# Patient Record
Sex: FEMALE | Race: OTHER | NOT HISPANIC OR LATINO
[De-identification: names, ages, dates, MRNs, and addresses within clinical notes are randomized per-mention and may not be internally consistent; named-entity substitution may affect disease eponyms.]

---

## 2018-07-26 ENCOUNTER — APPOINTMENT (OUTPATIENT)
Dept: OPHTHALMOLOGY | Facility: CLINIC | Age: 53
End: 2018-07-26
Payer: COMMERCIAL

## 2018-07-26 PROCEDURE — 92014 COMPRE OPH EXAM EST PT 1/>: CPT

## 2018-07-26 PROCEDURE — 92134 CPTRZ OPH DX IMG PST SGM RTA: CPT

## 2018-07-26 PROCEDURE — 92083 EXTENDED VISUAL FIELD XM: CPT

## 2019-05-07 ENCOUNTER — NON-APPOINTMENT (OUTPATIENT)
Age: 54
End: 2019-05-07

## 2019-05-07 ENCOUNTER — APPOINTMENT (OUTPATIENT)
Dept: OPHTHALMOLOGY | Facility: CLINIC | Age: 54
End: 2019-05-07
Payer: COMMERCIAL

## 2019-05-07 PROCEDURE — 92014 COMPRE OPH EXAM EST PT 1/>: CPT

## 2019-05-07 PROCEDURE — 92225: CPT | Mod: RT

## 2019-07-25 ENCOUNTER — NON-APPOINTMENT (OUTPATIENT)
Age: 54
End: 2019-07-25

## 2019-07-25 ENCOUNTER — APPOINTMENT (OUTPATIENT)
Dept: OPHTHALMOLOGY | Facility: CLINIC | Age: 54
End: 2019-07-25
Payer: COMMERCIAL

## 2019-07-25 PROCEDURE — 92134 CPTRZ OPH DX IMG PST SGM RTA: CPT

## 2019-07-25 PROCEDURE — 92083 EXTENDED VISUAL FIELD XM: CPT

## 2019-07-25 PROCEDURE — 92015 DETERMINE REFRACTIVE STATE: CPT

## 2019-07-25 PROCEDURE — 92012 INTRM OPH EXAM EST PATIENT: CPT

## 2019-10-24 ENCOUNTER — APPOINTMENT (OUTPATIENT)
Dept: OPHTHALMOLOGY | Facility: CLINIC | Age: 54
End: 2019-10-24

## 2022-01-21 ENCOUNTER — APPOINTMENT (OUTPATIENT)
Dept: PULMONOLOGY | Facility: CLINIC | Age: 57
End: 2022-01-21
Payer: COMMERCIAL

## 2022-01-21 VITALS
DIASTOLIC BLOOD PRESSURE: 80 MMHG | BODY MASS INDEX: 43.43 KG/M2 | OXYGEN SATURATION: 95 % | HEART RATE: 82 BPM | HEIGHT: 61 IN | TEMPERATURE: 97.5 F | SYSTOLIC BLOOD PRESSURE: 110 MMHG | WEIGHT: 230 LBS

## 2022-01-21 DIAGNOSIS — Z11.59 ENCOUNTER FOR SCREENING FOR OTHER VIRAL DISEASES: ICD-10-CM

## 2022-01-21 DIAGNOSIS — Z86.69 PERSONAL HISTORY OF OTHER DISEASES OF THE NERVOUS SYSTEM AND SENSE ORGANS: ICD-10-CM

## 2022-01-21 PROCEDURE — 99204 OFFICE O/P NEW MOD 45 MIN: CPT

## 2022-01-21 NOTE — HISTORY OF PRESENT ILLNESS
[Never] : never [TextBox_4] : She was hospitalized for asthma. She was in the hospital for about 3 days. She is feeling much better now. Using budesonide via nebulizer. Also using albuterol as needed. She is completing a prednisone taper.\par \par She has history of obstructive sleep apnea. Has not used CPAP in over one year. Her machine was recalled.

## 2022-01-21 NOTE — DISCUSSION/SUMMARY
[FreeTextEntry1] : She is a 56 year-old, never smoker, with a history of asthma and obstructive sleep apnea.\par \par For her apnea her CPAP was renewed. \par \par For her asthma she is to continue with budesonide and albuterol as needed. \par \par A PFT will be obtained after a nasopharyngeal swab for COVID-19-PCR has been obtained and the result is negative. \par \par Further recommendations to follow the results of the above. \par \par Follow up in one month. \par

## 2022-01-21 NOTE — REVIEW OF SYSTEMS
[SOB on Exertion] : sob on exertion [Fever] : no fever [Fatigue] : no fatigue [Nasal Congestion] : no nasal congestion [Cough] : no cough [Chest Tightness] : no chest tightness [Chest Discomfort] : no chest discomfort [GERD] : no gerd [Myalgias] : no myalgias [Rash] : no rash [History of Iron Deficiency] : no history of iron deficiency [Anxiety] : no anxiety [Diabetes] : no diabetes

## 2022-01-21 NOTE — PHYSICAL EXAM
[No Acute Distress] : no acute distress [No Neck Mass] : no neck mass [Normal S1, S2] : normal s1, s2 [Clear to Auscultation Bilaterally] : clear to auscultation bilaterally [No HSM] : no hsm [Normal Gait] : normal gait [No Cyanosis] : no cyanosis [No Edema] : no edema [Normal Turgor] : normal turgor [No Focal Deficits] : no focal deficits [Oriented x3] : oriented x3

## 2022-03-04 ENCOUNTER — APPOINTMENT (OUTPATIENT)
Dept: PULMONOLOGY | Facility: CLINIC | Age: 57
End: 2022-03-04

## 2022-03-05 ENCOUNTER — TRANSCRIPTION ENCOUNTER (OUTPATIENT)
Age: 57
End: 2022-03-05

## 2022-04-11 PROBLEM — Z11.59 SCREENING FOR VIRAL DISEASE: Status: ACTIVE | Noted: 2022-01-21

## 2022-07-28 ENCOUNTER — APPOINTMENT (OUTPATIENT)
Dept: OPHTHALMOLOGY | Facility: CLINIC | Age: 57
End: 2022-07-28

## 2022-09-01 ENCOUNTER — APPOINTMENT (OUTPATIENT)
Dept: OPHTHALMOLOGY | Facility: CLINIC | Age: 57
End: 2022-09-01

## 2022-09-01 ENCOUNTER — NON-APPOINTMENT (OUTPATIENT)
Age: 57
End: 2022-09-01

## 2022-09-01 PROCEDURE — 92015 DETERMINE REFRACTIVE STATE: CPT

## 2022-09-01 PROCEDURE — 92004 COMPRE OPH EXAM NEW PT 1/>: CPT

## 2023-03-01 ENCOUNTER — APPOINTMENT (OUTPATIENT)
Dept: PULMONOLOGY | Facility: CLINIC | Age: 58
End: 2023-03-01
Payer: COMMERCIAL

## 2023-03-01 VITALS
HEART RATE: 90 BPM | BODY MASS INDEX: 42.7 KG/M2 | WEIGHT: 226 LBS | TEMPERATURE: 98 F | DIASTOLIC BLOOD PRESSURE: 100 MMHG | OXYGEN SATURATION: 96 % | SYSTOLIC BLOOD PRESSURE: 172 MMHG

## 2023-03-01 DIAGNOSIS — J45.909 UNSPECIFIED ASTHMA, UNCOMPLICATED: ICD-10-CM

## 2023-03-01 DIAGNOSIS — G47.33 OBSTRUCTIVE SLEEP APNEA (ADULT) (PEDIATRIC): ICD-10-CM

## 2023-03-01 PROCEDURE — 94060 EVALUATION OF WHEEZING: CPT

## 2023-03-01 PROCEDURE — 94729 DIFFUSING CAPACITY: CPT

## 2023-03-01 PROCEDURE — 94727 GAS DIL/WSHOT DETER LNG VOL: CPT

## 2023-03-01 PROCEDURE — 99215 OFFICE O/P EST HI 40 MIN: CPT | Mod: 25

## 2023-03-01 NOTE — DISCUSSION/SUMMARY
[FreeTextEntry1] : She is a 58 year-old, never smoker, with a history of asthma and obstructive sleep apnea.\par \par She stated that she had a sleep study at [facility] in the past year. The lab was called. No record of her having a PSG. \par \par Will renew CPAP of 8. Obtain f/u PSG titration. To continue with albuterol as needed. Should to to hospital if feels somnolent or has any cardiac or neurologic symptoms.  \par \par Follow up in one month.

## 2023-03-01 NOTE — REVIEW OF SYSTEMS
[SOB on Exertion] : sob on exertion [Fever] : no fever [Fatigue] : no fatigue [Nasal Congestion] : no nasal congestion [Cough] : no cough [Chest Tightness] : no chest tightness [A.M. Dry Mouth] : no a.m. dry mouth [Chest Discomfort] : no chest discomfort [Nasal Discharge] : no nasal discharge [GERD] : no gerd [Myalgias] : no myalgias [Rash] : no rash [History of Iron Deficiency] : no history of iron deficiency [Diabetes] : no diabetes

## 2023-03-01 NOTE — HISTORY OF PRESENT ILLNESS
[Never] : never [Obstructive Sleep Apnea] : obstructive sleep apnea [TextBox_4] : Has h/o BRENDA. Was on CPAP but her machine was recalled. \par \par She stated that she had a sleep study at [facility] in the past year. The lab was called. No record of her having a PSG. \par \par  \par

## 2023-03-01 NOTE — PROCEDURE
[FreeTextEntry1] : PSG 7/31/15: Moderate BRENDA. AHI 21.9.\par \par PSG - CPAP 10/9/15: CPAP of 8 therapeutic. \par \par PFT 3/1/23: No obstruction. Moderate restriction. Moderate decrease in diffusion. Significant improvement after bronchodilator noted.

## 2023-03-13 ENCOUNTER — APPOINTMENT (OUTPATIENT)
Dept: PULMONOLOGY | Facility: CLINIC | Age: 58
End: 2023-03-13

## 2023-03-17 ENCOUNTER — APPOINTMENT (OUTPATIENT)
Dept: PULMONOLOGY | Facility: CLINIC | Age: 58
End: 2023-03-17
Payer: COMMERCIAL

## 2023-03-17 VITALS
SYSTOLIC BLOOD PRESSURE: 162 MMHG | HEART RATE: 91 BPM | DIASTOLIC BLOOD PRESSURE: 90 MMHG | BODY MASS INDEX: 41.95 KG/M2 | TEMPERATURE: 97.1 F | WEIGHT: 222 LBS | OXYGEN SATURATION: 95 %

## 2023-03-17 DIAGNOSIS — G47.30 SLEEP APNEA, UNSPECIFIED: ICD-10-CM

## 2023-03-17 PROCEDURE — 99213 OFFICE O/P EST LOW 20 MIN: CPT

## 2023-03-18 NOTE — HISTORY OF PRESENT ILLNESS
[Never] : never [Obstructive Sleep Apnea] : obstructive sleep apnea [TextBox_4] : Has h/o BRENDA. Was on CPAP but her machine was recalled. \par \par She stated that she had a sleep study at [facility] in the past year. The lab was called. No record of her having a PSG. \par \par The patient came for a scheduled follow up visit today. \par \par  \par

## 2023-03-18 NOTE — DISCUSSION/SUMMARY
[FreeTextEntry1] : She is a 58 year-old, never smoker, with a history of asthma and obstructive sleep apnea.\par \par Will renew CPAP of 8 and obtain CPAP titration. To continue with albuterol as needed.\par \par Follow up in two months.

## 2023-03-18 NOTE — PROCEDURE
[FreeTextEntry1] : PSG 7/31/15: Moderate BRENDA. AHI 21.9.\par \par PSG CPAP 10/9/15: CPAP of 8 therapeutic. \par \par PFT 3/1/23: No obstruction. Moderate restriction. Moderate decrease in diffusion. Significant improvement after bronchodilator noted.

## 2023-03-18 NOTE — PHYSICAL EXAM
[No Acute Distress] : no acute distress [No Neck Mass] : no neck mass [Normal S1, S2] : normal s1, s2 [Clear to Auscultation Bilaterally] : clear to auscultation bilaterally [No HSM] : no hsm [Normal Gait] : normal gait [No Cyanosis] : no cyanosis [No Edema] : no edema [Normal Turgor] : normal turgor [No Focal Deficits] : no focal deficits [Oriented x3] : oriented x3 [Not Tender] : not tender

## 2023-03-18 NOTE — REVIEW OF SYSTEMS
[SOB on Exertion] : sob on exertion [Fatigue] : fatigue [Nasal Congestion] : no nasal congestion [Cough] : no cough [Chest Tightness] : no chest tightness [A.M. Dry Mouth] : no a.m. dry mouth [Chest Discomfort] : no chest discomfort [GERD] : no gerd [Nasal Discharge] : no nasal discharge [Myalgias] : no myalgias [Rash] : no rash [History of Iron Deficiency] : no history of iron deficiency [Diabetes] : no diabetes

## 2023-03-20 ENCOUNTER — APPOINTMENT (OUTPATIENT)
Dept: PULMONOLOGY | Facility: CLINIC | Age: 58
End: 2023-03-20

## 2023-05-25 ENCOUNTER — APPOINTMENT (OUTPATIENT)
Dept: PULMONOLOGY | Facility: CLINIC | Age: 58
End: 2023-05-25

## 2023-11-14 ENCOUNTER — INPATIENT (INPATIENT)
Facility: HOSPITAL | Age: 58
LOS: 4 days | Discharge: ANOTHER HOSPITAL | End: 2023-11-19
Attending: HOSPITALIST | Admitting: HOSPITALIST
Payer: MEDICAID

## 2023-11-14 VITALS
OXYGEN SATURATION: 96 % | SYSTOLIC BLOOD PRESSURE: 156 MMHG | DIASTOLIC BLOOD PRESSURE: 94 MMHG | RESPIRATION RATE: 18 BRPM | TEMPERATURE: 99 F | HEART RATE: 76 BPM | HEIGHT: 62 IN | WEIGHT: 240.08 LBS

## 2023-11-14 LAB
ALBUMIN SERPL ELPH-MCNC: 3.3 G/DL — SIGNIFICANT CHANGE UP (ref 3.3–5)
ALBUMIN SERPL ELPH-MCNC: 3.3 G/DL — SIGNIFICANT CHANGE UP (ref 3.3–5)
ALP SERPL-CCNC: 60 U/L — SIGNIFICANT CHANGE UP (ref 40–120)
ALP SERPL-CCNC: 60 U/L — SIGNIFICANT CHANGE UP (ref 40–120)
ALT FLD-CCNC: 16 U/L — SIGNIFICANT CHANGE UP (ref 12–78)
ALT FLD-CCNC: 16 U/L — SIGNIFICANT CHANGE UP (ref 12–78)
ANION GAP SERPL CALC-SCNC: 5 MMOL/L — SIGNIFICANT CHANGE UP (ref 5–17)
ANION GAP SERPL CALC-SCNC: 5 MMOL/L — SIGNIFICANT CHANGE UP (ref 5–17)
AST SERPL-CCNC: 21 U/L — SIGNIFICANT CHANGE UP (ref 15–37)
AST SERPL-CCNC: 21 U/L — SIGNIFICANT CHANGE UP (ref 15–37)
BASOPHILS # BLD AUTO: 0.02 K/UL — SIGNIFICANT CHANGE UP (ref 0–0.2)
BASOPHILS # BLD AUTO: 0.02 K/UL — SIGNIFICANT CHANGE UP (ref 0–0.2)
BASOPHILS NFR BLD AUTO: 0.3 % — SIGNIFICANT CHANGE UP (ref 0–2)
BASOPHILS NFR BLD AUTO: 0.3 % — SIGNIFICANT CHANGE UP (ref 0–2)
BILIRUB SERPL-MCNC: 0.6 MG/DL — SIGNIFICANT CHANGE UP (ref 0.2–1.2)
BILIRUB SERPL-MCNC: 0.6 MG/DL — SIGNIFICANT CHANGE UP (ref 0.2–1.2)
BUN SERPL-MCNC: 24 MG/DL — HIGH (ref 7–23)
BUN SERPL-MCNC: 24 MG/DL — HIGH (ref 7–23)
CALCIUM SERPL-MCNC: 9.4 MG/DL — SIGNIFICANT CHANGE UP (ref 8.5–10.1)
CALCIUM SERPL-MCNC: 9.4 MG/DL — SIGNIFICANT CHANGE UP (ref 8.5–10.1)
CHLORIDE SERPL-SCNC: 102 MMOL/L — SIGNIFICANT CHANGE UP (ref 96–108)
CHLORIDE SERPL-SCNC: 102 MMOL/L — SIGNIFICANT CHANGE UP (ref 96–108)
CO2 SERPL-SCNC: 34 MMOL/L — HIGH (ref 22–31)
CO2 SERPL-SCNC: 34 MMOL/L — HIGH (ref 22–31)
CREAT SERPL-MCNC: 1.79 MG/DL — HIGH (ref 0.5–1.3)
CREAT SERPL-MCNC: 1.79 MG/DL — HIGH (ref 0.5–1.3)
EGFR: 32 ML/MIN/1.73M2 — LOW
EGFR: 32 ML/MIN/1.73M2 — LOW
EOSINOPHIL # BLD AUTO: 0.13 K/UL — SIGNIFICANT CHANGE UP (ref 0–0.5)
EOSINOPHIL # BLD AUTO: 0.13 K/UL — SIGNIFICANT CHANGE UP (ref 0–0.5)
EOSINOPHIL NFR BLD AUTO: 2.1 % — SIGNIFICANT CHANGE UP (ref 0–6)
EOSINOPHIL NFR BLD AUTO: 2.1 % — SIGNIFICANT CHANGE UP (ref 0–6)
GLUCOSE SERPL-MCNC: 104 MG/DL — HIGH (ref 70–99)
GLUCOSE SERPL-MCNC: 104 MG/DL — HIGH (ref 70–99)
HCG SERPL-ACNC: 1 MIU/ML — SIGNIFICANT CHANGE UP
HCG SERPL-ACNC: 1 MIU/ML — SIGNIFICANT CHANGE UP
HCT VFR BLD CALC: 30 % — LOW (ref 34.5–45)
HCT VFR BLD CALC: 30 % — LOW (ref 34.5–45)
HGB BLD-MCNC: 10.4 G/DL — LOW (ref 11.5–15.5)
HGB BLD-MCNC: 10.4 G/DL — LOW (ref 11.5–15.5)
IMM GRANULOCYTES NFR BLD AUTO: 0.2 % — SIGNIFICANT CHANGE UP (ref 0–0.9)
IMM GRANULOCYTES NFR BLD AUTO: 0.2 % — SIGNIFICANT CHANGE UP (ref 0–0.9)
LYMPHOCYTES # BLD AUTO: 1.94 K/UL — SIGNIFICANT CHANGE UP (ref 1–3.3)
LYMPHOCYTES # BLD AUTO: 1.94 K/UL — SIGNIFICANT CHANGE UP (ref 1–3.3)
LYMPHOCYTES # BLD AUTO: 30.8 % — SIGNIFICANT CHANGE UP (ref 13–44)
LYMPHOCYTES # BLD AUTO: 30.8 % — SIGNIFICANT CHANGE UP (ref 13–44)
MCHC RBC-ENTMCNC: 29 PG — SIGNIFICANT CHANGE UP (ref 27–34)
MCHC RBC-ENTMCNC: 29 PG — SIGNIFICANT CHANGE UP (ref 27–34)
MCHC RBC-ENTMCNC: 34.7 G/DL — SIGNIFICANT CHANGE UP (ref 32–36)
MCHC RBC-ENTMCNC: 34.7 G/DL — SIGNIFICANT CHANGE UP (ref 32–36)
MCV RBC AUTO: 83.6 FL — SIGNIFICANT CHANGE UP (ref 80–100)
MCV RBC AUTO: 83.6 FL — SIGNIFICANT CHANGE UP (ref 80–100)
MONOCYTES # BLD AUTO: 1.07 K/UL — HIGH (ref 0–0.9)
MONOCYTES # BLD AUTO: 1.07 K/UL — HIGH (ref 0–0.9)
MONOCYTES NFR BLD AUTO: 17 % — HIGH (ref 2–14)
MONOCYTES NFR BLD AUTO: 17 % — HIGH (ref 2–14)
NEUTROPHILS # BLD AUTO: 3.13 K/UL — SIGNIFICANT CHANGE UP (ref 1.8–7.4)
NEUTROPHILS # BLD AUTO: 3.13 K/UL — SIGNIFICANT CHANGE UP (ref 1.8–7.4)
NEUTROPHILS NFR BLD AUTO: 49.6 % — SIGNIFICANT CHANGE UP (ref 43–77)
NEUTROPHILS NFR BLD AUTO: 49.6 % — SIGNIFICANT CHANGE UP (ref 43–77)
NRBC # BLD: 0 /100 WBCS — SIGNIFICANT CHANGE UP (ref 0–0)
NRBC # BLD: 0 /100 WBCS — SIGNIFICANT CHANGE UP (ref 0–0)
PLATELET # BLD AUTO: 347 K/UL — SIGNIFICANT CHANGE UP (ref 150–400)
PLATELET # BLD AUTO: 347 K/UL — SIGNIFICANT CHANGE UP (ref 150–400)
POTASSIUM SERPL-MCNC: 3.1 MMOL/L — LOW (ref 3.5–5.3)
POTASSIUM SERPL-MCNC: 3.1 MMOL/L — LOW (ref 3.5–5.3)
POTASSIUM SERPL-SCNC: 3.1 MMOL/L — LOW (ref 3.5–5.3)
POTASSIUM SERPL-SCNC: 3.1 MMOL/L — LOW (ref 3.5–5.3)
PROT SERPL-MCNC: 7.9 GM/DL — SIGNIFICANT CHANGE UP (ref 6–8.3)
PROT SERPL-MCNC: 7.9 GM/DL — SIGNIFICANT CHANGE UP (ref 6–8.3)
RBC # BLD: 3.59 M/UL — LOW (ref 3.8–5.2)
RBC # BLD: 3.59 M/UL — LOW (ref 3.8–5.2)
RBC # FLD: 12.9 % — SIGNIFICANT CHANGE UP (ref 10.3–14.5)
RBC # FLD: 12.9 % — SIGNIFICANT CHANGE UP (ref 10.3–14.5)
SODIUM SERPL-SCNC: 141 MMOL/L — SIGNIFICANT CHANGE UP (ref 135–145)
SODIUM SERPL-SCNC: 141 MMOL/L — SIGNIFICANT CHANGE UP (ref 135–145)
TSH SERPL-MCNC: 1.04 UIU/ML — SIGNIFICANT CHANGE UP (ref 0.36–3.74)
TSH SERPL-MCNC: 1.04 UIU/ML — SIGNIFICANT CHANGE UP (ref 0.36–3.74)
WBC # BLD: 6.3 K/UL — SIGNIFICANT CHANGE UP (ref 3.8–10.5)
WBC # BLD: 6.3 K/UL — SIGNIFICANT CHANGE UP (ref 3.8–10.5)
WBC # FLD AUTO: 6.3 K/UL — SIGNIFICANT CHANGE UP (ref 3.8–10.5)
WBC # FLD AUTO: 6.3 K/UL — SIGNIFICANT CHANGE UP (ref 3.8–10.5)

## 2023-11-14 PROCEDURE — 99285 EMERGENCY DEPT VISIT HI MDM: CPT | Mod: 25

## 2023-11-14 PROCEDURE — 70450 CT HEAD/BRAIN W/O DYE: CPT | Mod: 26,MA

## 2023-11-14 RX ORDER — SODIUM CHLORIDE 9 MG/ML
1000 INJECTION INTRAMUSCULAR; INTRAVENOUS; SUBCUTANEOUS ONCE
Refills: 0 | Status: COMPLETED | OUTPATIENT
Start: 2023-11-14 | End: 2023-11-14

## 2023-11-14 RX ADMIN — SODIUM CHLORIDE 1000 MILLILITER(S): 9 INJECTION INTRAMUSCULAR; INTRAVENOUS; SUBCUTANEOUS at 22:32

## 2023-11-14 NOTE — ED PROVIDER NOTE - PHYSICAL EXAMINATION
Vitals: HTN at 156/94, otherwise WNL  Gen: AAOx2, pleasantly confused, NAD, sitting comfortably in stretcher  Head: ncat, perrla, eomi b/l  Neck: supple, no lymphadenopathy, no midline deviation  Heart: rrr, no m/r/g  Lungs: CTA b/l, no rales/ronchi/wheezes  Abd: soft, nontender, non-distended, no rebound or guarding  Ext: no clubbing/cyanosis/edema  Neuro: sensation and muscle strength intact b/l, steady gait, no focal weakness or sensory loss, CN2-12 intact b/l

## 2023-11-14 NOTE — ED PROVIDER NOTE - PROGRESS NOTE DETAILS
EKG shows concerning TWIs, unknown age, no prior EKG to compare to  pt. denies chest pain, but history is unreliable given probable dementia  will admit to tele for further care, trop trending, family pickup when medically cleared

## 2023-11-14 NOTE — ED ADULT TRIAGE NOTE - CHIEF COMPLAINT QUOTE
bibems from home for AMS.  Per EMS, pt lives by herself, a neighbor overheard pt looking for mom whom currently is in [place].  Pt was also confused as to what apt she lives when EMS got there.  FS in triage 115, Pt AOx3 in triage, denies any pain or discomfort.  no neuro deficits noted, pt ambulatory on scene.   hx of HTN, dementia.  Nkda.

## 2023-11-14 NOTE — ED ADULT NURSE NOTE - NSFALLUNIVINTERV_ED_ALL_ED
Bed/Stretcher in lowest position, wheels locked, appropriate side rails in place/Call bell, personal items and telephone in reach/Instruct patient to call for assistance before getting out of bed/chair/stretcher/Non-slip footwear applied when patient is off stretcher/[place] to call system/Physically safe environment - no spills, clutter or unnecessary equipment/Purposeful proactive rounding/Room/bathroom lighting operational, light cord in reach

## 2023-11-14 NOTE — ED PROVIDER NOTE - CARE PLAN
Principal Discharge DX:	Confusion   1 Principal Discharge DX:	Confusion  Secondary Diagnosis:	Abnormal EKG  Secondary Diagnosis:	Hypertensive urgency

## 2023-11-14 NOTE — ED ADULT NURSE NOTE - CHIEF COMPLAINT QUOTE
bibems from home for AMS.  Per EMS, pt lives by herself, a neighbor overheard pt looking for mom whom currently is in [place].  Pt was also confused as to what apt she lives when EMS got there.  FS in triage 115, Pt AOx3 in triage, denies any pain or discomfort.  no neuro deficits noted, pt ambulatory on scene.   hx of HTN, dementia.  Nkda.

## 2023-11-14 NOTE — ED ADULT NURSE NOTE - OBJECTIVE STATEMENT
bibems from home for AMS.  Per EMS, pt lives by herself, a neighbor overheard pt looking for mom whom currently is in [place].  Pt was also confused as to what apt she lives when EMS got there per triage note. Pt AOx3 at time of assessment. denies any pain or discomfort.  no neuro deficits noted, pt ambulatory on scene.   hx of HTN, dementia.  Nkda.

## 2023-11-14 NOTE — ED PROVIDER NOTE - CLINICAL SUMMARY MEDICAL DECISION MAKING FREE TEXT BOX
57 yo F with questionable AMS, vs baseline dementia, concerning for UTI, doubt CVA, doubt hypothyroid  -cbc, cmp, ua, cx, hcg, finger stick, tsh, CT brain, ekg, iv, hydration  -f/u results, reeval

## 2023-11-14 NOTE — ED PROVIDER NOTE - OBJECTIVE STATEMENT
57 yo F bibems after being found wandering/confused around apartment complex.  Pt. currently has no complaints.  She explains that she heard a voice or a scream--someone calling from outside so she went out to investigate.  pt. is unsure of her address at this time.  She says she lives with sister at home, but there was a family event and they were out for the night.  No other complaints.   ROS: negative for fever, cough, headache, chest pain, shortness of breath, abd pain, nausea, vomiting, diarrhea, rash, paresthesia, and weakness--all other systems reviewed are negative.   PMH: HTN, Dementia; Meds: Denies; SH: Denies smoking/drinking/drug use

## 2023-11-15 DIAGNOSIS — R41.82 ALTERED MENTAL STATUS, UNSPECIFIED: ICD-10-CM

## 2023-11-15 DIAGNOSIS — I16.0 HYPERTENSIVE URGENCY: ICD-10-CM

## 2023-11-15 DIAGNOSIS — R93.0 ABNORMAL FINDINGS ON DIAGNOSTIC IMAGING OF SKULL AND HEAD, NOT ELSEWHERE CLASSIFIED: ICD-10-CM

## 2023-11-15 DIAGNOSIS — N17.9 ACUTE KIDNEY FAILURE, UNSPECIFIED: ICD-10-CM

## 2023-11-15 LAB
ALBUMIN SERPL ELPH-MCNC: 3.3 G/DL — SIGNIFICANT CHANGE UP (ref 3.3–5)
ALBUMIN SERPL ELPH-MCNC: 3.3 G/DL — SIGNIFICANT CHANGE UP (ref 3.3–5)
ALP SERPL-CCNC: 61 U/L — SIGNIFICANT CHANGE UP (ref 40–120)
ALP SERPL-CCNC: 61 U/L — SIGNIFICANT CHANGE UP (ref 40–120)
ALT FLD-CCNC: 17 U/L — SIGNIFICANT CHANGE UP (ref 12–78)
ALT FLD-CCNC: 17 U/L — SIGNIFICANT CHANGE UP (ref 12–78)
ANION GAP SERPL CALC-SCNC: 7 MMOL/L — SIGNIFICANT CHANGE UP (ref 5–17)
ANION GAP SERPL CALC-SCNC: 7 MMOL/L — SIGNIFICANT CHANGE UP (ref 5–17)
AST SERPL-CCNC: 15 U/L — SIGNIFICANT CHANGE UP (ref 15–37)
AST SERPL-CCNC: 15 U/L — SIGNIFICANT CHANGE UP (ref 15–37)
BILIRUB SERPL-MCNC: 1 MG/DL — SIGNIFICANT CHANGE UP (ref 0.2–1.2)
BILIRUB SERPL-MCNC: 1 MG/DL — SIGNIFICANT CHANGE UP (ref 0.2–1.2)
BUN SERPL-MCNC: 19 MG/DL — SIGNIFICANT CHANGE UP (ref 7–23)
BUN SERPL-MCNC: 19 MG/DL — SIGNIFICANT CHANGE UP (ref 7–23)
CALCIUM SERPL-MCNC: 9 MG/DL — SIGNIFICANT CHANGE UP (ref 8.5–10.1)
CALCIUM SERPL-MCNC: 9 MG/DL — SIGNIFICANT CHANGE UP (ref 8.5–10.1)
CHLORIDE SERPL-SCNC: 105 MMOL/L — SIGNIFICANT CHANGE UP (ref 96–108)
CHLORIDE SERPL-SCNC: 105 MMOL/L — SIGNIFICANT CHANGE UP (ref 96–108)
CK MB BLD-MCNC: <1.2 % — SIGNIFICANT CHANGE UP (ref 0–3.5)
CK MB BLD-MCNC: <1.2 % — SIGNIFICANT CHANGE UP (ref 0–3.5)
CK MB CFR SERPL CALC: <1 NG/ML — SIGNIFICANT CHANGE UP (ref 0.5–3.6)
CK MB CFR SERPL CALC: <1 NG/ML — SIGNIFICANT CHANGE UP (ref 0.5–3.6)
CK SERPL-CCNC: 82 U/L — SIGNIFICANT CHANGE UP (ref 26–192)
CK SERPL-CCNC: 82 U/L — SIGNIFICANT CHANGE UP (ref 26–192)
CO2 SERPL-SCNC: 32 MMOL/L — HIGH (ref 22–31)
CO2 SERPL-SCNC: 32 MMOL/L — HIGH (ref 22–31)
CREAT SERPL-MCNC: 1.34 MG/DL — HIGH (ref 0.5–1.3)
CREAT SERPL-MCNC: 1.34 MG/DL — HIGH (ref 0.5–1.3)
EGFR: 45 ML/MIN/1.73M2 — LOW
EGFR: 45 ML/MIN/1.73M2 — LOW
FOLATE SERPL-MCNC: 16.5 NG/ML — SIGNIFICANT CHANGE UP
FOLATE SERPL-MCNC: 16.5 NG/ML — SIGNIFICANT CHANGE UP
GLUCOSE SERPL-MCNC: 97 MG/DL — SIGNIFICANT CHANGE UP (ref 70–99)
GLUCOSE SERPL-MCNC: 97 MG/DL — SIGNIFICANT CHANGE UP (ref 70–99)
HCT VFR BLD CALC: 29.1 % — LOW (ref 34.5–45)
HCT VFR BLD CALC: 29.1 % — LOW (ref 34.5–45)
HGB BLD-MCNC: 10.3 G/DL — LOW (ref 11.5–15.5)
HGB BLD-MCNC: 10.3 G/DL — LOW (ref 11.5–15.5)
MAGNESIUM SERPL-MCNC: 2 MG/DL — SIGNIFICANT CHANGE UP (ref 1.6–2.6)
MAGNESIUM SERPL-MCNC: 2 MG/DL — SIGNIFICANT CHANGE UP (ref 1.6–2.6)
MCHC RBC-ENTMCNC: 29.5 PG — SIGNIFICANT CHANGE UP (ref 27–34)
MCHC RBC-ENTMCNC: 29.5 PG — SIGNIFICANT CHANGE UP (ref 27–34)
MCHC RBC-ENTMCNC: 35.4 G/DL — SIGNIFICANT CHANGE UP (ref 32–36)
MCHC RBC-ENTMCNC: 35.4 G/DL — SIGNIFICANT CHANGE UP (ref 32–36)
MCV RBC AUTO: 83.4 FL — SIGNIFICANT CHANGE UP (ref 80–100)
MCV RBC AUTO: 83.4 FL — SIGNIFICANT CHANGE UP (ref 80–100)
NRBC # BLD: 0 /100 WBCS — SIGNIFICANT CHANGE UP (ref 0–0)
NRBC # BLD: 0 /100 WBCS — SIGNIFICANT CHANGE UP (ref 0–0)
PHOSPHATE SERPL-MCNC: 1.8 MG/DL — LOW (ref 2.5–4.5)
PHOSPHATE SERPL-MCNC: 1.8 MG/DL — LOW (ref 2.5–4.5)
PLATELET # BLD AUTO: 323 K/UL — SIGNIFICANT CHANGE UP (ref 150–400)
PLATELET # BLD AUTO: 323 K/UL — SIGNIFICANT CHANGE UP (ref 150–400)
POTASSIUM SERPL-MCNC: 2.7 MMOL/L — CRITICAL LOW (ref 3.5–5.3)
POTASSIUM SERPL-MCNC: 2.7 MMOL/L — CRITICAL LOW (ref 3.5–5.3)
POTASSIUM SERPL-SCNC: 2.7 MMOL/L — CRITICAL LOW (ref 3.5–5.3)
POTASSIUM SERPL-SCNC: 2.7 MMOL/L — CRITICAL LOW (ref 3.5–5.3)
PROT SERPL-MCNC: 7.8 GM/DL — SIGNIFICANT CHANGE UP (ref 6–8.3)
PROT SERPL-MCNC: 7.8 GM/DL — SIGNIFICANT CHANGE UP (ref 6–8.3)
RBC # BLD: 3.49 M/UL — LOW (ref 3.8–5.2)
RBC # BLD: 3.49 M/UL — LOW (ref 3.8–5.2)
RBC # FLD: 12.9 % — SIGNIFICANT CHANGE UP (ref 10.3–14.5)
RBC # FLD: 12.9 % — SIGNIFICANT CHANGE UP (ref 10.3–14.5)
SODIUM SERPL-SCNC: 144 MMOL/L — SIGNIFICANT CHANGE UP (ref 135–145)
SODIUM SERPL-SCNC: 144 MMOL/L — SIGNIFICANT CHANGE UP (ref 135–145)
TROPONIN I, HIGH SENSITIVITY RESULT: 15.3 NG/L — SIGNIFICANT CHANGE UP
TROPONIN I, HIGH SENSITIVITY RESULT: 15.3 NG/L — SIGNIFICANT CHANGE UP
TROPONIN I, HIGH SENSITIVITY RESULT: 15.4 NG/L — SIGNIFICANT CHANGE UP
TROPONIN I, HIGH SENSITIVITY RESULT: 15.4 NG/L — SIGNIFICANT CHANGE UP
TROPONIN I, HIGH SENSITIVITY RESULT: 17.4 NG/L — SIGNIFICANT CHANGE UP
TROPONIN I, HIGH SENSITIVITY RESULT: 17.4 NG/L — SIGNIFICANT CHANGE UP
VIT B12 SERPL-MCNC: 252 PG/ML — SIGNIFICANT CHANGE UP (ref 232–1245)
VIT B12 SERPL-MCNC: 252 PG/ML — SIGNIFICANT CHANGE UP (ref 232–1245)
WBC # BLD: 5.85 K/UL — SIGNIFICANT CHANGE UP (ref 3.8–10.5)
WBC # BLD: 5.85 K/UL — SIGNIFICANT CHANGE UP (ref 3.8–10.5)
WBC # FLD AUTO: 5.85 K/UL — SIGNIFICANT CHANGE UP (ref 3.8–10.5)
WBC # FLD AUTO: 5.85 K/UL — SIGNIFICANT CHANGE UP (ref 3.8–10.5)

## 2023-11-15 PROCEDURE — 70450 CT HEAD/BRAIN W/O DYE: CPT | Mod: 26

## 2023-11-15 PROCEDURE — 71045 X-RAY EXAM CHEST 1 VIEW: CPT | Mod: 26

## 2023-11-15 PROCEDURE — 99222 1ST HOSP IP/OBS MODERATE 55: CPT

## 2023-11-15 PROCEDURE — 93010 ELECTROCARDIOGRAM REPORT: CPT

## 2023-11-15 PROCEDURE — 93306 TTE W/DOPPLER COMPLETE: CPT | Mod: 26

## 2023-11-15 RX ORDER — LANOLIN ALCOHOL/MO/W.PET/CERES
3 CREAM (GRAM) TOPICAL AT BEDTIME
Refills: 0 | Status: DISCONTINUED | OUTPATIENT
Start: 2023-11-15 | End: 2023-11-19

## 2023-11-15 RX ORDER — HYDRALAZINE HCL 50 MG
5 TABLET ORAL ONCE
Refills: 0 | Status: COMPLETED | OUTPATIENT
Start: 2023-11-15 | End: 2023-11-15

## 2023-11-15 RX ORDER — HYDRALAZINE HCL 50 MG
10 TABLET ORAL ONCE
Refills: 0 | Status: COMPLETED | OUTPATIENT
Start: 2023-11-15 | End: 2023-11-15

## 2023-11-15 RX ORDER — LOSARTAN POTASSIUM 100 MG/1
100 TABLET, FILM COATED ORAL DAILY
Refills: 0 | Status: DISCONTINUED | OUTPATIENT
Start: 2023-11-15 | End: 2023-11-15

## 2023-11-15 RX ORDER — ALPRAZOLAM 0.25 MG
0.25 TABLET ORAL DAILY
Refills: 0 | Status: DISCONTINUED | OUTPATIENT
Start: 2023-11-15 | End: 2023-11-17

## 2023-11-15 RX ORDER — ACETAMINOPHEN 500 MG
650 TABLET ORAL EVERY 6 HOURS
Refills: 0 | Status: DISCONTINUED | OUTPATIENT
Start: 2023-11-15 | End: 2023-11-19

## 2023-11-15 RX ORDER — POTASSIUM CHLORIDE 20 MEQ
40 PACKET (EA) ORAL ONCE
Refills: 0 | Status: COMPLETED | OUTPATIENT
Start: 2023-11-15 | End: 2023-11-15

## 2023-11-15 RX ORDER — AMLODIPINE BESYLATE 2.5 MG/1
10 TABLET ORAL DAILY
Refills: 0 | Status: DISCONTINUED | OUTPATIENT
Start: 2023-11-16 | End: 2023-11-19

## 2023-11-15 RX ORDER — HYDRALAZINE HCL 50 MG
25 TABLET ORAL EVERY 6 HOURS
Refills: 0 | Status: DISCONTINUED | OUTPATIENT
Start: 2023-11-15 | End: 2023-11-17

## 2023-11-15 RX ORDER — POTASSIUM PHOSPHATE, MONOBASIC POTASSIUM PHOSPHATE, DIBASIC 236; 224 MG/ML; MG/ML
15 INJECTION, SOLUTION INTRAVENOUS ONCE
Refills: 0 | Status: COMPLETED | OUTPATIENT
Start: 2023-11-15 | End: 2023-11-15

## 2023-11-15 RX ORDER — AMLODIPINE BESYLATE 2.5 MG/1
5 TABLET ORAL DAILY
Refills: 0 | Status: DISCONTINUED | OUTPATIENT
Start: 2023-11-15 | End: 2023-11-15

## 2023-11-15 RX ORDER — POTASSIUM CHLORIDE 20 MEQ
10 PACKET (EA) ORAL
Refills: 0 | Status: COMPLETED | OUTPATIENT
Start: 2023-11-15 | End: 2023-11-15

## 2023-11-15 RX ADMIN — Medication 0.25 MILLIGRAM(S): at 10:56

## 2023-11-15 RX ADMIN — Medication 100 MILLIEQUIVALENT(S): at 15:13

## 2023-11-15 RX ADMIN — POTASSIUM PHOSPHATE, MONOBASIC POTASSIUM PHOSPHATE, DIBASIC 62.5 MILLIMOLE(S): 236; 224 INJECTION, SOLUTION INTRAVENOUS at 21:58

## 2023-11-15 RX ADMIN — Medication 100 MILLIEQUIVALENT(S): at 12:50

## 2023-11-15 RX ADMIN — Medication 100 MILLIEQUIVALENT(S): at 16:40

## 2023-11-15 RX ADMIN — Medication 25 MILLIGRAM(S): at 11:57

## 2023-11-15 RX ADMIN — LOSARTAN POTASSIUM 100 MILLIGRAM(S): 100 TABLET, FILM COATED ORAL at 08:11

## 2023-11-15 RX ADMIN — Medication 40 MILLIEQUIVALENT(S): at 03:06

## 2023-11-15 RX ADMIN — Medication 5 MILLIGRAM(S): at 09:44

## 2023-11-15 RX ADMIN — AMLODIPINE BESYLATE 5 MILLIGRAM(S): 2.5 TABLET ORAL at 10:56

## 2023-11-15 RX ADMIN — Medication 25 MILLIGRAM(S): at 17:33

## 2023-11-15 NOTE — H&P ADULT - PROBLEM SELECTOR PLAN 3
Amlodipine 10mg daily  Hydralazine prn  If Cr stable/improve and BP remain elevated, will resume losartan

## 2023-11-15 NOTE — PHYSICAL THERAPY INITIAL EVALUATION ADULT - ADDITIONAL COMMENTS
Per brother at bedside. Pt lives alone in apartment. Has elevators to second floor. Pt was independent prior to admission

## 2023-11-15 NOTE — PHYSICAL THERAPY INITIAL EVALUATION ADULT - PERTINENT HX OF CURRENT PROBLEM, REHAB EVAL
Per H&P, Pt is a 58 years old female with h/o HTN, RA was brought in to ED with concern for AMS. Neighbor overhead that patient was looking for mom, wondering/confused around apartment coplex. Per patient, she live with sister but is not able to provide with phone number. Patient denied any discomfort.

## 2023-11-15 NOTE — PATIENT PROFILE ADULT - FALL HARM RISK - HARM RISK INTERVENTIONS

## 2023-11-15 NOTE — H&P ADULT - NSHPPHYSICALEXAM_GEN_ALL_CORE
CONSTITUTIONAL: alert and cooperative, no acute distress.  EYES: PERRL, EOMI  ENT: Mucosa moist, tongue normal  NECK: Neck supple, trachea midline, non-tender  CARDIAC: Normal S1 and S2. Regular rate and rhythms. No Pedal edema.  LUNGS: Equal air entry both lungs. No rales, rhonchi, wheezing. Normal respiratory effort.   ABDOMEN: Soft, nondistended, nontender. No guarding or rebound tenderness. No hepatomegaly or splenomegaly. Bowel sound normal.   MUSCULOSKELETAL: Normocephalic, atraumatic. No significant deformity or joint abnormality  NEUROLOGICAL: No gross motor or sensory deficits  SKIN: no lesions or eruptions. Normal turgor  PSYCHIATRIC: A&O x 1-2, appear demented

## 2023-11-15 NOTE — H&P ADULT - NSHPADDITIONALINFOADULT_GEN_ALL_CORE
EKG  ( I personally review) with NSR, inferolateral ST-T changes. No chest pain, HsTnT negative. No prior EKG to compare. Will get ECHO

## 2023-11-15 NOTE — H&P ADULT - ASSESSMENT
58 years old female with h/o HTN, RA was brought in to ED with concern for AMS. Neighbor overhead that patient was looking for mom, wondering/confused around apartment coplex. Per patient, she live with sister but is not able to provide with phone number. Patient denied any discomfort.   Hypertensive, afebrile, sat well at . No leukocytosis, plt 347, hsTnT 15.4--> 17.4, Cr 1.79, TSH 1.040. CT head noted small hyperdensity at the right thalamocapsular junction on thinner slices, which may represent dystrophic calcification or residual blood products from prior hemorrhage (given surrounding hypodensity, possibly gliosis or residual edema). Acute hemorrhage is considered less likely as it is not prominent on the thicker slice. EKG with NSR, inferolateral ST-T changes.

## 2023-11-15 NOTE — H&P ADULT - NSHPREVIEWOFSYSTEMS_GEN_ALL_CORE
CONSTITUTIONAL: No fever, chills or weight loss.  EYES: No eye pain. No vision changes  ENT:  No hearing changes or pain,  Cardiovascular:  No Chest Pain, palpitation, SOB orthopnea or PND  Respiratory:  No cough, SOB or wheezing.  Gastrointestinal:  No nausea, vomiting, diarrhea, constipation or abdominal pain.  Genitourinary: No dysuria, frequency or hematuria  Musculoskeletal:  No myalgia or joint pain  Skin:  No skin lesion, rash or pruritis  Neuro:  No weakness, numbness, loss of consciousness

## 2023-11-15 NOTE — H&P ADULT - PROBLEM SELECTOR PLAN 1
brought in to ED with concern for AMS, found wondering in apartment complex  Patient unable to provide any contact information  No leukocytosis, afebrile,  No focal neurological weakness. TSH normal   Suspect underlying dementia  Social work consult - unable to reach family  Vitamin B12, folate  PT consult

## 2023-11-15 NOTE — H&P ADULT - PROBLEM SELECTOR PLAN 2
elevated Cr  KRUNAL vs CKD  Received 1L fluid in ED  monitor serum Cr. Hold if ARB for now. If Cr remain stable, will then resume

## 2023-11-15 NOTE — H&P ADULT - PROBLEM SELECTOR PLAN 4
CT head  ( I personally review) noted small hyperdensity at the right thalamocapsular junction on thinner slices, which may represent dystrophic calcification or residual blood products from prior hemorrhage (given surrounding hypodensity, possibly gliosis or residual edema). Acute hemorrhage is considered less likely as it is not prominent on the thicker slice  No focal neurological weakness  Repeat CT head for interval change  SW consult- unable to reach any family

## 2023-11-15 NOTE — H&P ADULT - HISTORY OF PRESENT ILLNESS
58 years old female with h/o HTN, RA was brought in to ED with concern for AMS. Neighbor overhead that patient was looking for mom, wondering/confused around apartment coplex. Per patient, she live with sister but is not able to provide with phone number. Patient denied any discomfort.   Hypertensive, afebrile, sat well at . No leukocytosis, plt 347, hsTnT 15.4--> 17.4, Cr 1.79, TSH 1.040. CT head noted small hyperdensity at the right thalamocapsular junction on thinner slices, which may represent dystrophic calcification or residual blood products from prior hemorrhage (given surrounding hypodensity, possibly gliosis or residual edema). Acute hemorrhage is considered less likely as it is not prominent on the thicker slice. EKG with NSR, inferolateral ST-T changes.    SH: no toxic habits

## 2023-11-16 LAB
ALBUMIN SERPL ELPH-MCNC: 3 G/DL — LOW (ref 3.3–5)
ALBUMIN SERPL ELPH-MCNC: 3 G/DL — LOW (ref 3.3–5)
ALP SERPL-CCNC: 65 U/L — SIGNIFICANT CHANGE UP (ref 40–120)
ALP SERPL-CCNC: 65 U/L — SIGNIFICANT CHANGE UP (ref 40–120)
ALT FLD-CCNC: 14 U/L — SIGNIFICANT CHANGE UP (ref 12–78)
ALT FLD-CCNC: 14 U/L — SIGNIFICANT CHANGE UP (ref 12–78)
AMPHET UR-MCNC: NEGATIVE — SIGNIFICANT CHANGE UP
AMPHET UR-MCNC: NEGATIVE — SIGNIFICANT CHANGE UP
ANION GAP SERPL CALC-SCNC: 4 MMOL/L — LOW (ref 5–17)
APPEARANCE UR: CLEAR — SIGNIFICANT CHANGE UP
APPEARANCE UR: CLEAR — SIGNIFICANT CHANGE UP
AST SERPL-CCNC: 15 U/L — SIGNIFICANT CHANGE UP (ref 15–37)
AST SERPL-CCNC: 15 U/L — SIGNIFICANT CHANGE UP (ref 15–37)
BACTERIA # UR AUTO: ABNORMAL /HPF
BACTERIA # UR AUTO: ABNORMAL /HPF
BARBITURATES UR SCN-MCNC: NEGATIVE — SIGNIFICANT CHANGE UP
BARBITURATES UR SCN-MCNC: NEGATIVE — SIGNIFICANT CHANGE UP
BENZODIAZ UR-MCNC: NEGATIVE — SIGNIFICANT CHANGE UP
BENZODIAZ UR-MCNC: NEGATIVE — SIGNIFICANT CHANGE UP
BILIRUB SERPL-MCNC: 0.6 MG/DL — SIGNIFICANT CHANGE UP (ref 0.2–1.2)
BILIRUB SERPL-MCNC: 0.6 MG/DL — SIGNIFICANT CHANGE UP (ref 0.2–1.2)
BILIRUB UR-MCNC: NEGATIVE — SIGNIFICANT CHANGE UP
BILIRUB UR-MCNC: NEGATIVE — SIGNIFICANT CHANGE UP
BUN SERPL-MCNC: 16 MG/DL — SIGNIFICANT CHANGE UP (ref 7–23)
CALCIUM SERPL-MCNC: 8.6 MG/DL — SIGNIFICANT CHANGE UP (ref 8.5–10.1)
CHLORIDE SERPL-SCNC: 104 MMOL/L — SIGNIFICANT CHANGE UP (ref 96–108)
CHLORIDE SERPL-SCNC: 104 MMOL/L — SIGNIFICANT CHANGE UP (ref 96–108)
CHLORIDE SERPL-SCNC: 107 MMOL/L — SIGNIFICANT CHANGE UP (ref 96–108)
CHLORIDE SERPL-SCNC: 107 MMOL/L — SIGNIFICANT CHANGE UP (ref 96–108)
CO2 SERPL-SCNC: 31 MMOL/L — SIGNIFICANT CHANGE UP (ref 22–31)
CO2 SERPL-SCNC: 31 MMOL/L — SIGNIFICANT CHANGE UP (ref 22–31)
CO2 SERPL-SCNC: 34 MMOL/L — HIGH (ref 22–31)
CO2 SERPL-SCNC: 34 MMOL/L — HIGH (ref 22–31)
COCAINE METAB.OTHER UR-MCNC: NEGATIVE — SIGNIFICANT CHANGE UP
COCAINE METAB.OTHER UR-MCNC: NEGATIVE — SIGNIFICANT CHANGE UP
COLOR SPEC: YELLOW — SIGNIFICANT CHANGE UP
COLOR SPEC: YELLOW — SIGNIFICANT CHANGE UP
CREAT SERPL-MCNC: 1.12 MG/DL — SIGNIFICANT CHANGE UP (ref 0.5–1.3)
CREAT SERPL-MCNC: 1.12 MG/DL — SIGNIFICANT CHANGE UP (ref 0.5–1.3)
CREAT SERPL-MCNC: 1.32 MG/DL — HIGH (ref 0.5–1.3)
CREAT SERPL-MCNC: 1.32 MG/DL — HIGH (ref 0.5–1.3)
DIFF PNL FLD: NEGATIVE — SIGNIFICANT CHANGE UP
DIFF PNL FLD: NEGATIVE — SIGNIFICANT CHANGE UP
EGFR: 46 ML/MIN/1.73M2 — LOW
EGFR: 46 ML/MIN/1.73M2 — LOW
EGFR: 56 ML/MIN/1.73M2 — LOW
EGFR: 56 ML/MIN/1.73M2 — LOW
EPI CELLS # UR: PRESENT
EPI CELLS # UR: PRESENT
GLUCOSE SERPL-MCNC: 103 MG/DL — HIGH (ref 70–99)
GLUCOSE SERPL-MCNC: 103 MG/DL — HIGH (ref 70–99)
GLUCOSE SERPL-MCNC: 99 MG/DL — SIGNIFICANT CHANGE UP (ref 70–99)
GLUCOSE SERPL-MCNC: 99 MG/DL — SIGNIFICANT CHANGE UP (ref 70–99)
GLUCOSE UR QL: 500 MG/DL
GLUCOSE UR QL: 500 MG/DL
HCT VFR BLD CALC: 29.9 % — LOW (ref 34.5–45)
HCT VFR BLD CALC: 29.9 % — LOW (ref 34.5–45)
HCV AB S/CO SERPL IA: 0.06 S/CO — SIGNIFICANT CHANGE UP (ref 0–0.99)
HCV AB S/CO SERPL IA: 0.06 S/CO — SIGNIFICANT CHANGE UP (ref 0–0.99)
HCV AB SERPL-IMP: SIGNIFICANT CHANGE UP
HCV AB SERPL-IMP: SIGNIFICANT CHANGE UP
HGB BLD-MCNC: 10.5 G/DL — LOW (ref 11.5–15.5)
HGB BLD-MCNC: 10.5 G/DL — LOW (ref 11.5–15.5)
KETONES UR-MCNC: ABNORMAL MG/DL
KETONES UR-MCNC: ABNORMAL MG/DL
LEUKOCYTE ESTERASE UR-ACNC: NEGATIVE — SIGNIFICANT CHANGE UP
LEUKOCYTE ESTERASE UR-ACNC: NEGATIVE — SIGNIFICANT CHANGE UP
MAGNESIUM SERPL-MCNC: 1.9 MG/DL — SIGNIFICANT CHANGE UP (ref 1.6–2.6)
MAGNESIUM SERPL-MCNC: 1.9 MG/DL — SIGNIFICANT CHANGE UP (ref 1.6–2.6)
MCHC RBC-ENTMCNC: 29 PG — SIGNIFICANT CHANGE UP (ref 27–34)
MCHC RBC-ENTMCNC: 29 PG — SIGNIFICANT CHANGE UP (ref 27–34)
MCHC RBC-ENTMCNC: 35.1 G/DL — SIGNIFICANT CHANGE UP (ref 32–36)
MCHC RBC-ENTMCNC: 35.1 G/DL — SIGNIFICANT CHANGE UP (ref 32–36)
MCV RBC AUTO: 82.6 FL — SIGNIFICANT CHANGE UP (ref 80–100)
MCV RBC AUTO: 82.6 FL — SIGNIFICANT CHANGE UP (ref 80–100)
METHADONE UR-MCNC: NEGATIVE — SIGNIFICANT CHANGE UP
METHADONE UR-MCNC: NEGATIVE — SIGNIFICANT CHANGE UP
NITRITE UR-MCNC: NEGATIVE — SIGNIFICANT CHANGE UP
NITRITE UR-MCNC: NEGATIVE — SIGNIFICANT CHANGE UP
NRBC # BLD: 0 /100 WBCS — SIGNIFICANT CHANGE UP (ref 0–0)
NRBC # BLD: 0 /100 WBCS — SIGNIFICANT CHANGE UP (ref 0–0)
OPIATES UR-MCNC: NEGATIVE — SIGNIFICANT CHANGE UP
OPIATES UR-MCNC: NEGATIVE — SIGNIFICANT CHANGE UP
PCP SPEC-MCNC: SIGNIFICANT CHANGE UP
PCP UR-MCNC: NEGATIVE — SIGNIFICANT CHANGE UP
PCP UR-MCNC: NEGATIVE — SIGNIFICANT CHANGE UP
PH UR: 7 — SIGNIFICANT CHANGE UP (ref 5–8)
PH UR: 7 — SIGNIFICANT CHANGE UP (ref 5–8)
PHOSPHATE SERPL-MCNC: 3.1 MG/DL — SIGNIFICANT CHANGE UP (ref 2.5–4.5)
PHOSPHATE SERPL-MCNC: 3.1 MG/DL — SIGNIFICANT CHANGE UP (ref 2.5–4.5)
PLATELET # BLD AUTO: 330 K/UL — SIGNIFICANT CHANGE UP (ref 150–400)
PLATELET # BLD AUTO: 330 K/UL — SIGNIFICANT CHANGE UP (ref 150–400)
POTASSIUM SERPL-MCNC: 2.6 MMOL/L — CRITICAL LOW (ref 3.5–5.3)
POTASSIUM SERPL-MCNC: 2.6 MMOL/L — CRITICAL LOW (ref 3.5–5.3)
POTASSIUM SERPL-MCNC: 3.3 MMOL/L — LOW (ref 3.5–5.3)
POTASSIUM SERPL-MCNC: 3.3 MMOL/L — LOW (ref 3.5–5.3)
POTASSIUM SERPL-SCNC: 2.6 MMOL/L — CRITICAL LOW (ref 3.5–5.3)
POTASSIUM SERPL-SCNC: 2.6 MMOL/L — CRITICAL LOW (ref 3.5–5.3)
POTASSIUM SERPL-SCNC: 3.3 MMOL/L — LOW (ref 3.5–5.3)
POTASSIUM SERPL-SCNC: 3.3 MMOL/L — LOW (ref 3.5–5.3)
PROT SERPL-MCNC: 7.6 GM/DL — SIGNIFICANT CHANGE UP (ref 6–8.3)
PROT SERPL-MCNC: 7.6 GM/DL — SIGNIFICANT CHANGE UP (ref 6–8.3)
PROT UR-MCNC: 30 MG/DL
PROT UR-MCNC: 30 MG/DL
RBC # BLD: 3.62 M/UL — LOW (ref 3.8–5.2)
RBC # BLD: 3.62 M/UL — LOW (ref 3.8–5.2)
RBC # FLD: 12.7 % — SIGNIFICANT CHANGE UP (ref 10.3–14.5)
RBC # FLD: 12.7 % — SIGNIFICANT CHANGE UP (ref 10.3–14.5)
RBC CASTS # UR COMP ASSIST: SIGNIFICANT CHANGE UP /HPF (ref 0–4)
RBC CASTS # UR COMP ASSIST: SIGNIFICANT CHANGE UP /HPF (ref 0–4)
SODIUM SERPL-SCNC: 142 MMOL/L — SIGNIFICANT CHANGE UP (ref 135–145)
SP GR SPEC: 1.02 — SIGNIFICANT CHANGE UP (ref 1–1.03)
SP GR SPEC: 1.02 — SIGNIFICANT CHANGE UP (ref 1–1.03)
THC UR QL: NEGATIVE — SIGNIFICANT CHANGE UP
THC UR QL: NEGATIVE — SIGNIFICANT CHANGE UP
TSH SERPL-MCNC: 1.51 UIU/ML — SIGNIFICANT CHANGE UP (ref 0.36–3.74)
TSH SERPL-MCNC: 1.51 UIU/ML — SIGNIFICANT CHANGE UP (ref 0.36–3.74)
UROBILINOGEN FLD QL: 1 MG/DL — SIGNIFICANT CHANGE UP (ref 0.2–1)
UROBILINOGEN FLD QL: 1 MG/DL — SIGNIFICANT CHANGE UP (ref 0.2–1)
WBC # BLD: 6.97 K/UL — SIGNIFICANT CHANGE UP (ref 3.8–10.5)
WBC # BLD: 6.97 K/UL — SIGNIFICANT CHANGE UP (ref 3.8–10.5)
WBC # FLD AUTO: 6.97 K/UL — SIGNIFICANT CHANGE UP (ref 3.8–10.5)
WBC # FLD AUTO: 6.97 K/UL — SIGNIFICANT CHANGE UP (ref 3.8–10.5)
WBC UR QL: SIGNIFICANT CHANGE UP /HPF (ref 0–5)
WBC UR QL: SIGNIFICANT CHANGE UP /HPF (ref 0–5)

## 2023-11-16 PROCEDURE — 99232 SBSQ HOSP IP/OBS MODERATE 35: CPT

## 2023-11-16 RX ORDER — POTASSIUM CHLORIDE 20 MEQ
10 PACKET (EA) ORAL
Refills: 0 | Status: DISCONTINUED | OUTPATIENT
Start: 2023-11-16 | End: 2023-11-16

## 2023-11-16 RX ORDER — LOSARTAN POTASSIUM 100 MG/1
50 TABLET, FILM COATED ORAL DAILY
Refills: 0 | Status: DISCONTINUED | OUTPATIENT
Start: 2023-11-16 | End: 2023-11-17

## 2023-11-16 RX ORDER — POTASSIUM CHLORIDE 20 MEQ
10 PACKET (EA) ORAL
Refills: 0 | Status: COMPLETED | OUTPATIENT
Start: 2023-11-16 | End: 2023-11-16

## 2023-11-16 RX ORDER — POTASSIUM CHLORIDE 20 MEQ
40 PACKET (EA) ORAL EVERY 4 HOURS
Refills: 0 | Status: DISCONTINUED | OUTPATIENT
Start: 2023-11-16 | End: 2023-11-16

## 2023-11-16 RX ORDER — CEFTRIAXONE 500 MG/1
INJECTION, POWDER, FOR SOLUTION INTRAMUSCULAR; INTRAVENOUS
Refills: 0 | Status: DISCONTINUED | OUTPATIENT
Start: 2023-11-16 | End: 2023-11-19

## 2023-11-16 RX ORDER — POTASSIUM CHLORIDE 20 MEQ
40 PACKET (EA) ORAL EVERY 4 HOURS
Refills: 0 | Status: COMPLETED | OUTPATIENT
Start: 2023-11-16 | End: 2023-11-17

## 2023-11-16 RX ORDER — CEFTRIAXONE 500 MG/1
1000 INJECTION, POWDER, FOR SOLUTION INTRAMUSCULAR; INTRAVENOUS EVERY 24 HOURS
Refills: 0 | Status: DISCONTINUED | OUTPATIENT
Start: 2023-11-17 | End: 2023-11-19

## 2023-11-16 RX ORDER — PREGABALIN 225 MG/1
100 CAPSULE ORAL DAILY
Refills: 0 | Status: DISCONTINUED | OUTPATIENT
Start: 2023-11-16 | End: 2023-11-16

## 2023-11-16 RX ORDER — PREGABALIN 225 MG/1
1000 CAPSULE ORAL DAILY
Refills: 0 | Status: DISCONTINUED | OUTPATIENT
Start: 2023-11-16 | End: 2023-11-19

## 2023-11-16 RX ORDER — CEFTRIAXONE 500 MG/1
1000 INJECTION, POWDER, FOR SOLUTION INTRAMUSCULAR; INTRAVENOUS ONCE
Refills: 0 | Status: COMPLETED | OUTPATIENT
Start: 2023-11-16 | End: 2023-11-16

## 2023-11-16 RX ORDER — POTASSIUM CHLORIDE 20 MEQ
40 PACKET (EA) ORAL EVERY 4 HOURS
Refills: 0 | Status: COMPLETED | OUTPATIENT
Start: 2023-11-16 | End: 2023-11-16

## 2023-11-16 RX ORDER — CEFTRIAXONE 500 MG/1
INJECTION, POWDER, FOR SOLUTION INTRAMUSCULAR; INTRAVENOUS
Refills: 0 | Status: DISCONTINUED | OUTPATIENT
Start: 2023-11-16 | End: 2023-11-16

## 2023-11-16 RX ADMIN — AMLODIPINE BESYLATE 10 MILLIGRAM(S): 2.5 TABLET ORAL at 05:08

## 2023-11-16 RX ADMIN — Medication 100 MILLIEQUIVALENT(S): at 18:40

## 2023-11-16 RX ADMIN — Medication 40 MILLIEQUIVALENT(S): at 22:20

## 2023-11-16 RX ADMIN — Medication 100 MILLIEQUIVALENT(S): at 09:34

## 2023-11-16 RX ADMIN — CEFTRIAXONE 100 MILLIGRAM(S): 500 INJECTION, POWDER, FOR SOLUTION INTRAMUSCULAR; INTRAVENOUS at 17:49

## 2023-11-16 RX ADMIN — Medication 40 MILLIEQUIVALENT(S): at 14:31

## 2023-11-16 RX ADMIN — Medication 100 MILLIEQUIVALENT(S): at 12:46

## 2023-11-16 RX ADMIN — Medication 100 MILLIEQUIVALENT(S): at 14:30

## 2023-11-16 RX ADMIN — LOSARTAN POTASSIUM 50 MILLIGRAM(S): 100 TABLET, FILM COATED ORAL at 12:36

## 2023-11-16 RX ADMIN — Medication 40 MILLIEQUIVALENT(S): at 09:34

## 2023-11-16 RX ADMIN — Medication 100 MILLIEQUIVALENT(S): at 22:22

## 2023-11-16 RX ADMIN — Medication 100 MILLIEQUIVALENT(S): at 22:34

## 2023-11-16 RX ADMIN — PREGABALIN 1000 MICROGRAM(S): 225 CAPSULE ORAL at 12:46

## 2023-11-17 ENCOUNTER — APPOINTMENT (OUTPATIENT)
Dept: NEUROSURGERY | Facility: CLINIC | Age: 58
End: 2023-11-17

## 2023-11-17 LAB
ANION GAP SERPL CALC-SCNC: 4 MMOL/L — LOW (ref 5–17)
ANION GAP SERPL CALC-SCNC: 4 MMOL/L — LOW (ref 5–17)
ANION GAP SERPL CALC-SCNC: 5 MMOL/L — SIGNIFICANT CHANGE UP (ref 5–17)
ANION GAP SERPL CALC-SCNC: 5 MMOL/L — SIGNIFICANT CHANGE UP (ref 5–17)
BASOPHILS # BLD AUTO: 0.02 K/UL — SIGNIFICANT CHANGE UP (ref 0–0.2)
BASOPHILS # BLD AUTO: 0.02 K/UL — SIGNIFICANT CHANGE UP (ref 0–0.2)
BASOPHILS NFR BLD AUTO: 0.3 % — SIGNIFICANT CHANGE UP (ref 0–2)
BASOPHILS NFR BLD AUTO: 0.3 % — SIGNIFICANT CHANGE UP (ref 0–2)
BUN SERPL-MCNC: 14 MG/DL — SIGNIFICANT CHANGE UP (ref 7–23)
BUN SERPL-MCNC: 14 MG/DL — SIGNIFICANT CHANGE UP (ref 7–23)
BUN SERPL-MCNC: 15 MG/DL — SIGNIFICANT CHANGE UP (ref 7–23)
BUN SERPL-MCNC: 15 MG/DL — SIGNIFICANT CHANGE UP (ref 7–23)
CALCIUM SERPL-MCNC: 8.8 MG/DL — SIGNIFICANT CHANGE UP (ref 8.5–10.1)
CALCIUM SERPL-MCNC: 8.8 MG/DL — SIGNIFICANT CHANGE UP (ref 8.5–10.1)
CALCIUM SERPL-MCNC: 8.9 MG/DL — SIGNIFICANT CHANGE UP (ref 8.5–10.1)
CALCIUM SERPL-MCNC: 8.9 MG/DL — SIGNIFICANT CHANGE UP (ref 8.5–10.1)
CHLORIDE SERPL-SCNC: 106 MMOL/L — SIGNIFICANT CHANGE UP (ref 96–108)
CHLORIDE SERPL-SCNC: 106 MMOL/L — SIGNIFICANT CHANGE UP (ref 96–108)
CHLORIDE SERPL-SCNC: 107 MMOL/L — SIGNIFICANT CHANGE UP (ref 96–108)
CHLORIDE SERPL-SCNC: 107 MMOL/L — SIGNIFICANT CHANGE UP (ref 96–108)
CO2 SERPL-SCNC: 30 MMOL/L — SIGNIFICANT CHANGE UP (ref 22–31)
CREAT SERPL-MCNC: 1.11 MG/DL — SIGNIFICANT CHANGE UP (ref 0.5–1.3)
CREAT SERPL-MCNC: 1.11 MG/DL — SIGNIFICANT CHANGE UP (ref 0.5–1.3)
CREAT SERPL-MCNC: 1.15 MG/DL — SIGNIFICANT CHANGE UP (ref 0.5–1.3)
CREAT SERPL-MCNC: 1.15 MG/DL — SIGNIFICANT CHANGE UP (ref 0.5–1.3)
EGFR: 55 ML/MIN/1.73M2 — LOW
EGFR: 55 ML/MIN/1.73M2 — LOW
EGFR: 57 ML/MIN/1.73M2 — LOW
EGFR: 57 ML/MIN/1.73M2 — LOW
EOSINOPHIL # BLD AUTO: 0.38 K/UL — SIGNIFICANT CHANGE UP (ref 0–0.5)
EOSINOPHIL # BLD AUTO: 0.38 K/UL — SIGNIFICANT CHANGE UP (ref 0–0.5)
EOSINOPHIL NFR BLD AUTO: 5.9 % — SIGNIFICANT CHANGE UP (ref 0–6)
EOSINOPHIL NFR BLD AUTO: 5.9 % — SIGNIFICANT CHANGE UP (ref 0–6)
ERYTHROCYTE [SEDIMENTATION RATE] IN BLOOD: 83 MM/HR — HIGH (ref 0–20)
ERYTHROCYTE [SEDIMENTATION RATE] IN BLOOD: 83 MM/HR — HIGH (ref 0–20)
FOLATE SERPL-MCNC: 8.3 NG/ML — SIGNIFICANT CHANGE UP
FOLATE SERPL-MCNC: 8.3 NG/ML — SIGNIFICANT CHANGE UP
GLUCOSE SERPL-MCNC: 110 MG/DL — HIGH (ref 70–99)
GLUCOSE SERPL-MCNC: 110 MG/DL — HIGH (ref 70–99)
GLUCOSE SERPL-MCNC: 90 MG/DL — SIGNIFICANT CHANGE UP (ref 70–99)
GLUCOSE SERPL-MCNC: 90 MG/DL — SIGNIFICANT CHANGE UP (ref 70–99)
HCT VFR BLD CALC: 29.1 % — LOW (ref 34.5–45)
HCT VFR BLD CALC: 29.1 % — LOW (ref 34.5–45)
HGB BLD-MCNC: 10.1 G/DL — LOW (ref 11.5–15.5)
HGB BLD-MCNC: 10.1 G/DL — LOW (ref 11.5–15.5)
IMM GRANULOCYTES NFR BLD AUTO: 0.2 % — SIGNIFICANT CHANGE UP (ref 0–0.9)
IMM GRANULOCYTES NFR BLD AUTO: 0.2 % — SIGNIFICANT CHANGE UP (ref 0–0.9)
LYMPHOCYTES # BLD AUTO: 2.83 K/UL — SIGNIFICANT CHANGE UP (ref 1–3.3)
LYMPHOCYTES # BLD AUTO: 2.83 K/UL — SIGNIFICANT CHANGE UP (ref 1–3.3)
LYMPHOCYTES # BLD AUTO: 43.7 % — SIGNIFICANT CHANGE UP (ref 13–44)
LYMPHOCYTES # BLD AUTO: 43.7 % — SIGNIFICANT CHANGE UP (ref 13–44)
MAGNESIUM SERPL-MCNC: 2.3 MG/DL — SIGNIFICANT CHANGE UP (ref 1.6–2.6)
MAGNESIUM SERPL-MCNC: 2.3 MG/DL — SIGNIFICANT CHANGE UP (ref 1.6–2.6)
MCHC RBC-ENTMCNC: 28.9 PG — SIGNIFICANT CHANGE UP (ref 27–34)
MCHC RBC-ENTMCNC: 28.9 PG — SIGNIFICANT CHANGE UP (ref 27–34)
MCHC RBC-ENTMCNC: 34.7 G/DL — SIGNIFICANT CHANGE UP (ref 32–36)
MCHC RBC-ENTMCNC: 34.7 G/DL — SIGNIFICANT CHANGE UP (ref 32–36)
MCV RBC AUTO: 83.4 FL — SIGNIFICANT CHANGE UP (ref 80–100)
MCV RBC AUTO: 83.4 FL — SIGNIFICANT CHANGE UP (ref 80–100)
MONOCYTES # BLD AUTO: 0.67 K/UL — SIGNIFICANT CHANGE UP (ref 0–0.9)
MONOCYTES # BLD AUTO: 0.67 K/UL — SIGNIFICANT CHANGE UP (ref 0–0.9)
MONOCYTES NFR BLD AUTO: 10.4 % — SIGNIFICANT CHANGE UP (ref 2–14)
MONOCYTES NFR BLD AUTO: 10.4 % — SIGNIFICANT CHANGE UP (ref 2–14)
NEUTROPHILS # BLD AUTO: 2.56 K/UL — SIGNIFICANT CHANGE UP (ref 1.8–7.4)
NEUTROPHILS # BLD AUTO: 2.56 K/UL — SIGNIFICANT CHANGE UP (ref 1.8–7.4)
NEUTROPHILS NFR BLD AUTO: 39.5 % — LOW (ref 43–77)
NEUTROPHILS NFR BLD AUTO: 39.5 % — LOW (ref 43–77)
NRBC # BLD: 0 /100 WBCS — SIGNIFICANT CHANGE UP (ref 0–0)
NRBC # BLD: 0 /100 WBCS — SIGNIFICANT CHANGE UP (ref 0–0)
PLATELET # BLD AUTO: 317 K/UL — SIGNIFICANT CHANGE UP (ref 150–400)
PLATELET # BLD AUTO: 317 K/UL — SIGNIFICANT CHANGE UP (ref 150–400)
POTASSIUM SERPL-MCNC: 2.9 MMOL/L — CRITICAL LOW (ref 3.5–5.3)
POTASSIUM SERPL-MCNC: 2.9 MMOL/L — CRITICAL LOW (ref 3.5–5.3)
POTASSIUM SERPL-MCNC: 3.5 MMOL/L — SIGNIFICANT CHANGE UP (ref 3.5–5.3)
POTASSIUM SERPL-MCNC: 3.5 MMOL/L — SIGNIFICANT CHANGE UP (ref 3.5–5.3)
POTASSIUM SERPL-MCNC: 4 MMOL/L — SIGNIFICANT CHANGE UP (ref 3.5–5.3)
POTASSIUM SERPL-MCNC: 4 MMOL/L — SIGNIFICANT CHANGE UP (ref 3.5–5.3)
POTASSIUM SERPL-SCNC: 2.9 MMOL/L — CRITICAL LOW (ref 3.5–5.3)
POTASSIUM SERPL-SCNC: 2.9 MMOL/L — CRITICAL LOW (ref 3.5–5.3)
POTASSIUM SERPL-SCNC: 3.5 MMOL/L — SIGNIFICANT CHANGE UP (ref 3.5–5.3)
POTASSIUM SERPL-SCNC: 3.5 MMOL/L — SIGNIFICANT CHANGE UP (ref 3.5–5.3)
POTASSIUM SERPL-SCNC: 4 MMOL/L — SIGNIFICANT CHANGE UP (ref 3.5–5.3)
POTASSIUM SERPL-SCNC: 4 MMOL/L — SIGNIFICANT CHANGE UP (ref 3.5–5.3)
RBC # BLD: 3.49 M/UL — LOW (ref 3.8–5.2)
RBC # BLD: 3.49 M/UL — LOW (ref 3.8–5.2)
RBC # FLD: 12.9 % — SIGNIFICANT CHANGE UP (ref 10.3–14.5)
RBC # FLD: 12.9 % — SIGNIFICANT CHANGE UP (ref 10.3–14.5)
SODIUM SERPL-SCNC: 140 MMOL/L — SIGNIFICANT CHANGE UP (ref 135–145)
SODIUM SERPL-SCNC: 140 MMOL/L — SIGNIFICANT CHANGE UP (ref 135–145)
SODIUM SERPL-SCNC: 142 MMOL/L — SIGNIFICANT CHANGE UP (ref 135–145)
SODIUM SERPL-SCNC: 142 MMOL/L — SIGNIFICANT CHANGE UP (ref 135–145)
T PALLIDUM AB TITR SER: NEGATIVE — SIGNIFICANT CHANGE UP
T PALLIDUM AB TITR SER: NEGATIVE — SIGNIFICANT CHANGE UP
T4 AB SER-ACNC: 8.7 UG/DL — SIGNIFICANT CHANGE UP (ref 4.6–12)
T4 AB SER-ACNC: 8.7 UG/DL — SIGNIFICANT CHANGE UP (ref 4.6–12)
WBC # BLD: 6.47 K/UL — SIGNIFICANT CHANGE UP (ref 3.8–10.5)
WBC # BLD: 6.47 K/UL — SIGNIFICANT CHANGE UP (ref 3.8–10.5)
WBC # FLD AUTO: 6.47 K/UL — SIGNIFICANT CHANGE UP (ref 3.8–10.5)
WBC # FLD AUTO: 6.47 K/UL — SIGNIFICANT CHANGE UP (ref 3.8–10.5)

## 2023-11-17 PROCEDURE — 99232 SBSQ HOSP IP/OBS MODERATE 35: CPT

## 2023-11-17 PROCEDURE — 74177 CT ABD & PELVIS W/CONTRAST: CPT | Mod: 26

## 2023-11-17 PROCEDURE — 71250 CT THORAX DX C-: CPT | Mod: 26

## 2023-11-17 PROCEDURE — 99221 1ST HOSP IP/OBS SF/LOW 40: CPT

## 2023-11-17 PROCEDURE — 70552 MRI BRAIN STEM W/DYE: CPT | Mod: 26

## 2023-11-17 PROCEDURE — 99223 1ST HOSP IP/OBS HIGH 75: CPT

## 2023-11-17 RX ORDER — SPIRONOLACTONE 25 MG/1
50 TABLET, FILM COATED ORAL DAILY
Refills: 0 | Status: DISCONTINUED | OUTPATIENT
Start: 2023-11-17 | End: 2023-11-19

## 2023-11-17 RX ORDER — SODIUM CHLORIDE 9 MG/ML
1000 INJECTION INTRAMUSCULAR; INTRAVENOUS; SUBCUTANEOUS
Refills: 0 | Status: DISCONTINUED | OUTPATIENT
Start: 2023-11-17 | End: 2023-11-17

## 2023-11-17 RX ORDER — POTASSIUM CHLORIDE 20 MEQ
40 PACKET (EA) ORAL EVERY 4 HOURS
Refills: 0 | Status: COMPLETED | OUTPATIENT
Start: 2023-11-17 | End: 2023-11-17

## 2023-11-17 RX ORDER — POTASSIUM CHLORIDE 20 MEQ
10 PACKET (EA) ORAL
Refills: 0 | Status: COMPLETED | OUTPATIENT
Start: 2023-11-17 | End: 2023-11-17

## 2023-11-17 RX ORDER — MAGNESIUM SULFATE 500 MG/ML
2 VIAL (ML) INJECTION ONCE
Refills: 0 | Status: COMPLETED | OUTPATIENT
Start: 2023-11-17 | End: 2023-11-17

## 2023-11-17 RX ORDER — HEPARIN SODIUM 5000 [USP'U]/ML
5000 INJECTION INTRAVENOUS; SUBCUTANEOUS EVERY 12 HOURS
Refills: 0 | Status: DISCONTINUED | OUTPATIENT
Start: 2023-11-17 | End: 2023-11-17

## 2023-11-17 RX ADMIN — LOSARTAN POTASSIUM 50 MILLIGRAM(S): 100 TABLET, FILM COATED ORAL at 06:02

## 2023-11-17 RX ADMIN — SPIRONOLACTONE 50 MILLIGRAM(S): 25 TABLET, FILM COATED ORAL at 12:11

## 2023-11-17 RX ADMIN — Medication 100 MILLIGRAM(S): at 18:52

## 2023-11-17 RX ADMIN — Medication 100 MILLIEQUIVALENT(S): at 09:40

## 2023-11-17 RX ADMIN — Medication 40 MILLIEQUIVALENT(S): at 18:53

## 2023-11-17 RX ADMIN — Medication 100 MILLIEQUIVALENT(S): at 11:46

## 2023-11-17 RX ADMIN — AMLODIPINE BESYLATE 10 MILLIGRAM(S): 2.5 TABLET ORAL at 06:02

## 2023-11-17 RX ADMIN — Medication 100 MILLIGRAM(S): at 12:12

## 2023-11-17 RX ADMIN — Medication 25 GRAM(S): at 11:41

## 2023-11-17 RX ADMIN — PREGABALIN 1000 MICROGRAM(S): 225 CAPSULE ORAL at 12:12

## 2023-11-17 RX ADMIN — Medication 40 MILLIEQUIVALENT(S): at 15:01

## 2023-11-17 RX ADMIN — Medication 40 MILLIEQUIVALENT(S): at 09:40

## 2023-11-17 RX ADMIN — Medication 100 MILLIEQUIVALENT(S): at 10:43

## 2023-11-17 RX ADMIN — CEFTRIAXONE 100 MILLIGRAM(S): 500 INJECTION, POWDER, FOR SOLUTION INTRAMUSCULAR; INTRAVENOUS at 18:53

## 2023-11-17 NOTE — CHART NOTE - NSCHARTNOTEFT_GEN_A_CORE
Please order (unless already ordered)    ESR, CRP, MAX, RF, CCP, RPR, anti DS-DNA, complement C3 and C4, SPEP, ferritin, COVID testing, 25 OH vit D, IL6, D-dimer, procalcitonin.    Have specimens drawn STAT so that the lab can process those that need to be sent out as soon as possible.

## 2023-11-17 NOTE — CONSULT NOTE ADULT - TIME BILLING
Imaging review, plan formulation
Review of imaging studies; co-ordination of care with hospitalists and neurosurgeons; chart review.

## 2023-11-17 NOTE — CONSULT NOTE ADULT - SUBJECTIVE AND OBJECTIVE BOX
[facility] NEPHROLOGY SERVICES, [facility]  NEPHROLOGY AND HYPERTENSION  [address]  [address]  [place] [address]  [phone]    MD POP MAYEN MD YELENA ROSENBERG, MD BINNY KOSHY, MD CHRISTOPHER CAPUTO, MD EDWARD BOVER, MD      Information from chart:  "Patient is a 58y old  Female who presents with a chief complaint of AMS, KRUNAL (2023 20:15)    HPI:  58 years old female with h/o HTN, RA was brought in to ED with concern for AMS. Neighbor overhead that patient was looking for mom, wondering/confused around apartment coplex. Per patient, she live with sister but is not able to provide with phone number. Patient denied any discomfort.   Hypertensive, afebrile, sat well at RA. No leukocytosis, plt 347, hsTnT 15.4--> 17.4, Cr 1.79, TSH 1.040. CT head noted small hyperdensity at the right thalamocapsular junction on thinner slices, which may represent dystrophic calcification or residual blood products from prior hemorrhage (given surrounding hypodensity, possibly gliosis or residual edema). Acute hemorrhage is considered less likely as it is not prominent on the thicker slice. EKG with NSR, inferolateral ST-T changes.    SH: no toxic habits (15 Nov 2023 11:44)   "    PAST MEDICAL & SURGICAL HISTORY:    FAMILY HISTORY:    Allergies    No Known Allergies    Intolerances      Home Medications:  LOSARTAN POTASSIUM 100 MG TAB: take 1 tablet by mouth once daily (2023 16:17)    MEDICATIONS  (STANDING):  amLODIPine   Tablet 10 milliGRAM(s) Oral daily  cefTRIAXone   IVPB 1000 milliGRAM(s) IV Intermittent every 24 hours  cefTRIAXone   IVPB      cyanocobalamin Injectable 1000 MICROGram(s) IntraMuscular daily  doxycycline monohydrate Capsule 100 milliGRAM(s) Oral every 12 hours  spironolactone 50 milliGRAM(s) Oral daily    MEDICATIONS  (PRN):  acetaminophen     Tablet .. 650 milliGRAM(s) Oral every 6 hours PRN Mild Pain (1 - 3), Moderate Pain (4 - 6)  melatonin 3 milliGRAM(s) Oral at bedtime PRN Insomnia    Vital Signs Last 24 Hrs  T(C): 36.3 (2023 17:30), Max: 37.2 (2023 06:19)  T(F): 97.4 (2023 17:30), Max: 99 (2023 06:19)  HR: 93 (:30) (87 - 101)  BP: 149/89 (2023 17:30) (141/80 - 171/85)  BP(mean): --  RR: 18 (:) (17 - 20)  SpO2: 93% (:) (92% - 96%)    Parameters below as of   Patient On (Oxygen Delivery Method): room air        Daily     Daily Weight in k.7 (2023 06:19)    23 @ 07:01  -  23 @ 07:00  --------------------------------------------------------  IN: 240 mL / OUT: 1150 mL / NET: -910 mL    23 @ 07:01  -  23 @ 20:56  --------------------------------------------------------  IN: 240 mL / OUT: 0 mL / NET: 240 mL      CAPILLARY BLOOD GLUCOSE        PHYSICAL EXAM:      T(C): 36.3 (23 @ 17:30), Max: 37.2 (23 @ 06:19)  HR: 93 (23 17:30) (87 - 101)  BP: 149/89 (23 @ 17:30) (141/80 - 171/85)  RR: 18 (23 17:30) (17 - 20)  SpO2: 93% (23 17:30) (92% - 96%)  Wt(kg): --  Lungs clear  Heart S1S2  Abd soft NT ND  Extremities:   1 edema                  x   |  x   |  x   ----------------------------<  x   4.0   |  x   |  x     Ca    8.8      2023 15:15  Phos  3.1       Mg     2.3         TPro  7.6  /  Alb  3.0<L>  /  TBili  0.6  /  DBili  x   /  AST  15  /  ALT  14  /  AlkPhos  65  11-16                          10.1   6.47  )-----------( 317      ( 2023 07:57 )             29.1     Creatinine Trend: 1.15<--, 1.11<--, 1.32<--, 1.12<--, 1.34<--, 1.79<--  Urinalysis Basic - ( 2023 15:15 )    Color: x / Appearance: x / SG: x / pH: x  Gluc: 110 mg/dL / Ketone: x  / Bili: x / Urobili: x   Blood: x / Protein: x / Nitrite: x   Leuk Esterase: x / RBC: x / WBC x   Sq Epi: x / Non Sq Epi: x / Bacteria: x            Assessment   KRUNAL accelerated HTN with acute nephropathy;   R/O secondary HTN in light of hypokalemia     Plan  BP mediation adjustments  Replete K  Secondary HTN work up   Shubham Swan MD

## 2023-11-17 NOTE — CONSULT NOTE ADULT - SUBJECTIVE AND OBJECTIVE BOX
58 year old female with history of rheumatoid arthritis and "early onset dementia" admitted for worsening confusion, found wandering around neighborhood confused. Denies urinary incontinence or gait imbalance.    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

## 2023-11-17 NOTE — CONSULT NOTE ADULT - SUBJECTIVE AND OBJECTIVE BOX
BIBEMS from home late 11/14/23.  History from Admission H&P 11/15/23    <Start of quote(s) from H&P>  "Reason for Admission: AMS, KRUNAL  History of Present Illness:   58 years old female with h/o HTN, RA was brought in to ED with concern for AMS. Neighbor overhead that patient was looking for mom, wondering/confused around apartment coplex. Per patient, she live with sister but is not able to provide with phone number. Patient denied any discomfort.   Hypertensive, afebrile, sat well at [facility]. No leukocytosis, plt 347, hsTnT 15.4--> 17.4, Cr 1.79, TSH 1.040. CT head noted small hyperdensity at the right thalamocapsular junction on thinner slices, which may represent dystrophic calcification or residual blood products from prior hemorrhage (given surrounding hypodensity, possibly gliosis or residual edema). Acute hemorrhage is considered less likely as it is not prominent on the thicker slice. EKG with NSR, inferolateral ST-T changes.    SH: no toxic habits       Review of Systems:  Review of Systems: CONSTITUTIONAL: No fever, chills or weight loss.  EYES: No eye pain. No vision changes  ENT:  No hearing changes or pain,  Cardiovascular:  No Chest Pain, palpitation, SOB orthopnea or PND  Respiratory:  No cough, SOB or wheezing.  Gastrointestinal:  No nausea, vomiting, diarrhea, constipation or abdominal pain.  Genitourinary: No dysuria, frequency or hematuria  Musculoskeletal:  No myalgia or joint pain  Skin:  No skin lesion, rash or pruritis  Neuro:  No weakness, numbness, loss of consciousness   . . .     Home Medications:   * Incomplete Medication History as of 15-Nov-2023 08:00 documented in Structured Notes  · 	LOSARTAN POTASSIUM 100 MG TAB: Last Dose Taken:  , take 1 tablet by mouth once daily   . . .   · Substance use	No"  <End of quote(s) from H&P>    Per radiology report of non-con CT head on day 11/14/23:  "COMPARISON: None.    FINDINGS: A 0.4 cm hyperdensity at the right thalamocapsular junction on   thinner slices (4:42) may represent dystrophic calcification or residual   blood products from prior hemorrhage (given surrounding hypodensity).   There is no mass effect or midline shift. Nonspecific mild to moderate   periventricular and subcortical white matter hypodensities likely   represent microvascular ischemic changes. Small hypodensities scattered   in the white matter, right lentiform nucleus and bilateral thalami may   represent age-indeterminate infarcts. There is mild to moderate cerebral   volume loss with ventricular dilatation. Cavum septum pellucidum/vergae.    There is no depressed skull fracture. There is sinus mucosal thickening   with a left maxillary sinus mucus retention cyst/polyp. The   tympanomastoid region is unremarkable. Minimal asymmetry of the   visualized lateral atlantodental space.    IMPRESSION:    Small hyperdensity at the right thalamocapsular junction on thinner   slices, which may represent dystrophic calcification or residual blood   products from prior hemorrhage (given surrounding hypodensity, possibly   gliosis or residual edema). Acute hemorrhage is considered less likely as   it is not prominent on the thicker slice. Recommend comparison to   previous outside study and follow-up to as indicated."      Per report of repeat non-con head CT, 11/15/23:  "COMPARISON: CT head 11/14/2023    FINDINGS:  No evidence of hemorrhage on this study.  Multiple chronic lacunar   infarcts in the bilateral thalami and basal ganglia are identified.    The ventricles are prominent, appearing out of proportion to degree of   sulcal dilatation, and clinical correlation for normal pressure   hydrocephalus is recommended.    There is no CT evidence for acute cerebral cortical infarct.  There is   periventricular and subcortical white matter hypoattenuation,  most   compatible with chronic microvascular ischemic changes.   No mass effect   is found in the brain.  There is no midline shift or herniation pattern.      The visualized portions of the paranasal sinuses and mastoid air cells   are clear.    IMPRESSION:    No evidence of hemorrhage on this study.    The ventricles are prominent, appearing out of proportion to degree of   sulcal dilatation, and clinical correlation for normal pressure   hydrocephalus is recommended.    Chronic changes as above."   BIBEMS from home late 11/14/23.  History from Admission H&P 11/15/23    <Start of quote(s) from H&P>  "Reason for Admission: AMS, KRUNAL  History of Present Illness:   58 years old female with h/o HTN, RA was brought in to ED with concern for AMS. Neighbor overhead that patient was looking for mom, wondering/confused around apartment coplex. Per patient, she live with sister but is not able to provide with phone number. Patient denied any discomfort.   Hypertensive, afebrile, sat well at [facility]. No leukocytosis, plt 347, hsTnT 15.4--> 17.4, Cr 1.79, TSH 1.040. CT head noted small hyperdensity at the right thalamocapsular junction on thinner slices, which may represent dystrophic calcification or residual blood products from prior hemorrhage (given surrounding hypodensity, possibly gliosis or residual edema). Acute hemorrhage is considered less likely as it is not prominent on the thicker slice. EKG with NSR, inferolateral ST-T changes.    SH: no toxic habits       Review of Systems:  Review of Systems: CONSTITUTIONAL: No fever, chills or weight loss.  EYES: No eye pain. No vision changes  ENT:  No hearing changes or pain,  Cardiovascular:  No Chest Pain, palpitation, SOB orthopnea or PND  Respiratory:  No cough, SOB or wheezing.  Gastrointestinal:  No nausea, vomiting, diarrhea, constipation or abdominal pain.  Genitourinary: No dysuria, frequency or hematuria  Musculoskeletal:  No myalgia or joint pain  Skin:  No skin lesion, rash or pruritis  Neuro:  No weakness, numbness, loss of consciousness   . . .     Home Medications:   * Incomplete Medication History as of 15-Nov-2023 08:00 documented in Structured Notes  · 	LOSARTAN POTASSIUM 100 MG TAB: Last Dose Taken:  , take 1 tablet by mouth once daily   . . .   · Substance use	No"  <End of quote(s) from H&P>    Per radiology report of non-con CT head on day 11/14/23:  "COMPARISON: None.    FINDINGS: A 0.4 cm hyperdensity at the right thalamocapsular junction on   thinner slices (4:42) may represent dystrophic calcification or residual   blood products from prior hemorrhage (given surrounding hypodensity).   There is no mass effect or midline shift. Nonspecific mild to moderate   periventricular and subcortical white matter hypodensities likely   represent microvascular ischemic changes. Small hypodensities scattered   in the white matter, right lentiform nucleus and bilateral thalami may   represent age-indeterminate infarcts. There is mild to moderate cerebral   volume loss with ventricular dilatation. Cavum septum pellucidum/vergae.    There is no depressed skull fracture. There is sinus mucosal thickening   with a left maxillary sinus mucus retention cyst/polyp. The   tympanomastoid region is unremarkable. Minimal asymmetry of the   visualized lateral atlantodental space.    IMPRESSION:    Small hyperdensity at the right thalamocapsular junction on thinner   slices, which may represent dystrophic calcification or residual blood   products from prior hemorrhage (given surrounding hypodensity, possibly   gliosis or residual edema). Acute hemorrhage is considered less likely as   it is not prominent on the thicker slice. Recommend comparison to   previous outside study and follow-up to as indicated."      Per report of repeat non-con head CT, 11/15/23:  "COMPARISON: CT head 11/14/2023    FINDINGS:  No evidence of hemorrhage on this study.  Multiple chronic lacunar   infarcts in the bilateral thalami and basal ganglia are identified.    The ventricles are prominent, appearing out of proportion to degree of   sulcal dilatation, and clinical correlation for normal pressure   hydrocephalus is recommended.    There is no CT evidence for acute cerebral cortical infarct.  There is   periventricular and subcortical white matter hypoattenuation,  most   compatible with chronic microvascular ischemic changes.   No mass effect   is found in the brain.  There is no midline shift or herniation pattern.      The visualized portions of the paranasal sinuses and mastoid air cells   are clear.    IMPRESSION:    No evidence of hemorrhage on this study.    The ventricles are prominent, appearing out of proportion to degree of   sulcal dilatation, and clinical correlation for normal pressure   hydrocephalus is recommended.    Chronic changes as above."      Per radiology report of MR head wo/w contrast:  "COMPARISON:   CT head 10/15/2023    FINDINGS:    BRAIN: In the right posterior corona radiata white matter there are 2   adjacent indistinct lesions hyperintense on the diffusion-weighted and   long TR images, faintly hypointense on the T1-weighted images and   inconspicuous on the ADC images.    The brain demonstrates several focal lesions over multiple infarction.   The most conspicuous including within the right mid corona radiata deep   white matter, the right globus pallidus, the left caudate nucleus head,   the left posterior corona radiata white matter andin each thalamus. No   cerebral cortical lesion is recognized. No abnormal enhancement is found   in the brain.  No diffusion restriction is found in the brain.  No acute   cerebral cortical infarct is found.   No mass effect is found in the   brain.With gadolinium administration no abnormal brain parenchymal   enhancement is found.    The brain also demonstrates extensive confluent lesions within the   cerebral hemispheric white matter. These lesions are hyperintense on the   long TR images, more faintly hypointense on the T1-weighted images were   most confluent, otherwise largely inconspicuous. Involvement is confluent   from the subcortical through the centrum semiovale and periatrial and   periventricular white matter, more patchy in the corona radiata white   matter and brainstem.    The brain also demonstrates numerous foci of remote hemorrhage. These   lesions demonstrate marked low signal intensity on the long TR   diffusion-weighted and gradient echo images. The lesions are most   extensive within the right thalamus, also noted at multiple locations in   the basal ganglia, deep hemispheric white matter and in the cerebellar   hemispheres. Few scattered punctate lesions are found at the level of   cerebral cortex at multiplelocations.    CSF SPACES:   The ventricles, sulci and basal cisterns appear moderately   dilated reflecting diffuse brain volume loss.    VESSELS:   The vertebral and internal carotid arteries demonstrate   expected flow voids indicating their patency. The left vertebral artery   is dominant caliber. The posterior circulation is tortuous. There is   fetal origin of right posterior cerebral artery from the ipsilateral   internal carotid artery (typically incidental developmental variant).    HEAD AND NECK STRUCTURES:   The orbits are unremarkable.  Paranasal   sinuses are clear.  The nasal cavity appears intact.  The central skull   base appears intact.  The nasopharynx is symmetric.  The temporal bones   appear clear of disease.  The calvarium appears unremarkable.      IMPRESSION:    1. Right posterior corona radiata subacute infarction    2. Multiple remote infarctions within the cerebral hemispheric white   matter basal ganglia and thalami    3. Pervasive cerebral hemispheric white matter abnormality, possibly   accelerated form of ischemic white matter disease versus and/or   superimposed other inflammatory metabolic toxic or infectious process    4. Numerous remote petechial hemorrhages most prominently in the basal   ganglia andthalami    5. Diffuse brain volume loss upper range typical for age"      Per cardiology report of [facility]:  "PHYSICIAN INTERPRETATION:  Left Ventricle: Normal left ventricular size and wall thicknesses, with   normal systolic and diastolic function.  Global LV systolic function was normal. Spectral Doppler shows normal   pattern of LV diastolic filling.  Right Ventricle: Normal right ventricular size and function.  Left Atrium: The left atrium is normal in size.  Right Atrium: The right atrium is normal in size.  Pericardium: There is no evidence of pericardial effusion.  Mitral Valve: Structurally normal mitral valve, with normal leaflet   excursion. Mild mitral valve regurgitation is seen.  Tricuspid Valve: Structurally normal tricuspid valve, with normal leaflet   excursion. No tricuspid regurgitation is visualized.  Aortic Valve: Normal trileaflet aortic valve with normal opening.   Sclerotic aortic valve with normal opening. Mild aortic valve   regurgitation is seen.  Pulmonic Valve: Structurally normal pulmonic valve, withnormal leaflet   excursion. Trace pulmonic valve regurgitation.  Aorta: The aortic root and ascending aorta are structurally normal, with   no evidence of dilitation.  Pulmonary Artery: The main pulmonary artery is normal in size.      Summary:   1. Normal global left ventricular systolic function.   2. Mild mitral valve regurgitation.   3. Mild aortic regurgitation.   4. Sclerotic aortic valve with normal opening."      B12/folate  252/8.3,  16.5  TSH  1.04,  1.51    EXAMINATION    To be completed   BIBEMS from home late 11/14/23.  History from Admission H&P 11/15/23    <Start of quote(s) from H&P>  "Reason for Admission: AMS, KRUNAL  History of Present Illness:   58 years old female with h/o HTN, RA was brought in to ED with concern for AMS. Neighbor overhead that patient was looking for mom, wondering/confused around apartment coplex. Per patient, she live with sister but is not able to provide with phone number. Patient denied any discomfort.   Hypertensive, afebrile, sat well at [facility]. No leukocytosis, plt 347, hsTnT 15.4--> 17.4, Cr 1.79, TSH 1.040. CT head noted small hyperdensity at the right thalamocapsular junction on thinner slices, which may represent dystrophic calcification or residual blood products from prior hemorrhage (given surrounding hypodensity, possibly gliosis or residual edema). Acute hemorrhage is considered less likely as it is not prominent on the thicker slice. EKG with NSR, inferolateral ST-T changes.    SH: no toxic habits       Review of Systems:  Review of Systems: CONSTITUTIONAL: No fever, chills or weight loss.  EYES: No eye pain. No vision changes  ENT:  No hearing changes or pain,  Cardiovascular:  No Chest Pain, palpitation, SOB orthopnea or PND  Respiratory:  No cough, SOB or wheezing.  Gastrointestinal:  No nausea, vomiting, diarrhea, constipation or abdominal pain.  Genitourinary: No dysuria, frequency or hematuria  Musculoskeletal:  No myalgia or joint pain  Skin:  No skin lesion, rash or pruritis  Neuro:  No weakness, numbness, loss of consciousness   . . .     Home Medications:   * Incomplete Medication History as of 15-Nov-2023 08:00 documented in Structured Notes  · 	LOSARTAN POTASSIUM 100 MG TAB: Last Dose Taken:  , take 1 tablet by mouth once daily   . . .   · Substance use	No"  <End of quote(s) from H&P>    Per radiology report of non-con CT head on day 11/14/23:  "COMPARISON: None.    FINDINGS: A 0.4 cm hyperdensity at the right thalamocapsular junction on   thinner slices (4:42) may represent dystrophic calcification or residual   blood products from prior hemorrhage (given surrounding hypodensity).   There is no mass effect or midline shift. Nonspecific mild to moderate   periventricular and subcortical white matter hypodensities likely   represent microvascular ischemic changes. Small hypodensities scattered   in the white matter, right lentiform nucleus and bilateral thalami may   represent age-indeterminate infarcts. There is mild to moderate cerebral   volume loss with ventricular dilatation. Cavum septum pellucidum/vergae.    There is no depressed skull fracture. There is sinus mucosal thickening   with a left maxillary sinus mucus retention cyst/polyp. The   tympanomastoid region is unremarkable. Minimal asymmetry of the   visualized lateral atlantodental space.    IMPRESSION:    Small hyperdensity at the right thalamocapsular junction on thinner   slices, which may represent dystrophic calcification or residual blood   products from prior hemorrhage (given surrounding hypodensity, possibly   gliosis or residual edema). Acute hemorrhage is considered less likely as   it is not prominent on the thicker slice. Recommend comparison to   previous outside study and follow-up to as indicated."      Per report of repeat non-con head CT, 11/15/23:  "COMPARISON: CT head 11/14/2023    FINDINGS:  No evidence of hemorrhage on this study.  Multiple chronic lacunar   infarcts in the bilateral thalami and basal ganglia are identified.    The ventricles are prominent, appearing out of proportion to degree of   sulcal dilatation, and clinical correlation for normal pressure   hydrocephalus is recommended.    There is no CT evidence for acute cerebral cortical infarct.  There is   periventricular and subcortical white matter hypoattenuation,  most   compatible with chronic microvascular ischemic changes.   No mass effect   is found in the brain.  There is no midline shift or herniation pattern.      The visualized portions of the paranasal sinuses and mastoid air cells   are clear.    IMPRESSION:    No evidence of hemorrhage on this study.    The ventricles are prominent, appearing out of proportion to degree of   sulcal dilatation, and clinical correlation for normal pressure   hydrocephalus is recommended.    Chronic changes as above."      Per radiology report of MR head wo/w contrast:  "COMPARISON:   CT head 10/15/2023    FINDINGS:    BRAIN: In the right posterior corona radiata white matter there are 2   adjacent indistinct lesions hyperintense on the diffusion-weighted and   long TR images, faintly hypointense on the T1-weighted images and   inconspicuous on the ADC images.    The brain demonstrates several focal lesions over multiple infarction.   The most conspicuous including within the right mid corona radiata deep   white matter, the right globus pallidus, the left caudate nucleus head,   the left posterior corona radiata white matter andin each thalamus. No   cerebral cortical lesion is recognized. No abnormal enhancement is found   in the brain.  No diffusion restriction is found in the brain.  No acute   cerebral cortical infarct is found.   No mass effect is found in the   brain.With gadolinium administration no abnormal brain parenchymal   enhancement is found.    The brain also demonstrates extensive confluent lesions within the   cerebral hemispheric white matter. These lesions are hyperintense on the   long TR images, more faintly hypointense on the T1-weighted images were   most confluent, otherwise largely inconspicuous. Involvement is confluent   from the subcortical through the centrum semiovale and periatrial and   periventricular white matter, more patchy in the corona radiata white   matter and brainstem.    The brain also demonstrates numerous foci of remote hemorrhage. These   lesions demonstrate marked low signal intensity on the long TR   diffusion-weighted and gradient echo images. The lesions are most   extensive within the right thalamus, also noted at multiple locations in   the basal ganglia, deep hemispheric white matter and in the cerebellar   hemispheres. Few scattered punctate lesions are found at the level of   cerebral cortex at multiplelocations.    CSF SPACES:   The ventricles, sulci and basal cisterns appear moderately   dilated reflecting diffuse brain volume loss.    VESSELS:   The vertebral and internal carotid arteries demonstrate   expected flow voids indicating their patency. The left vertebral artery   is dominant caliber. The posterior circulation is tortuous. There is   fetal origin of right posterior cerebral artery from the ipsilateral   internal carotid artery (typically incidental developmental variant).    HEAD AND NECK STRUCTURES:   The orbits are unremarkable.  Paranasal   sinuses are clear.  The nasal cavity appears intact.  The central skull   base appears intact.  The nasopharynx is symmetric.  The temporal bones   appear clear of disease.  The calvarium appears unremarkable.      IMPRESSION:    1. Right posterior corona radiata subacute infarction    2. Multiple remote infarctions within the cerebral hemispheric white   matter basal ganglia and thalami    3. Pervasive cerebral hemispheric white matter abnormality, possibly   accelerated form of ischemic white matter disease versus and/or   superimposed other inflammatory metabolic toxic or infectious process    4. Numerous remote petechial hemorrhages most prominently in the basal   ganglia andthalami    5. Diffuse brain volume loss upper range typical for age"      Per cardiology report of [facility]:  "PHYSICIAN INTERPRETATION:  Left Ventricle: Normal left ventricular size and wall thicknesses, with   normal systolic and diastolic function.  Global LV systolic function was normal. Spectral Doppler shows normal   pattern of LV diastolic filling.  Right Ventricle: Normal right ventricular size and function.  Left Atrium: The left atrium is normal in size.  Right Atrium: The right atrium is normal in size.  Pericardium: There is no evidence of pericardial effusion.  Mitral Valve: Structurally normal mitral valve, with normal leaflet   excursion. Mild mitral valve regurgitation is seen.  Tricuspid Valve: Structurally normal tricuspid valve, with normal leaflet   excursion. No tricuspid regurgitation is visualized.  Aortic Valve: Normal trileaflet aortic valve with normal opening.   Sclerotic aortic valve with normal opening. Mild aortic valve   regurgitation is seen.  Pulmonic Valve: Structurally normal pulmonic valve, withnormal leaflet   excursion. Trace pulmonic valve regurgitation.  Aorta: The aortic root and ascending aorta are structurally normal, with   no evidence of dilitation.  Pulmonary Artery: The main pulmonary artery is normal in size.      Summary:   1. Normal global left ventricular systolic function.   2. Mild mitral valve regurgitation.   3. Mild aortic regurgitation.   4. Sclerotic aortic valve with normal opening."      B12/folate  252/8.3,  16.5  TSH  1.04,  1.51  T4  8.7  UTOX neg      EXAMINATION    To be completed   BIBEMS from home late 11/14/23.  History from Admission H&P 11/15/23    <Start of quote(s) from H&P>  "Reason for Admission: AMS, KRUNAL  History of Present Illness:   58 years old female with h/o HTN, RA was brought in to ED with concern for AMS. Neighbor overhead that patient was looking for mom, wondering/confused around apartment coplex. Per patient, she live with sister but is not able to provide with phone number. Patient denied any discomfort.   Hypertensive, afebrile, sat well at [facility]. No leukocytosis, plt 347, hsTnT 15.4--> 17.4, Cr 1.79, TSH 1.040. CT head noted small hyperdensity at the right thalamocapsular junction on thinner slices, which may represent dystrophic calcification or residual blood products from prior hemorrhage (given surrounding hypodensity, possibly gliosis or residual edema). Acute hemorrhage is considered less likely as it is not prominent on the thicker slice. EKG with NSR, inferolateral ST-T changes.    SH: no toxic habits       Review of Systems:  Review of Systems: CONSTITUTIONAL: No fever, chills or weight loss.  EYES: No eye pain. No vision changes  ENT:  No hearing changes or pain,  Cardiovascular:  No Chest Pain, palpitation, SOB orthopnea or PND  Respiratory:  No cough, SOB or wheezing.  Gastrointestinal:  No nausea, vomiting, diarrhea, constipation or abdominal pain.  Genitourinary: No dysuria, frequency or hematuria  Musculoskeletal:  No myalgia or joint pain  Skin:  No skin lesion, rash or pruritis  Neuro:  No weakness, numbness, loss of consciousness   . . .     Home Medications:   * Incomplete Medication History as of 15-Nov-2023 08:00 documented in Structured Notes  · 	LOSARTAN POTASSIUM 100 MG TAB: Last Dose Taken:  , take 1 tablet by mouth once daily   . . .   · Substance use	No"  <End of quote(s) from H&P>    Per radiology report of non-con CT head on day 11/14/23:  "COMPARISON: None.    FINDINGS: A 0.4 cm hyperdensity at the right thalamocapsular junction on   thinner slices (4:42) may represent dystrophic calcification or residual   blood products from prior hemorrhage (given surrounding hypodensity).   There is no mass effect or midline shift. Nonspecific mild to moderate   periventricular and subcortical white matter hypodensities likely   represent microvascular ischemic changes. Small hypodensities scattered   in the white matter, right lentiform nucleus and bilateral thalami may   represent age-indeterminate infarcts. There is mild to moderate cerebral   volume loss with ventricular dilatation. Cavum septum pellucidum/vergae.    There is no depressed skull fracture. There is sinus mucosal thickening   with a left maxillary sinus mucus retention cyst/polyp. The   tympanomastoid region is unremarkable. Minimal asymmetry of the   visualized lateral atlantodental space.    IMPRESSION:    Small hyperdensity at the right thalamocapsular junction on thinner   slices, which may represent dystrophic calcification or residual blood   products from prior hemorrhage (given surrounding hypodensity, possibly   gliosis or residual edema). Acute hemorrhage is considered less likely as   it is not prominent on the thicker slice. Recommend comparison to   previous outside study and follow-up to as indicated."      Per report of repeat non-con head CT, 11/15/23:  "COMPARISON: CT head 11/14/2023    FINDINGS:  No evidence of hemorrhage on this study.  Multiple chronic lacunar   infarcts in the bilateral thalami and basal ganglia are identified.    The ventricles are prominent, appearing out of proportion to degree of   sulcal dilatation, and clinical correlation for normal pressure   hydrocephalus is recommended.    There is no CT evidence for acute cerebral cortical infarct.  There is   periventricular and subcortical white matter hypoattenuation,  most   compatible with chronic microvascular ischemic changes.   No mass effect   is found in the brain.  There is no midline shift or herniation pattern.      The visualized portions of the paranasal sinuses and mastoid air cells   are clear.    IMPRESSION:    No evidence of hemorrhage on this study.    The ventricles are prominent, appearing out of proportion to degree of   sulcal dilatation, and clinical correlation for normal pressure   hydrocephalus is recommended.    Chronic changes as above."      Per radiology report of MR head wo/w contrast:  "COMPARISON:   CT head 10/15/2023    FINDINGS:    BRAIN: In the right posterior corona radiata white matter there are 2   adjacent indistinct lesions hyperintense on the diffusion-weighted and   long TR images, faintly hypointense on the T1-weighted images and   inconspicuous on the ADC images.    The brain demonstrates several focal lesions over multiple infarction.   The most conspicuous including within the right mid corona radiata deep   white matter, the right globus pallidus, the left caudate nucleus head,   the left posterior corona radiata white matter andin each thalamus. No   cerebral cortical lesion is recognized. No abnormal enhancement is found   in the brain.  No diffusion restriction is found in the brain.  No acute   cerebral cortical infarct is found.   No mass effect is found in the   brain.With gadolinium administration no abnormal brain parenchymal   enhancement is found.    The brain also demonstrates extensive confluent lesions within the   cerebral hemispheric white matter. These lesions are hyperintense on the   long TR images, more faintly hypointense on the T1-weighted images were   most confluent, otherwise largely inconspicuous. Involvement is confluent   from the subcortical through the centrum semiovale and periatrial and   periventricular white matter, more patchy in the corona radiata white   matter and brainstem.    The brain also demonstrates numerous foci of remote hemorrhage. These   lesions demonstrate marked low signal intensity on the long TR   diffusion-weighted and gradient echo images. The lesions are most   extensive within the right thalamus, also noted at multiple locations in   the basal ganglia, deep hemispheric white matter and in the cerebellar   hemispheres. Few scattered punctate lesions are found at the level of   cerebral cortex at multiplelocations.    CSF SPACES:   The ventricles, sulci and basal cisterns appear moderately   dilated reflecting diffuse brain volume loss.    VESSELS:   The vertebral and internal carotid arteries demonstrate   expected flow voids indicating their patency. The left vertebral artery   is dominant caliber. The posterior circulation is tortuous. There is   fetal origin of right posterior cerebral artery from the ipsilateral   internal carotid artery (typically incidental developmental variant).    HEAD AND NECK STRUCTURES:   The orbits are unremarkable.  Paranasal   sinuses are clear.  The nasal cavity appears intact.  The central skull   base appears intact.  The nasopharynx is symmetric.  The temporal bones   appear clear of disease.  The calvarium appears unremarkable.      IMPRESSION:    1. Right posterior corona radiata subacute infarction    2. Multiple remote infarctions within the cerebral hemispheric white   matter basal ganglia and thalami    3. Pervasive cerebral hemispheric white matter abnormality, possibly   accelerated form of ischemic white matter disease versus and/or   superimposed other inflammatory metabolic toxic or infectious process    4. Numerous remote petechial hemorrhages most prominently in the basal   ganglia andthalami    5. Diffuse brain volume loss upper range typical for age"      Per cardiology report of [facility]:  "PHYSICIAN INTERPRETATION:  Left Ventricle: Normal left ventricular size and wall thicknesses, with   normal systolic and diastolic function.  Global LV systolic function was normal. Spectral Doppler shows normal   pattern of LV diastolic filling.  Right Ventricle: Normal right ventricular size and function.  Left Atrium: The left atrium is normal in size.  Right Atrium: The right atrium is normal in size.  Pericardium: There is no evidence of pericardial effusion.  Mitral Valve: Structurally normal mitral valve, with normal leaflet   excursion. Mild mitral valve regurgitation is seen.  Tricuspid Valve: Structurally normal tricuspid valve, with normal leaflet   excursion. No tricuspid regurgitation is visualized.  Aortic Valve: Normal trileaflet aortic valve with normal opening.   Sclerotic aortic valve with normal opening. Mild aortic valve   regurgitation is seen.  Pulmonic Valve: Structurally normal pulmonic valve, withnormal leaflet   excursion. Trace pulmonic valve regurgitation.  Aorta: The aortic root and ascending aorta are structurally normal, with   no evidence of dilitation.  Pulmonary Artery: The main pulmonary artery is normal in size.      Summary:   1. Normal global left ventricular systolic function.   2. Mild mitral valve regurgitation.   3. Mild aortic regurgitation.   4. Sclerotic aortic valve with normal opening."      B12/folate  252/8.3,  16.5  TSH  1.04,  1.51  T4  8.7  UTOX neg      EXAMINATION    Awake, alert.  When first asked for the date she answers :" May . . .  20. . . chart."  On further questioning she says the year is 2023, and the month December; corrects to November when told it's not yet December.  Does not know [facility] day of the week but offers that it is the end of the weekend.  When told that it is the last regular work day she says Friday.  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  Asked specifically if she works here she answers yes.  Limited speech production (single words; short phrases).  Some errors in finger naming.          Does not cross the midline.  R/L confusion.  No gross UE drift.  Offers strong resistance on confrontation testing of limb muscles; no grossly evident focal/lateralized motor deficits.      Appears to differentiate LT from P stimuli (tendency to perseverate in reponses).   BIBEMS from home late 11/14/23.  History from Admission H&P 11/15/23    <Start of quote(s) from H&P>  "Reason for Admission: AMS, KRUNAL  History of Present Illness:   58 years old female with h/o HTN, RA was brought in to ED with concern for AMS. Neighbor overhead that patient was looking for mom, wondering/confused around apartment coplex. Per patient, she live with sister but is not able to provide with phone number. Patient denied any discomfort.   Hypertensive, afebrile, sat well at RA. No leukocytosis, plt 347, hsTnT 15.4--> 17.4, Cr 1.79, TSH 1.040. CT head noted small hyperdensity at the right thalamocapsular junction on thinner slices, which may represent dystrophic calcification or residual blood products from prior hemorrhage (given surrounding hypodensity, possibly gliosis or residual edema). Acute hemorrhage is considered less likely as it is not prominent on the thicker slice. EKG with NSR, inferolateral ST-T changes.    SH: no toxic habits       Review of Systems:  Review of Systems: CONSTITUTIONAL: No fever, chills or weight loss.  EYES: No eye pain. No vision changes  ENT:  No hearing changes or pain,  Cardiovascular:  No Chest Pain, palpitation, SOB orthopnea or PND  Respiratory:  No cough, SOB or wheezing.  Gastrointestinal:  No nausea, vomiting, diarrhea, constipation or abdominal pain.  Genitourinary: No dysuria, frequency or hematuria  Musculoskeletal:  No myalgia or joint pain  Skin:  No skin lesion, rash or pruritis  Neuro:  No weakness, numbness, loss of consciousness   . . .     Home Medications:   * Incomplete Medication History as of 15-Nov-2023 08:00 documented in Structured Notes  · 	LOSARTAN POTASSIUM 100 MG TAB: Last Dose Taken:  , take 1 tablet by mouth once daily   . . .   · Substance use	No"  <End of quote(s) from H&P>    Per radiology report of non-con CT head on day 11/14/23:  "COMPARISON: None.    FINDINGS: A 0.4 cm hyperdensity at the right thalamocapsular junction on   thinner slices (4:42) may represent dystrophic calcification or residual   blood products from prior hemorrhage (given surrounding hypodensity).   There is no mass effect or midline shift. Nonspecific mild to moderate   periventricular and subcortical white matter hypodensities likely   represent microvascular ischemic changes. Small hypodensities scattered   in the white matter, right lentiform nucleus and bilateral thalami may   represent age-indeterminate infarcts. There is mild to moderate cerebral   volume loss with ventricular dilatation. Cavum septum pellucidum/vergae.    There is no depressed skull fracture. There is sinus mucosal thickening   with a left maxillary sinus mucus retention cyst/polyp. The   tympanomastoid region is unremarkable. Minimal asymmetry of the   visualized lateral atlantodental space.    IMPRESSION:    Small hyperdensity at the right thalamocapsular junction on thinner   slices, which may represent dystrophic calcification or residual blood   products from prior hemorrhage (given surrounding hypodensity, possibly   gliosis or residual edema). Acute hemorrhage is considered less likely as   it is not prominent on the thicker slice. Recommend comparison to   previous outside study and follow-up to as indicated."      Per report of repeat non-con head CT, 11/15/23:  "COMPARISON: CT head 11/14/2023    FINDINGS:  No evidence of hemorrhage on this study.  Multiple chronic lacunar   infarcts in the bilateral thalami and basal ganglia are identified.    The ventricles are prominent, appearing out of proportion to degree of   sulcal dilatation, and clinical correlation for normal pressure   hydrocephalus is recommended.    There is no CT evidence for acute cerebral cortical infarct.  There is   periventricular and subcortical white matter hypoattenuation,  most   compatible with chronic microvascular ischemic changes.   No mass effect   is found in the brain.  There is no midline shift or herniation pattern.      The visualized portions of the paranasal sinuses and mastoid air cells   are clear.    IMPRESSION:    No evidence of hemorrhage on this study.    The ventricles are prominent, appearing out of proportion to degree of   sulcal dilatation, and clinical correlation for normal pressure   hydrocephalus is recommended.    Chronic changes as above."      Per radiology report of MR head wo/w contrast:  "COMPARISON:   CT head 10/15/2023    FINDINGS:    BRAIN: In the right posterior corona radiata white matter there are 2   adjacent indistinct lesions hyperintense on the diffusion-weighted and   long TR images, faintly hypointense on the T1-weighted images and   inconspicuous on the ADC images.    The brain demonstrates several focal lesions over multiple infarction.   The most conspicuous including within the right mid corona radiata deep   white matter, the right globus pallidus, the left caudate nucleus head,   the left posterior corona radiata white matter andin each thalamus. No   cerebral cortical lesion is recognized. No abnormal enhancement is found   in the brain.  No diffusion restriction is found in the brain.  No acute   cerebral cortical infarct is found.   No mass effect is found in the   brain.With gadolinium administration no abnormal brain parenchymal   enhancement is found.    The brain also demonstrates extensive confluent lesions within the   cerebral hemispheric white matter. These lesions are hyperintense on the   long TR images, more faintly hypointense on the T1-weighted images were   most confluent, otherwise largely inconspicuous. Involvement is confluent   from the subcortical through the centrum semiovale and periatrial and   periventricular white matter, more patchy in the corona radiata white   matter and brainstem.    The brain also demonstrates numerous foci of remote hemorrhage. These   lesions demonstrate marked low signal intensity on the long TR   diffusion-weighted and gradient echo images. The lesions are most   extensive within the right thalamus, also noted at multiple locations in   the basal ganglia, deep hemispheric white matter and in the cerebellar   hemispheres. Few scattered punctate lesions are found at the level of   cerebral cortex at multiplelocations.    CSF SPACES:   The ventricles, sulci and basal cisterns appear moderately   dilated reflecting diffuse brain volume loss.    VESSELS:   The vertebral and internal carotid arteries demonstrate   expected flow voids indicating their patency. The left vertebral artery   is dominant caliber. The posterior circulation is tortuous. There is   fetal origin of right posterior cerebral artery from the ipsilateral   internal carotid artery (typically incidental developmental variant).    HEAD AND NECK STRUCTURES:   The orbits are unremarkable.  Paranasal   sinuses are clear.  The nasal cavity appears intact.  The central skull   base appears intact.  The nasopharynx is symmetric.  The temporal bones   appear clear of disease.  The calvarium appears unremarkable.      IMPRESSION:    1. Right posterior corona radiata subacute infarction    2. Multiple remote infarctions within the cerebral hemispheric white   matter basal ganglia and thalami    3. Pervasive cerebral hemispheric white matter abnormality, possibly   accelerated form of ischemic white matter disease versus and/or   superimposed other inflammatory metabolic toxic or infectious process    4. Numerous remote petechial hemorrhages most prominently in the basal   ganglia andthalami    5. Diffuse brain volume loss upper range typical for age"      I have personally reviewed imaging studies.      Per cardiology report of [facility]:  "PHYSICIAN INTERPRETATION:  Left Ventricle: Normal left ventricular size and wall thicknesses, with   normal systolic and diastolic function.  Global LV systolic function was normal. Spectral Doppler shows normal   pattern of LV diastolic filling.  Right Ventricle: Normal right ventricular size and function.  Left Atrium: The left atrium is normal in size.  Right Atrium: The right atrium is normal in size.  Pericardium: There is no evidence of pericardial effusion.  Mitral Valve: Structurally normal mitral valve, with normal leaflet   excursion. Mild mitral valve regurgitation is seen.  Tricuspid Valve: Structurally normal tricuspid valve, with normal leaflet   excursion. No tricuspid regurgitation is visualized.  Aortic Valve: Normal trileaflet aortic valve with normal opening.   Sclerotic aortic valve with normal opening. Mild aortic valve   regurgitation is seen.  Pulmonic Valve: Structurally normal pulmonic valve, withnormal leaflet   excursion. Trace pulmonic valve regurgitation.  Aorta: The aortic root and ascending aorta are structurally normal, with   no evidence of dilitation.  Pulmonary Artery: The main pulmonary artery is normal in size.      Summary:   1. Normal global left ventricular systolic function.   2. Mild mitral valve regurgitation.   3. Mild aortic regurgitation.   4. Sclerotic aortic valve with normal opening."      B12/folate  252/8.3,  16.5  TSH  1.04,  1.51  T4  8.7  UTOX neg      EXAMINATION    Awake, alert.  When first asked for the date she answers :" May . . .  20. . . chart."  On further questioning she says the year is 2023, and the month December; corrects to November when told it's not yet December.  Does not know [facility] day of the week but offers that it is the end of the weekend.  When told that it is the last regular work day she says Friday.  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  Asked specifically if she works here she answers yes.  Limited speech production (single words; short phrases).  Some errors in finger naming.          Does not cross the midline.  R/L confusion.  No gross UE drift.  Offers strong resistance on confrontation testing of limb muscles; no grossly evident focal/lateralized motor deficits.      Appears to differentiate LT from P stimuli (tendency to perseverate in reponses).

## 2023-11-17 NOTE — CONSULT NOTE ADULT - ASSESSMENT
58 year old female with history of rheumatoid arthritis and worsening cognitive function. Denies urinary incontinence and gait disturbance. Symptoms are not classic for normal pressure hydrocephalus.    MRI concerning for vasculitis.    Recommend neurology consult    Will discuss case with vascular neurosurgery colleagues regarding obtaining angiogram.

## 2023-11-17 NOTE — CONSULT NOTE ADULT - ASSESSMENT
Cognitive impairment.      Episode of wandering (lost in her own apartment building) leading to being taken to the ED.    Vascular dementia.      Multiple remote cerebral infarctions AND multiple intraparenchymal petechial hemorrhages.      RA by Hx.      The multiple petechial hemorrhages raise concern for a vasculitis.  Presence of one autoimmune disorder (RA) indicates being at risk for having another autoimmune disorder.        RECOMMENDATIONS             Cognitive impairment.      Episode of wandering (lost in her own apartment building) leading to being taken to the ED.    Vascular dementia.      Multiple remote cerebral infarctions AND multiple intraparenchymal petechial hemorrhages.      RA by Hx.      The multiple petechial hemorrhages raise concern for a vasculitis.  Unlilkely due to cerebral emboli.  \    Presence of one autoimmune disorder (RA) indicates being at risk for having another autoimmune disorder.      To my understanding, no embolic source found on TTE.  She may have had multiple small emboli, or all   infarcts and microhemorrhages could be on the basis of a vasculitis.  .       Possible functional B12 deficiency.  Need to treat presumptively (see below).        RECOMMENDATIONS    Please order (unless already ordered):  ESR, CRP, MAX, RF, CCP, RPR, anti DS-DNA, complement C3 and C4, SPEP, ferritin, COVID testing, 25 OH vit D, IL6, D-dimer, procalciton, methylmalonic acid (MMA), homocysteine (Hcy).       The assay for B12 does not measure B12 level directly.  False normal and false elevations (to or above the normal range) occur with pernicious anemia due to intrinsic factor antibodies, which may or may not be detected by antibody tests.  B12-like compounds of vegetable origin (a problem with vegans) without cobalamin activity also can lead to falsely normal or elevated determinations.  Methylmalonic acid (MMA) and homocysteine (Hcy) determinations are necessary to elucidate what is happening.    Irrespective of B12 and folate levels:       high Hcy w normal MMA implies folate deficiency;        high MMA and Hcy implies B12 deficiency +/- folate deficiency.    After MMA and HCY specimens sent to the lab:  Then, don't wait for results but administer cyanocobalamin 1000mcg IM, folic acid 5mg PO, B complex tab.  Afterwards cyanocobalamin 1000mcg IM weekly x 3, folic acid 2mg PO daily, B complex daily.  In follow-up, if either the MMA or Hcy result from here turns out high, need to repeat MMA and Hcy to ascertain if there was a response to the vitamin supplement treatment regimen.  Then manage as appropriate.    Cardiac telemetry.    Cerebral angiography if arrangements can be made  relatively urgently.  Otherwise CTA head and neck.      TIMUR.        Miladys Frederick M.D.   - Department of Neurology  [place] [facility] [employer] at [facility]   Confusional state; recent onset; ayden she lives alone the time course is unknown (hours, days, longer?).  Became manifest when she got lost in her own partment building.      Cognitive impairment.      Episode of wandering (lost in her own apartment building) leading to being taken to the ED.    Vascular dementia.      Multiple remote cerebral infarctions AND multiple intraparenchymal petechial hemorrhages.      RA by Hx.      The multiple petechial hemorrhages raise concern for a vasculitis.  Unlikely due to cerebral emboli.      Presence of one autoimmune disorder (RA) indicates being at risk for having another autoimmune disorder.      To my understanding, no embolic source found on TTE.  She may have had multiple small emboli, or all   infarcts and microhemorrhages could be on the basis of a vasculitis.  .       Possible functional B12 deficiency.  Need to treat presumptively (see below).        RECOMMENDATIONS    Please order (unless already ordered):  ESR, CRP, MAX, RF, CCP, RPR, anti DS-DNA, complement C3 and C4, SPEP, ferritin, COVID testing, 25 OH vit D, IL6, D-dimer, procalcitonin, methylmalonic acid (MMA), homocysteine (Hcy).       The assay for B12 does not measure B12 level directly.  False normal and false elevations (to or above the normal range) occur with pernicious anemia due to intrinsic factor antibodies, which may or may not be detected by antibody tests.  B12-like compounds of vegetable origin (a problem with vegans) without cobalamin activity also can lead to falsely normal or elevated determinations.  Methylmalonic acid (MMA) and homocysteine (Hcy) determinations are necessary to elucidate what is happening.    Irrespective of B12 and folate levels:       high Hcy w normal MMA implies folate deficiency;        high MMA and Hcy implies B12 deficiency +/- folate deficiency.    After MMA and HCY specimens sent to the lab:  Then, don't wait for results but administer cyanocobalamin 1000mcg IM, folic acid 5mg PO, B complex tab.  Afterwards cyanocobalamin 1000mcg IM weekly x 3, folic acid 2mg PO daily, B complex daily.  In follow-up, if either the MMA or Hcy result from here turns out high, need to repeat MMA and Hcy to ascertain if there was a response to the vitamin supplement treatment regimen.  Then manage as appropriate.    Cardiac telemetry.    Cerebral angiography if arrangements can be made  relatively urgently.  Otherwise CTA head and neck.      TIMUR.        Miladys Frederick M.D.   - Department of Neurology  [place] [facility] [employer] [facility] at [facility]

## 2023-11-17 NOTE — PROVIDER CONTACT NOTE (CRITICAL VALUE NOTIFICATION) - BACKGROUND
patient admitted to [facility] for AMS and HTN
patient came with confusion, abnormal ekg hpertensive urgency
patient came with confusion, abnormal ekg, and hypertensive urgency

## 2023-11-17 NOTE — PROVIDER CONTACT NOTE (CRITICAL VALUE NOTIFICATION) - SITUATION
patient came with confusion, abnormal ekg, and hypertensive urgency
k 2.7
Patient had critical potassium level 2.9

## 2023-11-17 NOTE — PROVIDER CONTACT NOTE (CRITICAL VALUE NOTIFICATION) - ASSESSMENT
no complaints reported, no distress noted
patient had critical potassium lab 2.6
no signs of distress noted

## 2023-11-18 LAB
ANION GAP SERPL CALC-SCNC: 3 MMOL/L — LOW (ref 5–17)
ANION GAP SERPL CALC-SCNC: 3 MMOL/L — LOW (ref 5–17)
APPEARANCE UR: ABNORMAL
APPEARANCE UR: ABNORMAL
BACTERIA # UR AUTO: ABNORMAL /HPF
BACTERIA # UR AUTO: ABNORMAL /HPF
BILIRUB UR-MCNC: NEGATIVE — SIGNIFICANT CHANGE UP
BILIRUB UR-MCNC: NEGATIVE — SIGNIFICANT CHANGE UP
BUN SERPL-MCNC: 18 MG/DL — SIGNIFICANT CHANGE UP (ref 7–23)
BUN SERPL-MCNC: 18 MG/DL — SIGNIFICANT CHANGE UP (ref 7–23)
CALCIUM SERPL-MCNC: 8.9 MG/DL — SIGNIFICANT CHANGE UP (ref 8.5–10.1)
CALCIUM SERPL-MCNC: 8.9 MG/DL — SIGNIFICANT CHANGE UP (ref 8.5–10.1)
CHLORIDE SERPL-SCNC: 109 MMOL/L — HIGH (ref 96–108)
CHLORIDE SERPL-SCNC: 109 MMOL/L — HIGH (ref 96–108)
CO2 SERPL-SCNC: 28 MMOL/L — SIGNIFICANT CHANGE UP (ref 22–31)
CO2 SERPL-SCNC: 28 MMOL/L — SIGNIFICANT CHANGE UP (ref 22–31)
COLOR SPEC: YELLOW — SIGNIFICANT CHANGE UP
COLOR SPEC: YELLOW — SIGNIFICANT CHANGE UP
CORTIS F PM SERPL-MCNC: 7.7 UG/DL — SIGNIFICANT CHANGE UP (ref 2.7–10.5)
CORTIS F PM SERPL-MCNC: 7.7 UG/DL — SIGNIFICANT CHANGE UP (ref 2.7–10.5)
CREAT SERPL-MCNC: 1.18 MG/DL — SIGNIFICANT CHANGE UP (ref 0.5–1.3)
CREAT SERPL-MCNC: 1.18 MG/DL — SIGNIFICANT CHANGE UP (ref 0.5–1.3)
CRP SERPL-MCNC: 10 MG/L — HIGH
CRP SERPL-MCNC: 10 MG/L — HIGH
DIFF PNL FLD: NEGATIVE — SIGNIFICANT CHANGE UP
DIFF PNL FLD: NEGATIVE — SIGNIFICANT CHANGE UP
EGFR: 53 ML/MIN/1.73M2 — LOW
EGFR: 53 ML/MIN/1.73M2 — LOW
EPI CELLS # UR: PRESENT
EPI CELLS # UR: PRESENT
GLUCOSE SERPL-MCNC: 90 MG/DL — SIGNIFICANT CHANGE UP (ref 70–99)
GLUCOSE SERPL-MCNC: 90 MG/DL — SIGNIFICANT CHANGE UP (ref 70–99)
GLUCOSE UR QL: 100 MG/DL
GLUCOSE UR QL: 100 MG/DL
HCT VFR BLD CALC: 28.1 % — LOW (ref 34.5–45)
HCT VFR BLD CALC: 28.1 % — LOW (ref 34.5–45)
HGB BLD-MCNC: 9.9 G/DL — LOW (ref 11.5–15.5)
HGB BLD-MCNC: 9.9 G/DL — LOW (ref 11.5–15.5)
KETONES UR-MCNC: ABNORMAL MG/DL
KETONES UR-MCNC: ABNORMAL MG/DL
LEUKOCYTE ESTERASE UR-ACNC: NEGATIVE — SIGNIFICANT CHANGE UP
LEUKOCYTE ESTERASE UR-ACNC: NEGATIVE — SIGNIFICANT CHANGE UP
MAGNESIUM SERPL-MCNC: 2.2 MG/DL — SIGNIFICANT CHANGE UP (ref 1.6–2.6)
MAGNESIUM SERPL-MCNC: 2.2 MG/DL — SIGNIFICANT CHANGE UP (ref 1.6–2.6)
MCHC RBC-ENTMCNC: 29.2 PG — SIGNIFICANT CHANGE UP (ref 27–34)
MCHC RBC-ENTMCNC: 29.2 PG — SIGNIFICANT CHANGE UP (ref 27–34)
MCHC RBC-ENTMCNC: 35.2 G/DL — SIGNIFICANT CHANGE UP (ref 32–36)
MCHC RBC-ENTMCNC: 35.2 G/DL — SIGNIFICANT CHANGE UP (ref 32–36)
MCV RBC AUTO: 82.9 FL — SIGNIFICANT CHANGE UP (ref 80–100)
MCV RBC AUTO: 82.9 FL — SIGNIFICANT CHANGE UP (ref 80–100)
NITRITE UR-MCNC: NEGATIVE — SIGNIFICANT CHANGE UP
NITRITE UR-MCNC: NEGATIVE — SIGNIFICANT CHANGE UP
NRBC # BLD: 0 /100 WBCS — SIGNIFICANT CHANGE UP (ref 0–0)
NRBC # BLD: 0 /100 WBCS — SIGNIFICANT CHANGE UP (ref 0–0)
PH UR: 5 — SIGNIFICANT CHANGE UP (ref 5–8)
PH UR: 5 — SIGNIFICANT CHANGE UP (ref 5–8)
PLATELET # BLD AUTO: 313 K/UL — SIGNIFICANT CHANGE UP (ref 150–400)
PLATELET # BLD AUTO: 313 K/UL — SIGNIFICANT CHANGE UP (ref 150–400)
POTASSIUM SERPL-MCNC: 4 MMOL/L — SIGNIFICANT CHANGE UP (ref 3.5–5.3)
POTASSIUM SERPL-MCNC: 4 MMOL/L — SIGNIFICANT CHANGE UP (ref 3.5–5.3)
POTASSIUM SERPL-SCNC: 4 MMOL/L — SIGNIFICANT CHANGE UP (ref 3.5–5.3)
POTASSIUM SERPL-SCNC: 4 MMOL/L — SIGNIFICANT CHANGE UP (ref 3.5–5.3)
PROT SERPL-MCNC: 7.1 G/DL — SIGNIFICANT CHANGE UP (ref 6–8.3)
PROT SERPL-MCNC: 7.1 G/DL — SIGNIFICANT CHANGE UP (ref 6–8.3)
PROT UR-MCNC: 30 MG/DL
PROT UR-MCNC: 30 MG/DL
RBC # BLD: 3.39 M/UL — LOW (ref 3.8–5.2)
RBC # BLD: 3.39 M/UL — LOW (ref 3.8–5.2)
RBC # FLD: 12.9 % — SIGNIFICANT CHANGE UP (ref 10.3–14.5)
RBC # FLD: 12.9 % — SIGNIFICANT CHANGE UP (ref 10.3–14.5)
RBC CASTS # UR COMP ASSIST: 0 /HPF — SIGNIFICANT CHANGE UP (ref 0–4)
RBC CASTS # UR COMP ASSIST: 0 /HPF — SIGNIFICANT CHANGE UP (ref 0–4)
RHEUMATOID FACT SERPL-ACNC: 30 IU/ML — HIGH (ref 0–13)
RHEUMATOID FACT SERPL-ACNC: 30 IU/ML — HIGH (ref 0–13)
SODIUM SERPL-SCNC: 140 MMOL/L — SIGNIFICANT CHANGE UP (ref 135–145)
SODIUM SERPL-SCNC: 140 MMOL/L — SIGNIFICANT CHANGE UP (ref 135–145)
SP GR SPEC: >1.03 — HIGH (ref 1–1.03)
SP GR SPEC: >1.03 — HIGH (ref 1–1.03)
UROBILINOGEN FLD QL: 0.2 MG/DL — SIGNIFICANT CHANGE UP (ref 0.2–1)
UROBILINOGEN FLD QL: 0.2 MG/DL — SIGNIFICANT CHANGE UP (ref 0.2–1)
WBC # BLD: 6.88 K/UL — SIGNIFICANT CHANGE UP (ref 3.8–10.5)
WBC # BLD: 6.88 K/UL — SIGNIFICANT CHANGE UP (ref 3.8–10.5)
WBC # FLD AUTO: 6.88 K/UL — SIGNIFICANT CHANGE UP (ref 3.8–10.5)
WBC # FLD AUTO: 6.88 K/UL — SIGNIFICANT CHANGE UP (ref 3.8–10.5)
WBC UR QL: 0 /HPF — SIGNIFICANT CHANGE UP (ref 0–5)
WBC UR QL: 0 /HPF — SIGNIFICANT CHANGE UP (ref 0–5)

## 2023-11-18 PROCEDURE — 74181 MRI ABDOMEN W/O CONTRAST: CPT | Mod: 26

## 2023-11-18 PROCEDURE — 70496 CT ANGIOGRAPHY HEAD: CPT | Mod: 26

## 2023-11-18 PROCEDURE — 70498 CT ANGIOGRAPHY NECK: CPT | Mod: 26

## 2023-11-18 PROCEDURE — 99232 SBSQ HOSP IP/OBS MODERATE 35: CPT

## 2023-11-18 PROCEDURE — 70450 CT HEAD/BRAIN W/O DYE: CPT | Mod: 26,59

## 2023-11-18 RX ADMIN — Medication 100 MILLIGRAM(S): at 17:12

## 2023-11-18 RX ADMIN — CEFTRIAXONE 100 MILLIGRAM(S): 500 INJECTION, POWDER, FOR SOLUTION INTRAMUSCULAR; INTRAVENOUS at 17:12

## 2023-11-18 RX ADMIN — AMLODIPINE BESYLATE 10 MILLIGRAM(S): 2.5 TABLET ORAL at 06:21

## 2023-11-18 RX ADMIN — SPIRONOLACTONE 50 MILLIGRAM(S): 25 TABLET, FILM COATED ORAL at 06:21

## 2023-11-18 RX ADMIN — Medication 100 MILLIGRAM(S): at 06:21

## 2023-11-18 RX ADMIN — PREGABALIN 1000 MICROGRAM(S): 225 CAPSULE ORAL at 11:43

## 2023-11-18 NOTE — CHART NOTE - NSCHARTNOTEFT_GEN_A_CORE
58F, currently admitted to [place]. PMH RA. Admitted for AMS. MRI shows Right posterior corona radiata acute infarct and multiple petechial hemorrhages in b/l thalami and basal ganglia. Exam is reportedly AOx1, otherwise neurointact  -Transfer to [facility] for cerebral angiogram, planned for 11/20/2023, to eval for vasculitis with Dr. Hartley

## 2023-11-18 NOTE — PROGRESS NOTE ADULT - NSPROGADDITIONALINFOA_GEN_ALL_CORE
CTA angiogram  nsx consulted  hopeful transfer to neuro for angio  vasculitis workup sent  communicated with rheum for official consult

## 2023-11-18 NOTE — CHART NOTE - NSCHARTNOTEFT_GEN_A_CORE
I note that blood for MMA and Hcy has been sent to the lab.     As recommended in my Neurology Consult note yesterday evening, start vitamin supplements given the possibility of B12 deficiency which may be symptomatic.      Miladys Frederick M.D.   - Department of Neurology  [facility] [employer] [facility] at [facility]

## 2023-11-18 NOTE — PROGRESS NOTE ADULT - PROBLEM SELECTOR PLAN 4
Ct x 2 reviewed  plan  MRI   Neuro  Nsx
Ct x 2 reviewed  plan  mri  lp
Ct x 2 reviewed  plan  mri  lp

## 2023-11-18 NOTE — PROGRESS NOTE ADULT - PROBLEM SELECTOR PLAN 1
ddx include b12 deficiency and NPH  neuro/neurosurgery (at [facility]) consulted  folate and thyroid in tact  plan:  Lp monday  MMA and homocysteine  c/w cyanocobalamin
ddx include b12 deficiency and NPH  neuro/neurosurgery (at [facility]) consulted  folate and thyroid in tact  plan:  Lp monday  MMA and homocysteine  c/w cyanocobalamin
ddx include b12 deficiency and NPH  neuro/neurosurgery (at [facility]) consulted  neuro and NS consult

## 2023-11-18 NOTE — PROGRESS NOTE ADULT - PROBLEM SELECTOR PLAN 2
elevated Cr due to dehydration  persistent low hypok  plan:  supplement
elevated Cr due to dehydration  persistent low hypok  plan:  nephro consulted  labs ordered  arb on hold   Ct ordered to check for ileus?
elevated Cr due to dehydration  persistent low hypok  plan:  nephro consulted  labs ordered  arb on hold   Ct ordered to check for ileus?

## 2023-11-19 ENCOUNTER — TRANSCRIPTION ENCOUNTER (OUTPATIENT)
Age: 58
End: 2023-11-19

## 2023-11-19 VITALS
DIASTOLIC BLOOD PRESSURE: 93 MMHG | HEART RATE: 84 BPM | RESPIRATION RATE: 18 BRPM | TEMPERATURE: 99 F | OXYGEN SATURATION: 97 % | SYSTOLIC BLOOD PRESSURE: 165 MMHG

## 2023-11-19 LAB
ANION GAP SERPL CALC-SCNC: 7 MMOL/L — SIGNIFICANT CHANGE UP (ref 5–17)
ANION GAP SERPL CALC-SCNC: 7 MMOL/L — SIGNIFICANT CHANGE UP (ref 5–17)
BUN SERPL-MCNC: 20 MG/DL — SIGNIFICANT CHANGE UP (ref 7–23)
BUN SERPL-MCNC: 20 MG/DL — SIGNIFICANT CHANGE UP (ref 7–23)
CALCIUM SERPL-MCNC: 8.9 MG/DL — SIGNIFICANT CHANGE UP (ref 8.5–10.1)
CALCIUM SERPL-MCNC: 8.9 MG/DL — SIGNIFICANT CHANGE UP (ref 8.5–10.1)
CHLORIDE SERPL-SCNC: 108 MMOL/L — SIGNIFICANT CHANGE UP (ref 96–108)
CHLORIDE SERPL-SCNC: 108 MMOL/L — SIGNIFICANT CHANGE UP (ref 96–108)
CO2 SERPL-SCNC: 27 MMOL/L — SIGNIFICANT CHANGE UP (ref 22–31)
CO2 SERPL-SCNC: 27 MMOL/L — SIGNIFICANT CHANGE UP (ref 22–31)
CREAT SERPL-MCNC: 0.99 MG/DL — SIGNIFICANT CHANGE UP (ref 0.5–1.3)
CREAT SERPL-MCNC: 0.99 MG/DL — SIGNIFICANT CHANGE UP (ref 0.5–1.3)
EGFR: 65 ML/MIN/1.73M2 — SIGNIFICANT CHANGE UP
EGFR: 65 ML/MIN/1.73M2 — SIGNIFICANT CHANGE UP
GLUCOSE SERPL-MCNC: 100 MG/DL — HIGH (ref 70–99)
GLUCOSE SERPL-MCNC: 100 MG/DL — HIGH (ref 70–99)
HCT VFR BLD CALC: 30.6 % — LOW (ref 34.5–45)
HCT VFR BLD CALC: 30.6 % — LOW (ref 34.5–45)
HGB BLD-MCNC: 10.8 G/DL — LOW (ref 11.5–15.5)
HGB BLD-MCNC: 10.8 G/DL — LOW (ref 11.5–15.5)
MCHC RBC-ENTMCNC: 29.1 PG — SIGNIFICANT CHANGE UP (ref 27–34)
MCHC RBC-ENTMCNC: 29.1 PG — SIGNIFICANT CHANGE UP (ref 27–34)
MCHC RBC-ENTMCNC: 35.3 G/DL — SIGNIFICANT CHANGE UP (ref 32–36)
MCHC RBC-ENTMCNC: 35.3 G/DL — SIGNIFICANT CHANGE UP (ref 32–36)
MCV RBC AUTO: 82.5 FL — SIGNIFICANT CHANGE UP (ref 80–100)
MCV RBC AUTO: 82.5 FL — SIGNIFICANT CHANGE UP (ref 80–100)
NRBC # BLD: 0 /100 WBCS — SIGNIFICANT CHANGE UP (ref 0–0)
NRBC # BLD: 0 /100 WBCS — SIGNIFICANT CHANGE UP (ref 0–0)
PLATELET # BLD AUTO: 335 K/UL — SIGNIFICANT CHANGE UP (ref 150–400)
PLATELET # BLD AUTO: 335 K/UL — SIGNIFICANT CHANGE UP (ref 150–400)
POTASSIUM SERPL-MCNC: 3.7 MMOL/L — SIGNIFICANT CHANGE UP (ref 3.5–5.3)
POTASSIUM SERPL-MCNC: 3.7 MMOL/L — SIGNIFICANT CHANGE UP (ref 3.5–5.3)
POTASSIUM SERPL-SCNC: 3.7 MMOL/L — SIGNIFICANT CHANGE UP (ref 3.5–5.3)
POTASSIUM SERPL-SCNC: 3.7 MMOL/L — SIGNIFICANT CHANGE UP (ref 3.5–5.3)
POTASSIUM UR-SCNC: 47.8 MMOL/L — SIGNIFICANT CHANGE UP
POTASSIUM UR-SCNC: 47.8 MMOL/L — SIGNIFICANT CHANGE UP
RBC # BLD: 3.71 M/UL — LOW (ref 3.8–5.2)
RBC # BLD: 3.71 M/UL — LOW (ref 3.8–5.2)
RBC # FLD: 12.9 % — SIGNIFICANT CHANGE UP (ref 10.3–14.5)
RBC # FLD: 12.9 % — SIGNIFICANT CHANGE UP (ref 10.3–14.5)
SODIUM SERPL-SCNC: 142 MMOL/L — SIGNIFICANT CHANGE UP (ref 135–145)
SODIUM SERPL-SCNC: 142 MMOL/L — SIGNIFICANT CHANGE UP (ref 135–145)
WBC # BLD: 7.75 K/UL — SIGNIFICANT CHANGE UP (ref 3.8–10.5)
WBC # BLD: 7.75 K/UL — SIGNIFICANT CHANGE UP (ref 3.8–10.5)
WBC # FLD AUTO: 7.75 K/UL — SIGNIFICANT CHANGE UP (ref 3.8–10.5)
WBC # FLD AUTO: 7.75 K/UL — SIGNIFICANT CHANGE UP (ref 3.8–10.5)

## 2023-11-19 PROCEDURE — 99223 1ST HOSP IP/OBS HIGH 75: CPT

## 2023-11-19 PROCEDURE — 99239 HOSP IP/OBS DSCHRG MGMT >30: CPT

## 2023-11-19 RX ORDER — LOSARTAN POTASSIUM 100 MG/1
0 TABLET, FILM COATED ORAL
Qty: 0 | Refills: 0 | DISCHARGE

## 2023-11-19 RX ORDER — LOSARTAN POTASSIUM 100 MG/1
1 TABLET, FILM COATED ORAL
Qty: 0 | Refills: 0 | DISCHARGE
Start: 2023-11-19

## 2023-11-19 RX ORDER — SPIRONOLACTONE 25 MG/1
2 TABLET, FILM COATED ORAL
Qty: 0 | Refills: 0 | DISCHARGE
Start: 2023-11-19

## 2023-11-19 RX ORDER — ATORVASTATIN CALCIUM 80 MG/1
40 TABLET, FILM COATED ORAL AT BEDTIME
Refills: 0 | Status: DISCONTINUED | OUTPATIENT
Start: 2023-11-19 | End: 2023-11-19

## 2023-11-19 RX ORDER — AMLODIPINE BESYLATE 2.5 MG/1
1 TABLET ORAL
Qty: 0 | Refills: 0 | DISCHARGE
Start: 2023-11-19

## 2023-11-19 RX ORDER — LOSARTAN POTASSIUM 100 MG/1
25 TABLET, FILM COATED ORAL DAILY
Refills: 0 | Status: DISCONTINUED | OUTPATIENT
Start: 2023-11-19 | End: 2023-11-19

## 2023-11-19 RX ORDER — ATORVASTATIN CALCIUM 80 MG/1
1 TABLET, FILM COATED ORAL
Qty: 30 | Refills: 0
Start: 2023-11-19 | End: 2023-12-18

## 2023-11-19 RX ADMIN — LOSARTAN POTASSIUM 25 MILLIGRAM(S): 100 TABLET, FILM COATED ORAL at 13:06

## 2023-11-19 RX ADMIN — AMLODIPINE BESYLATE 10 MILLIGRAM(S): 2.5 TABLET ORAL at 06:03

## 2023-11-19 RX ADMIN — SPIRONOLACTONE 50 MILLIGRAM(S): 25 TABLET, FILM COATED ORAL at 06:03

## 2023-11-19 RX ADMIN — Medication 100 MILLIGRAM(S): at 17:12

## 2023-11-19 RX ADMIN — CEFTRIAXONE 100 MILLIGRAM(S): 500 INJECTION, POWDER, FOR SOLUTION INTRAMUSCULAR; INTRAVENOUS at 17:12

## 2023-11-19 RX ADMIN — Medication 100 MILLIGRAM(S): at 06:03

## 2023-11-19 RX ADMIN — PREGABALIN 1000 MICROGRAM(S): 225 CAPSULE ORAL at 13:07

## 2023-11-19 RX ADMIN — ATORVASTATIN CALCIUM 40 MILLIGRAM(S): 80 TABLET, FILM COATED ORAL at 23:02

## 2023-11-19 NOTE — PROGRESS NOTE ADULT - ASSESSMENT
58 years old female with h/o HTN, RA was brought in to ED with concern for AMS. Neighbor overhead that patient was looking for mom, wondering/confused around apartment coplex. Per patient, she live with sister but is not able to provide with phone number. Patient denied any discomfort.   Hypertensive, afebrile, sat well at . No leukocytosis, plt 347, hsTnT 15.4--> 17.4, Cr 1.79, TSH 1.040. CT head noted small hyperdensity at the right thalamocapsular junction on thinner slices, which may represent dystrophic calcification or residual blood products from prior hemorrhage (given surrounding hypodensity, possibly gliosis or residual edema). Acute hemorrhage is considered less likely as it is not prominent on the thicker slice. EKG with NSR, inferolateral ST-T changes.
58 years old female with h/o HTN, RA was brought in to ED with concern for AMS. PE cognitive defiicts NIHSS 2. 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 Numerous remote petechial hemorrhages most prominently in the basal ganglia and thalami    Impression: CVA, possible NPH, vascular dementia, plan for angio to evaluate for vasculitis    team plans for tx to [facility] for conventional angio, recommend stroke eval there  MRI, CTA as above  tte reviewed  would consider Aspirin 81   Atorvastatin, titrate the dose according to LDL.   TTE with bubble study and telemetry to look for a cardiac source of embolism.   HbA1C and LDL.   PT/OT/Speech and swallow/safety eval.  
58 years old female with h/o HTN, RA was brought in to ED with concern for AMS. Neighbor overhead that patient was looking for mom, wondering/confused around apartment coplex. Per patient, she live with sister but is not able to provide with phone number. Patient denied any discomfort.   Hypertensive, afebrile, sat well at . No leukocytosis, plt 347, hsTnT 15.4--> 17.4, Cr 1.79, TSH 1.040. CT head noted small hyperdensity at the right thalamocapsular junction on thinner slices, which may represent dystrophic calcification or residual blood products from prior hemorrhage (given surrounding hypodensity, possibly gliosis or residual edema). Acute hemorrhage is considered less likely as it is not prominent on the thicker slice. EKG with NSR, inferolateral ST-T changes.
58 years old female with h/o HTN, RA was brought in to ED with concern for AMS. Neighbor overhead that patient was looking for mom, wondering/confused around apartment coplex. Per patient, she live with sister but is not able to provide with phone number. Patient denied any discomfort.   Hypertensive, afebrile, sat well at . No leukocytosis, plt 347, hsTnT 15.4--> 17.4, Cr 1.79, TSH 1.040. CT head noted small hyperdensity at the right thalamocapsular junction on thinner slices, which may represent dystrophic calcification or residual blood products from prior hemorrhage (given surrounding hypodensity, possibly gliosis or residual edema). Acute hemorrhage is considered less likely as it is not prominent on the thicker slice. EKG with NSR, inferolateral ST-T changes.

## 2023-11-19 NOTE — CONSULT NOTE ADULT - ASSESSMENT
58 year old female was admitted for AMS, and diagnosed with a likely CVA.  There is no obvious evidence of vasculitis at this time, as CTA brain does not exhibit any sign of vasculitis, and pt w/ no signs/symptoms of systemic vasculitis.    - As per neurology, pt to be transferred to [facility] for further evaluation, including conventional angiogram.    - Treatment for CVA as per neurology. 58 year old female with reported hx of RA was admitted for AMS, and diagnosed with a likely CVA.  There is no obvious evidence of vasculitis at this time, as CTA brain does not exhibit any sign of vasculitis, and pt w/ no signs/symptoms of systemic vasculitis.  RA currently appears clinically quiescent.    - As per neurology, pt to be transferred to [facility] for further evaluation, including conventional angiogram.    - Treatment for CVA as per neurology.    - Pt should f/u with her outside rheumatologist within 1 wee after D/C home.

## 2023-11-19 NOTE — PROGRESS NOTE ADULT - SUBJECTIVE AND OBJECTIVE BOX
[facility] NEPHROLOGY SERVICES, [facility]  NEPHROLOGY AND HYPERTENSION  [address]  [address]  [place] [address]  [phone]    MD POP MAYEN, MD EKATERINA CISSE MD CHRISTOPHER CAPUTO, MD EDWARD BOVER, MD          Patient events noted  No distress    MEDICATIONS  (STANDING):  amLODIPine   Tablet 10 milliGRAM(s) Oral daily  atorvastatin 40 milliGRAM(s) Oral at bedtime  cefTRIAXone   IVPB      cefTRIAXone   IVPB 1000 milliGRAM(s) IV Intermittent every 24 hours  cyanocobalamin Injectable 1000 MICROGram(s) IntraMuscular daily  doxycycline monohydrate Capsule 100 milliGRAM(s) Oral every 12 hours  losartan 25 milliGRAM(s) Oral daily  spironolactone 50 milliGRAM(s) Oral daily    MEDICATIONS  (PRN):  acetaminophen     Tablet .. 650 milliGRAM(s) Oral every 6 hours PRN Mild Pain (1 - 3), Moderate Pain (4 - 6)  melatonin 3 milliGRAM(s) Oral at bedtime PRN Insomnia      11-18-23 @ 07:01  -  11-19-23 @ 07:00  --------------------------------------------------------  IN: 490 mL / OUT: 300 mL / NET: 190 mL    11-19-23 @ 07:01  -  11-19-23 @ 22:27  --------------------------------------------------------  IN: 410 mL / OUT: 750 mL / NET: -340 mL      PHYSICAL EXAM:      T(C): 37.2 (11-19-23 @ 16:12), Max: 37.3 (11-18-23 @ 23:52)  HR: 100 (11-19-23 @ 16:12) (88 - 100)  BP: 139/87 (11-19-23 @ 16:12) (138/82 - 147/87)  RR: 18 (11-19-23 @ 16:12) (18 - 19)  SpO2: 95% (11-19-23 @ 16:12) (94% - 98%)  Wt(kg): --  Lungs clear  Heart S1S2  Abd soft NT ND  Extremities:   tr edema                                    10.8   7.75  )-----------( 335      ( 19 Nov 2023 07:30 )             30.6     11-19    142  |  108  |  20  ----------------------------<  100<H>  3.7   |  27  |  0.99    Ca    8.9      19 Nov 2023 07:30  Mg     2.2     11-18          Creatinine Trend: 0.99<--, 1.18<--, 1.15<--, 1.11<--, 1.32<--, 1.12<--        Assessment   KRUNAL accelerated HTN with acute nephropathy;   R/O secondary HTN in light of hypokalemia   Improving trend     Plan  BP mediation adjustments  Await Nathan: renin ratio; catecholamines;         Shubham Swan MD
patient seen and examined  Review of Systems:  General:denies fever chills, headache, weakness  HEENT: denies blurry vision,diffculty swallowing, difficulty hearing, tinnitus  Cardiovascular: denies chest pain  ,palpitations  Pulmonary:denies shortness of breath, cough, wheezing, hemoptysis  Gastrointestinal: denies abdominal pain, constipation, diarrhea,nausea , vomiting, hematochezia  : denies hematuria, dysuria, or incontinence  Neurological: denies weakness, numbness , tingling, dizziness, tremors  MSK: denies muscle pain, difficulty ambulating, swelling, back pain  skin: denies skin rash, itching, burning, or  skin lesions  Psychiatrical: denies mood disturbances, anxierty, feeling depressed, depression , or difficulty sleeping    Objective:  Vitals  T(C): 36.9 (11-18-23 @ 10:49), Max: 37.3 (11-17-23 @ 23:58)  HR: 96 (11-18-23 @ 10:49) (87 - 96)  BP: 144/90 (11-18-23 @ 10:49) (141/80 - 156/92)  RR: 18 (11-18-23 @ 10:49) (18 - 20)  SpO2: 96% (11-18-23 @ 10:49) (93% - 96%)    Physical Exam:  General: comfortable, no acute distress  HEENT: Atraumatic, no LAD, trachea midline, PERRLA  Cardiovascular: normal s1s2, no murmurs, gallops or fricition rubs  Pulmonary: clear to ausculation Bilaterally, no wheezing , rhonchi  Gastrointestinal: soft non tender non distended, no masses felt, no organomegally  Muscloskeletal: no lower extremity edema, intact bilateral lower extremity pulses  Neurological: CN II-12 intact. No focal weakness  Psychiatrical: normal mood, cooperative  SKIN: no rash, lesions or ulcers    Labs:                          9.9    6.88  )-----------( 313      ( 18 Nov 2023 06:17 )             28.1     11-18    140  |  109<H>  |  18  ----------------------------<  90  4.0   |  28  |  1.18    Ca    8.9      18 Nov 2023 06:17  Mg     2.2     11-18    TPro  7.1  /  Alb  x   /  TBili  x   /  DBili  x   /  AST  x   /  ALT  x   /  AlkPhos  x   11-17    LIVER FUNCTIONS - ( 17 Nov 2023 20:00 )  Alb: x     / Pro: 7.1 g/dL / ALK PHOS: x     / ALT: x     / AST: x     / GGT: x                 Active Medications  MEDICATIONS  (STANDING):  amLODIPine   Tablet 10 milliGRAM(s) Oral daily  cefTRIAXone   IVPB 1000 milliGRAM(s) IV Intermittent every 24 hours  cefTRIAXone   IVPB      cyanocobalamin Injectable 1000 MICROGram(s) IntraMuscular daily  doxycycline monohydrate Capsule 100 milliGRAM(s) Oral every 12 hours  spironolactone 50 milliGRAM(s) Oral daily    MEDICATIONS  (PRN):  acetaminophen     Tablet .. 650 milliGRAM(s) Oral every 6 hours PRN Mild Pain (1 - 3), Moderate Pain (4 - 6)  melatonin 3 milliGRAM(s) Oral at bedtime PRN Insomnia    
[facility] NEPHROLOGY SERVICES, [facility]  NEPHROLOGY AND HYPERTENSION  [address]  [address]  [place] [address]  [phone]    MD POP MAYEN, MD EKATERINA CISSE MD CHRISTOPHER CAPUTO, MD EDWARD BOVER, MD          Patient events noted    MEDICATIONS  (STANDING):  amLODIPine   Tablet 10 milliGRAM(s) Oral daily  cefTRIAXone   IVPB 1000 milliGRAM(s) IV Intermittent every 24 hours  cefTRIAXone   IVPB      cyanocobalamin Injectable 1000 MICROGram(s) IntraMuscular daily  doxycycline monohydrate Capsule 100 milliGRAM(s) Oral every 12 hours  spironolactone 50 milliGRAM(s) Oral daily    MEDICATIONS  (PRN):  acetaminophen     Tablet .. 650 milliGRAM(s) Oral every 6 hours PRN Mild Pain (1 - 3), Moderate Pain (4 - 6)  melatonin 3 milliGRAM(s) Oral at bedtime PRN Insomnia      11-17-23 @ 07:01  -  11-18-23 @ 07:00  --------------------------------------------------------  IN: 240 mL / OUT: 0 mL / NET: 240 mL    11-18-23 @ 07:01  -  11-18-23 @ 23:45  --------------------------------------------------------  IN: 490 mL / OUT: 300 mL / NET: 190 mL      PHYSICAL EXAM:      T(C): 37 (11-18-23 @ 16:34), Max: 37.3 (11-17-23 @ 23:58)  HR: 95 (11-18-23 @ 16:34) (87 - 96)  BP: 137/83 (11-18-23 @ 16:34) (137/83 - 156/92)  RR: 18 (11-18-23 @ 16:34) (18 - 18)  SpO2: 97% (11-18-23 @ 16:34) (93% - 97%)  Wt(kg): --  Lungs clear  Heart S1S2  Abd soft NT ND  Extremities:   tr edema                                    9.9    6.88  )-----------( 313      ( 18 Nov 2023 06:17 )             28.1     11-18    140  |  109<H>  |  18  ----------------------------<  90  4.0   |  28  |  1.18    Ca    8.9      18 Nov 2023 06:17  Mg     2.2     11-18    TPro  7.1  /  Alb  x   /  TBili  x   /  DBili  x   /  AST  x   /  ALT  x   /  AlkPhos  x   11-17      LIVER FUNCTIONS - ( 17 Nov 2023 20:00 )  Alb: x     / Pro: 7.1 g/dL / ALK PHOS: x     / ALT: x     / AST: x     / GGT: x           Creatinine Trend: 1.18<--, 1.15<--, 1.11<--, 1.32<--, 1.12<--, 1.34<--        Assessment   KRUNAL accelerated HTN with acute nephropathy;   R/O secondary HTN in light of hypokalemia     Plan  BP mediation adjustments  Secondary HTN work up       Shubham Swan MD
Neurology consult    NADINE [id number]     Patient is a 58y old  Female who presents with a chief complaint of AMS, KRUNAL (19 Nov 2023 06:54)      HPI:  58 years old female with h/o HTN, RA was brought in to ED with concern for AMS. Neighbor overhead that patient was looking for mom, wondering/confused around apartment coplex. Per patient, she live with sister but is not able to provide with phone number. Patient denied any discomfort.   Hypertensive, afebrile, sat well at RA. No leukocytosis, plt 347, hsTnT 15.4--> 17.4, Cr 1.79, TSH 1.040. CT head noted small hyperdensity at the right thalamocapsular junction on thinner slices, which may represent dystrophic calcification or residual blood products from prior hemorrhage (given surrounding hypodensity, possibly gliosis or residual edema). Acute hemorrhage is considered less likely as it is not prominent on the thicker slice. EKG with NSR, inferolateral ST-T changes.    SH: no toxic habits (15 Nov 2023 11:44)        MEDICATIONS    acetaminophen     Tablet .. 650 milliGRAM(s) Oral every 6 hours PRN  amLODIPine   Tablet 10 milliGRAM(s) Oral daily  atorvastatin 40 milliGRAM(s) Oral at bedtime  cefTRIAXone   IVPB 1000 milliGRAM(s) IV Intermittent every 24 hours  cefTRIAXone   IVPB      cyanocobalamin Injectable 1000 MICROGram(s) IntraMuscular daily  doxycycline monohydrate Capsule 100 milliGRAM(s) Oral every 12 hours  losartan 25 milliGRAM(s) Oral daily  melatonin 3 milliGRAM(s) Oral at bedtime PRN  spironolactone 50 milliGRAM(s) Oral daily      PMH:      PSH:     Family history: No history of dementia, strokes, or seizures   FAMILY HISTORY:      SOCIAL HISTORY:  No history of tobacco or alcohol use     Allergies    No Known Allergies    Intolerances            Vital Signs Last 24 Hrs  T(C): 37.3 (19 Nov 2023 10:19), Max: 37.3 (18 Nov 2023 23:52)  T(F): 99.1 (19 Nov 2023 10:19), Max: 99.1 (18 Nov 2023 23:52)  HR: 98 (19 Nov 2023 10:19) (88 - 98)  BP: 138/82 (19 Nov 2023 10:19) (137/83 - 147/87)  BP(mean): --  RR: 18 (19 Nov 2023 10:19) (18 - 19)  SpO2: 94% (19 Nov 2023 10:19) (94% - 98%)    Parameters below as of 19 Nov 2023 10:19  Patient On (Oxygen Delivery Method): room air          On Neurological Examination:    Neuro: AAOx2 could not say month, obeys commands, no dysarthria; calculations intact, fluent names, repeats     CN: PERRL, EOMI, VFF normal gaze preference, no facial palsy,     Motor: no drift in all extremeties    Sensory: Intact to LT and PP no extinction    Coordination: FTN intact b/l  + grasp reflexe                 LABS:  CBC Full  -  ( 19 Nov 2023 07:30 )  WBC Count : 7.75 K/uL  RBC Count : 3.71 M/uL  Hemoglobin : 10.8 g/dL  Hematocrit : 30.6 %  Platelet Count - Automated : 335 K/uL  Mean Cell Volume : 82.5 fl  Mean Cell Hemoglobin : 29.1 pg  Mean Cell Hemoglobin Concentration : 35.3 g/dL  Auto Neutrophil # : x  Auto Lymphocyte # : x  Auto Monocyte # : x  Auto Eosinophil # : x  Auto Basophil # : x  Auto Neutrophil % : x  Auto Lymphocyte % : x  Auto Monocyte % : x  Auto Eosinophil % : x  Auto Basophil % : x    Urinalysis Basic - ( 19 Nov 2023 07:30 )    Color: x / Appearance: x / SG: x / pH: x  Gluc: 100 mg/dL / Ketone: x  / Bili: x / Urobili: x   Blood: x / Protein: x / Nitrite: x   Leuk Esterase: x / RBC: x / WBC x   Sq Epi: x / Non Sq Epi: x / Bacteria: x      11-19    142  |  108  |  20  ----------------------------<  100<H>  3.7   |  27  |  0.99    Ca    8.9      19 Nov 2023 07:30  Mg     2.2     11-18    TPro  7.1  /  Alb  x   /  TBili  x   /  DBili  x   /  AST  x   /  ALT  x   /  AlkPhos  x   11-17    LIVER FUNCTIONS - ( 17 Nov 2023 20:00 )  Alb: x     / Pro: 7.1 g/dL / ALK PHOS: x     / ALT: x     / AST: x     / GGT: x           Hemoglobin A1C:             RADIOLOGY  < from: CT Angio Neck Stroke Protocol w/ IV Cont (11.18.23 @ 20:17) >    IMPRESSION:    Head CT:No acute intracranial hemorrhage, mass effect, or shift of the   midline structures.    Similar-appearing extensive chronic white matter microvascular type   changes and chronic lacunar infarcts, as discussed.    CTA neck and head: No large vessel occlusion or major stenosis.    --- End of Report ---      < end of copied text >  < from: MR Head w/ IV Cont (11.17.23 @ 13:09) >  IMPRESSION:    1. Right posterior corona radiata subacute infarction    2. Multiple remote infarctions within the cerebral hemispheric white   matter basal ganglia and thalami    3. Pervasive cerebral hemispheric white matter abnormality, possibly   accelerated form of ischemic white matter disease versus and/or   superimposed other inflammatory metabolic toxic or infectious process    4. Numerous remote petechial hemorrhages most prominently in the basal   ganglia andthalami    5. Diffuse brain volume loss upper range typical for age    --- End of Report ---    < end of copied text >        < from: TTE Echo Complete w/o Contrast w/ Doppler (11.15.23 @ 17:46) >  Summary:   1. Normal global left ventricular systolic function.   2. Mild mitral valve regurgitation.   3. Mild aortic regurgitation.   4. Sclerotic aortic valve with normal opening.      [id number] Jose Manuel Jernigan MD, [facility]  Electronically signed on 11/16/2023 at 8:15:38 PM            *** Final ***    < end of copied text >            
patient seen and examined  reports her brain fog is "better" not 100 percent  vitals hypertensive  given aggressive K supplementation without improvement  on b12 day 2  d/w with neurosurgery (dr. chao): to schedule patient for large volume LP monday  neuro consulted: Thyroid and folate in tact ; rpr in process    Review of Systems:  General:denies fever chills, headache, weakness  HEENT: denies blurry vision,diffculty swallowing, difficulty hearing, tinnitus  Cardiovascular: denies chest pain  ,palpitations  Pulmonary:denies shortness of breath, cough, wheezing, hemoptysis  Gastrointestinal: denies abdominal pain, constipation, diarrhea,nausea , vomiting, hematochezia  : denies hematuria, dysuria, or incontinence  Neurological: denies weakness, numbness , tingling, dizziness, tremors  MSK: denies muscle pain, difficulty ambulating, swelling, back pain  skin: denies skin rash, itching, burning, or  skin lesions  Psychiatrical: denies mood disturbances, anxierty, feeling depressed, depression , or difficulty sleeping    Objective:  Vitals  T(C): 36.9 (11-17-23 @ 11:27), Max: 37.2 (11-16-23 @ 16:57)  HR: 92 (11-17-23 @ 11:27) (87 - 101)  BP: 162/96 (11-17-23 @ 11:27) (138/79 - 171/85)  RR: 17 (11-17-23 @ 11:27) (17 - 20)  SpO2: 92% (11-17-23 @ 11:27) (92% - 96%)    Physical Exam:  General: comfortable, no acute distress  HEENT: Atraumatic, no LAD, trachea midline, PERRLA  Cardiovascular: normal s1s2, no murmurs, gallops or fricition rubs  Pulmonary: clear to ausculation Bilaterally, no wheezing , rhonchi  Gastrointestinal: soft non tender non distended, no masses felt, no organomegally  Muscloskeletal: no lower extremity edema, intact bilateral lower extremity pulses  Neurological: CN II-12 intact. No focal weakness ax02  Psychiatrical: normal mood, cooperative  SKIN: no rash, lesions or ulcers    Labs:                          10.1   6.47  )-----------( 317      ( 17 Nov 2023 07:57 )             29.1     11-17    142  |  107  |  15  ----------------------------<  90  2.9<LL>   |  30  |  1.11    Ca    8.9      17 Nov 2023 07:57  Phos  3.1     11-16  Mg     1.9     11-16    TPro  7.6  /  Alb  3.0<L>  /  TBili  0.6  /  DBili  x   /  AST  15  /  ALT  14  /  AlkPhos  65  11-16    LIVER FUNCTIONS - ( 16 Nov 2023 07:28 )  Alb: 3.0 g/dL / Pro: 7.6 gm/dL / ALK PHOS: 65 U/L / ALT: 14 U/L / AST: 15 U/L / GGT: x                 Active Medications  MEDICATIONS  (STANDING):  amLODIPine   Tablet 10 milliGRAM(s) Oral daily  cefTRIAXone   IVPB      cefTRIAXone   IVPB 1000 milliGRAM(s) IV Intermittent every 24 hours  cyanocobalamin Injectable 1000 MICROGram(s) IntraMuscular daily  doxycycline monohydrate Capsule 100 milliGRAM(s) Oral every 12 hours  heparin   Injectable 5000 Unit(s) SubCutaneous every 12 hours  potassium chloride    Tablet ER 40 milliEquivalent(s) Oral every 4 hours  spironolactone 50 milliGRAM(s) Oral daily    MEDICATIONS  (PRN):  acetaminophen     Tablet .. 650 milliGRAM(s) Oral every 6 hours PRN Mild Pain (1 - 3), Moderate Pain (4 - 6)  melatonin 3 milliGRAM(s) Oral at bedtime PRN Insomnia    
patient seen and examined  confused :redundant  given aggressive K supplementation   b12 low; started injection  noted Ct scan; d/w with admitting doc and NP  to call neuro and ns via ctc  Review of Systems:  unable    Objective:  Vitals  T(C): 36.9 (11-17-23 @ 11:27), Max: 37.2 (11-16-23 @ 16:57)  HR: 92 (11-17-23 @ 11:27) (87 - 101)  BP: 162/96 (11-17-23 @ 11:27) (138/79 - 171/85)  RR: 17 (11-17-23 @ 11:27) (17 - 20)  SpO2: 92% (11-17-23 @ 11:27) (92% - 96%)    Physical Exam:  General: comfortable, no acute distress  HEENT: Atraumatic, no LAD, trachea midline, PERRLA  Cardiovascular: normal s1s2, no murmurs, gallops or fricition rubs  Pulmonary: clear to ausculation Bilaterally, no wheezing , rhonchi  Gastrointestinal: soft non tender non distended, no masses felt, no organomegally  Muscloskeletal: no lower extremity edema, intact bilateral lower extremity pulses  Neurological: CN II-12 intact. No focal weakness ax01  Psychiatrical: normal mood, cooperative  SKIN: no rash, lesions or ulcers    Labs:                          10.1   6.47  )-----------( 317      ( 17 Nov 2023 07:57 )             29.1     11-17    142  |  107  |  15  ----------------------------<  90  2.9<LL>   |  30  |  1.11    Ca    8.9      17 Nov 2023 07:57  Phos  3.1     11-16  Mg     1.9     11-16    TPro  7.6  /  Alb  3.0<L>  /  TBili  0.6  /  DBili  x   /  AST  15  /  ALT  14  /  AlkPhos  65  11-16    LIVER FUNCTIONS - ( 16 Nov 2023 07:28 )  Alb: 3.0 g/dL / Pro: 7.6 gm/dL / ALK PHOS: 65 U/L / ALT: 14 U/L / AST: 15 U/L / GGT: x                 Active Medications  MEDICATIONS  (STANDING):  amLODIPine   Tablet 10 milliGRAM(s) Oral daily  cefTRIAXone   IVPB      cefTRIAXone   IVPB 1000 milliGRAM(s) IV Intermittent every 24 hours  cyanocobalamin Injectable 1000 MICROGram(s) IntraMuscular daily  doxycycline monohydrate Capsule 100 milliGRAM(s) Oral every 12 hours  heparin   Injectable 5000 Unit(s) SubCutaneous every 12 hours  potassium chloride    Tablet ER 40 milliEquivalent(s) Oral every 4 hours  spironolactone 50 milliGRAM(s) Oral daily    MEDICATIONS  (PRN):  acetaminophen     Tablet .. 650 milliGRAM(s) Oral every 6 hours PRN Mild Pain (1 - 3), Moderate Pain (4 - 6)  melatonin 3 milliGRAM(s) Oral at bedtime PRN Insomnia

## 2023-11-19 NOTE — DISCHARGE NOTE PROVIDER - NSDCMRMEDTOKEN_GEN_ALL_CORE_FT
amLODIPine 10 mg oral tablet: 1 tab(s) orally once a day  atorvastatin 80 mg oral tablet: 1 tab(s) orally once a day (at bedtime)  losartan 25 mg oral tablet: 1 tab(s) orally once a day  spironolactone 25 mg oral tablet: 2 tab(s) orally once a day

## 2023-11-19 NOTE — DISCHARGE NOTE PROVIDER - HOSPITAL COURSE
58 years old female with h/o HTN, RA was brought in to ED with concern for AMS. Neighbor overhead that patient was looking for mom, wondering/confused around apartment coplex. Per patient, she live with sister but is not able to provide with phone number. Patient denied any discomfort.   Hypertensive, afebrile, sat well at . No leukocytosis, plt 347, hsTnT 15.4--> 17.4, Cr 1.79, TSH 1.040. CT head noted small hyperdensity at the right thalamocapsular junction on thinner slices, which may represent dystrophic calcification or residual blood products from prior hemorrhage (given surrounding hypodensity, possibly gliosis or residual edema). Acute hemorrhage is considered less likely as it is not prominent on the thicker slice. EKG with NSR, inferolateral ST-T changes.       Problem/Plan - 1:  ·  Problem: AMS (altered mental status).   ·  Plan: ddx include b12 deficiency and NPH  neuro/neurosurgery (at [facility]) consulted  folate and thyroid in tact  plan:  Lp monday  MMA and homocysteine  c/w cyanocobalamin.     Problem/Plan - 2:  ·  Problem: KRUNAL (acute kidney injury).   ·  Plan: elevated Cr due to dehydration  persistent low hypok  plan:  nephro consulted  labs ordered  arb on hold   Ct ordered to check for ileus?     Problem/Plan - 3:  ·  Problem: Hypertensive urgency.   ·  Plan: Amlodipine 10mg daily  Hydralazine prn  If Cr stable/improve and BP remain elevated, will resume losartan.     Problem/Plan - 4:  ·  Problem: Abnormal head CT.   ·  Plan: Ct x 2 reviewed  plan  mri  lp.   58 years old female with h/o HTN, RA was brought in to ED with concern for AMS. Neighbor overhead that patient was looking for mom, wondering/confused around apartment coplex. Per patient, she live with sister but is not able to provide with phone number. Patient denied any discomfort.   Hypertensive, afebrile, sat well at . No leukocytosis, plt 347, hsTnT 15.4--> 17.4, Cr 1.79, TSH 1.040. CT head noted small hyperdensity at the right thalamocapsular junction on thinner slices, which may represent dystrophic calcification or residual blood products from prior hemorrhage (given surrounding hypodensity, possibly gliosis or residual edema). Acute hemorrhage is considered less likely as it is not prominent on the thicker slice. EKG with NSR, inferolateral ST-T changes.       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  CT Angio ordered  as per stroke neuro in [place]; CT reviewed for transfer to [facility] for Angiogram workup    KRUNAL (acute kidney injury).   elevated Cr due to dehydration  now resolved    persistent low hypok now resolved  CT imaging  negative for ileus  Nephro consulted  secondary htn workup sent  resume arb for now    HTN  on norvasc aldactone (K sparing) and losartan

## 2023-11-19 NOTE — CONSULT NOTE ADULT - SUBJECTIVE AND OBJECTIVE BOX
HISTORY OF PRESENT ILLNESS:    Patient is a 58y Female    HPI:  58 years old female with h/o HTN, RA was brought in to ED with concern for AMS. Neighbor overhead that patient was looking for mom, wondering/confused around apartment coplex. Per patient, she live with sister but is not able to provide with phone number. Patient denied any discomfort.   Hypertensive, afebrile, sat well at RA. No leukocytosis, plt 347, hsTnT 15.4--> 17.4, Cr 1.79, TSH 1.040. CT head noted small hyperdensity at the right thalamocapsular junction on thinner slices, which may represent dystrophic calcification or residual blood products from prior hemorrhage (given surrounding hypodensity, possibly gliosis or residual edema). Acute hemorrhage is considered less likely as it is not prominent on the thicker slice. EKG with NSR, inferolateral ST-T changes.    SH: no toxic habits (15 Nov 2023 11:44)    PAST MEDICAL & SURGICAL HISTORY:      ROS: No fever/chills, wt loss, night sweats, chest pain/dyspnea/cough, oral ulcers, rashes, joint pain, alopecia, photosensitivity, dry eye/dry mouth, Raynaud's, dysphagia, focal weakness, or eye symptoms.    MEDICATIONS  (STANDING):  amLODIPine   Tablet 10 milliGRAM(s) Oral daily  atorvastatin 40 milliGRAM(s) Oral at bedtime  cefTRIAXone   IVPB      cefTRIAXone   IVPB 1000 milliGRAM(s) IV Intermittent every 24 hours  cyanocobalamin Injectable 1000 MICROGram(s) IntraMuscular daily  doxycycline monohydrate Capsule 100 milliGRAM(s) Oral every 12 hours  losartan 25 milliGRAM(s) Oral daily  spironolactone 50 milliGRAM(s) Oral daily    MEDICATIONS  (PRN):  acetaminophen     Tablet .. 650 milliGRAM(s) Oral every 6 hours PRN Mild Pain (1 - 3), Moderate Pain (4 - 6)  melatonin 3 milliGRAM(s) Oral at bedtime PRN Insomnia      Allergies    No Known Allergies    Intolerances        FAMILY HISTORY:      SOCIAL HISTORY:  Tobacco--   none            Vital Signs Last 24 Hrs  T(C): 37.3 (19 Nov 2023 10:19), Max: 37.3 (18 Nov 2023 23:52)  T(F): 99.1 (19 Nov 2023 10:19), Max: 99.1 (18 Nov 2023 23:52)  HR: 98 (19 Nov 2023 10:19) (88 - 98)  BP: 138/82 (19 Nov 2023 10:19) (137/83 - 147/87)  BP(mean): --  RR: 18 (19 Nov 2023 10:19) (18 - 19)  SpO2: 94% (19 Nov 2023 10:19) (94% - 98%)    Parameters below as of 19 Nov 2023 10:19  Patient On (Oxygen Delivery Method): room air        PHYSICAL EXAM:  General :  NAD  HEENT--  no oral ulcers  Nodes--  no LAD  Lungs--  CTA B/L  Heart--  RRR, nlS1 &S2 normal;   Abdomen--  soft, NT, ND +BS  Skin:  no rashes  Musculoskeletal exam:  No synovitis;  normal ROM in all joints    LABS:                        10.8   7.75  )-----------( 335      ( 19 Nov 2023 07:30 )             30.6     11-19    142  |  108  |  20  ----------------------------<  100<H>  3.7   |  27  |  0.99    Ca    8.9      19 Nov 2023 07:30  Mg     2.2     11-18    TPro  7.1  /  Alb  x   /  TBili  x   /  DBili  x   /  AST  x   /  ALT  x   /  AlkPhos  x   11-17      Urinalysis Basic - ( 19 Nov 2023 07:30 )    Color: x / Appearance: x / SG: x / pH: x  Gluc: 100 mg/dL / Ketone: x  / Bili: x / Urobili: x   Blood: x / Protein: x / Nitrite: x   Leuk Esterase: x / RBC: x / WBC x   Sq Epi: x / Non Sq Epi: x / Bacteria: x      RADIOLOGY & ADDITIONAL STUDIES:  < from: CT Angio Brain Stroke Protocol  w/ IV Cont (11.18.23 @ 20:16) >  CT ANGIO BRAIN STROKE PROTC IC   ORDERED BY: АННА VERDUZCO     ACC: [id number] EXAM:  CT ANGIO NECK STROKE PROTCL IC   ORDERED BY: АННА VERDUZCO     ACC: [id number] EXAM:  CT BRAIN   ORDERED BY: KEELEY MONTEIRO     PROCEDURE DATE:  11/18/2023          INTERPRETATION:  .    CLINICAL INFORMATION: Cerebrovascular accident. Vasculitis.    TECHNIQUE: Transaxial CT images were acquired through the head without   the administration of IV contrast. Thin section CT angiographyfrom the   aortic arch to the cranial vertex was also performed using a multislice   CT scanner, following the infusion of intravenous contrast material. 96   cc's of IV Omnipaque 350 was administered without immediate complication.   4 cc's was discarded. Sagittal and coronal MIP reformatted images were   obtained from the source data. Both the axial source and reconstructed   images were reviewed.    COMPARISON: Prior contrast-enhanced brain MRI study from 11/17/2023.   Prior CT study of the head from 11/15/2023.    FINDINGS:    Head CT: There is no acute intracranial hemorrhage, mass effect, shift of   the midline structures, herniation, extra-axial fluid collection, or   hydrocephalus.    Chronic lacunar infarcts again seen within the bilateral thalami and   right basal ganglia.    There is diffuse cerebral volume loss with prominence of the sulci,   fissures, and cisternal spaces which is normal for the patient's age.   Ventriculomegaly appears unchanged. There is extensive patchy confluent   deep and periventricular white matter hypoattenuation statistically   compatible with microvascular changes given calcific atherosclerotic   disease of the intracranial arteries.    The paranasal sinuses and tympanomastoid cavities are clear. The   calvarium is intact. The imaged orbits are unremarkable.    CTA NECK: There is a standard anatomic configuration to the aortic arch.   Atherosclerosis affects the arch. The origins of the great vessels appear   unremarkable.    The bilateral common carotid arteries appear unremarkable. Minimal   calcified plaque affects the bilateral carotid bifurcations without   associated stenosis. The cervical internal carotid arteries are patent   extending to the skull base.    The origins of the bilateral vertebral arteries are normal. The bilateral   vertebral arteries are unremarkable.    CTA HEAD: Calcified plaques affect the bilateral carotid siphons without   associated stenosis. Otherwise, the bilateral intracranial internal   carotid, anterior, and middle cerebral arteries appear unremarkable.    The anterior and bilateral posterior communicating arteries are seen.    The posterior circulation appears intact.    The intracranial venous structures are patent.    IMPRESSION:    Head CT:No acute intracranial hemorrhage, mass effect, or shift of the   midline structures.    Similar-appearing extensive chronic white matter microvascular type   changes and chronic lacunar infarcts, as discussed.    CTA neck and head: No large vessel occlusion or major stenosis.    --- End of Report ---      < end of copied text >    < from: MR Head w/ IV Cont (11.17.23 @ 13:09) >  MR BRAIN IC   ORDERED BY: BRANDEN FERNANDEZ     PROCEDURE DATE:  11/17/2023          INTERPRETATION:  MR of the brain with and without gadolinium contrast    CLINICAL INFORMATION:   [facility]  FMR    TECHNIQUE:   Sagittal and axial T1-weighted images, axial FLAIR images,   axial susceptibility weighted images, axial T2-weighted images and axial   diffusion weighted images of the brain were obtained.  Following   gadolinium administration  axial volumetric T1 weighted images were   obtained.  This data set was reconstructed as sagittal and coronal   images. Subsequent axial T1-weighted acquisition was obtained.  CONTRAST:    9 cc administered  ;  1 cc discarded    COMPARISON:   CT head 10/15/2023    FINDINGS:    BRAIN: In the right posterior corona radiata white matter there are 2   adjacent indistinct lesions hyperintense on the diffusion-weighted and   long TR images, faintly hypointense on the T1-weighted images and   inconspicuous on the ADC images.    The brain demonstrates several focal lesions over multiple infarction.   The most conspicuous including within the right mid corona radiata deep   white matter, the right globus pallidus, the left caudate nucleus head,   the left posterior corona radiata white matter andin each thalamus. No   cerebral cortical lesion is recognized. No abnormal enhancement is found   in the brain.  No diffusion restriction is found in the brain.  No acute   cerebral cortical infarct is found.   No mass effect is found in the   brain.With gadolinium administration no abnormal brain parenchymal   enhancement is found.    The brain also demonstrates extensive confluent lesions within the   cerebral hemispheric white matter. These lesions are hyperintense on the   long TR images, more faintly hypointense on the T1-weighted images were   most confluent, otherwise largely inconspicuous. Involvement is confluent   from the subcortical through the centrum semiovale and periatrial and   periventricular white matter, more patchy in the corona radiata white   matter and brainstem.    The brain also demonstrates numerous foci of remote hemorrhage. These   lesions demonstrate marked low signal intensity on the long TR   diffusion-weighted and gradient echo images. The lesions are most   extensive within the right thalamus, also noted at multiple locations in   the basal ganglia, deep hemispheric white matter and in the cerebellar   hemispheres. Few scattered punctate lesions are found at the level of   cerebral cortex at multiplelocations.    CSF SPACES:   The ventricles, sulci and basal cisterns appear moderately   dilated reflecting diffuse brain volume loss.    VESSELS:   The vertebral and internal carotid arteries demonstrate   expected flow voids indicating their patency. The left vertebral artery   is dominant caliber. The posterior circulation is tortuous. There is   fetal origin of right posterior cerebral artery from the ipsilateral   internal carotid artery (typically incidental developmental variant).    HEAD AND NECK STRUCTURES:   The orbits are unremarkable.  Paranasal   sinuses are clear.  The nasal cavity appears intact.  The central skull   base appears intact.  The nasopharynx is symmetric.  The temporal bones   appear clear of disease.  The calvarium appears unremarkable.      IMPRESSION:    1. Right posterior corona radiata subacute infarction    2. Multiple remote infarctions within the cerebral hemispheric white   matter basal ganglia and thalami    3. Pervasive cerebral hemispheric white matter abnormality, possibly   accelerated form of ischemic white matter disease versus and/or   superimposed other inflammatory metabolic toxic or infectious process    4. Numerous remote petechial hemorrhages most prominently in the basal   ganglia andthalami    5. Diffuse brain volume loss upper range typical for age    --- End of Report ---            < end of copied text >

## 2023-11-19 NOTE — DISCHARGE NOTE PROVIDER - NSDCCPCAREPLAN_GEN_ALL_CORE_FT
PRINCIPAL DISCHARGE DIAGNOSIS  Diagnosis: Confusion  Assessment and Plan of Treatment: 58 years old female with h/o HTN, RA was brought in to ED with concern for AMS. Neighbor overhead that patient was looking for mom, wondering/confused around apartment coplex. Per patient, she live with sister but is not able to provide with phone number. Patient denied any discomfort.   Hypertensive, afebrile, sat well at RA. No leukocytosis, plt 347, hsTnT 15.4--> 17.4, Cr 1.79, TSH 1.040. CT head noted small hyperdensity at the right thalamocapsular junction on thinner slices, which may represent dystrophic calcification or residual blood products from prior hemorrhage (given surrounding hypodensity, possibly gliosis or residual edema). Acute hemorrhage is considered less likely as it is not prominent on the thicker slice. EKG with NSR, inferolateral ST-T changes.   Problem/Plan - 1:  ·  Problem: AMS (altered mental status) due to embolic stroke likely vasculitis and NPH  neuro/neurosurgery (at [facility]) consulted  folate and thyroid in tact  b12 250 although doubtful contributing  plan:  defer LP  MRI ordered : bilateral subacute to remote embolic cva  MMA and homocysteine  c/w cyanocobalamin.  CT Angio ordered  as per stroke neuro in [place]; CT reviewed for transfer to [facility] for Angiogram workup  Ordered high dose statin  held asa during hospitalization due to petechial hemorrhages (dw with neuro)  KRUNAL (acute kidney injury).   elevated Cr due to dehydration  now resolved  persistent low hypok now resolved  CT imaging  negative for ileus  Nephro consulted  secondary htn workup sent  resume arb for now  HTN  on norvasc aldactone (K sparing) and losartan        SECONDARY DISCHARGE DIAGNOSES  Diagnosis: Abnormal EKG  Assessment and Plan of Treatment:     Diagnosis: Hypertensive urgency  Assessment and Plan of Treatment:

## 2023-11-20 ENCOUNTER — APPOINTMENT (OUTPATIENT)
Dept: NEUROSURGERY | Facility: HOSPITAL | Age: 58
End: 2023-11-20

## 2023-11-20 ENCOUNTER — INPATIENT (INPATIENT)
Facility: HOSPITAL | Age: 58
LOS: 9 days | Discharge: NURSING, REHAB OR LONG-TERM CARE | DRG: 102 | End: 2023-11-30
Attending: PSYCHIATRY & NEUROLOGY | Admitting: PSYCHIATRY & NEUROLOGY
Payer: MEDICAID

## 2023-11-20 VITALS
HEART RATE: 99 BPM | RESPIRATION RATE: 18 BRPM | OXYGEN SATURATION: 97 % | TEMPERATURE: 99 F | SYSTOLIC BLOOD PRESSURE: 146 MMHG | DIASTOLIC BLOOD PRESSURE: 74 MMHG

## 2023-11-20 DIAGNOSIS — R41.82 ALTERED MENTAL STATUS, UNSPECIFIED: ICD-10-CM

## 2023-11-20 DIAGNOSIS — I63.9 CEREBRAL INFARCTION, UNSPECIFIED: ICD-10-CM

## 2023-11-20 DIAGNOSIS — Z72.0 TOBACCO USE: ICD-10-CM

## 2023-11-20 DIAGNOSIS — Z01.818 ENCOUNTER FOR OTHER PREPROCEDURAL EXAMINATION: ICD-10-CM

## 2023-11-20 DIAGNOSIS — I10 ESSENTIAL (PRIMARY) HYPERTENSION: ICD-10-CM

## 2023-11-20 LAB
-  AMOXICILLIN/CLAVULANIC ACID: SIGNIFICANT CHANGE UP
-  AMOXICILLIN/CLAVULANIC ACID: SIGNIFICANT CHANGE UP
-  AMPICILLIN/SULBACTAM: SIGNIFICANT CHANGE UP
-  AMPICILLIN/SULBACTAM: SIGNIFICANT CHANGE UP
-  AMPICILLIN: SIGNIFICANT CHANGE UP
-  AZTREONAM: SIGNIFICANT CHANGE UP
-  AZTREONAM: SIGNIFICANT CHANGE UP
-  CEFAZOLIN: SIGNIFICANT CHANGE UP
-  CEFAZOLIN: SIGNIFICANT CHANGE UP
-  CEFEPIME: SIGNIFICANT CHANGE UP
-  CEFEPIME: SIGNIFICANT CHANGE UP
-  CEFOXITIN: SIGNIFICANT CHANGE UP
-  CEFOXITIN: SIGNIFICANT CHANGE UP
-  CEFTRIAXONE: SIGNIFICANT CHANGE UP
-  CEFTRIAXONE: SIGNIFICANT CHANGE UP
-  CEFUROXIME: SIGNIFICANT CHANGE UP
-  CEFUROXIME: SIGNIFICANT CHANGE UP
-  CIPROFLOXACIN: SIGNIFICANT CHANGE UP
-  ERTAPENEM: SIGNIFICANT CHANGE UP
-  ERTAPENEM: SIGNIFICANT CHANGE UP
-  GENTAMICIN: SIGNIFICANT CHANGE UP
-  GENTAMICIN: SIGNIFICANT CHANGE UP
-  LEVOFLOXACIN: SIGNIFICANT CHANGE UP
-  MEROPENEM: SIGNIFICANT CHANGE UP
-  MEROPENEM: SIGNIFICANT CHANGE UP
-  NITROFURANTOIN: SIGNIFICANT CHANGE UP
-  PIPERACILLIN/TAZOBACTAM: SIGNIFICANT CHANGE UP
-  PIPERACILLIN/TAZOBACTAM: SIGNIFICANT CHANGE UP
-  TETRACYCLINE: SIGNIFICANT CHANGE UP
-  TETRACYCLINE: SIGNIFICANT CHANGE UP
-  TOBRAMYCIN: SIGNIFICANT CHANGE UP
-  TOBRAMYCIN: SIGNIFICANT CHANGE UP
-  TRIMETHOPRIM/SULFAMETHOXAZOLE: SIGNIFICANT CHANGE UP
-  TRIMETHOPRIM/SULFAMETHOXAZOLE: SIGNIFICANT CHANGE UP
-  VANCOMYCIN: SIGNIFICANT CHANGE UP
-  VANCOMYCIN: SIGNIFICANT CHANGE UP
ALDOST SERPL-MCNC: 27.7 NG/DL — HIGH
ALDOST SERPL-MCNC: 27.7 NG/DL — HIGH
ALDOSTERONE / DIRECT RENIN RATIO RESULT: 9.2 RATIO — HIGH
ALDOSTERONE / DIRECT RENIN RATIO RESULT: 9.2 RATIO — HIGH
APPEARANCE UR: ABNORMAL
APPEARANCE UR: ABNORMAL
APTT BLD: 30.2 SEC — SIGNIFICANT CHANGE UP (ref 24.5–35.6)
APTT BLD: 30.2 SEC — SIGNIFICANT CHANGE UP (ref 24.5–35.6)
BACTERIA # UR AUTO: NEGATIVE /HPF — SIGNIFICANT CHANGE UP
BACTERIA # UR AUTO: NEGATIVE /HPF — SIGNIFICANT CHANGE UP
BILIRUB UR-MCNC: NEGATIVE — SIGNIFICANT CHANGE UP
BILIRUB UR-MCNC: NEGATIVE — SIGNIFICANT CHANGE UP
BLD GP AB SCN SERPL QL: NEGATIVE — SIGNIFICANT CHANGE UP
BLD GP AB SCN SERPL QL: NEGATIVE — SIGNIFICANT CHANGE UP
C3 SERPL-MCNC: 167 MG/DL — HIGH (ref 81–157)
C3 SERPL-MCNC: 167 MG/DL — HIGH (ref 81–157)
C4 SERPL-MCNC: 37 MG/DL — SIGNIFICANT CHANGE UP (ref 13–39)
C4 SERPL-MCNC: 37 MG/DL — SIGNIFICANT CHANGE UP (ref 13–39)
CAST: 1 /LPF — SIGNIFICANT CHANGE UP (ref 0–4)
CAST: 1 /LPF — SIGNIFICANT CHANGE UP (ref 0–4)
COLOR SPEC: YELLOW — SIGNIFICANT CHANGE UP
COLOR SPEC: YELLOW — SIGNIFICANT CHANGE UP
CULTURE RESULTS: ABNORMAL
CULTURE RESULTS: ABNORMAL
DIFF PNL FLD: NEGATIVE — SIGNIFICANT CHANGE UP
DIFF PNL FLD: NEGATIVE — SIGNIFICANT CHANGE UP
DSDNA AB SER-ACNC: <12 IU/ML — SIGNIFICANT CHANGE UP
DSDNA AB SER-ACNC: <12 IU/ML — SIGNIFICANT CHANGE UP
GLUCOSE UR QL: NEGATIVE MG/DL — SIGNIFICANT CHANGE UP
GLUCOSE UR QL: NEGATIVE MG/DL — SIGNIFICANT CHANGE UP
INR BLD: 1.08 RATIO — SIGNIFICANT CHANGE UP (ref 0.85–1.18)
INR BLD: 1.08 RATIO — SIGNIFICANT CHANGE UP (ref 0.85–1.18)
KETONES UR-MCNC: NEGATIVE MG/DL — SIGNIFICANT CHANGE UP
KETONES UR-MCNC: NEGATIVE MG/DL — SIGNIFICANT CHANGE UP
LEUKOCYTE ESTERASE UR-ACNC: NEGATIVE — SIGNIFICANT CHANGE UP
LEUKOCYTE ESTERASE UR-ACNC: NEGATIVE — SIGNIFICANT CHANGE UP
METHOD TYPE: SIGNIFICANT CHANGE UP
NITRITE UR-MCNC: NEGATIVE — SIGNIFICANT CHANGE UP
NITRITE UR-MCNC: NEGATIVE — SIGNIFICANT CHANGE UP
ORGANISM # SPEC MICROSCOPIC CNT: ABNORMAL
ORGANISM # SPEC MICROSCOPIC CNT: SIGNIFICANT CHANGE UP
ORGANISM # SPEC MICROSCOPIC CNT: SIGNIFICANT CHANGE UP
PH UR: 5 — SIGNIFICANT CHANGE UP (ref 5–8)
PH UR: 5 — SIGNIFICANT CHANGE UP (ref 5–8)
PROT UR-MCNC: SIGNIFICANT CHANGE UP MG/DL
PROT UR-MCNC: SIGNIFICANT CHANGE UP MG/DL
PROTHROM AB SERPL-ACNC: 11.9 SEC — SIGNIFICANT CHANGE UP (ref 9.5–13)
PROTHROM AB SERPL-ACNC: 11.9 SEC — SIGNIFICANT CHANGE UP (ref 9.5–13)
RBC CASTS # UR COMP ASSIST: 2 /HPF — SIGNIFICANT CHANGE UP (ref 0–4)
RBC CASTS # UR COMP ASSIST: 2 /HPF — SIGNIFICANT CHANGE UP (ref 0–4)
RENIN DIRECT, PLASMA: 3 PG/ML — SIGNIFICANT CHANGE UP
RENIN DIRECT, PLASMA: 3 PG/ML — SIGNIFICANT CHANGE UP
REVIEW: SIGNIFICANT CHANGE UP
REVIEW: SIGNIFICANT CHANGE UP
RH IG SCN BLD-IMP: POSITIVE — SIGNIFICANT CHANGE UP
RH IG SCN BLD-IMP: POSITIVE — SIGNIFICANT CHANGE UP
SP GR SPEC: 1.02 — SIGNIFICANT CHANGE UP (ref 1–1.03)
SP GR SPEC: 1.02 — SIGNIFICANT CHANGE UP (ref 1–1.03)
SPECIMEN SOURCE: SIGNIFICANT CHANGE UP
SPECIMEN SOURCE: SIGNIFICANT CHANGE UP
SQUAMOUS # UR AUTO: 9 /HPF — HIGH (ref 0–5)
SQUAMOUS # UR AUTO: 9 /HPF — HIGH (ref 0–5)
URATE CRY FLD QL MICRO: PRESENT
URATE CRY FLD QL MICRO: PRESENT
UROBILINOGEN FLD QL: 0.2 MG/DL — SIGNIFICANT CHANGE UP (ref 0.2–1)
UROBILINOGEN FLD QL: 0.2 MG/DL — SIGNIFICANT CHANGE UP (ref 0.2–1)
WBC UR QL: 1 /HPF — SIGNIFICANT CHANGE UP (ref 0–5)
WBC UR QL: 1 /HPF — SIGNIFICANT CHANGE UP (ref 0–5)

## 2023-11-20 PROCEDURE — 36227 PLACE CATH XTRNL CAROTID: CPT | Mod: 50

## 2023-11-20 PROCEDURE — 99222 1ST HOSP IP/OBS MODERATE 55: CPT

## 2023-11-20 PROCEDURE — 36226 PLACE CATH VERTEBRAL ART: CPT | Mod: 50

## 2023-11-20 PROCEDURE — 99222 1ST HOSP IP/OBS MODERATE 55: CPT | Mod: GC

## 2023-11-20 PROCEDURE — 36224 PLACE CATH CAROTD ART: CPT | Mod: 50

## 2023-11-20 RX ORDER — ASPIRIN/CALCIUM CARB/MAGNESIUM 324 MG
81 TABLET ORAL DAILY
Refills: 0 | Status: DISCONTINUED | OUTPATIENT
Start: 2023-11-20 | End: 2023-11-28

## 2023-11-20 RX ORDER — AMLODIPINE BESYLATE 2.5 MG/1
10 TABLET ORAL DAILY
Refills: 0 | Status: DISCONTINUED | OUTPATIENT
Start: 2023-11-20 | End: 2023-11-30

## 2023-11-20 RX ORDER — LOSARTAN POTASSIUM 100 MG/1
25 TABLET, FILM COATED ORAL DAILY
Refills: 0 | Status: DISCONTINUED | OUTPATIENT
Start: 2023-11-20 | End: 2023-11-30

## 2023-11-20 RX ORDER — ATORVASTATIN CALCIUM 80 MG/1
80 TABLET, FILM COATED ORAL AT BEDTIME
Refills: 0 | Status: DISCONTINUED | OUTPATIENT
Start: 2023-11-20 | End: 2023-11-30

## 2023-11-20 RX ORDER — PREGABALIN 225 MG/1
1000 CAPSULE ORAL DAILY
Refills: 0 | Status: COMPLETED | OUTPATIENT
Start: 2023-11-20 | End: 2023-11-24

## 2023-11-20 RX ADMIN — PREGABALIN 1000 MICROGRAM(S): 225 CAPSULE ORAL at 11:45

## 2023-11-20 RX ADMIN — LOSARTAN POTASSIUM 25 MILLIGRAM(S): 100 TABLET, FILM COATED ORAL at 07:28

## 2023-11-20 RX ADMIN — ATORVASTATIN CALCIUM 80 MILLIGRAM(S): 80 TABLET, FILM COATED ORAL at 21:35

## 2023-11-20 RX ADMIN — AMLODIPINE BESYLATE 10 MILLIGRAM(S): 2.5 TABLET ORAL at 05:30

## 2023-11-20 RX ADMIN — Medication 81 MILLIGRAM(S): at 18:36

## 2023-11-20 NOTE — CHART NOTE - NSCHARTNOTEFT_GEN_A_CORE
Interventional Neuro Radiology  Pre-Procedure Note    HPI:  58-year old woman with history of hypertension, rheumatoid arthritis, brought into ED at [facility] initially for concerned for altered mental status.    Prior to hospital presentation: neighbor overhead patient was looking for her mom, was confused/wandering around apartment complex.   At [facility]: MR brain showed bilateral subacute to remote embolic strokes. MRI shows right posterior corona radiata acute infarct and multiple petechial hemorrhages in b/l thalami and basal ganglia. Noted to have KRUNAL. Also noted to have hypertension, on amlodipine, aldactone, and losartan. Started on B12 supplements. Seen by rheumatology,    Transferred to [facility] for cerebral angiogram.  Pt with Right posterior corona radiata acute infarct and multiple petechial hemorrhages in b/l thalami and basal ganglia.     Allergies: No Known Allergies      Current Medications: amLODIPine   Tablet 10 milliGRAM(s) Oral daily  atorvastatin 80 milliGRAM(s) Oral at bedtime  cyanocobalamin Injectable 1000 MICROGram(s) IntraMuscular daily  losartan 25 milliGRAM(s) Oral daily      Labs:                         10.8   7.75  )-----------( 335      ( 19 Nov 2023 07:30 )             30.6       11-19    142  |  108  |  20  ----------------------------<  100<H>  3.7   |  27  |  0.99    Ca    8.9      19 Nov 2023 07:30  Phos  3.1     11-16  Mg     2.2     11-18    TPro  7.1  /  Alb  x   /  TBili  x   /  DBili  x   /  AST  x   /  ALT  x   /  AlkPhos  x   11-17    Activated Partial Thromboplastin Time in AM (11.20.23 @ 05:05)    Activated Partial Thromboplastin Time: 30.2:   Prothrombin Time and INR, Plasma in AM (11.20.23 @ 05:05)    Prothrombin Time, Plasma: 11.9 sec    INR: 1.08:    Blood Bank: 11-20-23  B  --  Positive      Assessment/Plan:   This is a 59 y/o Female  presents with AMS and imaging findings on MRI show Right posterior corona radiata acute infarct and multiple petechial hemorrhages in b/l thalami and basal ganglia and now planned for diagnostic angiogram. Patient presents to neuro-IR for selective cerebral angiography. Procedure/ risks/ benefits/ goals/ alternatives were explained. Risks include but are not limited to stroke/ vessel injury/ hemorrhage/ groin hematoma. All questions answered. Informed content obtained and placed in chart.

## 2023-11-20 NOTE — OCCUPATIONAL THERAPY INITIAL EVALUATION ADULT - VISUAL ASSESSMENT: VISUAL NEGLECT
however, inattention to L side. Bumping into wall/ obstacles during functional mobility on L side/none

## 2023-11-20 NOTE — PATIENT PROFILE ADULT - NSPROPTRIGHTBILLOFRIGHTS_GEN_A_NUR
124/80 mmHg  
Asymptomatic  Continue ASA  Needs to be on a statin of unable to take statin zetia or repatha/praulent to reduce his LDl to goal of <70 mm/hg.  I will defer to PCP.   
Lipid panel from 10/21/2022  Trig 78 mg/dl   mg/dl  HDL 62 mg/dl  According to documentation this was done while he was off Zetia due to gastric issues. This was to be addressed after gastric issues. This was documented in 10/2022. He was st arted on Crestor 5 mg when last seen by Dr. Bae 6/22/2022 (Crestor 10 mg po daily caused myalgias). He does not have a Zetia or Crestor in his meds today. Unsure if this was due to his gastric issues or not. I will defer to PCP.   
We will start Eliquis 5 mg po BID for CVA prophylaxis; stop plavix and continue ASA 81 mg po daily.  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 He will d/w them his options and notify my office should he wish to go in a different direction.     
patient

## 2023-11-20 NOTE — OCCUPATIONAL THERAPY INITIAL EVALUATION ADULT - PERTINENT HX OF CURRENT PROBLEM, REHAB EVAL
58-year old woman with history of hypertension, rheumatoid arthritis, brought into ED at  initially for concerned for altered mental status.  Prior to hospital presentation: neighbor overhead patient was looking for her mom, was confused/wandering around apartment complex.   At [place]: MR brain showed bilateral subacute to remote embolic strokes. MRI shows right posterior corona radiata acute infarct and multiple petechial hemorrhages in b/l thalami and basal ganglia. Noted to have KRUNAL. Also noted to have hypertension, on amlodipine, aldactone, and losartan. Started on B12 supplements. Seen by rheumatology,  Transferred to [facility] for cerebral angiogram.

## 2023-11-20 NOTE — H&P ADULT - ATTENDING COMMENTS
I saw and examined the patient on AM stroke rounds.   Patient transferred from [place] with acute stroke in the R corona radiata, and multiple microhemorrhages.  Overall concern for vasculitis, and transferred for cerebral angiogram.     On exam:   GEN: NAD  CV: RRR, S1, S2  PULM: CTAB  GI: soft, non tender  NEURO:   Awake, alert, not fully attending examiner, did not name common objects, followed bulbar commands, but not appendicular commands reliably.   Pupils 3-2mm, symmetric, blinking to threat bilaterally, no overt gaze palsy, trace L facial weakness.   MOTOR: normal bulk and tone.  L pronator drift.  Lower extremities antigravity.   COORDINATION: no ataxia on FNF.      MRI brain images reviewed: diffusion restriction R coronoa radiata.  Mulittiple microhemorrhages, deep and cortical.   Cerebran angiogram reviewed: multiple areas of focal stenosis and dilatation    AP: 58 year old woman with HTN, Rheumatoid arthritis, presenting with decompensated confusion, subacute course since about February 2023.  On exam, diminished attention, and language, trace L face, and arm weakness.  MRI showing the acute stroke, and cerebral angiogram concerning for vasculitic appearance.    Plan for CSF studies, rheumatology consultation, and likely initiation of IV steroids tomorrow. I saw and examined the patient on AM stroke rounds.   Patient transferred from [place] with acute stroke in the R corona radiata, and multiple microhemorrhages.  Overall concern for vasculitis, and transferred for cerebral angiogram.     On exam:   GEN: NAD  CV: RRR, S1, S2  PULM: CTAB  GI: soft, non tender  NEURO:   Awake, alert, not fully attending examiner, did not name common objects, followed bulbar commands, but not appendicular commands reliably.   Pupils 3-2mm, symmetric, blinking to threat bilaterally, no overt gaze palsy, trace L facial weakness.   MOTOR: normal bulk and tone.  L pronator drift.  Lower extremities antigravity.   COORDINATION: no ataxia on FNF.      MRI brain images reviewed: diffusion restriction R coronoa radiata.  Mulittiple microhemorrhages, deep and cortical.   Cerebran angiogram reviewed: multiple areas of focal stenosis and dilatation    AP: 58 year old woman with HTN, Rheumatoid arthritis, presenting with decompensated confusion, subacute course since about February 2023.  On exam, diminished attention, and language, trace L face, and arm weakness.  MRI showing the acute stroke, and cerebral angiogram concerning for vasculitic appearance.    Plan for CSF studies, rheumatology consultation, and likely initiation of IV steroids tomorrow. I saw and examined the patient on AM stroke rounds.   Patient transferred from [place] with acute stroke in the R corona radiata, and multiple microhemorrhages.  Overall concern for vasculitis, and transferred for cerebral angiogram.     On exam:   GEN: NAD  CV: RRR, S1, S2  PULM: CTAB  GI: soft, non tender  NEURO:   Awake, alert, not fully attending examiner, did not name common objects, followed bulbar commands, but not appendicular commands reliably.   Pupils 3-2mm, symmetric, blinking to threat bilaterally, no overt gaze palsy, trace L facial weakness.   MOTOR: normal bulk and tone.  L pronator drift.  Lower extremities antigravity.   COORDINATION: no ataxia on FNF.      MRI brain images reviewed: diffusion restriction R coronoa radiata.  Mulittiple microhemorrhages, deep and cortical.   Cerebran angiogram reviewed: multiple areas of focal stenosis and dilatation    AP: 58 year old woman with HTN, Rheumatoid arthritis, presenting with decompensated confusion, subacute course since about February 2023.  On exam, diminished attention, and language, trace L face, and arm weakness.  MRI showing the acute stroke, and cerebral angiogram concerning for vasculitic appearance.    Plan for CSF studies, rheumatology consultation, and likely initiation of IV steroids tomorrow.

## 2023-11-20 NOTE — PHYSICAL THERAPY INITIAL EVALUATION ADULT - ADDITIONAL COMMENTS
Per brother at bedside. Pt lives alone in apartment. Has elevators to second floor. Pt was independent prior to admission patient a/f rehab, prior to recent admission, Pt lives alone in apartment. Has elevators to second floor. Pt was independent prior to admission, has been amb with rolling walker on/off for the past 4 years. As per pt, lives alone in apartment. Has elevators to second floor. Pt was independent prior to admission.

## 2023-11-20 NOTE — CONSULT NOTE ADULT - PROBLEM SELECTOR RECOMMENDATION 9
At present AAOxO. Opens eyes to voice and pain. Nod's head yes to all questions asked. Given mental status at present unable to assess functional status. TTE from 11/15 reviewed. EF 69%. No WMA, had normal systolic and diastolic function. Based on chart review RCRI is at least a 1 (CVA). indicating class II risk and 6% chance of 30 day MACE. From medical standpoint is optimized for planned procedure. From a medical standpoint no further testing required prior to angiogram. is moderate risk for planned procedure  - Would have cardiology evaluate as unable to assess functional status
Plan for angiogram   Acceptable cardiac/medical risk to proceed   Check TTE   Monitor on Tele  Lipitor

## 2023-11-20 NOTE — CONSULT NOTE ADULT - TIME BILLING
- Reviewing, and interpreting labs, testing, and imaging.  - Independently performing a physical exam  - Reviewing consultant documentation/recommendations.

## 2023-11-20 NOTE — PHYSICAL THERAPY INITIAL EVALUATION ADULT - PERTINENT HX OF CURRENT PROBLEM, REHAB EVAL
58F, LVS txfer. PMH RA, current smoker. Admitted for AMS. MRI shows Right posterior corona radiata acute infarct and multiple petechial hemorrhages in b/l thalami and basal ganglia. Exam: AOx2-3, EOMI, no facial, no drift CLEANING 5/5, CTA Brain/neck 11/20, chronic white matter microvascular changes.

## 2023-11-20 NOTE — PROGRESS NOTE ADULT - SUBJECTIVE AND OBJECTIVE BOX
Patient seen and examined s/p angiogram w/ beading of cerebral vasculature compatible with vasculitis.    Doing well. Awake, alert, oriented x2-3. Groin soft, c/d/i     T(C): 36.9 (23 @ 18:25), Max: 37.4 (23 @ 00:00)  HR: 87 (23 @ 18:25) (62 - 99)  BP: 131/82 (23 @ 18:25) (131/82 - 179/85)  RR: 20 (23 @ 18:25) (15 - 30)  SpO2: 98% (23 @ 18:25) (90% - 100%)                          10.8   7.75  )-----------( 335      ( 2023 07:30 )             30.6         142  |  108  |  20  ----------------------------<  100<H>  3.7   |  27  |  0.99    Ca    8.9      2023 07:30      PT/INR - ( 2023 05:05 )   PT: 11.9 sec;   INR: 1.08 ratio         PTT - ( 2023 05:05 )  PTT:30.2 sec  Urinalysis Basic - ( 2023 10:06 )    Color: Yellow / Appearance: Turbid / S.023 / pH: x  Gluc: x / Ketone: Negative mg/dL  / Bili: Negative / Urobili: 0.2 mg/dL   Blood: x / Protein: Trace mg/dL / Nitrite: Negative   Leuk Esterase: Negative / RBC: 2 /HPF / WBC 1 /HPF   Sq Epi: x / Non Sq Epi: 9 /HPF / Bacteria: Negative /HPF          CAPILLARY BLOOD GLUCOSE

## 2023-11-20 NOTE — OCCUPATIONAL THERAPY INITIAL EVALUATION ADULT - ORIENTATION, REHAB EVAL
Oriented to name (not birthday), knows she is in a hospital, stating she is in "[facility]". Oriented to year, not month

## 2023-11-20 NOTE — CONSULT NOTE ADULT - ASSESSMENT
58F, LVS txfer. PMH RA, current smoker. Admitted for AMS. MRI shows Right posterior corona radiata acute infarct and multiple petechial hemorrhages in b/l thalami and basal ganglia. Exam: AOx2-3, EOMI, no facial, no drift CLEANING 5/5  -Adm Neurology, q4h neurochecks  -Preop for Angio in AM

## 2023-11-20 NOTE — PROGRESS NOTE ADULT - ASSESSMENT
58F, LVS txfer. PMH RA, current smoker. Admitted for AMS. MRI shows Right posterior corona radiata acute infarct and multiple petechial hemorrhages in b/l thalami and basal ganglia. Exam: AOx2-3, EOMI, no facial, no drift CLEANING 5/5  -Adm Neurology, q4h neurochecks  -s/p angio w/ beading of cerebral vasculature compatible with vasculitis.

## 2023-11-20 NOTE — CHART NOTE - NSCHARTNOTEFT_GEN_A_CORE
Patient is preop'd and optimized for an angiogram with Dr. Hartley today to rule out vasculitis in the setting of a recent change in mental status. MRI imaging demonstrates multiple petechial hemorrhages. Patient's presentation is concerning for possible vasculitis which would be best evaluated by performing an angiogram. Patient's family has not been responsive to multiple phone calls.     Two physician consent is obtained.

## 2023-11-20 NOTE — CONSULT NOTE ADULT - SUBJECTIVE AND OBJECTIVE BOX
p ([id number])     HPI:  58F, LVS txfer. PMH RA, current smoker. Admitted for AMS. MRI shows Right posterior corona radiata acute infarct and multiple petechial hemorrhages in b/l thalami and basal ganglia. Exam: AOx2-3, EOMI, no facial, no drift CLEANING 5/5    =====================  PAST MEDICAL HISTORY     PAST SURGICAL HISTORY     No Known Allergies      MEDICATIONS:  Antibiotics:    Neuro:    Other:  amLODIPine   Tablet 10 milliGRAM(s) Oral daily  atorvastatin 80 milliGRAM(s) Oral at bedtime      SOCIAL HISTORY:   Occupation:   Marital Status:     FAMILY HISTORY:      ROS: Negative except per HPI    LABS:                          10.8   7.75  )-----------( 335      ( 19 Nov 2023 07:30 )             30.6     11-19    142  |  108  |  20  ----------------------------<  100<H>  3.7   |  27  |  0.99    Ca    8.9      19 Nov 2023 07:30  Mg     2.2     11-18

## 2023-11-20 NOTE — SPEECH LANGUAGE PATHOLOGY EVALUATION - SLP DIAGNOSIS
58-year old woman with history of hypertension, rheumatoid arthritis, brought into ED at  initially for concerned for AMS .MRI shows right posterior corona radiata acute infarct and multiple petechial hemorrhages in b/l thalami and basal ganglia. Transferred to [facility] for angiogram. Orders received and chart reviewed. Attempted to see patient for a speech-language evaluation, however, pt is currently off the floor for an angio. This service to f/u at a later date/time, pt's schedule permitting.

## 2023-11-20 NOTE — CHART NOTE - NSCHARTNOTEFT_GEN_A_CORE
Interventional Neuro- Radiology   Procedure Note      Procedure: Selective Cerebral Angiography   Pre- Procedure Diagnosis: AMS with Right posterior corona radiata acute infarct and multiple petechial hemorrhages in b/l thalami and basal ganglia   Post- Procedure Diagnosis: AMS with Right posterior corona radiata acute infarct and multiple petechial hemorrhages in b/l thalami and basal ganglia     : Dr. Naeem MD  Fellow: Dr. Marlon Redding  Physician Assistant: Herminio Montana PA-C  Resident: Dr. Mira Pemberton  RN: Leticia Rodriguez RN  Tech: Lonnie Kaur (RT)    Anesthesia: Dr. Ruggiero (no sedation was given to patient)      I/Os:  Fluids: 100 cc  Hamm: DTV  Contrast: 80 cc  Estimated Blood Loss: <10cc    Preliminary Report:  Under local anesthesia, using a 5 Tajik short sheath to the right femoral artery examination of left vertebral artery/ left internal carotid artery/ left external carotid artery/ right vertebral artery/ right internal carotid artery/ right external carotid artery via selective cerebral angiography demonstrates beading of cerebral vasculature consistent with vasculitis. Official dictated report to follow.    Patient tolerated procedure well, vital signs stable, hemodynamically stable, no change in neurological status compared to baseline. Results discussed with neurosurgery and patient. Groin sheath d/c'ed, manual compression held to hemostasis, no active bleeding, no hematoma, Vascade device applied, quick clot and safeguard balloon dressing applied at 14:00.  Patient transferred to IR recovery for further care/ monitoring.

## 2023-11-20 NOTE — CONSULT NOTE ADULT - ASSESSMENT
58-year old woman with history of hypertension, rheumatoid arthritis, brought to OSH for altered mental status. MR brain showed bilateral subacute to remote embolic strokes. MRI shows right posterior corona radiata acute infarct and multiple petechial hemorrhages in b/l thalami and basal ganglia. Planned for cerebral angiogram with Nsx

## 2023-11-20 NOTE — H&P ADULT - NSHPPHYSICALEXAM_GEN_ALL_CORE
Physical Examination:   General - non-toxic appearing female in no acute distress    Neurologic Exam:  Mental status - Eyes closed, opens to verbal stimuli (name). Oriented to self, states 'hospital' when given options but not spontaneously, does not know date.     Cranial nerves - PERRLA (4mm -> 3mm b/l), VFF, EOMI, face sensation (V1-V3) intact b/l, facial strength intact without asymmetry b/l, hearing intact b/l, palate with symmetric elevation, sternocleidomastiod 5/5 strength b/l, tongue midline on protrusion with full lateral movement  Motor - Normal bulk and tone throughout. No pronator drift.  Strength testing             Deltoid      Biceps      Triceps     Wrist Extension    Wrist Flexion        R            5                 5               5                     5                              5                             5  L             5                 5               5                     5                              5                             5              Hip Flexion       Knee Flexion    Knee Extension    Dorsiflexion    Plantar Flexion  R              5                         5                           5                            5                          5  L              5                          5                           5                            5                          5    Sensation - Light touch intact throughout  DTR's - deferred for focal exam  Coordination- Finger to Nose intact b/l. No tremors appreciated  Gait and station - deferred

## 2023-11-20 NOTE — H&P ADULT - HISTORY OF PRESENT ILLNESS
58-year old woman with history of hypertension, rheumatoid arthritis, brought into ED at  initially for concerned for altered mental status.    Prior to hospital presentation: neighbor overhead patient was looking for her mom, was confused/wandering around apartment complex.     At [place]: CT head noted small hyperdensity at the right thalamocapsular junction on thinner slices. MR brain showed bilateral subacute to remote embolic strokes. MRI shows right posterior corona radiata acute infarct and multiple petechial hemorrhages in b/l thalami and basal ganglia. Noted to have KRUNAL. Also noted to have hypertension, on amlodipine, aldactone, and losartan. Started on B12 supplements. Seen by rheumatology, 'no obvious evidence of vasculitis at this time'    Discharge medications: amLODIPine 10 mg oral tablet: 1 tab(s) orally once a day  atorvastatin 80 mg oral tablet: 1 tab(s) orally once a day (at bedtime)  losartan 25 mg oral tablet: 1 tab(s) orally once a day  spironolactone 25 mg oral tablet: 2 tab(s) orally once a day    Transferred to [facility] for cerebral angiogram.  58-year old woman with history of hypertension, rheumatoid arthritis, brought into ED at  initially for concerned for altered mental status.    Prior to hospital presentation: neighbor overhead patient was looking for her mom, was confused/wandering around apartment complex.     At [place]: CT head noted small hyperdensity at the right thalamocapsular junction on thinner slices. MR brain showed bilateral subacute to remote embolic strokes. MRI shows right posterior corona radiata acute infarct and multiple petechial hemorrhages in b/l thalami and basal ganglia. Noted to have KRUNAL. Also noted to have hypertension, on amlodipine, aldactone, and losartan. Started on B12 supplements. Seen by rheumatology, 'no obvious evidence of vasculitis at this time'    Discharge medications: amLODIPine 10 mg oral tablet: 1 tab(s) orally once a day  atorvastatin 80 mg oral tablet: 1 tab(s) orally once a day (at bedtime)  losartan 25 mg oral tablet: 1 tab(s) orally once a day  spironolactone 25 mg oral tablet: 2 tab(s) orally once a day    Transferred to [facility] for cerebral angiogram.  58-year old woman with history of hypertension, rheumatoid arthritis, brought into ED at  initially for concerned for altered mental status.    Prior to hospital presentation: neighbor overhead patient was looking for her mom, was confused/wandering around apartment complex.     At [place]: CT head noted small hyperdensity at the right thalamocapsular junction on thinner slices. MR brain showed bilateral subacute to remote embolic strokes. MRI shows right posterior corona radiata acute infarct and multiple petechial hemorrhages in b/l thalami and basal ganglia. Noted to have KRUNAL. Also noted to have hypertension, on amlodipine, aldactone, and losartan. Started on B12 supplements. Seen by rheumatology, 'no obvious evidence of vasculitis at this time'    Discharge medications: amLODIPine 10 mg oral tablet: 1 tab(s) orally once a day  atorvastatin 80 mg oral tablet: 1 tab(s) orally once a day (at bedtime)  losartan 25 mg oral tablet: 1 tab(s) orally once a day  spironolactone 25 mg oral tablet: 2 tab(s) orally once a day    Transferred to [facility] for cerebral angiogram.  58-year old woman with history of hypertension, rheumatoid arthritis, brought into ED at  initially for concerned for altered mental status.    Prior to hospital presentation: neighbor overhead patient was looking for her mom, was confused/wandering around apartment complex.     At [place]: MR brain showed bilateral subacute to remote embolic strokes. MRI shows right posterior corona radiata acute infarct and multiple petechial hemorrhages in b/l thalami and basal ganglia. Noted to have KRUNAL. Also noted to have hypertension, on amlodipine, aldactone, and losartan. Started on B12 supplements. Seen by rheumatology,    Transferred to [facility] for cerebral angiogram.     Patient seen at bedside.   NIHSS:  58-year old woman with history of hypertension, rheumatoid arthritis, brought into ED at  initially for concerned for altered mental status.    Prior to hospital presentation: neighbor overhead patient was looking for her mom, was confused/wandering around apartment complex.     At [place]: MR brain showed bilateral subacute to remote embolic strokes. MRI shows right posterior corona radiata acute infarct and multiple petechial hemorrhages in b/l thalami and basal ganglia. Noted to have KRUNAL. Also noted to have hypertension, on amlodipine, aldactone, and losartan. Started on B12 supplements. Seen by rheumatology,    Transferred to [facility] for cerebral angiogram.     Patient seen at bedside.   NIHSS:  58-year old woman with history of hypertension, rheumatoid arthritis, brought into ED at  initially for concerned for altered mental status.    Prior to hospital presentation: neighbor overhead patient was looking for her mom, was confused/wandering around apartment complex.     At [place]: MR brain showed bilateral subacute to remote embolic strokes. MRI shows right posterior corona radiata acute infarct and multiple petechial hemorrhages in b/l thalami and basal ganglia. Noted to have KRUNAL. Also noted to have hypertension, on amlodipine, aldactone, and losartan. Started on B12 supplements. Seen by rheumatology,    Transferred to [facility] for cerebral angiogram.     Patient seen at bedside.   NIHSS:  58-year old woman with history of hypertension, rheumatoid arthritis, brought into ED at  initially for concerned for altered mental status.    Prior to hospital presentation: neighbor overhead patient was looking for her mom, was confused/wandering around apartment complex.     At [place]: MR brain showed bilateral subacute to remote embolic strokes. MRI shows right posterior corona radiata acute infarct and multiple petechial hemorrhages in b/l thalami and basal ganglia. Noted to have KRUNAL. Also noted to have hypertension, on amlodipine, aldactone, and losartan. Started on B12 supplements. Seen by rheumatology,    Transferred to [facility] for cerebral angiogram.     Patient seen at bedside.   NIHSS: 2  [facility] 58-year [facility] woman with history of hypertension, rheumatoid arthritis, brought into ED at  initially for concerned for altered mental status.    Prior to hospital presentation: neighbor overhead patient was looking for her mom, was confused/wandering around apartment complex.     At [place]: MR brain showed bilateral subacute to remote embolic strokes. MRI shows right posterior corona radiata acute infarct and multiple petechial hemorrhages in b/l thalami and basal ganglia. Noted to have KRUNAL. Also noted to have hypertension, on amlodipine, aldactone, and losartan. Started on B12 supplements. Seen by rheumatology,    Transferred to [facility] for cerebral angiogram.     Patient seen at bedside.   NIHSS: 2  [facility] 58-year [facility] woman with history of hypertension, rheumatoid arthritis, brought into ED at  initially for concerned for altered mental status.    Prior to hospital presentation: neighbor overhead patient was looking for her mom, was confused/wandering around apartment complex.     At [place]: MR brain showed bilateral subacute to remote embolic strokes. MRI shows right posterior corona radiata acute infarct and multiple petechial hemorrhages in b/l thalami and basal ganglia. Noted to have KRUNAL. Also noted to have hypertension, on amlodipine, aldactone, and losartan. Started on B12 supplements. Seen by rheumatology,    Transferred to [facility] for cerebral angiogram.     Patient seen at bedside.   NIHSS: 2  [facility] 58-year [facility] woman with history of hypertension, rheumatoid arthritis, brought into ED at  initially for concerned for altered mental status.    Prior to hospital presentation: neighbor overhead patient was looking for her mom, was confused/wandering around apartment complex.     At [place]: MR brain showed bilateral subacute to remote embolic strokes. MRI shows right posterior corona radiata acute infarct and multiple petechial hemorrhages in b/l thalami and basal ganglia. Noted to have KRUNAL. Also noted to have hypertension, on amlodipine, aldactone, and losartan. Started on B12 supplements. Seen by rheumatology,    Transferred to [facility] for cerebral angiogram.     Patient seen at bedside.   NIHSS: 2  [facility]    Social History  States has been smoking 1 ppd for several years. 58-year old woman with history of hypertension, rheumatoid arthritis, brought into ED at  initially for concerned for altered mental status.    Prior to hospital presentation: neighbor overhead patient was looking for her mom, was confused/wandering around apartment complex.     At [place]: MR brain showed bilateral subacute to remote embolic strokes. MRI shows right posterior corona radiata acute infarct and multiple petechial hemorrhages in b/l thalami and basal ganglia. Noted to have KRUNAL. Also noted to have hypertension, on amlodipine, aldactone, and losartan. Started on B12 supplements. Seen by rheumatology,    Transferred to [facility] for cerebral angiogram.     Patient seen at bedside.   NIHSS: 2  [facility]    Social History  States has been smoking 1 ppd for several years. 58-year old woman with history of hypertension, rheumatoid arthritis, brought into ED at  initially for concerned for altered mental status.    Prior to hospital presentation: neighbor overhead patient was looking for her mom, was confused/wandering around apartment complex.     At [place]: MR brain showed bilateral subacute to remote embolic strokes. MRI shows right posterior corona radiata acute infarct and multiple petechial hemorrhages in b/l thalami and basal ganglia. Noted to have KRUNAL. Also noted to have hypertension, on amlodipine, aldactone, and losartan. Started on B12 supplements. Seen by rheumatology,    Transferred to [facility] for cerebral angiogram.     Patient seen at bedside.   NIHSS: 2  [facility]    Social History  States has been smoking 1 ppd for several years.

## 2023-11-20 NOTE — H&P ADULT - ASSESSMENT
ASSESSMENT   58-year old woman with history of hypertension, rheumatoid arthritis, brought into ED at  initially for concerned for altered mental status.MRI shows right posterior corona radiata acute infarct and multiple petechial hemorrhages in b/l thalami and basal ganglia. Transferred to [facility] for angiogram.    IMPRESSION   Altered mental status with MRI brain findings showing posterior corona radiata infarct + multiple petechial hemorrhages in b/l thalami + basal ganglia, transferred to [facility] for c/f vasculitis.    PLAN  [] plan for angiogram 11/20 AM, NSGY team contacted and updated on patient arrival recs appreciated  [] start ASA 81 daily  [] continue home statin (goal LDL<70)  [] DVT prophylaxis w/ lovenox SQ  [] MRI brain w/o contrast to look at the extent and distribution of the potential stroke   [x] TTE and telemetry to look for a cardiac source of embolism.  [] consider in AM bubble study (not noted on VS echo)  [] Check HbA1C and lipid panel  [] Telemonitoring; Neurochecks and vital signs Q4H  [] normotension; continue BP meds  [] BGM goals 140-180  [] PT/OT evaluation  [] NPO until clears dysphagia screen, otherwise swallow evaluation  [] Stroke education provided    Case discussed with stroke fellow Dr. Dwayne Hurtado under supervision of Dr. Mayen stroke attending.    Patient to be seen by team and attending. Note finalized upon attending attestation.  ASSESSMENT   58-year old woman with history of hypertension, rheumatoid arthritis, brought into ED at  initially for concerned for altered mental status.MRI shows right posterior corona radiata acute infarct and multiple petechial hemorrhages in b/l thalami and basal ganglia. Transferred to [facility] for angiogram.    IMPRESSION   Altered mental status with MRI brain findings showing posterior corona radiata infarct + multiple petechial hemorrhages in b/l thalami + basal ganglia, transferred to [facility] for c/f vasculitis.    PLAN  [] plan for angiogram 11/20 AM, NSGY team contacted and updated on patient arrival recs appreciated  [] start ASA 81 daily  [] continue home statin (goal LDL<70)  [] DVT prophylaxis w/ lovenox SQ  [] MRI brain w/o contrast to look at the extent and distribution of the potential stroke   [x] TTE and telemetry to look for a cardiac source of embolism.  [] consider in AM bubble study (not noted on VS echo)  [] Check HbA1C and lipid panel  [] Telemonitoring; Neurochecks and vital signs Q4H  [] normotension; continue BP meds  [] BGM goals 140-180  [] PT/OT evaluation  [] NPO until clears dysphagia screen, otherwise swallow evaluation  [] Stroke education provided    Case discussed with stroke fellow Dr. Dwayne Hurtado under supervision of Dr. Mayen stroke attending.    Patient to be seen by team and attending. Note finalized upon attending attestation.  ASSESSMENT   58-year old woman with history of hypertension, rheumatoid arthritis, brought into ED at  initially for concerned for altered mental status.MRI shows right posterior corona radiata acute infarct and multiple petechial hemorrhages in b/l thalami and basal ganglia. Transferred to [facility] for angiogram.    IMPRESSION   Altered mental status with MRI brain findings showing posterior corona radiata infarct + multiple petechial hemorrhages in b/l thalami + basal ganglia, transferred to [facility] for c/f vasculitis.    PLAN  [] plan for angiogram 11/20 AM, NSGY team contacted and updated on patient arrival recs appreciated  [] start ASA 81 daily  [] continue home statin (goal LDL<70)  [] DVT prophylaxis w/ lovenox SQ  [] MRI brain w/o contrast to look at the extent and distribution of the potential stroke   [x] TTE and telemetry to look for a cardiac source of embolism.  [] consider in AM bubble study (not noted on VS echo)  [] Check HbA1C and lipid panel  [] Telemonitoring; Neurochecks and vital signs Q4H  [] normotension; continue BP meds  [] BGM goals 140-180  [] PT/OT evaluation  [] NPO until clears dysphagia screen, otherwise swallow evaluation  [] Stroke education provided    Case discussed with stroke fellow Dr. Dwayne Hurtado under supervision of Dr. Mayen stroke attending.    Patient to be seen by team and attending. Note finalized upon attending attestation.  ASSESSMENT   58-year old woman with history of hypertension, rheumatoid arthritis, brought into ED at  initially for concerned for altered mental status.MRI shows right posterior corona radiata acute infarct and multiple petechial hemorrhages in b/l thalami and basal ganglia. Transferred to [facility] for angiogram.  NIHSS: 2  LKW: prior to 11/15 ( hospitalizaton)    IMPRESSION   Altered mental status with MRI brain findings showing posterior corona radiata infarct + multiple petechial hemorrhages in b/l thalami + basal ganglia, transferred to [facility] for c/f vasculitis.    PLAN  [] plan for angiogram 11/20 AM, NSGY team contacted and updated on patient arrival recs appreciated  [] start ASA 81 daily  [] continue home statin (goal LDL<70)  [] DVT prophylaxis w/ lovenox SQ  [] MRI brain w/o contrast to look at the extent and distribution of the potential stroke   [x] TTE and telemetry to look for a cardiac source of embolism.  [] consider in AM bubble study (not noted on VS echo)  [] Check HbA1C and lipid panel  [] Telemonitoring; Neurochecks and vital signs Q4H  [] normotension; continue BP meds  [] BGM goals 140-180  [] PT/OT evaluation  [] NPO until clears dysphagia screen, otherwise swallow evaluation  [] Stroke education provided    Case discussed with stroke fellow Dr. Dwayne Hurtado under supervision of Dr. Mayen stroke attending.    Patient to be seen by team and attending. Note finalized upon attending attestation.  ASSESSMENT   58-year old woman with history of hypertension, rheumatoid arthritis, brought into ED at  initially for concerned for altered mental status.MRI shows right posterior corona radiata acute infarct and multiple petechial hemorrhages in b/l thalami and basal ganglia. Transferred to [facility] for angiogram.  NIHSS: 2  LKW: prior to 11/15 ( hospitalizaton)    IMPRESSION   Altered mental status with MRI brain findings showing posterior corona radiata infarct + multiple petechial hemorrhages in b/l thalami + basal ganglia, transferred to [facility] for c/f vasculitis.    PLAN  [] plan for angiogram 11/20 AM, NSGY team contacted and updated on patient arrival recs appreciated  [] start ASA 81 daily  [] continue home statin (goal LDL<70)  [] DVT prophylaxis w/ lovenox SQ  [] MRI brain w/o contrast to look at the extent and distribution of the potential stroke   [x] TTE and telemetry to look for a cardiac source of embolism.  [] consider in AM bubble study (not noted on VS echo)  [] Check HbA1C and lipid panel  [] Telemonitoring; Neurochecks and vital signs Q4H  [] normotension; continue BP meds  [] BGM goals 140-180  [] PT/OT evaluation  [] NPO until clears dysphagia screen, otherwise swallow evaluation  [] Stroke education provided    Case discussed with stroke fellow Dr. Dwayne Hurtado under supervision of Dr. Mayen stroke attending.    Patient to be seen by team and attending. Note finalized upon attending attestation.  ASSESSMENT   58-year old woman with history of hypertension, rheumatoid arthritis, brought into ED at  initially for concerned for altered mental status.MRI shows right posterior corona radiata acute infarct and multiple petechial hemorrhages in b/l thalami and basal ganglia. Transferred to [facility] for angiogram.  NIHSS: 2  LKW: prior to 11/15 ( hospitalizaton)    IMPRESSION   Altered mental status with MRI brain findings showing posterior corona radiata infarct + multiple petechial hemorrhages in b/l thalami + basal ganglia, transferred to [facility] for c/f vasculitis.    PLAN  [] plan for angiogram 11/20 AM, NSGY team contacted and updated on patient arrival recs appreciated  [] start ASA 81 daily  [] continue home statin (goal LDL<70)  [] DVT prophylaxis w/ lovenox SQ  [] MRI brain w/o contrast to look at the extent and distribution of the potential stroke   [x] TTE and telemetry to look for a cardiac source of embolism.  [] consider in AM bubble study (not noted on VS echo)  [] Check HbA1C and lipid panel  [] Telemonitoring; Neurochecks and vital signs Q4H  [] normotension; continue BP meds  [] BGM goals 140-180  [] PT/OT evaluation  [] NPO until clears dysphagia screen, otherwise swallow evaluation  [] Stroke education provided    Case discussed with stroke fellow Dr. Dwayne Hurtado under supervision of Dr. Mayen stroke attending.    Patient to be seen by team and attending. Note finalized upon attending attestation.

## 2023-11-20 NOTE — PATIENT PROFILE ADULT - FALL HARM RISK - HARM RISK INTERVENTIONS

## 2023-11-20 NOTE — SPEECH LANGUAGE PATHOLOGY EVALUATION - COMMENTS
11/18 CT Head - No acute intracranial hemorrhage, mass effect, or shift of the   midline structures. 11/18 CT Head - No acute intracranial hemorrhage, mass effect, or shift of the   midline structures.    MRI shows Right posterior corona radiata acute infarct and multiple petechial hemorrhages in b/l thalami and basal ganglia. Exam: AOx2-3, EOMI, no facial, no drift CLEANING 5/5    No prior speech/swallow w/u at this location per EMR.

## 2023-11-20 NOTE — CONSULT NOTE ADULT - SUBJECTIVE AND OBJECTIVE BOX
TRAUMA COMANAGEMENT MEDICINE ATTENDING INITIAL CONSULT NOTE    HPI:  58-year old woman with history of hypertension, rheumatoid arthritis, brought into ED at  initially for concerned for altered mental status.    Prior to hospital presentation: neighbor overhead patient was looking for her mom, was confused/wandering around apartment complex.     At [place]: MR brain showed bilateral subacute to remote embolic strokes. MRI shows right posterior corona radiata acute infarct and multiple petechial hemorrhages in b/l thalami and basal ganglia. Noted to have KRUNAL. Also noted to have hypertension, on amlodipine, aldactone, and losartan. Started on B12 supplements. Seen by rheumatology,    Transferred to [facility] for cerebral angiogram.         SUBJECTIVE:     Home Medications:  amLODIPine 10 mg oral tablet: 1 tab(s) orally once a day (2023 03:49)  losartan 25 mg oral tablet: 1 tab(s) orally once a day (2023 03:49)  spironolactone 25 mg oral tablet: 2 tab(s) orally once a day (2023 03:49)      PAST MEDICAL & SURGICAL HISTORY:  HTN  RA      Review of Systems:   Unable to assess given mental status    Allergies    No Known Allergies            Social History: Unable to assess given mental status    FAMILY HISTORY:   Unable to assess given mental status    Vital Signs Last 24 Hrs  T(C): 36.9 (2023 09:12), Max: 37.4 (2023 00:00)  T(F): 98.5 (2023 09:12), Max: 99.3 (2023 00:00)  HR: 86 (2023 09:12) (84 - 100)  BP: 142/82 (2023 09:12) (139/87 - 179/85)  BP(mean): --  RR: 19 (2023 09:12) (18 - 19)  SpO2: 96% (2023 09:12) (94% - 97%)    Parameters below as of 2023 09:12  Patient On (Oxygen Delivery Method): room air      CAPILLARY BLOOD GLUCOSE          PHYSICAL EXAM:  GENERAL: NAD, well-developed  NECK: Supple, No JVD  CHEST/LUNG: Clear to auscultation bilaterally; No wheeze  HEART: Reg rate. No M/R/G.  ABDOMEN: Soft, NT/ND, Bowel sounds present  EXTREMITIES:  2+ Peripheral Pulses, No clubbing, cyanosis, or edema  PSYCH: AAOx0  NEUROLOGY: Opens eyes to voice and pain,   LABS:                        10.8   7.75  )-----------( 335      ( 2023 07:30 )             30.6     11-    142  |  108  |  20  ----------------------------<  100<H>  3.7   |  27  |  0.99    Ca    8.9      2023 07:30      PT/INR - ( 2023 05:05 )   PT: 11.9 sec;   INR: 1.08 ratio         PTT - ( 2023 05:05 )  PTT:30.2 sec      Urinalysis Basic - ( 2023 10:06 )    Color: Yellow / Appearance: Turbid / S.023 / pH: x  Gluc: x / Ketone: Negative mg/dL  / Bili: Negative / Urobili: 0.2 mg/dL   Blood: x / Protein: Trace mg/dL / Nitrite: Negative   Leuk Esterase: Negative / RBC: 2 /HPF / WBC x   Sq Epi: x / Non Sq Epi: x / Bacteria: x          MEDICATIONS  (STANDING):  amLODIPine   Tablet 10 milliGRAM(s) Oral daily  atorvastatin 80 milliGRAM(s) Oral at bedtime  cyanocobalamin Injectable 1000 MICROGram(s) IntraMuscular daily  losartan 25 milliGRAM(s) Oral daily    MEDICATIONS  (PRN):     n/a

## 2023-11-20 NOTE — CONSULT NOTE ADULT - SUBJECTIVE AND OBJECTIVE BOX
Patient is a 58y old  Female who presents with a chief complaint of     HPI:  58-year old woman with history of hypertension, rheumatoid arthritis, brought into ED at  initially for concerned for altered mental status.  Prior to hospital presentation: neighbor overhead patient was looking for her mom, was confused/wandering around apartment complex.   At [place]: MR brain showed bilateral subacute to remote embolic strokes. MRI shows right posterior corona radiata acute infarct and multiple petechial hemorrhages in b/l thalami and basal ganglia. Noted to have KRUNAL. Also noted to have hypertension, on amlodipine, aldactone, and losartan. Started on B12 supplements. Seen by rheumatology,  Transferred to [facility] for cerebral angiogram.   Patient seen at bedside.   NIHSS: 2  LKW: prior to 11/15 ( hospitalizato)    Social History  States has been smoking 1 ppd for several years. (20 Nov 2023 02:43)    INTERVAL HISTORY      REVIEW OF SYSTEMS: No chest pain, shortness of breath, nausea, vomiting or diarhea; other ROS neg       PAST MEDICAL & SURGICAL HISTORY  HTN  Rheumatoid Arthritis    FUNCTIONAL HISTORY:   Lives in an apartment alone, elevator available, walk in shower  Independent in all ADLs and ambulation prior to admission    SOCIAL HISTORY  Tobacco - 1ppd for several years    CURRENT FUNCTIONAL STATUS:  PT/OT on 11/20:  Bed mob - Didi  Transfers - Didi  UBD - Didi  LBD - modA  Gait not assessed    SLP to see patient - eval not performed yet     FAMILY HISTORY       MEDICATIONS   amLODIPine   Tablet 10 milliGRAM(s) Oral daily  atorvastatin 80 milliGRAM(s) Oral at bedtime  cyanocobalamin Injectable 1000 MICROGram(s) IntraMuscular daily  losartan 25 milliGRAM(s) Oral daily      ALLERGIES  No Known Allergies      VITALS  T(C): 36.9 (11-20-23 @ 09:12), Max: 37.4 (11-20-23 @ 00:00)  HR: 86 (11-20-23 @ 09:12) (84 - 100)  BP: 142/82 (11-20-23 @ 09:12) (138/82 - 179/85)  RR: 19 (11-20-23 @ 09:12) (18 - 19)  SpO2: 96% (11-20-23 @ 09:12) (94% - 97%)  Wt(kg): --    PHYSICAL EXAM  Constitutional - NAD, Comfortable  HEENT - NCAT, EOMI  Neck - Supple  Chest - No distress, no use of accessory muscles for respiration  Cardiovascular -Well perfused  Abdomen - BS+, Soft, NTND  Extremities - No C/C/E, No calf tenderness   Neurologic Exam -                    Cognitive - Awake, Alert, AAO to self, place, date, year, situation     Communication - Fluent, No dysarthria, no aphasia     Cranial Nerves - CN 2-12 intact     Motor - No focal deficits      Sensory - Intact to LT     Reflexes - DTR Intact, No primitive reflexive  Psychiatric - Mood stable, Affect WNL    RECENT LABS/IMAGING  CBC Full  -  ( 19 Nov 2023 07:30 )  WBC Count : 7.75 K/uL  RBC Count : 3.71 M/uL  Hemoglobin : 10.8 g/dL  Hematocrit : 30.6 %  Platelet Count - Automated : 335 K/uL  Mean Cell Volume : 82.5 fl  Mean Cell Hemoglobin : 29.1 pg  Mean Cell Hemoglobin Concentration : 35.3 g/dL  Auto Neutrophil # : x  Auto Lymphocyte # : x  Auto Monocyte # : x  Auto Eosinophil # : x  Auto Basophil # : x  Auto Neutrophil % : x  Auto Lymphocyte % : x  Auto Monocyte % : x  Auto Eosinophil % : x  Auto Basophil % : x    11-19    142  |  108  |  20  ----------------------------<  100<H>  3.7   |  27  |  0.99    Ca    8.9      19 Nov 2023 07:30      Urinalysis Basic - ( 19 Nov 2023 07:30 )    Color: x / Appearance: x / SG: x / pH: x  Gluc: 100 mg/dL / Ketone: x  / Bili: x / Urobili: x   Blood: x / Protein: x / Nitrite: x   Leuk Esterase: x / RBC: x / WBC x   Sq Epi: x / Non Sq Epi: x / Bacteria: x      < from: CT Angio Neck Stroke Protocol w/ IV Cont (11.18.23 @ 20:17) >  IMPRESSION:    Head CT:No acute intracranial hemorrhage, mass effect, or shift of the   midline structures.    Similar-appearing extensive chronic white matter microvascular type   changes and chronic lacunar infarcts, as discussed.    CTA neck and head: No large vessel occlusion or major stenosis.    < end of copied text >  < from: MR Head w/ IV Cont (11.17.23 @ 13:09) >  IMPRESSION:    1. Right posterior corona radiata subacute infarction    2. Multiple remote infarctions within the cerebral hemispheric white   matter basal ganglia and thalami    3. Pervasive cerebral hemispheric white matter abnormality, possibly   accelerated form of ischemic white matter disease versus and/or   superimposed other inflammatory metabolic toxic or infectious process    4. Numerous remote petechial hemorrhages most prominently in the basal   ganglia andthalami    5. Diffuse brain volume loss upper range typical for age      < end of copied text >   Patient is a 58y old  Female who presents with a chief complaint of     HPI:  58-year old woman with history of hypertension, rheumatoid arthritis, brought into ED at  initially for concerned for altered mental status.  Prior to hospital presentation: neighbor overhead patient was looking for her mom, was confused/wandering around apartment complex.   At [place]: MR brain showed bilateral subacute to remote embolic strokes. MRI shows right posterior corona radiata acute infarct and multiple petechial hemorrhages in b/l thalami and basal ganglia. Noted to have KRUNAL. Also noted to have hypertension, on amlodipine, aldactone, and losartan. Started on B12 supplements. Seen by rheumatology,  Transferred to [facility] for cerebral angiogram.   Patient seen at bedside.   NIHSS: 2  LKW: prior to 11/15 ( hospitalizato)    Social History  States has been smoking 1 ppd for several years. (20 Nov 2023 02:43)    INTERVAL HISTORY  Patient seen at bedside. Patient with fluctuating participation in interview. Patient able to speak and was oriented to self, place, situation, and time. patient with distractibility and pauses between responses and often requires multiple commands or questions to receive an answer. Denies any pain. States maybe feeling weak on her left side. Unable to remember nurses name but states she saw her earlier in the day. Patient to go for angiogram soon. Denies any numbness or tingling.    REVIEW OF SYSTEMS: +left sided weakness, Denies pain, confusion, and numbness or tingling. No chest pain, shortness of breath, nausea, vomiting or diarhea; other ROS neg       PAST MEDICAL & SURGICAL HISTORY  HTN  Rheumatoid Arthritis    FUNCTIONAL HISTORY:   Lives in an apartment alone, elevator available, walk in shower  Independent in all ADLs and ambulation prior to admission    SOCIAL HISTORY  Tobacco - 1ppd for several years    CURRENT FUNCTIONAL STATUS:  PT/OT on 11/20:  Bed mob - Didi  Transfers - Didi  UBD - Didi  LBD - modA  Gait not assessed    SLP to see patient - eval not performed yet     FAMILY HISTORY       MEDICATIONS   amLODIPine   Tablet 10 milliGRAM(s) Oral daily  atorvastatin 80 milliGRAM(s) Oral at bedtime  cyanocobalamin Injectable 1000 MICROGram(s) IntraMuscular daily  losartan 25 milliGRAM(s) Oral daily      ALLERGIES  No Known Allergies      VITALS  T(C): 36.9 (11-20-23 @ 09:12), Max: 37.4 (11-20-23 @ 00:00)  HR: 86 (11-20-23 @ 09:12) (84 - 100)  BP: 142/82 (11-20-23 @ 09:12) (138/82 - 179/85)  RR: 19 (11-20-23 @ 09:12) (18 - 19)  SpO2: 96% (11-20-23 @ 09:12) (94% - 97%)  Wt(kg): --    PHYSICAL EXAM  Constitutional - NAD, Comfortable  HEENT - NCAT, EOMI  Neck - Supple  Chest - No distress, no use of accessory muscles for respiration  Cardiovascular -Well perfused  Abdomen - BS+, Soft, NTND  Extremities - No C/C/E, No calf tenderness   Neurologic Exam -                    Cognitive - Awake, Alert, AAO to self, place, date, year, situation; slow to follow complex commands     Communication - Fluent, No dysarthria, patient with intermittent pauses and fluctuating participation verbally     Cranial Nerves - CN 2-12 intact     Motor - 4+/5 strength in LLE and LUE, 5/5 strength on exam      Sensory - Intact to LT     Reflexes - DTR Intact, No primitive reflexive  Psychiatric - Mood stable, Affect flat and patient somewhat reserved with interviewer    RECENT LABS/IMAGING  CBC Full  -  ( 19 Nov 2023 07:30 )  WBC Count : 7.75 K/uL  RBC Count : 3.71 M/uL  Hemoglobin : 10.8 g/dL  Hematocrit : 30.6 %  Platelet Count - Automated : 335 K/uL  Mean Cell Volume : 82.5 fl  Mean Cell Hemoglobin : 29.1 pg  Mean Cell Hemoglobin Concentration : 35.3 g/dL  Auto Neutrophil # : x  Auto Lymphocyte # : x  Auto Monocyte # : x  Auto Eosinophil # : x  Auto Basophil # : x  Auto Neutrophil % : x  Auto Lymphocyte % : x  Auto Monocyte % : x  Auto Eosinophil % : x  Auto Basophil % : x    11-19    142  |  108  |  20  ----------------------------<  100<H>  3.7   |  27  |  0.99    Ca    8.9      19 Nov 2023 07:30      Urinalysis Basic - ( 19 Nov 2023 07:30 )    Color: x / Appearance: x / SG: x / pH: x  Gluc: 100 mg/dL / Ketone: x  / Bili: x / Urobili: x   Blood: x / Protein: x / Nitrite: x   Leuk Esterase: x / RBC: x / WBC x   Sq Epi: x / Non Sq Epi: x / Bacteria: x      < from: CT Angio Neck Stroke Protocol w/ IV Cont (11.18.23 @ 20:17) >  IMPRESSION:    Head CT:No acute intracranial hemorrhage, mass effect, or shift of the   midline structures.    Similar-appearing extensive chronic white matter microvascular type   changes and chronic lacunar infarcts, as discussed.    CTA neck and head: No large vessel occlusion or major stenosis.    < end of copied text >  < from: MR Head w/ IV Cont (11.17.23 @ 13:09) >  IMPRESSION:    1. Right posterior corona radiata subacute infarction    2. Multiple remote infarctions within the cerebral hemispheric white   matter basal ganglia and thalami    3. Pervasive cerebral hemispheric white matter abnormality, possibly   accelerated form of ischemic white matter disease versus and/or   superimposed other inflammatory metabolic toxic or infectious process    4. Numerous remote petechial hemorrhages most prominently in the basal   ganglia andthalami    5. Diffuse brain volume loss upper range typical for age      < end of copied text >

## 2023-11-20 NOTE — CONSULT NOTE ADULT - ASSESSMENT
58F, LVS txfer. PMH RA, current smoker. Admitted for AMS. MRI shows Right posterior corona radiata acute infarct and multiple petechial hemorrhages in b/l thalami and basal ganglia

## 2023-11-20 NOTE — CONSULT NOTE ADULT - SUBJECTIVE AND OBJECTIVE BOX
CHIEF COMPLAINT:  Sees Dr. Clarke in [place] for cardiology care     HISTORY OF PRESENT ILLNESS:  58F, LVS txfer. PMH RA, current smoker. Admitted for AMS. MRI shows Right posterior corona radiata acute infarct and multiple petechial hemorrhages in b/l thalami and basal ganglia  Unknown past cardiac history.     PAST MEDICAL & SURGICAL HISTORY:          MEDICATIONS:  amLODIPine   Tablet 10 milliGRAM(s) Oral daily  losartan 25 milliGRAM(s) Oral daily            atorvastatin 80 milliGRAM(s) Oral at bedtime    cyanocobalamin Injectable 1000 MICROGram(s) IntraMuscular daily      FAMILY HISTORY:      SOCIAL HISTORY:    [ ] Non-smoker  [ ] Smoker  [ ] Alcohol    Allergies    No Known Allergies    Intolerances    	    REVIEW OF SYSTEMS:  CONSTITUTIONAL: No fever, weight loss,+ fatigue  EYES: No eye pain, visual disturbances, or discharge  ENMT:  No difficulty hearing, tinnitus, vertigo; No sinus or throat pain  NECK: No pain or stiffness  RESPIRATORY: No cough, wheezing, chills or hemoptysis; No Shortness of Breath  CARDIOVASCULAR: No chest pain, palpitations, passing out, dizziness, or leg swelling  GASTROINTESTINAL: No abdominal or epigastric pain. No nausea, vomiting, or hematemesis; No diarrhea or constipation. No melena or hematochezia.  GENITOURINARY: No dysuria, frequency, hematuria, or incontinence  NEUROLOGICAL: No headaches, memory loss, loss of strength, numbness, or tremors  SKIN: No itching, burning, rashes, or lesions   LYMPH Nodes: No enlarged glands  ENDOCRINE: No heat or cold intolerance; No hair loss  MUSCULOSKELETAL: No joint pain or swelling; No muscle, back, or extremity pain  PSYCHIATRIC: No depression, anxiety, mood swings, or difficulty sleeping  HEME/LYMPH: No easy bruising, or bleeding gums  ALLERY AND IMMUNOLOGIC: No hives or eczema	    [ ] All others negative	  [ ] Unable to obtain    PHYSICAL EXAM:  T(C): 36.9 (11-20-23 @ 09:12), Max: 37.4 (11-20-23 @ 00:00)  HR: 86 (11-20-23 @ 09:12) (84 - 100)  BP: 142/82 (11-20-23 @ 09:12) (139/87 - 179/85)  RR: 19 (11-20-23 @ 09:12) (18 - 19)  SpO2: 96% (11-20-23 @ 09:12) (94% - 97%)  Wt(kg): --  I&O's Summary      Appearance: NAD	  HEENT:   Dry oral mucosa, PERRL, EOMI	  Lymphatic: No lymphadenopathy  Cardiovascular: Normal S1 S2, No JVD, No murmurs, No edema  Respiratory: Lungs clear to auscultation	  Psychiatry: A & O x 3, Mood & affect appropriate  Gastrointestinal:  Soft, Non-tender, + BS	  Skin: No rashes, No ecchymoses, No cyanosis	  Neurologic: AOx2-3, EOMI, no facial, no drift CLEANING 5/5  Extremities: Normal range of motion, No clubbing, cyanosis or edema  Vascular: Peripheral pulses palpable 2+ bilaterally    TELEMETRY:  SR 	    ECG:  	NSR inferior STT changes   RADIOLOGY:  < from: CT Angio Neck Stroke Protocol w/ IV Cont (11.18.23 @ 20:17) >    ACC: [id number] EXAM:  CT ANGIO BRAIN STROKE PROTC IC   ORDERED BY: АННА VERDUZCO     ACC: [id number] EXAM:  CT ANGIO NECK STROKE PROTCL IC   ORDERED BY: АННА VERDUZCO     ACC: [id number] EXAM:  CT BRAIN   ORDERED BY: KEELEY MONTEIRO     PROCEDURE DATE:  11/18/2023          INTERPRETATION:  .    CLINICAL INFORMATION: Cerebrovascular accident. Vasculitis.    TECHNIQUE: Transaxial CT images were acquired through the head without   the administration of IV contrast. Thin section CT angiographyfrom the   aortic arch to the cranial vertex was also performed using a multislice   CT scanner, following the infusion of intravenous contrast material. 96   cc's of IV Omnipaque 350 was administered without immediate complication.   4 cc's was discarded. Sagittal and coronal MIP reformatted images were   obtained from the source data. Both the axial source and reconstructed   images were reviewed.    COMPARISON: Prior contrast-enhanced brain MRI study from 11/17/2023.   Prior CT study of the head from 11/15/2023.    FINDINGS:    Head CT: There is no acute intracranial hemorrhage, mass effect, shift of   the midline structures, herniation, extra-axial fluid collection, or   hydrocephalus.    Chronic lacunar infarcts again seen within the bilateral thalami and   right basal ganglia.    There is diffuse cerebral volume loss with prominence of the sulci,   fissures, and cisternal spaces which is normal for the patient's age.   Ventriculomegaly appears unchanged. There is extensive patchy confluent   deep and periventricular white matter hypoattenuation statistically   compatible with microvascular changes given calcific atherosclerotic   disease of the intracranial arteries.    The paranasal sinuses and tympanomastoid cavities are clear. The   calvarium is intact. The imaged orbits are unremarkable.    CTA NECK: There is a standard anatomic configuration to the aortic arch.   Atherosclerosis affects the arch. The origins of the great vessels appear   unremarkable.    The bilateral common carotid arteries appear unremarkable. Minimal   calcified plaque affects the bilateral carotid bifurcations without   associated stenosis. The cervical internal carotid arteries are patent   extending to the skull base.    The origins of the bilateral vertebral arteries are normal. The bilateral   vertebral arteries are unremarkable.    CTA HEAD: Calcified plaques affect the bilateral carotid siphons without   associated stenosis. Otherwise, the bilateral intracranial internal   carotid, anterior, and middle cerebral arteries appear unremarkable.    The anterior and bilateral posterior communicating arteries are seen.    The posterior circulation appears intact.    The intracranial venous structures are patent.    IMPRESSION:    Head CT:No acute intracranial hemorrhage, mass effect, or shift of the   midline structures.    Similar-appearing extensive chronic white matter microvascular type   changes and chronic lacunar infarcts, as discussed.    CTA neck and head: No large vessel occlusion or major stenosis.    --- End of Report ---      < end of copied text >  < from: MR Head w/ IV Cont (11.17.23 @ 13:09) >    ACC: [id number] EXAM:  MR BRAIN IC   ORDERED BY: BRANDEN FERNANDEZ     PROCEDURE DATE:  11/17/2023          INTERPRETATION:  MR of the brain with and without gadolinium contrast    CLINICAL INFORMATION:   [facility]  FMR    TECHNIQUE:   Sagittal and axial T1-weighted images, axial FLAIR images,   axial susceptibility weighted images, axial T2-weighted images and axial   diffusion weighted images of the brain were obtained.  Following   gadolinium administration  axial volumetric T1 weighted images were   obtained.  This data set was reconstructed as sagittal and coronal   images. Subsequent axial T1-weighted acquisition was obtained.  CONTRAST:    9 cc administered  ;  1 cc discarded    COMPARISON:   CT head 10/15/2023    FINDINGS:    BRAIN: In the right posterior corona radiata white matter there are 2   adjacent indistinct lesions hyperintense on the diffusion-weighted and   long TR images, faintly hypointense on the T1-weighted images and   inconspicuous on the ADC images.    The brain demonstrates several focal lesions over multiple infarction.   The most conspicuous including within the right mid corona radiata deep   white matter, the right globus pallidus, the left caudate nucleus head,   the left posterior corona radiata white matter andin each thalamus. No   cerebral cortical lesion is recognized. No abnormal enhancement is found   in the brain.  No diffusion restriction is found in the brain.  No acute   cerebral cortical infarct is found.   No mass effect is found in the   brain.With gadolinium administration no abnormal brain parenchymal   enhancement is found.    The brain also demonstrates extensive confluent lesions within the   cerebral hemispheric white matter. These lesions are hyperintense on the   long TR images, more faintly hypointense on the T1-weighted images were   most confluent, otherwise largely inconspicuous. Involvement is confluent   from the subcortical through the centrum semiovale and periatrial and   periventricular white matter, more patchy in the corona radiata white   matter and brainstem.    The brain also demonstrates numerous foci of remote hemorrhage. These   lesions demonstrate marked low signal intensity on the long TR   diffusion-weighted and gradient echo images. The lesions are most   extensive within the right thalamus, also noted at multiple locations in   the basal ganglia, deep hemispheric white matter and in the cerebellar   hemispheres. Few scattered punctate lesions are found at the level of   cerebral cortex at multiplelocations.    CSF SPACES:   The ventricles, sulci and basal cisterns appear moderately   dilated reflecting diffuse brain volume loss.    VESSELS:   The vertebral and internal carotid arteries demonstrate   expected flow voids indicating their patency. The left vertebral artery   is dominant caliber. The posterior circulation is tortuous. There is   fetal origin of right posterior cerebral artery from the ipsilateral   internal carotid artery (typically incidental developmental variant).    HEAD AND NECK STRUCTURES:   The orbits are unremarkable.  Paranasal   sinuses are clear.  The nasal cavity appears intact.  The central skull   base appears intact.  The nasopharynx is symmetric.  The temporal bones   appear clear of disease.  The calvarium appears unremarkable.      IMPRESSION:    1. Right posterior corona radiata subacute infarction    2. Multiple remote infarctions within the cerebral hemispheric white   matter basal ganglia and thalami    3. Pervasive cerebral hemispheric white matter abnormality, possibly   accelerated form of ischemic white matter disease versus and/or   superimposed other inflammatory metabolic toxic or infectious process    4. Numerous remote petechial hemorrhages most prominently in the basal   ganglia andthalami    5. Diffuse brain volume loss upper range typical for age    --- End of Report ---          < end of copied text >  < from: MR MRCP No Cont (11.18.23 @ 11:43) >    ACC: [id number] EXAM:  MR MRCP   ORDERED BY: BRANDEN FERNANDEZ     PROCEDURE DATE:  11/18/2023          INTERPRETATION:  CLINICAL INFORMATION: Confusion. Common bile duct 6 mm    COMPARISON: No prior abdominal MR is available for comparison. Reference   is made with a previous abdominal CT dated 11/17/2023    CONTRAST/COMPLICATIONS:  IV Contrast: None  Oral Contrast: None  Complications: None reported at time of study completion    PROCEDURE:  MRI of the abdomen was performed.  MRCP was performed.    FINDINGS:  LOWER CHEST: Within normal limits.    LIVER: Within normal limits.  BILE DUCTS: Normal caliber. The common bile duct measures up to 6 mm in   caliber which is within normal limits. No evidence for   choledocholithiasis.  GALLBLADDER: Cholelithiasis. No evidence for gallbladder wall thickening,   or pericholecystic fluid.  SPLEEN: Within normal limits.  PANCREAS: Within normal limits.  ADRENALS: Within normal limits.  KIDNEYS/URETERS: Within normal limits.    VISUALIZED PORTIONS:  BOWEL: Within normal limits.  PERITONEUM: No ascites.  VESSELS: Within normal limits.  RETROPERITONEUM/LYMPH NODES: No lymphadenopathy.  ABDOMINAL WALL: Within normal limits.  BONES: Within normal limits.    IMPRESSION:  The common bile duct measures up to6 mm in caliber which is within   normal limits. No evidence for choledocholithiasis.    Cholelithiasis. No evidence for gallbladder wall thickening, or   pericholecystic fluid.        < end of copied text >    OTHER: 	  	  LABS:	 	    CARDIAC MARKERS:                                  10.8   7.75  )-----------( 335      ( 19 Nov 2023 07:30 )             30.6     11-19    142  |  108  |  20  ----------------------------<  100<H>  3.7   |  27  |  0.99    Ca    8.9      19 Nov 2023 07:30      proBNP:   Lipid Profile:   HgA1c:   TSH:

## 2023-11-20 NOTE — PATIENT PROFILE ADULT - ..
"[facility]  General Surgery  Progress Note    Subjective:     History of Present Illness:  Marisela Bunn is a 66 y.o. female with PMH CAD, HTN, TIA, GERD presents to the hospital as a direct admission for evaluation of a newly diagnosed diaphragmatic hernia. She initially presented to [place] ED on 1/18/20 for a 1-month history of a cough, intermittent fevers and dyspnea.  She had been seen by 2 urgent care physicians in the last month for the cough, with 2 different antibiotic courses given without improvement in cough. She reports that on 1/15 after an particularly severe coughing episode she felt a "pop" on left side, with associated severe pain and  Subsequent bruising of the left flank. She reports constant severe pain since that time. She is on daily ASA 81mg,     On ED presentation, patient was hemodynamically stable, H/H 12.4/40.0, breathing on RA. Labs revealed leukocytosis (19.2), lactic acid 2.5 (repeat lactic acid1.6), BMP wnl. CXR revealed left lower lobe pneumonia with possible associated pleural effusion. CT abdomen/pelvis revealed a large left diaphragmatic hernia containing fat  and bowel loops with hernia of intra-abdomnial contents outside the thorax from 7th left ICS. Medium sized HH. CTA revealed large Bochdalek hernia through defect in left hemo-diaphragm, with associated widening of 7th intercostal space. She was started on IV hydration and antibiotics (levofloxacin, zosyn). She reports she has not passed a bowel movement of flatus in 2 days. Intermittent lower quadrant "spasms" while coughing, otherwise no abdominal pain. She reports no other symptoms.    Post-Op Info:  Procedure(s) (LRB):  REPAIR, HERNIA, DIAPHRAGMATIC (Left)         Interval History: No acute events overnight.   AF and VSS with O2 sats ranging 88-99% on 5L. She continues to have a cough.  WBC 22 today   + sputum    Medications:  Continuous Infusions:    Scheduled Meds:   albuterol-ipratropium  3 mL " Nebulization Q4H    amLODIPine  10 mg Oral Daily    benzonatate  100 mg Oral TID    levoFLOXacin  750 mg Intravenous Q24H    pantoprazole  40 mg Oral BID AC    polyethylene glycol  17 g Oral Daily     PRN Meds:acetaminophen, albuterol-ipratropium, melatonin, ondansetron, oxyCODONE, oxyCODONE, sodium chloride 0.9%     Review of patient's allergies indicates:   Allergen Reactions    Erythromycin      Stomach pains    Rosuvastatin      Hives, muscle/joint pains    Zolpidem      Confusion      Objective:     Vital Signs (Most Recent):  Temp: 99 °F (37.2 °C) (01/20/20 2343)  Pulse: 105 (01/21/20 0440)  Resp: 19 (01/21/20 0440)  BP: 135/66 (01/20/20 2343)  SpO2: 95 % (01/21/20 0440) Vital Signs (24h Range):  Temp:  [96 °F (35.6 °C)-99 °F (37.2 °C)] 99 °F (37.2 °C)  Pulse:  [] 105  Resp:  [13-24] 19  SpO2:  [88 %-95 %] 95 %  BP: (111-135)/(59-66) 135/66     Weight: 108.4 kg (239 lb)  Body mass index is 45.16 kg/m².    Intake/Output - Last 3 Shifts       01/19 0700 - 01/20 0659 01/20 0700 - 01/21 0659 01/21 0700 - 01/22 0659    I.V. (mL/kg)  1355 (12.5)     Total Intake(mL/kg)  1355 (12.5)     Net  +1355            Urine Occurrence 1 x 4 x           Physical Exam    Constitutional: She is oriented to person, place, and time.   Obese female lying in bed in NAD, breathing 2L NC   Cardiovascular: Normal rate and intact distal pulses.   Pulmonary/Chest: Effort normal. She has wheezes.   Coarse breath sounds bilaterally, poor effort   Abdominal: Soft. She exhibits no distension. There is no tenderness.   Soft, non-tender, non-distended. Large bruise on left quadrants that extends to left flank   Neurological: She is alert and oriented to person, place, and time.   Skin: Skin is warm and dry.     Significant Labs:  CBC:   Recent Labs   Lab 01/21/20  0433   WBC 22.05*   RBC 4.08   HGB 11.1*   HCT 37.7      MCV 92   MCH 27.2   MCHC 29.4*     CMP:   Recent Labs   Lab 01/21/20 0433   *   CALCIUM 8.9  "  ALBUMIN 2.8*   PROT 6.4      K 4.0   CO2 25      BUN 12   CREATININE 0.7   ALKPHOS 134   ALT 76*   AST 58*   BILITOT 1.2*       Significant Diagnostics:  I have reviewed all pertinent imaging results/findings within the past 24 hours.    Assessment/Plan:     * Diaphragmatic hernia  Marisela Bunn is a 66 y.o. female with PMH COPD (not on home oxygen), HTN, HLD (not on anticoagulation) received as direct admission for large diaphragmatic hernia. Patient is symptomatic from hernia with constipation and dyspnea with overlying bruise on left flank. Reports history of "lung and heart problems"  Low threshold for repeat CT if she should develop abdominal pain    - Full liquid diet, Boost  - Miralax daily   - EKG and Echo today for preOp planning   - Levaquin for community-acquired pneumonia in setting of elevated WBC and ongoing cough  - Aggressive pulm toilet with IS, Acapella, DuoNebs, and Tessalon   - Pulm consult today for any recs, pre-op eval   - PT/OT  - Daily labs  - Plan for OR on Thursday 1/23 for diaphragmatic hernia repair pending respiratory status    DISPO: pending surgery later this week, not ready for d/c     Pneumonia  See principle       Sanjeev Murcia MD  General Surgery  [facility]  " 20-Nov-2023 00:34:42

## 2023-11-20 NOTE — H&P ADULT - NSHPLABSRESULTS_GEN_ALL_CORE
LABS:                        10.8   7.75  )-----------( 335      ( 19 Nov 2023 07:30 )             30.6     11-19    142  |  108  |  20  ----------------------------<  100<H>  3.7   |  27  |  0.99    Ca    8.9      19 Nov 2023 07:30  Mg     2.2     11-18    [place] workup:  11/18   Head CT:No acute intracranial hemorrhage, mass effect, or shift of the   midline structures.  Similar-appearing extensive chronic white matter microvascular type   changes and chronic lacunar infarcts, as discussed.  CTA neck and head: No large vessel occlusion or major stenosis.    MR 11/17  1. Right posterior corona radiata subacute infarction    2. Multiple remote infarctions within the cerebral hemispheric white   matter basal ganglia and thalami    3. Pervasive cerebral hemispheric white matter abnormality, possibly   accelerated form of ischemic white matter disease versus and/or   superimposed other inflammatory metabolic toxic or infectious process    4. Numerous remote petechial hemorrhages most prominently in the basal   ganglia andthalami    5. Diffuse brain volume loss upper range typical for age      11/15 echo  Left Ventricle: Normal left ventricular size and wall thicknesses, with   normal systolic and diastolic function.  Global LV systolic function was normal. Spectral Doppler shows normal   pattern of LV diastolic filling.  Right Ventricle: Normal right ventricular size and function.  Left Atrium: The left atrium is normal in size.  Right Atrium: The right atrium is normal in size.  Pericardium: There is no evidence of pericardial effusion.  Mitral Valve: Structurally normal mitral valve, with normal leaflet   excursion. Mild mitral valve regurgitation is seen.  Tricuspid Valve: Structurally normal tricuspid valve, with normal leaflet   excursion. No tricuspid regurgitation is visualized.  Aortic Valve: Normal trileaflet aortic valve with normal opening.   Sclerotic aortic valve with normal opening. Mild aortic valve   regurgitation is seen.  Pulmonic Valve: Structurally normal pulmonic valve, withnormal leaflet   excursion. Trace pulmonic valve regurgitation.  Aorta: The aortic root and ascending aorta are structurally normal, with   no evidence of dilitation.  Pulmonary Artery: The main pulmonary artery is normal in size.      Summary:   1. Normal global left ventricular systolic function.   2. Mild mitral valve regurgitation.   3. Mild aortic regurgitation.   4. Sclerotic aortic valve with normal opening. LABS:                        10.8   7.75  )-----------( 335      ( 19 Nov 2023 07:30 )             30.6     11-19    142  |  108  |  20  ----------------------------<  100<H>  3.7   |  27  |  0.99    Ca    8.9      19 Nov 2023 07:30  Mg     2.2     11-18    [place] workup:  11/18   Head CT:No acute intracranial hemorrhage, mass effect, or shift of the   midline structures.  Similar-appearing extensive chronic white matter microvascular type   changes and chronic lacunar infarcts, as discussed.  CTA neck and head: No large vessel occlusion or major stenosis.    MR 11/17  1. Right posterior corona radiata subacute infarction    2. Multiple remote infarctions within the cerebral hemispheric white   matter basal ganglia and thalami    3. Pervasive cerebral hemispheric white matter abnormality, possibly   accelerated form of ischemic white matter disease versus and/or   superimposed other inflammatory metabolic toxic or infectious process    4. Numerous remote petechial hemorrhages most prominently in the basal   ganglia andthalami    5. Diffuse brain volume loss upper range typical for age      11/15 echo  Left Ventricle: Normal left ventricular size and wall thicknesses, with   normal systolic and diastolic function.  Global LV systolic function was normal. Spectral Doppler shows normal   pattern of LV diastolic filling.  Right Ventricle: Normal right ventricular size and function.  Left Atrium: The left atrium is normal in size.  Right Atrium: The right atrium is normal in size.  Pericardium: There is no evidence of pericardial effusion.  Mitral Valve: Structurally normal mitral valve, with normal leaflet   excursion. Mild mitral valve regurgitation is seen.  Tricuspid Valve: Structurally normal tricuspid valve, with normal leaflet   excursion. No tricuspid regurgitation is visualized.  Aortic Valve: Normal trileaflet aortic valve with normal opening.   Sclerotic aortic valve with normal opening. Mild aortic valve   regurgitation is seen.  Pulmonic Valve: Structurally normal pulmonic valve, withnormal leaflet   excursion. Trace pulmonic valve regurgitation.  Aorta: The aortic root and ascending aorta are structurally normal, with   no evidence of dilitation.  Pulmonary Artery: The main pulmonary artery is normal in size.      Summary:   1. Normal global left ventricular systolic function.   2. Mild mitral valve regurgitation.   3. Mild aortic regurgitation.   4. Sclerotic aortic valve with normal opening. LABS:                        10.8   7.75  )-----------( 335      ( 19 Nov 2023 07:30 )             30.6     11-19    142  |  108  |  20  ----------------------------<  100<H>  3.7   |  27  |  0.99    Ca    8.9      19 Nov 2023 07:30  Mg     2.2     11-18    [place] workup:  11/18   Head CT:No acute intracranial hemorrhage, mass effect, or shift of the   midline structures.  Similar-appearing extensive chronic white matter microvascular type   changes and chronic lacunar infarcts, as discussed.  CTA neck and head: No large vessel occlusion or major stenosis.    MR 11/17  1. Right posterior corona radiata subacute infarction    2. Multiple remote infarctions within the cerebral hemispheric white   matter basal ganglia and thalami    3. Pervasive cerebral hemispheric white matter abnormality, possibly   accelerated form of ischemic white matter disease versus and/or   superimposed other inflammatory metabolic toxic or infectious process    4. Numerous remote petechial hemorrhages most prominently in the basal   ganglia andthalami    5. Diffuse brain volume loss upper range typical for age      11/15 echo  Left Ventricle: Normal left ventricular size and wall thicknesses, with   normal systolic and diastolic function.  Global LV systolic function was normal. Spectral Doppler shows normal   pattern of LV diastolic filling.  Right Ventricle: Normal right ventricular size and function.  Left Atrium: The left atrium is normal in size.  Right Atrium: The right atrium is normal in size.  Pericardium: There is no evidence of pericardial effusion.  Mitral Valve: Structurally normal mitral valve, with normal leaflet   excursion. Mild mitral valve regurgitation is seen.  Tricuspid Valve: Structurally normal tricuspid valve, with normal leaflet   excursion. No tricuspid regurgitation is visualized.  Aortic Valve: Normal trileaflet aortic valve with normal opening.   Sclerotic aortic valve with normal opening. Mild aortic valve   regurgitation is seen.  Pulmonic Valve: Structurally normal pulmonic valve, withnormal leaflet   excursion. Trace pulmonic valve regurgitation.  Aorta: The aortic root and ascending aorta are structurally normal, with   no evidence of dilitation.  Pulmonary Artery: The main pulmonary artery is normal in size.      Summary:   1. Normal global left ventricular systolic function.   2. Mild mitral valve regurgitation.   3. Mild aortic regurgitation.   4. Sclerotic aortic valve with normal opening.

## 2023-11-20 NOTE — PHYSICAL THERAPY INITIAL EVALUATION ADULT - ACTIVE RANGE OF MOTION EXAMINATION, REHAB EVAL
Right UE Active ROM was WFL (within functional limits)/bilateral  lower extremity Active ROM was WFL (within functional limits) bilateral upper extremity Active ROM was WFL (within functional limits)/bilateral  lower extremity Active ROM was WFL (within functional limits)

## 2023-11-21 LAB
ANA TITR SER: NEGATIVE — SIGNIFICANT CHANGE UP
ANA TITR SER: NEGATIVE — SIGNIFICANT CHANGE UP
APPEARANCE CSF: CLEAR — SIGNIFICANT CHANGE UP
APPEARANCE CSF: CLEAR — SIGNIFICANT CHANGE UP
C3 SERPL-MCNC: 152 MG/DL — SIGNIFICANT CHANGE UP (ref 81–157)
C3 SERPL-MCNC: 152 MG/DL — SIGNIFICANT CHANGE UP (ref 81–157)
C4 SERPL-MCNC: 36 MG/DL — SIGNIFICANT CHANGE UP (ref 13–39)
C4 SERPL-MCNC: 36 MG/DL — SIGNIFICANT CHANGE UP (ref 13–39)
COLOR CSF: SIGNIFICANT CHANGE UP
COLOR CSF: SIGNIFICANT CHANGE UP
CRP SERPL-MCNC: 8 MG/L — HIGH (ref 0–4)
CRP SERPL-MCNC: 8 MG/L — HIGH (ref 0–4)
ERYTHROCYTE [SEDIMENTATION RATE] IN BLOOD: 113 MM/HR — HIGH (ref 0–20)
ERYTHROCYTE [SEDIMENTATION RATE] IN BLOOD: 113 MM/HR — HIGH (ref 0–20)
FERRITIN SERPL-MCNC: 490 NG/ML — HIGH (ref 13–330)
FERRITIN SERPL-MCNC: 490 NG/ML — HIGH (ref 13–330)
GLUCOSE CSF-MCNC: 63 MG/DL — SIGNIFICANT CHANGE UP (ref 40–70)
GLUCOSE CSF-MCNC: 63 MG/DL — SIGNIFICANT CHANGE UP (ref 40–70)
GRAM STN FLD: SIGNIFICANT CHANGE UP
GRAM STN FLD: SIGNIFICANT CHANGE UP
HAV IGM SER-ACNC: SIGNIFICANT CHANGE UP
HAV IGM SER-ACNC: SIGNIFICANT CHANGE UP
HBV CORE IGM SER-ACNC: SIGNIFICANT CHANGE UP
HBV CORE IGM SER-ACNC: SIGNIFICANT CHANGE UP
HBV SURFACE AG SER-ACNC: SIGNIFICANT CHANGE UP
HBV SURFACE AG SER-ACNC: SIGNIFICANT CHANGE UP
HCV AB S/CO SERPL IA: 0.06 S/CO — SIGNIFICANT CHANGE UP (ref 0–0.99)
HCV AB S/CO SERPL IA: 0.06 S/CO — SIGNIFICANT CHANGE UP (ref 0–0.99)
HCV AB SERPL-IMP: SIGNIFICANT CHANGE UP
HCV AB SERPL-IMP: SIGNIFICANT CHANGE UP
HOMOCYSTEINE LEVEL: 18.4 UMOL/L — HIGH
HOMOCYSTEINE LEVEL: 18.4 UMOL/L — HIGH
IRON SATN MFR SERPL: 20 % — SIGNIFICANT CHANGE UP (ref 14–50)
IRON SATN MFR SERPL: 20 % — SIGNIFICANT CHANGE UP (ref 14–50)
IRON SATN MFR SERPL: 44 UG/DL — SIGNIFICANT CHANGE UP (ref 30–160)
IRON SATN MFR SERPL: 44 UG/DL — SIGNIFICANT CHANGE UP (ref 30–160)
LDH CSF L TO P-CCNC: 18 U/L — SIGNIFICANT CHANGE UP
LDH CSF L TO P-CCNC: 18 U/L — SIGNIFICANT CHANGE UP
LDH FLD-CCNC: 18 U/L — SIGNIFICANT CHANGE UP
LDH FLD-CCNC: 18 U/L — SIGNIFICANT CHANGE UP
LYMPHOCYTES # CSF: 69 % — SIGNIFICANT CHANGE UP (ref 40–80)
LYMPHOCYTES # CSF: 69 % — SIGNIFICANT CHANGE UP (ref 40–80)
METHYLMALONIC ACID LEVEL: 156 NMOL/L — SIGNIFICANT CHANGE UP (ref 87–318)
METHYLMALONIC ACID LEVEL: 156 NMOL/L — SIGNIFICANT CHANGE UP (ref 87–318)
MONOS+MACROS NFR CSF: 31 % — SIGNIFICANT CHANGE UP (ref 15–45)
MONOS+MACROS NFR CSF: 31 % — SIGNIFICANT CHANGE UP (ref 15–45)
NEUTROPHILS # CSF: 0 % — SIGNIFICANT CHANGE UP (ref 0–6)
NEUTROPHILS # CSF: 0 % — SIGNIFICANT CHANGE UP (ref 0–6)
NRBC NFR CSF: 1 /UL — SIGNIFICANT CHANGE UP (ref 0–5)
NRBC NFR CSF: 1 /UL — SIGNIFICANT CHANGE UP (ref 0–5)
PROT CSF-MCNC: 39 MG/DL — SIGNIFICANT CHANGE UP (ref 15–45)
PROT CSF-MCNC: 39 MG/DL — SIGNIFICANT CHANGE UP (ref 15–45)
RBC # CSF: 1 /UL — HIGH (ref 0–0)
RBC # CSF: 1 /UL — HIGH (ref 0–0)
SPECIMEN SOURCE: SIGNIFICANT CHANGE UP
SPECIMEN SOURCE: SIGNIFICANT CHANGE UP
TIBC SERPL-MCNC: 225 UG/DL — SIGNIFICANT CHANGE UP (ref 220–430)
TIBC SERPL-MCNC: 225 UG/DL — SIGNIFICANT CHANGE UP (ref 220–430)
TUBE TYPE: SIGNIFICANT CHANGE UP
TUBE TYPE: SIGNIFICANT CHANGE UP
UIBC SERPL-MCNC: 181 UG/DL — SIGNIFICANT CHANGE UP (ref 110–370)
UIBC SERPL-MCNC: 181 UG/DL — SIGNIFICANT CHANGE UP (ref 110–370)

## 2023-11-21 PROCEDURE — 70450 CT HEAD/BRAIN W/O DYE: CPT | Mod: 26

## 2023-11-21 PROCEDURE — 88108 CYTOPATH CONCENTRATE TECH: CPT | Mod: 26

## 2023-11-21 PROCEDURE — 88189 FLOWCYTOMETRY/READ 16 & >: CPT

## 2023-11-21 PROCEDURE — 62328 DX LMBR SPI PNXR W/FLUOR/CT: CPT

## 2023-11-21 RX ORDER — ENOXAPARIN SODIUM 100 MG/ML
40 INJECTION SUBCUTANEOUS EVERY 24 HOURS
Refills: 0 | Status: DISCONTINUED | OUTPATIENT
Start: 2023-11-22 | End: 2023-11-28

## 2023-11-21 RX ADMIN — ATORVASTATIN CALCIUM 80 MILLIGRAM(S): 80 TABLET, FILM COATED ORAL at 21:13

## 2023-11-21 RX ADMIN — Medication 81 MILLIGRAM(S): at 12:06

## 2023-11-21 RX ADMIN — PREGABALIN 1000 MICROGRAM(S): 225 CAPSULE ORAL at 12:06

## 2023-11-21 NOTE — PROGRESS NOTE ADULT - ASSESSMENT
58 year old woman with HTN, Rheumatoid arthritis, presenting with decompensated confusion, subacute course since about February 2023.     Impression: left hemiparesis due to R corona radiata infarct, multiple microhemorrhages, deep and cortical, cerebral angiogram concerning for vasculitic appearance. r/o vasculitis     NEURO: Neurologically unchanged, Continue monitoring for neurologic deterioration, permissive HTN to gradual normotension, A1C, LDL - pending on 11/22 AM, high intensity statin initiated, MRI Brain with contrast showed diffusion restriction R corona radiata.  Multiple microhemorrhages, deep and cortical. Cerebral angiogram performed on 11/20 showed multiple areas of focal stenosis and dilatation. Rheum consult pending. MAX, c3, c4, ANCA, CRP, ESR, cryoglobulin, antiVEGF, hep panel, quant, SPEP, ferritin, TIBC, ribosomal P, Sjogren, BONI sent. Plan for LP. Physical therapy/OT/PMR: AR.     ANTITHROMBOTIC THERAPY: ASA    PULMONARY: CXR: Mild, central pulmonary venous congestion.Mild perihilar interstitial infiltrates with air bronchograms, right greater than left, protecting airway, saturating well     CARDIOVASCULAR: TTE Normal global left ventricular systolic function. Mild mitral valve regurgitation.Mild aortic regurgitation. Sclerotic aortic valve with normal opening, cardiac monitoring without reported events                             SBP goal: 100-160    GASTROINTESTINAL:  dysphagia screen passed on 11/20, advance as tolerated        Diet: Pureed    RENAL: BUN/Cr within normal limits on 11/19, good urine output      Na Goal: Greater than 135     Hamm: no    HEMATOLOGY: H/H without change, Platelets normal      DVT ppx: on hold for LP    ID: afebrile, no sign of infection    OTHER: Plan discussed with patient at bedside, all questions and concerns addressed.     DISPOSITION: AR once stable and workup is complete    CORE MEASURES:        Admission NIHSS: 2     Tenecteplase: NO      LDL/HDL: p     Depression Screen: p     Statin Therapy: yes     Dysphagia Screen:  PASS      SmokingYES Smoking cessation and education provided to patient [] Nicotine patch ordered ??     Afib NO     Stroke Education  YES    Obtain screening lower extremity venous ultrasound in patients who meet 1 or more of the following criteria as patient is high risk for DVT/PE on admission:   [] History of DVT/PE  []Hypercoagulable states (Factor V Leiden, Cancer, OCP, etc. )  []Prolonged immobility (hemiplegia/hemiparesis/post operative or any other extended immobilization)  [] Transferred from outside facility (Rehab or Long term care)  [] Age </= to 50 58 year old woman with HTN, Rheumatoid arthritis, presenting with decompensated confusion, subacute course since about February 2023.     Impression: left hemiparesis due to R corona radiata infarct, multiple microhemorrhages, deep and cortical, cerebral angiogram concerning for vasculitic appearance. r/o vasculitis     NEURO: Neurologically unchanged, Continue monitoring for neurologic deterioration, permissive HTN to gradual normotension, A1C, LDL - pending on 11/22 AM, high intensity statin initiated, MRI Brain with contrast showed diffusion restriction R corona radiata.  Multiple microhemorrhages, deep and cortical. Cerebral angiogram performed on 11/20 showed multiple areas of focal stenosis and dilatation. Rheum consult pending. MAX, c3, c4, ANCA, CRP, ESR, cryoglobulin, antiVEGF, hep panel, quant, SPEP, ferritin, TIBC, ribosomal P, Sjogren, BONI sent. Plan for LP with neuro IR. Physical therapy/OT/PMR: AR.     ANTITHROMBOTIC THERAPY: ASA    PULMONARY: CXR: Mild, central pulmonary venous congestion. Mild perihilar interstitial infiltrates with air bronchograms, right greater than left, protecting airway, saturating well     CARDIOVASCULAR: TTE Normal global left ventricular systolic function. Mild mitral valve regurgitation. Mild aortic regurgitation. Sclerotic aortic valve with normal opening, cardiac monitoring without reported events                             SBP goal: 100-160    GASTROINTESTINAL:  dysphagia screen passed on 11/20, advance as tolerated        Diet: Pureed    RENAL: BUN/Cr within normal limits on 11/19, good urine output      Na Goal: Greater than 135     Hamm: no    HEMATOLOGY: H/H without change, Platelets normal      DVT ppx: on hold for LP    ID: afebrile, no sign of infection    OTHER: Plan discussed with patient at bedside, all questions and concerns addressed.     DISPOSITION: AR once stable and workup is complete    CORE MEASURES:        Admission NIHSS: 2     Tenecteplase: NO      LDL/HDL: p     Depression Screen: p     Statin Therapy: yes     Dysphagia Screen:  PASS      SmokingYES Smoking cessation and education provided to patient [] Nicotine patch ordered ??     Afib NO     Stroke Education  YES    Obtain screening lower extremity venous ultrasound in patients who meet 1 or more of the following criteria as patient is high risk for DVT/PE on admission:   [] History of DVT/PE  []Hypercoagulable states (Factor V Leiden, Cancer, OCP, etc. )  []Prolonged immobility (hemiplegia/hemiparesis/post operative or any other extended immobilization)  [] Transferred from outside facility (Rehab or Long term care)  [] Age </= to 50

## 2023-11-21 NOTE — SPEECH LANGUAGE PATHOLOGY EVALUATION - SLP DIAGNOSIS
58-year old woman with history of HTN, RA brought into ED at  initially for concerned for AMS. MRI shows right posterior corona radiata acute infarct and multiple petechial hemorrhages in b/l thalami and basal ganglia. Pt s/p angio on 11/20. Pt presents with 58-year old woman with history of HTN, RA brought into ED at  initially for concerned for AMS. MRI shows right posterior corona radiata acute infarct and multiple petechial hemorrhages in b/l thalami and basal ganglia. Pt s/p angio on 11/20. Pt presents with a non-fluent receptive/expressive aphasia. Overall reduced initiation of verbal expression. Breakdown in following beyond 1-step directives or answering semi-complex yes/no questions. Expressively, utterances range between 1-4 word phrases with word-finding difficulties, paucity of verbal output, and occasional semantic paraphasias. Reading is intact at the simple word level, however, paraphasias also observed during reading tasks. Writing intact for first/last name and to be further assessed 2/2 difficulty attending to pen/paper tasks in bed. Speech is mildly dysarthria with low vocal volume/intensity with mildly imprecise articulatory precision. Unable to fully assess cognition due to aphasia. 58-year old woman with history of HTN, RA brought into ED at  initially for concerned for AMS. MRI shows right posterior corona radiata acute infarct and multiple petechial hemorrhages in b/l thalami and basal ganglia. Pt s/p angio on 11/20. Pt presents with a non-fluent receptive/expressive aphasia. Overall reduced initiation of verbal expression. Breakdown in following beyond 1-step directives or answering semi-complex yes/no questions. Expressively, utterances range between 1-4 word phrases with word-finding difficulties, paucity of verbal output, and occasional semantic paraphasias. Reading is intact at the simple word level, however, paraphasias also observed during reading tasks. Writing intact for first/last name and to be further assessed 2/2 difficulty attending to pen/paper tasks in bed. Speech is mildly dysarthric with low vocal volume/intensity with mildly imprecise articulatory precision. Unable to fully assess cognition due to aphasia.

## 2023-11-21 NOTE — SPEECH LANGUAGE PATHOLOGY EVALUATION - SLP GENERAL OBSERVATIONS
Pt encountered bedside, AA&Ox2-3 (person, year, location when given cues). Pt follows simple directives and answers simple yes/no questions given prompting.

## 2023-11-21 NOTE — SPEECH LANGUAGE PATHOLOGY EVALUATION - SLP PERTINENT HISTORY OF CURRENT PROBLEM
58-year old woman with history of hypertension, rheumatoid arthritis, brought into ED at  initially for concerned for altered mental status.
58-year old woman with history of hypertension, rheumatoid arthritis, brought into ED at  initially for concerned for altered mental status.

## 2023-11-21 NOTE — CHART NOTE - NSCHARTNOTEFT_GEN_A_CORE
Informed by floor RN that pt was agitated after coming back to unit from Lumbar Puncture    Exam (roughly 4 PM):  Mental Status: Pt is laughing and trying to get out of bed. Refuses to answer many questions. Knows year, names pen and watch, does not know president. Perserverating on getting out of bed/hospital. Denies awareness of LP that was recently performed.  CN: BTT b/l, tracks examiner with eyes, face appears symmetric. Pt not complying with additional cranial nerve testing, including pupil exam.  Motor: Moves all extremities antigravity x 4. Pt not complying with manual motor testing     Impression: Acute change in mental status, otherwise nonfocal on exam. Would evaluate for acute intracranial pathology.    Plan:  -CTH noncon ordered to r/o acute intracranial pathology, such as hemorrhage (spoke with CT folks to expedite it)   -Case discussed with neuro attending Dr. Calvo. Pt results to be followed up by covering team.

## 2023-11-21 NOTE — SPEECH LANGUAGE PATHOLOGY EVALUATION - COMMENTS
Transfer orders/clinicals sent to [facility]. [facility]       08/19/20 0907   Post-Acute Status   Post-Acute Authorization Placement      11/18 CT Head - No acute intracranial hemorrhage, mass effect, or shift of the   midline structures.    MRI shows Right posterior corona radiata acute infarct and multiple petechial hemorrhages in b/l thalami and basal ganglia. Exam: AOx2-3, EOMI, no facial, no drift CLEANING 5/5    No prior speech/swallow w/u at this location per EMR. LT. Pt will maximize receptive language skills  2. Pt will maximize expressive language skills  3. Pt will improve cognitive-linguistic skills for functional communication.     discussed above with Pt; KYLE Abdul Unable to fully assess cognitive skills 2/2 aphasia. Reduced organization/planning skills noted during clock drawing tasks with numbers favoring the right visual field only. Numbers closely placed beginning from 12-6 and then repeated back up to 12 within the right visual field on clock. Overall poor initiation of verbal expression and delayed response time. Once lunch tray set-up, pt required placement of utensils in her hands and tactile cues to begin feeding self. KYLE Abdul made aware of the need for assistance. Receptive language deficits marked by breakdown following 2-step directives or answering semi-complex yes/no questions involving comparatives (i.e. bigger/smaller). Delay in body part ID. +Mild dysarthria marked by low vocal volume/intensity with mildly reduced articulatory precsion Pt presents with a non-fluent aphasia marked by reduced initiation of verbalizations; paucity of verbal output, average sentences between 1-4 word phrases, anomia, semantic paraphasias (i.e. watch for chair) and perseverations and phonemic paraphasias (e.g. comb for chair). Once prompted, pt responds with a simple phrase. For a picture description task (cookie theft) pt did not respond. Given max prompting, pt stated" They're standing around. They can't move. They're frozen," followed by, "my mom, my sister, a brother." Low vocal volume Writing first and last name intact. To be further assessed. Pt demonstrated difficulty attending to pen/paper tasks while in bed. Pt does not utilize AAC/gestures as primary means of communication Reading impaired at the sentence level. Suspect visual perceptual deficits as pt required 30+seconds to read a sentence and required verbal cues to initiate reading aloud. +Paraphasic errors. To be further assessed, as pt noted with difficulty attending to pen/paper tasks while in bed.

## 2023-11-21 NOTE — CHART NOTE - NSCHARTNOTEFT_GEN_A_CORE
Neurology    Briefly, patient Екатерина Poole is a 58 year old woman with HTN, Rheumatoid arthritis, presenting with decompensated confusion, subacute course since about February 2023. Imaging shows R CR infarct, multiple remote infarcts in BG and thalami, numerous remote petechial hemorrhages prominently in BG and thalami. Cerebral angiogram performed 11/20/23 showing beading of cerebral vasculature compatible with vasculitis. Thus, today 11/21/23 AM attempted LP bedside and due to body habitus, likely scoliosis, difficult to appreciate margins for performing bedside. Therefore, requested LP under neuro radiology and tentatively today afternoon.     hgb 10.8, plt 335, not on ac, only on asa 81mg daily. No recent NSAID use. No evidence of intracranial mass effect.     , CRP 8,   C3 152, C4 36, ferritin 490     Will send for LP labs:  cells, protein, glucose, ACE, IgG, PCR, gram stain, fungal, EBV, flow cyto, SINGH virus, cytopath, LDH, lyme, MBP, OCB, paraneoplastic, VDRL, WNV within CSF amount available. Neurology    Briefly, patient Екатерина Poole is a 58 year old woman with HTN, Rheumatoid arthritis, presenting with decompensated confusion, subacute course since about February 2023. Imaging shows R CR infarct, multiple remote infarcts in BG and thalami, numerous remote petechial hemorrhages prominently in BG and thalami. Cerebral angiogram performed 11/20/23 showing beading of cerebral vasculature compatible with vasculitis. Thus, today 11/21/23 AM assessed for LP bedside and due to body habitus, likely scoliosis, difficult to appreciate margins for performing bedside. Therefore, requested LP under neuro radiology and tentatively today afternoon.     hgb 10.8, plt 335, not on ac, only on asa 81mg daily. No recent NSAID use. No evidence of intracranial mass effect.     , CRP 8,   C3 152, C4 36, ferritin 490     Will send for LP labs:  cells, protein, glucose, ACE, IgG, PCR, gram stain, fungal, EBV, flow cyto, SINGH virus, cytopath, LDH, lyme, MBP, OCB, paraneoplastic, VDRL, WNV within CSF amount available.

## 2023-11-21 NOTE — PROGRESS NOTE ADULT - SUBJECTIVE AND OBJECTIVE BOX
Clinical Indication: Altered medntal status, vasculitis    PREPROCEDURE:    Patient presents for diagnostic lumbar puncture.  Risks and benefits were discussed with patient and/or health care proxy.  Risks include but are not limited to headache, bleeding, infection and nerve damage.    Patient and/or health care proxy understands and consents to procedure.    POSTPROCEDURE:    Lumbar puncture was performed at the L3-4  level using a 20 gauge needle using fluoroscopic guidance. 20 cc of CSF  was collected and hand delivered to the lab    Patient tolerated the procedure well and left the department in stable condition.

## 2023-11-21 NOTE — SPEECH LANGUAGE PATHOLOGY EVALUATION - SLP PROGNOSIS
Dx continued: Reduced organization and planning skills observed during clock drawing task, favoring the right visual field over left. Pt reportedly lives in [place] with her Mom and sister and is originally from [place]. 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.

## 2023-11-21 NOTE — PROGRESS NOTE ADULT - SUBJECTIVE AND OBJECTIVE BOX
Subjective: Patient seen and examined. No new events except as noted.     s/p Procedure: Selective Cerebral Angiography   Pre- Procedure Diagnosis: AMS with Right posterior corona radiata acute infarct and multiple petechial hemorrhages in b/l thalami and basal ganglia   Post- Procedure Diagnosis: AMS with Right posterior corona radiata acute infarct and multiple petechial hemorrhages in b/l thalami and basal ganglia     REVIEW OF SYSTEMS:    CONSTITUTIONAL: + weakness, fevers or chills  EYES/ENT: No visual changes;  No vertigo or throat pain   NECK: No pain or stiffness  RESPIRATORY: No cough, wheezing, hemoptysis; No shortness of breath  CARDIOVASCULAR: No chest pain or palpitations  GASTROINTESTINAL: No abdominal or epigastric pain. No nausea, vomiting, or hematemesis; No diarrhea or constipation. No melena or hematochezia.  GENITOURINARY: No dysuria, frequency or hematuria  NEUROLOGICAL: No numbness or weakness  SKIN: No itching, burning, rashes, or lesions   All other review of systems is negative unless indicated above.    MEDICATIONS:  MEDICATIONS  (STANDING):  amLODIPine   Tablet 10 milliGRAM(s) Oral daily  aspirin  chewable 81 milliGRAM(s) Oral daily  atorvastatin 80 milliGRAM(s) Oral at bedtime  cyanocobalamin Injectable 1000 MICROGram(s) IntraMuscular daily  losartan 25 milliGRAM(s) Oral daily      PHYSICAL EXAM:  T(C): 36.9 (23 @ 09:41), Max: 36.9 (23 @ 12:48)  HR: 90 (23 @ 09:41) (62 - 93)  BP: 159/85 (23 @ 09:41) (131/82 - 159/85)  RR: 18 (23 @ 09:41) (15 - 30)  SpO2: 95% (23 @ 09:41) (90% - 100%)  Wt(kg): --  I&O's Summary    2023 07:01  -  2023 07:00  --------------------------------------------------------  IN: 0 mL / OUT: 600 mL / NET: -600 mL      Height (cm): 154.9 ( @ 12:48)  Weight (kg): 106.1 ( @ 12:48)  BMI (kg/m2): 44.2 ( @ 12:48)  BSA (m2): 2.02 ( @ 12:48)        Appearance: NAD	  HEENT:   Dry oral mucosa, PERRL, EOMI	  Lymphatic: No lymphadenopathy  Cardiovascular: Normal S1 S2, No JVD, No murmurs, No edema  Respiratory: Lungs clear to auscultation	  Psychiatry: A & O x 3, Mood & affect appropriate  Gastrointestinal:  Soft, Non-tender, + BS	  Skin: No rashes, No ecchymoses, No cyanosis	  Neurologic: AOx2-3, EOMI, no facial, no drift CLEANING 5/5  Extremities: Normal range of motion, No clubbing, cyanosis or edema  Vascular: Peripheral pulses palpable 2+ bilaterally          LABS:    CARDIAC MARKERS:              TELEMETRY: 	  SR  ECG:  	  RADIOLOGY:   < from: MR MRCP No Cont (23 @ 11:43) >  ACC: [id number] EXAM:  MR MRCP   ORDERED BY: BRANDEN FERNANDEZ     PROCEDURE DATE:  2023          INTERPRETATION:  CLINICAL INFORMATION: Confusion. Common bile duct 6 mm    COMPARISON: No prior abdominal MR is available for comparison. Reference   is made with a previous abdominal CT dated 2023    CONTRAST/COMPLICATIONS:  IV Contrast: None  Oral Contrast: None  Complications: None reported at time of study completion    PROCEDURE:  MRI of the abdomen was performed.  MRCP was performed.    FINDINGS:  LOWER CHEST: Within normal limits.    LIVER: Within normal limits.  BILE DUCTS: Normal caliber. The common bile duct measures up to 6 mm in   caliber which is within normal limits. No evidence for   choledocholithiasis.  GALLBLADDER: Cholelithiasis. No evidence for gallbladder wall thickening,   or pericholecystic fluid.  SPLEEN: Within normal limits.  PANCREAS: Within normal limits.  ADRENALS: Within normal limits.  KIDNEYS/URETERS: Within normal limits.    VISUALIZED PORTIONS:  BOWEL: Within normal limits.  PERITONEUM: No ascites.  VESSELS: Within normal limits.  RETROPERITONEUM/LYMPH NODES: No lymphadenopathy.  ABDOMINAL WALL: Within normal limits.  BONES: Within normal limits.    IMPRESSION:  The common bile duct measures up to6 mm in caliber which is within   normal limits. No evidence for choledocholithiasis.    Cholelithiasis. No evidence for gallbladder wall thickening, or   pericholecystic fluid.        --- End of Report ---      < end of copied text >  < from: TTE Echo Complete w/o Contrast w/ Doppler (11.15.23 @ 17:46) >    ACC: [id number] EXAM:  ECHO TTE WO CON COMP W DOPP                          PROCEDURE DATE:  11/15/2023          INTERPRETATION:  TRANSTHORACIC ECHOCARDIOGRAM REPORT        Patient Name:   NADINE CAMPOVERDE Patient Location: [facility] Rec #:  [id number] Accession #:      [id number]  Account #:                   Height:           61.8 in 157.0 cm  YOB: 1965    Weight:           240.3 lb 109.00 kg  Patient Age:    58 years     BSA:              2.06 m²  Patient Gender: F            BP:             160/107 mmHg      Date of Exam:        11/15/2023 5:46:54 PM  Sonographer:         BRENDA  Referring Physician: ARUN LANDRUM    Procedure:     2D Echo/Doppler/Color Doppler Complete.  Indications:   R94.31 - Abnormal electrocardiogram ECG/EKG  Diagnosis:     R94.31 - Abnormal electrocardiogram ECG/EKG  Study Details: Technically difficult study. Study quality was adversely   affected                 due to patient obesity.        2D AND M-MODE MEASUREMENTS (normal ranges within parentheses):  Left                 Normal   Aorta/Left            Normal  Ventricle:                    Atrium:  IVSd (2D):    1.00  (0.7-1.1) Aortic Root    3.50  (2.4-3.7)                 cm             (2D):           cm  LVPWd (2D):   1.20  (0.7-1.1) Left Atrium    3.40  (1.9-4.0)                 cm             (2D):           cm  LVIDd (2D):   4.80  (3.4-5.7) LA Vol Index   34.2                 cm             (A4C):        ml/m²  LVIDs (2D):   3.20            LA Vol Index   29.1                 cm            (A2C):        ml/m²  LV FS (2D):   33.3   (>25%)   LA Vol Index   33.3                  %             (BP):         ml/m²  Relative Wall 0.50   (<0.42)  Right  Thickness                     Ventricle:                                TAPSE:         2.53 cm    LV SYSTOLIC FUNCTION BY 2D PLANIMETRY (MOD):  EF-A4C View: 69.6 % EF-A2C View: 64.2 % EF-Biplane: 66 %    LV DIASTOLIC FUNCTION:  MV Peak E: 0.88 m/s Decel Time:  185 msec  MV Peak A: 1.17 m/s Septal E/e'  9.7  E/A Ratio: 0.75Lateral E/e' 13.7  Septal e'  0.1 m/s  Lateral e' 0.1 m/s    SPECTRAL DOPPLER ANALYSIS (where applicable):  Mitral Valve:  MV P1/2 Time: 53.65 msec  MV Area, PHT: 4.10 cm²    Aortic Valve: AoV Max Marco Antonio: 2.09 m/s AoV Peak P.5 mmHg AoV Mean P.7 mmHg    LVOT Vmax: 1.54 m/s LVOT VTI: 0.265 m LVOT Diameter:      PHYSICIAN INTERPRETATION:  Left Ventricle: Normal left ventricular size and wall thicknesses, with   normal systolic and diastolic function.  Global LV systolic function was normal. Spectral Doppler shows normal   pattern of LV diastolic filling.  Right Ventricle: Normal right ventricular size and function.  Left Atrium: The left atrium is normal in size.  Right Atrium: The right atrium is normal in size.  Pericardium: There is no evidence of pericardial effusion.  Mitral Valve: Structurally normal mitral valve, with normal leaflet   excursion. Mild mitral valve regurgitation is seen.  Tricuspid Valve: Structurally normal tricuspid valve, with normal leaflet   excursion. No tricuspid regurgitation is visualized.  Aortic Valve: Normal trileaflet aortic valve with normal opening.   Sclerotic aortic valve with normal opening. Mild aortic valve   regurgitation is seen.  Pulmonic Valve: Structurally normal pulmonic valve, withnormal leaflet   excursion. Trace pulmonic valve regurgitation.  Aorta: The aortic root and ascending aorta are structurally normal, with   no evidence of dilitation.  Pulmonary Artery: The main pulmonary artery is normal in size.      Summary:   1. Normal global left ventricular systolic function.   2. Mild mitral valve regurgitation.   3. Mild aortic regurgitation.   4. Sclerotic aortic valve with normal opening.      [id number] Jose Manuel Jernigan MD, FACC  Electronically signed on 2023 at 8:15:38 PM    < end of copied text >  DIAGNOSTIC TESTING:  [ ] Echocardiogram:  [ ]  Catheterization:  [ ] Stress Test:    OTHER:

## 2023-11-21 NOTE — SPEECH LANGUAGE PATHOLOGY EVALUATION - SPECIFY REASON(S)
to assess speech-language and cognitive-linguistic skills
to assess speech-language and cognitive-linguistic skills

## 2023-11-21 NOTE — PROGRESS NOTE ADULT - SUBJECTIVE AND OBJECTIVE BOX
THE PATIENT WAS SEEN AND EXAMINED BY ME WITH THE HOUSESTAFF AND STROKE TEAM DURING MORNING ROUNDS.   HPI:  58-year old woman with history of hypertension, rheumatoid arthritis, brought into ED at  initially for concerned for altered mental status.Prior to hospital presentation: neighbor overhead patient was looking for her mom, was confused/wandering around apartment complex. At [place]: MR brain showed bilateral subacute to remote embolic strokes. MRI shows right posterior corona radiata acute infarct and multiple petechial hemorrhages in b/l thalami and basal ganglia. Noted to have KRUNAL. Also noted to have hypertension, on amlodipine, aldactone, and losartan. Started on B12 supplements. Seen by rheumatology. Transferred to [facility] for cerebral angiogram. On admission: NIHSS: 2  LKW: prior to 11/15 ([facility]) States has been smoking 1 ppd for several years.     SUBJECTIVE: No events overnight.  No new neurologic complaints.  ROS reported negative unless otherwise noted.    amLODIPine   Tablet 10 milliGRAM(s) Oral daily  aspirin  chewable 81 milliGRAM(s) Oral daily  atorvastatin 80 milliGRAM(s) Oral at bedtime  cyanocobalamin Injectable 1000 MICROGram(s) IntraMuscular daily  losartan 25 milliGRAM(s) Oral daily      PHYSICAL EXAM:   Vital Signs Last 24 Hrs  T(C): 36.9 (21 Nov 2023 05:14), Max: 36.9 (20 Nov 2023 09:12)  T(F): 98.4 (21 Nov 2023 05:14), Max: 98.5 (20 Nov 2023 09:12)  HR: 72 (21 Nov 2023 05:14) (62 - 93)  BP: 152/87 (21 Nov 2023 05:14) (131/82 - 152/87)  BP(mean): 90 (20 Nov 2023 17:00) (89 - 101)  RR: 19 (21 Nov 2023 05:14) (15 - 30)  SpO2: 94% (21 Nov 2023 05:14) (90% - 100%)    Parameters below as of 21 Nov 2023 05:14  Patient On (Oxygen Delivery Method): room air        General: No acute distress  HEENT: EOM intact, visual fields full  Abdomen: Soft, nontender, nondistended   Extremities: No edema    NEUROLOGICAL EXAM:  Mental status: Awake, alert, oriented x3, speech fluent, follows commands, no neglect, normal memory   Cranial Nerves: trace L facial droop, no nystagmus, no dysarthria,  tongue midline  Motor exam: left UE drift,  Lower extremities antigravity.   Sensation: Intact to light touch   Coordination/ Gait: No dysmetria, gait not tested    LABS:       PT/INR - ( 20 Nov 2023 05:05 )   PT: 11.9 sec;   INR: 1.08 ratio         PTT - ( 20 Nov 2023 05:05 )  PTT:30.2 sec      IMAGING: Reviewed by me.     (11.18.2023)  Head CT: No acute intracranial hemorrhage, mass effect, or shift of the   midline structures.  Similar-appearing extensive chronic white matter microvascular type   changes and chronic lacunar infarcts, as discussed.    CTA neck and head: No large vessel occlusion or major stenosis.    MR Head w/ IV Cont (11.17.2023)  Right posterior corona radiata subacute infarction  Multiple remote infarctions within the cerebral hemispheric white   matter basal ganglia and thalami  Pervasive cerebral hemispheric white matter abnormality, possibly   accelerated form of ischemic white matter disease versus and/or   superimposed other inflammatory metabolic toxic or infectious process  Numerous remote petechial hemorrhages most prominently in the basal   ganglia and thalami  Diffuse brain volume loss upper range typical for age THE PATIENT WAS SEEN AND EXAMINED BY ME WITH THE HOUSESTAFF AND STROKE TEAM DURING MORNING ROUNDS.   HPI:  58-year old woman with history of hypertension, rheumatoid arthritis, brought into ED at  initially for concerned for altered mental status.Prior to hospital presentation: neighbor overhead patient was looking for her mom, was confused/wandering around apartment complex. At [place]: MR brain showed bilateral subacute to remote embolic strokes. MRI shows right posterior corona radiata acute infarct and multiple petechial hemorrhages in b/l thalami and basal ganglia. Noted to have KRUNAL. Also noted to have hypertension, on amlodipine, aldactone, and losartan. Started on B12 supplements. Seen by rheumatology. Transferred to [facility] for cerebral angiogram. On admission: NIHSS: 2  [facility] States has been smoking 1 ppd for several years.     SUBJECTIVE: No events overnight.  No new neurologic complaints.  ROS reported negative unless otherwise noted.    amLODIPine   Tablet 10 milliGRAM(s) Oral daily  aspirin  chewable 81 milliGRAM(s) Oral daily  atorvastatin 80 milliGRAM(s) Oral at bedtime  cyanocobalamin Injectable 1000 MICROGram(s) IntraMuscular daily  losartan 25 milliGRAM(s) Oral daily      PHYSICAL EXAM:   Vital Signs Last 24 Hrs  T(C): 36.9 (21 Nov 2023 05:14), Max: 36.9 (20 Nov 2023 09:12)  T(F): 98.4 (21 Nov 2023 05:14), Max: 98.5 (20 Nov 2023 09:12)  HR: 72 (21 Nov 2023 05:14) (62 - 93)  BP: 152/87 (21 Nov 2023 05:14) (131/82 - 152/87)  BP(mean): 90 (20 Nov 2023 17:00) (89 - 101)  RR: 19 (21 Nov 2023 05:14) (15 - 30)  SpO2: 94% (21 Nov 2023 05:14) (90% - 100%)    Parameters below as of 21 Nov 2023 05:14  Patient On (Oxygen Delivery Method): room air        General: No acute distress  HEENT: EOM intact, visual fields full  Abdomen: Soft, nontender, nondistended   Extremities: No edema    NEUROLOGICAL EXAM:  Mental status: Awake, alert, oriented to name, speaks 1-2words not month/year, follows some simple commands.  Cranial Nerves: trace L facial droop, no nystagmus, no dysarthria,  tongue midline  Motor exam: left UE drift,  Lower extremities antigravity.   Sensation: Intact to light touch   Coordination/ Gait: No dysmetria, gait not tested    LABS:       PT/INR - ( 20 Nov 2023 05:05 )   PT: 11.9 sec;   INR: 1.08 ratio         PTT - ( 20 Nov 2023 05:05 )  PTT:30.2 sec      IMAGING: Reviewed by me.     (11.18.2023)  Head CT: No acute intracranial hemorrhage, mass effect, or shift of the   midline structures.  Similar-appearing extensive chronic white matter microvascular type   changes and chronic lacunar infarcts, as discussed.    CTA neck and head: No large vessel occlusion or major stenosis.    MR Head w/ IV Cont (11.17.2023)  Right posterior corona radiata subacute infarction  Multiple remote infarctions within the cerebral hemispheric white   matter basal ganglia and thalami  Pervasive cerebral hemispheric white matter abnormality, possibly   accelerated form of ischemic white matter disease versus and/or   superimposed other inflammatory metabolic toxic or infectious process  Numerous remote petechial hemorrhages most prominently in the basal   ganglia and thalami  Diffuse brain volume loss upper range typical for age

## 2023-11-22 DIAGNOSIS — R93.0 ABNORMAL FINDINGS ON DIAGNOSTIC IMAGING OF SKULL AND HEAD, NOT ELSEWHERE CLASSIFIED: ICD-10-CM

## 2023-11-22 DIAGNOSIS — G91.2 (IDIOPATHIC) NORMAL PRESSURE HYDROCEPHALUS: ICD-10-CM

## 2023-11-22 DIAGNOSIS — I16.0 HYPERTENSIVE URGENCY: ICD-10-CM

## 2023-11-22 DIAGNOSIS — E86.0 DEHYDRATION: ICD-10-CM

## 2023-11-22 DIAGNOSIS — N17.9 ACUTE KIDNEY FAILURE, UNSPECIFIED: ICD-10-CM

## 2023-11-22 DIAGNOSIS — F01.50 VASCULAR DEMENTIA WITHOUT BEHAVIORAL DISTURBANCE: ICD-10-CM

## 2023-11-22 DIAGNOSIS — E87.6 HYPOKALEMIA: ICD-10-CM

## 2023-11-22 DIAGNOSIS — I63.431 CEREBRAL INFARCTION DUE TO EMBOLISM OF RIGHT POSTERIOR CEREBRAL ARTERY: ICD-10-CM

## 2023-11-22 DIAGNOSIS — I10 ESSENTIAL (PRIMARY) HYPERTENSION: ICD-10-CM

## 2023-11-22 DIAGNOSIS — R29.702 NIHSS SCORE 2: ICD-10-CM

## 2023-11-22 DIAGNOSIS — M06.9 RHEUMATOID ARTHRITIS, UNSPECIFIED: ICD-10-CM

## 2023-11-22 LAB
% ALBUMIN: 48.2 % — SIGNIFICANT CHANGE UP
% ALBUMIN: 48.2 % — SIGNIFICANT CHANGE UP
% ALPHA 1: 4.4 % — SIGNIFICANT CHANGE UP
% ALPHA 1: 4.4 % — SIGNIFICANT CHANGE UP
% ALPHA 2: 13.8 % — SIGNIFICANT CHANGE UP
% ALPHA 2: 13.8 % — SIGNIFICANT CHANGE UP
% BETA: 12.3 % — SIGNIFICANT CHANGE UP
% BETA: 12.3 % — SIGNIFICANT CHANGE UP
% GAMMA: 21.3 % — SIGNIFICANT CHANGE UP
% GAMMA: 21.3 % — SIGNIFICANT CHANGE UP
A1C WITH ESTIMATED AVERAGE GLUCOSE RESULT: 4.9 % — SIGNIFICANT CHANGE UP (ref 4–5.6)
A1C WITH ESTIMATED AVERAGE GLUCOSE RESULT: 4.9 % — SIGNIFICANT CHANGE UP (ref 4–5.6)
ALBUMIN CSF-MCNC: 20.6 MG/DL — SIGNIFICANT CHANGE UP (ref 14–25)
ALBUMIN CSF-MCNC: 20.6 MG/DL — SIGNIFICANT CHANGE UP (ref 14–25)
ALBUMIN SERPL ELPH-MCNC: 3.4 G/DL — LOW (ref 3.6–5.5)
ALBUMIN SERPL ELPH-MCNC: 3.4 G/DL — LOW (ref 3.6–5.5)
ALBUMIN SERPL ELPH-MCNC: 3.7 G/DL — SIGNIFICANT CHANGE UP (ref 3.3–5)
ALBUMIN SERPL ELPH-MCNC: 3.7 G/DL — SIGNIFICANT CHANGE UP (ref 3.3–5)
ALBUMIN SERPL ELPH-MCNC: 3358 MG/DL — LOW (ref 3500–5200)
ALBUMIN SERPL ELPH-MCNC: 3358 MG/DL — LOW (ref 3500–5200)
ALBUMIN/GLOB SERPL ELPH: 0.9 RATIO — SIGNIFICANT CHANGE UP
ALBUMIN/GLOB SERPL ELPH: 0.9 RATIO — SIGNIFICANT CHANGE UP
ALP SERPL-CCNC: 69 U/L — SIGNIFICANT CHANGE UP (ref 40–120)
ALP SERPL-CCNC: 69 U/L — SIGNIFICANT CHANGE UP (ref 40–120)
ALPHA1 GLOB SERPL ELPH-MCNC: 0.3 G/DL — SIGNIFICANT CHANGE UP (ref 0.1–0.4)
ALPHA1 GLOB SERPL ELPH-MCNC: 0.3 G/DL — SIGNIFICANT CHANGE UP (ref 0.1–0.4)
ALPHA2 GLOB SERPL ELPH-MCNC: 1 G/DL — SIGNIFICANT CHANGE UP (ref 0.5–1)
ALPHA2 GLOB SERPL ELPH-MCNC: 1 G/DL — SIGNIFICANT CHANGE UP (ref 0.5–1)
ALT FLD-CCNC: 11 U/L — SIGNIFICANT CHANGE UP (ref 10–45)
ALT FLD-CCNC: 11 U/L — SIGNIFICANT CHANGE UP (ref 10–45)
ANA TITR SER: NEGATIVE — SIGNIFICANT CHANGE UP
ANA TITR SER: NEGATIVE — SIGNIFICANT CHANGE UP
ANION GAP SERPL CALC-SCNC: 11 MMOL/L — SIGNIFICANT CHANGE UP (ref 5–17)
ANION GAP SERPL CALC-SCNC: 11 MMOL/L — SIGNIFICANT CHANGE UP (ref 5–17)
ANTI-RIBONUCLEAR PROTEIN: <0.2 AI — SIGNIFICANT CHANGE UP
ANTI-RIBONUCLEAR PROTEIN: <0.2 AI — SIGNIFICANT CHANGE UP
AST SERPL-CCNC: 11 U/L — SIGNIFICANT CHANGE UP (ref 10–40)
AST SERPL-CCNC: 11 U/L — SIGNIFICANT CHANGE UP (ref 10–40)
AUTO DIFF PNL BLD: NEGATIVE — SIGNIFICANT CHANGE UP
AUTO DIFF PNL BLD: NEGATIVE — SIGNIFICANT CHANGE UP
B-GLOBULIN SERPL ELPH-MCNC: 0.9 G/DL — SIGNIFICANT CHANGE UP (ref 0.5–1)
B-GLOBULIN SERPL ELPH-MCNC: 0.9 G/DL — SIGNIFICANT CHANGE UP (ref 0.5–1)
BASOPHILS # BLD AUTO: 0.03 K/UL — SIGNIFICANT CHANGE UP (ref 0–0.2)
BASOPHILS # BLD AUTO: 0.03 K/UL — SIGNIFICANT CHANGE UP (ref 0–0.2)
BASOPHILS NFR BLD AUTO: 0.4 % — SIGNIFICANT CHANGE UP (ref 0–2)
BASOPHILS NFR BLD AUTO: 0.4 % — SIGNIFICANT CHANGE UP (ref 0–2)
BILIRUB SERPL-MCNC: 0.6 MG/DL — SIGNIFICANT CHANGE UP (ref 0.2–1.2)
BILIRUB SERPL-MCNC: 0.6 MG/DL — SIGNIFICANT CHANGE UP (ref 0.2–1.2)
BUN SERPL-MCNC: 15 MG/DL — SIGNIFICANT CHANGE UP (ref 7–23)
BUN SERPL-MCNC: 15 MG/DL — SIGNIFICANT CHANGE UP (ref 7–23)
C NEOFORM RRNA SPEC NAA+PROBE-ACNC: SIGNIFICANT CHANGE UP
C NEOFORM RRNA SPEC NAA+PROBE-ACNC: SIGNIFICANT CHANGE UP
C-ANCA SER-ACNC: NEGATIVE — SIGNIFICANT CHANGE UP
C-ANCA SER-ACNC: NEGATIVE — SIGNIFICANT CHANGE UP
CALCIUM SERPL-MCNC: 9.4 MG/DL — SIGNIFICANT CHANGE UP (ref 8.4–10.5)
CALCIUM SERPL-MCNC: 9.4 MG/DL — SIGNIFICANT CHANGE UP (ref 8.4–10.5)
CHLORIDE SERPL-SCNC: 103 MMOL/L — SIGNIFICANT CHANGE UP (ref 96–108)
CHLORIDE SERPL-SCNC: 103 MMOL/L — SIGNIFICANT CHANGE UP (ref 96–108)
CHOLEST SERPL-MCNC: 186 MG/DL — SIGNIFICANT CHANGE UP
CHOLEST SERPL-MCNC: 186 MG/DL — SIGNIFICANT CHANGE UP
CMV DNA CSF QL NAA+PROBE: SIGNIFICANT CHANGE UP
CMV DNA CSF QL NAA+PROBE: SIGNIFICANT CHANGE UP
CO2 SERPL-SCNC: 26 MMOL/L — SIGNIFICANT CHANGE UP (ref 22–31)
CO2 SERPL-SCNC: 26 MMOL/L — SIGNIFICANT CHANGE UP (ref 22–31)
CREAT SERPL-MCNC: 1.06 MG/DL — SIGNIFICANT CHANGE UP (ref 0.5–1.3)
CREAT SERPL-MCNC: 1.06 MG/DL — SIGNIFICANT CHANGE UP (ref 0.5–1.3)
CSF PCR RESULT: SIGNIFICANT CHANGE UP
CSF PCR RESULT: SIGNIFICANT CHANGE UP
DSDNA AB FLD-ACNC: <0.2 AI — SIGNIFICANT CHANGE UP
DSDNA AB FLD-ACNC: <0.2 AI — SIGNIFICANT CHANGE UP
E COLI K1 DNA CSF QL NAA+NON-PROBE: SIGNIFICANT CHANGE UP
E COLI K1 DNA CSF QL NAA+NON-PROBE: SIGNIFICANT CHANGE UP
EGFR: 60 ML/MIN/1.73M2 — SIGNIFICANT CHANGE UP
EGFR: 60 ML/MIN/1.73M2 — SIGNIFICANT CHANGE UP
ENA SM AB FLD QL: <0.2 AI — SIGNIFICANT CHANGE UP
ENA SM AB FLD QL: <0.2 AI — SIGNIFICANT CHANGE UP
ENA SS-A AB FLD IA-ACNC: <0.2 AI — SIGNIFICANT CHANGE UP
ENA SS-A AB FLD IA-ACNC: <0.2 AI — SIGNIFICANT CHANGE UP
EOSINOPHIL # BLD AUTO: 0.35 K/UL — SIGNIFICANT CHANGE UP (ref 0–0.5)
EOSINOPHIL # BLD AUTO: 0.35 K/UL — SIGNIFICANT CHANGE UP (ref 0–0.5)
EOSINOPHIL NFR BLD AUTO: 5.2 % — SIGNIFICANT CHANGE UP (ref 0–6)
EOSINOPHIL NFR BLD AUTO: 5.2 % — SIGNIFICANT CHANGE UP (ref 0–6)
ESCHERICHIA COLI K1: SIGNIFICANT CHANGE UP
ESCHERICHIA COLI K1: SIGNIFICANT CHANGE UP
ESTIMATED AVERAGE GLUCOSE: 94 MG/DL — SIGNIFICANT CHANGE UP (ref 68–114)
ESTIMATED AVERAGE GLUCOSE: 94 MG/DL — SIGNIFICANT CHANGE UP (ref 68–114)
EV RNA CSF QL NAA+PROBE: SIGNIFICANT CHANGE UP
EV RNA CSF QL NAA+PROBE: SIGNIFICANT CHANGE UP
GAMMA GLOBULIN: 1.5 G/DL — SIGNIFICANT CHANGE UP (ref 0.6–1.6)
GAMMA GLOBULIN: 1.5 G/DL — SIGNIFICANT CHANGE UP (ref 0.6–1.6)
GLUCOSE SERPL-MCNC: 94 MG/DL — SIGNIFICANT CHANGE UP (ref 70–99)
GLUCOSE SERPL-MCNC: 94 MG/DL — SIGNIFICANT CHANGE UP (ref 70–99)
GP B STREP DNA SPEC QL NAA+PROBE: SIGNIFICANT CHANGE UP
GP B STREP DNA SPEC QL NAA+PROBE: SIGNIFICANT CHANGE UP
HAEM INFLU DNA SPEC QL NAA+PROBE: SIGNIFICANT CHANGE UP
HAEM INFLU DNA SPEC QL NAA+PROBE: SIGNIFICANT CHANGE UP
HCT VFR BLD CALC: 29 % — LOW (ref 34.5–45)
HCT VFR BLD CALC: 29 % — LOW (ref 34.5–45)
HDLC SERPL-MCNC: 43 MG/DL — LOW
HDLC SERPL-MCNC: 43 MG/DL — LOW
HGB BLD-MCNC: 10.3 G/DL — LOW (ref 11.5–15.5)
HGB BLD-MCNC: 10.3 G/DL — LOW (ref 11.5–15.5)
HHV6 DNA CSF QL NAA+PROBE: SIGNIFICANT CHANGE UP
HHV6 DNA CSF QL NAA+PROBE: SIGNIFICANT CHANGE UP
HSV1 DNA CSF QL NAA+PROBE: SIGNIFICANT CHANGE UP
HSV1 DNA CSF QL NAA+PROBE: SIGNIFICANT CHANGE UP
HSV2 DNA CSF QL NAA+PROBE: SIGNIFICANT CHANGE UP
HSV2 DNA CSF QL NAA+PROBE: SIGNIFICANT CHANGE UP
IGG CSF-MCNC: 5.8 MG/DL — HIGH
IGG CSF-MCNC: 5.8 MG/DL — HIGH
IGG FLD-MCNC: 1664 MG/DL — HIGH (ref 610–1660)
IGG FLD-MCNC: 1664 MG/DL — HIGH (ref 610–1660)
IGG SYNTH RATE SER+CSF CALC-MRATE: 0.1 MG/DAY — SIGNIFICANT CHANGE UP
IGG SYNTH RATE SER+CSF CALC-MRATE: 0.1 MG/DAY — SIGNIFICANT CHANGE UP
IGG/ALB CLEAR SER+CSF-RTO: 0.6 — SIGNIFICANT CHANGE UP
IGG/ALB CLEAR SER+CSF-RTO: 0.6 — SIGNIFICANT CHANGE UP
IGG/ALB CSF: 0.28 RATIO — HIGH
IGG/ALB CSF: 0.28 RATIO — HIGH
IGG/ALB SER: 0.5 RATIO — SIGNIFICANT CHANGE UP
IGG/ALB SER: 0.5 RATIO — SIGNIFICANT CHANGE UP
IMM GRANULOCYTES NFR BLD AUTO: 0.3 % — SIGNIFICANT CHANGE UP (ref 0–0.9)
IMM GRANULOCYTES NFR BLD AUTO: 0.3 % — SIGNIFICANT CHANGE UP (ref 0–0.9)
INTERPRETATION SERPL IFE-IMP: SIGNIFICANT CHANGE UP
INTERPRETATION SERPL IFE-IMP: SIGNIFICANT CHANGE UP
L MONOCYTOG DNA SPEC QL NAA+PROBE: SIGNIFICANT CHANGE UP
L MONOCYTOG DNA SPEC QL NAA+PROBE: SIGNIFICANT CHANGE UP
LIPID PNL WITH DIRECT LDL SERPL: 128 MG/DL — HIGH
LIPID PNL WITH DIRECT LDL SERPL: 128 MG/DL — HIGH
LYMPHOCYTES # BLD AUTO: 1.68 K/UL — SIGNIFICANT CHANGE UP (ref 1–3.3)
LYMPHOCYTES # BLD AUTO: 1.68 K/UL — SIGNIFICANT CHANGE UP (ref 1–3.3)
LYMPHOCYTES # BLD AUTO: 24.9 % — SIGNIFICANT CHANGE UP (ref 13–44)
LYMPHOCYTES # BLD AUTO: 24.9 % — SIGNIFICANT CHANGE UP (ref 13–44)
MCHC RBC-ENTMCNC: 29.4 PG — SIGNIFICANT CHANGE UP (ref 27–34)
MCHC RBC-ENTMCNC: 29.4 PG — SIGNIFICANT CHANGE UP (ref 27–34)
MCHC RBC-ENTMCNC: 35.5 GM/DL — SIGNIFICANT CHANGE UP (ref 32–36)
MCHC RBC-ENTMCNC: 35.5 GM/DL — SIGNIFICANT CHANGE UP (ref 32–36)
MCV RBC AUTO: 82.9 FL — SIGNIFICANT CHANGE UP (ref 80–100)
MCV RBC AUTO: 82.9 FL — SIGNIFICANT CHANGE UP (ref 80–100)
MONOCYTES # BLD AUTO: 0.91 K/UL — HIGH (ref 0–0.9)
MONOCYTES # BLD AUTO: 0.91 K/UL — HIGH (ref 0–0.9)
MONOCYTES NFR BLD AUTO: 13.5 % — SIGNIFICANT CHANGE UP (ref 2–14)
MONOCYTES NFR BLD AUTO: 13.5 % — SIGNIFICANT CHANGE UP (ref 2–14)
MPO AB + PR3 PNL SER: SIGNIFICANT CHANGE UP
MPO AB + PR3 PNL SER: SIGNIFICANT CHANGE UP
N MEN DNA SPEC QL NAA+PROBE: SIGNIFICANT CHANGE UP
N MEN DNA SPEC QL NAA+PROBE: SIGNIFICANT CHANGE UP
NEUTROPHILS # BLD AUTO: 3.76 K/UL — SIGNIFICANT CHANGE UP (ref 1.8–7.4)
NEUTROPHILS # BLD AUTO: 3.76 K/UL — SIGNIFICANT CHANGE UP (ref 1.8–7.4)
NEUTROPHILS NFR BLD AUTO: 55.7 % — SIGNIFICANT CHANGE UP (ref 43–77)
NEUTROPHILS NFR BLD AUTO: 55.7 % — SIGNIFICANT CHANGE UP (ref 43–77)
NON HDL CHOLESTEROL: 142 MG/DL — HIGH
NON HDL CHOLESTEROL: 142 MG/DL — HIGH
NRBC # BLD: 0 /100 WBCS — SIGNIFICANT CHANGE UP (ref 0–0)
NRBC # BLD: 0 /100 WBCS — SIGNIFICANT CHANGE UP (ref 0–0)
P-ANCA SER-ACNC: NEGATIVE — SIGNIFICANT CHANGE UP
P-ANCA SER-ACNC: NEGATIVE — SIGNIFICANT CHANGE UP
PARECHOVIRUS A RNA SPEC QL NAA+PROBE: SIGNIFICANT CHANGE UP
PARECHOVIRUS A RNA SPEC QL NAA+PROBE: SIGNIFICANT CHANGE UP
PLATELET # BLD AUTO: 327 K/UL — SIGNIFICANT CHANGE UP (ref 150–400)
PLATELET # BLD AUTO: 327 K/UL — SIGNIFICANT CHANGE UP (ref 150–400)
POTASSIUM SERPL-MCNC: 3.6 MMOL/L — SIGNIFICANT CHANGE UP (ref 3.5–5.3)
POTASSIUM SERPL-MCNC: 3.6 MMOL/L — SIGNIFICANT CHANGE UP (ref 3.5–5.3)
POTASSIUM SERPL-SCNC: 3.6 MMOL/L — SIGNIFICANT CHANGE UP (ref 3.5–5.3)
POTASSIUM SERPL-SCNC: 3.6 MMOL/L — SIGNIFICANT CHANGE UP (ref 3.5–5.3)
PROT PATTERN SERPL ELPH-IMP: SIGNIFICANT CHANGE UP
PROT PATTERN SERPL ELPH-IMP: SIGNIFICANT CHANGE UP
PROT SERPL-MCNC: 7.1 G/DL — SIGNIFICANT CHANGE UP (ref 6–8.3)
PROT SERPL-MCNC: 7.1 G/DL — SIGNIFICANT CHANGE UP (ref 6–8.3)
PROT SERPL-MCNC: 7.3 G/DL — SIGNIFICANT CHANGE UP (ref 6–8.3)
PROT SERPL-MCNC: 7.3 G/DL — SIGNIFICANT CHANGE UP (ref 6–8.3)
PROT SERPL-MCNC: 7.7 G/DL — SIGNIFICANT CHANGE UP (ref 6–8.3)
PROT SERPL-MCNC: 7.7 G/DL — SIGNIFICANT CHANGE UP (ref 6–8.3)
RBC # BLD: 3.5 M/UL — LOW (ref 3.8–5.2)
RBC # BLD: 3.5 M/UL — LOW (ref 3.8–5.2)
RBC # FLD: 13 % — SIGNIFICANT CHANGE UP (ref 10.3–14.5)
RBC # FLD: 13 % — SIGNIFICANT CHANGE UP (ref 10.3–14.5)
RIBOSOMAL P AB SER-ACNC: <0.2 AI — SIGNIFICANT CHANGE UP
RIBOSOMAL P AB SER-ACNC: <0.2 AI — SIGNIFICANT CHANGE UP
S PNEUM DNA SPEC QL NAA+PROBE: SIGNIFICANT CHANGE UP
S PNEUM DNA SPEC QL NAA+PROBE: SIGNIFICANT CHANGE UP
SODIUM SERPL-SCNC: 140 MMOL/L — SIGNIFICANT CHANGE UP (ref 135–145)
SODIUM SERPL-SCNC: 140 MMOL/L — SIGNIFICANT CHANGE UP (ref 135–145)
TRIGL SERPL-MCNC: 80 MG/DL — SIGNIFICANT CHANGE UP
TRIGL SERPL-MCNC: 80 MG/DL — SIGNIFICANT CHANGE UP
VZV DNA CSF QL NAA+PROBE: SIGNIFICANT CHANGE UP
VZV DNA CSF QL NAA+PROBE: SIGNIFICANT CHANGE UP
WBC # BLD: 6.75 K/UL — SIGNIFICANT CHANGE UP (ref 3.8–10.5)
WBC # BLD: 6.75 K/UL — SIGNIFICANT CHANGE UP (ref 3.8–10.5)
WBC # FLD AUTO: 6.75 K/UL — SIGNIFICANT CHANGE UP (ref 3.8–10.5)
WBC # FLD AUTO: 6.75 K/UL — SIGNIFICANT CHANGE UP (ref 3.8–10.5)

## 2023-11-22 PROCEDURE — 99222 1ST HOSP IP/OBS MODERATE 55: CPT

## 2023-11-22 PROCEDURE — 95720 EEG PHY/QHP EA INCR W/VEEG: CPT

## 2023-11-22 RX ADMIN — PREGABALIN 1000 MICROGRAM(S): 225 CAPSULE ORAL at 13:26

## 2023-11-22 RX ADMIN — ATORVASTATIN CALCIUM 80 MILLIGRAM(S): 80 TABLET, FILM COATED ORAL at 21:21

## 2023-11-22 RX ADMIN — Medication 81 MILLIGRAM(S): at 13:25

## 2023-11-22 NOTE — PROGRESS NOTE ADULT - SUBJECTIVE AND OBJECTIVE BOX
THE PATIENT WAS SEEN AND EXAMINED BY ME WITH THE HOUSESTAFF AND STROKE TEAM DURING MORNING ROUNDS.   HPI:  58-year old woman with history of hypertension, rheumatoid arthritis, brought into ED at  initially for concerned for altered mental status.Prior to hospital presentation: neighbor overhead patient was looking for her mom, was confused/wandering around apartment complex. At [place]: MR brain showed bilateral subacute to remote embolic strokes. MRI shows right posterior corona radiata acute infarct and multiple petechial hemorrhages in b/l thalami and basal ganglia. Noted to have KRUNAL. Also noted to have hypertension, on amlodipine, aldactone, and losartan. Started on B12 supplements. Seen by rheumatology. Transferred to [facility] for cerebral angiogram. On admission: NIHSS: 2  [facility] States has been smoking 1 ppd for several years.      SUBJECTIVE: No events overnight.  No new neurologic complaints.      amLODIPine   Tablet 10 milliGRAM(s) Oral daily  aspirin  chewable 81 milliGRAM(s) Oral daily  atorvastatin 80 milliGRAM(s) Oral at bedtime  cyanocobalamin Injectable 1000 MICROGram(s) IntraMuscular daily  enoxaparin Injectable 40 milliGRAM(s) SubCutaneous every 24 hours  losartan 25 milliGRAM(s) Oral daily      PHYSICAL EXAM:   Vital Signs Last 24 Hrs  T(C): 37.3 (22 Nov 2023 05:08), Max: 37.3 (22 Nov 2023 05:08)  T(F): 99.2 (22 Nov 2023 05:08), Max: 99.2 (22 Nov 2023 05:08)  HR: 88 (22 Nov 2023 05:08) (78 - 95)  BP: 150/66 (22 Nov 2023 05:08) (130/77 - 159/85)  RR: 20 (22 Nov 2023 05:08) (18 - 20)  SpO2: 94% (22 Nov 2023 05:08) (94% - 97%)    Parameters below as of 22 Nov 2023 05:08  Patient On (Oxygen Delivery Method): room air          General: No acute distress  HEENT: EOM intact, visual fields full  Abdomen: Soft, nontender, nondistended   Extremities: No edema    NEUROLOGICAL EXAM:  Mental status: Awake, alert, oriented to name, speaks 1-2words not month/year, follows some simple commands.  Cranial Nerves: trace L facial droop, no nystagmus, no dysarthria,  tongue midline  Motor exam: left UE drift,  Lower extremities antigravity.   Sensation: Intact to light touch   Coordination/ Gait: No dysmetria, gait not tested  LABS:             IMAGING: Reviewed by me.   11/21/23: CT HEAD: No hydrocephalus, acute intracranial hemorrhage, mass effect, or brain   edema.    (11.18.2023)  Head CT: No acute intracranial hemorrhage, mass effect, or shift of the   midline structures.  Similar-appearing extensive chronic white matter microvascular type   changes and chronic lacunar infarcts, as discussed.    CTA neck and head: No large vessel occlusion or major stenosis.    MR Head w/ IV Cont (11.17.2023)  Right posterior corona radiata subacute infarction  Multiple remote infarctions within the cerebral hemispheric white   matter basal ganglia and thalami  Pervasive cerebral hemispheric white matter abnormality, possibly   accelerated form of ischemic white matter disease versus and/or   superimposed other inflammatory metabolic toxic or infectious process  Numerous remote petechial hemorrhages most prominently in the basal   ganglia and thalami  Diffuse brain volume loss upper range typical for age     THE PATIENT WAS SEEN AND EXAMINED BY ME WITH THE HOUSESTAFF AND STROKE TEAM DURING MORNING ROUNDS.   HPI:  58-year old woman with history of hypertension, rheumatoid arthritis, brought into ED at  initially for concerned for altered mental status.Prior to hospital presentation: neighbor overhead patient was looking for her mom, was confused/wandering around apartment complex. At [place]: MR brain showed bilateral subacute to remote embolic strokes. MRI shows right posterior corona radiata acute infarct and multiple petechial hemorrhages in b/l thalami and basal ganglia. Noted to have KRUNAL. Also noted to have hypertension, on amlodipine, aldactone, and losartan. Started on B12 supplements. Seen by rheumatology. Transferred to [facility] for cerebral angiogram. On admission: NIHSS: 2  LKW: prior to 11/15 ([facility]) States has been smoking 1 ppd for several years.    SUBJECTIVE: Overnight pt with episode of confusion, connected to vEEG to r/o seizure.      amLODIPine   Tablet 10 milliGRAM(s) Oral daily  aspirin  chewable 81 milliGRAM(s) Oral daily  atorvastatin 80 milliGRAM(s) Oral at bedtime  cyanocobalamin Injectable 1000 MICROGram(s) IntraMuscular daily  enoxaparin Injectable 40 milliGRAM(s) SubCutaneous every 24 hours  losartan 25 milliGRAM(s) Oral daily    PHYSICAL EXAM:   Vital Signs Last 24 Hrs  T(C): 37.3 (22 Nov 2023 05:08), Max: 37.3 (22 Nov 2023 05:08)  T(F): 99.2 (22 Nov 2023 05:08), Max: 99.2 (22 Nov 2023 05:08)  HR: 88 (22 Nov 2023 05:08) (78 - 95)  BP: 150/66 (22 Nov 2023 05:08) (130/77 - 159/85)  RR: 20 (22 Nov 2023 05:08) (18 - 20)  SpO2: 94% (22 Nov 2023 05:08) (94% - 97%)    Parameters below as of 22 Nov 2023 05:08  Patient On (Oxygen Delivery Method): room air    General: No acute distress  HEENT: EOM intact, visual fields full  Abdomen: Soft, nontender, nondistended   Extremities: No edema    NEUROLOGICAL EXAM:  Mental status: Awake, alert, oriented to name, speaks 1-2 words not month/year, follows some simple commands.  Cranial Nerves: trace L facial droop, no nystagmus, no dysarthria,  tongue midline  Motor exam: left UE drift, Lower extremities antigravity.   Sensation: Intact to light touch   Coordination/ Gait: No dysmetria, gait not tested    IMAGING: Reviewed by me.   11/21/23: CT HEAD: No hydrocephalus, acute intracranial hemorrhage, mass effect, or brain   edema.    (11.18.2023)  Head CT: No acute intracranial hemorrhage, mass effect, or shift of the   midline structures.  Similar-appearing extensive chronic white matter microvascular type   changes and chronic lacunar infarcts, as discussed.    CTA neck and head: No large vessel occlusion or major stenosis.    MR Head w/ IV Cont (11.17.2023)  Right posterior corona radiata subacute infarction  Multiple remote infarctions within the cerebral hemispheric white   matter basal ganglia and thalami  Pervasive cerebral hemispheric white matter abnormality, possibly   accelerated form of ischemic white matter disease versus and/or   superimposed other inflammatory metabolic toxic or infectious process  Numerous remote petechial hemorrhages most prominently in the basal   ganglia and thalami  Diffuse brain volume loss upper range typical for age

## 2023-11-22 NOTE — DIETITIAN INITIAL EVALUATION ADULT - OTHER CALCULATIONS
Used upper IBW (+10%) of 52.5 kg for caloric and protein needs in setting of BMI >40.   Defer fluid needs to team.

## 2023-11-22 NOTE — DIETITIAN INITIAL EVALUATION ADULT - NSFNSGIIOFT_GEN_A_CORE
I&O's Detail    21 Nov 2023 07:01  -  22 Nov 2023 07:00  --------------------------------------------------------  IN:  Total IN: 0 mL    OUT:    Voided (mL): 1400 mL  Total OUT: 1400 mL    Total NET: -1400 mL       Used obtained bedscale wt of 98.5 kg (11/22) for anthropometrics above; RD to update as able.     I&O's Detail    21 Nov 2023 07:01  -  22 Nov 2023 07:00  --------------------------------------------------------  IN:  Total IN: 0 mL    OUT:    Voided (mL): 1400 mL  Total OUT: 1400 mL    Total NET: -1400 mL

## 2023-11-22 NOTE — CONSULT NOTE ADULT - SUBJECTIVE AND OBJECTIVE BOX
***consult has been received. note is in progress and incomplete without attending attestation***    Jamal Alvarado MD, PGY-5  Rheumatology Fellow  Reachable on TEAMS    NADINE CAMPOVERDE  [id number]    INTERVAL EVENTS  58-year old woman with history of hypertension, rheumatoid arthritis, brought into ED at  initially for concerned for altered mental status.    Prior to hospital presentation: neighbor overhead patient was looking for her mom, was confused/wandering around apartment complex.     At [facility]: MR brain showed bilateral subacute to remote embolic strokes. MRI shows right posterior corona radiata acute infarct and multiple petechial hemorrhages in b/l thalami and basal ganglia. Noted to have KRUNAL. Also noted to have hypertension, on amlodipine, aldactone, and losartan. Started on B12 supplements. Seen by rheumatology,    Transferred to [facility] for cerebral angiogram.     Patient seen at bedside.   NIHSS: 2  LKW: prior to 11/15 ( hospitalizato)    Social History  States has been smoking 1 ppd for several years. (20 Nov 2023 02:43)    Rheumatology consulted for eval for pACNS    Evaluated by Rheumatology at [facility] with the following ROS    ROS: No fever/chills, wt loss, night sweats, chest pain/dyspnea/cough, oral ulcers, rashes, joint pain, alopecia, photosensitivity, dry eye/dry mouth, Raynaud's, dysphagia, focal weakness, or eye symptoms.    Resulted Serologies: Negative MAX/dsDNA/Sm/Ribo P/BONI/SSA/SSB/SPEP/UA/C3: 152/C4: 36. CRP: 8. ESR: 113 ->83 RF: 30    Pending ANCA    Negative Hepatitis/Treponema    CSF 11/21: negative PCR. 1 nucleated louisa. 1 RBC. 69% lymphocytes. glucose 63. protein 39.     Rheum ROS    Denies fevers/chills/weight loss/night sweats/alopecia/sinus disease/asthma history/oral ulcers/dysphagia/vision changes/Raynauds/VTE/miscarraiges/sicca symptoms/urinary changes/edema/SOB/joint pain/swelling/jaw claudication/vision changes/rash/photosensitivity/morning stiffness    Endorses fevers/chills/weight loss/night sweats/alopecia/sinus disease/asthma history/oral ulcers/dysphagia/vision changes/Raynauds/VTE/miscarraiges/sicca symptoms/urinary changes/edema/SOB/joint pain/swelling/jaw claudication/vision changes/rash/photosensitivity/morning stiffness      MEDICATIONS  (STANDING):  amLODIPine   Tablet 10 milliGRAM(s) Oral daily  aspirin  chewable 81 milliGRAM(s) Oral daily  atorvastatin 80 milliGRAM(s) Oral at bedtime  cyanocobalamin Injectable 1000 MICROGram(s) IntraMuscular daily  enoxaparin Injectable 40 milliGRAM(s) SubCutaneous every 24 hours  losartan 25 milliGRAM(s) Oral daily    MEDICATIONS  (PRN):      Allergies    No Known Allergies    Intolerances      PERTINENT MEDICATION HISTORY:    SOCIAL HISTORY:  OCCUPATION:  TRAVEL HISTORY:    FAMILY HISTORY:      Vital Signs Last 24 Hrs  T(C): 36.8 (22 Nov 2023 13:22), Max: 37.4 (22 Nov 2023 09:15)  T(F): 98.2 (22 Nov 2023 13:22), Max: 99.3 (22 Nov 2023 09:15)  HR: 84 (22 Nov 2023 13:22) (78 - 95)  BP: 146/86 (22 Nov 2023 13:22) (136/84 - 151/81)  BP(mean): --  RR: 18 (22 Nov 2023 13:22) (18 - 20)  SpO2: 94% (22 Nov 2023 13:22) (94% - 97%)    Parameters below as of 22 Nov 2023 13:22  Patient On (Oxygen Delivery Method): room air        Physical Exam:  General: No apparent distress  HEENT: EOMI, MMM  CVS: +S1/S2, RRR, no murmurs/rubs/gallops  Resp: CTA b/l. No crackles/wheezing  GI: Soft, NT/ND +BS  MSK: no swelling/warmth/erythema of the joints of the UE/LE  Neuro: AAOx3  Skin: no visible rashes    LABS:                        10.3   6.75  )-----------( 327      ( 22 Nov 2023 07:20 )             29.0     11-22    140  |  103  |  15  ----------------------------<  94  3.6   |  26  |  1.06    Ca    9.4      22 Nov 2023 07:18    TPro  7.7  /  Alb  3.7  /  TBili  0.6  /  DBili  x   /  AST  11  /  ALT  11  /  AlkPhos  69  11-22      Urinalysis Basic - ( 22 Nov 2023 07:18 )    Color: x / Appearance: x / SG: x / pH: x  Gluc: 94 mg/dL / Ketone: x  / Bili: x / Urobili: x   Blood: x / Protein: x / Nitrite: x   Leuk Esterase: x / RBC: x / WBC x   Sq Epi: x / Non Sq Epi: x / Bacteria: x      Culture - Fungal, CSF (collected 11-21-23 @ 14:51)  Source: .CSF CSF  Preliminary Report (11-22-23 @ 08:55):    Testing in progress    Culture - CSF with Gram Stain (collected 11-21-23 @ 14:51)  Source: .CSF CSF  Gram Stain (11-21-23 @ 19:09):    No polymorphonuclear cells seen    No organisms seen    by cytocentrifuge      Rheumatology Work Up    C3 Complement, Serum: 152 mg/dL [81 - 157] (11-21-23 @ 07:19)  C4 Complement, Serum: 36 mg/dL [13 - 39] (11-21-23 @ 07:19)  Ribosomal P Protein Antibody: <0.2 AI [Fluorescent Bead Immunoassay                     Ribosomal P Antibodies, IgG                     <1.0 AI (negative)                     > or =1.0 AI (positive)                     Reference values  apply  to all ages.] (11-21-23 @ 07:19)  Anti SS-A Antibody: <0.2 AI (11-21-23 @ 07:19)      RADIOLOGY & ADDITIONAL STUDIES:  < from: CT Head No Cont (11.21.23 @ 17:11) >    INTERPRETATION:  Noncontrast CT of the brain.    CLINICAL INDICATION:  Altered mental status    TECHNIQUE : Axial CT scanning of the brain was obtained from the skull   base to the vertex without the administration of intravenous contrast.   Sagittal and coronal reformats were provided.    COMPARISON: CT brain 11/18/2023    FINDINGS:    No hydrocephalus, mass effect, midline shift, acute intracranial   hemorrhage, or brain edema.    Moderate white matter microvascular ischemic disease.    Visualized paranasal sinuses and mastoid air cells are clear.    IMPRESSION:    No hydrocephalus, acute intracranial hemorrhage, mass effect, or brain   edema.    --- End of Report ---      < end of copied text >    < from: CT Angio Neck Stroke Protocol w/ IV Cont (11.18.23 @ 20:17) >    ACC: [id number] EXAM:  CT ANGIO BRAIN STROKE PROTC IC   ORDERED BY: АННА VERDUZCO     ACC: [id number] EXAM:  CT ANGIO NECK STROKE PROTCL IC   ORDERED BY: АННА VERDUZCO     ACC: [id number] EXAM:  CT BRAIN   ORDERED BY: KEELEY RADFORDJIMMY     PROCEDURE DATE:  11/18/2023          INTERPRETATION:  .    CLINICAL INFORMATION: Cerebrovascular accident. Vasculitis.    TECHNIQUE: Transaxial CT images were acquired through the head without   the administration of IV contrast. Thin section CT angiographyfrom the   aortic arch to the cranial vertex was also performed using a multislice   CT scanner, following the infusion of intravenous contrast material. 96   cc's of IV Omnipaque 350 was administered without immediate complication.   4 cc's was discarded. Sagittal and coronal MIP reformatted images were   obtained from the source data. Both the axial source and reconstructed   images were reviewed.    COMPARISON: Prior contrast-enhanced brain MRI study from 11/17/2023.   Prior CT study of the head from 11/15/2023.    FINDINGS:    Head CT: There is no acute intracranial hemorrhage, mass effect, shift of   the midline structures, herniation, extra-axial fluid collection, or   hydrocephalus.    Chronic lacunar infarcts again seen within the bilateral thalami and   right basal ganglia.    There is diffuse cerebral volume loss with prominence of the sulci,   fissures, and cisternal spaces which is normal for the patient's age.   Ventriculomegaly appears unchanged. There is extensive patchy confluent   deep and periventricular white matter hypoattenuation statistically   compatible with microvascular changes given calcific atherosclerotic   disease of the intracranial arteries.    The paranasal sinuses and tympanomastoid cavities are clear. The   calvarium is intact. The imaged orbits are unremarkable.    CTA NECK: There is a standard anatomic configuration to the aortic arch.   Atherosclerosis affects the arch. The origins of the great vessels appear   unremarkable.    The bilateral common carotid arteries appear unremarkable. Minimal   calcified plaque affects the bilateral carotid bifurcations without   associated stenosis. The cervical internal carotid arteries are patent   extending to the skull base.    The origins of the bilateral vertebral arteries are normal. The bilateral   vertebral arteries are unremarkable.    CTA HEAD: Calcified plaques affect the bilateral carotid siphons without   associated stenosis. Otherwise, the bilateral intracranial internal   carotid, anterior, and middle cerebral arteries appear unremarkable.    The anterior and bilateral posterior communicating arteries are seen.    The posterior circulation appears intact.    The intracranial venous structures are patent.    IMPRESSION:    Head CT:No acute intracranial hemorrhage, mass effect, or shift of the   midline structures.    Similar-appearing extensive chronic white matter microvascular type   changes and chronic lacunar infarcts, as discussed.    CTA neck and head: No large vessel occlusion or major stenosis.    --- End of Report ---      < end of copied text >    < from: MR Head w/ IV Cont (11.17.23 @ 13:09) >    ACC: [id number] EXAM:  MR BRAIN IC   ORDERED BY: BRANDEN FERNANDEZ     PROCEDURE DATE:  11/17/2023          INTERPRETATION:  MR of the brain with and without gadolinium contrast    CLINICAL INFORMATION:   ams  FMR    TECHNIQUE:   Sagittal and axial T1-weighted images, axial FLAIR images,   axial susceptibility weighted images, axial T2-weighted images and axial   diffusion weighted images of the brain were obtained.  Following   gadolinium administration  axial volumetric T1 weighted images were   obtained.  This data set was reconstructed as sagittal and coronal   images. Subsequent axial T1-weighted acquisition was obtained.  CONTRAST:    9 cc administered  ;  1 cc discarded    COMPARISON:   CT head 10/15/2023    FINDINGS:    BRAIN: In the right posterior corona radiata white matter there are 2   adjacent indistinct lesions hyperintense on the diffusion-weighted and   long TR images, faintly hypointense on the T1-weighted images and   inconspicuous on the ADC images.    The brain demonstrates several focal lesions over multiple infarction.   The most conspicuous including within the right mid corona radiata deep   white matter, the right globus pallidus, the left caudate nucleus head,   the left posterior corona radiata white matter andin each thalamus. No   cerebral cortical lesion is recognized. No abnormal enhancement is found   in the brain.  No diffusion restriction is found in the brain.  No acute   cerebral cortical infarct is found.   No mass effect is found in the   brain.With gadolinium administration no abnormal brain parenchymal   enhancement is found.    The brain also demonstrates extensive confluent lesions within the   cerebral hemispheric white matter. These lesions are hyperintense on the   long TR images, more faintly hypointense on the T1-weighted images were   most confluent, otherwise largely inconspicuous. Involvement is confluent   from the subcortical through the centrum semiovale and periatrial and   periventricular white matter, more patchy in the corona radiata white   matter and brainstem.    The brain also demonstrates numerous foci of remote hemorrhage. These   lesions demonstrate marked low signal intensity on the long TR   diffusion-weighted and gradient echo images. The lesions are most   extensive within the right thalamus, also noted at multiple locations in   the basal ganglia, deep hemispheric white matter and in the cerebellar   hemispheres. Few scattered punctate lesions are found at the level of   cerebral cortex at multiplelocations.    CSF SPACES:   The ventricles, sulci and basal cisterns appear moderately   dilated reflecting diffuse brain volume loss.    VESSELS:   The vertebral and internal carotid arteries demonstrate   expected flow voids indicating their patency. The left vertebral artery   is dominant caliber. The posterior circulation is tortuous. There is   fetal origin of right posterior cerebral artery from the ipsilateral   internal carotid artery (typically incidental developmental variant).    HEAD AND NECK STRUCTURES:   The orbits are unremarkable.  Paranasal   sinuses are clear.  The nasal cavity appears intact.  The central skull   base appears intact.  The nasopharynx is symmetric.  The temporal bones   appear clear of disease.  The calvarium appears unremarkable.      IMPRESSION:    1. Right posterior corona radiata subacute infarction    2. Multiple remote infarctions within the cerebral hemispheric white   matter basal ganglia and thalami    3. Pervasive cerebral hemispheric white matter abnormality, possibly   accelerated form of ischemic white matter disease versus and/or   superimposed other inflammatory metabolic toxic or infectious process    4. Numerous remote petechial hemorrhages most prominently in the basal   ganglia andthalami    5. Diffuse brain volume loss upper range typical for age    --- End of Report ---    < end of copied text >      < from: TTE Echo Complete w/o Contrast w/ Doppler (11.15.23 @ 17:46) >  PHYSICIAN INTERPRETATION:  Left Ventricle: Normal left ventricular size and wall thicknesses, with   normal systolic and diastolic function.  Global LV systolic function was normal. Spectral Doppler shows normal   pattern of LV diastolic filling.  Right Ventricle: Normal right ventricular size and function.  Left Atrium: The left atrium is normal in size.  Right Atrium: The right atrium is normal in size.  Pericardium: There is no evidence of pericardial effusion.  Mitral Valve: Structurally normal mitral valve, with normal leaflet   excursion. Mild mitral valve regurgitation is seen.  Tricuspid Valve: Structurally normal tricuspid valve, with normal leaflet   excursion. No tricuspid regurgitation is visualized.  Aortic Valve: Normal trileaflet aortic valve with normal opening.   Sclerotic aortic valve with normal opening. Mild aortic valve   regurgitation is seen.  Pulmonic Valve: Structurally normal pulmonic valve, withnormal leaflet   excursion. Trace pulmonic valve regurgitation.  Aorta: The aortic root and ascending aorta are structurally normal, with   no evidence of dilitation.  Pulmonary Artery: The main pulmonary artery is normal in size.      Summary:   1. Normal global left ventricular systolic function.   2. Mild mitral valve regurgitation.   3. Mild aortic regurgitation.   4. Sclerotic aortic valve with normal opening.    < end of copied text >    < from: CT Abdomen and Pelvis w/ IV Cont (11.17.23 @ 12:40) >    ACC: [id number] EXAM:  CT ABDOMEN AND PELVIS IC   ORDERED BY: BRANDEN FERNANDEZ     ACC: [id number] EXAM:  CT CHEST   ORDERED BY: BRANDEN FERNANDEZ     PROCEDURE DATE:  11/17/2023          INTERPRETATION:  CLINICAL INFORMATION: Fever, pneumonia. Hypokalemia,   rule out ileus.    COMPARISON: None.    CONTRAST/COMPLICATIONS:  IV Contrast: IV contrast documented in unlinked concurrent exam   (accession [id number]), Omnipaque 350 (accession [id number])  90 cc   administered   10 cc discarded  Oral Contrast: NONE  Complications: None reported at time of study completion    PROCEDURE:  CT of the Chest, Abdomen and Pelvis was performed.  Sagittal and coronal reformats were performed.    FINDINGS:    CHEST:  LUNGS AND LARGE AIRWAYS: Visualized central airways are patent. No large   focal consolidation. Mosaic attenuation of the lung parenchyma.   Evaluation for pulmonary nodules limited by respiratory motion.  PLEURA: No pleural effusion or pneumothorax.  VESSELS: Thoracic aorta is ectatic measuring 3.9 cm. Atheromatous   changes. Main pulmonary artery is enlarged measuring 3.1 cm. No central   pulmonary embolus.  HEART: Heart size is qualitatively within normal limits. Aortic valve and   coronary artery calcifications. Trace pericardial fluid.  MEDIASTINUM AND INDIRA: No enlarged lymph nodes of the thorax. Esophagus is   nondistended.  CHEST WALL AND LOWER NECK: No chest wall hematoma. Visualized thyroid is   unremarkable.    ABDOMEN AND PELVIS:  LIVER: Liver size within normal limits. Main portal vein and hepatic   veins are patent.  BILE DUCTS: Common bile duct is mildly distended for patient age   measuring 8 mm.  GALLBLADDER: Cholelithiasis.  SPLEEN: Spleen size within normal limits  PANCREAS: No acute peripancreatic inflammation  ADRENALS: Unremarkable  KIDNEYS/URETERS: No hydronephrosis or obstructing ureteral calculus    BLADDER: Minimally distended.  REPRODUCTIVE ORGANS: Uterus and adnexa are suboptimally characterized on   CT    BOWEL: Sleeve gastrectomy postoperative changes. Residual stomach is   slightly distended. No small bowel distention. Appendix is unremarkable.   Mild stool burden of the colon limits evaluation of the colonic mucosa.  PERITONEUM: No ascites. Mild mesenteric haziness and a few small   mesenteric nodes up to 7 mm in short axis.  VESSELS: No abdominal aortic aneurysm. Atheromatous changes.  RETROPERITONEUM/LYMPH NODES: Small volume nodes.  ABDOMINAL WALL: Unremarkable.  BONES: Degenerative changes and osseous demineralization.    IMPRESSION:    CHEST:  -Nopneumonia. Mosaic attenuation of the lung parenchyma, nonspecific.  -Aortic valve and coronary artery calcifications.    ABDOMEN/PELVIS:  -No acute bowel pathology. Sleeve gastrectomy postoperative changes.  -Common bile duct is mildly distended for patient age measuring 8 mm.   Correlate with LFTs and MRCP as warranted. Cholelithiasis.  -Mild mesenteric haziness and small mesenteric nodes may reflect   mesenteric panniculitis. Follow up CT abdomen pelvis is recommended in   6-12 months to evaluate for stability.    --- End of Report ---          < end of copied text >   Jamal Alvarado MD, PGY-5  Rheumatology Fellow  Reachable on TEAMS    NADINE EMMANUELOD  [id number]    INTERVAL EVENTS  58-year old woman with history of hypertension, rheumatoid arthritis, brought into ED at  initially for concerned for altered mental status.    Prior to hospital presentation: neighbor overhead patient was looking for her mom, was confused/wandering around apartment complex.     At [place]: MR brain showed bilateral subacute to remote embolic strokes. MRI shows right posterior corona radiata acute infarct and multiple petechial hemorrhages in b/l thalami and basal ganglia. Noted to have KRUNAL. Also noted to have hypertension, on amlodipine, aldactone, and losartan. Started on B12 supplements. Seen by rheumatology,    Transferred to [facility] for cerebral angiogram.     Patient seen at bedside.   NIHSS: 2  LKW: prior to 11/15 ( hospitalizaton)    Social History  States has been smoking 1 ppd for several years. (20 Nov 2023 02:43)    Rheumatology consulted for eval for PACNS. Collateral gathered from brother [phone]. Patient with a history of strokes over the past 5 years, with more recurrent episodes starting in 2023. Since the beginning of 2023 the patient has been experiencing memory loss to the point where she was diagnosed with "dementia". Memory loss has been progressive of the past year with it culminating in the symptoms that bring the patient to the hospital this admission. Patient's brother describes episodes of waxing/waning similar to what the patient is experiencing in the hospital. Of note the patient's family was unaware of prior reports of aneurysms in the patient's brain during episodes of prior strokes. The brother was never told of any particular etiologies of the strokes. Brother unaware of any rheumatologic condition in the sister or if she has a Rheumatologist/takes and rheum meds. Prior to this year the patient has been working at [employer] and was highly functional    Evaluated by Rheumatology at [place] with the following ROS    ROS: No fever/chills, wt loss, night sweats, chest pain/dyspnea/cough, oral ulcers, rashes, joint pain, alopecia, photosensitivity, dry eye/dry mouth, Raynaud's, dysphagia, focal weakness, or eye symptoms.    Resulted Serologies: Negative MAX/cpANCA/dsDNA/Sm/Ribo P/BONI/SSA/SSB/SPEP/UA/C3: 152/C4: 36. CRP: 8. ESR: 113 RF: 30    Negative Hepatitis/Treponema    CSF 11/21: negative PCR. 1 nucleated louisa. 1 RBC. 69% lymphocytes. glucose 63. protein 39. culture negative    Rheum ROS  Unable to obtain    MEDICATIONS  (STANDING):  amLODIPine   Tablet 10 milliGRAM(s) Oral daily  aspirin  chewable 81 milliGRAM(s) Oral daily  atorvastatin 80 milliGRAM(s) Oral at bedtime  cyanocobalamin Injectable 1000 MICROGram(s) IntraMuscular daily  enoxaparin Injectable 40 milliGRAM(s) SubCutaneous every 24 hours  losartan 25 milliGRAM(s) Oral daily    MEDICATIONS  (PRN):      Allergies    No Known Allergies    Intolerances      PERTINENT MEDICATION HISTORY:    SOCIAL HISTORY: ?smoker. per brother no known illicit drug use or unprotected sex  OCCUPATION: works in [employer]  TRAVEL HISTORY:    FAMILY HISTORY: no known family history of autoimmune disease. recurrent stroke, vasculitis      Vital Signs Last 24 Hrs  T(C): 36.8 (22 Nov 2023 13:22), Max: 37.4 (22 Nov 2023 09:15)  T(F): 98.2 (22 Nov 2023 13:22), Max: 99.3 (22 Nov 2023 09:15)  HR: 84 (22 Nov 2023 13:22) (78 - 95)  BP: 146/86 (22 Nov 2023 13:22) (136/84 - 151/81)  BP(mean): --  RR: 18 (22 Nov 2023 13:22) (18 - 20)  SpO2: 94% (22 Nov 2023 13:22) (94% - 97%)    Parameters below as of 22 Nov 2023 13:22  Patient On (Oxygen Delivery Method): room air        Physical Exam:  General: lying in hospital bed on EEG monitor  HEENT: EOMI, MMM  CVS: +S1/S2, RRR, no murmurs/rubs/gallops  Resp: distant breath sounds  GI: Soft  MSK: no swelling/warmth/erythema of the joints of the UE/LE  Neuro: AAOx1-2. moves extremities with extensive prompting  Skin: no visible rashes    LABS:                        10.3   6.75  )-----------( 327      ( 22 Nov 2023 07:20 )             29.0     11-22    140  |  103  |  15  ----------------------------<  94  3.6   |  26  |  1.06    Ca    9.4      22 Nov 2023 07:18    TPro  7.7  /  Alb  3.7  /  TBili  0.6  /  DBili  x   /  AST  11  /  ALT  11  /  AlkPhos  69  11-22      Urinalysis Basic - ( 22 Nov 2023 07:18 )    Color: x / Appearance: x / SG: x / pH: x  Gluc: 94 mg/dL / Ketone: x  / Bili: x / Urobili: x   Blood: x / Protein: x / Nitrite: x   Leuk Esterase: x / RBC: x / WBC x   Sq Epi: x / Non Sq Epi: x / Bacteria: x      Culture - Fungal, CSF (collected 11-21-23 @ 14:51)  Source: .CSF CSF  Preliminary Report (11-22-23 @ 08:55):    Testing in progress    Culture - CSF with Gram Stain (collected 11-21-23 @ 14:51)  Source: .CSF CSF  Gram Stain (11-21-23 @ 19:09):    No polymorphonuclear cells seen    No organisms seen    by cytocentrifuge      Rheumatology Work Up    C3 Complement, Serum: 152 mg/dL [81 - 157] (11-21-23 @ 07:19)  C4 Complement, Serum: 36 mg/dL [13 - 39] (11-21-23 @ 07:19)  Ribosomal P Protein Antibody: <0.2 AI [Fluorescent Bead Immunoassay                     Ribosomal P Antibodies, IgG                     <1.0 AI (negative)                     > or =1.0 AI (positive)                     Reference values  apply  to all ages.] (11-21-23 @ 07:19)  Anti SS-A Antibody: <0.2 AI (11-21-23 @ 07:19)  Anti SS-B Antibody: <0.2: Fluorescent Bead Immunoassay      Neutrophil Cytoplasmic Antibody (11.21.23 @ 07:19)    Cytoplasmic (c-ANCA) Antibody: Negative   Perinuclear (p-ANCA) Antibody: Negative   Atypical ANCA: Negative    Acute Hepatitis Panel (11.21.23 @ 07:19)    Hepatitis C Virus Interpretation: Nonreact: Hepatitis C AB   Hepatitis C Virus S/CO Ratio: 0.06 S/CO   Hepatitis B Core IgM Antibody: Nonreact   Hepatitis B Surface Antigen: Nonreact   Hepatitis A IgM Antibody: Nonreact    BONI Antibody Screening Test (11.21.23 @ 07:19)    SM (Ferrera) Ab FBIA: <0.2 AI   Anti-Ribonuclear Protein: <0.2: Fluorescent Bead Immunoassy    Double Stranded DNA Antibody (11.17.23 @ 20:00)    Double Stranded DNA Antibody: <12: Method: EIA    Syphilis Screen (11.16.23 @ 16:01)    Treponema Pallidum Antibody Interpretation: Negative      RADIOLOGY & ADDITIONAL STUDIES:  < from: CT Head No Cont (11.21.23 @ 17:11) >    INTERPRETATION:  Noncontrast CT of the brain.    CLINICAL INDICATION:  Altered mental status    TECHNIQUE : Axial CT scanning of the brain was obtained from the skull   base to the vertex without the administration of intravenous contrast.   Sagittal and coronal reformats were provided.    COMPARISON: CT brain 11/18/2023    FINDINGS:    No hydrocephalus, mass effect, midline shift, acute intracranial   hemorrhage, or brain edema.    Moderate white matter microvascular ischemic disease.    Visualized paranasal sinuses and mastoid air cells are clear.    IMPRESSION:    No hydrocephalus, acute intracranial hemorrhage, mass effect, or brain   edema.    --- End of Report ---      < end of copied text >    < from: CT Angio Neck Stroke Protocol w/ IV Cont (11.18.23 @ 20:17) >    ACC: [id number] EXAM:  CT ANGIO BRAIN STROKE PROTC IC   ORDERED BY: АННА VERDUZCO     ACC: [id number] EXAM:  CT ANGIO NECK STROKE PROTCL IC   ORDERED BY: АННА VERDUZCO     ACC: [id number] EXAM:  CT BRAIN   ORDERED BY: KEELEY MONTEIRO     PROCEDURE DATE:  11/18/2023      IMPRESSION:    Head CT:No acute intracranial hemorrhage, mass effect, or shift of the   midline structures.    Similar-appearing extensive chronic white matter microvascular type   changes and chronic lacunar infarcts, as discussed.    CTA neck and head: No large vessel occlusion or major stenosis.    --- End of Report ---      < end of copied text >    < from: MR Head w/ IV Cont (11.17.23 @ 13:09) >    ACC: [id number] EXAM:  MR BRAIN IC   ORDERED BY: BRANDEN FERNANDEZ     PROCEDURE DATE:  11/17/2023          INTERPRETATION:  MR of the brain with and without gadolinium contrast    CLINICAL INFORMATION:   ams  FMR        IMPRESSION:    1. Right posterior corona radiata subacute infarction    2. Multiple remote infarctions within the cerebral hemispheric white   matter basal ganglia and thalami    3. Pervasive cerebral hemispheric white matter abnormality, possibly   accelerated form of ischemic white matter disease versus and/or   superimposed other inflammatory metabolic toxic or infectious process    4. Numerous remote petechial hemorrhages most prominently in the basal   ganglia andthalami    5. Diffuse brain volume loss upper range typical for age    --- End of Report ---    < end of copied text >      < from: TTE Echo Complete w/o Contrast w/ Doppler (11.15.23 @ 17:46) >  PHYSICIAN INTERPRETATION:    Summary:   1. Normal global left ventricular systolic function.   2. Mild mitral valve regurgitation.   3. Mild aortic regurgitation.   4. Sclerotic aortic valve with normal opening.    < end of copied text >    < from: CT Abdomen and Pelvis w/ IV Cont (11.17.23 @ 12:40) >    ACC: [id number] EXAM:  CT ABDOMEN AND PELVIS IC   ORDERED BY: BRANDEN FERNANDEZ     ACC: [id number] EXAM:  CT CHEST   ORDERED BY: BRANDEN FERNANDEZ     PROCEDURE DATE:  11/17/2023          I    IMPRESSION:    CHEST:  -Nopneumonia. Mosaic attenuation of the lung parenchyma, nonspecific.  -Aortic valve and coronary artery calcifications.    ABDOMEN/PELVIS:  -No acute bowel pathology. Sleeve gastrectomy postoperative changes.  -Common bile duct is mildly distended for patient age measuring 8 mm.   Correlate with LFTs and MRCP as warranted. Cholelithiasis.  -Mild mesenteric haziness and small mesenteric nodes may reflect   mesenteric panniculitis. Follow up CT abdomen pelvis is recommended in   6-12 months to evaluate for stability.    --- End of Report ---          < end of copied text >   Jamal Alvarado MD, PGY-5  Rheumatology Fellow  Reachable on TEAMS    NADINE EMMANUELOD  [phone]    INTERVAL EVENTS  58-year old woman with history of hypertension, rheumatoid arthritis, brought into ED at  initially for concerned for altered mental status.    Prior to hospital presentation: neighbor overhead patient was looking for her mom, was confused/wandering around apartment complex.     At [place]: MR brain showed bilateral subacute to remote embolic strokes. MRI shows right posterior corona radiata acute infarct and multiple petechial hemorrhages in b/l thalami and basal ganglia. Noted to have KRUNAL. Also noted to have hypertension, on amlodipine, aldactone, and losartan. Started on B12 supplements. Seen by rheumatology,    Transferred to [facility] for cerebral angiogram.     Patient seen at bedside.   NIHSS: 2  LKW: prior to 11/15 ( hospitalizaton)    Social History  States has been smoking 1 ppd for several years. (20 Nov 2023 02:43)    Rheumatology consulted for eval for PACNS. Collateral gathered from brother [phone]. Patient with a history of strokes over the past 5 years, with more recurrent episodes starting in 2023. Since the beginning of 2023 the patient has been experiencing memory loss to the point where she was diagnosed with "dementia". Memory loss has been progressive of the past year with it culminating in the symptoms that bring the patient to the hospital this admission. Patient's brother describes episodes of waxing/waning similar to what the patient is experiencing in the hospital. Of note the patient's family was unaware of prior reports of aneurysms in the patient's brain during episodes of prior strokes. The brother was never told of any particular etiologies of the strokes. Brother unaware of any rheumatologic condition in the sister or if she has a Rheumatologist/takes and rheum meds. Prior to this year the patient has been working at [employer] and was highly functional    Evaluated by Rheumatology at [place] with the following ROS    ROS: No fever/chills, wt loss, night sweats, chest pain/dyspnea/cough, oral ulcers, rashes, joint pain, alopecia, photosensitivity, dry eye/dry mouth, Raynaud's, dysphagia, focal weakness, or eye symptoms.    Resulted Serologies: Negative MAX/cpANCA/dsDNA/Sm/Ribo P/BONI/SSA/SSB/SPEP/UA/C3: 152/C4: 36. CRP: 8. ESR: 113 RF: 30    Negative Hepatitis/Treponema    CSF 11/21: negative PCR. 1 nucleated louisa. 1 RBC. 69% lymphocytes. glucose 63. protein 39. culture negative    Rheum ROS  Unable to obtain    MEDICATIONS  (STANDING):  amLODIPine   Tablet 10 milliGRAM(s) Oral daily  aspirin  chewable 81 milliGRAM(s) Oral daily  atorvastatin 80 milliGRAM(s) Oral at bedtime  cyanocobalamin Injectable 1000 MICROGram(s) IntraMuscular daily  enoxaparin Injectable 40 milliGRAM(s) SubCutaneous every 24 hours  losartan 25 milliGRAM(s) Oral daily    MEDICATIONS  (PRN):      Allergies    No Known Allergies    Intolerances      PERTINENT MEDICATION HISTORY:    SOCIAL HISTORY: ?smoker. per brother no known illicit drug use or unprotected sex  OCCUPATION: works in [employer]  TRAVEL HISTORY:    FAMILY HISTORY: no known family history of autoimmune disease. recurrent stroke, vasculitis      Vital Signs Last 24 Hrs  T(C): 36.8 (22 Nov 2023 13:22), Max: 37.4 (22 Nov 2023 09:15)  T(F): 98.2 (22 Nov 2023 13:22), Max: 99.3 (22 Nov 2023 09:15)  HR: 84 (22 Nov 2023 13:22) (78 - 95)  BP: 146/86 (22 Nov 2023 13:22) (136/84 - 151/81)  BP(mean): --  RR: 18 (22 Nov 2023 13:22) (18 - 20)  SpO2: 94% (22 Nov 2023 13:22) (94% - 97%)    Parameters below as of 22 Nov 2023 13:22  Patient On (Oxygen Delivery Method): room air        Physical Exam:  General: lying in hospital bed on EEG monitor  HEENT: EOMI, MMM  CVS: +S1/S2, RRR,  Resp: distant breath sounds  GI: Soft  MSK: no swelling/warmth/erythema of the joints of the UE/LE  Neuro: AAOx1-2. moves extremities with extensive prompting  Skin: no visible rashes    LABS:                        10.3   6.75  )-----------( 327      ( 22 Nov 2023 07:20 )             29.0     11-22    140  |  103  |  15  ----------------------------<  94  3.6   |  26  |  1.06    Ca    9.4      22 Nov 2023 07:18    TPro  7.7  /  Alb  3.7  /  TBili  0.6  /  DBili  x   /  AST  11  /  ALT  11  /  AlkPhos  69  11-22      Urinalysis Basic - ( 22 Nov 2023 07:18 )    Color: x / Appearance: x / SG: x / pH: x  Gluc: 94 mg/dL / Ketone: x  / Bili: x / Urobili: x   Blood: x / Protein: x / Nitrite: x   Leuk Esterase: x / RBC: x / WBC x   Sq Epi: x / Non Sq Epi: x / Bacteria: x      Culture - Fungal, CSF (collected 11-21-23 @ 14:51)  Source: .CSF CSF  Preliminary Report (11-22-23 @ 08:55):    Testing in progress    Culture - CSF with Gram Stain (collected 11-21-23 @ 14:51)  Source: .CSF CSF  Gram Stain (11-21-23 @ 19:09):    No polymorphonuclear cells seen    No organisms seen    by cytocentrifuge      Rheumatology Work Up    C3 Complement, Serum: 152 mg/dL [81 - 157] (11-21-23 @ 07:19)  C4 Complement, Serum: 36 mg/dL [13 - 39] (11-21-23 @ 07:19)  Ribosomal P Protein Antibody: <0.2 AI [Fluorescent Bead Immunoassay                     Ribosomal P Antibodies, IgG                     <1.0 AI (negative)                     > or =1.0 AI (positive)                     Reference values  apply  to all ages.] (11-21-23 @ 07:19)  Anti SS-A Antibody: <0.2 AI (11-21-23 @ 07:19)  Anti SS-B Antibody: <0.2: Fluorescent Bead Immunoassay      Neutrophil Cytoplasmic Antibody (11.21.23 @ 07:19)    Cytoplasmic (c-ANCA) Antibody: Negative   Perinuclear (p-ANCA) Antibody: Negative   Atypical ANCA: Negative    Acute Hepatitis Panel (11.21.23 @ 07:19)    Hepatitis C Virus Interpretation: Nonreact: Hepatitis C AB   Hepatitis C Virus S/CO Ratio: 0.06 S/CO   Hepatitis B Core IgM Antibody: Nonreact   Hepatitis B Surface Antigen: Nonreact   Hepatitis A IgM Antibody: Nonreact    BONI Antibody Screening Test (11.21.23 @ 07:19)    SM (Ferrera) Ab FBIA: <0.2 AI   Anti-Ribonuclear Protein: <0.2: Fluorescent Bead Immunoassy    Double Stranded DNA Antibody (11.17.23 @ 20:00)    Double Stranded DNA Antibody: <12: Method: EIA    Syphilis Screen (11.16.23 @ 16:01)    Treponema Pallidum Antibody Interpretation: Negative      RADIOLOGY & ADDITIONAL STUDIES:  < from: CT Head No Cont (11.21.23 @ 17:11) >    INTERPRETATION:  Noncontrast CT of the brain.    CLINICAL INDICATION:  Altered mental status    TECHNIQUE : Axial CT scanning of the brain was obtained from the skull   base to the vertex without the administration of intravenous contrast.   Sagittal and coronal reformats were provided.    COMPARISON: CT brain 11/18/2023    FINDINGS:    No hydrocephalus, mass effect, midline shift, acute intracranial   hemorrhage, or brain edema.    Moderate white matter microvascular ischemic disease.    Visualized paranasal sinuses and mastoid air cells are clear.    IMPRESSION:    No hydrocephalus, acute intracranial hemorrhage, mass effect, or brain   edema.    --- End of Report ---      < end of copied text >    < from: CT Angio Neck Stroke Protocol w/ IV Cont (11.18.23 @ 20:17) >    ACC: [id number] EXAM:  CT ANGIO BRAIN STROKE PROTC IC   ORDERED BY: АННА VERDUZCO     ACC: [id number] EXAM:  CT ANGIO NECK STROKE PROTCL IC   ORDERED BY: АННА VERDUZCO     ACC: [id number] EXAM:  CT BRAIN   ORDERED BY: KEELEY MONTEIRO     PROCEDURE DATE:  11/18/2023      IMPRESSION:    Head CT:No acute intracranial hemorrhage, mass effect, or shift of the   midline structures.    Similar-appearing extensive chronic white matter microvascular type   changes and chronic lacunar infarcts, as discussed.    CTA neck and head: No large vessel occlusion or major stenosis.    --- End of Report ---      < end of copied text >    < from: MR Head w/ IV Cont (11.17.23 @ 13:09) >    ACC: [id number] EXAM:  MR BRAIN IC   ORDERED BY: BRANDEN FERNANDEZ     PROCEDURE DATE:  11/17/2023          INTERPRETATION:  MR of the brain with and without gadolinium contrast    CLINICAL INFORMATION:   ams  FMR        IMPRESSION:    1. Right posterior corona radiata subacute infarction    2. Multiple remote infarctions within the cerebral hemispheric white   matter basal ganglia and thalami    3. Pervasive cerebral hemispheric white matter abnormality, possibly   accelerated form of ischemic white matter disease versus and/or   superimposed other inflammatory metabolic toxic or infectious process    4. Numerous remote petechial hemorrhages most prominently in the basal   ganglia andthalami    5. Diffuse brain volume loss upper range typical for age    --- End of Report ---    < end of copied text >      < from: TTE Echo Complete w/o Contrast w/ Doppler (11.15.23 @ 17:46) >  PHYSICIAN INTERPRETATION:    Summary:   1. Normal global left ventricular systolic function.   2. Mild mitral valve regurgitation.   3. Mild aortic regurgitation.   4. Sclerotic aortic valve with normal opening.    < end of copied text >    < from: CT Abdomen and Pelvis w/ IV Cont (11.17.23 @ 12:40) >    ACC: [id number] EXAM:  CT ABDOMEN AND PELVIS IC   ORDERED BY: BRANDEN FERNANDEZ     ACC: [id number] EXAM:  CT CHEST   ORDERED BY: BRANDEN FERNANDEZ     PROCEDURE DATE:  11/17/2023          I    IMPRESSION:    CHEST:  -Nopneumonia. Mosaic attenuation of the lung parenchyma, nonspecific.  -Aortic valve and coronary artery calcifications.    ABDOMEN/PELVIS:  -No acute bowel pathology. Sleeve gastrectomy postoperative changes.  -Common bile duct is mildly distended for patient age measuring 8 mm.   Correlate with LFTs and MRCP as warranted. Cholelithiasis.  -Mild mesenteric haziness and small mesenteric nodes may reflect   mesenteric panniculitis. Follow up CT abdomen pelvis is recommended in   6-12 months to evaluate for stability.    --- End of Report ---          < end of copied text >

## 2023-11-22 NOTE — DIETITIAN INITIAL EVALUATION ADULT - REASON
Nutrition Focused Physical Exam deferred at this time as inappropriate in setting of pt's acuity of illness. RD will continue to reassess as able.

## 2023-11-22 NOTE — DIETITIAN INITIAL EVALUATION ADULT - ADD RECOMMEND
1) Continue with Regular Diet; Defer texture/consistency to Speech Language Pathologist prn.   2) Add oral nutrition supplementation:    3) Consider adding multivitamin for micronutrient coverage, pending no medical contraindications.   4) RD will continue to monitor PO intake, GI tolerance, weight trends, skin integrity, BMs, labs/electrolytes prn.   RD remains available upon request.  1) Continue with Regular Diet; Defer texture/consistency to Speech Language Pathologist prn.   2) Consider adding multivitamin for micronutrient coverage, pending no medical contraindications.   3) BMI sticker placed in chart   4) RD will continue to monitor PO intake, GI tolerance, weight trends, skin integrity, BMs, labs/electrolytes prn.   RD remains available upon request.

## 2023-11-22 NOTE — CONSULT NOTE ADULT - ATTENDING COMMENTS
Seen and examined with resident. Agree with note.   Patient with gait dysfunction, CVA.  Patient will need acute rehabilitation when stable.
Amendments to fellow's assessment and plan as above.  Patient unable to provide history, collateral obtained from brother  seems like she has a progressive steady decline over the past year, worsening cognitive impairment and memory loss  Brain MRI showing multiple territorial infarcts and remote hemorrhages, CFS without pleocytosis, ESR elevated, cerebral angio showing diffuse beading of vasculature  overall suspicious for PACNS  other etiologies for secondary vasculitis negative, infectious work up so far negative but some tests pending.   discussed the role for biopsy to confirm the diagnosis, if not possible or delayed - would treat empirically as long as no concern for infection   please send anemia work up (retic, LDH, hapto), SPEP/UPEP, QTB, aPLs serology  Discussed with neuro/ neuro sx  will follow closely

## 2023-11-22 NOTE — PROGRESS NOTE ADULT - ASSESSMENT
58 year old woman with HTN, Rheumatoid arthritis, presenting with decompensated confusion, subacute course since about February 2023.     Impression: left hemiparesis due to R corona radiata infarct, multiple microhemorrhages, deep and cortical, cerebral angiogram concerning for vasculitic appearance, vasculitis work up in process    NEURO: Neurologically unchanged, Continue monitoring for neurologic deterioration, permissive HTN to gradual normotension, A1C, LDL - pending on 11/22 AM, high intensity statin initiated, MRI Brain with contrast showed diffusion restriction R corona radiata.  Multiple microhemorrhages, deep and cortical. Cerebral angiogram performed on 11/20 showed multiple areas of focal stenosis and dilatation. Rheum consult pending. MAX, c3, c4, ANCA, CRP, ESR, cryoglobulin, antiVEGF, hep panel, quant, SPEP, ferritin, TIBC, ribosomal P, Sjogren, BONI sent. S/P LP with neuro IR: unremarkable,  Physical therapy/OT/PMR: AR.     ANTITHROMBOTIC THERAPY: ASA    PULMONARY: CXR: Mild, central pulmonary venous congestion. Mild perihilar interstitial infiltrates with air bronchograms, right greater than left, protecting airway, saturating well     CARDIOVASCULAR: TTE Normal global left ventricular systolic function. Mild mitral valve regurgitation. Mild aortic regurgitation. Sclerotic aortic valve with normal opening, cardiac monitoring without reported events                             SBP goal: 100-160    GASTROINTESTINAL:  dysphagia screen passed on 11/20, advance as tolerated        Diet: Pureed    RENAL: BUN/Cr within normal limits on 11/19, good urine output      Na Goal: Greater than 135     Hamm: no    HEMATOLOGY: H/H without change, Platelets normal      DVT ppx: lovenox    ID: afebrile, no sign of infection    OTHER: Plan discussed with patient at bedside, all questions and concerns addressed. patient's brother ( HCP): Jorge Stone ()    DISPOSITION: AR once stable and workup is complete    CORE MEASURES:        Admission NIHSS: 2     Tenecteplase: NO      LDL/HDL: p     Depression Screen: p     Statin Therapy: yes     Dysphagia Screen:  PASS      SmokingYES Smoking cessation and education provided to patient [] Nicotine patch ordered ??     Afib NO     Stroke Education  YES    Obtain screening lower extremity venous ultrasound in patients who meet 1 or more of the following criteria as patient is high risk for DVT/PE on admission:   [] History of DVT/PE  []Hypercoagulable states (Factor V Leiden, Cancer, OCP, etc. )  []Prolonged immobility (hemiplegia/hemiparesis/post operative or any other extended immobilization)  [] Transferred from outside facility (Rehab or Long term care)  [] Age </= to 50 58 year old woman with HTN, Rheumatoid arthritis, presenting with decompensated confusion, subacute course since about February 2023.     Impression: left hemiparesis due to R corona radiata infarct, multiple microhemorrhages, deep and cortical, cerebral angiogram concerning for vasculitic appearance, vasculitis work up in process    NEURO: Neurologically unchanged, permissive HTN to gradual normotension, A1c 4.9, LDL - 128, high intensity statin initiated. MRI Brain with contrast showed diffusion restriction R corona radiata.  Multiple microhemorrhages, deep and cortical. Cerebral angiogram performed on 11/20 showed multiple areas of focal stenosis and dilatation concerning for vasculitis. LP completed on 11/21 with normal profile, pending additional studies. Rheum consult pending. MAX, c3, c4, ANCA, CRP, ESR, cryoglobulin, antiVEGF, hep panel, quant, SPEP, ferritin, TIBC, ribosomal P, Sjogren, BONI sent. Physical therapy/OT/PMR: AR.     ANTITHROMBOTIC THERAPY: ASA    PULMONARY: CXR: Mild, central pulmonary venous congestion. Mild perihilar interstitial infiltrates with air bronchograms, right greater than left, protecting airway, saturating well     CARDIOVASCULAR: TTE Normal global left ventricular systolic function. Mild mitral valve regurgitation. Mild aortic regurgitation. Sclerotic aortic valve with normal opening, cardiac monitoring without reported events                             SBP goal: 100-160    GASTROINTESTINAL:  dysphagia screen passed on 11/20, advance as tolerated        Diet: Pureed    RENAL: BUN/Cr within normal limits on 11/19, good urine output      Na Goal: Greater than 135     Hamm: no    HEMATOLOGY: H/H without change, Platelets normal      DVT ppx: lovenox    ID: afebrile, no sign of infection    OTHER: Plan discussed with patient at bedside, all questions and concerns addressed. patient's brother ( HCP): Jorge Stone ()    DISPOSITION: AR once stable and workup is complete    CORE MEASURES:        Admission NIHSS: 2     Tenecteplase: NO      LDL/HDL: p     Depression Screen: p     Statin Therapy: yes     Dysphagia Screen:  PASS      SmokingYES Smoking cessation and education provided to patient [] Nicotine patch ordered ??     Afib NO     Stroke Education  YES    Obtain screening lower extremity venous ultrasound in patients who meet 1 or more of the following criteria as patient is high risk for DVT/PE on admission:   [] History of DVT/PE  [] Hypercoagulable states (Factor V Leiden, Cancer, OCP, etc. )  [] Prolonged immobility (hemiplegia/hemiparesis/post operative or any other extended immobilization)  [] Transferred from outside facility (Rehab or Long term care)  [] Age </= to 50

## 2023-11-22 NOTE — PROGRESS NOTE ADULT - SUBJECTIVE AND OBJECTIVE BOX
Subjective: Patient seen and examined. No new events except as noted.     REVIEW OF SYSTEMS:    CONSTITUTIONAL: + weakness, fevers or chills  EYES/ENT: No visual changes;  No vertigo or throat pain   NECK: No pain or stiffness  RESPIRATORY: No cough, wheezing, hemoptysis; No shortness of breath  CARDIOVASCULAR: No chest pain or palpitations  GASTROINTESTINAL: No abdominal or epigastric pain. No nausea, vomiting, or hematemesis; No diarrhea or constipation. No melena or hematochezia.  GENITOURINARY: No dysuria, frequency or hematuria  NEUROLOGICAL: No numbness or weakness  SKIN: No itching, burning, rashes, or lesions   All other review of systems is negative unless indicated above.    MEDICATIONS:  MEDICATIONS  (STANDING):  amLODIPine   Tablet 10 milliGRAM(s) Oral daily  aspirin  chewable 81 milliGRAM(s) Oral daily  atorvastatin 80 milliGRAM(s) Oral at bedtime  cyanocobalamin Injectable 1000 MICROGram(s) IntraMuscular daily  enoxaparin Injectable 40 milliGRAM(s) SubCutaneous every 24 hours  losartan 25 milliGRAM(s) Oral daily      PHYSICAL EXAM:  T(C): 37.4 (11-22-23 @ 09:15), Max: 37.4 (11-22-23 @ 09:15)  HR: 80 (11-22-23 @ 09:15) (78 - 95)  BP: 136/84 (11-22-23 @ 09:15) (130/77 - 159/85)  RR: 18 (11-22-23 @ 09:15) (18 - 20)  SpO2: 94% (11-22-23 @ 09:15) (94% - 97%)  Wt(kg): --  I&O's Summary    21 Nov 2023 07:01  -  22 Nov 2023 07:00  --------------------------------------------------------  IN: 0 mL / OUT: 1400 mL / NET: -1400 mL          Appearance: NAD	  HEENT:   Dry oral mucosa, PERRL, EOMI	  Lymphatic: No lymphadenopathy  Cardiovascular: Normal S1 S2, No JVD, No murmurs, No edema  Respiratory: Lungs clear to auscultation	  Psychiatry: A & O x 3, Mood & affect appropriate  Gastrointestinal:  Soft, Non-tender, + BS	  Skin: No rashes, No ecchymoses, No cyanosis	  Neurologic: AOx2-3, EOMI, no facial, no drift CELANING 5/5  Extremities: Normal range of motion, No clubbing, cyanosis or edema  Vascular: Peripheral pulses palpable 2+ bilaterally      LABS:    CARDIAC MARKERS:                                10.3   6.75  )-----------( 327      ( 22 Nov 2023 07:20 )             29.0     11-22    140  |  103  |  15  ----------------------------<  94  3.6   |  26  |  1.06    Ca    9.4      22 Nov 2023 07:18    TPro  7.7  /  Alb  3.7  /  TBili  0.6  /  DBili  x   /  AST  11  /  ALT  11  /  AlkPhos  69  11-22      TELEMETRY: 	 SR   ECG:  	  RADIOLOGY:   DIAGNOSTIC TESTING:  [ ] Echocardiogram:  [ ]  Catheterization:  [ ] Stress Test:    OTHER:

## 2023-11-22 NOTE — DIETITIAN INITIAL EVALUATION ADULT - OTHER INFO
GI:  - Per chart, pt with no N/V nor diarrhea or constipation.     Endo:  - RD will continue to monitor blood glucose levels prn.     Renal:  - RD will continue to monitor electrolytes prn.    Wt Hx:  - Pt reports UBW of xx kg. Per chart, dosing wt of xx kg (x/x). Wt hx in kg ([facility]) as follows:   - RD will continue to monitor weight trends as available/able.   - IBW: kg (based on ht of in)  - S/p R corona radiata acute infarct and multiple petechial hemorrhages. Ordered for vitamin B12 supplementation.     GI:  - Per RN, no N/V reported; ordered for odansetron. No diarrhea or constipation reported. Per chart, last BM PTA. No bowel regimen ordered at this time.   - Ordered for Aluminum Hydroxide; Magnesium Hydroxide; Simethicone Suspension.   - Noted abnormal lipid panel: elevated non-HDL and LDL, low HDL; consider provided heart healthy diet education when medically appropriate.     Wt Hx:  - Unable to obtain pt's UBW at this time. Per chart, dosing wt of 106.1 kg (11/20). RD obtained bedscale wt of 98.5 kg (11/22).   - [facility] as follows: 108.9 (11/14), 100.7 (3/20), 110.2 (10/25/22), 104.3 (1/21/22). Noted hx of weight fluctuations likely in setting of fluid shifts versus bedscale inaccuracies. RD will continue to monitor weight trends as available/able.   - Upper IBW (+10%): 52.5 kg (based on ht of 61 in)

## 2023-11-22 NOTE — DIETITIAN INITIAL EVALUATION ADULT - PERTINENT MEDS FT
MEDICATIONS  (STANDING):  amLODIPine   Tablet 10 milliGRAM(s) Oral daily  aspirin  chewable 81 milliGRAM(s) Oral daily  atorvastatin 80 milliGRAM(s) Oral at bedtime  cyanocobalamin Injectable 1000 MICROGram(s) IntraMuscular daily  enoxaparin Injectable 40 milliGRAM(s) SubCutaneous every 24 hours  losartan 25 milliGRAM(s) Oral daily    MEDICATIONS  (PRN):   MEDICATIONS  (STANDING):  amLODIPine   Tablet 10 milliGRAM(s) Oral daily  aspirin  chewable 81 milliGRAM(s) Oral daily  atorvastatin 80 milliGRAM(s) Oral at bedtime  cyanocobalamin Injectable 1000 MICROGram(s) IntraMuscular daily  enoxaparin Injectable 40 milliGRAM(s) SubCutaneous every 24 hours  losartan 25 milliGRAM(s) Oral daily

## 2023-11-22 NOTE — DIETITIAN INITIAL EVALUATION ADULT - REASON INDICATOR FOR ASSESSMENT
RD Consult Indicated for: MST Score 2 or >  Source: Pt, Electronic Medical Record   Chart reviewed, events noted.  RD Consult Indicated for: MST Score 2 or >  Source: RN, Electronic Medical Record; Unable to interview pt at this time secondary to s/p stroke  Chart reviewed, events noted.

## 2023-11-22 NOTE — CHART NOTE - NSCHARTNOTEFT_GEN_A_CORE
EEG preliminary read (not final) on the initial recording hour(s) = x 3    No seizures recorded.  Moderate slowing noted, nonspecific.    Final report to follow tomorrow morning after completion of study.    [facility] EEG Reading Room Ph#: [phone]  Epilepsy Answering Service after 5PM and before 8:30AM: Ph#: [phone]    YOGI Mac  Epilepsy Fellow

## 2023-11-22 NOTE — DIETITIAN INITIAL EVALUATION ADULT - ENERGY INTAKE
- Pt passed dysphagia screen (11/20), diet advanced to regular textures/consistencies today (11/22).  Adequate (%) - Pt passed dysphagia screen (11/20), diet advanced to regular textures/consistencies today (11/22). Per RN, endorses pt with good appetite/PO intake, consuming >/= 75% of meal tray in-house.

## 2023-11-22 NOTE — DIETITIAN INITIAL EVALUATION ADULT - PERTINENT LABORATORY DATA
11-22    140  |  103  |  15  ----------------------------<  94  3.6   |  26  |  1.06    Ca    9.4      22 Nov 2023 07:18    TPro  7.7  /  Alb  3.7  /  TBili  0.6  /  DBili  x   /  AST  11  /  ALT  11  /  AlkPhos  69  11-22  A1C with Estimated Average Glucose Result: 4.9 % (11-22-23 @ 07:20)

## 2023-11-22 NOTE — DIETITIAN INITIAL EVALUATION ADULT - ORAL INTAKE PTA/DIET HISTORY
Unable to obtain subjective dietary hx at this time secondary to pt s/p stroke with expressive aphasia. RD will continue to obtain information as medically appropriate. Per H&P, no known food allergies; no micronutrient supplementation noted.

## 2023-11-22 NOTE — CONSULT NOTE ADULT - ASSESSMENT
58-year old woman with history of hypertension, rheumatoid arthritis, brought into ED at  initially for concerned for altered mental status with findings concerning for pACNS    ##eval for pACNS  - 58-year old woman with history of hypertension, recurrent strokes, brought into ED at  initially for concerned for altered mental status with findings concerning for PACNS    ##eval for pACNS  Negative MAX/cpANCA/dsDNA/Sm/Ribo P/BONI/SSA/SSB/SPEP/UA/C3: 152/C4: 36. CRP: 8. ESR: 113 RF: 30  Negative Hepatitis/Treponema  -there is no specific test for the diagnosis of PACNS although the combination of exam findings/imaging/CSF studies maybe suggestive  -corrolation between angio findings with biopsy positivity is not 100% and thus biopsy remains the gold standard for diagnosis  -so far no evidence of secondary autoimmune disease based on serologies above  -CSF thus far without evidence of infection. Albumin index and IgG synthetic ratio normal as well from CSF  -discussed with neuroradiology, results from selective angiogram indicate diffuse beading more worrisome for PACNS vs RCVS  -discussed with Neurosurgery. possible biopsy on Friday or next week. they will discuss with attending feasability. understood if risks outweigh benefits from neurosurgery perspective  -d/w stroke resident. patient's recurrent strokes maybe related to undiagnosed PACNS vs purely thromboembolic disease due to the beading    PLAN  -will check for APLS serologies and HIV to r/o other causes  -if biopsy confirmed for 11/24 then would defer steroids until after procedure unless patient decompensates as long as there is no sign of infection  -if biopsy is not feasible or if planned for next week then initiate solu-medrol 80mg qD  -if on steroids please initiate PPI as well as Bactrim DS TIW for PCP prophylaxis    case d/w Neurology, Neurosurgery, Neuro-radiology    case d/w Dr. David Alvarado MD, PGY-5  Rheumatology Fellow  Reachable on TEAMS 58-year old woman with history of hypertension, recurrent strokes, brought into ED at  initially for concern for altered mental status with findings concerning for PACNS    ##eval for PACNS  Progressive mental status change/ cognitive impairment over the past year  Brain imaging with findings of multiple infarcts, petechial hemorrhages  Cerebral angio showed diffuse beading   Negative MAX/cpANCA/dsDNA/Sm/Ribo P/BONI/SSA/SSB/SPEP/UA/C3: 152/C4: 36. CRP: 8. ESR: 113 RF: 30  Negative Hepatitis/Treponema  -there is no specific test for the diagnosis of PACNS although the combination of exam findings/imaging/CSF studies maybe suggestive  correlation between angio findings with biopsy positivity is not 100% and thus biopsy remains the gold standard for diagnosis  -so far no evidence of secondary autoimmune disease based on serologies above  -CSF thus far without evidence of infection. Albumin index and IgG synthetic ratio normal as well from CSF  -discussed with neuroradiology, results from selective angiogram indicate diffuse beading more worrisome for PACNS vs RCVS  -discussed with Neurosurgery. possible biopsy on Friday or next week. they will discuss with attending feasability. understood if risks outweigh benefits from neurosurgery perspective  -d/w stroke resident. patient's recurrent strokes maybe related to undiagnosed PACNS vs purely thromboembolic disease due to the beading    PLAN  -will check for APLS serologies and HIV to r/o other causes  -if biopsy confirmed for 11/24 then would defer steroids until after procedure unless patient decompensates as long as there is no sign of infection  -if biopsy is not feasible or if planned for later next week then initiate solu-medrol 80mg qD  -if on steroids please initiate PPI as well as Bactrim DS TIW for PCP prophylaxis    case d/w Neurology, Neurosurgery, Neuro-radiology    case d/w Dr. David Alvarado MD, PGY-5  Rheumatology Fellow  Reachable on TEAMS

## 2023-11-23 LAB
% ALBUMIN: 48 % — SIGNIFICANT CHANGE UP
% ALBUMIN: 48 % — SIGNIFICANT CHANGE UP
% ALPHA 1: 4.5 % — SIGNIFICANT CHANGE UP
% ALPHA 1: 4.5 % — SIGNIFICANT CHANGE UP
% ALPHA 2: 13.9 % — SIGNIFICANT CHANGE UP
% ALPHA 2: 13.9 % — SIGNIFICANT CHANGE UP
% BETA: 12.4 % — SIGNIFICANT CHANGE UP
% BETA: 12.4 % — SIGNIFICANT CHANGE UP
% GAMMA: 21.2 % — SIGNIFICANT CHANGE UP
% GAMMA: 21.2 % — SIGNIFICANT CHANGE UP
ALBUMIN SERPL ELPH-MCNC: 3.5 G/DL — LOW (ref 3.6–5.5)
ALBUMIN SERPL ELPH-MCNC: 3.5 G/DL — LOW (ref 3.6–5.5)
ALBUMIN/GLOB SERPL ELPH: 0.9 RATIO — SIGNIFICANT CHANGE UP
ALBUMIN/GLOB SERPL ELPH: 0.9 RATIO — SIGNIFICANT CHANGE UP
ALPHA1 GLOB SERPL ELPH-MCNC: 0.3 G/DL — SIGNIFICANT CHANGE UP (ref 0.1–0.4)
ALPHA1 GLOB SERPL ELPH-MCNC: 0.3 G/DL — SIGNIFICANT CHANGE UP (ref 0.1–0.4)
ALPHA2 GLOB SERPL ELPH-MCNC: 1 G/DL — SIGNIFICANT CHANGE UP (ref 0.5–1)
ALPHA2 GLOB SERPL ELPH-MCNC: 1 G/DL — SIGNIFICANT CHANGE UP (ref 0.5–1)
APTT BLD: 31.1 SEC — SIGNIFICANT CHANGE UP (ref 24.5–35.6)
APTT BLD: 31.1 SEC — SIGNIFICANT CHANGE UP (ref 24.5–35.6)
B-GLOBULIN SERPL ELPH-MCNC: 0.9 G/DL — SIGNIFICANT CHANGE UP (ref 0.5–1)
B-GLOBULIN SERPL ELPH-MCNC: 0.9 G/DL — SIGNIFICANT CHANGE UP (ref 0.5–1)
GAMMA GLOBULIN: 1.5 G/DL — SIGNIFICANT CHANGE UP (ref 0.6–1.6)
GAMMA GLOBULIN: 1.5 G/DL — SIGNIFICANT CHANGE UP (ref 0.6–1.6)
GAMMA INTERFERON BACKGROUND BLD IA-ACNC: 0.01 IU/ML — SIGNIFICANT CHANGE UP
GAMMA INTERFERON BACKGROUND BLD IA-ACNC: 0.01 IU/ML — SIGNIFICANT CHANGE UP
HIV 1+2 AB+HIV1 P24 AG SERPL QL IA: SIGNIFICANT CHANGE UP
HIV 1+2 AB+HIV1 P24 AG SERPL QL IA: SIGNIFICANT CHANGE UP
M TB IFN-G BLD-IMP: NEGATIVE — SIGNIFICANT CHANGE UP
M TB IFN-G BLD-IMP: NEGATIVE — SIGNIFICANT CHANGE UP
M TB IFN-G CD4+ BCKGRND COR BLD-ACNC: 0.03 IU/ML — SIGNIFICANT CHANGE UP
M TB IFN-G CD4+ BCKGRND COR BLD-ACNC: 0.03 IU/ML — SIGNIFICANT CHANGE UP
M TB IFN-G CD4+CD8+ BCKGRND COR BLD-ACNC: 0.01 IU/ML — SIGNIFICANT CHANGE UP
M TB IFN-G CD4+CD8+ BCKGRND COR BLD-ACNC: 0.01 IU/ML — SIGNIFICANT CHANGE UP
PROT PATTERN SERPL ELPH-IMP: SIGNIFICANT CHANGE UP
PROT PATTERN SERPL ELPH-IMP: SIGNIFICANT CHANGE UP
PROT SERPL-MCNC: 7.3 G/DL — SIGNIFICANT CHANGE UP (ref 6–8.3)
PROT SERPL-MCNC: 7.3 G/DL — SIGNIFICANT CHANGE UP (ref 6–8.3)
QUANT TB PLUS MITOGEN MINUS NIL: >10 IU/ML — SIGNIFICANT CHANGE UP
QUANT TB PLUS MITOGEN MINUS NIL: >10 IU/ML — SIGNIFICANT CHANGE UP
TM INTERPRETATION: SIGNIFICANT CHANGE UP
TM INTERPRETATION: SIGNIFICANT CHANGE UP

## 2023-11-23 PROCEDURE — 95718 EEG PHYS/QHP 2-12 HR W/VEEG: CPT

## 2023-11-23 RX ADMIN — AMLODIPINE BESYLATE 10 MILLIGRAM(S): 2.5 TABLET ORAL at 05:56

## 2023-11-23 RX ADMIN — ENOXAPARIN SODIUM 40 MILLIGRAM(S): 100 INJECTION SUBCUTANEOUS at 05:57

## 2023-11-23 RX ADMIN — PREGABALIN 1000 MICROGRAM(S): 225 CAPSULE ORAL at 11:11

## 2023-11-23 RX ADMIN — Medication 81 MILLIGRAM(S): at 11:09

## 2023-11-23 RX ADMIN — LOSARTAN POTASSIUM 25 MILLIGRAM(S): 100 TABLET, FILM COATED ORAL at 05:56

## 2023-11-23 NOTE — PROGRESS NOTE ADULT - ASSESSMENT
58 year old woman with HTN, Rheumatoid arthritis, presenting with decompensated confusion, subacute course since about February 2023.     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    NEURO: Neurologically unchanged, permissive HTN to gradual normotension, A1c 4.9, LDL - 128, high intensity statin initiated. MRI Brain with contrast showed diffusion restriction R corona radiata.  Multiple microhemorrhages, deep and cortical. Cerebral angiogram performed on 11/20 showed multiple areas of focal stenosis and dilatation concerning for vasculitis. LP completed on 11/21 with normal profile, pending additional studies. Rheumatology consulted and recommended possible brain biopsy to confirm diagnosis of vasculitis. Physical therapy/OT/PMR: AR.     ANTITHROMBOTIC THERAPY: ASA 81mg daily    PULMONARY: CXR: Mild, central pulmonary venous congestion. Mild perihilar interstitial infiltrates with air bronchograms, right greater than left. Protecting airway, saturating well     CARDIOVASCULAR: TTE Normal global left ventricular systolic function. Mild mitral valve regurgitation. Mild aortic regurgitation. Sclerotic aortic valve with normal opening, cardiac monitoring without reported events                             SBP goal: 100-160    GASTROINTESTINAL:  dysphagia screen passed on 11/20, advance as tolerated        Diet: Regular    RENAL: BUN/Cr within normal limits on 11/22, good urine output      Na Goal: Greater than 135     Hamm: no    HEMATOLOGY: H/H without change, Platelets 327     DVT ppx: Lovenox    ID: afebrile, no sign of infection    OTHER: Plan discussed with patient at bedside, all questions and concerns addressed. patient's brother (HCP): Jorge Stone ().     DISPOSITION: AR once stable and workup is complete    CORE MEASURES:        Admission NIHSS: 2     Tenecteplase: NO      LDL/HDL: p     Depression Screen: p     Statin Therapy: yes     Dysphagia Screen:  PASS      SmokingYES Smoking cessation and education provided to patient [] Nicotine patch ordered ??     Afib NO     Stroke Education  YES    Obtain screening lower extremity venous ultrasound in patients who meet 1 or more of the following criteria as patient is high risk for DVT/PE on admission:   [] History of DVT/PE  [] Hypercoagulable states (Factor V Leiden, Cancer, OCP, etc. )  [] Prolonged immobility (hemiplegia/hemiparesis/post operative or any other extended immobilization)  [] Transferred from outside facility (Rehab or Long term care)  [] Age </= to 50   58 year old woman with HTN, Rheumatoid arthritis, presenting with decompensated confusion, subacute course since about February 2023.     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    NEURO: Neurologically unchanged, permissive HTN to gradual normotension. A1c 4.9, LDL - 128, high intensity statin initiated. MRI Brain with contrast showed diffusion restriction R corona radiata.  Multiple microhemorrhages, deep and cortical. Cerebral angiogram performed on 11/20 showed multiple areas of focal stenosis and dilatation concerning for vasculitis. LP completed on 11/21 with normal profile, pending additional studies. Rheumatology consulted and recommended possible brain biopsy to confirm diagnosis of vasculitis, will hold off as CSF results pending. Physical therapy/OT/PMR: AR.     ANTITHROMBOTIC THERAPY: ASA 81mg daily    PULMONARY: CXR: Mild, central pulmonary venous congestion. Mild perihilar interstitial infiltrates with air bronchograms, right greater than left. Protecting airway, saturating well     CARDIOVASCULAR: TTE Normal global left ventricular systolic function. Mild mitral valve regurgitation. Mild aortic regurgitation. Sclerotic aortic valve with normal opening, cardiac monitoring without reported events                             SBP goal: 100-160    GASTROINTESTINAL: dysphagia screen passed on 11/20, advance as tolerated        Diet: Regular    RENAL: BUN/Cr within normal limits on 11/22, good urine output      Na Goal: Greater than 135     Hamm: no    HEMATOLOGY: H/H without change, Platelets 327     DVT ppx: Lovenox    ID: afebrile, no sign of infection    OTHER: Plan discussed with patient at bedside, all questions and concerns addressed. Patient's brother (HCP): Jorge Stone ().     DISPOSITION: AR once stable and workup is complete    CORE MEASURES:        Admission NIHSS: 2     Tenecteplase: NO      LDL/HDL: p     Depression Screen: p     Statin Therapy: yes     Dysphagia Screen:  PASS      SmokingYES Smoking cessation and education provided to patient [] Nicotine patch ordered ??     Afib NO     Stroke Education  YES    Obtain screening lower extremity venous ultrasound in patients who meet 1 or more of the following criteria as patient is high risk for DVT/PE on admission:   [] History of DVT/PE  [] Hypercoagulable states (Factor V Leiden, Cancer, OCP, etc. )  [] Prolonged immobility (hemiplegia/hemiparesis/post operative or any other extended immobilization)  [] Transferred from outside facility (Rehab or Long term care)  [] Age </= to 50

## 2023-11-23 NOTE — PROGRESS NOTE ADULT - SUBJECTIVE AND OBJECTIVE BOX
THE PATIENT WAS SEEN AND EXAMINED BY ME WITH THE HOUSESTAFF AND STROKE TEAM DURING MORNING ROUNDS.     HPI:  58-year old woman with history of hypertension, rheumatoid arthritis, brought into ED at  initially for concerned for altered mental status.Prior to hospital presentation: neighbor overhead patient was looking for her mom, was confused/wandering around apartment complex. At [place]: MR brain showed bilateral subacute to remote embolic strokes. MRI shows right posterior corona radiata acute infarct and multiple petechial hemorrhages in b/l thalami and basal ganglia. Noted to have KRUNAL. Also noted to have hypertension, on amlodipine, aldactone, and losartan. Started on B12 supplements. Seen by rheumatology. Transferred to [facility] for cerebral angiogram. On admission: NIHSS: 2  [facility] States has been smoking 1 ppd for several years.    SUBJECTIVE: No events overnight. No new neurologic complaints.     amLODIPine   Tablet 10 milliGRAM(s) Oral daily  aspirin  chewable 81 milliGRAM(s) Oral daily  atorvastatin 80 milliGRAM(s) Oral at bedtime  cyanocobalamin Injectable 1000 MICROGram(s) IntraMuscular daily  enoxaparin Injectable 40 milliGRAM(s) SubCutaneous every 24 hours  losartan 25 milliGRAM(s) Oral daily    PHYSICAL EXAM:   Vital Signs Last 24 Hrs  T(C): 36.8 (23 Nov 2023 05:10), Max: 37.5 (23 Nov 2023 00:45)  T(F): 98.2 (23 Nov 2023 05:10), Max: 99.5 (23 Nov 2023 00:45)  HR: 64 (23 Nov 2023 05:10) (61 - 84)  BP: 151/79 (23 Nov 2023 05:10) (136/84 - 153/87)  BP(mean): --  RR: 18 (23 Nov 2023 05:10) (18 - 20)  SpO2: 97% (23 Nov 2023 05:10) (94% - 97%)    Parameters below as of 23 Nov 2023 05:10  Patient On (Oxygen Delivery Method): nasal cannula    General: No acute distress  HEENT: EOM intact, visual fields full  Abdomen: Soft, nontender, nondistended   Extremities: No edema    NEUROLOGICAL EXAM:  Mental status: Awake, alert, oriented to name, speaks 1-2 words not month/year, follows some simple commands.  Cranial Nerves: trace L facial droop, no nystagmus, no dysarthria,  tongue midline  Motor exam: left UE drift, Lower extremities antigravity.   Sensation: Intact to light touch   Coordination/ Gait: No dysmetria, gait not tested    LABS:                        10.3   6.75  )-----------( 327      ( 22 Nov 2023 07:20 )             29.0    11-22    140  |  103  |  15  ----------------------------<  94  3.6   |  26  |  1.06    Ca    9.4      22 Nov 2023 07:18    TPro  7.7  /  Alb  3.7  /  TBili  0.6  /  DBili  x   /  AST  11  /  ALT  11  /  AlkPhos  69  11-22  PTT - ( 23 Nov 2023 06:26 )  PTT:31.1 sec    IMAGING: Reviewed by me.     11/21/23 CT HEAD: No hydrocephalus, acute intracranial hemorrhage, mass effect, or brain edema.    (11.18.2023)  Head CT: No acute intracranial hemorrhage, mass effect, or shift of the   midline structures.  Similar-appearing extensive chronic white matter microvascular type   changes and chronic lacunar infarcts, as discussed.    CTA neck and head: No large vessel occlusion or major stenosis.    MR Head w/ IV Cont (11.17.2023)  Right posterior corona radiata subacute infarction  Multiple remote infarctions within the cerebral hemispheric white   matter basal ganglia and thalami  Pervasive cerebral hemispheric white matter abnormality, possibly   accelerated form of ischemic white matter disease versus and/or   superimposed other inflammatory metabolic toxic or infectious process  Numerous remote petechial hemorrhages most prominently in the basal   ganglia and thalami  Diffuse brain volume loss upper range typical for age THE PATIENT WAS SEEN AND EXAMINED BY ME WITH THE HOUSESTAFF AND STROKE TEAM DURING MORNING ROUNDS.     HPI:  58-year old woman with history of hypertension, rheumatoid arthritis, brought into ED at  initially for concerned for altered mental status.Prior to hospital presentation: neighbor overhead patient was looking for her mom, was confused/wandering around apartment complex. At [place]: MR brain showed bilateral subacute to remote embolic strokes. MRI shows right posterior corona radiata acute infarct and multiple petechial hemorrhages in b/l thalami and basal ganglia. Noted to have KRUNAL. Also noted to have hypertension, on amlodipine, aldactone, and losartan. Started on B12 supplements. Seen by rheumatology. Transferred to [facility] for cerebral angiogram. On admission: NIHSS: 2  [facility] States has been smoking 1 ppd for several years.    SUBJECTIVE: No events overnight. No new neurologic complaints.     amLODIPine   Tablet 10 milliGRAM(s) Oral daily  aspirin  chewable 81 milliGRAM(s) Oral daily  atorvastatin 80 milliGRAM(s) Oral at bedtime  cyanocobalamin Injectable 1000 MICROGram(s) IntraMuscular daily  enoxaparin Injectable 40 milliGRAM(s) SubCutaneous every 24 hours  losartan 25 milliGRAM(s) Oral daily    PHYSICAL EXAM:   Vital Signs Last 24 Hrs  T(C): 36.8 (23 Nov 2023 05:10), Max: 37.5 (23 Nov 2023 00:45)  T(F): 98.2 (23 Nov 2023 05:10), Max: 99.5 (23 Nov 2023 00:45)  HR: 64 (23 Nov 2023 05:10) (61 - 84)  BP: 151/79 (23 Nov 2023 05:10) (136/84 - 153/87)  BP(mean): --  RR: 18 (23 Nov 2023 05:10) (18 - 20)  SpO2: 97% (23 Nov 2023 05:10) (94% - 97%)    Parameters below as of 23 Nov 2023 05:10  Patient On (Oxygen Delivery Method): nasal cannula    General: No acute distress  HEENT: EOM intact, visual fields full  Abdomen: Soft, nontender, nondistended   Extremities: No edema    NEUROLOGICAL EXAM:  Mental status: Awake, alert, oriented to name and 2023, speaks 1-2 words not month/year, follows some simple commands.  Cranial Nerves: trace L facial droop, no nystagmus, no dysarthria,  tongue midline  Motor exam: left UE drift, Lower extremities antigravity.   Sensation: Intact to light touch   Coordination/ Gait: No dysmetria, gait not tested    LABS:                        10.3   6.75  )-----------( 327      ( 22 Nov 2023 07:20 )             29.0    11-22    140  |  103  |  15  ----------------------------<  94  3.6   |  26  |  1.06    Ca    9.4      22 Nov 2023 07:18    TPro  7.7  /  Alb  3.7  /  TBili  0.6  /  DBili  x   /  AST  11  /  ALT  11  /  AlkPhos  69  11-22  PTT - ( 23 Nov 2023 06:26 )  PTT:31.1 sec    IMAGING: Reviewed by me.     11/21/23 CT HEAD: No hydrocephalus, acute intracranial hemorrhage, mass effect, or brain edema.    (11.18.2023)  Head CT: No acute intracranial hemorrhage, mass effect, or shift of the   midline structures.  Similar-appearing extensive chronic white matter microvascular type   changes and chronic lacunar infarcts, as discussed.    CTA neck and head: No large vessel occlusion or major stenosis.    MR Head w/ IV Cont (11.17.2023)  Right posterior corona radiata subacute infarction  Multiple remote infarctions within the cerebral hemispheric white   matter basal ganglia and thalami  Pervasive cerebral hemispheric white matter abnormality, possibly   accelerated form of ischemic white matter disease versus and/or   superimposed other inflammatory metabolic toxic or infectious process  Numerous remote petechial hemorrhages most prominently in the basal   ganglia and thalami  Diffuse brain volume loss upper range typical for age

## 2023-11-23 NOTE — CHART NOTE - NSCHARTNOTEFT_GEN_A_CORE
Discussed with stroke neurology team, rheumatology fellow, and Dr. Collier:     Patient's neuroradiologic findings including beading on cerebral angiogram (consistent with vasculitis) and clinical presentation, with no other etiology of vasculitis, point to PACNS. Patient should be started on steroids given she is having microhemorrhage and strokes from vasculitis.     The risks of performing an open biopsy outweigh the benefits if the results of the biopsy are unlikely to .

## 2023-11-23 NOTE — EEG REPORT - NS EEG TEXT BOX
NADINE CAMPOVERDE [facility]-[id number]     Study Date: 		11- 09:50 AM -  11-23-23 08:00 AM  Duration in hours:  x 21 HR 55 MIN    --------------------------------------------------------------------------------------------------  History:  CC/ HPI Patient is a 58y old  Female who presents with a chief complaint of AMS (22 Nov 2023 13:46)    MEDICATIONS  (STANDING):  amLODIPine   Tablet 10 milliGRAM(s) Oral daily  aspirin  chewable 81 milliGRAM(s) Oral daily  atorvastatin 80 milliGRAM(s) Oral at bedtime  cyanocobalamin Injectable 1000 MICROGram(s) IntraMuscular daily  enoxaparin Injectable 40 milliGRAM(s) SubCutaneous every 24 hours  losartan 25 milliGRAM(s) Oral daily    --------------------------------------------------------------------------------------------------  Study Interpretation:    [Abbreviation Key:  PDR=alpha rhythm/posterior dominant rhythm. A-P=anterior posterior.  Amplitude: ‘very low’:<20; ‘low’:20-49; ‘medium’:; ‘high’:>150uV.  Persistence for periodic/rhythmic patterns (% of epoch) ‘rare’:<1%; ‘occasional’:1-10%; ‘frequent’:10-50%; ‘abundant’:50-90%; ‘continuous’:>90%.  Persistence for sporadic discharges: ‘rare’:<1/hr; ‘occasional’:1/min-1/hr; ‘frequent’:>1/min; ‘abundant’:>1/10 sec.  RPP=rhythmic and periodic patterns; GRDA=generalized rhythmic delta activity; FIRDA=frontal intermittent GRDA; LRDA=lateralized rhythmic delta activity; TIRDA=temporal intermittent rhythmic delta activity;  LPD=PLED=lateralized periodic discharges; GPD=generalized periodic discharges; BIPDs =bilateral independent periodic discharges; Mf=multifocal; SIRPDs=stimulus induced rhythmic, periodic, or ictal appearing discharges; BIRDs=brief potentially ictal rhythmic discharges >4 Hz, lasting .5-10s; PFA (paroxysmal bursts >13 Hz or =8 Hz <10s).  Modifiers: +F=with fast component; +S=with spike component; +R=with rhythmic component.  S-B=burst suppression pattern.  Max=maximal. N1-drowsy; N2-stage II sleep; N3-slow wave sleep. SSS/BETS=small sharp spikes/benign epileptiform transients of sleep. HV=hyperventilation; PS=photic stimulation]    FINDINGS:      Background:  Continuity: continuous  Symmetry: symmetric  PDR: 7-8 Hz activity, with amplitude to 40 uV, that attenuated to eye opening.    Reactivity: present  Voltage: normal (between 20-150uV)  Anterior Posterior Gradient: present  Other background findings: none  Breach: absent    Background Slowing:  Generalized slowing: intermittent diffuse irregular delta and theta activity.  Focal slowing: intermittent bitemporal irregular delta slowing with shifting lateralization was present.    State Changes:   -Drowsiness noted with increased slowing, attenuation of fast activity.  -N2 sleep transients were not recorded.    Sporadic Epileptiform Discharges:    None    Rhythmic and Periodic Patterns (RPPs):  Intermittent frontally predominant generalized rhythmic delta activity    Electrographic and Electroclinical seizures:  None    Other Clinical Events:  None    Activation Procedures:   -Hyperventilation was not performed.    -Photic stimulation was not performed.    Artifacts:  Intermittent myogenic and movement artifacts were noted.    ECG:  The heart rate on single channel ECG was predominantly between 60-80 BPM with intermittent irregularity.    EEG Classification / Summary:  Abnormal EEG study  Moderate generalized background slowing with intermittent GRDA  Intermittent bitemporal slowing with shifting lateralization    -----------------------------------------------------------------------------------------------------    Clinical Impression:  Moderate diffuse/multi-focal cerebral dysfunction, not specific as to etiology.  There were no epileptiform abnormalities recorded.    This is a preliminary report pending attending review and attestation.    Rolo Lopez MD  Fellow, [facility]      -------------------------------------------------------------------------------------------------------  [facility] EEG Reading Room Ph#: [phone]  Epilepsy Answering Service after 5PM and before 8:30AM: Ph#: [phone]   NADINE CAMPOVERDE [facility]-[id number]     Study Date: 		11- 09:50 AM -  11-23-23 08:00 AM  Duration in hours:  x 21 HR 55 MIN    --------------------------------------------------------------------------------------------------  History:  CC/ HPI Patient is a 58y old  Female who presents with a chief complaint of AMS (22 Nov 2023 13:46)    MEDICATIONS  (STANDING):  amLODIPine   Tablet 10 milliGRAM(s) Oral daily  aspirin  chewable 81 milliGRAM(s) Oral daily  atorvastatin 80 milliGRAM(s) Oral at bedtime  cyanocobalamin Injectable 1000 MICROGram(s) IntraMuscular daily  enoxaparin Injectable 40 milliGRAM(s) SubCutaneous every 24 hours  losartan 25 milliGRAM(s) Oral daily    --------------------------------------------------------------------------------------------------  Study Interpretation:    [Abbreviation Key:  PDR=alpha rhythm/posterior dominant rhythm. A-P=anterior posterior.  Amplitude: ‘very low’:<20; ‘low’:20-49; ‘medium’:; ‘high’:>150uV.  Persistence for periodic/rhythmic patterns (% of epoch) ‘rare’:<1%; ‘occasional’:1-10%; ‘frequent’:10-50%; ‘abundant’:50-90%; ‘continuous’:>90%.  Persistence for sporadic discharges: ‘rare’:<1/hr; ‘occasional’:1/min-1/hr; ‘frequent’:>1/min; ‘abundant’:>1/10 sec.  RPP=rhythmic and periodic patterns; GRDA=generalized rhythmic delta activity; FIRDA=frontal intermittent GRDA; LRDA=lateralized rhythmic delta activity; TIRDA=temporal intermittent rhythmic delta activity;  LPD=PLED=lateralized periodic discharges; GPD=generalized periodic discharges; BIPDs =bilateral independent periodic discharges; Mf=multifocal; SIRPDs=stimulus induced rhythmic, periodic, or ictal appearing discharges; BIRDs=brief potentially ictal rhythmic discharges >4 Hz, lasting .5-10s; PFA (paroxysmal bursts >13 Hz or =8 Hz <10s).  Modifiers: +F=with fast component; +S=with spike component; +R=with rhythmic component.  S-B=burst suppression pattern.  Max=maximal. N1-drowsy; N2-stage II sleep; N3-slow wave sleep. SSS/BETS=small sharp spikes/benign epileptiform transients of sleep. HV=hyperventilation; PS=photic stimulation]    FINDINGS:      Background:  Continuity: continuous  Symmetry: symmetric  PDR: 7-8 Hz activity, with amplitude to 40 uV, that attenuated to eye opening.    Reactivity: present  Voltage: normal (between 20-150uV)  Anterior Posterior Gradient: present  Other background findings: none  Breach: absent    Background Slowing:  Generalized slowing: intermittent diffuse irregular delta and theta activity.  Focal slowing: intermittent bitemporal irregular delta slowing with shifting lateralization was present.    State Changes:   -Drowsiness noted with increased slowing, attenuation of fast activity.  -Poorly formed symmetrical K complexes and sleep spindles were recorded.    Sporadic Epileptiform Discharges:    None    Rhythmic and Periodic Patterns (RPPs):  Intermittent frontally predominant generalized rhythmic delta activity    Electrographic and Electroclinical seizures:  None    Other Clinical Events:  None    Activation Procedures:   -Hyperventilation was not performed.    -Photic stimulation was not performed.    Artifacts:  Intermittent myogenic and movement artifacts were noted.    ECG:  The heart rate on single channel ECG was predominantly between 60-80 BPM with intermittent irregularity.    EEG Classification / Summary:  Abnormal EEG study  Moderate generalized background slowing with intermittent GRDA  Intermittent bitemporal slowing with shifting lateralization    -----------------------------------------------------------------------------------------------------    Clinical Impression:  Moderate diffuse/multi-focal cerebral dysfunction, not specific as to etiology.  There were no epileptiform abnormalities recorded.    This is a preliminary report pending attending review and attestation.    Rolo Lopez MD  Fellow, [facility]      -------------------------------------------------------------------------------------------------------  [facility] EEG Reading Room Ph#: [phone]  Epilepsy Answering Service after 5PM and before 8:30AM: Ph#: [phone]   NADINE CAMPOVERDE [facility]-[id number]     Study Date: 		11- 09:50 AM -  11-23-23 08:00 AM  Duration in hours:  x 21 HR 55 MIN    --------------------------------------------------------------------------------------------------  History:  CC/ HPI Patient is a 58y old  Female who presents with a chief complaint of AMS (22 Nov 2023 13:46)    MEDICATIONS  (STANDING):  amLODIPine   Tablet 10 milliGRAM(s) Oral daily  aspirin  chewable 81 milliGRAM(s) Oral daily  atorvastatin 80 milliGRAM(s) Oral at bedtime  cyanocobalamin Injectable 1000 MICROGram(s) IntraMuscular daily  enoxaparin Injectable 40 milliGRAM(s) SubCutaneous every 24 hours  losartan 25 milliGRAM(s) Oral daily    --------------------------------------------------------------------------------------------------  Study Interpretation:    [Abbreviation Key:  PDR=alpha rhythm/posterior dominant rhythm. A-P=anterior posterior.  Amplitude: ‘very low’:<20; ‘low’:20-49; ‘medium’:; ‘high’:>150uV.  Persistence for periodic/rhythmic patterns (% of epoch) ‘rare’:<1%; ‘occasional’:1-10%; ‘frequent’:10-50%; ‘abundant’:50-90%; ‘continuous’:>90%.  Persistence for sporadic discharges: ‘rare’:<1/hr; ‘occasional’:1/min-1/hr; ‘frequent’:>1/min; ‘abundant’:>1/10 sec.  RPP=rhythmic and periodic patterns; GRDA=generalized rhythmic delta activity; FIRDA=frontal intermittent GRDA; LRDA=lateralized rhythmic delta activity; TIRDA=temporal intermittent rhythmic delta activity;  LPD=PLED=lateralized periodic discharges; GPD=generalized periodic discharges; BIPDs =bilateral independent periodic discharges; Mf=multifocal; SIRPDs=stimulus induced rhythmic, periodic, or ictal appearing discharges; BIRDs=brief potentially ictal rhythmic discharges >4 Hz, lasting .5-10s; PFA (paroxysmal bursts >13 Hz or =8 Hz <10s).  Modifiers: +F=with fast component; +S=with spike component; +R=with rhythmic component.  S-B=burst suppression pattern.  Max=maximal. N1-drowsy; N2-stage II sleep; N3-slow wave sleep. SSS/BETS=small sharp spikes/benign epileptiform transients of sleep. HV=hyperventilation; PS=photic stimulation]    FINDINGS:      Background:  Continuity: continuous  Symmetry: symmetric  PDR: 7-8 Hz activity, with amplitude to 40 uV, that attenuated to eye opening.    Reactivity: present  Voltage: normal (between 20-150uV)  Anterior Posterior Gradient: present  Other background findings: none  Breach: absent    Background Slowing:  Generalized slowing: intermittent diffuse irregular delta and theta activity.  Focal slowing: intermittent bitemporal irregular delta slowing with shifting lateralization was present.    State Changes:   -Drowsiness noted with increased slowing, attenuation of fast activity.  -Poorly formed symmetrical K complexes and sleep spindles were recorded.    Sporadic Epileptiform Discharges:    None    Rhythmic and Periodic Patterns (RPPs):  Intermittent frontally predominant generalized rhythmic delta activity    Electrographic and Electroclinical seizures:  None    Other Clinical Events:  None    Activation Procedures:   -Hyperventilation was not performed.    -Photic stimulation was not performed.    Artifacts:  Intermittent myogenic and movement artifacts were noted.    ECG:  The heart rate on single channel ECG was predominantly between 60-80 BPM with intermittent irregularity.    EEG Classification / Summary:  Abnormal EEG study  Moderate generalized background slowing with intermittent GRDA  Intermittent bitemporal slowing with shifting lateralization    -----------------------------------------------------------------------------------------------------    Clinical Impression:  Moderate diffuse/multi-focal cerebral dysfunction, not specific as to etiology.  There were no epileptiform abnormalities recorded.       Rolo Lopez MD  Fellow, [facility]      -------------------------------------------------------------------------------------------------------  [facility] EEG Reading Room Ph#: [phone]  Epilepsy Answering Service after 5PM and before 8:30AM: Ph#: [phone]   NADINE CAMPOVERDE [facility]-[id number]     Study Date: 11- 09:50 AM -  11-23-23 17:15 AM  Duration in hours:   30 HR 15 MIN    --------------------------------------------------------------------------------------------------  History:  CC/ HPI Patient is a 58y old  Female who presents with a chief complaint of AMS (22 Nov 2023 13:46)    MEDICATIONS  (STANDING):  amLODIPine   Tablet 10 milliGRAM(s) Oral daily  aspirin  chewable 81 milliGRAM(s) Oral daily  atorvastatin 80 milliGRAM(s) Oral at bedtime  cyanocobalamin Injectable 1000 MICROGram(s) IntraMuscular daily  enoxaparin Injectable 40 milliGRAM(s) SubCutaneous every 24 hours  losartan 25 milliGRAM(s) Oral daily    --------------------------------------------------------------------------------------------------  Study Interpretation:    [Abbreviation Key:  PDR=alpha rhythm/posterior dominant rhythm. A-P=anterior posterior.  Amplitude: ‘very low’:<20; ‘low’:20-49; ‘medium’:; ‘high’:>150uV.  Persistence for periodic/rhythmic patterns (% of epoch) ‘rare’:<1%; ‘occasional’:1-10%; ‘frequent’:10-50%; ‘abundant’:50-90%; ‘continuous’:>90%.  Persistence for sporadic discharges: ‘rare’:<1/hr; ‘occasional’:1/min-1/hr; ‘frequent’:>1/min; ‘abundant’:>1/10 sec.  RPP=rhythmic and periodic patterns; GRDA=generalized rhythmic delta activity; FIRDA=frontal intermittent GRDA; LRDA=lateralized rhythmic delta activity; TIRDA=temporal intermittent rhythmic delta activity;  LPD=PLED=lateralized periodic discharges; GPD=generalized periodic discharges; BIPDs =bilateral independent periodic discharges; Mf=multifocal; SIRPDs=stimulus induced rhythmic, periodic, or ictal appearing discharges; BIRDs=brief potentially ictal rhythmic discharges >4 Hz, lasting .5-10s; PFA (paroxysmal bursts >13 Hz or =8 Hz <10s).  Modifiers: +F=with fast component; +S=with spike component; +R=with rhythmic component.  S-B=burst suppression pattern.  Max=maximal. N1-drowsy; N2-stage II sleep; N3-slow wave sleep. SSS/BETS=small sharp spikes/benign epileptiform transients of sleep. HV=hyperventilation; PS=photic stimulation]    FINDINGS:      Background:  Continuity: continuous  Symmetry: symmetric  PDR: 7-8 Hz activity, with amplitude to 40 uV, that attenuated to eye opening.    Reactivity: present  Voltage: normal (between 20-150uV)  Anterior Posterior Gradient: present  Other background findings: none  Breach: absent    Background Slowing:  Generalized slowing: intermittent diffuse irregular delta and theta activity.  Focal slowing: intermittent bitemporal irregular delta slowing with shifting lateralization was present.    State Changes:   -Drowsiness noted with increased slowing, attenuation of fast activity.  -Poorly formed symmetrical K complexes and sleep spindles were recorded.    Sporadic Epileptiform Discharges:    None    Rhythmic and Periodic Patterns (RPPs):  Intermittent frontally predominant generalized rhythmic delta activity    Electrographic and Electroclinical seizures:  None    Other Clinical Events:  None    Activation Procedures:   -Hyperventilation was not performed.    -Photic stimulation was not performed.    Artifacts:  Intermittent myogenic and movement artifacts were noted.    ECG:  The heart rate on single channel ECG was predominantly between 60-80 BPM with intermittent irregularity.    EEG Classification / Summary:  Abnormal EEG study  Moderate generalized background slowing with intermittent GRDA  Intermittent bitemporal slowing with shifting lateralization    -----------------------------------------------------------------------------------------------------    Clinical Impression:  Moderate diffuse/multi-focal cerebral dysfunction, not specific as to etiology.  There were no epileptiform abnormalities recorded.       Rolo Lopez MD  Fellow, [facility]      -------------------------------------------------------------------------------------------------------  [facility] EEG Reading Room Ph#: [phone]  Epilepsy Answering Service after 5PM and before 8:30AM: Ph#: [phone]

## 2023-11-23 NOTE — PROGRESS NOTE ADULT - SUBJECTIVE AND OBJECTIVE BOX
{\rtf1\fwwjoq99044\ansi\vjskjid6963\ftnbj\uc1\deff0  {\fonttbl{\f0 \fnil Segoe UI;}{\f1 \fnil \fcharset0 Segoe UI;}{\f2 \fnil Times New Nirmal;}}  {\colortbl ;\ddr829\mqauq269\xvni686 ;\red0\green0\blue0 ;\red0\green0\rups763 ;\red0\green0\blue0 ;}  {\stylesheet{\f0\fs20 Normal;}{\cs1 Default Paragraph Font;}{\cs2\f0\fs16 Line Number;}{\cs3\f2\fs24\ul\cf3 Hyperlink;}}  {\*\revtbl{Unknown;}}  \djskvv30634\kjacls30776\ggtwa7873\lblnd8952\epphe3661\gqymp3837\mnclekc086\dvgmtws396\nogrowautofit\cpgkob140\formshade\nofeaturethrottle1\dntblnsbdb\fet4\aendnotes\aftnnrlc\pgbrdrhead\pgbrdrfoot  \sectd\ealrtm56819\xexbjp88635\guttersxn0\cccfmfwp3221\aqqejnjb3014\whnsgmrg9888\dppiidcx8246\bhvtiim655\ybopqqc566\sbkpage\pgncont\pgndec  \plain\plain\f0\fs24\pard\plain\f0\fs24\plain\f0\fs20\jzmi7633\hich\f0\dbch\f0\loch\f0\fs20 Subjective: Patient seen and examined. No new events except as noted. \par  \par  REVIEW OF SYSTEMS:\par  \par  CONSTITUTIONAL:+ weakness, fevers or chills\par  EYES/ENT: No visual changes;  No vertigo or throat pain \par  NECK: No pain or stiffness\par  RESPIRATORY: No cough, wheezing, hemoptysis; No shortness of breath\par  CARDIOVASCULAR: No chest pain or palpitations\par  GASTROINTESTINAL: No abdominal or epigastric pain. No nausea, vomiting, or hematemesis; No diarrhea or constipation. No melena or hematochezia.\par  GENITOURINARY: No dysuria, frequency or hematuria\par  NEUROLOGICAL: No numbness or weakness\par  SKIN: No itching, burning, rashes, or lesions \par  All other review of systems is negative unless indicated above.\par  \par  MEDICATIONS:\par  MEDICATIONS  (STANDING):\par  amLODIPine   Tablet 10 milliGRAM(s) Oral daily\par  aspirin  chewable 81 milliGRAM(s) Oral daily\par  atorvastatin 80 milliGRAM(s) Oral at bedtime\par  cyanocobalamin Injectable 1000 MICROGram(s) IntraMuscular daily\par  enoxaparin Injectable 40 milliGRAM(s) SubCutaneous every 24 hours\par  losartan 25 milliGRAM(s) Oral daily\par  \par  \par  PHYSICAL EXAM:\par  T(C): 36.8 (11-23-23 @ 05:10), Max: 37.5 (11-23-23 @ 00:45)\par  HR: 64 (11-23-23 @ 05:10) (61 - 84)\par  BP: 151/79 (11-23-23 @ 05:10) (146/82 - 153/87)\par  RR: 18 (11-23-23 @ 05:10) (18 - 20)\par  SpO2: 97% (11-23-23 @ 05:10) (94% - 97%)\par  Wt(kg): --\par  I&O's Summary\par  \par  22 Nov 2023 07:01  -  23 Nov 2023 07:00\par  --------------------------------------------------------\par  IN: 420 mL / OUT: 400 mL / NET: 20 mL\par  \par  \par  \par  \par  \par  \par  \par  Appearance: NAD\tab\par  HEENT:   Dry oral mucosa, PERRL, EOMI\tab\par  Lymphatic: No lymphadenopathy\par  Cardiovascular: Normal S1 S2, No JVD, No murmurs, No edema\par  Respiratory: Lungs clear to auscultation\tab\par  Psychiatry: A & O x 3, Mood & affect appropriate\par  Gastrointestinal:  Soft, Non-tender, + BS\tab\par  Skin: No rashes, No ecchymoses, No cyanosis\tab\par  Neurologic: AOx2-3, EOMI, no facial, no drift CLEANING 5/5\par  Extremities: Normal range of motion, No clubbing, cyanosis or edema\par  Vascular: Peripheral pulses palpable 2+ bilaterally\par  \par  \par  \par  \par  LABS:\par  \par  CARDIAC MARKERS:\par  \par  \par  \par  \par           \par             10.3 \par  6.75  )-----------( 327      ( 22 Nov 2023 07:20 )\par             29.0 \par  \par  11-22\par  \par  140  |  103  |  15\par  ----------------------------<  94\par  3.6   |  26  |  1.06\par  \par  Ca    9.4      22 Nov 2023 07:18\par  \par  TPro  7.7  /  Alb  3.7  /  TBili  0.6  /  DBili  x   /  AST  11  /  ALT  11  /  AlkPhos  69  11-22\par  \par  \par  TELEMETRY: \tab   \par  ECG:  \tab\par  RADIOLOGY: \par  DIAGNOSTIC TESTING:\par  [ ] Echocardiogram:\par  [ ]  Catheterization:\par  [ ] Stress Test:  \par  OTHER: \tab\par  \par  \ql\plain\f0\fs24{\*\bkmkstart [id number]}{\*\bkmkend [id number]}\plain\f1\fs16\oszq1525\hich\f1\dbch\f1\loch\f1\cf2\fs16\b\ul TECH INFORMATION:\plain\f1\fs16\zcpc7467\hich\f1\dbch\f1\loch\f1\cf2\fs16  \par  {\*\bkmkstart [id number]}{\*\bkmkend [id number]}This is a {\*\bkmkstart [id number]}{\*\bkmkend [id number]}Continuous Video EEG. \par  \par  {\*\bkmkstart [id number]}{\*\bkmkend [id number]}Recording Technique: {\*\bkmkstart [id number]}{\*\bkmkend [id number]}The patient underwent continuous video-EEG monitoring, using Telemetry System hardware on the [employer].  EEG and   video data were stored on a computer hard drive with important events saved in digital archive files. {\*\bkmkstart [id number]}{\*\bkmkend [id number]}The material was reviewed by a physician (electroencephalographer/epileptologist) on a daily basis.    Jose M and seizure detection algorithms were utilized and reviewed.  An EEG Technician attended to the patient for 8 to 10 hours per day. {\*\bkmkstart [id number]}{\*\bkmkend [id number]}The patient was observed by the epilepsy nursing staff 24 hours   per day. The epilepsy center neurologist was available in person or on call 24 hours per day.. \par  \par  {\*\bkmkstart [id number]}{\*\bkmkend [id number]}Placement and Labeling of Electrodes: {\*\bkmkstart [id number]}{\*\bkmkend [id number]}The EEG was performed utilizing at least 20 channel referential EEG connections (coronal over temporal over   parasagittal montage) with inferior temporal electrodes when indicting and using all standard 10-20 electrode placements with EKG, {\*\bkmkstart [id number]}{\*\bkmkend [id number]}with additional electrodes placed in the inferior temporal region   using the modified 10-10 montage electrode placements for elective admissions, or if deemed necessary.  Recording was at  a sampling rate of 256 samples per second per channel. {\*\bkmkstart [id number]}{\*\bkmkend [id number]}Time synchronized digital   video recording was done simultaneously with EEG recording.  A low light infrared camera was used for low light recording..\par  \par  {\*\bkmkstart [id number]}{\*\bkmkend [id number]}\plain\f1\fs16\jllq7091\hich\f1\dbch\f1\loch\f1\cf2\fs16\b\ul EEG REPORT:\plain\f1\fs16\yccv2534\hich\f1\dbch\f1\loch\f1\cf2\fs16  \par  \plain\f1\fs16\sqci8227\hich\f1\dbch\f1\loch\f1\cf2\fs16\b\ul{\field{\*\fldinst HYPERLINK [id number] }{\fldrslt EEG Report:}}\plain\f1\fs16\ohjw8410\hich\f1\dbch\f1\loch\f1\cf2\fs16\ql\par  \trowd\cudbed09\lastrow\gmhbwuj23\trpaddfl3\iflwdua56\trpaddfr3\trpaddt0\trpaddft3\trpaddb0\trpaddfb3\trleft0  \clvertalt\otrjmq33\clpadft3\vxszpo32\clpadfr3\clpadl0\clpadfl3\clpadb0\clpadfb3\stcqw6936  \clvertalt\lvpmbt47\clpadft3\wsvhpz80\clpadfr3\clpadl0\clpadfl3\clpadb0\clpadfb3\exmfs6508  \pard\intbl\ssparaaux0\s0\fi-120\li120\ql\plain\f0\fs24{\*\bkmkstart [id number]}{\*\bkmkend [id number]}\plain\f1\fs16\nxha5533\hich\f1\dbch\f1\loch\f1\cf2\fs16 \'b7 \plain\f1\fs16\bnga1831\hich\f1\dbch\f1\loch\f1\cf2\fs16\b EEG Report\plain\f1\fs16\zcen3888\hich\f1\dbch\f1\loch\f1\cf2\fs16\cell  \pard\intbl\ssparaaux0\s0\ql\plain\f0\fs24\plain\f1\fs16\djzz4375\hich\f1\dbch\f1\loch\f1\cf2\fs16\cell  \intbl\row  \pard\ssparaaux0\s0\ql\plain\f0\fs24\plain\f1\fs16\erov9674\hich\f1\dbch\f1\loch\f1\cf2\fs16 NADINE CAMPOVERDE [id number] \par  \par  Study Date: \tab\tab 11- 09:50 AM -  11-23-23 08:00 AM\par  Duration in hours:  x 21 HR 55 MIN\par  \par  --------------------------------------------------------------------------------------------------\par  History:\par  CC/ HPI Patient is a 58y old  Female who presents with a chief complaint of AMS (22 Nov 2023 13:46)\par  \par  MEDICATIONS  (STANDING):\par  amLODIPine   Tablet 10 milliGRAM(s) Oral daily\par  aspirin  chewable 81 milliGRAM(s) Oral daily\par  atorvastatin 80 milliGRAM(s) Oral at bedtime\par  cyanocobalamin Injectable 1000 MICROGram(s) IntraMuscular daily\par  enoxaparin Injectable 40 milliGRAM(s) SubCutaneous every 24 hours\par  losartan 25 milliGRAM(s) Oral daily\par  \par  --------------------------------------------------------------------------------------------------\par  Study Interpretation:\par  \par  [Abbreviation Key:  PDR=alpha rhythm/posterior dominant rhythm. A-P=anterior posterior.  Amplitude: \u8216 ?very low\u8217 ?:<20; \u8216 ?low\u8217 ?:20-49; \u8216 ?medium\u8217 ?:; \u8216 ?high\u8217 ?:>150uV.  Persistence for periodic/rhythmic   patterns (% of epoch) \u8216 ?rare\u8217 ?:<1%; \u8216 ?occasional\u8217 ?:1-10%; \u8216 ?frequent\u8217 ?:10-50%; \u8216 ?abundant\u8217 ?:50-90%; \u8216 ?continuous\u8217 ?:>90%.  Persistence for sporadic discharges: \u8216 ?rare\u8217 ?:<1/hr; \u8216   ?occasional\u8217 ?:1/min-1/hr; \u8216 ?frequent\u8217 ?:>1/min; \u8216 ?abundant\u8217 ?:>1/10 sec.  RPP=rhythmic and periodic patterns; GRDA=generalized rhythmic delta activity; FIRDA=frontal intermittent GRDA; LRDA=lateralized rhythmic delta activity;   TIRDA=temporal intermittent rhythmic delta activity;  LPD=PLED=lateralized periodic discharges; GPD=generalized periodic discharges; BIPDs =bilateral independent periodic discharges; Mf=multifocal; SIRPDs=stimulus induced rhythmic, periodic, or ictal   appearing discharges; BIRDs=brief potentially ictal rhythmic discharges >4 Hz, lasting .5-10s; PFA (paroxysmal bursts >13 Hz or =8 Hz <10s).  Modifiers: +F=with fast component; +S=with spike component; +R=with rhythmic component.  S-B=burst suppression   pattern.  Max=maximal. N1-drowsy; N2-stage II sleep; N3-slow wave sleep. SSS/BETS=small sharp spikes/benign epileptiform transients of sleep. HV=hyperventilation; PS=photic stimulation]\par  \par  FINDINGS:  \par  \par  Background:\par  Continuity: continuous\par  Symmetry: symmetric\par  PDR: 7-8 Hz activity, with amplitude to 40 uV, that attenuated to eye opening.  \par  Reactivity: present\par  Voltage: normal (between 20-150uV)\par  Anterior Posterior Gradient: present\par  Other background findings: none\par  Breach: absent\par  \par  Background Slowing:\par  Generalized slowing: intermittent diffuse irregular delta and theta activity.\par  Focal slowing: intermittent bitemporal irregular delta slowing with shifting lateralization was present.\par  \par  State Changes: \par  -Drowsiness noted with increased slowing, attenuation of fast activity.\par  -Poorly formed symmetrical K complexes and sleep spindles were recorded.\par  \par  Sporadic Epileptiform Discharges:  \par  None\par  \par  Rhythmic and Periodic Patterns (RPPs):\par  Intermittent frontally predominant generalized rhythmic delta activity\par  \par  Electrographic and Electroclinical seizures:\par  None\par  \par  Other Clinical Events:\par  None\par  \par  Activation Procedures: \par  -Hyperventilation was not performed.  \par  -Photic stimulation was not performed.\par  \par  Artifacts:\par  Intermittent myogenic and movement artifacts were noted.\par  \par  ECG:\par  The heart rate on single channel ECG was predominantly between 60-80 BPM with intermittent irregularity.\par  \par  EEG Classification / Summary:\par  Abnormal EEG study\par  Moderate generalized background slowing with intermittent GRDA\par  Intermittent bitemporal slowing with shifting lateralization\par  \par  -----------------------------------------------------------------------------------------------------\par  \par  Clinical Impression:\par  Moderate diffuse/multi-focal cerebral dysfunction, not specific as to etiology.\par  There were no epileptiform abnormalities recorded.  \par   \par  Rolo Lopez MD\par  Fellow, [facility]\par  \par  \par  -------------------------------------------------------------------------------------------------------\par  [facility] EEG Reading Room Ph#: [phone]\par  Epilepsy Answering Service after 5PM and before 8:30AM: Ph#: [phone]\par  \par  \par  \plain\f1\fs16\zejz9652\hich\f1\dbch\f1\loch\f1\cf2\fs16\strike\plain\f1\fs16\bxhn8477\hich\f1\dbch\f1\loch\f1\cf2\fs16\plain\f1\fs16\ffbv5871\hich\f1\dbch\f1\loch\f1\cf2\fs16\par  \par  {\*\bkmkstart bkClinDocSignaturesAlt}{\*\bkmkend bkClinDocSignaturesAlt}{\*\bkmkstart bkcommentSBK}{\*\bkmkend bkcommentSBK}\plain\f1\fs16\ujsh5713\hich\f1\dbch\f1\loch\f1\cf2\fs16\b Electronic Signatures:\plain\f1\fs16\jzlq1550\hich\f1\dbch\f1\loch\f1\cf2\fs16\par  \plain\f1\fs16\emgh7051\hich\f1\dbch\f1\loch\f1\cf2\fs16\b\ul Juno Sanchez (MD)\plain\f1\fs16\tarl6753\hich\f1\dbch\f1\loch\f1\cf2\fs16   \plain\f1\fs18\yzrh9476\hich\f1\dbch\f1\loch\f1\cf2\fs18 (Signed 23-Nov-2023 09:35)\par  \fi-360\li720\plain\f0\fs24\plain\f1\fs18\glza6306\hich\f1\dbch\f1\loch\f1\cf2\fs18\tab\plain\f1\fs16\hsyf8135\hich\f1\dbch\f1\loch\f1\cf2\fs16\b\i Authored: \plain\f1\fs16\xsvf0400\hich\f1\dbch\f1\loch\f1\cf2\fs16\i EEG REPORT\plain\f1\fs16\akzg0949\hich\f1\dbch\f1\loch\f1\cf2\fs16\par  \tab\plain\f1\fs16\pywk9243\hich\f1\dbch\f1\loch\f1\cf2\fs16\b\i Co-Signer: \plain\f1\fs16\qawa7716\hich\f1\dbch\f1\loch\f1\cf2\fs16\i TECH INFORMATION, EEG REPORT\plain\f1\fs16\ivzl5593\hich\f1\dbch\f1\loch\f1\cf2\fs16\par  \fi0\li0\plain\f0\fs24\plain\f1\fs16\bxqf1223\hich\f1\dbch\f1\loch\f1\cf2\fs16\b\ul John, Ferhat (MD)\plain\f1\fs16\obyp5671\hich\f1\dbch\f1\loch\f1\cf2\fs16   \plain\f1\fs18\smzb0366\hich\f1\dbch\f1\loch\f1\cf2\fs18 (Signed 23-Nov-2023 08:55)\par  \fi-360\li720\plain\f0\fs24\plain\f1\fs18\ayjx1685\hich\f1\dbch\f1\loch\f1\cf2\fs18\tab\plain\f1\fs16\xxtm7230\hich\f1\dbch\f1\loch\f1\cf2\fs16\b\i Authored: \plain\f1\fs16\refu2789\hich\f1\dbch\f1\loch\f1\cf2\fs16\i TECH INFORMATION, EEG REPORT\plain\f1\fs16\rxwf7175\hich\f1\dbch\f1\loch\f1\cf2\fs16\par  \fi0\li0\plain\f0\fs24\plain\f1\fs16\drmd1595\hich\f1\dbch\f1\loch\f1\cf2\fs16\par  \par  \plain\f1\fs16\arxs5880\hich\f1\dbch\f1\loch\f1\cf2\fs16\b\i Last Updated: \plain\f1\fs16\bdro5748\hich\f1\dbch\f1\loch\f1\cf2\fs16\i 23-Nov-2023 09:35 by Juno Sanchez (MD)\plain\f0\fs20\ajgv2264\hich\f0\dbch\f0\loch\f0\fs20\par  \pard\plain\f0\fs24\plain\f0\fs20\xjgf0113\hich\f0\dbch\f0\loch\f0\fs20\par  \par  \ql\plain\f0\fs24\plain\f0\fs20\wswd4454\hich\f0\dbch\f0\loch\f0\fs20\par  }

## 2023-11-24 LAB
CRYOGLOB SERPL-MCNC: NEGATIVE — SIGNIFICANT CHANGE UP
CRYOGLOB SERPL-MCNC: NEGATIVE — SIGNIFICANT CHANGE UP
CULTURE RESULTS: NO GROWTH — SIGNIFICANT CHANGE UP
CULTURE RESULTS: NO GROWTH — SIGNIFICANT CHANGE UP
DRVVT RATIO: 1.02 RATIO — SIGNIFICANT CHANGE UP (ref 0–1.21)
DRVVT RATIO: 1.02 RATIO — SIGNIFICANT CHANGE UP (ref 0–1.21)
DRVVT RATIO: 1.04 RATIO — SIGNIFICANT CHANGE UP (ref 0–1.21)
DRVVT RATIO: 1.04 RATIO — SIGNIFICANT CHANGE UP (ref 0–1.21)
DRVVT SCREEN TO CONFIRM RATIO: SIGNIFICANT CHANGE UP
EBV PCR: SIGNIFICANT CHANGE UP IU/ML
EBV PCR: SIGNIFICANT CHANGE UP IU/ML
GLUCOSE BLDC GLUCOMTR-MCNC: 115 MG/DL — HIGH (ref 70–99)
GLUCOSE BLDC GLUCOMTR-MCNC: 115 MG/DL — HIGH (ref 70–99)
GLUCOSE BLDC GLUCOMTR-MCNC: 152 MG/DL — HIGH (ref 70–99)
GLUCOSE BLDC GLUCOMTR-MCNC: 152 MG/DL — HIGH (ref 70–99)
GLUCOSE BLDC GLUCOMTR-MCNC: 203 MG/DL — HIGH (ref 70–99)
GLUCOSE BLDC GLUCOMTR-MCNC: 203 MG/DL — HIGH (ref 70–99)
GLUCOSE BLDC GLUCOMTR-MCNC: 204 MG/DL — HIGH (ref 70–99)
GLUCOSE BLDC GLUCOMTR-MCNC: 204 MG/DL — HIGH (ref 70–99)
HAPTOGLOB SERPL-MCNC: 335 MG/DL — HIGH (ref 34–200)
HAPTOGLOB SERPL-MCNC: 335 MG/DL — HIGH (ref 34–200)
JCPYV DNA # CSF NAA+PROBE: SIGNIFICANT CHANGE UP COPIES/ML
JCPYV DNA # CSF NAA+PROBE: SIGNIFICANT CHANGE UP COPIES/ML
LDH SERPL L TO P-CCNC: 148 U/L — SIGNIFICANT CHANGE UP (ref 50–242)
LDH SERPL L TO P-CCNC: 148 U/L — SIGNIFICANT CHANGE UP (ref 50–242)
NON-GYNECOLOGICAL CYTOLOGY STUDY: SIGNIFICANT CHANGE UP
NON-GYNECOLOGICAL CYTOLOGY STUDY: SIGNIFICANT CHANGE UP
NORMALIZED SCT PPP-RTO: 0.93 RATIO — SIGNIFICANT CHANGE UP (ref 0–1.16)
NORMALIZED SCT PPP-RTO: 0.93 RATIO — SIGNIFICANT CHANGE UP (ref 0–1.16)
NORMALIZED SCT PPP-RTO: SIGNIFICANT CHANGE UP
NORMALIZED SCT PPP-RTO: SIGNIFICANT CHANGE UP
RBC # BLD: 3.57 M/UL — LOW (ref 3.8–5.2)
RBC # BLD: 3.57 M/UL — LOW (ref 3.8–5.2)
RETICS #: 48.6 K/UL — SIGNIFICANT CHANGE UP (ref 25–125)
RETICS #: 48.6 K/UL — SIGNIFICANT CHANGE UP (ref 25–125)
RETICS/RBC NFR: 1.4 % — SIGNIFICANT CHANGE UP (ref 0.5–2.5)
RETICS/RBC NFR: 1.4 % — SIGNIFICANT CHANGE UP (ref 0.5–2.5)
SPECIMEN SOURCE: SIGNIFICANT CHANGE UP
SPECIMEN SOURCE: SIGNIFICANT CHANGE UP

## 2023-11-24 PROCEDURE — 70450 CT HEAD/BRAIN W/O DYE: CPT | Mod: 26,59

## 2023-11-24 PROCEDURE — 70496 CT ANGIOGRAPHY HEAD: CPT | Mod: 26

## 2023-11-24 PROCEDURE — 70498 CT ANGIOGRAPHY NECK: CPT | Mod: 26

## 2023-11-24 RX ORDER — INSULIN LISPRO 100/ML
VIAL (ML) SUBCUTANEOUS AT BEDTIME
Refills: 0 | Status: DISCONTINUED | OUTPATIENT
Start: 2023-11-24 | End: 2023-11-30

## 2023-11-24 RX ORDER — PANTOPRAZOLE SODIUM 20 MG/1
40 TABLET, DELAYED RELEASE ORAL DAILY
Refills: 0 | Status: DISCONTINUED | OUTPATIENT
Start: 2023-11-24 | End: 2023-11-30

## 2023-11-24 RX ORDER — INSULIN LISPRO 100/ML
VIAL (ML) SUBCUTANEOUS
Refills: 0 | Status: DISCONTINUED | OUTPATIENT
Start: 2023-11-24 | End: 2023-11-30

## 2023-11-24 RX ADMIN — LOSARTAN POTASSIUM 25 MILLIGRAM(S): 100 TABLET, FILM COATED ORAL at 05:46

## 2023-11-24 RX ADMIN — PANTOPRAZOLE SODIUM 40 MILLIGRAM(S): 20 TABLET, DELAYED RELEASE ORAL at 12:21

## 2023-11-24 RX ADMIN — Medication 81 MILLIGRAM(S): at 12:21

## 2023-11-24 RX ADMIN — Medication 50 MILLIGRAM(S): at 12:21

## 2023-11-24 RX ADMIN — AMLODIPINE BESYLATE 10 MILLIGRAM(S): 2.5 TABLET ORAL at 05:46

## 2023-11-24 RX ADMIN — PREGABALIN 1000 MICROGRAM(S): 225 CAPSULE ORAL at 12:21

## 2023-11-24 RX ADMIN — ENOXAPARIN SODIUM 40 MILLIGRAM(S): 100 INJECTION SUBCUTANEOUS at 05:46

## 2023-11-24 NOTE — PROGRESS NOTE ADULT - SUBJECTIVE AND OBJECTIVE BOX
THE PATIENT WAS SEEN AND EXAMINED BY ME WITH THE HOUSESTAFF AND STROKE TEAM DURING MORNING ROUNDS.   HPI:  58-year old woman with history of hypertension, rheumatoid arthritis, brought into ED at  initially for concerned for altered mental status.Prior to hospital presentation: neighbor overhead patient was looking for her mom, was confused/wandering around apartment complex. At [facility]: MR brain showed bilateral subacute to remote embolic strokes. MRI shows right posterior corona radiata acute infarct and multiple petechial hemorrhages in b/l thalami and basal ganglia. Noted to have KRUNAL. Also noted to have hypertension, on amlodipine, aldactone, and losartan. Started on B12 supplements. Seen by rheumatology. Transferred to [facility] for cerebral angiogram. On admission: NIHSS: 2  [facility] States has been smoking 1 ppd for several years.      SUBJECTIVE: No events overnight.  No new neurologic complaints.      amLODIPine   Tablet 10 milliGRAM(s) Oral daily  aspirin  chewable 81 milliGRAM(s) Oral daily  atorvastatin 80 milliGRAM(s) Oral at bedtime  cyanocobalamin Injectable 1000 MICROGram(s) IntraMuscular daily  enoxaparin Injectable 40 milliGRAM(s) SubCutaneous every 24 hours  losartan 25 milliGRAM(s) Oral daily      PHYSICAL EXAM:   Vital Signs Last 24 Hrs  T(C): 36.9 (24 Nov 2023 04:47), Max: 37.4 (24 Nov 2023 00:22)  T(F): 98.4 (24 Nov 2023 04:47), Max: 99.3 (24 Nov 2023 00:22)  HR: 92 (24 Nov 2023 04:47) (67 - 92)  BP: 121/79 (24 Nov 2023 04:47) (121/79 - 172/87)  RR: 18 (24 Nov 2023 04:47) (18 - 18)  SpO2: 96% (24 Nov 2023 04:47) (92% - 96%)    Parameters below as of 24 Nov 2023 04:47  Patient On (Oxygen Delivery Method): room air        General: No acute distress  HEENT: EOM intact, visual fields full  Abdomen: Soft, nontender, nondistended   Extremities: No edema    NEUROLOGICAL EXAM:  Mental status: Awake, alert, oriented to name and 2023, speaks 1-2 words not month/year, follows some simple commands.  Cranial Nerves: trace L facial droop, no nystagmus, no dysarthria,  tongue midline  Motor exam: left UE drift, Lower extremities antigravity.   Sensation: Intact to light touch   Coordination/ Gait: No dysmetria, gait not tested    LABS:       PTT - ( 23 Nov 2023 06:26 )  PTT:31.1 sec      IMAGING: Reviewed by me.     11/21/23 CT HEAD: No hydrocephalus, acute intracranial hemorrhage, mass effect, or brain edema.    (11.18.2023)  Head CT: No acute intracranial hemorrhage, mass effect, or shift of the   midline structures.  Similar-appearing extensive chronic white matter microvascular type   changes and chronic lacunar infarcts, as discussed.    CTA neck and head: No large vessel occlusion or major stenosis.    MR Head w/ IV Cont (11.17.2023)  Right posterior corona radiata subacute infarction  Multiple remote infarctions within the cerebral hemispheric white   matter basal ganglia and thalami  Pervasive cerebral hemispheric white matter abnormality, possibly   accelerated form of ischemic white matter disease versus and/or   superimposed other inflammatory metabolic toxic or infectious process  Numerous remote petechial hemorrhages most prominently in the basal   ganglia and thalami  Diffuse brain volume loss upper range typical for age

## 2023-11-24 NOTE — PROGRESS NOTE ADULT - ASSESSMENT
58 year old woman with HTN, Rheumatoid arthritis, presenting with decompensated confusion, subacute course since about February 2023.     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    NEURO: Neurologically unchanged, permissive HTN to gradual normotension. A1c 4.9, LDL - 128, high intensity statin initiated. MRI Brain with contrast showed diffusion restriction R corona radiata.  Multiple microhemorrhages, deep and cortical. Cerebral angiogram performed on 11/20 showed multiple areas of focal stenosis and dilatation concerning for vasculitis. LP completed on 11/21 with normal profile, pending additional studies. Rheumatology consulted and recommended possible brain biopsy to confirm diagnosis of vasculitis, will hold off as CSF results pending. Seen by Neurosrugery: Patient's neuroradiologic findings including beading on cerebral angiogram (consistent with vasculitis) and clinical presentation, with no other etiology of vasculitis, point to PACNS. Patient should be started on steroids given she is having microhemorrhage and strokes from vasculitis. The risks of performing an open biopsy outweigh the benefits if the results of the biopsy are unlikely to . EEG: Moderate diffuse/multi-focal cerebral dysfunction, not specific as to etiology. There were no epileptiform abnormalities recorded. Physical therapy/OT/PMR: AR.     ANTITHROMBOTIC THERAPY: ASA 81mg daily    PULMONARY: CXR: Mild, central pulmonary venous congestion. Mild perihilar interstitial infiltrates with air bronchograms, right greater than left. Protecting airway, saturating well     CARDIOVASCULAR: TTE Normal global left ventricular systolic function. Mild mitral valve regurgitation. Mild aortic regurgitation. Sclerotic aortic valve with normal opening, cardiac monitoring without reported events                             SBP goal: 100-160    GASTROINTESTINAL: dysphagia screen passed on 11/20, advance as tolerated        Diet: Regular    RENAL: BUN/Cr within normal limits on 11/22, good urine output      Na Goal: Greater than 135     Hamm: no    HEMATOLOGY: H/H without change, Platelets 327     DVT ppx: Lovenox    ID: afebrile, no sign of infection    OTHER: Plan discussed with patient at bedside, all questions and concerns addressed. Patient's brother (HCP): Jorge Stone ().     DISPOSITION: AR once stable and workup is complete    CORE MEASURES:        Admission NIHSS: 2     Tenecteplase: NO      LDL/HDL: p     Depression Screen: p     Statin Therapy: yes     Dysphagia Screen:  PASS      SmokingYES Smoking cessation and education provided to patient [] Nicotine patch ordered ??     Afib NO     Stroke Education  YES    Obtain screening lower extremity venous ultrasound in patients who meet 1 or more of the following criteria as patient is high risk for DVT/PE on admission:   [] History of DVT/PE  [] Hypercoagulable states (Factor V Leiden, Cancer, OCP, etc. )  [] Prolonged immobility (hemiplegia/hemiparesis/post operative or any other extended immobilization)  [] Transferred from outside facility (Rehab or Long term care)  [] Age </= to 50 58 year old woman with HTN, Rheumatoid arthritis, presenting with decompensated confusion, subacute course since about February 2023.     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    NEURO: Neurologically unchanged, permissive HTN to gradual normotension. A1c 4.9, LDL - 128, high intensity statin initiated. MRI Brain with contrast showed diffusion restriction R corona radiata.  Multiple microhemorrhages, deep and cortical. Cerebral angiogram performed on 11/20 showed multiple areas of focal stenosis and dilatation concerning for vasculitis. LP completed on 11/21 with normal profile, pending additional studies. Rheumatology consulted and recommended possible brain biopsy to confirm diagnosis of vasculitis, will hold off as CSF results pending. Seen by Neurosrugery: Patient's neuroradiologic findings including beading on cerebral angiogram (consistent with vasculitis) and clinical presentation, with no other etiology of vasculitis, point to PACNS. Patient should be started on steroids given she is having microhemorrhage and strokes from vasculitis. The risks of performing an open biopsy outweigh the benefits if the results of the biopsy are unlikely to . EEG: Moderate diffuse/multi-focal cerebral dysfunction, not specific as to etiology. There were no epileptiform abnormalities recorded. Steroids started on 11/24, initial dose Solumderol 1G for 3 days and then will taper.  Physical therapy/OT/PMR: AR.     ANTITHROMBOTIC THERAPY: ASA 81mg daily    PULMONARY: CXR: Mild, central pulmonary venous congestion. Mild perihilar interstitial infiltrates with air bronchograms, right greater than left. Protecting airway, saturating well     CARDIOVASCULAR: TTE Normal global left ventricular systolic function. Mild mitral valve regurgitation. Mild aortic regurgitation. Sclerotic aortic valve with normal opening, cardiac monitoring without reported events                             SBP goal: 100-160    GASTROINTESTINAL: dysphagia screen passed on 11/20, advance as tolerated        Diet: Regular    RENAL: BUN/Cr within normal limits on 11/22, good urine output      Na Goal: Greater than 135     Hamm: no    HEMATOLOGY: H/H without change, Platelets 327     DVT ppx: Lovenox    ID: afebrile, no sign of infection    OTHER: Plan discussed with patient at bedside, all questions and concerns addressed. Patient's brother (HCP): Jorge Stone ().     DISPOSITION: AR once stable and workup is complete    CORE MEASURES:        Admission NIHSS: 2     Tenecteplase: NO      LDL/HDL: p     Depression Screen: p     Statin Therapy: yes     Dysphagia Screen:  PASS      SmokingYES Smoking cessation and education provided to patient [] Nicotine patch ordered ??     Afib NO     Stroke Education  YES    Obtain screening lower extremity venous ultrasound in patients who meet 1 or more of the following criteria as patient is high risk for DVT/PE on admission:   [] History of DVT/PE  [] Hypercoagulable states (Factor V Leiden, Cancer, OCP, etc. )  [] Prolonged immobility (hemiplegia/hemiparesis/post operative or any other extended immobilization)  [] Transferred from outside facility (Rehab or Long term care)  [] Age </= to 50

## 2023-11-24 NOTE — PROGRESS NOTE ADULT - SUBJECTIVE AND OBJECTIVE BOX
{\rtf1\wcqepv56213\ansi\jrraacv8064\ftnbj\uc1\deff0  {\fonttbl{\f0 \fnil Segoe UI;}{\f1 \fnil \fcharset0 Segoe UI;}{\f2 \fnil Times New Nirmal;}}  {\colortbl ;\xkx790\bvluy062\nolx511 ;\red0\green0\blue0 ;\red0\green0\wmye846 ;\red0\green0\blue0 ;}  {\stylesheet{\f0\fs20 Normal;}{\cs1 Default Paragraph Font;}{\cs2\f0\fs16 Line Number;}{\cs3\f2\fs24\ul\cf3 Hyperlink;}}  {\*\revtbl{Unknown;}}  \hmyeto22055\aiiarf18641\zqpwu4787\vldus7042\skmvj5782\nbnur8314\xveyiif683\depqojp822\nogrowautofit\bgjurg075\formshade\nofeaturethrottle1\dntblnsbdb\fet4\aendnotes\aftnnrlc\pgbrdrhead\pgbrdrfoot  \sectd\njbhay05419\sycusm27406\guttersxn0\gahrwyih2936\xfjtztih5388\vueuupjw0215\zmscfbtb0427\vqgzvzt382\uwghzcb574\sbkpage\pgncont\pgndec  \plain\plain\f0\fs24\pard\plain\f0\fs24\plain\f0\fs20\thdb7611\hich\f0\dbch\f0\loch\f0\fs20 Subjective: Patient seen and examined. No new events except as noted. \par  \par  REVIEW OF SYSTEMS:\par  \par  CONSTITUTIONAL: + weakness, fevers or chills\par  EYES/ENT: No visual changes;  No vertigo or throat pain \par  NECK: No pain or stiffness\par  RESPIRATORY: No cough, wheezing, hemoptysis; No shortness of breath\par  CARDIOVASCULAR: No chest pain or palpitations\par  GASTROINTESTINAL: No abdominal or epigastric pain. No nausea, vomiting, or hematemesis; No diarrhea or constipation. No melena or hematochezia.\par  GENITOURINARY: No dysuria, frequency or hematuria\par  NEUROLOGICAL: No numbness or weakness\par  SKIN: No itching, burning, rashes, or lesions \par  All other review of systems is negative unless indicated above.\par  \par  MEDICATIONS:\par  MEDICATIONS  (STANDING):\par  amLODIPine   Tablet 10 milliGRAM(s) Oral daily\par  aspirin  chewable 81 milliGRAM(s) Oral daily\par  atorvastatin 80 milliGRAM(s) Oral at bedtime\par  cyanocobalamin Injectable 1000 MICROGram(s) IntraMuscular daily\par  enoxaparin Injectable 40 milliGRAM(s) SubCutaneous every 24 hours\par  losartan 25 milliGRAM(s) Oral daily\par  methylPREDNISolone sodium succinate IVPB 1000 milliGRAM(s) IV Intermittent daily\par  pantoprazole    Tablet 40 milliGRAM(s) Oral daily\par  \par  \par  PHYSICAL EXAM:\par  T(C): 37.2 (11-24-23 @ 08:19), Max: 37.4 (11-24-23 @ 00:22)\par  HR: 86 (11-24-23 @ 08:19) (67 - 92)\par  BP: 162/75 (11-24-23 @ 08:19) (121/79 - 172/87)\par  RR: 18 (11-24-23 @ 08:19) (18 - 18)\par  SpO2: 95% (11-24-23 @ 08:19) (92% - 96%)\par  Wt(kg): --\par  I&O's Summary\par  \par  23 Nov 2023 07:01  -  24 Nov 2023 07:00\par  --------------------------------------------------------\par  IN: 120 mL / OUT: 735 mL / NET: -615 mL\par  \par  \par  \par  Appearance: NAD\tab\par  HEENT:   Dry oral mucosa, PERRL, EOMI\tab\par  Lymphatic: No lymphadenopathy\par  Cardiovascular: Normal S1 S2, No JVD, No murmurs, No edema\par  Respiratory: Lungs clear to auscultation\tab\par  Psychiatry: A & O x 3, Mood & affect appropriate\par  Gastrointestinal:  Soft, Non-tender, + BS\tab\par  Skin: No rashes, No ecchymoses, No cyanosis\tab\par  Neurologic: AOx2-3, EOMI, no facial, no drift CLEANING 5/5\par  Extremities: Normal range of motion, No clubbing, cyanosis or edema\par  Vascular: Peripheral pulses palpable 2+ bilaterally\par  \par  \par  \par  LABS:\par  \par  CARDIAC MARKERS:\par  \par  \par  proBNP: \par  Lipid Profile: \par  HgA1c: \par  TSH: \par  \par  \par  TELEMETRY: \tab   \par  ECG:  \tab\par  RADIOLOGY: \par  DIAGNOSTIC TESTING:\par  [ ] Echocardiogram:\par  [ ]  Catheterization:\par  [ ] Stress Test:  \par  OTHER: \tab\par  \par  \ql\plain\f0\fs24{\*\bkmkstart [id number]}{\*\bkmkend [id number]}\plain\f1\fs16\ksun0215\hich\f1\dbch\f1\loch\f1\cf2\fs16\b\ul TECH INFORMATION:\plain\f1\fs16\rybb8705\hich\f1\dbch\f1\loch\f1\cf2\fs16  \par  {\*\bkmkstart [id number]}{\*\bkmkend [id number]}This is a {\*\bkmkstart [id number]}{\*\bkmkend [id number]}Continuous Video EEG. \par  \par  {\*\bkmkstart [id number]}{\*\bkmkend el91322600134}Recording Technique: {\*\bkmkstart [id number]}{\*\bkmkend [id number]}The patient underwent continuous video-EEG monitoring, using Telemetry System hardware on the [employer].  EEG and   video data were stored on a computer hard drive with important events saved in digital archive files. {\*\bkmkstart [id number]}{\*\bkmkend [id number]}The material was reviewed by a physician (electroencephalographer/epileptologist) on a daily basis.    Jose M and seizure detection algorithms were utilized and reviewed.  An EEG Technician attended to the patient for 8 to 10 hours per day. {\*\bkmkstart [id number]}{\*\bkmkend [id number]}The patient was observed by the epilepsy nursing staff 24 hours   per day. The epilepsy center neurologist was available in person or on call 24 hours per day.. \par  \par  {\*\bkmkstart [id number]}{\*\bkmkend [id number]}Placement and Labeling of Electrodes: {\*\bkmkstart [id number]}{\*\bkmkend [id number]}The EEG was performed utilizing at least 20 channel referential EEG connections (coronal over temporal over   parasagittal montage) with inferior temporal electrodes when indicting and using all standard 10-20 electrode placements with EKG, {\*\bkmkstart [id number]}{\*\bkmkend [id number]}with additional electrodes placed in the inferior temporal region   using the modified 10-10 montage electrode placements for elective admissions, or if deemed necessary.  Recording was at  a sampling rate of 256 samples per second per channel. {\*\bkmkstart [id number]}{\*\bkmkend [id number]}Time synchronized digital   video recording was done simultaneously with EEG recording.  A low light infrared camera was used for low light recording..\par  \par  {\*\bkmkstart [id number]}{\*\bkmkend [id number]}\plain\f1\fs16\izal9222\hich\f1\dbch\f1\loch\f1\cf2\fs16\b\ul EEG REPORT:\plain\f1\fs16\uvvz0172\hich\f1\dbch\f1\loch\f1\cf2\fs16  \par  \plain\f1\fs16\yhnu0076\hich\f1\dbch\f1\loch\f1\cf2\fs16\b\ul{\field{\*\fldinst HYPERLINK [id number] }{\fldrslt EEG Report:}}\plain\f1\fs16\dktq4734\hich\f1\dbch\f1\loch\f1\cf2\fs16\ql\par  \trowd\mrxrbq96\lastrow\gxboreb55\trpaddfl3\fsthaea72\trpaddfr3\trpaddt0\trpaddft3\trpaddb0\trpaddfb3\trleft0  \clvertalt\flzczf31\clpadft3\ggrcwp96\clpadfr3\clpadl0\clpadfl3\clpadb0\clpadfb3\tztqn7979  \clvertalt\opqagt29\clpadft3\ebzuzj21\clpadfr3\clpadl0\clpadfl3\clpadb0\clpadfb3\gmpaf5319  \pard\intbl\ssparaaux0\s0\fi-120\li120\ql\plain\f0\fs24{\*\bkmkstart [id number]}{\*\bkmkend [id number]}\plain\f1\fs16\bkxb2049\hich\f1\dbch\f1\loch\f1\cf2\fs16 \'b7 \plain\f1\fs16\sbiq1259\hich\f1\dbch\f1\loch\f1\cf2\fs16\b EEG Report\plain\f1\fs16\txrv7717\hich\f1\dbch\f1\loch\f1\cf2\fs16\cell  \pard\intbl\ssparaaux0\s0\ql\plain\f0\fs24\plain\f1\fs16\yocu8945\hich\f1\dbch\f1\loch\f1\cf2\fs16\cell  \intbl\row  \pard\ssparaaux0\s0\ql\plain\f0\fs24\plain\f1\fs16\uzhw3159\hich\f1\dbch\f1\loch\f1\cf2\fs16 NADINE CAMPOVERDE MRN-[id number] \par  \par  Study Date: 11- 09:50 AM -  11-23-23 17:15 AM\par  Duration in hours:   30 HR 15 MIN\par  \par  --------------------------------------------------------------------------------------------------\par  History:\par  CC/ HPI Patient is a 58y old  Female who presents with a chief complaint of AMS (22 Nov 2023 13:46)\par  \par  MEDICATIONS  (STANDING):\par  amLODIPine   Tablet 10 milliGRAM(s) Oral daily\par  aspirin  chewable 81 milliGRAM(s) Oral daily\par  atorvastatin 80 milliGRAM(s) Oral at bedtime\par  cyanocobalamin Injectable 1000 MICROGram(s) IntraMuscular daily\par  enoxaparin Injectable 40 milliGRAM(s) SubCutaneous every 24 hours\par  losartan 25 milliGRAM(s) Oral daily\par  \par  --------------------------------------------------------------------------------------------------\par  Study Interpretation:\par  \par  [Abbreviation Key:  PDR=alpha rhythm/posterior dominant rhythm. A-P=anterior posterior.  Amplitude: \u8216 ?very low\u8217 ?:<20; \u8216 ?low\u8217 ?:20-49; \u8216 ?medium\u8217 ?:; \u8216 ?high\u8217 ?:>150uV.  Persistence for periodic/rhythmic   patterns (% of epoch) \u8216 ?rare\u8217 ?:<1%; \u8216 ?occasional\u8217 ?:1-10%; \u8216 ?frequent\u8217 ?:10-50%; \u8216 ?abundant\u8217 ?:50-90%; \u8216 ?continuous\u8217 ?:>90%.  Persistence for sporadic discharges: \u8216 ?rare\u8217 ?:<1/hr; \u8216   ?occasional\u8217 ?:1/min-1/hr; \u8216 ?frequent\u8217 ?:>1/min; \u8216 ?abundant\u8217 ?:>1/10 sec.  RPP=rhythmic and periodic patterns; GRDA=generalized rhythmic delta activity; FIRDA=frontal intermittent GRDA; LRDA=lateralized rhythmic delta activity;   TIRDA=temporal intermittent rhythmic delta activity;  LPD=PLED=lateralized periodic discharges; GPD=generalized periodic discharges; BIPDs =bilateral independent periodic discharges; Mf=multifocal; SIRPDs=stimulus induced rhythmic, periodic, or ictal   appearing discharges; BIRDs=brief potentially ictal rhythmic discharges >4 Hz, lasting .5-10s; PFA (paroxysmal bursts >13 Hz or =8 Hz <10s).  Modifiers: +F=with fast component; +S=with spike component; +R=with rhythmic component.  S-B=burst suppression   pattern.  Max=maximal. N1-drowsy; N2-stage II sleep; N3-slow wave sleep. SSS/BETS=small sharp spikes/benign epileptiform transients of sleep. HV=hyperventilation; PS=photic stimulation]\par  \par  FINDINGS:  \par  \par  Background:\par  Continuity: continuous\par  Symmetry: symmetric\par  PDR: 7-8 Hz activity, with amplitude to 40 uV, that attenuated to eye opening.  \par  Reactivity: present\par  Voltage: normal (between 20-150uV)\par  Anterior Posterior Gradient: present\par  Other background findings: none\par  Breach: absent\par  \par  Background Slowing:\par  Generalized slowing: intermittent diffuse irregular delta and theta activity.\par  Focal slowing: intermittent bitemporal irregular delta slowing with shifting lateralization was present.\par  \par  State Changes: \par  -Drowsiness noted with increased slowing, attenuation of fast activity.\par  -Poorly formed symmetrical K complexes and sleep spindles were recorded.\par  \par  Sporadic Epileptiform Discharges:  \par  None\par  \par  Rhythmic and Periodic Patterns (RPPs):\par  Intermittent frontally predominant generalized rhythmic delta activity\par  \par  Electrographic and Electroclinical seizures:\par  None\par  \par  Other Clinical Events:\par  None\par  \par  Activation Procedures: \par  -Hyperventilation was not performed.  \par  -Photic stimulation was not performed.\par  \par  Artifacts:\par  Intermittent myogenic and movement artifacts were noted.\par  \par  ECG:\par  The heart rate on single channel ECG was predominantly between 60-80 BPM with intermittent irregularity.\par  \par  EEG Classification / Summary:\par  Abnormal EEG study\par  Moderate generalized background slowing with intermittent GRDA\par  Intermittent bitemporal slowing with shifting lateralization\par  \par  -----------------------------------------------------------------------------------------------------\par  \par  Clinical Impression:\par  Moderate diffuse/multi-focal cerebral dysfunction, not specific as to etiology.\par  There were no epileptiform abnormalities recorded.  \par   \par  Rolo Lopez MD\par  Fellow, [facility]\par  \par  \par  -------------------------------------------------------------------------------------------------------\par  [facility] EEG Reading Room Ph#: [phone]\par  Epilepsy Answering Service after 5PM and before 8:30AM: Ph#: [phone]\par  \par  \par  \plain\f1\fs16\vqsh6550\hich\f1\dbch\f1\loch\f1\cf2\fs16\strike\plain\f1\fs16\hejk7646\hich\f1\dbch\f1\loch\f1\cf2\fs16\plain\f1\fs16\gkis5179\hich\f1\dbch\f1\loch\f1\cf2\fs16\par  \par  {\*\bkmkstart bkClinDocSignatures}{\*\bkmkend bkClinDocSignatures}{\*\bkmkstart bkcommentSBK}{\*\bkmkend bkcommentSBK}\plain\f1\fs16\oeae1427\hich\f1\dbch\f1\loch\f1\cf2\fs16\b Electronic Signatures:\plain\f1\fs16\pusp7958\hich\f1\dbch\f1\loch\f1\cf2\fs16\par  \plain\f1\fs16\emso7951\hich\f1\dbch\f1\loch\f1\cf2\fs16\b\ul Juno Sanchez (MD)\plain\f1\fs16\jqon8707\hich\f1\dbch\f1\loch\f1\cf2\fs16   \plain\f1\fs18\yonx8326\hich\f1\dbch\f1\loch\f1\cf2\fs18 (Signed 23-Nov-2023 09:35)\par  \fi-360\li720\plain\f0\fs24\plain\f1\fs18\sisk3825\hich\f1\dbch\f1\loch\f1\cf2\fs18\tab\plain\f1\fs16\pgiy1575\hich\f1\dbch\f1\loch\f1\cf2\fs16\b\i Authored: \plain\f1\fs16\ssmq8162\hich\f1\dbch\f1\loch\f1\cf2\fs16\i EEG REPORT\plain\f1\fs16\jepi0398\hich\f1\dbch\f1\loch\f1\cf2\fs16\par  \tab\plain\f1\fs16\ksbz3279\hich\f1\dbch\f1\loch\f1\cf2\fs16\b\i Co-Signer: \plain\f1\fs16\sbgo1608\hich\f1\dbch\f1\loch\f1\cf2\fs16\i TECH INFORMATION, EEG REPORT\plain\f1\fs16\tvch7364\hich\f1\dbch\f1\loch\f1\cf2\fs16\par  \fi0\li0\plain\f0\fs24\plain\f1\fs16\ptom8471\hich\f1\dbch\f1\loch\f1\cf2\fs16\b\ul John, Ferhat (MD)\plain\f1\fs16\jxex7290\hich\f1\dbch\f1\loch\f1\cf2\fs16   \plain\f1\fs18\crmo0971\hich\f1\dbch\f1\loch\f1\cf2\fs18 (Signed 23-Nov-2023 08:55)\par  \fi-360\li720\plain\f0\fs24\plain\f1\fs18\ytfm8709\hich\f1\dbch\f1\loch\f1\cf2\fs18\tab\plain\f1\fs16\vxin7416\hich\f1\dbch\f1\loch\f1\cf2\fs16\b\i Authored: \plain\f1\fs16\papa0668\hich\f1\dbch\f1\loch\f1\cf2\fs16\i TECH INFORMATION, EEG REPORT\plain\f1\fs16\jqao7975\hich\f1\dbch\f1\loch\f1\cf2\fs16\par  \fi0\li0\plain\f0\fs24\plain\f1\fs16\wywp7169\hich\f1\dbch\f1\loch\f1\cf2\fs16\b\ul Narapureddy, Maddy (MD)\plain\f1\fs16\jspu3773\hich\f1\dbch\f1\loch\f1\cf2\fs16   \plain\f1\fs18\vngx3049\hich\f1\dbch\f1\loch\f1\cf2\fs18 (Signed 24-Nov-2023 10:03)\par  \fi-360\li720\plain\f0\fs24\plain\f1\fs18\pjhc2977\hich\f1\dbch\f1\loch\f1\cf2\fs18\tab\plain\f1\fs16\sswa0467\hich\f1\dbch\f1\loch\f1\cf2\fs16\b\i Authored: \plain\f1\fs16\dywx0236\hich\f1\dbch\f1\loch\f1\cf2\fs16\i EEG REPORT\plain\f1\fs16\jwrd8092\hich\f1\dbch\f1\loch\f1\cf2\fs16\par  \fi0\li0\plain\f0\fs24\plain\f1\fs16\tibq1012\hich\f1\dbch\f1\loch\f1\cf2\fs16\par  \par  \plain\f1\fs16\vhed4495\hich\f1\dbch\f1\loch\f1\cf2\fs16\b\i Last Updated: \plain\f1\fs16\rmvn2605\hich\f1\dbch\f1\loch\f1\cf2\fs16\i 24-Nov-2023 10:03 by Narapuredlakesha, Maddy (MD)\plain\f0\fs20\brhp3894\hich\f0\dbch\f0\loch\f0\fs20\par  \pard\plain\f0\fs24\plain\f0\fs20\lkfh6201\hich\f0\dbch\f0\loch\f0\fs20\par  \par  \ql\plain\f0\fs24\plain\f0\fs20\sqlq0900\hich\f0\dbch\f0\loch\f0\fs20\par  }

## 2023-11-25 LAB
ALBUMIN SERPL ELPH-MCNC: 4.2 G/DL — SIGNIFICANT CHANGE UP (ref 3.3–5)
ALBUMIN SERPL ELPH-MCNC: 4.2 G/DL — SIGNIFICANT CHANGE UP (ref 3.3–5)
ALP SERPL-CCNC: 76 U/L — SIGNIFICANT CHANGE UP (ref 40–120)
ALP SERPL-CCNC: 76 U/L — SIGNIFICANT CHANGE UP (ref 40–120)
ALT FLD-CCNC: 13 U/L — SIGNIFICANT CHANGE UP (ref 10–45)
ALT FLD-CCNC: 13 U/L — SIGNIFICANT CHANGE UP (ref 10–45)
ANION GAP SERPL CALC-SCNC: 13 MMOL/L — SIGNIFICANT CHANGE UP (ref 5–17)
ANION GAP SERPL CALC-SCNC: 13 MMOL/L — SIGNIFICANT CHANGE UP (ref 5–17)
APPEARANCE UR: ABNORMAL
APPEARANCE UR: ABNORMAL
AST SERPL-CCNC: 12 U/L — SIGNIFICANT CHANGE UP (ref 10–40)
AST SERPL-CCNC: 12 U/L — SIGNIFICANT CHANGE UP (ref 10–40)
B2 GLYCOPROT1 AB SER QL: NEGATIVE — SIGNIFICANT CHANGE UP
BACTERIA # UR AUTO: NEGATIVE /HPF — SIGNIFICANT CHANGE UP
BACTERIA # UR AUTO: NEGATIVE /HPF — SIGNIFICANT CHANGE UP
BILIRUB SERPL-MCNC: 0.5 MG/DL — SIGNIFICANT CHANGE UP (ref 0.2–1.2)
BILIRUB SERPL-MCNC: 0.5 MG/DL — SIGNIFICANT CHANGE UP (ref 0.2–1.2)
BILIRUB UR-MCNC: NEGATIVE — SIGNIFICANT CHANGE UP
BILIRUB UR-MCNC: NEGATIVE — SIGNIFICANT CHANGE UP
BUN SERPL-MCNC: 35 MG/DL — HIGH (ref 7–23)
BUN SERPL-MCNC: 35 MG/DL — HIGH (ref 7–23)
CALCIUM SERPL-MCNC: 10.2 MG/DL — SIGNIFICANT CHANGE UP (ref 8.4–10.5)
CALCIUM SERPL-MCNC: 10.2 MG/DL — SIGNIFICANT CHANGE UP (ref 8.4–10.5)
CARDIOLIPIN AB SER-ACNC: NEGATIVE — SIGNIFICANT CHANGE UP
CAST: 0 /LPF — SIGNIFICANT CHANGE UP (ref 0–4)
CAST: 0 /LPF — SIGNIFICANT CHANGE UP (ref 0–4)
CHLORIDE SERPL-SCNC: 104 MMOL/L — SIGNIFICANT CHANGE UP (ref 96–108)
CHLORIDE SERPL-SCNC: 104 MMOL/L — SIGNIFICANT CHANGE UP (ref 96–108)
CO2 SERPL-SCNC: 23 MMOL/L — SIGNIFICANT CHANGE UP (ref 22–31)
CO2 SERPL-SCNC: 23 MMOL/L — SIGNIFICANT CHANGE UP (ref 22–31)
COLOR SPEC: YELLOW — SIGNIFICANT CHANGE UP
COLOR SPEC: YELLOW — SIGNIFICANT CHANGE UP
CREAT SERPL-MCNC: 1.4 MG/DL — HIGH (ref 0.5–1.3)
CREAT SERPL-MCNC: 1.4 MG/DL — HIGH (ref 0.5–1.3)
DIFF PNL FLD: NEGATIVE — SIGNIFICANT CHANGE UP
DIFF PNL FLD: NEGATIVE — SIGNIFICANT CHANGE UP
EGFR: 43 ML/MIN/1.73M2 — LOW
EGFR: 43 ML/MIN/1.73M2 — LOW
GLUCOSE BLDC GLUCOMTR-MCNC: 153 MG/DL — HIGH (ref 70–99)
GLUCOSE BLDC GLUCOMTR-MCNC: 153 MG/DL — HIGH (ref 70–99)
GLUCOSE BLDC GLUCOMTR-MCNC: 168 MG/DL — HIGH (ref 70–99)
GLUCOSE BLDC GLUCOMTR-MCNC: 168 MG/DL — HIGH (ref 70–99)
GLUCOSE BLDC GLUCOMTR-MCNC: 170 MG/DL — HIGH (ref 70–99)
GLUCOSE BLDC GLUCOMTR-MCNC: 170 MG/DL — HIGH (ref 70–99)
GLUCOSE BLDC GLUCOMTR-MCNC: 238 MG/DL — HIGH (ref 70–99)
GLUCOSE BLDC GLUCOMTR-MCNC: 238 MG/DL — HIGH (ref 70–99)
GLUCOSE SERPL-MCNC: 229 MG/DL — HIGH (ref 70–99)
GLUCOSE SERPL-MCNC: 229 MG/DL — HIGH (ref 70–99)
GLUCOSE UR QL: NEGATIVE MG/DL — SIGNIFICANT CHANGE UP
GLUCOSE UR QL: NEGATIVE MG/DL — SIGNIFICANT CHANGE UP
HCT VFR BLD CALC: 31.5 % — LOW (ref 34.5–45)
HCT VFR BLD CALC: 31.5 % — LOW (ref 34.5–45)
HGB BLD-MCNC: 10.9 G/DL — LOW (ref 11.5–15.5)
HGB BLD-MCNC: 10.9 G/DL — LOW (ref 11.5–15.5)
KETONES UR-MCNC: NEGATIVE MG/DL — SIGNIFICANT CHANGE UP
KETONES UR-MCNC: NEGATIVE MG/DL — SIGNIFICANT CHANGE UP
LEUKOCYTE ESTERASE UR-ACNC: NEGATIVE — SIGNIFICANT CHANGE UP
LEUKOCYTE ESTERASE UR-ACNC: NEGATIVE — SIGNIFICANT CHANGE UP
MAGNESIUM SERPL-MCNC: 2.3 MG/DL — SIGNIFICANT CHANGE UP (ref 1.6–2.6)
MAGNESIUM SERPL-MCNC: 2.3 MG/DL — SIGNIFICANT CHANGE UP (ref 1.6–2.6)
MCHC RBC-ENTMCNC: 28.7 PG — SIGNIFICANT CHANGE UP (ref 27–34)
MCHC RBC-ENTMCNC: 28.7 PG — SIGNIFICANT CHANGE UP (ref 27–34)
MCHC RBC-ENTMCNC: 34.6 GM/DL — SIGNIFICANT CHANGE UP (ref 32–36)
MCHC RBC-ENTMCNC: 34.6 GM/DL — SIGNIFICANT CHANGE UP (ref 32–36)
MCV RBC AUTO: 82.9 FL — SIGNIFICANT CHANGE UP (ref 80–100)
MCV RBC AUTO: 82.9 FL — SIGNIFICANT CHANGE UP (ref 80–100)
NITRITE UR-MCNC: NEGATIVE — SIGNIFICANT CHANGE UP
NITRITE UR-MCNC: NEGATIVE — SIGNIFICANT CHANGE UP
NRBC # BLD: 0 /100 WBCS — SIGNIFICANT CHANGE UP (ref 0–0)
NRBC # BLD: 0 /100 WBCS — SIGNIFICANT CHANGE UP (ref 0–0)
PH UR: 5.5 — SIGNIFICANT CHANGE UP (ref 5–8)
PH UR: 5.5 — SIGNIFICANT CHANGE UP (ref 5–8)
PLATELET # BLD AUTO: 413 K/UL — HIGH (ref 150–400)
PLATELET # BLD AUTO: 413 K/UL — HIGH (ref 150–400)
POTASSIUM SERPL-MCNC: 4.1 MMOL/L — SIGNIFICANT CHANGE UP (ref 3.5–5.3)
POTASSIUM SERPL-MCNC: 4.1 MMOL/L — SIGNIFICANT CHANGE UP (ref 3.5–5.3)
POTASSIUM SERPL-SCNC: 4.1 MMOL/L — SIGNIFICANT CHANGE UP (ref 3.5–5.3)
POTASSIUM SERPL-SCNC: 4.1 MMOL/L — SIGNIFICANT CHANGE UP (ref 3.5–5.3)
PROT SERPL-MCNC: 8.4 G/DL — HIGH (ref 6–8.3)
PROT SERPL-MCNC: 8.4 G/DL — HIGH (ref 6–8.3)
PROT UR-MCNC: SIGNIFICANT CHANGE UP MG/DL
PROT UR-MCNC: SIGNIFICANT CHANGE UP MG/DL
RBC # BLD: 3.8 M/UL — SIGNIFICANT CHANGE UP (ref 3.8–5.2)
RBC # BLD: 3.8 M/UL — SIGNIFICANT CHANGE UP (ref 3.8–5.2)
RBC # FLD: 12.9 % — SIGNIFICANT CHANGE UP (ref 10.3–14.5)
RBC # FLD: 12.9 % — SIGNIFICANT CHANGE UP (ref 10.3–14.5)
RBC CASTS # UR COMP ASSIST: 1 /HPF — SIGNIFICANT CHANGE UP (ref 0–4)
RBC CASTS # UR COMP ASSIST: 1 /HPF — SIGNIFICANT CHANGE UP (ref 0–4)
REVIEW: SIGNIFICANT CHANGE UP
REVIEW: SIGNIFICANT CHANGE UP
SODIUM SERPL-SCNC: 140 MMOL/L — SIGNIFICANT CHANGE UP (ref 135–145)
SODIUM SERPL-SCNC: 140 MMOL/L — SIGNIFICANT CHANGE UP (ref 135–145)
SP GR SPEC: >1.03 — HIGH (ref 1–1.03)
SP GR SPEC: >1.03 — HIGH (ref 1–1.03)
SQUAMOUS # UR AUTO: 4 /HPF — SIGNIFICANT CHANGE UP (ref 0–5)
SQUAMOUS # UR AUTO: 4 /HPF — SIGNIFICANT CHANGE UP (ref 0–5)
URATE CRY FLD QL MICRO: PRESENT
URATE CRY FLD QL MICRO: PRESENT
UROBILINOGEN FLD QL: 0.2 MG/DL — SIGNIFICANT CHANGE UP (ref 0.2–1)
UROBILINOGEN FLD QL: 0.2 MG/DL — SIGNIFICANT CHANGE UP (ref 0.2–1)
WBC # BLD: 12.73 K/UL — HIGH (ref 3.8–10.5)
WBC # BLD: 12.73 K/UL — HIGH (ref 3.8–10.5)
WBC # FLD AUTO: 12.73 K/UL — HIGH (ref 3.8–10.5)
WBC # FLD AUTO: 12.73 K/UL — HIGH (ref 3.8–10.5)
WBC UR QL: 0 /HPF — SIGNIFICANT CHANGE UP (ref 0–5)
WBC UR QL: 0 /HPF — SIGNIFICANT CHANGE UP (ref 0–5)

## 2023-11-25 PROCEDURE — 71045 X-RAY EXAM CHEST 1 VIEW: CPT | Mod: 26

## 2023-11-25 RX ORDER — SODIUM CHLORIDE 9 MG/ML
1000 INJECTION INTRAMUSCULAR; INTRAVENOUS; SUBCUTANEOUS
Refills: 0 | Status: DISCONTINUED | OUTPATIENT
Start: 2023-11-25 | End: 2023-11-27

## 2023-11-25 RX ADMIN — Medication 50 MILLIGRAM(S): at 06:07

## 2023-11-25 RX ADMIN — Medication 1: at 16:38

## 2023-11-25 RX ADMIN — Medication 1: at 08:49

## 2023-11-25 RX ADMIN — Medication 2: at 11:28

## 2023-11-25 RX ADMIN — AMLODIPINE BESYLATE 10 MILLIGRAM(S): 2.5 TABLET ORAL at 06:03

## 2023-11-25 RX ADMIN — Medication 81 MILLIGRAM(S): at 11:46

## 2023-11-25 RX ADMIN — ENOXAPARIN SODIUM 40 MILLIGRAM(S): 100 INJECTION SUBCUTANEOUS at 06:05

## 2023-11-25 RX ADMIN — LOSARTAN POTASSIUM 25 MILLIGRAM(S): 100 TABLET, FILM COATED ORAL at 06:03

## 2023-11-25 RX ADMIN — PANTOPRAZOLE SODIUM 40 MILLIGRAM(S): 20 TABLET, DELAYED RELEASE ORAL at 11:46

## 2023-11-25 NOTE — PROGRESS NOTE ADULT - ASSESSMENT
58 year old woman with HTN, Rheumatoid arthritis, presenting with decompensated confusion, subacute course since about February 2023.     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    NEURO: Neurologically unchanged, permissive HTN to gradual normotension. A1c 4.9, LDL - 128, high intensity statin initiated. MRI Brain with contrast showed diffusion restriction R corona radiata.  Multiple microhemorrhages, deep and cortical. Cerebral angiogram performed on 11/20 showed multiple areas of focal stenosis and dilatation concerning for vasculitis. LP completed on 11/21 with normal profile, pending additional studies. Rheumatology consulted and recommended possible brain biopsy to confirm diagnosis of vasculitis, will hold off as CSF results pending. Seen by Neurosrugery: Patient's neuroradiologic findings including beading on cerebral angiogram (consistent with vasculitis) and clinical presentation, with no other etiology of vasculitis, point to PACNS. Patient should be started on steroids given she is having microhemorrhage and strokes from vasculitis. The risks of performing an open biopsy outweigh the benefits if the results of the biopsy are unlikely to . EEG: Moderate diffuse/multi-focal cerebral dysfunction, not specific as to etiology. There were no epileptiform abnormalities recorded. Steroids started on 11/24, initial dose Solumderol 1G for 3 days and then will taper.  Physical therapy/OT/PMR: AR.     ANTITHROMBOTIC THERAPY: ASA 81mg daily    PULMONARY: CXR: Mild, central pulmonary venous congestion. Mild perihilar interstitial infiltrates with air bronchograms, right greater than left. Protecting airway, saturating well     CARDIOVASCULAR: TTE Normal global left ventricular systolic function. Mild mitral valve regurgitation. Mild aortic regurgitation. Sclerotic aortic valve with normal opening, cardiac monitoring without reported events                             SBP goal: 100-160    GASTROINTESTINAL: dysphagia screen passed on 11/20, advance as tolerated        Diet: Regular    RENAL: BUN/Cr within normal limits on 11/22, good urine output      Na Goal: Greater than 135     Hamm: no    HEMATOLOGY: H/H without change, Platelets 327     DVT ppx: Lovenox    ID: afebrile, no sign of infection    OTHER: Plan discussed with patient at bedside, all questions and concerns addressed. Patient's brother (HCP): Jorge Stone ().     DISPOSITION: AR once stable and workup is complete    CORE MEASURES:        Admission NIHSS: 2     Tenecteplase: NO      LDL/HDL: p     Depression Screen: p     Statin Therapy: yes     Dysphagia Screen:  PASS      SmokingYES Smoking cessation and education provided to patient [] Nicotine patch ordered ??     Afib NO     Stroke Education  YES    Obtain screening lower extremity venous ultrasound in patients who meet 1 or more of the following criteria as patient is high risk for DVT/PE on admission:   [] History of DVT/PE  [] Hypercoagulable states (Factor V Leiden, Cancer, OCP, etc. )  [] Prolonged immobility (hemiplegia/hemiparesis/post operative or any other extended immobilization)  [] Transferred from outside facility (Rehab or Long term care)  [] Age </= to 50 58 year old woman with HTN, Rheumatoid arthritis, presenting with decompensated confusion, subacute course since about February 2023.     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 Other etiologies include leukodystrophy and CADASIL.     NEURO: Neurologically unchanged, permissive HTN to gradual normotension. A1c 4.9, LDL - 128, high intensity statin initiated. MRI Brain with contrast showed diffusion restriction R corona radiata. Multiple microhemorrhages, deep and cortical. Cerebral angiogram performed on 11/20 showed multiple areas of focal stenosis and dilatation concerning for vasculitis. LP completed on 11/21 with normal profile, pending additional studies. Rheumatology consulted and recommended possible brain biopsy to confirm diagnosis of vasculitis. 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 Patient should be started on steroids given she is having microhemorrhage and strokes from vasculitis. The risks of performing an open biopsy outweigh the benefits if the results of the biopsy are unlikely to . EEG: Moderate diffuse/multi-focal cerebral dysfunction, not specific as to etiology. There were no epileptiform abnormalities recorded. Steroids started on 11/24, initial dose Solumderol 1g for 3 days and then will taper. Physical therapy/OT/PMR: AR.     ANTITHROMBOTIC THERAPY: ASA 81mg daily    PULMONARY: CXR: Mild, central pulmonary venous congestion. Mild perihilar interstitial infiltrates with air bronchograms, right greater than left. Protecting airway, saturating well     CARDIOVASCULAR: TTE Normal global left ventricular systolic function. Mild mitral valve regurgitation. Mild aortic regurgitation. Sclerotic aortic valve with normal opening, cardiac monitoring without reported events                             SBP goal: 100-160    GASTROINTESTINAL: dysphagia screen passed on 11/20, advance as tolerated        Diet: Regular    RENAL: BUN/Cr within normal limits on 11/22, good urine output      Na Goal: Greater than 135     Hamm: no    HEMATOLOGY: H/H without change, Platelets 327 on 11/22     DVT ppx: Lovenox    ID: afebrile, no sign of infection. Will send repeat infectious workup due to increased lethargy.     OTHER: Plan discussed with patient at bedside, all questions and concerns addressed. Patient's brother (HCP): Jorge Stone ().     DISPOSITION: AR once stable and workup is complete    CORE MEASURES:        Admission NIHSS: 2     Tenecteplase: NO      LDL/HDL: p     Depression Screen: p     Statin Therapy: yes     Dysphagia Screen:  PASS      SmokingYES Smoking cessation and education provided to patient [] Nicotine patch ordered ??     Afib NO     Stroke Education  YES    Obtain screening lower extremity venous ultrasound in patients who meet 1 or more of the following criteria as patient is high risk for DVT/PE on admission:   [] History of DVT/PE  [] Hypercoagulable states (Factor V Leiden, Cancer, OCP, etc. )  [] Prolonged immobility (hemiplegia/hemiparesis/post operative or any other extended immobilization)  [] Transferred from outside facility (Rehab or Long term care)  [] Age </= to 50 58 year old woman with HTN, Rheumatoid arthritis, presenting with decompensated confusion, subacute course since about February 2023.     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 Other etiologies include CADASIL for which testing is in process.     NEURO: Neurologically unchanged, permissive HTN to gradual normotension. A1c 4.9, LDL - 128, high intensity statin initiated. MRI Brain with contrast showed diffusion restriction R corona radiata. Multiple microhemorrhages, deep and cortical. Cerebral angiogram performed on 11/20 showed multiple areas of focal stenosis and dilatation concerning for vasculitis. LP completed on 11/21 with normal profile, pending additional studies. Rheumatology consulted and recommended possible brain biopsy to confirm diagnosis of vasculitis. 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 Patient should be started on steroids given she is having microhemorrhage and strokes from vasculitis. The risks of performing an open biopsy outweigh the benefits if the results of the biopsy are unlikely to . EEG: Moderate diffuse/multi-focal cerebral dysfunction, not specific as to etiology. There were no epileptiform abnormalities recorded. Steroids started on 11/24, initial dose Solumderol 1g for 3 days and then will taper. Physical therapy/OT/PMR: [facility].     ANTITHROMBOTIC THERAPY: ASA 81mg daily    PULMONARY: CXR: Mild, central pulmonary venous congestion. Mild perihilar interstitial infiltrates with air bronchograms, right greater than left. Protecting airway, saturating well     CARDIOVASCULAR: TTE Normal global left ventricular systolic function. Mild mitral valve regurgitation. Mild aortic regurgitation. Sclerotic aortic valve with normal opening, cardiac monitoring without reported events                             SBP goal: 100-160    GASTROINTESTINAL: dysphagia screen passed on 11/20, advance as tolerated        Diet: Regular    RENAL: BUN/Cr within normal limits on 11/22, good urine output      Na Goal: Greater than 135     Hamm: no    HEMATOLOGY: H/H without change, Platelets 327 on 11/22     DVT ppx: Lovenox    ID: afebrile, no sign of infection. Will send repeat infectious workup due to increased lethargy.     OTHER: Plan discussed with patient at bedside, all questions and concerns addressed. Patient's brother (HCP): Jorge Stone ().     DISPOSITION: AR once stable and workup is complete    CORE MEASURES:        Admission NIHSS: 2     Tenecteplase: NO      LDL/HDL: p     Depression Screen: p     Statin Therapy: yes     Dysphagia Screen:  PASS      SmokingYES Smoking cessation and education provided to patient [] Nicotine patch ordered ??     Afib NO     Stroke Education  YES    Obtain screening lower extremity venous ultrasound in patients who meet 1 or more of the following criteria as patient is high risk for DVT/PE on admission:   [] History of DVT/PE  [] Hypercoagulable states (Factor V Leiden, Cancer, OCP, etc. )  [] Prolonged immobility (hemiplegia/hemiparesis/post operative or any other extended immobilization)  [] Transferred from outside facility (Rehab or Long term care)  [] Age </= to 50

## 2023-11-25 NOTE — CHART NOTE - NSCHARTNOTEFT_GEN_A_CORE
58-year old woman with history of hypertension, 1 ppd smoker, rheumatoid arthritis, brought into ED at  initially for concerned for altered mental status.    Prior to hospital presentation: neighbor overhead patient was looking for her mom, was confused/wandering around apartment complex.     At [facility]: MR brain showed bilateral subacute to remote embolic strokes. MRI shows right posterior corona radiata acute infarct and multiple petechial hemorrhages in b/l thalami and basal ganglia. Noted to have KRUNAL. Also noted to have hypertension, on amlodipine, aldactone, and losartan. Started on B12 supplements. Seen by rheumatology,      Called to patients bedside by nurse for patient being less arousal than usual.    While at bedside it was noted that patient appears to be asleep. When name was called and light shoulder tap, patient did not respond. When multiple vigorous sternal rubs applied patient said "ouch" and pushed my hand away. Both eyes remained closed despite being asked to open. When eyelids were lifted by me, left gaze and some nystagmus was noted. Patient also asked to  my hand b/l and did not follow command. Moving all extremities while trying to position herself in the bed. During this time SBP noted to b be in 170s.      amLODIPine   Tablet 10 milliGRAM(s) Oral daily  aspirin  chewable 81 milliGRAM(s) Oral daily  atorvastatin 80 milliGRAM(s) Oral at bedtime  enoxaparin Injectable 40 milliGRAM(s) SubCutaneous every 24 hours  insulin lispro (ADMELOG) corrective regimen sliding scale   SubCutaneous three times a day before meals  insulin lispro (ADMELOG) corrective regimen sliding scale   SubCutaneous at bedtime  losartan 25 milliGRAM(s) Oral daily  methylPREDNISolone sodium succinate IVPB 1000 milliGRAM(s) IV Intermittent daily  pantoprazole    Tablet 40 milliGRAM(s) Oral daily      CONSTITUTIONAL: No weight loss, weakness, fevers or chills  All other review of systems is negative unless indicated above.        PHYSICAL EXAM:   Vital Signs Last 24 Hrs  T(C): 36.9 (24 Nov 2023 22:39), Max: 37.2 (24 Nov 2023 08:19)  T(F): 98.4 (24 Nov 2023 22:39), Max: 98.9 (24 Nov 2023 08:19)  HR: 107 (24 Nov 2023 22:39) (77 - 107)  BP: 176/81 (24 Nov 2023 22:39) (121/79 - 176/81)  BP(mean): --  RR: 18 (24 Nov 2023 22:39) (18 - 18)  SpO2: 98% (24 Nov 2023 22:39) (93% - 98%)    Parameters below as of 24 Nov 2023 22:39  Patient On (Oxygen Delivery Method): nasal cannula, 2L        General: Alert and Oriented x1 to 2. No acute Distress. Well Nourished, Well Developed    Cardiac: Regular Heart Rate and Rhythm. No Murmur. No Jugular Venous Distension. No Peripheral Edema.    Pulmonary: Clear Breath Sounds to Auscultation Bilaterally. No Accessory Muscle use.         NEUROLOGICAL EXAM:  Mental status: Drowsy, alert, oriented 1 - 2x, speech fluent, not following commands, no neglect  Cranial Nerves: No facial asymmetry, no nystagmus, no dysarthria,  tongue midline  Sensation: Intact to light touch   Coordination/ Gait: No dysmetria, gait not tested    LABS:       PTT - ( 23 Nov 2023 06:25 )  PTT:31.1 sec      IMAGING: Reviewed by house radiologist.     CT Head: no change  CT Angio Head/Neck No change      Plan: Radiographic Studies  Nursing Interventions: Continue Telemetry Monitor, Continuous Pulse Oximeter, Neuro checks 4h, 2 L NC of Oxygen, continue with soft vest Restraints    Will reassess in1 hour    Time spent with patient:  65 minutes  Event discussed with: Stroke Fellow Dr Daniel Wesley

## 2023-11-25 NOTE — PROGRESS NOTE ADULT - SUBJECTIVE AND OBJECTIVE BOX
Subjective: Patient seen and examined. No new events except as noted.     REVIEW OF SYSTEMS:    CONSTITUTIONAL:+ weakness, fevers or chills  EYES/ENT: No visual changes;  No vertigo or throat pain   NECK: No pain or stiffness  RESPIRATORY: No cough, wheezing, hemoptysis; No shortness of breath  CARDIOVASCULAR: No chest pain or palpitations  GASTROINTESTINAL: No abdominal or epigastric pain. No nausea, vomiting, or hematemesis; No diarrhea or constipation. No melena or hematochezia.  GENITOURINARY: No dysuria, frequency or hematuria  NEUROLOGICAL: No numbness or weakness  SKIN: No itching, burning, rashes, or lesions   All other review of systems is negative unless indicated above.    MEDICATIONS:  MEDICATIONS  (STANDING):  amLODIPine   Tablet 10 milliGRAM(s) Oral daily  aspirin  chewable 81 milliGRAM(s) Oral daily  atorvastatin 80 milliGRAM(s) Oral at bedtime  enoxaparin Injectable 40 milliGRAM(s) SubCutaneous every 24 hours  insulin lispro (ADMELOG) corrective regimen sliding scale   SubCutaneous three times a day before meals  insulin lispro (ADMELOG) corrective regimen sliding scale   SubCutaneous at bedtime  losartan 25 milliGRAM(s) Oral daily  methylPREDNISolone sodium succinate IVPB 1000 milliGRAM(s) IV Intermittent daily  pantoprazole    Tablet 40 milliGRAM(s) Oral daily  sodium chloride 0.9%. 1000 milliLiter(s) (75 mL/Hr) IV Continuous <Continuous>      PHYSICAL EXAM:  T(C): 36.7 (11-25-23 @ 16:26), Max: 37.1 (11-24-23 @ 23:58)  HR: 100 (11-25-23 @ 16:26) (85 - 107)  BP: 141/78 (11-25-23 @ 16:26) (121/73 - 176/81)  RR: 18 (11-25-23 @ 16:26) (18 - 18)  SpO2: 94% (11-25-23 @ 16:26) (94% - 98%)  Wt(kg): --  I&O's Summary    24 Nov 2023 07:01  -  25 Nov 2023 07:00  --------------------------------------------------------  IN: 0 mL / OUT: 400 mL / NET: -400 mL    25 Nov 2023 07:01  -  25 Nov 2023 19:20  --------------------------------------------------------  IN: 480 mL / OUT: 100 mL / NET: 380 mL          Appearance: NAD	  HEENT:   Dry oral mucosa, PERRL, EOMI	  Lymphatic: No lymphadenopathy  Cardiovascular: Normal S1 S2, No JVD, No murmurs, No edema  Respiratory: Lungs clear to auscultation	  Psychiatry: A & O x 3, Mood & affect appropriate  Gastrointestinal:  Soft, Non-tender, + BS	  Skin: No rashes, No ecchymoses, No cyanosis	  Neurologic: AOx2-3, EOMI, no facial, no drift CLEANING 5/5  Extremities: Normal range of motion, No clubbing, cyanosis or edema  Vascular: Peripheral pulses palpable 2+ bilaterally      LABS:    CARDIAC MARKERS:                                10.9   12.73 )-----------( 413      ( 25 Nov 2023 11:33 )             31.5     11-25    140  |  104  |  35<H>  ----------------------------<  229<H>  4.1   |  23  |  1.40<H>    Ca    10.2      25 Nov 2023 11:33  Mg     2.3     11-25    TPro  8.4<H>  /  Alb  4.2  /  TBili  0.5  /  DBili  x   /  AST  12  /  ALT  13  /  AlkPhos  76  11-25        TELEMETRY: 	    ECG:  	  RADIOLOGY:   DIAGNOSTIC TESTING:  [ ] Echocardiogram:  [ ]  Catheterization:  [ ] Stress Test:    OTHER:

## 2023-11-26 LAB
ANION GAP SERPL CALC-SCNC: 13 MMOL/L — SIGNIFICANT CHANGE UP (ref 5–17)
ANION GAP SERPL CALC-SCNC: 13 MMOL/L — SIGNIFICANT CHANGE UP (ref 5–17)
BUN SERPL-MCNC: 57 MG/DL — HIGH (ref 7–23)
BUN SERPL-MCNC: 57 MG/DL — HIGH (ref 7–23)
CALCIUM SERPL-MCNC: 9.9 MG/DL — SIGNIFICANT CHANGE UP (ref 8.4–10.5)
CALCIUM SERPL-MCNC: 9.9 MG/DL — SIGNIFICANT CHANGE UP (ref 8.4–10.5)
CHLORIDE SERPL-SCNC: 106 MMOL/L — SIGNIFICANT CHANGE UP (ref 96–108)
CHLORIDE SERPL-SCNC: 106 MMOL/L — SIGNIFICANT CHANGE UP (ref 96–108)
CO2 SERPL-SCNC: 23 MMOL/L — SIGNIFICANT CHANGE UP (ref 22–31)
CO2 SERPL-SCNC: 23 MMOL/L — SIGNIFICANT CHANGE UP (ref 22–31)
CREAT SERPL-MCNC: 1.59 MG/DL — HIGH (ref 0.5–1.3)
CREAT SERPL-MCNC: 1.59 MG/DL — HIGH (ref 0.5–1.3)
CREATININE, URINE RESULT: 194 MG/DL — SIGNIFICANT CHANGE UP
CREATININE, URINE RESULT: 194 MG/DL — SIGNIFICANT CHANGE UP
EGFR: 37 ML/MIN/1.73M2 — LOW
EGFR: 37 ML/MIN/1.73M2 — LOW
GLUCOSE BLDC GLUCOMTR-MCNC: 150 MG/DL — HIGH (ref 70–99)
GLUCOSE BLDC GLUCOMTR-MCNC: 150 MG/DL — HIGH (ref 70–99)
GLUCOSE BLDC GLUCOMTR-MCNC: 158 MG/DL — HIGH (ref 70–99)
GLUCOSE BLDC GLUCOMTR-MCNC: 158 MG/DL — HIGH (ref 70–99)
GLUCOSE BLDC GLUCOMTR-MCNC: 161 MG/DL — HIGH (ref 70–99)
GLUCOSE BLDC GLUCOMTR-MCNC: 161 MG/DL — HIGH (ref 70–99)
GLUCOSE BLDC GLUCOMTR-MCNC: 201 MG/DL — HIGH (ref 70–99)
GLUCOSE BLDC GLUCOMTR-MCNC: 201 MG/DL — HIGH (ref 70–99)
GLUCOSE SERPL-MCNC: 148 MG/DL — HIGH (ref 70–99)
GLUCOSE SERPL-MCNC: 148 MG/DL — HIGH (ref 70–99)
HCT VFR BLD CALC: 28.4 % — LOW (ref 34.5–45)
HCT VFR BLD CALC: 28.4 % — LOW (ref 34.5–45)
HGB BLD-MCNC: 10.1 G/DL — LOW (ref 11.5–15.5)
HGB BLD-MCNC: 10.1 G/DL — LOW (ref 11.5–15.5)
MCHC RBC-ENTMCNC: 29.4 PG — SIGNIFICANT CHANGE UP (ref 27–34)
MCHC RBC-ENTMCNC: 29.4 PG — SIGNIFICANT CHANGE UP (ref 27–34)
MCHC RBC-ENTMCNC: 35.6 GM/DL — SIGNIFICANT CHANGE UP (ref 32–36)
MCHC RBC-ENTMCNC: 35.6 GM/DL — SIGNIFICANT CHANGE UP (ref 32–36)
MCV RBC AUTO: 82.8 FL — SIGNIFICANT CHANGE UP (ref 80–100)
MCV RBC AUTO: 82.8 FL — SIGNIFICANT CHANGE UP (ref 80–100)
NRBC # BLD: 0 /100 WBCS — SIGNIFICANT CHANGE UP (ref 0–0)
NRBC # BLD: 0 /100 WBCS — SIGNIFICANT CHANGE UP (ref 0–0)
PLATELET # BLD AUTO: 391 K/UL — SIGNIFICANT CHANGE UP (ref 150–400)
PLATELET # BLD AUTO: 391 K/UL — SIGNIFICANT CHANGE UP (ref 150–400)
POTASSIUM SERPL-MCNC: 4 MMOL/L — SIGNIFICANT CHANGE UP (ref 3.5–5.3)
POTASSIUM SERPL-MCNC: 4 MMOL/L — SIGNIFICANT CHANGE UP (ref 3.5–5.3)
POTASSIUM SERPL-SCNC: 4 MMOL/L — SIGNIFICANT CHANGE UP (ref 3.5–5.3)
POTASSIUM SERPL-SCNC: 4 MMOL/L — SIGNIFICANT CHANGE UP (ref 3.5–5.3)
PROT ?TM UR-MCNC: 20 MG/DL — HIGH (ref 0–12)
PROT ?TM UR-MCNC: 20 MG/DL — HIGH (ref 0–12)
RBC # BLD: 3.43 M/UL — LOW (ref 3.8–5.2)
RBC # BLD: 3.43 M/UL — LOW (ref 3.8–5.2)
RBC # FLD: 12.8 % — SIGNIFICANT CHANGE UP (ref 10.3–14.5)
RBC # FLD: 12.8 % — SIGNIFICANT CHANGE UP (ref 10.3–14.5)
SODIUM SERPL-SCNC: 142 MMOL/L — SIGNIFICANT CHANGE UP (ref 135–145)
SODIUM SERPL-SCNC: 142 MMOL/L — SIGNIFICANT CHANGE UP (ref 135–145)
WBC # BLD: 18.43 K/UL — HIGH (ref 3.8–10.5)
WBC # BLD: 18.43 K/UL — HIGH (ref 3.8–10.5)
WBC # FLD AUTO: 18.43 K/UL — HIGH (ref 3.8–10.5)
WBC # FLD AUTO: 18.43 K/UL — HIGH (ref 3.8–10.5)

## 2023-11-26 RX ORDER — SODIUM CHLORIDE 9 MG/ML
500 INJECTION INTRAMUSCULAR; INTRAVENOUS; SUBCUTANEOUS ONCE
Refills: 0 | Status: COMPLETED | OUTPATIENT
Start: 2023-11-26 | End: 2023-11-26

## 2023-11-26 RX ADMIN — AMLODIPINE BESYLATE 10 MILLIGRAM(S): 2.5 TABLET ORAL at 05:18

## 2023-11-26 RX ADMIN — PANTOPRAZOLE SODIUM 40 MILLIGRAM(S): 20 TABLET, DELAYED RELEASE ORAL at 12:19

## 2023-11-26 RX ADMIN — ENOXAPARIN SODIUM 40 MILLIGRAM(S): 100 INJECTION SUBCUTANEOUS at 05:19

## 2023-11-26 RX ADMIN — Medication 1: at 12:21

## 2023-11-26 RX ADMIN — Medication 81 MILLIGRAM(S): at 12:20

## 2023-11-26 RX ADMIN — Medication 50 MILLIGRAM(S): at 05:19

## 2023-11-26 RX ADMIN — LOSARTAN POTASSIUM 25 MILLIGRAM(S): 100 TABLET, FILM COATED ORAL at 05:18

## 2023-11-26 RX ADMIN — SODIUM CHLORIDE 500 MILLILITER(S): 9 INJECTION INTRAMUSCULAR; INTRAVENOUS; SUBCUTANEOUS at 09:23

## 2023-11-26 RX ADMIN — ATORVASTATIN CALCIUM 80 MILLIGRAM(S): 80 TABLET, FILM COATED ORAL at 21:40

## 2023-11-26 RX ADMIN — Medication 2: at 17:03

## 2023-11-26 NOTE — PROGRESS NOTE ADULT - ASSESSMENT
58 year old woman with HTN, Rheumatoid arthritis, presenting with decompensated confusion, subacute course since about February 2023.     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 Other etiologies include CADASIL for which testing is in process.     NEURO: Neurologically unchanged, permissive HTN to gradual normotension. A1c 4.9, LDL - 128, high intensity statin initiated. MRI Brain with contrast showed diffusion restriction R corona radiata. Multiple microhemorrhages, deep and cortical. Cerebral angiogram performed on 11/20 showed multiple areas of focal stenosis and dilatation concerning for vasculitis. LP completed on 11/21 with normal profile, pending additional studies. Rheumatology consulted and recommended possible brain biopsy to confirm diagnosis of vasculitis. 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 Patient should be started on steroids given she is having microhemorrhage and strokes from vasculitis. The risks of performing an open biopsy outweigh the benefits if the results of the biopsy are unlikely to . EEG: Moderate diffuse/multi-focal cerebral dysfunction, not specific as to etiology. There were no epileptiform abnormalities recorded. Steroids started on 11/24, initial dose Solumderol 1g for 3 days and then will taper. Physical therapy/OT/PMR: AR.     ANTITHROMBOTIC THERAPY: ASA 81mg daily    PULMONARY: CXR: Mild, central pulmonary venous congestion. Mild perihilar interstitial infiltrates with air bronchograms, right greater than left. Protecting airway, saturating well     CARDIOVASCULAR: TTE Normal global left ventricular systolic function. Mild mitral valve regurgitation. Mild aortic regurgitation. Sclerotic aortic valve with normal opening, cardiac monitoring without reported events                             SBP goal: 100-160    GASTROINTESTINAL: dysphagia screen passed on 11/20, advance as tolerated        Diet: Regular    RENAL: BUN/Cr within normal limits on 11/22, good urine output      Na Goal: Greater than 135     Hamm: no    HEMATOLOGY: H/H without change, Platelets 391      DVT ppx: Lovenox    ID: afebrile, no sign of infection. Will send repeat infectious workup due to increased lethargy.     OTHER: Plan discussed with patient at bedside, all questions and concerns addressed. Patient's brother (HCP): Jorge Stone ().     DISPOSITION: AR once stable and workup is complete    CORE MEASURES:        Admission NIHSS: 2     Tenecteplase: NO      LDL/HDL: p     Depression Screen: p     Statin Therapy: yes     Dysphagia Screen:  PASS      SmokingYES Smoking cessation and education provided to patient [] Nicotine patch ordered ??     Afib NO     Stroke Education  YES    Obtain screening lower extremity venous ultrasound in patients who meet 1 or more of the following criteria as patient is high risk for DVT/PE on admission:   [] History of DVT/PE  [] Hypercoagulable states (Factor V Leiden, Cancer, OCP, etc. )  [] Prolonged immobility (hemiplegia/hemiparesis/post operative or any other extended immobilization)  [] Transferred from outside facility (Rehab or Long term care)  [] Age </= to 50 58 year old woman with HTN, Rheumatoid arthritis, presenting with decompensated confusion, subacute course since about February 2023.     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 Other etiologies include CADASIL for which testing is in process.     NEURO: Neurologically unchanged, permissive HTN to gradual normotension. A1c 4.9, LDL - 128, high intensity statin initiated. MRI Brain with contrast showed diffusion restriction R corona radiata. Multiple microhemorrhages, deep and cortical. Cerebral angiogram performed on 11/20 showed multiple areas of focal stenosis and dilatation concerning for vasculitis. LP completed on 11/21 with normal profile, pending additional studies. Rheumatology consulted and recommended possible brain biopsy to confirm diagnosis of vasculitis. 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 Patient should be started on steroids given she is having micro hemorrhage and strokes from vasculitis. The risks of performing an open biopsy outweigh the benefits if the results of the biopsy are unlikely to . EEG: Moderate diffuse/multi-focal cerebral dysfunction, not specific as to etiology. There were no epileptiform abnormalities recorded. Steroids started on 11/24, initial dose Solumderol 1g for 3 days and then will taper. Physical therapy/OT/PMR: [facility].     ANTITHROMBOTIC THERAPY: ASA 81mg daily for secondary stroke prevention     PULMONARY: CXR: Mild, central pulmonary venous congestion. Mild perihilar interstitial infiltrates with air bronchograms, right greater than left. Protecting airway, saturating well     CARDIOVASCULAR: TTE Normal global left ventricular systolic function. Mild mitral valve regurgitation. Mild aortic regurgitation. Sclerotic aortic valve with normal opening, cardiac monitoring without reported events                             SBP goal: 100-160    GASTROINTESTINAL: dysphagia screen passed on 11/20, advance as tolerated        Diet: Regular    RENAL: BUN/Cr within normal limits on 11/22, good urine output      Na Goal: Greater than 135     Hamm: no    HEMATOLOGY: H/H without change, Platelets 391      DVT ppx: Lovenox    ID: afebrile, no sign of infection. Will send repeat infectious workup due to increased lethargy.     OTHER: Plan discussed with patient at bedside, all questions and concerns addressed. Patient's brother (HCP): Jorge Stone ().     DISPOSITION: AR once stable and workup is complete    CORE MEASURES:        Admission NIHSS: 2     Tenecteplase: NO      LDL/HDL: 128/43     Depression Screen: p     Statin Therapy: yes     Dysphagia Screen:  PASS      Smoking: YES Smoking cessation and education provided to patient [x] Nicotine patch ordered []     Afib NO     Stroke Education  YES    Obtain screening lower extremity venous ultrasound in patients who meet 1 or more of the following criteria as patient is high risk for DVT/PE on admission:   [] History of DVT/PE  [] Hypercoagulable states (Factor V Leiden, Cancer, OCP, etc. )  [] Prolonged immobility (hemiplegia/hemiparesis/post operative or any other extended immobilization)  [] Transferred from outside facility (Rehab or Long term care)  [] Age </= to 50

## 2023-11-26 NOTE — PROGRESS NOTE ADULT - SUBJECTIVE AND OBJECTIVE BOX
Subjective: Patient seen and examined. No new events except as noted.     REVIEW OF SYSTEMS:    CONSTITUTIONAL: + weakness, fevers or chills  EYES/ENT: No visual changes;  No vertigo or throat pain   NECK: No pain or stiffness  RESPIRATORY: No cough, wheezing, hemoptysis; No shortness of breath  CARDIOVASCULAR: No chest pain or palpitations  GASTROINTESTINAL: No abdominal or epigastric pain. No nausea, vomiting, or hematemesis; No diarrhea or constipation. No melena or hematochezia.  GENITOURINARY: No dysuria, frequency or hematuria  NEUROLOGICAL: No numbness or weakness  SKIN: No itching, burning, rashes, or lesions   All other review of systems is negative unless indicated above.    MEDICATIONS:  MEDICATIONS  (STANDING):  amLODIPine   Tablet 10 milliGRAM(s) Oral daily  aspirin  chewable 81 milliGRAM(s) Oral daily  atorvastatin 80 milliGRAM(s) Oral at bedtime  enoxaparin Injectable 40 milliGRAM(s) SubCutaneous every 24 hours  insulin lispro (ADMELOG) corrective regimen sliding scale   SubCutaneous at bedtime  insulin lispro (ADMELOG) corrective regimen sliding scale   SubCutaneous three times a day before meals  losartan 25 milliGRAM(s) Oral daily  methylPREDNISolone sodium succinate IVPB 1000 milliGRAM(s) IV Intermittent daily  pantoprazole    Tablet 40 milliGRAM(s) Oral daily  sodium chloride 0.9% Bolus 500 milliLiter(s) IV Bolus once  sodium chloride 0.9%. 1000 milliLiter(s) (75 mL/Hr) IV Continuous <Continuous>      PHYSICAL EXAM:  T(C): 37.2 (11-26-23 @ 08:43), Max: 37.2 (11-26-23 @ 08:43)  HR: 74 (11-26-23 @ 08:43) (74 - 100)  BP: 131/74 (11-26-23 @ 08:43) (121/73 - 147/81)  RR: 18 (11-26-23 @ 08:43) (18 - 18)  SpO2: 98% (11-26-23 @ 08:43) (94% - 98%)  Wt(kg): --  I&O's Summary    25 Nov 2023 07:01  -  26 Nov 2023 07:00  --------------------------------------------------------  IN: 480 mL / OUT: 600 mL / NET: -120 mL          Appearance: NAD	  HEENT:   Dry oral mucosa, PERRL, EOMI	  Lymphatic: No lymphadenopathy  Cardiovascular: Normal S1 S2, No JVD, No murmurs, No edema  Respiratory: Lungs clear to auscultation	  Psychiatry: A & O x 3, Mood & affect appropriate  Gastrointestinal:  Soft, Non-tender, + BS	  Skin: No rashes, No ecchymoses, No cyanosis	  Neurologic: AOx2-3, EOMI, no facial, no drift CLEANING 5/5  Extremities: Normal range of motion, No clubbing, cyanosis or edema  Vascular: Peripheral pulses palpable 2+ bilaterally          LABS:    CARDIAC MARKERS:                                10.1   18.43 )-----------( 391      ( 26 Nov 2023 06:03 )             28.4     11-26    142  |  106  |  57<H>  ----------------------------<  148<H>  4.0   |  23  |  1.59<H>    Ca    9.9      26 Nov 2023 06:03  Mg     2.3     11-25    TPro  8.4<H>  /  Alb  4.2  /  TBili  0.5  /  DBili  x   /  AST  12  /  ALT  13  /  AlkPhos  76  11-25    proBNP:   Lipid Profile:   HgA1c:   TSH:     Negative          TELEMETRY: 	    ECG:  	  RADIOLOGY:   DIAGNOSTIC TESTING:  [ ] Echocardiogram:  [ ]  Catheterization:  [ ] Stress Test:    OTHER:

## 2023-11-27 LAB
% GAMMA, URINE: 7.1 % — SIGNIFICANT CHANGE UP
% GAMMA, URINE: 7.1 % — SIGNIFICANT CHANGE UP
ALBUMIN 24H MFR UR ELPH: 29.8 % — SIGNIFICANT CHANGE UP
ALBUMIN 24H MFR UR ELPH: 29.8 % — SIGNIFICANT CHANGE UP
ALPHA1 GLOB 24H MFR UR ELPH: 32.4 % — SIGNIFICANT CHANGE UP
ALPHA1 GLOB 24H MFR UR ELPH: 32.4 % — SIGNIFICANT CHANGE UP
ALPHA2 GLOB 24H MFR UR ELPH: 17.7 % — SIGNIFICANT CHANGE UP
ALPHA2 GLOB 24H MFR UR ELPH: 17.7 % — SIGNIFICANT CHANGE UP
ANION GAP SERPL CALC-SCNC: 12 MMOL/L — SIGNIFICANT CHANGE UP (ref 5–17)
ANION GAP SERPL CALC-SCNC: 12 MMOL/L — SIGNIFICANT CHANGE UP (ref 5–17)
B-GLOBULIN 24H MFR UR ELPH: 13 % — SIGNIFICANT CHANGE UP
B-GLOBULIN 24H MFR UR ELPH: 13 % — SIGNIFICANT CHANGE UP
BUN SERPL-MCNC: 56 MG/DL — HIGH (ref 7–23)
BUN SERPL-MCNC: 56 MG/DL — HIGH (ref 7–23)
CALCIUM SERPL-MCNC: 10 MG/DL — SIGNIFICANT CHANGE UP (ref 8.4–10.5)
CALCIUM SERPL-MCNC: 10 MG/DL — SIGNIFICANT CHANGE UP (ref 8.4–10.5)
CHLORIDE SERPL-SCNC: 112 MMOL/L — HIGH (ref 96–108)
CHLORIDE SERPL-SCNC: 112 MMOL/L — HIGH (ref 96–108)
CO2 SERPL-SCNC: 23 MMOL/L — SIGNIFICANT CHANGE UP (ref 22–31)
CO2 SERPL-SCNC: 23 MMOL/L — SIGNIFICANT CHANGE UP (ref 22–31)
COLLECT DURATION TIME UR: 24 HR — SIGNIFICANT CHANGE UP
COLLECT DURATION TIME UR: 24 HR — SIGNIFICANT CHANGE UP
CREAT SERPL-MCNC: 1.64 MG/DL — HIGH (ref 0.5–1.3)
CREAT SERPL-MCNC: 1.64 MG/DL — HIGH (ref 0.5–1.3)
EGFR: 36 ML/MIN/1.73M2 — LOW
EGFR: 36 ML/MIN/1.73M2 — LOW
GLUCOSE BLDC GLUCOMTR-MCNC: 132 MG/DL — HIGH (ref 70–99)
GLUCOSE BLDC GLUCOMTR-MCNC: 132 MG/DL — HIGH (ref 70–99)
GLUCOSE BLDC GLUCOMTR-MCNC: 146 MG/DL — HIGH (ref 70–99)
GLUCOSE BLDC GLUCOMTR-MCNC: 146 MG/DL — HIGH (ref 70–99)
GLUCOSE BLDC GLUCOMTR-MCNC: 163 MG/DL — HIGH (ref 70–99)
GLUCOSE BLDC GLUCOMTR-MCNC: 163 MG/DL — HIGH (ref 70–99)
GLUCOSE BLDC GLUCOMTR-MCNC: 252 MG/DL — HIGH (ref 70–99)
GLUCOSE BLDC GLUCOMTR-MCNC: 252 MG/DL — HIGH (ref 70–99)
GLUCOSE SERPL-MCNC: 145 MG/DL — HIGH (ref 70–99)
GLUCOSE SERPL-MCNC: 145 MG/DL — HIGH (ref 70–99)
HCT VFR BLD CALC: 27.2 % — LOW (ref 34.5–45)
HCT VFR BLD CALC: 27.2 % — LOW (ref 34.5–45)
HGB BLD-MCNC: 9.6 G/DL — LOW (ref 11.5–15.5)
HGB BLD-MCNC: 9.6 G/DL — LOW (ref 11.5–15.5)
INTERPRETATION 24H UR IFE-IMP: SIGNIFICANT CHANGE UP
INTERPRETATION 24H UR IFE-IMP: SIGNIFICANT CHANGE UP
M PROTEIN 24H UR ELPH-MRATE: 0 % — SIGNIFICANT CHANGE UP
M PROTEIN 24H UR ELPH-MRATE: 0 % — SIGNIFICANT CHANGE UP
M PROTEIN 24H UR ELPH-MRATE: 0 MG/24HR — SIGNIFICANT CHANGE UP (ref 0–0)
M PROTEIN 24H UR ELPH-MRATE: 0 MG/24HR — SIGNIFICANT CHANGE UP (ref 0–0)
M PROTEIN 24H UR ELPH-MRATE: 0 MG/DL — SIGNIFICANT CHANGE UP
M PROTEIN 24H UR ELPH-MRATE: 0 MG/DL — SIGNIFICANT CHANGE UP
MBP CSF-MCNC: 8.7 NG/ML — HIGH (ref 0–3.7)
MBP CSF-MCNC: 8.7 NG/ML — HIGH (ref 0–3.7)
MCHC RBC-ENTMCNC: 29.4 PG — SIGNIFICANT CHANGE UP (ref 27–34)
MCHC RBC-ENTMCNC: 29.4 PG — SIGNIFICANT CHANGE UP (ref 27–34)
MCHC RBC-ENTMCNC: 35.3 GM/DL — SIGNIFICANT CHANGE UP (ref 32–36)
MCHC RBC-ENTMCNC: 35.3 GM/DL — SIGNIFICANT CHANGE UP (ref 32–36)
MCV RBC AUTO: 83.2 FL — SIGNIFICANT CHANGE UP (ref 80–100)
MCV RBC AUTO: 83.2 FL — SIGNIFICANT CHANGE UP (ref 80–100)
NRBC # BLD: 0 /100 WBCS — SIGNIFICANT CHANGE UP (ref 0–0)
NRBC # BLD: 0 /100 WBCS — SIGNIFICANT CHANGE UP (ref 0–0)
PLATELET # BLD AUTO: 389 K/UL — SIGNIFICANT CHANGE UP (ref 150–400)
PLATELET # BLD AUTO: 389 K/UL — SIGNIFICANT CHANGE UP (ref 150–400)
POTASSIUM SERPL-MCNC: 4.3 MMOL/L — SIGNIFICANT CHANGE UP (ref 3.5–5.3)
POTASSIUM SERPL-MCNC: 4.3 MMOL/L — SIGNIFICANT CHANGE UP (ref 3.5–5.3)
POTASSIUM SERPL-SCNC: 4.3 MMOL/L — SIGNIFICANT CHANGE UP (ref 3.5–5.3)
POTASSIUM SERPL-SCNC: 4.3 MMOL/L — SIGNIFICANT CHANGE UP (ref 3.5–5.3)
PROT ?TM UR-MCNC: 20 MG/DL — HIGH (ref 0–12)
PROT ?TM UR-MCNC: 20 MG/DL — HIGH (ref 0–12)
PROT PATTERN 24H UR ELPH-IMP: SIGNIFICANT CHANGE UP
PROT PATTERN 24H UR ELPH-IMP: SIGNIFICANT CHANGE UP
PROTEIN QUANT CALC, URINE: 160 MG/24 H — HIGH (ref 50–100)
PROTEIN QUANT CALC, URINE: 160 MG/24 H — HIGH (ref 50–100)
RBC # BLD: 3.27 M/UL — LOW (ref 3.8–5.2)
RBC # BLD: 3.27 M/UL — LOW (ref 3.8–5.2)
RBC # FLD: 13 % — SIGNIFICANT CHANGE UP (ref 10.3–14.5)
RBC # FLD: 13 % — SIGNIFICANT CHANGE UP (ref 10.3–14.5)
SODIUM SERPL-SCNC: 147 MMOL/L — HIGH (ref 135–145)
SODIUM SERPL-SCNC: 147 MMOL/L — HIGH (ref 135–145)
TOTAL VOLUME - 24 HOUR: 800 ML — SIGNIFICANT CHANGE UP
TOTAL VOLUME - 24 HOUR: 800 ML — SIGNIFICANT CHANGE UP
URINE CREATININE CALCULATION: 1.6 G/24 H — SIGNIFICANT CHANGE UP (ref 0.8–1.8)
URINE CREATININE CALCULATION: 1.6 G/24 H — SIGNIFICANT CHANGE UP (ref 0.8–1.8)
VDRL CSF-TITR: SIGNIFICANT CHANGE UP
VDRL CSF-TITR: SIGNIFICANT CHANGE UP
VEGF SERPL-MCNC: 14 PG/ML — SIGNIFICANT CHANGE UP (ref 0–115)
VEGF SERPL-MCNC: 14 PG/ML — SIGNIFICANT CHANGE UP (ref 0–115)
WBC # BLD: 14.29 K/UL — HIGH (ref 3.8–10.5)
WBC # BLD: 14.29 K/UL — HIGH (ref 3.8–10.5)
WBC # FLD AUTO: 14.29 K/UL — HIGH (ref 3.8–10.5)
WBC # FLD AUTO: 14.29 K/UL — HIGH (ref 3.8–10.5)
WNV IGG CSF IA-ACNC: POSITIVE
WNV IGG CSF IA-ACNC: POSITIVE
WNV IGM CSF IA-ACNC: NEGATIVE — SIGNIFICANT CHANGE UP
WNV IGM CSF IA-ACNC: NEGATIVE — SIGNIFICANT CHANGE UP

## 2023-11-27 PROCEDURE — 93010 ELECTROCARDIOGRAM REPORT: CPT

## 2023-11-27 PROCEDURE — 99232 SBSQ HOSP IP/OBS MODERATE 35: CPT | Mod: GC

## 2023-11-27 PROCEDURE — 99233 SBSQ HOSP IP/OBS HIGH 50: CPT

## 2023-11-27 RX ORDER — METOPROLOL TARTRATE 50 MG
25 TABLET ORAL
Refills: 0 | Status: DISCONTINUED | OUTPATIENT
Start: 2023-11-27 | End: 2023-11-30

## 2023-11-27 RX ADMIN — Medication 81 MILLIGRAM(S): at 11:16

## 2023-11-27 RX ADMIN — PANTOPRAZOLE SODIUM 40 MILLIGRAM(S): 20 TABLET, DELAYED RELEASE ORAL at 11:16

## 2023-11-27 RX ADMIN — ENOXAPARIN SODIUM 40 MILLIGRAM(S): 100 INJECTION SUBCUTANEOUS at 05:25

## 2023-11-27 RX ADMIN — ATORVASTATIN CALCIUM 80 MILLIGRAM(S): 80 TABLET, FILM COATED ORAL at 22:59

## 2023-11-27 RX ADMIN — LOSARTAN POTASSIUM 25 MILLIGRAM(S): 100 TABLET, FILM COATED ORAL at 05:25

## 2023-11-27 RX ADMIN — Medication 25 MILLIGRAM(S): at 22:57

## 2023-11-27 RX ADMIN — Medication 50 MILLIGRAM(S): at 05:25

## 2023-11-27 RX ADMIN — Medication 3: at 11:24

## 2023-11-27 NOTE — PROGRESS NOTE ADULT - ASSESSMENT
58 year old woman with HTN, Rheumatoid arthritis, presenting with decompensated confusion, subacute course since about February 2023.     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 Other etiologies include CADASIL for which testing is in process.     NEURO: Neurologically unchanged, permissive HTN to gradual normotension. A1c 4.9, LDL - 128, high intensity statin initiated. MRI Brain with contrast showed diffusion restriction R corona radiata. Multiple microhemorrhages, deep and cortical. Cerebral angiogram performed on 11/20 showed multiple areas of focal stenosis and dilatation concerning for vasculitis. LP completed on 11/21 with normal profile, pending additional studies. Rheumatology consulted and recommended possible brain biopsy to confirm diagnosis of vasculitis. 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 Patient should be started on steroids given she is having micro hemorrhage and strokes from vasculitis. The risks of performing an open biopsy outweigh the benefits if the results of the biopsy are unlikely to . EEG: Moderate diffuse/multi-focal cerebral dysfunction, not specific as to etiology. There were no epileptiform abnormalities recorded. Steroids started on 11/24, initial dose Solumderol 1g for 3 days and then will taper. Physical therapy/OT/PMR: AR.     ANTITHROMBOTIC THERAPY: ASA 81mg daily for secondary stroke prevention     PULMONARY: CXR: Mild, central pulmonary venous congestion. Mild perihilar interstitial infiltrates with air bronchograms, right greater than left. Protecting airway, saturating well     CARDIOVASCULAR: TTE Normal global left ventricular systolic function. Mild mitral valve regurgitation. Mild aortic regurgitation. Sclerotic aortic valve with normal opening, cardiac monitoring without reported events                             SBP goal: 100-160    GASTROINTESTINAL: dysphagia screen passed on 11/20, advance as tolerated        Diet: Regular    RENAL: BUN/Cr within normal limits on 11/22, good urine output      Na Goal: Greater than 135     Hamm: no    HEMATOLOGY: H/H without change, Platelets 391      DVT ppx: Lovenox    ID: afebrile, no sign of infection. Will send repeat infectious workup due to increased lethargy. leukocytosis likely due to steroids.     OTHER: Plan discussed with patient at bedside, all questions and concerns addressed. Patient's brother (HCP): Jorge Stone ().     DISPOSITION: AR once stable and workup is complete    CORE MEASURES:        Admission NIHSS: 2     Tenecteplase: NO      LDL/HDL: 128/43     Depression Screen: p     Statin Therapy: yes     Dysphagia Screen:  PASS      Smoking: YES Smoking cessation and education provided to patient [x] Nicotine patch ordered []     Afib NO     Stroke Education  YES    Obtain screening lower extremity venous ultrasound in patients who meet 1 or more of the following criteria as patient is high risk for DVT/PE on admission:   [] History of DVT/PE  [] Hypercoagulable states (Factor V Leiden, Cancer, OCP, etc. )  [] Prolonged immobility (hemiplegia/hemiparesis/post operative or any other extended immobilization)  [] Transferred from outside facility (Rehab or Long term care)  [] Age </= to 50 58 year old woman with HTN, Rheumatoid arthritis, presenting with decompensated confusion, subacute course since about February 2023.     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 Other etiologies include CADASIL for which testing is in process.     NEURO: Neurologically improved since weekened, more interactive with examiner, permissive HTN to gradual normotension. A1c 4.9, LDL - 128, high intensity statin initiated. MRI Brain with contrast showed diffusion restriction R corona radiata. Multiple microhemorrhages, deep and cortical. Cerebral angiogram performed on 11/20 showed multiple areas of focal stenosis and dilatation concerning for vasculitis. LP completed on 11/21 with normal profile, pending additional studies. Rheumatology consulted and recommended possible brain biopsy to confirm diagnosis of vasculitis. 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 Patient should be started on steroids given she is having micro hemorrhage and strokes from vasculitis. The risks of performing an open biopsy outweigh the benefits if the results of the biopsy are unlikely to . EEG: Moderate diffuse/multi-focal cerebral dysfunction, not specific as to etiology. There were no epileptiform abnormalities recorded. Steroids started on 11/24, initial dose Solumderol 1g for 3 days and then will taper ( prednisone 60 mg x 1 week, 50 mg x 1 week, 40 mg x 1 week, 30 mg x1 week, 20 mg x1 week, 10 mg x 1 week. Physical therapy/OT/PMR: AR.     ANTITHROMBOTIC THERAPY: ASA 81mg daily for secondary stroke prevention     PULMONARY: CXR: Mild, central pulmonary venous congestion. Mild perihilar interstitial infiltrates with air bronchograms, right greater than left. Protecting airway, saturating well     CARDIOVASCULAR: TTE Normal global left ventricular systolic function. Mild mitral valve regurgitation. Mild aortic regurgitation. Sclerotic aortic valve with normal opening, cardiac monitoring without reported events                             SBP goal: 100-160    GASTROINTESTINAL: dysphagia screen passed on 11/20, advance as tolerated        Diet: Regular    RENAL: BUN/Cr within normal limits on 11/22, good urine output      Na Goal: Greater than 135     Hamm: no    HEMATOLOGY: H/H without change, Platelets 389      DVT ppx: Lovenox    ID: afebrile, no sign of infection.  leukocytosis likely due to steroids.     OTHER: Plan discussed with patient at bedside, all questions and concerns addressed. Patient's brother (HCP): Jorge Stone ().     DISPOSITION: AR once stable and workup is complete    CORE MEASURES:        Admission NIHSS: 2     Tenecteplase: NO      LDL/HDL: 128/43     Depression Screen: p     Statin Therapy: yes     Dysphagia Screen:  PASS      Smoking: YES Smoking cessation and education provided to patient [x] Nicotine patch ordered []     Afib NO     Stroke Education  YES    Obtain screening lower extremity venous ultrasound in patients who meet 1 or more of the following criteria as patient is high risk for DVT/PE on admission:   [] History of DVT/PE  [] Hypercoagulable states (Factor V Leiden, Cancer, OCP, etc. )  [] Prolonged immobility (hemiplegia/hemiparesis/post operative or any other extended immobilization)  [] Transferred from outside facility (Rehab or Long term care)  [] Age </= to 50

## 2023-11-27 NOTE — PROGRESS NOTE ADULT - ASSESSMENT
58-year old woman with history of hypertension, recurrent strokes, brought into ED at  initially for concern for altered mental status with findings concerning for PACNS    ##eval for PACNS  Progressive mental status change/ cognitive impairment over the past year  Brain imaging with findings of multiple infarcts, petechial hemorrhages  Cerebral angio showed diffuse beading   Negative MAX/cpANCA/dsDNA/Sm/Ribo P/BONI/SSA/SSB/SPEP/UA/C3: 152/C4: 36. CRP: 8. ESR: 113 RF: 30  Negative Hepatitis/Treponema  -s/p 1g Methylprednisolone (11/24 - 11/27)  -there is no specific test for the diagnosis of PACNS although the combination of exam findings/imaging/CSF studies maybe suggestive  correlation between angio findings with biopsy positivity is not 100% and thus biopsy remains the gold standard for diagnosis  -however biopsy is being deferred given bleeding risk. started empiric therapy with pulse steroids as above  -so far no evidence of secondary autoimmune disease based on serologies above  -CSF thus far without evidence of infection. Albumin index and IgG synthetic ratio normal as well from CSF  -discussed with neuroradiology, results from selective angiogram indicate diffuse beading more worrisome for PACNS vs RCVS  -d/w stroke resident. patient's recurrent strokes maybe related to undiagnosed PACNS vs purely thromboembolic disease due to the beading    PLAN  -  -if on steroids please initiate PPI as well as Bactrim DS TIW for PCP prophylaxis    case d/w     Jamal Alvarado MD, PGY-5  Rheumatology Fellow  Reachable on TEAMS 58-year old woman with history of hypertension, recurrent strokes, brought into ED at  initially for concern for altered mental status with findings concerning for PACNS    ##eval for PACNS  Progressive mental status change/ cognitive impairment over the past year  Brain imaging with findings of multiple infarcts, petechial hemorrhages  Cerebral angio showed diffuse beading   Negative MAX/cpANCA/dsDNA/Sm/Ribo P/BONI/SSA/SSB/SPEP/UA/C3: 152/C4: 36. CRP: 8. ESR: 113 RF: 30  Negative Hepatitis/Treponema  -s/p 1g Methylprednisolone (11/24 - 11/27)  -there is no specific test for the diagnosis of PACNS although the combination of exam findings/imaging/CSF studies maybe suggestive  correlation between angio findings with biopsy positivity is not 100% and thus biopsy remains the gold standard for diagnosis  -however biopsy is being deferred given bleeding risk. started empiric therapy with pulse steroids as above  -so far no evidence of secondary autoimmune disease based on serologies above  -CSF thus far without evidence of infection. Albumin index and IgG synthetic ratio normal as well from CSF  -discussed with neuroradiology, results from selective angiogram indicate diffuse beading more worrisome for PACNS vs RCVS  -d/w stroke resident. patient's recurrent strokes maybe related to undiagnosed PACNS vs purely thromboembolic disease due to the beading    PLAN  -s/p methylpred 1g pulse 11/24 - 11/27. biopsy deferred  -patient at 80% prior to her acute hospitalization per brother  -theoretically, for PACNS that is not responsive to steroids Cytoxan is used  -however with no objective data such as a biopsy planning for immunosuppression is difficult and benefit vs risk is unclear for a cytotoxic agent such as Cytoxan  -will clarify with neuroradiology and neurology team when the soonest feasible opportunity will be to repeat MR angio or conventional angiogram to have an objective measure for response  -afterward coordination of additional steroid sparing agent vs taper can be discussed pending on when repeat imaging will occur    case d/w Dr. David Alvarado MD, PGY-5  Rheumatology Fellow  Reachable on TEAMS 58-year old woman with history of hypertension, recurrent strokes, brought into ED at  initially for concern for altered mental status with findings concerning for PACNS    ##eval for PACNS  Progressive mental status change/ cognitive impairment over the past year  Brain imaging with findings of multiple infarcts, petechial hemorrhages  Cerebral angio showed diffuse beading   Negative MAX/cpANCA/dsDNA/Sm/Ribo P/BONI/SSA/SSB/SPEP/UA/C3: 152/C4: 36. CRP: 8. ESR: 113 RF: 30  apls negative  Negative Hepatitis/Treponema  -s/p 1g Methylprednisolone (11/24 - 11/27)  -there is no specific test for the diagnosis of PACNS although the combination of exam findings/imaging/CSF studies may be suggestive  correlation between angio findings with biopsy positivity is not 100% and thus biopsy remains the gold standard for diagnosis  -however biopsy is being deferred given bleeding risk. started empiric therapy with pulse steroids as above  -so far no evidence of secondary autoimmune disease based on serologies above  -CSF thus far without evidence of infection. Albumin index and IgG synthetic ratio normal as well from CSF  -discussed with neuroradiology, results from selective angiogram indicate diffuse beading more worrisome for PACNS vs RCVS  -d/w stroke resident. patient's recurrent strokes maybe related to undiagnosed PACNS vs purely thromboembolic disease due to the beading    PLAN  -s/p methylpred 1g pulse 11/24 - 11/27. biopsy deferred  -patient at 80% prior to her acute hospitalization per brother  -theoretically, for PACNS that is not responsive to steroids Cytoxan is used  -however with no objective data such as a biopsy, planning for immunosuppression is difficult and benefit vs risk is unclear for a cytotoxic agent such as Cytoxan  -will clarify with neuroradiology and neurology team when the soonest feasible opportunity will be to repeat MR angio and/ or conventional angiogram to have an objective measure for response  -afterward coordination of additional steroid sparing agent vs taper can be discussed pending on when repeat imaging will occur    # Anemia,   no signs of hemolysis    # New KRUNAL  please send UA , urine lytes  please hold ARB  repeat BMP tomorrow        case d/w Dr. David Alvarado MD, PGY-5  Rheumatology Fellow  Reachable on TEAMS

## 2023-11-27 NOTE — PROGRESS NOTE ADULT - SUBJECTIVE AND OBJECTIVE BOX
Subjective: Patient seen and examined. No new events except as noted.     REVIEW OF SYSTEMS:    CONSTITUTIONAL: No weakness, fevers or chills  EYES/ENT: No visual changes;  No vertigo or throat pain   NECK: No pain or stiffness  RESPIRATORY: No cough, wheezing, hemoptysis; No shortness of breath  CARDIOVASCULAR: No chest pain or palpitations  GASTROINTESTINAL: No abdominal or epigastric pain. No nausea, vomiting, or hematemesis; No diarrhea or constipation. No melena or hematochezia.  GENITOURINARY: No dysuria, frequency or hematuria  NEUROLOGICAL: No numbness or weakness  SKIN: No itching, burning, rashes, or lesions   All other review of systems is negative unless indicated above.    MEDICATIONS:  MEDICATIONS  (STANDING):  amLODIPine   Tablet 10 milliGRAM(s) Oral daily  aspirin  chewable 81 milliGRAM(s) Oral daily  atorvastatin 80 milliGRAM(s) Oral at bedtime  enoxaparin Injectable 40 milliGRAM(s) SubCutaneous every 24 hours  insulin lispro (ADMELOG) corrective regimen sliding scale   SubCutaneous three times a day before meals  insulin lispro (ADMELOG) corrective regimen sliding scale   SubCutaneous at bedtime  losartan 25 milliGRAM(s) Oral daily  pantoprazole    Tablet 40 milliGRAM(s) Oral daily      PHYSICAL EXAM:  T(C): 36.8 (11-27-23 @ 08:45), Max: 37.1 (11-26-23 @ 16:19)  HR: 83 (11-27-23 @ 08:45) (83 - 99)  BP: 137/72 (11-27-23 @ 08:45) (119/71 - 149/71)  RR: 18 (11-27-23 @ 08:45) (18 - 18)  SpO2: 98% (11-27-23 @ 08:45) (98% - 99%)  Wt(kg): --  I&O's Summary    26 Nov 2023 07:01  -  27 Nov 2023 07:00  --------------------------------------------------------  IN: 830 mL / OUT: 1100 mL / NET: -270 mL          Appearance: NAD	  HEENT:   Dry oral mucosa, PERRL, EOMI	  Lymphatic: No lymphadenopathy  Cardiovascular: Normal S1 S2, No JVD, No murmurs, No edema  Respiratory: Lungs clear to auscultation	  Psychiatry: A & O x 3, Mood & affect appropriate  Gastrointestinal:  Soft, Non-tender, + BS	  Skin: No rashes, No ecchymoses, No cyanosis	  Neurologic: AOx2-3, EOMI, no facial, no drift CLEANING 5/5  Extremities: Normal range of motion, No clubbing, cyanosis or edema  Vascular: Peripheral pulses palpable 2+ bilaterally          LABS:    CARDIAC MARKERS:                                9.6    14.29 )-----------( 389      ( 27 Nov 2023 06:13 )             27.2     11-27    147<H>  |  112<H>  |  56<H>  ----------------------------<  145<H>  4.3   |  23  |  1.64<H>    Ca    10.0      27 Nov 2023 06:13  Mg     2.3     11-25    TPro  8.4<H>  /  Alb  4.2  /  TBili  0.5  /  DBili  x   /  AST  12  /  ALT  13  /  AlkPhos  76  11-25            TELEMETRY: 	    ECG:  	  RADIOLOGY:   DIAGNOSTIC TESTING:  [ ] Echocardiogram:  [ ]  Catheterization:  [ ] Stress Test:    OTHER:

## 2023-11-27 NOTE — PROGRESS NOTE ADULT - SUBJECTIVE AND OBJECTIVE BOX
THE PATIENT WAS SEEN AND EXAMINED BY ME WITH THE HOUSESTAFF AND STROKE TEAM DURING MORNING ROUNDS.   HPI:  58-year old woman with history of hypertension, rheumatoid arthritis, brought into ED at  initially for concerned for altered mental status.  Prior to hospital presentation: neighbor overhead patient was looking for her mom, was confused/wandering around apartment complex.   At [place]: MR brain showed bilateral subacute to remote embolic strokes. MRI shows right posterior corona radiata acute infarct and multiple petechial hemorrhages in b/l thalami and basal ganglia. Noted to have KRUNAL. Also noted to have hypertension, on amlodipine, aldactone, and losartan. Started on B12 supplements. Seen by rheumatology,  Transferred to [facility] for cerebral angiogram. NIHSS: 2 LKW: prior to 11/15 ( hospitalizato)        SUBJECTIVE: No events overnight.  No new neurologic complaints.      amLODIPine   Tablet 10 milliGRAM(s) Oral daily  aspirin  chewable 81 milliGRAM(s) Oral daily  atorvastatin 80 milliGRAM(s) Oral at bedtime  enoxaparin Injectable 40 milliGRAM(s) SubCutaneous every 24 hours  insulin lispro (ADMELOG) corrective regimen sliding scale   SubCutaneous at bedtime  insulin lispro (ADMELOG) corrective regimen sliding scale   SubCutaneous three times a day before meals  losartan 25 milliGRAM(s) Oral daily  pantoprazole    Tablet 40 milliGRAM(s) Oral daily      PHYSICAL EXAM:   Vital Signs Last 24 Hrs  T(C): 36.7 (27 Nov 2023 04:47), Max: 37.2 (26 Nov 2023 08:43)  T(F): 98 (27 Nov 2023 04:47), Max: 98.9 (26 Nov 2023 08:43)  HR: 85 (27 Nov 2023 04:47) (74 - 99)  BP: 137/79 (27 Nov 2023 04:47) (119/71 - 149/71)  RR: 18 (27 Nov 2023 04:47) (18 - 18)  SpO2: 99% (27 Nov 2023 04:47) (98% - 99%)    Parameters below as of 27 Nov 2023 04:47  Patient On (Oxygen Delivery Method): nasal cannula        General: No acute distress  HEENT: EOM intact, visual fields full  Abdomen: Soft, nontender, nondistended   Extremities: No edema      NEUROLOGICAL EXAM:  Mental status: Eyes closed, does not open to noxious stimuli. Resists eye opening by examiner. Answers yes/no questions with eyes closed.  Follows some simple commands.  Cranial Nerves: trace L facial droop, no nystagmus, no dysarthria,  tongue midline  Motor exam: Antigravity and symmetric throughout  Sensation: Intact to light touch   Coordination/ Gait: Deferred    LABS:                        9.6    14.29 )-----------( 389      ( 27 Nov 2023 06:13 )             27.2    11-27    147<H>  |  112<H>  |  56<H>  ----------------------------<  145<H>  4.3   |  23  |  1.64<H>    Ca    10.0      27 Nov 2023 06:13  Mg     2.3     11-25    TPro  8.4<H>  /  Alb  4.2  /  TBili  0.5  /  DBili  x   /  AST  12  /  ALT  13  /  AlkPhos  76  11-25        IMAGING: Reviewed by me.     11/21/23 CT HEAD: No hydrocephalus, acute intracranial hemorrhage, mass effect, or brain edema.    (11.18.2023)  Head CT: No acute intracranial hemorrhage, mass effect, or shift of the   midline structures.  Similar-appearing extensive chronic white matter microvascular type   changes and chronic lacunar infarcts, as discussed.  CTA neck and head: No large vessel occlusion or major stenosis.    MR Head w/ IV Cont (11.17.2023)  Right posterior corona radiata subacute infarction  Multiple remote infarctions within the cerebral hemispheric white   matter basal ganglia and thalami  Pervasive cerebral hemispheric white matter abnormality, possibly   accelerated form of ischemic white matter disease versus and/or   superimposed other inflammatory metabolic toxic or infectious process  Numerous remote petechial hemorrhages most prominently in the basal   ganglia and thalami  Diffuse brain volume loss upper range typical for age   THE PATIENT WAS SEEN AND EXAMINED BY ME WITH THE HOUSESTAFF AND STROKE TEAM DURING MORNING ROUNDS.   HPI:  58-year old woman with history of hypertension, rheumatoid arthritis, brought into ED at  initially for concerned for altered mental status.  Prior to hospital presentation: neighbor overhead patient was looking for her mom, was confused/wandering around apartment complex.   At [place]: MR brain showed bilateral subacute to remote embolic strokes. MRI shows right posterior corona radiata acute infarct and multiple petechial hemorrhages in b/l thalami and basal ganglia. Noted to have KRUNAL. Also noted to have hypertension, on amlodipine, aldactone, and losartan. Started on B12 supplements. Seen by rheumatology,  Transferred to [facility] for cerebral angiogram. NIHSS: 2 LKW: prior to 11/15 ( hospitalizaton)        SUBJECTIVE: No events overnight.  No new neurologic complaints.      amLODIPine   Tablet 10 milliGRAM(s) Oral daily  aspirin  chewable 81 milliGRAM(s) Oral daily  atorvastatin 80 milliGRAM(s) Oral at bedtime  enoxaparin Injectable 40 milliGRAM(s) SubCutaneous every 24 hours  insulin lispro (ADMELOG) corrective regimen sliding scale   SubCutaneous at bedtime  insulin lispro (ADMELOG) corrective regimen sliding scale   SubCutaneous three times a day before meals  losartan 25 milliGRAM(s) Oral daily  pantoprazole    Tablet 40 milliGRAM(s) Oral daily      PHYSICAL EXAM:   Vital Signs Last 24 Hrs  T(C): 36.7 (27 Nov 2023 04:47), Max: 37.2 (26 Nov 2023 08:43)  T(F): 98 (27 Nov 2023 04:47), Max: 98.9 (26 Nov 2023 08:43)  HR: 85 (27 Nov 2023 04:47) (74 - 99)  BP: 137/79 (27 Nov 2023 04:47) (119/71 - 149/71)  RR: 18 (27 Nov 2023 04:47) (18 - 18)  SpO2: 99% (27 Nov 2023 04:47) (98% - 99%)    Parameters below as of 27 Nov 2023 04:47  Patient On (Oxygen Delivery Method): nasal cannula        General: No acute distress  HEENT: EOM intact, visual fields full  Abdomen: Soft, nontender, nondistended   Extremities: No edema      NEUROLOGICAL EXAM:  Mental status: Eyes opened, awake and alert x2 ( month wrong), following commands  Cranial Nerves: trace L facial droop, no nystagmus, no dysarthria,  tongue midline  Motor exam: Antigravity and symmetric throughout  Sensation: Intact to light touch   Coordination/ Gait: Deferred    LABS:                        9.6    14.29 )-----------( 389      ( 27 Nov 2023 06:13 )             27.2    11-27    147<H>  |  112<H>  |  56<H>  ----------------------------<  145<H>  4.3   |  23  |  1.64<H>    Ca    10.0      27 Nov 2023 06:13  Mg     2.3     11-25    TPro  8.4<H>  /  Alb  4.2  /  TBili  0.5  /  DBili  x   /  AST  12  /  ALT  13  /  AlkPhos  76  11-25        IMAGING: Reviewed by me.     11/21/23 CT HEAD: No hydrocephalus, acute intracranial hemorrhage, mass effect, or brain edema.    (11.18.2023)  Head CT: No acute intracranial hemorrhage, mass effect, or shift of the   midline structures.  Similar-appearing extensive chronic white matter microvascular type   changes and chronic lacunar infarcts, as discussed.  CTA neck and head: No large vessel occlusion or major stenosis.    MR Head w/ IV Cont (11.17.2023)  Right posterior corona radiata subacute infarction  Multiple remote infarctions within the cerebral hemispheric white   matter basal ganglia and thalami  Pervasive cerebral hemispheric white matter abnormality, possibly   accelerated form of ischemic white matter disease versus and/or   superimposed other inflammatory metabolic toxic or infectious process  Numerous remote petechial hemorrhages most prominently in the basal   ganglia and thalami  Diffuse brain volume loss upper range typical for age

## 2023-11-27 NOTE — PROGRESS NOTE ADULT - SUBJECTIVE AND OBJECTIVE BOX
Jamal Alvarado MD, PGY-5  Rheumatology Fellow  Reachable on TEAMS    NADINE NIRALI  [phone]    INTERVAL EVENTS      ROS   as per HPI    MEDICATIONS  (STANDING):  amLODIPine   Tablet 10 milliGRAM(s) Oral daily  aspirin  chewable 81 milliGRAM(s) Oral daily  atorvastatin 80 milliGRAM(s) Oral at bedtime  enoxaparin Injectable 40 milliGRAM(s) SubCutaneous every 24 hours  insulin lispro (ADMELOG) corrective regimen sliding scale   SubCutaneous three times a day before meals  insulin lispro (ADMELOG) corrective regimen sliding scale   SubCutaneous at bedtime  losartan 25 milliGRAM(s) Oral daily  pantoprazole    Tablet 40 milliGRAM(s) Oral daily    MEDICATIONS  (PRN):      Allergies    No Known Allergies    Intolerances    Vital Signs Last 24 Hrs  T(C): 36.3 (27 Nov 2023 12:38), Max: 37.1 (26 Nov 2023 16:19)  T(F): 97.4 (27 Nov 2023 12:38), Max: 98.8 (26 Nov 2023 16:19)  HR: 83 (27 Nov 2023 12:38) (83 - 99)  BP: 132/67 (27 Nov 2023 12:38) (119/71 - 149/71)  BP(mean): --  RR: 18 (27 Nov 2023 12:38) (18 - 18)  SpO2: 96% (27 Nov 2023 12:38) (96% - 99%)    Parameters below as of 27 Nov 2023 12:38  Patient On (Oxygen Delivery Method): room air      Physical Exam:  General: lying in hospital bed  HEENT: EOMI, MMM  CVS: +S1/S2, RRR,  Resp: distant breath sounds  GI: Soft  MSK: no swelling/warmth/erythema of the joints of the UE/LE  Neuro: AAOx1-2. moves extremities with extensive prompting  Skin: no visible rashes      LABS:  cret                        9.6    14.29 )-----------( 389      ( 27 Nov 2023 06:13 )             27.2     11-27    147<H>  |  112<H>  |  56<H>  ----------------------------<  145<H>  4.3   |  23  |  1.64<H>    Ca    10.0      27 Nov 2023 06:13      Urinalysis Basic - ( 22 Nov 2023 07:18 )    Color: x / Appearance: x / SG: x / pH: x  Gluc: 94 mg/dL / Ketone: x  / Bili: x / Urobili: x   Blood: x / Protein: x / Nitrite: x   Leuk Esterase: x / RBC: x / WBC x   Sq Epi: x / Non Sq Epi: x / Bacteria: x      Culture - Fungal, CSF (collected 11-21-23 @ 14:51)  Source: .CSF CSF  Preliminary Report (11-22-23 @ 08:55):    Testing in progress    Culture - CSF with Gram Stain (collected 11-21-23 @ 14:51)  Source: .CSF CSF  Gram Stain (11-21-23 @ 19:09):    No polymorphonuclear cells seen    No organisms seen    by cytocentrifuge      Rheumatology Work Up    C3 Complement, Serum: 152 mg/dL [81 - 157] (11-21-23 @ 07:19)  C4 Complement, Serum: 36 mg/dL [13 - 39] (11-21-23 @ 07:19)  Ribosomal P Protein Antibody: <0.2 AI [Fluorescent Bead Immunoassay                     Ribosomal P Antibodies, IgG                     <1.0 AI (negative)                     > or =1.0 AI (positive)                     Reference values  apply  to all ages.] (11-21-23 @ 07:19)  Anti SS-A Antibody: <0.2 AI (11-21-23 @ 07:19)  Anti SS-B Antibody: <0.2: Fluorescent Bead Immunoassay      Neutrophil Cytoplasmic Antibody (11.21.23 @ 07:19)    Cytoplasmic (c-ANCA) Antibody: Negative   Perinuclear (p-ANCA) Antibody: Negative   Atypical ANCA: Negative    Acute Hepatitis Panel (11.21.23 @ 07:19)    Hepatitis C Virus Interpretation: Nonreact: Hepatitis C AB   Hepatitis C Virus S/CO Ratio: 0.06 S/CO   Hepatitis B Core IgM Antibody: Nonreact   Hepatitis B Surface Antigen: Nonreact   Hepatitis A IgM Antibody: Nonreact    BONI Antibody Screening Test (11.21.23 @ 07:19)    SM (Ferrera) Ab FBIA: <0.2 AI   Anti-Ribonuclear Protein: <0.2: Fluorescent Bead Immunoassy    Double Stranded DNA Antibody (11.17.23 @ 20:00)    Double Stranded DNA Antibody: <12: Method: EIA    Syphilis Screen (11.16.23 @ 16:01)    Treponema Pallidum Antibody Interpretation: Negative      RADIOLOGY & ADDITIONAL STUDIES:  < from: CT Head No Cont (11.21.23 @ 17:11) >    INTERPRETATION:  Noncontrast CT of the brain.    CLINICAL INDICATION:  Altered mental status    TECHNIQUE : Axial CT scanning of the brain was obtained from the skull   base to the vertex without the administration of intravenous contrast.   Sagittal and coronal reformats were provided.    COMPARISON: CT brain 11/18/2023    FINDINGS:    No hydrocephalus, mass effect, midline shift, acute intracranial   hemorrhage, or brain edema.    Moderate white matter microvascular ischemic disease.    Visualized paranasal sinuses and mastoid air cells are clear.    IMPRESSION:    No hydrocephalus, acute intracranial hemorrhage, mass effect, or brain   edema.    --- End of Report ---      < end of copied text >    < from: CT Angio Neck Stroke Protocol w/ IV Cont (11.18.23 @ 20:17) >    ACC: [id number] EXAM:  CT ANGIO BRAIN STROKE PROTC IC   ORDERED BY: АННА VERDUZCO     ACC: [id number] EXAM:  CT ANGIO NECK STROKE PROTCL IC   ORDERED BY: АННА VERDUZCO     ACC: [id number] EXAM:  CT BRAIN   ORDERED BY: KEELEY MONTEIRO     PROCEDURE DATE:  11/18/2023      IMPRESSION:    Head CT:No acute intracranial hemorrhage, mass effect, or shift of the   midline structures.    Similar-appearing extensive chronic white matter microvascular type   changes and chronic lacunar infarcts, as discussed.    CTA neck and head: No large vessel occlusion or major stenosis.    --- End of Report ---      < end of copied text >    < from: MR Head w/ IV Cont (11.17.23 @ 13:09) >    ACC: [id number] EXAM:  MR BRAIN IC   ORDERED BY: BRANDEN FERNANDEZ     PROCEDURE DATE:  11/17/2023          INTERPRETATION:  MR of the brain with and without gadolinium contrast    CLINICAL INFORMATION:   ams  FMR        IMPRESSION:    1. Right posterior corona radiata subacute infarction    2. Multiple remote infarctions within the cerebral hemispheric white   matter basal ganglia and thalami    3. Pervasive cerebral hemispheric white matter abnormality, possibly   accelerated form of ischemic white matter disease versus and/or   superimposed other inflammatory metabolic toxic or infectious process    4. Numerous remote petechial hemorrhages most prominently in the basal   ganglia andthalami    5. Diffuse brain volume loss upper range typical for age    --- End of Report ---    < end of copied text >      < from: TTE Echo Complete w/o Contrast w/ Doppler (11.15.23 @ 17:46) >  PHYSICIAN INTERPRETATION:    Summary:   1. Normal global left ventricular systolic function.   2. Mild mitral valve regurgitation.   3. Mild aortic regurgitation.   4. Sclerotic aortic valve with normal opening.    < end of copied text >    < from: CT Abdomen and Pelvis w/ IV Cont (11.17.23 @ 12:40) >    ACC: [id number] EXAM:  CT ABDOMEN AND PELVIS IC   ORDERED BY: BRANDEN FERNANDEZ     ACC: [id number] EXAM:  CT CHEST   ORDERED BY: BRANDEN FERNANDEZ     PROCEDURE DATE:  11/17/2023          I    IMPRESSION:    CHEST:  -Nopneumonia. Mosaic attenuation of the lung parenchyma, nonspecific.  -Aortic valve and coronary artery calcifications.    ABDOMEN/PELVIS:  -No acute bowel pathology. Sleeve gastrectomy postoperative changes.  -Common bile duct is mildly distended for patient age measuring 8 mm.   Correlate with LFTs and MRCP as warranted. Cholelithiasis.  -Mild mesenteric haziness and small mesenteric nodes may reflect   mesenteric panniculitis. Follow up CT abdomen pelvis is recommended in   6-12 months to evaluate for stability.    --- End of Report ---          < end of copied text >   Jamal Alvarado MD, PGY-5  Rheumatology Fellow  Reachable on TEAMS    NADINE NIRALI  [phone]    INTERVAL EVENTS  s/p methylprednisolone IV pulse 1g x4 doses. More oriented today. Per brother patient is 80% back to her baseline before this acute episode of decompensation    ROS   as per HPI    MEDICATIONS  (STANDING):  amLODIPine   Tablet 10 milliGRAM(s) Oral daily  aspirin  chewable 81 milliGRAM(s) Oral daily  atorvastatin 80 milliGRAM(s) Oral at bedtime  enoxaparin Injectable 40 milliGRAM(s) SubCutaneous every 24 hours  insulin lispro (ADMELOG) corrective regimen sliding scale   SubCutaneous three times a day before meals  insulin lispro (ADMELOG) corrective regimen sliding scale   SubCutaneous at bedtime  losartan 25 milliGRAM(s) Oral daily  pantoprazole    Tablet 40 milliGRAM(s) Oral daily    MEDICATIONS  (PRN):      Allergies    No Known Allergies    Intolerances    Vital Signs Last 24 Hrs  T(C): 36.3 (27 Nov 2023 12:38), Max: 37.1 (26 Nov 2023 16:19)  T(F): 97.4 (27 Nov 2023 12:38), Max: 98.8 (26 Nov 2023 16:19)  HR: 83 (27 Nov 2023 12:38) (83 - 99)  BP: 132/67 (27 Nov 2023 12:38) (119/71 - 149/71)  BP(mean): --  RR: 18 (27 Nov 2023 12:38) (18 - 18)  SpO2: 96% (27 Nov 2023 12:38) (96% - 99%)    Parameters below as of 27 Nov 2023 12:38  Patient On (Oxygen Delivery Method): room air      Physical Exam:  General: sitting in chair. comfortable  HEENT: no oral ulcers  Resp: no respiratory distress  MSK: no swelling/warmth/erythema of the joints of the UE/LE  Neuro: AAOx1-2. waxes/wanes.   Skin: no visible rashes      LABS:  cret                        9.6    14.29 )-----------( 389      ( 27 Nov 2023 06:13 )             27.2     11-27    147<H>  |  112<H>  |  56<H>  ----------------------------<  145<H>  4.3   |  23  |  1.64<H>    Ca    10.0      27 Nov 2023 06:13      Urinalysis Basic - ( 22 Nov 2023 07:18 )    Color: x / Appearance: x / SG: x / pH: x  Gluc: 94 mg/dL / Ketone: x  / Bili: x / Urobili: x   Blood: x / Protein: x / Nitrite: x   Leuk Esterase: x / RBC: x / WBC x   Sq Epi: x / Non Sq Epi: x / Bacteria: x      Culture - Fungal, CSF (collected 11-21-23 @ 14:51)  Source: .CSF CSF  Preliminary Report (11-22-23 @ 08:55):    Testing in progress    Culture - CSF with Gram Stain (collected 11-21-23 @ 14:51)  Source: .CSF CSF  Gram Stain (11-21-23 @ 19:09):    No polymorphonuclear cells seen    No organisms seen    by cytocentrifuge      Rheumatology Work Up    C3 Complement, Serum: 152 mg/dL [81 - 157] (11-21-23 @ 07:19)  C4 Complement, Serum: 36 mg/dL [13 - 39] (11-21-23 @ 07:19)  Ribosomal P Protein Antibody: <0.2 AI [Fluorescent Bead Immunoassay                     Ribosomal P Antibodies, IgG                     <1.0 AI (negative)                     > or =1.0 AI (positive)                     Reference values  apply  to all ages.] (11-21-23 @ 07:19)  Anti SS-A Antibody: <0.2 AI (11-21-23 @ 07:19)  Anti SS-B Antibody: <0.2: Fluorescent Bead Immunoassay      Neutrophil Cytoplasmic Antibody (11.21.23 @ 07:19)    Cytoplasmic (c-ANCA) Antibody: Negative   Perinuclear (p-ANCA) Antibody: Negative   Atypical ANCA: Negative    Acute Hepatitis Panel (11.21.23 @ 07:19)    Hepatitis C Virus Interpretation: Nonreact: Hepatitis C AB   Hepatitis C Virus S/CO Ratio: 0.06 S/CO   Hepatitis B Core IgM Antibody: Nonreact   Hepatitis B Surface Antigen: Nonreact   Hepatitis A IgM Antibody: Nonreact    BONI Antibody Screening Test (11.21.23 @ 07:19)    SM (Ferrera) Ab FBIA: <0.2 AI   Anti-Ribonuclear Protein: <0.2: Fluorescent Bead Immunoassy    Double Stranded DNA Antibody (11.17.23 @ 20:00)    Double Stranded DNA Antibody: <12: Method: EIA    Syphilis Screen (11.16.23 @ 16:01)    Treponema Pallidum Antibody Interpretation: Negative      RADIOLOGY & ADDITIONAL STUDIES:  < from: CT Head No Cont (11.21.23 @ 17:11) >    INTERPRETATION:  Noncontrast CT of the brain.    CLINICAL INDICATION:  Altered mental status    TECHNIQUE : Axial CT scanning of the brain was obtained from the skull   base to the vertex without the administration of intravenous contrast.   Sagittal and coronal reformats were provided.    COMPARISON: CT brain 11/18/2023    FINDINGS:    No hydrocephalus, mass effect, midline shift, acute intracranial   hemorrhage, or brain edema.    Moderate white matter microvascular ischemic disease.    Visualized paranasal sinuses and mastoid air cells are clear.    IMPRESSION:    No hydrocephalus, acute intracranial hemorrhage, mass effect, or brain   edema.    --- End of Report ---      < end of copied text >    < from: CT Angio Neck Stroke Protocol w/ IV Cont (11.18.23 @ 20:17) >    ACC: [id number] EXAM:  CT ANGIO BRAIN STROKE PROTC IC   ORDERED BY: АННА VERDUZCO     ACC: [id number] EXAM:  CT ANGIO NECK STROKE PROTCL IC   ORDERED BY: АННА VERDUZCO     ACC: [id number] EXAM:  CT BRAIN   ORDERED BY: KEELEY MONTEIRO     PROCEDURE DATE:  11/18/2023      IMPRESSION:    Head CT:No acute intracranial hemorrhage, mass effect, or shift of the   midline structures.    Similar-appearing extensive chronic white matter microvascular type   changes and chronic lacunar infarcts, as discussed.    CTA neck and head: No large vessel occlusion or major stenosis.    --- End of Report ---      < end of copied text >    < from: MR Head w/ IV Cont (11.17.23 @ 13:09) >    ACC: [id number] EXAM:  MR BRAIN IC   ORDERED BY: BRANDEN FERNANDEZ     PROCEDURE DATE:  11/17/2023          INTERPRETATION:  MR of the brain with and without gadolinium contrast    CLINICAL INFORMATION:   ams  FMR        IMPRESSION:    1. Right posterior corona radiata subacute infarction    2. Multiple remote infarctions within the cerebral hemispheric white   matter basal ganglia and thalami    3. Pervasive cerebral hemispheric white matter abnormality, possibly   accelerated form of ischemic white matter disease versus and/or   superimposed other inflammatory metabolic toxic or infectious process    4. Numerous remote petechial hemorrhages most prominently in the basal   ganglia andthalami    5. Diffuse brain volume loss upper range typical for age    --- End of Report ---    < end of copied text >      < from: TTE Echo Complete w/o Contrast w/ Doppler (11.15.23 @ 17:46) >  PHYSICIAN INTERPRETATION:    Summary:   1. Normal global left ventricular systolic function.   2. Mild mitral valve regurgitation.   3. Mild aortic regurgitation.   4. Sclerotic aortic valve with normal opening.    < end of copied text >    < from: CT Abdomen and Pelvis w/ IV Cont (11.17.23 @ 12:40) >    ACC: [id number] EXAM:  CT ABDOMEN AND PELVIS IC   ORDERED BY: BRANDEN FERNANDEZ     ACC: [id number] EXAM:  CT CHEST   ORDERED BY: BRANDEN FERNANDEZ     PROCEDURE DATE:  11/17/2023          I    IMPRESSION:    CHEST:  -Nopneumonia. Mosaic attenuation of the lung parenchyma, nonspecific.  -Aortic valve and coronary artery calcifications.    ABDOMEN/PELVIS:  -No acute bowel pathology. Sleeve gastrectomy postoperative changes.  -Common bile duct is mildly distended for patient age measuring 8 mm.   Correlate with LFTs and MRCP as warranted. Cholelithiasis.  -Mild mesenteric haziness and small mesenteric nodes may reflect   mesenteric panniculitis. Follow up CT abdomen pelvis is recommended in   6-12 months to evaluate for stability.    --- End of Report ---          < end of copied text >   Jamal Alvarado MD, PGY-5  Rheumatology Fellow  Reachable on TEAMS    NADINE NIRALI  [phone]    INTERVAL EVENTS  s/p methylprednisolone IV pulse 1g x4 doses. More oriented today. Per brother patient is 80% back to her baseline before this acute episode of decompensation    ROS   denies any pain, no rashes, no headaches, no visual changes      MEDICATIONS  (STANDING):  amLODIPine   Tablet 10 milliGRAM(s) Oral daily  aspirin  chewable 81 milliGRAM(s) Oral daily  atorvastatin 80 milliGRAM(s) Oral at bedtime  enoxaparin Injectable 40 milliGRAM(s) SubCutaneous every 24 hours  insulin lispro (ADMELOG) corrective regimen sliding scale   SubCutaneous three times a day before meals  insulin lispro (ADMELOG) corrective regimen sliding scale   SubCutaneous at bedtime  losartan 25 milliGRAM(s) Oral daily  pantoprazole    Tablet 40 milliGRAM(s) Oral daily    MEDICATIONS  (PRN):      Allergies    No Known Allergies    Intolerances    Vital Signs Last 24 Hrs  T(C): 36.3 (27 Nov 2023 12:38), Max: 37.1 (26 Nov 2023 16:19)  T(F): 97.4 (27 Nov 2023 12:38), Max: 98.8 (26 Nov 2023 16:19)  HR: 83 (27 Nov 2023 12:38) (83 - 99)  BP: 132/67 (27 Nov 2023 12:38) (119/71 - 149/71)  BP(mean): --  RR: 18 (27 Nov 2023 12:38) (18 - 18)  SpO2: 96% (27 Nov 2023 12:38) (96% - 99%)    Parameters below as of 27 Nov 2023 12:38  Patient On (Oxygen Delivery Method): room air      Physical Exam:  General: sitting in chair. comfortable  HEENT: no oral ulcers  Resp: no respiratory distress  MSK: no swelling/warmth/erythema of the joints of the UE/LE  Neuro: AAOx1-2. waxes/wanes.   Skin: no visible rashes      LABS:  cret                        9.6    14.29 )-----------( 389      ( 27 Nov 2023 06:13 )             27.2     11-27    147<H>  |  112<H>  |  56<H>  ----------------------------<  145<H>  4.3   |  23  |  1.64<H>    Ca    10.0      27 Nov 2023 06:13      Urinalysis Basic - ( 22 Nov 2023 07:18 )    Color: x / Appearance: x / SG: x / pH: x  Gluc: 94 mg/dL / Ketone: x  / Bili: x / Urobili: x   Blood: x / Protein: x / Nitrite: x   Leuk Esterase: x / RBC: x / WBC x   Sq Epi: x / Non Sq Epi: x / Bacteria: x      Culture - Fungal, CSF (collected 11-21-23 @ 14:51)  Source: .CSF CSF  Preliminary Report (11-22-23 @ 08:55):    Testing in progress    Culture - CSF with Gram Stain (collected 11-21-23 @ 14:51)  Source: .CSF CSF  Gram Stain (11-21-23 @ 19:09):    No polymorphonuclear cells seen    No organisms seen    by cytocentrifuge      Rheumatology Work Up    C3 Complement, Serum: 152 mg/dL [81 - 157] (11-21-23 @ 07:19)  C4 Complement, Serum: 36 mg/dL [13 - 39] (11-21-23 @ 07:19)  Ribosomal P Protein Antibody: <0.2 AI [Fluorescent Bead Immunoassay                     Ribosomal P Antibodies, IgG                     <1.0 AI (negative)                     > or =1.0 AI (positive)                     Reference values  apply  to all ages.] (11-21-23 @ 07:19)  Anti SS-A Antibody: <0.2 AI (11-21-23 @ 07:19)  Anti SS-B Antibody: <0.2: Fluorescent Bead Immunoassay      Neutrophil Cytoplasmic Antibody (11.21.23 @ 07:19)    Cytoplasmic (c-ANCA) Antibody: Negative   Perinuclear (p-ANCA) Antibody: Negative   Atypical ANCA: Negative    Acute Hepatitis Panel (11.21.23 @ 07:19)    Hepatitis C Virus Interpretation: Nonreact: Hepatitis C AB   Hepatitis C Virus S/CO Ratio: 0.06 S/CO   Hepatitis B Core IgM Antibody: Nonreact   Hepatitis B Surface Antigen: Nonreact   Hepatitis A IgM Antibody: Nonreact    BONI Antibody Screening Test (11.21.23 @ 07:19)    SM (Ferrera) Ab FBIA: <0.2 AI   Anti-Ribonuclear Protein: <0.2: Fluorescent Bead Immunoassy    Double Stranded DNA Antibody (11.17.23 @ 20:00)    Double Stranded DNA Antibody: <12: Method: EIA    Syphilis Screen (11.16.23 @ 16:01)    Treponema Pallidum Antibody Interpretation: Negative      RADIOLOGY & ADDITIONAL STUDIES:  < from: CT Head No Cont (11.21.23 @ 17:11) >    INTERPRETATION:  Noncontrast CT of the brain.    CLINICAL INDICATION:  Altered mental status    TECHNIQUE : Axial CT scanning of the brain was obtained from the skull   base to the vertex without the administration of intravenous contrast.   Sagittal and coronal reformats were provided.    COMPARISON: CT brain 11/18/2023    FINDINGS:    No hydrocephalus, mass effect, midline shift, acute intracranial   hemorrhage, or brain edema.    Moderate white matter microvascular ischemic disease.    Visualized paranasal sinuses and mastoid air cells are clear.    IMPRESSION:    No hydrocephalus, acute intracranial hemorrhage, mass effect, or brain   edema.    --- End of Report ---      < end of copied text >    < from: CT Angio Neck Stroke Protocol w/ IV Cont (11.18.23 @ 20:17) >    ACC: [id number] EXAM:  CT ANGIO BRAIN STROKE PROTC IC   ORDERED BY: АННА VERDUZCO     ACC: [id number] EXAM:  CT ANGIO NECK STROKE PROTCL IC   ORDERED BY: АННА VERDUZCO     ACC: [id number] EXAM:  CT BRAIN   ORDERED BY: KEELEY MONTEIRO     PROCEDURE DATE:  11/18/2023      IMPRESSION:    Head CT:No acute intracranial hemorrhage, mass effect, or shift of the   midline structures.    Similar-appearing extensive chronic white matter microvascular type   changes and chronic lacunar infarcts, as discussed.    CTA neck and head: No large vessel occlusion or major stenosis.    --- End of Report ---      < end of copied text >    < from: MR Head w/ IV Cont (11.17.23 @ 13:09) >    ACC: [id number] EXAM:  MR BRAIN IC   ORDERED BY: BRANDEN FERNANDEZ     PROCEDURE DATE:  11/17/2023          INTERPRETATION:  MR of the brain with and without gadolinium contrast    CLINICAL INFORMATION:   ams  FMR        IMPRESSION:    1. Right posterior corona radiata subacute infarction    2. Multiple remote infarctions within the cerebral hemispheric white   matter basal ganglia and thalami    3. Pervasive cerebral hemispheric white matter abnormality, possibly   accelerated form of ischemic white matter disease versus and/or   superimposed other inflammatory metabolic toxic or infectious process    4. Numerous remote petechial hemorrhages most prominently in the basal   ganglia andthalami    5. Diffuse brain volume loss upper range typical for age    --- End of Report ---    < end of copied text >      < from: TTE Echo Complete w/o Contrast w/ Doppler (11.15.23 @ 17:46) >  PHYSICIAN INTERPRETATION:    Summary:   1. Normal global left ventricular systolic function.   2. Mild mitral valve regurgitation.   3. Mild aortic regurgitation.   4. Sclerotic aortic valve with normal opening.    < end of copied text >    < from: CT Abdomen and Pelvis w/ IV Cont (11.17.23 @ 12:40) >    ACC: [id number] EXAM:  CT ABDOMEN AND PELVIS IC   ORDERED BY: BRANDEN FERNANDEZ     ACC: [id number] EXAM:  CT CHEST   ORDERED BY: RBANDEN FERNANDEZ     PROCEDURE DATE:  11/17/2023          I    IMPRESSION:    CHEST:  -Nopneumonia. Mosaic attenuation of the lung parenchyma, nonspecific.  -Aortic valve and coronary artery calcifications.    ABDOMEN/PELVIS:  -No acute bowel pathology. Sleeve gastrectomy postoperative changes.  -Common bile duct is mildly distended for patient age measuring 8 mm.   Correlate with LFTs and MRCP as warranted. Cholelithiasis.  -Mild mesenteric haziness and small mesenteric nodes may reflect   mesenteric panniculitis. Follow up CT abdomen pelvis is recommended in   6-12 months to evaluate for stability.    --- End of Report ---          < end of copied text >

## 2023-11-27 NOTE — PROGRESS NOTE ADULT - ATTENDING COMMENTS
Amendments to fellow's assessment and plan as above.  Patient's brother aware of our recommendations and in agreement.  Discussed with neuro/primary team   will follow

## 2023-11-27 NOTE — PROGRESS NOTE ADULT - TIME BILLING
Advanced care planning was discussed with patient and family.  Advanced care planning forms were reviewed and discussed as appropriate.  Differential diagnosis and plan of care discussed with patient after the evaluation.   Pain assessed and judicious use of narcotics when appropriate was discussed.  Importance of Fall prevention discussed.  Counseling on Smoking and Alcohol cessation was offered when appropriate.  Counseling on Diet, exercise, and medication compliance was done.
Preparation to see the patient, including reviewing the brain imaging in past one year, obtaining and/or reviewing the resident/ or PA note, separately obtained history, performing a medically appropriate examination and/or evaluation as documented in this encounter note, counseling and educating of the patient, ordering tests, documenting clinical information in patients electronic record, interpreting results (not separately reported) and communicating results to the patient.   Evaluation was performed on 11/25/23
Preparation to see the patient, including reviewing the brain imaging in past one year, obtaining and/or reviewing the resident/ or PA note, separately obtained history, performing a medically appropriate examination and/or evaluation as documented in this encounter note, counseling and educating of the patient, ordering tests, documenting clinical information in patients electronic record, interpreting results (not separately reported) and communicating results to the patient.   Evaluation was performed on 11/26/23
Advanced care planning was discussed with patient and family.  Advanced care planning forms were reviewed and discussed as appropriate.  Differential diagnosis and plan of care discussed with patient after the evaluation.   Pain assessed and judicious use of narcotics when appropriate was discussed.  Importance of Fall prevention discussed.  Counseling on Smoking and Alcohol cessation was offered when appropriate.  Counseling on Diet, exercise, and medication compliance was done.
Advanced care planning was discussed with patient and family.  Advanced care planning forms were reviewed and discussed as appropriate.  Differential diagnosis and plan of care discussed with patient after the evaluation.   Pain assessed and judicious use of narcotics when appropriate was discussed.  Importance of Fall prevention discussed.  Counseling on Smoking and Alcohol cessation was offered when appropriate.  Counseling on Diet, exercise, and medication compliance was done.

## 2023-11-28 DIAGNOSIS — I48.0 PAROXYSMAL ATRIAL FIBRILLATION: ICD-10-CM

## 2023-11-28 LAB
ANION GAP SERPL CALC-SCNC: 12 MMOL/L — SIGNIFICANT CHANGE UP (ref 5–17)
ANION GAP SERPL CALC-SCNC: 12 MMOL/L — SIGNIFICANT CHANGE UP (ref 5–17)
BUN SERPL-MCNC: 38 MG/DL — HIGH (ref 7–23)
BUN SERPL-MCNC: 38 MG/DL — HIGH (ref 7–23)
CALCIUM SERPL-MCNC: 9.6 MG/DL — SIGNIFICANT CHANGE UP (ref 8.4–10.5)
CALCIUM SERPL-MCNC: 9.6 MG/DL — SIGNIFICANT CHANGE UP (ref 8.4–10.5)
CHLORIDE SERPL-SCNC: 107 MMOL/L — SIGNIFICANT CHANGE UP (ref 96–108)
CHLORIDE SERPL-SCNC: 107 MMOL/L — SIGNIFICANT CHANGE UP (ref 96–108)
CO2 SERPL-SCNC: 24 MMOL/L — SIGNIFICANT CHANGE UP (ref 22–31)
CO2 SERPL-SCNC: 24 MMOL/L — SIGNIFICANT CHANGE UP (ref 22–31)
CREAT SERPL-MCNC: 1.01 MG/DL — SIGNIFICANT CHANGE UP (ref 0.5–1.3)
CREAT SERPL-MCNC: 1.01 MG/DL — SIGNIFICANT CHANGE UP (ref 0.5–1.3)
EGFR: 64 ML/MIN/1.73M2 — SIGNIFICANT CHANGE UP
EGFR: 64 ML/MIN/1.73M2 — SIGNIFICANT CHANGE UP
GLUCOSE BLDC GLUCOMTR-MCNC: 140 MG/DL — HIGH (ref 70–99)
GLUCOSE BLDC GLUCOMTR-MCNC: 140 MG/DL — HIGH (ref 70–99)
GLUCOSE BLDC GLUCOMTR-MCNC: 150 MG/DL — HIGH (ref 70–99)
GLUCOSE BLDC GLUCOMTR-MCNC: 150 MG/DL — HIGH (ref 70–99)
GLUCOSE BLDC GLUCOMTR-MCNC: 151 MG/DL — HIGH (ref 70–99)
GLUCOSE BLDC GLUCOMTR-MCNC: 151 MG/DL — HIGH (ref 70–99)
GLUCOSE BLDC GLUCOMTR-MCNC: 202 MG/DL — HIGH (ref 70–99)
GLUCOSE BLDC GLUCOMTR-MCNC: 202 MG/DL — HIGH (ref 70–99)
GLUCOSE SERPL-MCNC: 145 MG/DL — HIGH (ref 70–99)
GLUCOSE SERPL-MCNC: 145 MG/DL — HIGH (ref 70–99)
HCT VFR BLD CALC: 28.1 % — LOW (ref 34.5–45)
HCT VFR BLD CALC: 28.1 % — LOW (ref 34.5–45)
HGB BLD-MCNC: 10.1 G/DL — LOW (ref 11.5–15.5)
HGB BLD-MCNC: 10.1 G/DL — LOW (ref 11.5–15.5)
INNER EAR 68KD AB FLD QL: <1.5 U/L — SIGNIFICANT CHANGE UP (ref 0–3.1)
INNER EAR 68KD AB FLD QL: <1.5 U/L — SIGNIFICANT CHANGE UP (ref 0–3.1)
LYME IGG LINE BLOT INTERP.: NEGATIVE — SIGNIFICANT CHANGE UP
LYME IGG LINE BLOT INTERP.: NEGATIVE — SIGNIFICANT CHANGE UP
LYME IGM LINE BLOT INTERP.: NEGATIVE — SIGNIFICANT CHANGE UP
LYME IGM LINE BLOT INTERP.: NEGATIVE — SIGNIFICANT CHANGE UP
MCHC RBC-ENTMCNC: 29.4 PG — SIGNIFICANT CHANGE UP (ref 27–34)
MCHC RBC-ENTMCNC: 29.4 PG — SIGNIFICANT CHANGE UP (ref 27–34)
MCHC RBC-ENTMCNC: 35.9 GM/DL — SIGNIFICANT CHANGE UP (ref 32–36)
MCHC RBC-ENTMCNC: 35.9 GM/DL — SIGNIFICANT CHANGE UP (ref 32–36)
MCV RBC AUTO: 81.7 FL — SIGNIFICANT CHANGE UP (ref 80–100)
MCV RBC AUTO: 81.7 FL — SIGNIFICANT CHANGE UP (ref 80–100)
METANEPHRINE, PL: 28.6 PG/ML — SIGNIFICANT CHANGE UP (ref 0–88)
METANEPHRINE, PL: 28.6 PG/ML — SIGNIFICANT CHANGE UP (ref 0–88)
NORMETANEPHRINE, PL: 76.3 PG/ML — SIGNIFICANT CHANGE UP (ref 0–244)
NORMETANEPHRINE, PL: 76.3 PG/ML — SIGNIFICANT CHANGE UP (ref 0–244)
NRBC # BLD: 0 /100 WBCS — SIGNIFICANT CHANGE UP (ref 0–0)
NRBC # BLD: 0 /100 WBCS — SIGNIFICANT CHANGE UP (ref 0–0)
P18 AB. IGG: SIGNIFICANT CHANGE UP
P18 AB. IGG: SIGNIFICANT CHANGE UP
P23 AB. IGG: SIGNIFICANT CHANGE UP
P23 AB. IGG: SIGNIFICANT CHANGE UP
P23 AB. IGM: SIGNIFICANT CHANGE UP
P23 AB. IGM: SIGNIFICANT CHANGE UP
P28 AB. IGG: SIGNIFICANT CHANGE UP
P28 AB. IGG: SIGNIFICANT CHANGE UP
P30 AB. IGG: SIGNIFICANT CHANGE UP
P30 AB. IGG: SIGNIFICANT CHANGE UP
P39 AB. IGG: SIGNIFICANT CHANGE UP
P39 AB. IGG: SIGNIFICANT CHANGE UP
P39 AB. IGM: SIGNIFICANT CHANGE UP
P39 AB. IGM: SIGNIFICANT CHANGE UP
P41 AB. IGG: SIGNIFICANT CHANGE UP
P41 AB. IGG: SIGNIFICANT CHANGE UP
P41 AB. IGM: SIGNIFICANT CHANGE UP
P41 AB. IGM: SIGNIFICANT CHANGE UP
P45 AB. IGG: SIGNIFICANT CHANGE UP
P45 AB. IGG: SIGNIFICANT CHANGE UP
P58 AB. IGG: SIGNIFICANT CHANGE UP
P58 AB. IGG: SIGNIFICANT CHANGE UP
P66 AB. IGG: SIGNIFICANT CHANGE UP
P66 AB. IGG: SIGNIFICANT CHANGE UP
P93 AB. IGG: SIGNIFICANT CHANGE UP
P93 AB. IGG: SIGNIFICANT CHANGE UP
PLATELET # BLD AUTO: 409 K/UL — HIGH (ref 150–400)
PLATELET # BLD AUTO: 409 K/UL — HIGH (ref 150–400)
POTASSIUM SERPL-MCNC: 3.8 MMOL/L — SIGNIFICANT CHANGE UP (ref 3.5–5.3)
POTASSIUM SERPL-MCNC: 3.8 MMOL/L — SIGNIFICANT CHANGE UP (ref 3.5–5.3)
POTASSIUM SERPL-SCNC: 3.8 MMOL/L — SIGNIFICANT CHANGE UP (ref 3.5–5.3)
POTASSIUM SERPL-SCNC: 3.8 MMOL/L — SIGNIFICANT CHANGE UP (ref 3.5–5.3)
RBC # BLD: 3.44 M/UL — LOW (ref 3.8–5.2)
RBC # BLD: 3.44 M/UL — LOW (ref 3.8–5.2)
RBC # FLD: 12.8 % — SIGNIFICANT CHANGE UP (ref 10.3–14.5)
RBC # FLD: 12.8 % — SIGNIFICANT CHANGE UP (ref 10.3–14.5)
SODIUM SERPL-SCNC: 143 MMOL/L — SIGNIFICANT CHANGE UP (ref 135–145)
SODIUM SERPL-SCNC: 143 MMOL/L — SIGNIFICANT CHANGE UP (ref 135–145)
WBC # BLD: 10.55 K/UL — HIGH (ref 3.8–10.5)
WBC # BLD: 10.55 K/UL — HIGH (ref 3.8–10.5)
WBC # FLD AUTO: 10.55 K/UL — HIGH (ref 3.8–10.5)
WBC # FLD AUTO: 10.55 K/UL — HIGH (ref 3.8–10.5)

## 2023-11-28 PROCEDURE — 99232 SBSQ HOSP IP/OBS MODERATE 35: CPT | Mod: GC

## 2023-11-28 PROCEDURE — 99233 SBSQ HOSP IP/OBS HIGH 50: CPT

## 2023-11-28 RX ORDER — AMIODARONE HYDROCHLORIDE 400 MG/1
0.5 TABLET ORAL
Qty: 450 | Refills: 0 | Status: DISCONTINUED | OUTPATIENT
Start: 2023-11-28 | End: 2023-11-29

## 2023-11-28 RX ORDER — SODIUM CHLORIDE 9 MG/ML
1000 INJECTION INTRAMUSCULAR; INTRAVENOUS; SUBCUTANEOUS
Refills: 0 | Status: DISCONTINUED | OUTPATIENT
Start: 2023-11-28 | End: 2023-11-30

## 2023-11-28 RX ORDER — APIXABAN 2.5 MG/1
5 TABLET, FILM COATED ORAL
Refills: 0 | Status: DISCONTINUED | OUTPATIENT
Start: 2023-11-28 | End: 2023-11-30

## 2023-11-28 RX ORDER — AMIODARONE HYDROCHLORIDE 400 MG/1
150 TABLET ORAL ONCE
Refills: 0 | Status: COMPLETED | OUTPATIENT
Start: 2023-11-28 | End: 2023-11-28

## 2023-11-28 RX ORDER — AMIODARONE HYDROCHLORIDE 400 MG/1
1 TABLET ORAL
Qty: 450 | Refills: 0 | Status: DISCONTINUED | OUTPATIENT
Start: 2023-11-28 | End: 2023-11-29

## 2023-11-28 RX ORDER — AMIODARONE HYDROCHLORIDE 400 MG/1
0.5 TABLET ORAL
Qty: 450 | Refills: 0 | Status: DISCONTINUED | OUTPATIENT
Start: 2023-11-28 | End: 2023-11-28

## 2023-11-28 RX ORDER — AMIODARONE HYDROCHLORIDE 400 MG/1
1 TABLET ORAL
Qty: 450 | Refills: 0 | Status: DISCONTINUED | OUTPATIENT
Start: 2023-11-28 | End: 2023-11-28

## 2023-11-28 RX ADMIN — Medication 2: at 12:15

## 2023-11-28 RX ADMIN — SODIUM CHLORIDE 50 MILLILITER(S): 9 INJECTION INTRAMUSCULAR; INTRAVENOUS; SUBCUTANEOUS at 20:14

## 2023-11-28 RX ADMIN — Medication 25 MILLIGRAM(S): at 05:44

## 2023-11-28 RX ADMIN — AMLODIPINE BESYLATE 10 MILLIGRAM(S): 2.5 TABLET ORAL at 05:41

## 2023-11-28 RX ADMIN — LOSARTAN POTASSIUM 25 MILLIGRAM(S): 100 TABLET, FILM COATED ORAL at 05:41

## 2023-11-28 RX ADMIN — ENOXAPARIN SODIUM 40 MILLIGRAM(S): 100 INJECTION SUBCUTANEOUS at 05:40

## 2023-11-28 RX ADMIN — APIXABAN 5 MILLIGRAM(S): 2.5 TABLET, FILM COATED ORAL at 17:08

## 2023-11-28 RX ADMIN — AMIODARONE HYDROCHLORIDE 16.7 MG/MIN: 400 TABLET ORAL at 16:40

## 2023-11-28 RX ADMIN — AMIODARONE HYDROCHLORIDE 33.3 MG/MIN: 400 TABLET ORAL at 10:50

## 2023-11-28 RX ADMIN — Medication 25 MILLIGRAM(S): at 17:07

## 2023-11-28 RX ADMIN — AMIODARONE HYDROCHLORIDE 600 MILLIGRAM(S): 400 TABLET ORAL at 03:38

## 2023-11-28 RX ADMIN — ATORVASTATIN CALCIUM 80 MILLIGRAM(S): 80 TABLET, FILM COATED ORAL at 21:25

## 2023-11-28 RX ADMIN — Medication 1: at 16:44

## 2023-11-28 RX ADMIN — AMIODARONE HYDROCHLORIDE 16.7 MG/MIN: 400 TABLET ORAL at 16:45

## 2023-11-28 RX ADMIN — Medication 60 MILLIGRAM(S): at 05:44

## 2023-11-28 RX ADMIN — APIXABAN 5 MILLIGRAM(S): 2.5 TABLET, FILM COATED ORAL at 10:52

## 2023-11-28 RX ADMIN — AMIODARONE HYDROCHLORIDE 16.7 MG/MIN: 400 TABLET ORAL at 20:14

## 2023-11-28 RX ADMIN — PANTOPRAZOLE SODIUM 40 MILLIGRAM(S): 20 TABLET, DELAYED RELEASE ORAL at 12:15

## 2023-11-28 NOTE — PROVIDER CONTACT NOTE (OTHER) - SITUATION
pt with rhythm AFIB 160s unsustained to 130s Vitals taken pt asymptomatic
Patient states her mother and sister are in the room when they are not, pt is becoming agitated and removing tele leads
Pt had cardiac event overnight, converting from NSR to atrial fibrillation. Endorsing no symptoms or changes at this time or last night. Pt heart rate maintaining 120s-130s at this time.
pt with elevated HR AFIB 190s-200 unsustained notified via [employer] [facility] from telemetry
Pt is very lethargic. Pt is too lethargic to take medication.

## 2023-11-28 NOTE — PROVIDER CONTACT NOTE (OTHER) - RECOMMENDATIONS
NP bedside assessment
NP bedside assessment
eval /tx
INOCENCIO/ TX . PA at bedside with Patient  s/p Metroprolol po admin discussed lopressor IV .
evaluate for PO medication regimen

## 2023-11-28 NOTE — PROGRESS NOTE ADULT - ASSESSMENT
58-year old woman with history of hypertension, recurrent strokes, brought into ED at  initially for concern for altered mental status with findings concerning for PACNS    ##eval for PACNS  Progressive mental status change/ cognitive impairment over the past year  Brain imaging with findings of multiple infarcts, petechial hemorrhages  Cerebral angio showed diffuse beading   Negative MAX/cpANCA/dsDNA/Sm/Ribo P/BONI/SSA/SSB/SPEP/UA/C3: 152/C4: 36. CRP: 8. ESR: 113 RF: 30  apls negative  Negative Hepatitis/Treponema  -s/p 1g Methylprednisolone (11/24 - 11/27). now prednisone 60mg (11/28 - )  -there is no specific test for the diagnosis of PACNS although the combination of exam findings/imaging/CSF studies may be suggestive  correlation between angio findings with biopsy positivity is not 100% and thus biopsy remains the gold standard for diagnosis  -however biopsy is being deferred given bleeding risk. started empiric therapy with pulse steroids as above  -so far no evidence of secondary autoimmune disease based on serologies above  -CSF thus far without evidence of infection. Albumin index and IgG synthetic ratio normal as well from CSF  -discussed with neuroradiology, results from selective angiogram indicate diffuse beading more worrisome for PACNS vs RCVS  -d/w stroke resident. patient's recurrent strokes maybe related to undiagnosed PACNS vs purely thromboembolic disease due to the beading    PLAN  -would recommend one more MR brain prior to discharge to evaluate for stability  -recommend keeping patient on prednisone 60mg for now and decreasing to prednisone 50mg qD for discharge  -recommend against a taper since no steroid sparing agent is being offered until radiographic progression can be assessed for  -theoretically, for PACNS that is not responsive to steroids Cytoxan is used up front and a steroid taper is not recommended without other therapy  -however with no objective data such as a biopsy, planning for immunosuppression is difficult and benefit vs risk is unclear for a cytotoxic agent such as Cytoxan  -after discussion with stroke service/neuro-radiology/neurosurgery planned repeat angiogram in 2-6 weeks after initial intervention    # Anemia,   no signs of hemolysis    # New KRUNAL  now resolved      case d/w Dr. David Alvarado MD, PGY-5  Rheumatology Fellow  Reachable on TEAMS

## 2023-11-28 NOTE — PROVIDER CONTACT NOTE (OTHER) - REASON
Pt is very lethargic
Pt remains in atrial fibrillation, heart rate up to 130s
pt with elevated HR AFIB 190s-200 unsustained notified via [employer] [facility] from telemetry.
Patient states her mother and sister are in the room when they are not, pt is becoming agitated and removing tele leads
pt with rhythm AFIB 160s unsustained to 130s Vitals taken pt asymptomatic

## 2023-11-28 NOTE — PROGRESS NOTE ADULT - ASSESSMENT
58 year old woman with HTN, Rheumatoid arthritis, presenting with decompensated confusion, subacute course since about February 2023.     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 Other etiologies include CADASIL for which testing is in process.     NEURO: Neurologically improved since weekened, more interactive with examiner, permissive HTN to gradual normotension. A1c 4.9, LDL - 128, high intensity statin initiated. MRI Brain with contrast showed diffusion restriction R corona radiata. Multiple microhemorrhages, deep and cortical. Cerebral angiogram performed on 11/20 showed multiple areas of focal stenosis and dilatation concerning for vasculitis. LP completed on 11/21 with normal profile, pending additional studies. Rheumatology consulted and recommended possible brain biopsy to confirm diagnosis of vasculitis. 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 Patient should be started on steroids given she is having micro hemorrhage and strokes from vasculitis. The risks of performing an open biopsy outweigh the benefits if the results of the biopsy are unlikely to . EEG: Moderate diffuse/multi-focal cerebral dysfunction, not specific as to etiology. There were no epileptiform abnormalities recorded. Steroids started on 11/24, initial dose Solumderol 1g for 3 days and then will taper ( prednisone 60 mg x 1 week, 50 mg x 1 week, 40 mg x 1 week, 30 mg x1 week, 20 mg x1 week, 10 mg x 1 week. Physical therapy/OT/PMR: AR.     ANTITHROMBOTIC THERAPY: ASA 81mg daily for secondary stroke prevention     PULMONARY: CXR: Mild, central pulmonary venous congestion. Mild perihilar interstitial infiltrates with air bronchograms, right greater than left. Protecting airway, saturating well     CARDIOVASCULAR: TTE Normal global left ventricular systolic function. Mild mitral valve regurgitation. Mild aortic regurgitation. Sclerotic aortic valve with normal opening, cardiac monitoring noted with afib overnight- started on metoprolol and amiodarone. Dr. Lazar following.                        SBP goal: 100-160    GASTROINTESTINAL: dysphagia screen passed on 11/20, advance as tolerated        Diet: Regular    RENAL: BUN/Cr elevated on 11/27 good urine output      Na Goal: Greater than 135     Hamm: no    HEMATOLOGY: H/H without change, Platelets 389      DVT ppx: Lovenox    ID: afebrile, no sign of infection.  leukocytosis likely due to steroids.     OTHER: Plan discussed with patient at bedside, all questions and concerns addressed. Patient's brother (HCP): Jorge Stone ().     DISPOSITION: AR once stable and workup is complete    CORE MEASURES:        Admission NIHSS: 2     Tenecteplase: NO      LDL/HDL: 128/43     Depression Screen: p     Statin Therapy: yes     Dysphagia Screen:  PASS      Smoking: YES Smoking cessation and education provided to patient [x] Nicotine patch ordered []     Afib NO     Stroke Education  YES    Obtain screening lower extremity venous ultrasound in patients who meet 1 or more of the following criteria as patient is high risk for DVT/PE on admission:   [] History of DVT/PE  [] Hypercoagulable states (Factor V Leiden, Cancer, OCP, etc. )  [] Prolonged immobility (hemiplegia/hemiparesis/post operative or any other extended immobilization)  [] Transferred from outside facility (Rehab or Long term care)  [] Age </= to 50       58 year old woman with HTN, Rheumatoid arthritis, presented with decompensated confusion, subacute course since about February 2023.     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 Other etiologies include CADASIL for which testing is in process.     NEURO: Neurologically continues to improve, more interactive with examiner, permissive HTN to gradual normotension. A1c 4.9, LDL - 128, high intensity statin initiated. MRI Brain with contrast showed diffusion restriction R corona radiata. Multiple microhemorrhages, deep and cortical. Cerebral angiogram performed on 11/20 showed multiple areas of focal stenosis and dilatation concerning for vasculitis. LP completed on 11/21 with normal profile, pending additional studies. Rheumatology consulted and recommended possible brain biopsy to confirm diagnosis of vasculitis. 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 Patient should be started on steroids given she is having micro hemorrhage and strokes from vasculitis. The risks of performing an open biopsy outweigh the benefits if the results of the biopsy are unlikely to . EEG: Moderate diffuse/multi-focal cerebral dysfunction, not specific as to etiology. There were no epileptiform abnormalities recorded. Steroids started on 11/24, initial dose Solumderol 1g for 3 days and then will taper ( prednisone 60 mg x 1 week, 50 mg x 1 week, 40 mg x 1 week, 30 mg x1 week, 20 mg x1 week, 10 mg x 1 week. Physical therapy/OT/PMR: AR.     ANTITHROMBOTIC THERAPY: ASA 81mg daily for secondary stroke prevention     PULMONARY: CXR: Mild, central pulmonary venous congestion. Mild perihilar interstitial infiltrates with air bronchograms, right greater than left. Protecting airway, saturating well     CARDIOVASCULAR: TTE Normal global left ventricular systolic function. Mild mitral valve regurgitation. Mild aortic regurgitation. Sclerotic aortic valve with normal opening, cardiac monitoring noted with afib overnight- started on metoprolol and amiodarone. Dr. Lazar following.                        SBP goal: 100-160    GASTROINTESTINAL: dysphagia screen passed on 11/20, advance as tolerated        Diet: Regular    RENAL: BUN/Cr improved, KRUNAL resolved, good urine output      Na Goal: Greater than 135     Hamm: no    HEMATOLOGY: H/H without change, Platelets 409     DVT ppx: Lovenox    ID: afebrile, no sign of infection. Leukocytosis: downtrending previously likely due to steroids.     OTHER: Plan discussed with patient at bedside, all questions and concerns addressed. Patient's brother (HCP): Jorge Stone ().     DISPOSITION: AR once stable and workup is complete    CORE MEASURES:        Admission NIHSS: 2     Tenecteplase: NO      LDL/HDL: 128/43     Depression Screen: p     Statin Therapy: yes     Dysphagia Screen:  PASS      Smoking: YES Smoking cessation and education provided to patient [x] Nicotine patch ordered []     Afib NO     Stroke Education  YES    Obtain screening lower extremity venous ultrasound in patients who meet 1 or more of the following criteria as patient is high risk for DVT/PE on admission:   [] History of DVT/PE  [] Hypercoagulable states (Factor V Leiden, Cancer, OCP, etc. )  [] Prolonged immobility (hemiplegia/hemiparesis/post operative or any other extended immobilization)  [] Transferred from outside facility (Rehab or Long term care)  [] Age </= to 50

## 2023-11-28 NOTE — PROGRESS NOTE ADULT - SUBJECTIVE AND OBJECTIVE BOX
THE PATIENT WAS SEEN AND EXAMINED BY ME WITH THE HOUSESTAFF AND STROKE TEAM DURING MORNING ROUNDS.   HPI:  58-year old woman with history of hypertension, rheumatoid arthritis, brought into ED at  initially for concerned for altered mental status.  Prior to hospital presentation: neighbor overhead patient was looking for her mom, was confused/wandering around apartment complex.   At [place]: MR brain showed bilateral subacute to remote embolic strokes. MRI shows right posterior corona radiata acute infarct and multiple petechial hemorrhages in b/l thalami and basal ganglia. Noted to have KRUNAL. Also noted to have hypertension, on amlodipine, aldactone, and losartan. Started on B12 supplements. Seen by rheumatology,  Transferred to [facility] for cerebral angiogram. NIHSS: 2 LKW: prior to 11/15 ( hospitalizaton)    SUBJECTIVE: No events overnight.  No new neurologic complaints.  ROS reported negative unless otherwise noted.    amLODIPine   Tablet 10 milliGRAM(s) Oral daily  aspirin  chewable 81 milliGRAM(s) Oral daily  atorvastatin 80 milliGRAM(s) Oral at bedtime  enoxaparin Injectable 40 milliGRAM(s) SubCutaneous every 24 hours  insulin lispro (ADMELOG) corrective regimen sliding scale   SubCutaneous three times a day before meals  insulin lispro (ADMELOG) corrective regimen sliding scale   SubCutaneous at bedtime  losartan 25 milliGRAM(s) Oral daily  metoprolol tartrate 25 milliGRAM(s) Oral two times a day  pantoprazole    Tablet 40 milliGRAM(s) Oral daily  predniSONE   Tablet 60 milliGRAM(s) Oral daily      PHYSICAL EXAM:   Vital Signs Last 24 Hrs  T(C): 36.8 (28 Nov 2023 00:22), Max: 37.1 (27 Nov 2023 23:14)  T(F): 98.3 (28 Nov 2023 00:22), Max: 98.7 (27 Nov 2023 23:14)  HR: 102 (28 Nov 2023 03:57) (83 - 136)  BP: 126/81 (28 Nov 2023 03:57) (114/70 - 164/80)  BP(mean): --  RR: 20 (28 Nov 2023 03:57) (18 - 20)  SpO2: 97% (28 Nov 2023 03:57) (94% - 98%)    Parameters below as of 28 Nov 2023 03:57  Patient On (Oxygen Delivery Method): nasal cannula  O2 Flow (L/min): 3      General: No acute distress  HEENT: EOM intact, visual fields full  Abdomen: Soft, nontender, nondistended   Extremities: No edema      NEUROLOGICAL EXAM:  Mental status: Eyes opened, awake and alert x2 ( month wrong), following commands  Cranial Nerves: trace L facial droop, no nystagmus, no dysarthria,  tongue midline  Motor exam: Antigravity and symmetric throughout  Sensation: Intact to light touch   Coordination/ Gait: Deferred  LABS:                        9.6    14.29 )-----------( 389      ( 27 Nov 2023 06:13 )             27.2    11-27    147<H>  |  112<H>  |  56<H>  ----------------------------<  145<H>  4.3   |  23  |  1.64<H>    Ca    10.0      27 Nov 2023 06:13          IMAGING: Reviewed by me.       11/21/23 CT HEAD: No hydrocephalus, acute intracranial hemorrhage, mass effect, or brain edema.    (11.18.2023)  Head CT: No acute intracranial hemorrhage, mass effect, or shift of the   midline structures.  Similar-appearing extensive chronic white matter microvascular type   changes and chronic lacunar infarcts, as discussed.  CTA neck and head: No large vessel occlusion or major stenosis.    MR Head w/ IV Cont (11.17.2023)  Right posterior corona radiata subacute infarction  Multiple remote infarctions within the cerebral hemispheric white   matter basal ganglia and thalami  Pervasive cerebral hemispheric white matter abnormality, possibly   accelerated form of ischemic white matter disease versus and/or   superimposed other inflammatory metabolic toxic or infectious process  Numerous remote petechial hemorrhages most prominently in the basal   ganglia and thalami  Diffuse brain volume loss upper range typical for age     THE PATIENT WAS SEEN AND EXAMINED BY ME WITH THE HOUSESTAFF AND STROKE TEAM DURING MORNING ROUNDS.   HPI:  58-year old woman with history of hypertension, rheumatoid arthritis, brought into ED at  initially for concerned for altered mental status.  Prior to hospital presentation: neighbor overhead patient was looking for her mom, was confused/wandering around apartment complex.   At [place]: MR brain showed bilateral subacute to remote embolic strokes. MRI shows right posterior corona radiata acute infarct and multiple petechial hemorrhages in b/l thalami and basal ganglia. Noted to have KRUNAL. Also noted to have hypertension, on amlodipine, aldactone, and losartan. Started on B12 supplements. Seen by rheumatology,  Transferred to [facility] for cerebral angiogram. NIHSS: 2 LKW: prior to 11/15 ( hospitalizaton)    SUBJECTIVE: No events overnight.  No new neurologic complaints.  ROS reported negative unless otherwise noted.    amLODIPine   Tablet 10 milliGRAM(s) Oral daily  aspirin  chewable 81 milliGRAM(s) Oral daily  atorvastatin 80 milliGRAM(s) Oral at bedtime  enoxaparin Injectable 40 milliGRAM(s) SubCutaneous every 24 hours  insulin lispro (ADMELOG) corrective regimen sliding scale   SubCutaneous three times a day before meals  insulin lispro (ADMELOG) corrective regimen sliding scale   SubCutaneous at bedtime  losartan 25 milliGRAM(s) Oral daily  metoprolol tartrate 25 milliGRAM(s) Oral two times a day  pantoprazole    Tablet 40 milliGRAM(s) Oral daily  predniSONE   Tablet 60 milliGRAM(s) Oral daily      PHYSICAL EXAM:   Vital Signs Last 24 Hrs  T(C): 36.8 (28 Nov 2023 00:22), Max: 37.1 (27 Nov 2023 23:14)  T(F): 98.3 (28 Nov 2023 00:22), Max: 98.7 (27 Nov 2023 23:14)  HR: 102 (28 Nov 2023 03:57) (83 - 136)  BP: 126/81 (28 Nov 2023 03:57) (114/70 - 164/80)  BP(mean): --  RR: 20 (28 Nov 2023 03:57) (18 - 20)  SpO2: 97% (28 Nov 2023 03:57) (94% - 98%)    Parameters below as of 28 Nov 2023 03:57  Patient On (Oxygen Delivery Method): nasal cannula  O2 Flow (L/min): 3      General: No acute distress  HEENT: EOM intact, visual fields full  Abdomen: Soft, nontender, nondistended   Extremities: No edema      NEUROLOGICAL EXAM:  Mental status: awake and alert says month is January then asked about what holiday has passed and corrects to November, IDs objects, following commands, tearful during exam  Cranial Nerves: trace L facial droop, no nystagmus, no dysarthria,  tongue midline  Motor exam: Antigravity and symmetric throughout  Sensation: Intact to light touch   Coordination/ Gait: Deferred  LABS:                        9.6    14.29 )-----------( 389      ( 27 Nov 2023 06:13 )             27.2    11-27    147<H>  |  112<H>  |  56<H>  ----------------------------<  145<H>  4.3   |  23  |  1.64<H>    Ca    10.0      27 Nov 2023 06:13          IMAGING: Reviewed by me.       11/21/23 CT HEAD: No hydrocephalus, acute intracranial hemorrhage, mass effect, or brain edema.    (11.18.2023)  Head CT: No acute intracranial hemorrhage, mass effect, or shift of the   midline structures.  Similar-appearing extensive chronic white matter microvascular type   changes and chronic lacunar infarcts, as discussed.  CTA neck and head: No large vessel occlusion or major stenosis.    MR Head w/ IV Cont (11.17.2023)  Right posterior corona radiata subacute infarction  Multiple remote infarctions within the cerebral hemispheric white   matter basal ganglia and thalami  Pervasive cerebral hemispheric white matter abnormality, possibly   accelerated form of ischemic white matter disease versus and/or   superimposed other inflammatory metabolic toxic or infectious process  Numerous remote petechial hemorrhages most prominently in the basal   ganglia and thalami  Diffuse brain volume loss upper range typical for age

## 2023-11-28 NOTE — CHART NOTE - NSCHARTNOTEFT_GEN_A_CORE
58 year old woman with HTN, Rheumatoid arthritis, presenting with decompensated confusion, subacute course since about 2023, found to have left hemiparesis due to R corona radiata infarct, multiple micro hemorrhages, deep and cortical, cerebral angiogram concerning for vasculitic appearance.       EVENT: Called to patient's bedside by RN due to telemetry finding: rhythm AFIB 160s, unsustained to 130s.       PHYSICAL EXAM:  Vital Signs Last 24 Hrs  T(C): 36.8 (2023 00:22), Max: 37.1 (2023 23:14)  T(F): 98.3 (2023 00:22), Max: 98.7 (2023 23:14)  HR: 98 (2023 01:20) (83 - 124)  BP: 114/70 (2023 01:20) (114/70 - 158/90)  BP(mean): --  RR: 20 (2023 01:20) (18 - 20)  SpO2: 96% (:20) (94% - 99%)    Parameters below as of 2023 01:20  Patient On (Oxygen Delivery Method): nasal cannula  O2 Flow (L/min): 3    General: No acute distress  HEENT: EOM intact, visual fields full  Abdomen: Soft, nontender, nondistended   Extremities: No edema    NEUROLOGICAL EXAM:  Mental status: Eyes opened, awake and alert x2, following commands  Cranial Nerves: trace L facial droop, no nystagmus, no dysarthria,  tongue midline  Motor exam: Antigravity and symmetric throughout  Sensation: Intact to light touch   Coordination/ Gait: Deferred      IMAGIN23 CT HEAD: No hydrocephalus, acute intracranial hemorrhage, mass effect, or brain edema.    (2023)  Head CT: No acute intracranial hemorrhage, mass effect, or shift of the midline structures. Similar-appearing extensive chronic white matter microvascular type changes and chronic lacunar infarcts, as discussed. CTA neck and head: No large vessel occlusion or major stenosis.    MR Head w/ IV Cont (2023)  Right posterior corona radiata subacute infarction. Multiple remote infarctions within the cerebral hemispheric white matter basal ganglia and thalami. Pervasive cerebral hemispheric white matter abnormality, possibly   accelerated form of ischemic white matter disease versus and/or superimposed other inflammatory metabolic toxic or infectious process. Numerous remote petechial hemorrhages most prominently in the basal ganglia and thalami. Diffuse brain volume loss upper range typical for age.      IMPRESSION: Left hemiparesis due to R corona radiata infarct, multiple micro hemorrhages, deep and cortical, cerebral angiogram concerning for vasculitic appearance, vasculitis work up in process. Other etiologies include CADASIL for which testing is in process.      Plan:  -pt remains neurologically stable with no obvious sign of clinical decline; f/u neuro check, will continue to monitor.   -EKG completed, Metoprolol 25 mg BID ordered for rate control, informed cardiology and advised Amiodarone 150 mg IVBP x 1.  -Nursing Interventions: Continue Telemetry Monitor, Continuous Pulse Oximeter, Neurochecks q4, Continue frequent oral care and reorientation.      Time spent with patient:  30 minutes.

## 2023-11-28 NOTE — PROVIDER CONTACT NOTE (OTHER) - ACTION/TREATMENT ORDERED:
NP bedside assessment. CT and CTA ordered by SASKIA Bower
Putting patient on amiodarone drip; transfer to stroke unit for management of atrial fibrillation.
As per PA will order  Metroprolol pending order and verification from pharm.
NP bedside assessment, no new interventions at this time as per SASKIA Bower
PA  at bedside as per PA pt followed by cardiology  . Continue to monitor repeat vitals in 1 hour will monitor

## 2023-11-28 NOTE — PROVIDER CONTACT NOTE (OTHER) - ASSESSMENT
Patient s/p EKG and Metroprolol 25mg administration po . Patient tolerated well /90  oast 97on 3 L temp 98.7
Patient states her mother and sister are in the room when they are not, pt is becoming agitated and removing tele leads
pt with rhythm AFIB 160s unsustained to 130s Vitals taken pt asymptomatic . Pt alert and cofused
pt other vital signs within parameters, see [facility] documentation. continuing to endorse no feelings of palpitations, racing heart rate, chest pain or tightness.
Pt is very lethargic. Pt is too lethargic to take medication.

## 2023-11-28 NOTE — CHART NOTE - NSCHARTNOTESELECT_GEN_ALL_CORE
NEURO IR/Event Note
NEURO IR/Event Note
Neurology
neurosurgery/Event Note
EEG prelim
Event Note
Neurology Resident/Event Note
Neurology/Event Note
neurosurgery/Event Note
two physician consent/Event Note

## 2023-11-28 NOTE — PROGRESS NOTE ADULT - SUBJECTIVE AND OBJECTIVE BOX
Subjective: Patient seen and examined. No new events except as noted.   transferred to Stroke unit   Afib RVR   I was contacted /. Started Amiodarone gtt     REVIEW OF SYSTEMS:    CONSTITUTIONAL: No weakness, fevers or chills  EYES/ENT: No visual changes;  No vertigo or throat pain   NECK: No pain or stiffness  RESPIRATORY: No cough, wheezing, hemoptysis; No shortness of breath  CARDIOVASCULAR: No chest pain or palpitations  GASTROINTESTINAL: No abdominal or epigastric pain. No nausea, vomiting, or hematemesis; No diarrhea or constipation. No melena or hematochezia.  GENITOURINARY: No dysuria, frequency or hematuria  NEUROLOGICAL: No numbness or weakness  SKIN: No itching, burning, rashes, or lesions   All other review of systems is negative unless indicated above.    MEDICATIONS:  MEDICATIONS  (STANDING):  aMIOdarone Infusion 0.5 mG/Min (16.7 mL/Hr) IV Continuous <Continuous>  aMIOdarone Infusion 1 mG/Min (33.3 mL/Hr) IV Continuous <Continuous>  amLODIPine   Tablet 10 milliGRAM(s) Oral daily  apixaban 5 milliGRAM(s) Oral two times a day  atorvastatin 80 milliGRAM(s) Oral at bedtime  insulin lispro (ADMELOG) corrective regimen sliding scale   SubCutaneous at bedtime  insulin lispro (ADMELOG) corrective regimen sliding scale   SubCutaneous three times a day before meals  losartan 25 milliGRAM(s) Oral daily  metoprolol tartrate 25 milliGRAM(s) Oral two times a day  pantoprazole    Tablet 40 milliGRAM(s) Oral daily  predniSONE   Tablet 60 milliGRAM(s) Oral daily  sodium chloride 0.9%. 1000 milliLiter(s) (50 mL/Hr) IV Continuous <Continuous>      PHYSICAL EXAM:  T(C): 36.5 (11-28-23 @ 09:07), Max: 37.1 (11-27-23 @ 23:14)  HR: 128 (11-28-23 @ 09:07) (83 - 136)  BP: 133/97 (11-28-23 @ 09:07) (114/70 - 164/80)  RR: 17 (11-28-23 @ 09:07) (17 - 20)  SpO2: 99% (11-28-23 @ 09:07) (94% - 99%)  Wt(kg): --  I&O's Summary    27 Nov 2023 07:01  -  28 Nov 2023 07:00  --------------------------------------------------------  IN: 480 mL / OUT: 300 mL / NET: 180 mL              Appearance: NAD	  HEENT:   Dry oral mucosa, PERRL, EOMI	  Lymphatic: No lymphadenopathy  Cardiovascular: Irregular  S1 S2, No JVD, No murmurs, No edema  Respiratory: Lungs clear to auscultation	  Psychiatry: A & O x 3, Mood & affect appropriate  Gastrointestinal:  Soft, Non-tender, + BS	  Skin: No rashes, No ecchymoses, No cyanosis	  Neurologic: AOx2-3, EOMI, no facial, no drift CLEANING 5/5  Extremities: Normal range of motion, No clubbing, cyanosis or edema  Vascular: Peripheral pulses palpable 2+ bilaterally            LABS:    CARDIAC MARKERS:                                10.1   10.55 )-----------( 409      ( 28 Nov 2023 07:45 )             28.1     11-28    143  |  107  |  38<H>  ----------------------------<  145<H>  3.8   |  24  |  1.01    Ca    9.6      28 Nov 2023 07:45      proBNP:   Lipid Profile:   HgA1c:   TSH:     Negative          TELEMETRY: 	Afib RVR 140s     ECG:  	  RADIOLOGY:   DIAGNOSTIC TESTING:  [ ] Echocardiogram:  [ ]  Catheterization:  [ ] Stress Test:    OTHER:

## 2023-11-28 NOTE — PROVIDER CONTACT NOTE (OTHER) - BACKGROUND
pt with elevated HR AFIB 190s-200 unsustained notified via [facility] from telemetry
Altered mental status, right posterior corona radiata acute infarct, s/p angiogram
pt with rhythm AFIB 160s unsustained to 130s Vitals taken pt asymptomatic
Right posterior corona radiata acute infarct, s/p angio
s/p infarct in corona radiata; confused post LP; placed in observation for increased impulsivity;

## 2023-11-28 NOTE — PROGRESS NOTE ADULT - ATTENDING COMMENTS
Amendments to fellow's assessment and plan as above.  Patient's brother aware of our recommendations and in agreement.  Discussed with neuro/primary team   will follow Amendments to fellow's assessment and plan as above.  complex case, challenging diagnosis and treatment as outlined above   Patient's brother aware of our recommendations and in agreement.  Discussed with neuro/radiology/ neurosx  will follow

## 2023-11-28 NOTE — CHART NOTE - NSCHARTNOTEFT_GEN_A_CORE
Rheumatology consulting neurosurgery for consideration of repeat angiogram to assess response to treatment.   - Please obtain repeat CTA 2-4wks post initial treatment   - Once CTA is completed, can page neurosurgery [phone] for consideration of repeat angiogram at that time     Discussed above with Dr. Hartley

## 2023-11-29 LAB
ANION GAP SERPL CALC-SCNC: 12 MMOL/L — SIGNIFICANT CHANGE UP (ref 5–17)
ANION GAP SERPL CALC-SCNC: 12 MMOL/L — SIGNIFICANT CHANGE UP (ref 5–17)
BUN SERPL-MCNC: 35 MG/DL — HIGH (ref 7–23)
BUN SERPL-MCNC: 35 MG/DL — HIGH (ref 7–23)
CALCIUM SERPL-MCNC: 9.6 MG/DL — SIGNIFICANT CHANGE UP (ref 8.4–10.5)
CALCIUM SERPL-MCNC: 9.6 MG/DL — SIGNIFICANT CHANGE UP (ref 8.4–10.5)
CHLORIDE SERPL-SCNC: 104 MMOL/L — SIGNIFICANT CHANGE UP (ref 96–108)
CHLORIDE SERPL-SCNC: 104 MMOL/L — SIGNIFICANT CHANGE UP (ref 96–108)
CO2 SERPL-SCNC: 25 MMOL/L — SIGNIFICANT CHANGE UP (ref 22–31)
CO2 SERPL-SCNC: 25 MMOL/L — SIGNIFICANT CHANGE UP (ref 22–31)
CREAT SERPL-MCNC: 1.08 MG/DL — SIGNIFICANT CHANGE UP (ref 0.5–1.3)
CREAT SERPL-MCNC: 1.08 MG/DL — SIGNIFICANT CHANGE UP (ref 0.5–1.3)
EGFR: 59 ML/MIN/1.73M2 — LOW
EGFR: 59 ML/MIN/1.73M2 — LOW
GLUCOSE BLDC GLUCOMTR-MCNC: 129 MG/DL — HIGH (ref 70–99)
GLUCOSE BLDC GLUCOMTR-MCNC: 129 MG/DL — HIGH (ref 70–99)
GLUCOSE BLDC GLUCOMTR-MCNC: 134 MG/DL — HIGH (ref 70–99)
GLUCOSE BLDC GLUCOMTR-MCNC: 134 MG/DL — HIGH (ref 70–99)
GLUCOSE BLDC GLUCOMTR-MCNC: 164 MG/DL — HIGH (ref 70–99)
GLUCOSE BLDC GLUCOMTR-MCNC: 164 MG/DL — HIGH (ref 70–99)
GLUCOSE BLDC GLUCOMTR-MCNC: 195 MG/DL — HIGH (ref 70–99)
GLUCOSE BLDC GLUCOMTR-MCNC: 195 MG/DL — HIGH (ref 70–99)
GLUCOSE BLDC GLUCOMTR-MCNC: 221 MG/DL — HIGH (ref 70–99)
GLUCOSE BLDC GLUCOMTR-MCNC: 221 MG/DL — HIGH (ref 70–99)
GLUCOSE SERPL-MCNC: 111 MG/DL — HIGH (ref 70–99)
GLUCOSE SERPL-MCNC: 111 MG/DL — HIGH (ref 70–99)
HCT VFR BLD CALC: 32.1 % — LOW (ref 34.5–45)
HCT VFR BLD CALC: 32.1 % — LOW (ref 34.5–45)
HGB BLD-MCNC: 11.3 G/DL — LOW (ref 11.5–15.5)
HGB BLD-MCNC: 11.3 G/DL — LOW (ref 11.5–15.5)
MCHC RBC-ENTMCNC: 29 PG — SIGNIFICANT CHANGE UP (ref 27–34)
MCHC RBC-ENTMCNC: 29 PG — SIGNIFICANT CHANGE UP (ref 27–34)
MCHC RBC-ENTMCNC: 35.2 GM/DL — SIGNIFICANT CHANGE UP (ref 32–36)
MCHC RBC-ENTMCNC: 35.2 GM/DL — SIGNIFICANT CHANGE UP (ref 32–36)
MCV RBC AUTO: 82.5 FL — SIGNIFICANT CHANGE UP (ref 80–100)
MCV RBC AUTO: 82.5 FL — SIGNIFICANT CHANGE UP (ref 80–100)
NRBC # BLD: 0 /100 WBCS — SIGNIFICANT CHANGE UP (ref 0–0)
NRBC # BLD: 0 /100 WBCS — SIGNIFICANT CHANGE UP (ref 0–0)
OLIGOCLONAL BANDS CSF ELPH-IMP: SIGNIFICANT CHANGE UP
OLIGOCLONAL BANDS CSF ELPH-IMP: SIGNIFICANT CHANGE UP
PLATELET # BLD AUTO: 430 K/UL — HIGH (ref 150–400)
PLATELET # BLD AUTO: 430 K/UL — HIGH (ref 150–400)
POTASSIUM SERPL-MCNC: 3.4 MMOL/L — LOW (ref 3.5–5.3)
POTASSIUM SERPL-MCNC: 3.4 MMOL/L — LOW (ref 3.5–5.3)
POTASSIUM SERPL-SCNC: 3.4 MMOL/L — LOW (ref 3.5–5.3)
POTASSIUM SERPL-SCNC: 3.4 MMOL/L — LOW (ref 3.5–5.3)
RBC # BLD: 3.89 M/UL — SIGNIFICANT CHANGE UP (ref 3.8–5.2)
RBC # BLD: 3.89 M/UL — SIGNIFICANT CHANGE UP (ref 3.8–5.2)
RBC # FLD: 12.6 % — SIGNIFICANT CHANGE UP (ref 10.3–14.5)
RBC # FLD: 12.6 % — SIGNIFICANT CHANGE UP (ref 10.3–14.5)
SODIUM SERPL-SCNC: 141 MMOL/L — SIGNIFICANT CHANGE UP (ref 135–145)
SODIUM SERPL-SCNC: 141 MMOL/L — SIGNIFICANT CHANGE UP (ref 135–145)
WBC # BLD: 10.26 K/UL — SIGNIFICANT CHANGE UP (ref 3.8–10.5)
WBC # BLD: 10.26 K/UL — SIGNIFICANT CHANGE UP (ref 3.8–10.5)
WBC # FLD AUTO: 10.26 K/UL — SIGNIFICANT CHANGE UP (ref 3.8–10.5)
WBC # FLD AUTO: 10.26 K/UL — SIGNIFICANT CHANGE UP (ref 3.8–10.5)

## 2023-11-29 PROCEDURE — 93010 ELECTROCARDIOGRAM REPORT: CPT

## 2023-11-29 PROCEDURE — 99233 SBSQ HOSP IP/OBS HIGH 50: CPT

## 2023-11-29 PROCEDURE — 99232 SBSQ HOSP IP/OBS MODERATE 35: CPT | Mod: GC

## 2023-11-29 PROCEDURE — 70551 MRI BRAIN STEM W/O DYE: CPT | Mod: 26

## 2023-11-29 RX ORDER — AMIODARONE HYDROCHLORIDE 400 MG/1
400 TABLET ORAL EVERY 8 HOURS
Refills: 0 | Status: DISCONTINUED | OUTPATIENT
Start: 2023-11-29 | End: 2023-11-30

## 2023-11-29 RX ORDER — POTASSIUM CHLORIDE 20 MEQ
20 PACKET (EA) ORAL ONCE
Refills: 0 | Status: COMPLETED | OUTPATIENT
Start: 2023-11-29 | End: 2023-11-29

## 2023-11-29 RX ORDER — AMIODARONE HYDROCHLORIDE 400 MG/1
200 TABLET ORAL DAILY
Refills: 0 | Status: CANCELLED | OUTPATIENT
Start: 2023-12-03 | End: 2023-11-30

## 2023-11-29 RX ORDER — AMIODARONE HYDROCHLORIDE 400 MG/1
TABLET ORAL
Refills: 0 | Status: DISCONTINUED | OUTPATIENT
Start: 2023-11-29 | End: 2023-11-30

## 2023-11-29 RX ORDER — SENNA PLUS 8.6 MG/1
2 TABLET ORAL AT BEDTIME
Refills: 0 | Status: DISCONTINUED | OUTPATIENT
Start: 2023-11-29 | End: 2023-11-30

## 2023-11-29 RX ADMIN — Medication 5 MILLIGRAM(S): at 21:31

## 2023-11-29 RX ADMIN — Medication 60 MILLIGRAM(S): at 05:44

## 2023-11-29 RX ADMIN — LOSARTAN POTASSIUM 25 MILLIGRAM(S): 100 TABLET, FILM COATED ORAL at 05:44

## 2023-11-29 RX ADMIN — Medication 20 MILLIEQUIVALENT(S): at 12:49

## 2023-11-29 RX ADMIN — PANTOPRAZOLE SODIUM 40 MILLIGRAM(S): 20 TABLET, DELAYED RELEASE ORAL at 12:49

## 2023-11-29 RX ADMIN — APIXABAN 5 MILLIGRAM(S): 2.5 TABLET, FILM COATED ORAL at 17:10

## 2023-11-29 RX ADMIN — ATORVASTATIN CALCIUM 80 MILLIGRAM(S): 80 TABLET, FILM COATED ORAL at 21:31

## 2023-11-29 RX ADMIN — SODIUM CHLORIDE 50 MILLILITER(S): 9 INJECTION INTRAMUSCULAR; INTRAVENOUS; SUBCUTANEOUS at 21:31

## 2023-11-29 RX ADMIN — APIXABAN 5 MILLIGRAM(S): 2.5 TABLET, FILM COATED ORAL at 05:44

## 2023-11-29 RX ADMIN — Medication 25 MILLIGRAM(S): at 17:10

## 2023-11-29 RX ADMIN — SODIUM CHLORIDE 50 MILLILITER(S): 9 INJECTION INTRAMUSCULAR; INTRAVENOUS; SUBCUTANEOUS at 09:35

## 2023-11-29 RX ADMIN — SENNA PLUS 2 TABLET(S): 8.6 TABLET ORAL at 21:31

## 2023-11-29 RX ADMIN — AMIODARONE HYDROCHLORIDE 400 MILLIGRAM(S): 400 TABLET ORAL at 21:31

## 2023-11-29 RX ADMIN — AMIODARONE HYDROCHLORIDE 400 MILLIGRAM(S): 400 TABLET ORAL at 15:15

## 2023-11-29 RX ADMIN — Medication 2: at 12:48

## 2023-11-29 RX ADMIN — Medication 25 MILLIGRAM(S): at 05:44

## 2023-11-29 RX ADMIN — AMLODIPINE BESYLATE 10 MILLIGRAM(S): 2.5 TABLET ORAL at 05:44

## 2023-11-29 NOTE — PROGRESS NOTE ADULT - SUBJECTIVE AND OBJECTIVE BOX
Jamal Alvarado MD, PGY-5  Rheumatology Fellow  Reachable on TEAMS    NADINE NIRALI  [phone]    INTERVAL EVENTS  had RRT for vasovagal episode by toilet. somnulent on original assessment but more responsive and back to baseline at prior assessment yesterday. pending MR scan    ROS   denies any pain, no rashes, no headaches, no visual changes      MEDICATIONS  (STANDING):  aMIOdarone    Tablet   Oral   aMIOdarone    Tablet 400 milliGRAM(s) Oral every 8 hours  amLODIPine   Tablet 10 milliGRAM(s) Oral daily  apixaban 5 milliGRAM(s) Oral two times a day  atorvastatin 80 milliGRAM(s) Oral at bedtime  insulin lispro (ADMELOG) corrective regimen sliding scale   SubCutaneous at bedtime  insulin lispro (ADMELOG) corrective regimen sliding scale   SubCutaneous three times a day before meals  losartan 25 milliGRAM(s) Oral daily  metoprolol tartrate 25 milliGRAM(s) Oral two times a day  pantoprazole    Tablet 40 milliGRAM(s) Oral daily  predniSONE   Tablet 60 milliGRAM(s) Oral daily  sodium chloride 0.9%. 1000 milliLiter(s) (50 mL/Hr) IV Continuous <Continuous>    MEDICATIONS  (PRN):      Vital Signs Last 24 Hrs  T(C): 36.6 (29 Nov 2023 11:15), Max: 36.8 (28 Nov 2023 20:00)  T(F): 97.9 (29 Nov 2023 11:15), Max: 98.3 (28 Nov 2023 20:00)  HR: 110 (29 Nov 2023 16:00) (85 - 123)  BP: 138/58 (29 Nov 2023 16:00) (124/64 - 152/107)  BP(mean): 89 (29 Nov 2023 16:00) (78 - 124)  RR: 20 (29 Nov 2023 16:00) (18 - 21)  SpO2: 99% (29 Nov 2023 16:00) (97% - 100%)    Parameters below as of 29 Nov 2023 16:00  Patient On (Oxygen Delivery Method): nasal cannula  O2 Flow (L/min): 2      Physical Exam:  General: lying in bed comfortable  Resp: no respiratory distress  MSK: no swelling/warmth/erythema of the joints of the UE/LE  Neuro: AAOx1-2.  Skin: no visible rashes      LABS:  cret                        11.3   10.26 )-----------( 430      ( 29 Nov 2023 06:46 )             32.1     11-29    141  |  104  |  35<H>  ----------------------------<  111<H>  3.4<L>   |  25  |  1.08    Ca    9.6      29 Nov 2023 06:46        Urinalysis Basic - ( 22 Nov 2023 07:18 )    Color: x / Appearance: x / SG: x / pH: x  Gluc: 94 mg/dL / Ketone: x  / Bili: x / Urobili: x   Blood: x / Protein: x / Nitrite: x   Leuk Esterase: x / RBC: x / WBC x   Sq Epi: x / Non Sq Epi: x / Bacteria: x      Culture - Fungal, CSF (collected 11-21-23 @ 14:51)  Source: .CSF CSF  Preliminary Report (11-22-23 @ 08:55):    Testing in progress    Culture - CSF with Gram Stain (collected 11-21-23 @ 14:51)  Source: .CSF CSF  Gram Stain (11-21-23 @ 19:09):    No polymorphonuclear cells seen    No organisms seen    by cytocentrifuge      Rheumatology Work Up    C3 Complement, Serum: 152 mg/dL [81 - 157] (11-21-23 @ 07:19)  C4 Complement, Serum: 36 mg/dL [13 - 39] (11-21-23 @ 07:19)  Ribosomal P Protein Antibody: <0.2 AI [Fluorescent Bead Immunoassay                     Ribosomal P Antibodies, IgG                     <1.0 AI (negative)                     > or =1.0 AI (positive)                     Reference values  apply  to all ages.] (11-21-23 @ 07:19)  Anti SS-A Antibody: <0.2 AI (11-21-23 @ 07:19)  Anti SS-B Antibody: <0.2: Fluorescent Bead Immunoassay      Neutrophil Cytoplasmic Antibody (11.21.23 @ 07:19)    Cytoplasmic (c-ANCA) Antibody: Negative   Perinuclear (p-ANCA) Antibody: Negative   Atypical ANCA: Negative    Acute Hepatitis Panel (11.21.23 @ 07:19)    Hepatitis C Virus Interpretation: Nonreact: Hepatitis C AB   Hepatitis C Virus S/CO Ratio: 0.06 S/CO   Hepatitis B Core IgM Antibody: Nonreact   Hepatitis B Surface Antigen: Nonreact   Hepatitis A IgM Antibody: Nonreact    BONI Antibody Screening Test (11.21.23 @ 07:19)    SM (Ferrera) Ab FBIA: <0.2 AI   Anti-Ribonuclear Protein: <0.2: Fluorescent Bead Immunoassy    Double Stranded DNA Antibody (11.17.23 @ 20:00)    Double Stranded DNA Antibody: <12: Method: EIA    Syphilis Screen (11.16.23 @ 16:01)    Treponema Pallidum Antibody Interpretation: Negative      RADIOLOGY & ADDITIONAL STUDIES:  < from: CT Head No Cont (11.21.23 @ 17:11) >    INTERPRETATION:  Noncontrast CT of the brain.    CLINICAL INDICATION:  Altered mental status    TECHNIQUE : Axial CT scanning of the brain was obtained from the skull   base to the vertex without the administration of intravenous contrast.   Sagittal and coronal reformats were provided.    COMPARISON: CT brain 11/18/2023    FINDINGS:    No hydrocephalus, mass effect, midline shift, acute intracranial   hemorrhage, or brain edema.    Moderate white matter microvascular ischemic disease.    Visualized paranasal sinuses and mastoid air cells are clear.    IMPRESSION:    No hydrocephalus, acute intracranial hemorrhage, mass effect, or brain   edema.    --- End of Report ---      < end of copied text >    < from: CT Angio Neck Stroke Protocol w/ IV Cont (11.18.23 @ 20:17) >    ACC: [id number] EXAM:  CT ANGIO BRAIN STROKE PROTC IC   ORDERED BY: АННА VERDUZCO     ACC: [id number] EXAM:  CT ANGIO NECK STROKE PROTCL IC   ORDERED BY: АННА VERDUZCO     ACC: [id number] EXAM:  CT BRAIN   ORDERED BY: KEELEY MONTEIRO     PROCEDURE DATE:  11/18/2023      IMPRESSION:    Head CT:No acute intracranial hemorrhage, mass effect, or shift of the   midline structures.    Similar-appearing extensive chronic white matter microvascular type   changes and chronic lacunar infarcts, as discussed.    CTA neck and head: No large vessel occlusion or major stenosis.    --- End of Report ---      < end of copied text >    < from: MR Head w/ IV Cont (11.17.23 @ 13:09) >    ACC: [id number] EXAM:  MR BRAIN IC   ORDERED BY: BRANDEN FERNANDEZ     PROCEDURE DATE:  11/17/2023          INTERPRETATION:  MR of the brain with and without gadolinium contrast    CLINICAL INFORMATION:   ams  FMR        IMPRESSION:    1. Right posterior corona radiata subacute infarction    2. Multiple remote infarctions within the cerebral hemispheric white   matter basal ganglia and thalami    3. Pervasive cerebral hemispheric white matter abnormality, possibly   accelerated form of ischemic white matter disease versus and/or   superimposed other inflammatory metabolic toxic or infectious process    4. Numerous remote petechial hemorrhages most prominently in the basal   ganglia andthalami    5. Diffuse brain volume loss upper range typical for age    --- End of Report ---    < end of copied text >      < from: TTE Echo Complete w/o Contrast w/ Doppler (11.15.23 @ 17:46) >  PHYSICIAN INTERPRETATION:    Summary:   1. Normal global left ventricular systolic function.   2. Mild mitral valve regurgitation.   3. Mild aortic regurgitation.   4. Sclerotic aortic valve with normal opening.    < end of copied text >    < from: CT Abdomen and Pelvis w/ IV Cont (11.17.23 @ 12:40) >    ACC: [id number] EXAM:  CT ABDOMEN AND PELVIS IC   ORDERED BY: BRANDEN FERNANDEZ     ACC: [id number] EXAM:  CT CHEST   ORDERED BY: BRANDEN FERNANDEZ     PROCEDURE DATE:  11/17/2023          I    IMPRESSION:    CHEST:  -Nopneumonia. Mosaic attenuation of the lung parenchyma, nonspecific.  -Aortic valve and coronary artery calcifications.    ABDOMEN/PELVIS:  -No acute bowel pathology. Sleeve gastrectomy postoperative changes.  -Common bile duct is mildly distended for patient age measuring 8 mm.   Correlate with LFTs and MRCP as warranted. Cholelithiasis.  -Mild mesenteric haziness and small mesenteric nodes may reflect   mesenteric panniculitis. Follow up CT abdomen pelvis is recommended in   6-12 months to evaluate for stability.    --- End of Report ---          < end of copied text >   Jamal Alvarado MD, PGY-5  Rheumatology Fellow  Reachable on TEAMS    NADINE NIRALI  [id number]    INTERVAL EVENTS  had RRT for vasovagal episode by toilet. somnolent on original assessment but more responsive and back to baseline . pending MR scan    ROS   denies any pain, no rashes, no headaches, no visual changes      MEDICATIONS  (STANDING):  aMIOdarone    Tablet   Oral   aMIOdarone    Tablet 400 milliGRAM(s) Oral every 8 hours  amLODIPine   Tablet 10 milliGRAM(s) Oral daily  apixaban 5 milliGRAM(s) Oral two times a day  atorvastatin 80 milliGRAM(s) Oral at bedtime  insulin lispro (ADMELOG) corrective regimen sliding scale   SubCutaneous at bedtime  insulin lispro (ADMELOG) corrective regimen sliding scale   SubCutaneous three times a day before meals  losartan 25 milliGRAM(s) Oral daily  metoprolol tartrate 25 milliGRAM(s) Oral two times a day  pantoprazole    Tablet 40 milliGRAM(s) Oral daily  predniSONE   Tablet 60 milliGRAM(s) Oral daily  sodium chloride 0.9%. 1000 milliLiter(s) (50 mL/Hr) IV Continuous <Continuous>    MEDICATIONS  (PRN):      Vital Signs Last 24 Hrs  T(C): 36.6 (29 Nov 2023 11:15), Max: 36.8 (28 Nov 2023 20:00)  T(F): 97.9 (29 Nov 2023 11:15), Max: 98.3 (28 Nov 2023 20:00)  HR: 110 (29 Nov 2023 16:00) (85 - 123)  BP: 138/58 (29 Nov 2023 16:00) (124/64 - 152/107)  BP(mean): 89 (29 Nov 2023 16:00) (78 - 124)  RR: 20 (29 Nov 2023 16:00) (18 - 21)  SpO2: 99% (29 Nov 2023 16:00) (97% - 100%)    Parameters below as of 29 Nov 2023 16:00  Patient On (Oxygen Delivery Method): nasal cannula  O2 Flow (L/min): 2      Physical Exam:  General: lying in bed comfortable  Resp: no respiratory distress  MSK: no swelling/warmth/erythema of the joints of the UE/LE  Neuro: AAOx1-2.  Skin: no visible rashes      LABS:  cret                        11.3   10.26 )-----------( 430      ( 29 Nov 2023 06:46 )             32.1     11-29    141  |  104  |  35<H>  ----------------------------<  111<H>  3.4<L>   |  25  |  1.08    Ca    9.6      29 Nov 2023 06:46        Urinalysis Basic - ( 22 Nov 2023 07:18 )    Color: x / Appearance: x / SG: x / pH: x  Gluc: 94 mg/dL / Ketone: x  / Bili: x / Urobili: x   Blood: x / Protein: x / Nitrite: x   Leuk Esterase: x / RBC: x / WBC x   Sq Epi: x / Non Sq Epi: x / Bacteria: x      Culture - Fungal, CSF (collected 11-21-23 @ 14:51)  Source: .CSF CSF  Preliminary Report (11-22-23 @ 08:55):    Testing in progress    Culture - CSF with Gram Stain (collected 11-21-23 @ 14:51)  Source: .CSF CSF  Gram Stain (11-21-23 @ 19:09):    No polymorphonuclear cells seen    No organisms seen    by cytocentrifuge      Rheumatology Work Up    C3 Complement, Serum: 152 mg/dL [81 - 157] (11-21-23 @ 07:19)  C4 Complement, Serum: 36 mg/dL [13 - 39] (11-21-23 @ 07:19)  Ribosomal P Protein Antibody: <0.2 AI [Fluorescent Bead Immunoassay                     Ribosomal P Antibodies, IgG                     <1.0 AI (negative)                     > or =1.0 AI (positive)                     Reference values  apply  to all ages.] (11-21-23 @ 07:19)  Anti SS-A Antibody: <0.2 AI (11-21-23 @ 07:19)  Anti SS-B Antibody: <0.2: Fluorescent Bead Immunoassay      Neutrophil Cytoplasmic Antibody (11.21.23 @ 07:19)    Cytoplasmic (c-ANCA) Antibody: Negative   Perinuclear (p-ANCA) Antibody: Negative   Atypical ANCA: Negative    Acute Hepatitis Panel (11.21.23 @ 07:19)    Hepatitis C Virus Interpretation: Nonreact: Hepatitis C AB   Hepatitis C Virus S/CO Ratio: 0.06 S/CO   Hepatitis B Core IgM Antibody: Nonreact   Hepatitis B Surface Antigen: Nonreact   Hepatitis A IgM Antibody: Nonreact    BONI Antibody Screening Test (11.21.23 @ 07:19)    SM (Ferrera) Ab FBIA: <0.2 AI   Anti-Ribonuclear Protein: <0.2: Fluorescent Bead Immunoassy    Double Stranded DNA Antibody (11.17.23 @ 20:00)    Double Stranded DNA Antibody: <12: Method: EIA    Syphilis Screen (11.16.23 @ 16:01)    Treponema Pallidum Antibody Interpretation: Negative      RADIOLOGY & ADDITIONAL STUDIES:  < from: CT Head No Cont (11.21.23 @ 17:11) >    INTERPRETATION:  Noncontrast CT of the brain.    CLINICAL INDICATION:  Altered mental status    TECHNIQUE : Axial CT scanning of the brain was obtained from the skull   base to the vertex without the administration of intravenous contrast.   Sagittal and coronal reformats were provided.    COMPARISON: CT brain 11/18/2023    FINDINGS:    No hydrocephalus, mass effect, midline shift, acute intracranial   hemorrhage, or brain edema.    Moderate white matter microvascular ischemic disease.    Visualized paranasal sinuses and mastoid air cells are clear.    IMPRESSION:    No hydrocephalus, acute intracranial hemorrhage, mass effect, or brain   edema.    --- End of Report ---      < end of copied text >    < from: CT Angio Neck Stroke Protocol w/ IV Cont (11.18.23 @ 20:17) >    ACC: [id number] EXAM:  CT ANGIO BRAIN STROKE PROTC IC   ORDERED BY: АННА VERDUZCO     ACC: [id number] EXAM:  CT ANGIO NECK STROKE PROTCL IC   ORDERED BY: АННА VERDUZCO     ACC: [id number] EXAM:  CT BRAIN   ORDERED BY: KEELEY MONTEIRO     PROCEDURE DATE:  11/18/2023      IMPRESSION:    Head CT:No acute intracranial hemorrhage, mass effect, or shift of the   midline structures.    Similar-appearing extensive chronic white matter microvascular type   changes and chronic lacunar infarcts, as discussed.    CTA neck and head: No large vessel occlusion or major stenosis.    --- End of Report ---      < end of copied text >    < from: MR Head w/ IV Cont (11.17.23 @ 13:09) >    ACC: [id number] EXAM:  MR BRAIN IC   ORDERED BY: BRANDEN FERNANDEZ     PROCEDURE DATE:  11/17/2023          INTERPRETATION:  MR of the brain with and without gadolinium contrast    CLINICAL INFORMATION:   ams  FMR        IMPRESSION:    1. Right posterior corona radiata subacute infarction    2. Multiple remote infarctions within the cerebral hemispheric white   matter basal ganglia and thalami    3. Pervasive cerebral hemispheric white matter abnormality, possibly   accelerated form of ischemic white matter disease versus and/or   superimposed other inflammatory metabolic toxic or infectious process    4. Numerous remote petechial hemorrhages most prominently in the basal   ganglia andthalami    5. Diffuse brain volume loss upper range typical for age    --- End of Report ---    < end of copied text >      < from: TTE Echo Complete w/o Contrast w/ Doppler (11.15.23 @ 17:46) >  PHYSICIAN INTERPRETATION:    Summary:   1. Normal global left ventricular systolic function.   2. Mild mitral valve regurgitation.   3. Mild aortic regurgitation.   4. Sclerotic aortic valve with normal opening.    < end of copied text >    < from: CT Abdomen and Pelvis w/ IV Cont (11.17.23 @ 12:40) >    ACC: [id number] EXAM:  CT ABDOMEN AND PELVIS IC   ORDERED BY: BRANDEN FERNANDEZ     ACC: [id number] EXAM:  CT CHEST   ORDERED BY: BRANDEN FERNANDEZ     PROCEDURE DATE:  11/17/2023          I    IMPRESSION:    CHEST:  -Nopneumonia. Mosaic attenuation of the lung parenchyma, nonspecific.  -Aortic valve and coronary artery calcifications.    ABDOMEN/PELVIS:  -No acute bowel pathology. Sleeve gastrectomy postoperative changes.  -Common bile duct is mildly distended for patient age measuring 8 mm.   Correlate with LFTs and MRCP as warranted. Cholelithiasis.  -Mild mesenteric haziness and small mesenteric nodes may reflect   mesenteric panniculitis. Follow up CT abdomen pelvis is recommended in   6-12 months to evaluate for stability.    --- End of Report ---          < end of copied text >

## 2023-11-29 NOTE — PROGRESS NOTE ADULT - SUBJECTIVE AND OBJECTIVE BOX
THE PATIENT WAS SEEN AND EXAMINED BY ME WITH THE HOUSESTAFF AND STROKE TEAM DURING MORNING ROUNDS.   HPI:  58-year old woman with history of hypertension, rheumatoid arthritis, brought into ED at  initially for concerned for altered mental status. Prior to hospital presentation: neighbor overhead patient was looking for her mom, was confused/wandering around apartment complex. At [place]: MR brain showed bilateral subacute to remote embolic strokes. MRI shows right posterior corona radiata acute infarct and multiple petechial hemorrhages in b/l thalami and basal ganglia. Noted to have KRUNAL. Also noted to have hypertension, on amlodipine, aldactone, and losartan. Transferred to [facility] for cerebral angiogram. NIHSS: 2 LKW: prior to 11/15 ( hospitalizaton)        SUBJECTIVE: Episode of vasovagal in am while at the bathroom.  No new neurologic complaints, ROS reported negative unless otherwise noted.      aMIOdarone Infusion 0.5 mG/Min IV Continuous <Continuous>  amLODIPine   Tablet 10 milliGRAM(s) Oral daily  apixaban 5 milliGRAM(s) Oral two times a day  atorvastatin 80 milliGRAM(s) Oral at bedtime  insulin lispro (ADMELOG) corrective regimen sliding scale   SubCutaneous at bedtime  insulin lispro (ADMELOG) corrective regimen sliding scale   SubCutaneous three times a day before meals  losartan 25 milliGRAM(s) Oral daily  metoprolol tartrate 25 milliGRAM(s) Oral two times a day  pantoprazole    Tablet 40 milliGRAM(s) Oral daily  potassium chloride    Tablet ER 20 milliEquivalent(s) Oral once  predniSONE   Tablet 60 milliGRAM(s) Oral daily  sodium chloride 0.9%. 1000 milliLiter(s) IV Continuous <Continuous>      PHYSICAL EXAM:   Vital Signs Last 24 Hrs  T(C): 36.6 (29 Nov 2023 11:15), Max: 36.8 (28 Nov 2023 20:00)  T(F): 97.9 (29 Nov 2023 11:15), Max: 98.3 (28 Nov 2023 20:00)  HR: 103 (29 Nov 2023 11:15) (85 - 112)  BP: 131/60 (29 Nov 2023 11:15) (123/75 - 184/84)  BP(mean): 78 (29 Nov 2023 11:15) (78 - 124)  RR: 21 (29 Nov 2023 11:15) (18 - 22)  SpO2: 99% (29 Nov 2023 11:15) (97% - 100%)    Parameters below as of 29 Nov 2023 11:15  Patient On (Oxygen Delivery Method): nasal cannula  O2 Flow (L/min): 2      General: No acute distress  HEENT: EOM intact, visual fields full  Abdomen: Soft, nontender, nondistended   Extremities: No edema      NEUROLOGICAL EXAM:  Mental status: Eyes open, awake and alert says month december, 2023 for year, able to IDs objects, following commands,   Cranial Nerves: Subtle left facial droop, no nystagmus, no dysarthria,  tongue midline  Motor exam: Antigravity and symmetric throughout  Sensation: Intact to light touch   Coordination/ Gait: Deferred       LABS:                        11.3   10.26 )-----------( 430      ( 29 Nov 2023 06:46 )             32.1    11-29    141  |  104  |  35<H>  ----------------------------<  111<H>  3.4<L>   |  25  |  1.08    Ca    9.6      29 Nov 2023 06:46          IMAGING: Reviewed by me.     11/21/23 CT HEAD: No hydrocephalus, acute intracranial hemorrhage, mass effect, or brain edema.    (11.18.2023)  Head CT: No acute intracranial hemorrhage, mass effect, or shift of the midline structures. Similar-appearing extensive chronic white matter microvascular type changes and chronic lacunar infarcts, as discussed.    CTA neck and head: No large vessel occlusion or major stenosis.    MR Head w/ IV Cont (11.17.2023) Right posterior corona radiata subacute infarction Multiple remote infarctions within the cerebral hemispheric white matter basal ganglia and thalami  Pervasive cerebral hemispheric white matter abnormality, possibly accelerated form of ischemic white matter disease versus and/or superimposed other inflammatory metabolic toxic or infectious process Numerous remote petechial hemorrhages most prominently in the basal ganglia and thalami. Diffuse brain volume loss upper range typical for age

## 2023-11-29 NOTE — PROGRESS NOTE ADULT - ASSESSMENT
58-year old woman with history of hypertension, rheumatoid arthritis, brought into ED at  initially for concerned for altered mental status. At [place]: MR brain showed bilateral subacute to remote embolic strokes. MRI shows right posterior corona radiata acute infarct and multiple petechial hemorrhages in b/l thalami and basal ganglia. Pt transferred to [facility] for further care and for cerebral angiogram. Not a Tenecteplase candidate due to multiple petechial hemorrhages. NOT a mechanical thrombectomy candidate due to no LVO on imaging.     Impression: Left hemiparesis due to right corona radiata infarct, multiple micro hemorrhages, deep and cortical, cerebral angiogram concerning for PACNS. Pt also with afib, less likely cardioembolic  event    NEURO: Vasovagal episode in am 11/29/23, 1L IV bolus administered, RRT contacted, pt now back to her baseline, continue neuro monitoring, Plan to obtain repeat MRI to eval stability,  permissive HTN to gradual normotension. A1c 4.9, LDL - 128, high intensity statin initiated. MRI Brain with contrast showed diffusion restriction R corona radiata. Multiple microhemorrhages, deep and cortical. Cerebral angiogram performed on 11/20 showed multiple areas of focal stenosis and dilatation concerning for vasculitis. LP completed on 11/21 with normal profile. Rheumatology consulted and recommended possible brain biopsy to confirm diagnosis of vasculitis: The risks of performing an open biopsy outweigh the benefits if the results of the biopsy are unlikely to . 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 Patient started on steroids given she is having micro hemorrhage and strokes from vasculitis. Steroids started on 11/24, initial dose Solumderol 1g for 3 days. Will continue Prednisone 60mg and decreasing to prednisone 50mg daily for discharge, No need for taper as per rheumatology eval/recommendation. EEG shows Moderate diffuse/multi-focal cerebral dysfunction, not specific as to etiology. There were no epileptiform abnormalities recorded.  Physical therapy/OT/PMR: AR. Plan to obtain repeat cerebral angiogram in 4-6 weeks    ANTITHROMBOTIC THERAPY: D/C ASA 81mg and started apixaban 5mg BID for afib    PULMONARY: CXR (11/26) Clear lungs. No pleural effusions or pneumothorax. Protecting airway, saturating well     CARDIOVASCULAR: TTE Normal global left ventricular systolic function. Mild mitral valve regurgitation. Mild aortic regurgitation. Sclerotic aortic valve with normal opening, cardiac monitoring noted with afib on 11/27, will d/c amiodarone infusion. will start amiodarone PO, Dr. Lazar (cardio) following.                        SBP goal: 100-160    GASTROINTESTINAL: Dysphagia screen passed on 11/20, tolerated        Diet: Regular    RENAL: BUN/Cr improved, BUN 35 likely prerenal, will encourage PO fluid intake,  good urine output, hypokalemia: will provide KCl PO      Na Goal: Greater than 135     Hamm: no    HEMATOLOGY: H/H without change, Platelets 430 thrombocytosis, will continue to monitor     DVT ppx: Lovenox    ID: afebrile, no sign of infection. Leukocytosis: resolved     OTHER: Plan discussed with patient at bedside, all questions and concerns addressed. Patient's brother (HCP): Jorge Stone ().     DISPOSITION: AR as per PT/OT eval once stable and workup is complete    CORE MEASURES:        Admission NIHSS: 2     Tenecteplase: NO      LDL/HDL: 128/43     Depression Screen: p     Statin Therapy: yes     Dysphagia Screen:  PASS      Smoking: YES Smoking cessation and education provided to patient [x] Nicotine patch ordered []     Afib NO     Stroke Education  YES    Obtain screening lower extremity venous ultrasound in patients who meet 1 or more of the following criteria as patient is high risk for DVT/PE on admission:   [] History of DVT/PE  [] Hypercoagulable states (Factor V Leiden, Cancer, OCP, etc. )  [] Prolonged immobility (hemiplegia/hemiparesis/post operative or any other extended immobilization)  [] Transferred from outside facility (Rehab or Long term care)  [] Age </= to 50

## 2023-11-29 NOTE — RAPID RESPONSE TEAM SUMMARY - NSSITUATIONBACKGROUNDRRT_GEN_ALL_CORE
58F PMH CVA appx. 10 days ago with subsequent aphasia had RRT called for vasovagal episode while in restroom. Was found to be slumped over while on toilet by staff and was subsequently brought back to her bed. Per staff, patient did not sustain any trauma and was moved to bed without difficulty. Patient did not receive any medications immediately prior to RRT. Patient then found to be at her baseline mental status A&Ox2, VS notable for HR in Afib in 100's-110's since yesterday (started on Eliquis), systolic 's, 's, O2 saturation 93-96% on RA. EKG was obtained that did not show any significant changes from EKG completed yesterday. 1L fluids was administered, mental status remained stable with no other significant physical exam findings.

## 2023-11-29 NOTE — PROGRESS NOTE ADULT - ATTENDING COMMENTS
Amendments to fellow's assessment and plan as above.  Complex and challenging case from diagnostic and therapeutic aspects, reviewed today during our case conference.   plan as above.   Discussed with primary team  will follow

## 2023-11-29 NOTE — PROGRESS NOTE ADULT - ASSESSMENT
58-year old woman with history of hypertension, recurrent strokes, brought into ED at  initially for concern for altered mental status with findings concerning for PACNS    ##eval for PACNS  Progressive mental status change/ cognitive impairment over the past year  Brain imaging with findings of multiple infarcts, petechial hemorrhages  Cerebral angio showed diffuse beading   Negative MAX/cpANCA/dsDNA/Sm/Ribo P/BONI/SSA/SSB/SPEP/UA/C3: 152/C4: 36. CRP: 8. ESR: 113 RF: 30  apls negative  Negative Hepatitis/Treponema  -s/p 1g Methylprednisolone (11/24 - 11/27). now prednisone 60mg (11/28 - )  -difficult to assess for clinical response giving waxing/waning nature of disease  -also difficult to ascertain how much of these changes are now permanent vs how much can immunosuppression reverse. or if the role of immunosuppression is to stop recurrent strokes/progression   -risk/benefit of addition of Cytoxan at this point is unclear  -discussed during case conference, ?addition of Rituximab. however role of immunosuppression is unclear as stated above  -if evidence of objective worsening radiology from repeat MR then discussion for escalation of immunosuppression is warranted      PLAN  -pending MR brain repeat. if noted worsening of radiology then role of increasing immunosuppression is warranted  -recommend keeping patient on prednisone 60mg for now and decreasing to prednisone 50mg qD for discharge  -recommend against a taper since no steroid sparing agent is being offered until radiographic progression can be assessed for  -after discussion with stroke service/neuro-radiology/neurosurgery planned repeat angiogram in 2-6 weeks after initial intervention    # Anemia,   no signs of hemolysis      case d/w Dr. David Alvarado MD, PGY-5  Rheumatology Fellow  Reachable on TEAMS 58-year old woman with history of hypertension, recurrent strokes, brought into ED at  initially for concern for altered mental status with findings concerning for PACNS    ##eval for PACNS  Progressive mental status change/ cognitive impairment over the past year  Brain imaging with findings of multiple infarcts, petechial hemorrhages  Cerebral angio showed diffuse beading   Negative MAX/cpANCA/dsDNA/Sm/Ribo P/BONI/SSA/SSB/SPEP/UA/C3: 152/C4: 36. CRP: 8. ESR: 113 RF: 30  apls negative  Negative Hepatitis/Treponema  -s/p 1g Methylprednisolone (11/24 - 11/27). now prednisone 60mg (11/28 - )  -difficult to assess for clinical response giving waxing/waning nature of disease  -also difficult to ascertain how much of these changes are now permanent vs how much can immunosuppression reverse. or if the role of immunosuppression is to stop recurrent strokes/progression   -risk/benefit of addition of Cytoxan at this point is unclear  -discussed her case during case conference, ?addition of Rituximab. however role of immunosuppression is unclear as stated above  -if evidence of objective worsening radiology from repeat MR then discussion for escalation of immunosuppression is warranted      PLAN  -pending MR brain repeat. if noted worsening of radiology then role of increasing immunosuppression is warranted  -recommend keeping patient on prednisone 60mg for now and decreasing to prednisone 50mg qD for discharge  -recommend against a taper since no steroid sparing agent is being offered until radiographic progression can be assessed for  -after discussion with stroke service/neuro-radiology/neurosurgery planned repeat angiogram in 2-6 weeks after initial intervention  -[place] IgG on CSF noted, unclear significance, will discuss with neurology     # Anemia,   no signs of hemolysis    #KRUNAL, resolved      #A.Fib, on anticoagulation      case d/w Dr. David Alvarado MD, PGY-5  Rheumatology Fellow  Reachable on TEAMS

## 2023-11-29 NOTE — PROGRESS NOTE ADULT - SUBJECTIVE AND OBJECTIVE BOX
Subjective: Patient seen and examined. No new events except as noted.   remains in Stroke unit   seen this am   resting comfortably   remains in Afib better rate controlled   to complete Amio gtt today       REVIEW OF SYSTEMS:    CONSTITUTIONAL: +weakness, fevers or chills  EYES/ENT: No visual changes;  No vertigo or throat pain   NECK: No pain or stiffness  RESPIRATORY: No cough, wheezing, hemoptysis; No shortness of breath  CARDIOVASCULAR: No chest pain or palpitations  GASTROINTESTINAL: No abdominal or epigastric pain. No nausea, vomiting, or hematemesis; No diarrhea or constipation. No melena or hematochezia.  GENITOURINARY: No dysuria, frequency or hematuria  NEUROLOGICAL: No numbness or weakness  SKIN: No itching, burning, rashes, or lesions   All other review of systems is negative unless indicated above.    MEDICATIONS:  MEDICATIONS  (STANDING):  aMIOdarone    Tablet 400 milliGRAM(s) Oral every 8 hours  aMIOdarone    Tablet   Oral   amLODIPine   Tablet 10 milliGRAM(s) Oral daily  apixaban 5 milliGRAM(s) Oral two times a day  atorvastatin 80 milliGRAM(s) Oral at bedtime  insulin lispro (ADMELOG) corrective regimen sliding scale   SubCutaneous three times a day before meals  insulin lispro (ADMELOG) corrective regimen sliding scale   SubCutaneous at bedtime  losartan 25 milliGRAM(s) Oral daily  metoprolol tartrate 25 milliGRAM(s) Oral two times a day  pantoprazole    Tablet 40 milliGRAM(s) Oral daily  predniSONE   Tablet 60 milliGRAM(s) Oral daily  sodium chloride 0.9%. 1000 milliLiter(s) (50 mL/Hr) IV Continuous <Continuous>      PHYSICAL EXAM:  T(C): 36.6 (11-29-23 @ 11:15), Max: 36.8 (11-28-23 @ 20:00)  HR: 123 (11-29-23 @ 12:00) (85 - 123)  BP: 124/64 (11-29-23 @ 12:00) (124/64 - 184/84)  RR: 20 (11-29-23 @ 12:00) (18 - 21)  SpO2: 100% (11-29-23 @ 12:00) (97% - 100%)  Wt(kg): --  I&O's Summary    28 Nov 2023 07:01  -  29 Nov 2023 07:00  --------------------------------------------------------  IN: 249.9 mL / OUT: 350 mL / NET: -100.1 mL    29 Nov 2023 07:01  -  29 Nov 2023 15:53  --------------------------------------------------------  IN: 233.4 mL / OUT: 0 mL / NET: 233.4 mL          Appearance: NAD	  HEENT:   Dry oral mucosa, PERRL, EOMI	  Lymphatic: No lymphadenopathy  Cardiovascular: Irregular  S1 S2, No JVD, No murmurs, No edema  Respiratory: Lungs clear to auscultation	  Psychiatry: A & O x 3, Mood & affect appropriate  Gastrointestinal:  Soft, Non-tender, + BS	  Skin: No rashes, No ecchymoses, No cyanosis	  Neurologic: AOx2-3, EOMI, no facial, no drift CLEANING 5/5  Extremities: Normal range of motion, No clubbing, cyanosis or edema  Vascular: Peripheral pulses palpable 2+ bilaterally          LABS:    CARDIAC MARKERS:                                11.3   10.26 )-----------( 430      ( 29 Nov 2023 06:46 )             32.1     11-29    141  |  104  |  35<H>  ----------------------------<  111<H>  3.4<L>   |  25  |  1.08    Ca    9.6      29 Nov 2023 06:46      TELEMETRY: 	AF    ECG:  	  RADIOLOGY:   DIAGNOSTIC TESTING:  [ ] Echocardiogram:  [ ]  Catheterization:  [ ] Stress Test:    OTHER:

## 2023-11-30 ENCOUNTER — TRANSCRIPTION ENCOUNTER (OUTPATIENT)
Age: 58
End: 2023-11-30

## 2023-11-30 ENCOUNTER — NON-APPOINTMENT (OUTPATIENT)
Age: 58
End: 2023-11-30

## 2023-11-30 ENCOUNTER — INPATIENT (INPATIENT)
Facility: HOSPITAL | Age: 58
LOS: 12 days | Discharge: HOME HEALTH | DRG: 57 | End: 2023-12-13
Attending: PHYSICAL MEDICINE & REHABILITATION | Admitting: PHYSICAL MEDICINE & REHABILITATION
Payer: MEDICAID

## 2023-11-30 VITALS
DIASTOLIC BLOOD PRESSURE: 78 MMHG | RESPIRATION RATE: 16 BRPM | HEIGHT: 62 IN | SYSTOLIC BLOOD PRESSURE: 129 MMHG | WEIGHT: 222.67 LBS | OXYGEN SATURATION: 95 % | TEMPERATURE: 99 F | HEART RATE: 92 BPM

## 2023-11-30 VITALS
HEART RATE: 95 BPM | OXYGEN SATURATION: 95 % | RESPIRATION RATE: 16 BRPM | SYSTOLIC BLOOD PRESSURE: 140 MMHG | TEMPERATURE: 98 F | DIASTOLIC BLOOD PRESSURE: 88 MMHG

## 2023-11-30 DIAGNOSIS — Z51.89 ENCOUNTER FOR OTHER SPECIFIED AFTERCARE: ICD-10-CM

## 2023-11-30 DIAGNOSIS — E66.01 MORBID (SEVERE) OBESITY DUE TO EXCESS CALORIES: ICD-10-CM

## 2023-11-30 DIAGNOSIS — Z79.52 LONG TERM (CURRENT) USE OF SYSTEMIC STEROIDS: ICD-10-CM

## 2023-11-30 DIAGNOSIS — I10 ESSENTIAL (PRIMARY) HYPERTENSION: ICD-10-CM

## 2023-11-30 DIAGNOSIS — E78.5 HYPERLIPIDEMIA, UNSPECIFIED: ICD-10-CM

## 2023-11-30 DIAGNOSIS — I67.7 CEREBRAL ARTERITIS, NOT ELSEWHERE CLASSIFIED: ICD-10-CM

## 2023-11-30 DIAGNOSIS — M06.9 RHEUMATOID ARTHRITIS, UNSPECIFIED: ICD-10-CM

## 2023-11-30 DIAGNOSIS — R74.02 ELEVATION OF LEVELS OF LACTIC ACID DEHYDROGENASE [LDH]: ICD-10-CM
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
## 2023-11-30 DIAGNOSIS — R94.01 ABNORMAL ELECTROENCEPHALOGRAM [EEG]: ICD-10-CM

## 2023-11-30 DIAGNOSIS — T38.0X5D ADVERSE EFFECT OF GLUCOCORTICOIDS AND SYNTHETIC ANALOGUES, SUBSEQUENT ENCOUNTER: ICD-10-CM

## 2023-11-30 DIAGNOSIS — I63.9 CEREBRAL INFARCTION, UNSPECIFIED: ICD-10-CM

## 2023-11-30 DIAGNOSIS — I48.91 UNSPECIFIED ATRIAL FIBRILLATION: ICD-10-CM

## 2023-11-30 DIAGNOSIS — I69.354 HEMIPLEGIA AND HEMIPARESIS FOLLOWING CEREBRAL INFARCTION AFFECTING LEFT NON-DOMINANT SIDE: ICD-10-CM

## 2023-11-30 DIAGNOSIS — R55 SYNCOPE AND COLLAPSE: ICD-10-CM

## 2023-11-30 DIAGNOSIS — Z79.01 LONG TERM (CURRENT) USE OF ANTICOAGULANTS: ICD-10-CM

## 2023-11-30 DIAGNOSIS — R73.9 HYPERGLYCEMIA, UNSPECIFIED: ICD-10-CM

## 2023-11-30 DIAGNOSIS — Z79.2 LONG TERM (CURRENT) USE OF ANTIBIOTICS: ICD-10-CM

## 2023-11-30 DIAGNOSIS — D72.829 ELEVATED WHITE BLOOD CELL COUNT, UNSPECIFIED: ICD-10-CM

## 2023-11-30 DIAGNOSIS — E87.6 HYPOKALEMIA: ICD-10-CM

## 2023-11-30 LAB
ANION GAP SERPL CALC-SCNC: 7 MMOL/L — SIGNIFICANT CHANGE UP (ref 5–17)
ANION GAP SERPL CALC-SCNC: 7 MMOL/L — SIGNIFICANT CHANGE UP (ref 5–17)
BUN SERPL-MCNC: 23 MG/DL — SIGNIFICANT CHANGE UP (ref 7–23)
BUN SERPL-MCNC: 23 MG/DL — SIGNIFICANT CHANGE UP (ref 7–23)
CALCIUM SERPL-MCNC: 8.6 MG/DL — SIGNIFICANT CHANGE UP (ref 8.4–10.5)
CALCIUM SERPL-MCNC: 8.6 MG/DL — SIGNIFICANT CHANGE UP (ref 8.4–10.5)
CHLORIDE SERPL-SCNC: 107 MMOL/L — SIGNIFICANT CHANGE UP (ref 96–108)
CHLORIDE SERPL-SCNC: 107 MMOL/L — SIGNIFICANT CHANGE UP (ref 96–108)
CO2 SERPL-SCNC: 28 MMOL/L — SIGNIFICANT CHANGE UP (ref 22–31)
CO2 SERPL-SCNC: 28 MMOL/L — SIGNIFICANT CHANGE UP (ref 22–31)
CREAT SERPL-MCNC: 0.77 MG/DL — SIGNIFICANT CHANGE UP (ref 0.5–1.3)
CREAT SERPL-MCNC: 0.77 MG/DL — SIGNIFICANT CHANGE UP (ref 0.5–1.3)
EGFR: 88 ML/MIN/1.73M2 — SIGNIFICANT CHANGE UP
EGFR: 88 ML/MIN/1.73M2 — SIGNIFICANT CHANGE UP
GLUCOSE BLDC GLUCOMTR-MCNC: 120 MG/DL — HIGH (ref 70–99)
GLUCOSE BLDC GLUCOMTR-MCNC: 120 MG/DL — HIGH (ref 70–99)
GLUCOSE BLDC GLUCOMTR-MCNC: 122 MG/DL — HIGH (ref 70–99)
GLUCOSE BLDC GLUCOMTR-MCNC: 122 MG/DL — HIGH (ref 70–99)
GLUCOSE BLDC GLUCOMTR-MCNC: 138 MG/DL — HIGH (ref 70–99)
GLUCOSE BLDC GLUCOMTR-MCNC: 138 MG/DL — HIGH (ref 70–99)
GLUCOSE BLDC GLUCOMTR-MCNC: 186 MG/DL — HIGH (ref 70–99)
GLUCOSE BLDC GLUCOMTR-MCNC: 186 MG/DL — HIGH (ref 70–99)
GLUCOSE SERPL-MCNC: 102 MG/DL — HIGH (ref 70–99)
GLUCOSE SERPL-MCNC: 102 MG/DL — HIGH (ref 70–99)
HCT VFR BLD CALC: 26.9 % — LOW (ref 34.5–45)
HCT VFR BLD CALC: 26.9 % — LOW (ref 34.5–45)
HGB BLD-MCNC: 9.8 G/DL — LOW (ref 11.5–15.5)
HGB BLD-MCNC: 9.8 G/DL — LOW (ref 11.5–15.5)
MCHC RBC-ENTMCNC: 29.7 PG — SIGNIFICANT CHANGE UP (ref 27–34)
MCHC RBC-ENTMCNC: 29.7 PG — SIGNIFICANT CHANGE UP (ref 27–34)
MCHC RBC-ENTMCNC: 36.4 GM/DL — HIGH (ref 32–36)
MCHC RBC-ENTMCNC: 36.4 GM/DL — HIGH (ref 32–36)
MCV RBC AUTO: 81.5 FL — SIGNIFICANT CHANGE UP (ref 80–100)
MCV RBC AUTO: 81.5 FL — SIGNIFICANT CHANGE UP (ref 80–100)
NRBC # BLD: 0 /100 WBCS — SIGNIFICANT CHANGE UP (ref 0–0)
NRBC # BLD: 0 /100 WBCS — SIGNIFICANT CHANGE UP (ref 0–0)
PLATELET # BLD AUTO: 361 K/UL — SIGNIFICANT CHANGE UP (ref 150–400)
PLATELET # BLD AUTO: 361 K/UL — SIGNIFICANT CHANGE UP (ref 150–400)
POTASSIUM SERPL-MCNC: 3.2 MMOL/L — LOW (ref 3.5–5.3)
POTASSIUM SERPL-MCNC: 3.2 MMOL/L — LOW (ref 3.5–5.3)
POTASSIUM SERPL-SCNC: 3.2 MMOL/L — LOW (ref 3.5–5.3)
POTASSIUM SERPL-SCNC: 3.2 MMOL/L — LOW (ref 3.5–5.3)
RBC # BLD: 3.3 M/UL — LOW (ref 3.8–5.2)
RBC # BLD: 3.3 M/UL — LOW (ref 3.8–5.2)
RBC # FLD: 12.6 % — SIGNIFICANT CHANGE UP (ref 10.3–14.5)
RBC # FLD: 12.6 % — SIGNIFICANT CHANGE UP (ref 10.3–14.5)
SODIUM SERPL-SCNC: 142 MMOL/L — SIGNIFICANT CHANGE UP (ref 135–145)
SODIUM SERPL-SCNC: 142 MMOL/L — SIGNIFICANT CHANGE UP (ref 135–145)
WBC # BLD: 11.58 K/UL — HIGH (ref 3.8–10.5)
WBC # BLD: 11.58 K/UL — HIGH (ref 3.8–10.5)
WBC # FLD AUTO: 11.58 K/UL — HIGH (ref 3.8–10.5)
WBC # FLD AUTO: 11.58 K/UL — HIGH (ref 3.8–10.5)

## 2023-11-30 PROCEDURE — 87799 DETECT AGENT NOS DNA QUANT: CPT

## 2023-11-30 PROCEDURE — 86901 BLOOD TYPING SEROLOGIC RH(D): CPT

## 2023-11-30 PROCEDURE — 85613 RUSSELL VIPER VENOM DILUTED: CPT

## 2023-11-30 PROCEDURE — 86147 CARDIOLIPIN ANTIBODY EA IG: CPT

## 2023-11-30 PROCEDURE — 85730 THROMBOPLASTIN TIME PARTIAL: CPT

## 2023-11-30 PROCEDURE — 84155 ASSAY OF PROTEIN SERUM: CPT

## 2023-11-30 PROCEDURE — 82164 ANGIOTENSIN I ENZYME TEST: CPT

## 2023-11-30 PROCEDURE — 95714 VEEG EA 12-26 HR UNMNTR: CPT

## 2023-11-30 PROCEDURE — 92523 SPEECH SOUND LANG COMPREHEN: CPT

## 2023-11-30 PROCEDURE — 88185 FLOWCYTOMETRY/TC ADD-ON: CPT

## 2023-11-30 PROCEDURE — 86160 COMPLEMENT ANTIGEN: CPT

## 2023-11-30 PROCEDURE — 82728 ASSAY OF FERRITIN: CPT

## 2023-11-30 PROCEDURE — 86038 ANTINUCLEAR ANTIBODIES: CPT

## 2023-11-30 PROCEDURE — 85027 COMPLETE CBC AUTOMATED: CPT

## 2023-11-30 PROCEDURE — 99232 SBSQ HOSP IP/OBS MODERATE 35: CPT | Mod: GC

## 2023-11-30 PROCEDURE — 80053 COMPREHEN METABOLIC PANEL: CPT

## 2023-11-30 PROCEDURE — 97116 GAIT TRAINING THERAPY: CPT

## 2023-11-30 PROCEDURE — 95700 EEG CONT REC W/VID EEG TECH: CPT

## 2023-11-30 PROCEDURE — 95711 VEEG 2-12 HR UNMONITORED: CPT

## 2023-11-30 PROCEDURE — 70450 CT HEAD/BRAIN W/O DYE: CPT

## 2023-11-30 PROCEDURE — 99233 SBSQ HOSP IP/OBS HIGH 50: CPT

## 2023-11-30 PROCEDURE — 83036 HEMOGLOBIN GLYCOSYLATED A1C: CPT

## 2023-11-30 PROCEDURE — 80061 LIPID PANEL: CPT

## 2023-11-30 PROCEDURE — 85610 PROTHROMBIN TIME: CPT

## 2023-11-30 PROCEDURE — 83550 IRON BINDING TEST: CPT

## 2023-11-30 PROCEDURE — C1760: CPT

## 2023-11-30 PROCEDURE — 87389 HIV-1 AG W/HIV-1&-2 AB AG IA: CPT

## 2023-11-30 PROCEDURE — C1894: CPT

## 2023-11-30 PROCEDURE — 86592 SYPHILIS TEST NON-TREP QUAL: CPT

## 2023-11-30 PROCEDURE — 86255 FLUORESCENT ANTIBODY SCREEN: CPT

## 2023-11-30 PROCEDURE — 93005 ELECTROCARDIOGRAM TRACING: CPT

## 2023-11-30 PROCEDURE — 71045 X-RAY EXAM CHEST 1 VIEW: CPT

## 2023-11-30 PROCEDURE — 87205 SMEAR GRAM STAIN: CPT

## 2023-11-30 PROCEDURE — 97110 THERAPEUTIC EXERCISES: CPT

## 2023-11-30 PROCEDURE — 81001 URINALYSIS AUTO W/SCOPE: CPT

## 2023-11-30 PROCEDURE — 36224 PLACE CATH CAROTD ART: CPT

## 2023-11-30 PROCEDURE — 85045 AUTOMATED RETICULOCYTE COUNT: CPT

## 2023-11-30 PROCEDURE — 83520 IMMUNOASSAY QUANT NOS NONAB: CPT

## 2023-11-30 PROCEDURE — 82784 ASSAY IGA/IGD/IGG/IGM EACH: CPT

## 2023-11-30 PROCEDURE — 84165 PROTEIN E-PHORESIS SERUM: CPT

## 2023-11-30 PROCEDURE — C1769: CPT

## 2023-11-30 PROCEDURE — 86140 C-REACTIVE PROTEIN: CPT

## 2023-11-30 PROCEDURE — 85025 COMPLETE CBC W/AUTO DIFF WBC: CPT

## 2023-11-30 PROCEDURE — 82962 GLUCOSE BLOOD TEST: CPT

## 2023-11-30 PROCEDURE — 70496 CT ANGIOGRAPHY HEAD: CPT

## 2023-11-30 PROCEDURE — 86146 BETA-2 GLYCOPROTEIN ANTIBODY: CPT

## 2023-11-30 PROCEDURE — 36227 PLACE CATH XTRNL CAROTID: CPT

## 2023-11-30 PROCEDURE — 97162 PT EVAL MOD COMPLEX 30 MIN: CPT

## 2023-11-30 PROCEDURE — 80048 BASIC METABOLIC PNL TOTAL CA: CPT

## 2023-11-30 PROCEDURE — 88108 CYTOPATH CONCENTRATE TECH: CPT

## 2023-11-30 PROCEDURE — 87102 FUNGUS ISOLATION CULTURE: CPT

## 2023-11-30 PROCEDURE — 86235 NUCLEAR ANTIGEN ANTIBODY: CPT

## 2023-11-30 PROCEDURE — 86850 RBC ANTIBODY SCREEN: CPT

## 2023-11-30 PROCEDURE — 82040 ASSAY OF SERUM ALBUMIN: CPT

## 2023-11-30 PROCEDURE — 70498 CT ANGIOGRAPHY NECK: CPT

## 2023-11-30 PROCEDURE — 62328 DX LMBR SPI PNXR W/FLUOR/CT: CPT

## 2023-11-30 PROCEDURE — 84157 ASSAY OF PROTEIN OTHER: CPT

## 2023-11-30 PROCEDURE — 86036 ANCA SCREEN EACH ANTIBODY: CPT

## 2023-11-30 PROCEDURE — 82595 ASSAY OF CRYOGLOBULIN: CPT

## 2023-11-30 PROCEDURE — 70551 MRI BRAIN STEM W/O DYE: CPT

## 2023-11-30 PROCEDURE — 85652 RBC SED RATE AUTOMATED: CPT

## 2023-11-30 PROCEDURE — 86617 LYME DISEASE ANTIBODY: CPT

## 2023-11-30 PROCEDURE — 84166 PROTEIN E-PHORESIS/URINE/CSF: CPT

## 2023-11-30 PROCEDURE — C1887: CPT

## 2023-11-30 PROCEDURE — 86789 WEST NILE VIRUS ANTIBODY: CPT

## 2023-11-30 PROCEDURE — 97166 OT EVAL MOD COMPLEX 45 MIN: CPT

## 2023-11-30 PROCEDURE — 83916 OLIGOCLONAL BANDS: CPT

## 2023-11-30 PROCEDURE — 82945 GLUCOSE OTHER FLUID: CPT

## 2023-11-30 PROCEDURE — 92507 TX SP LANG VOICE COMM INDIV: CPT

## 2023-11-30 PROCEDURE — 87070 CULTURE OTHR SPECIMN AEROBIC: CPT

## 2023-11-30 PROCEDURE — 82042 OTHER SOURCE ALBUMIN QUAN EA: CPT

## 2023-11-30 PROCEDURE — 89051 BODY FLUID CELL COUNT: CPT

## 2023-11-30 PROCEDURE — 83873 ASSAY OF CSF PROTEIN: CPT

## 2023-11-30 PROCEDURE — 36226 PLACE CATH VERTEBRAL ART: CPT

## 2023-11-30 PROCEDURE — 97535 SELF CARE MNGMENT TRAINING: CPT

## 2023-11-30 PROCEDURE — 87483 CNS DNA AMP PROBE TYPE 12-25: CPT

## 2023-11-30 PROCEDURE — 86900 BLOOD TYPING SEROLOGIC ABO: CPT

## 2023-11-30 PROCEDURE — 36415 COLL VENOUS BLD VENIPUNCTURE: CPT

## 2023-11-30 PROCEDURE — 80074 ACUTE HEPATITIS PANEL: CPT

## 2023-11-30 PROCEDURE — 83540 ASSAY OF IRON: CPT

## 2023-11-30 PROCEDURE — 86480 TB TEST CELL IMMUN MEASURE: CPT

## 2023-11-30 PROCEDURE — 86788 WEST NILE VIRUS AB IGM: CPT

## 2023-11-30 PROCEDURE — 83010 ASSAY OF HAPTOGLOBIN QUANT: CPT

## 2023-11-30 PROCEDURE — 88184 FLOWCYTOMETRY/ TC 1 MARKER: CPT

## 2023-11-30 PROCEDURE — 83615 LACTATE (LD) (LDH) ENZYME: CPT

## 2023-11-30 PROCEDURE — 81406 MOPATH PROCEDURE LEVEL 7: CPT

## 2023-11-30 PROCEDURE — 97530 THERAPEUTIC ACTIVITIES: CPT

## 2023-11-30 PROCEDURE — 99239 HOSP IP/OBS DSCHRG MGMT >30: CPT

## 2023-11-30 PROCEDURE — 83735 ASSAY OF MAGNESIUM: CPT

## 2023-11-30 RX ORDER — GLUCAGON INJECTION, SOLUTION 0.5 MG/.1ML
1 INJECTION, SOLUTION SUBCUTANEOUS ONCE
Refills: 0 | Status: DISCONTINUED | OUTPATIENT
Start: 2023-11-30 | End: 2023-12-13

## 2023-11-30 RX ORDER — DEXTROSE 50 % IN WATER 50 %
25 SYRINGE (ML) INTRAVENOUS ONCE
Refills: 0 | Status: DISCONTINUED | OUTPATIENT
Start: 2023-11-30 | End: 2023-12-06

## 2023-11-30 RX ORDER — AMIODARONE HYDROCHLORIDE 400 MG/1
1 TABLET ORAL
Qty: 0 | Refills: 0 | DISCHARGE
Start: 2023-11-30

## 2023-11-30 RX ORDER — METOPROLOL TARTRATE 50 MG
1 TABLET ORAL
Qty: 0 | Refills: 0 | DISCHARGE
Start: 2023-11-30

## 2023-11-30 RX ORDER — POTASSIUM CHLORIDE 20 MEQ
20 PACKET (EA) ORAL
Refills: 0 | Status: COMPLETED | OUTPATIENT
Start: 2023-11-30 | End: 2023-11-30

## 2023-11-30 RX ORDER — AMLODIPINE BESYLATE 2.5 MG/1
10 TABLET ORAL DAILY
Refills: 0 | Status: DISCONTINUED | OUTPATIENT
Start: 2023-12-01 | End: 2023-12-01

## 2023-11-30 RX ORDER — INSULIN LISPRO 100/ML
VIAL (ML) SUBCUTANEOUS AT BEDTIME
Refills: 0 | Status: DISCONTINUED | OUTPATIENT
Start: 2023-11-30 | End: 2023-12-06

## 2023-11-30 RX ORDER — DEXTROSE 50 % IN WATER 50 %
12.5 SYRINGE (ML) INTRAVENOUS ONCE
Refills: 0 | Status: DISCONTINUED | OUTPATIENT
Start: 2023-11-30 | End: 2023-12-06

## 2023-11-30 RX ORDER — ATORVASTATIN CALCIUM 80 MG/1
1 TABLET, FILM COATED ORAL
Qty: 0 | Refills: 0 | DISCHARGE
Start: 2023-11-30

## 2023-11-30 RX ORDER — APIXABAN 2.5 MG/1
1 TABLET, FILM COATED ORAL
Qty: 0 | Refills: 0 | DISCHARGE
Start: 2023-11-30

## 2023-11-30 RX ORDER — LOSARTAN POTASSIUM 100 MG/1
25 TABLET, FILM COATED ORAL DAILY
Refills: 0 | Status: DISCONTINUED | OUTPATIENT
Start: 2023-12-01 | End: 2023-12-13

## 2023-11-30 RX ORDER — AMIODARONE HYDROCHLORIDE 400 MG/1
200 TABLET ORAL DAILY
Refills: 0 | Status: DISCONTINUED | OUTPATIENT
Start: 2023-12-01 | End: 2023-12-13

## 2023-11-30 RX ORDER — INFLUENZA VIRUS VACCINE 15; 15; 15; 15 UG/.5ML; UG/.5ML; UG/.5ML; UG/.5ML
0.5 SUSPENSION INTRAMUSCULAR ONCE
Refills: 0 | Status: DISCONTINUED | OUTPATIENT
Start: 2023-11-30 | End: 2023-12-13

## 2023-11-30 RX ORDER — SENNA PLUS 8.6 MG/1
2 TABLET ORAL AT BEDTIME
Refills: 0 | Status: DISCONTINUED | OUTPATIENT
Start: 2023-11-30 | End: 2023-12-13

## 2023-11-30 RX ORDER — SODIUM CHLORIDE 9 MG/ML
1000 INJECTION, SOLUTION INTRAVENOUS
Refills: 0 | Status: DISCONTINUED | OUTPATIENT
Start: 2023-11-30 | End: 2023-12-06

## 2023-11-30 RX ORDER — INSULIN LISPRO 100/ML
0 VIAL (ML) SUBCUTANEOUS
Qty: 0 | Refills: 0 | DISCHARGE
Start: 2023-11-30

## 2023-11-30 RX ORDER — METOPROLOL TARTRATE 50 MG
50 TABLET ORAL THREE TIMES A DAY
Refills: 0 | Status: DISCONTINUED | OUTPATIENT
Start: 2023-11-30 | End: 2023-11-30

## 2023-11-30 RX ORDER — APIXABAN 2.5 MG/1
5 TABLET, FILM COATED ORAL
Refills: 0 | Status: DISCONTINUED | OUTPATIENT
Start: 2023-12-01 | End: 2023-12-13

## 2023-11-30 RX ORDER — LOSARTAN POTASSIUM 100 MG/1
1 TABLET, FILM COATED ORAL
Qty: 0 | Refills: 0 | DISCHARGE
Start: 2023-11-30

## 2023-11-30 RX ORDER — DEXTROSE 50 % IN WATER 50 %
15 SYRINGE (ML) INTRAVENOUS ONCE
Refills: 0 | Status: DISCONTINUED | OUTPATIENT
Start: 2023-11-30 | End: 2023-12-06

## 2023-11-30 RX ORDER — SENNA PLUS 8.6 MG/1
2 TABLET ORAL
Qty: 0 | Refills: 0 | DISCHARGE
Start: 2023-11-30

## 2023-11-30 RX ORDER — PANTOPRAZOLE SODIUM 20 MG/1
1 TABLET, DELAYED RELEASE ORAL
Qty: 0 | Refills: 0 | DISCHARGE
Start: 2023-11-30

## 2023-11-30 RX ORDER — METOPROLOL TARTRATE 50 MG
50 TABLET ORAL
Refills: 0 | Status: DISCONTINUED | OUTPATIENT
Start: 2023-11-30 | End: 2023-11-30

## 2023-11-30 RX ORDER — METOPROLOL TARTRATE 50 MG
25 TABLET ORAL ONCE
Refills: 0 | Status: COMPLETED | OUTPATIENT
Start: 2023-11-30 | End: 2023-11-30

## 2023-11-30 RX ORDER — INSULIN LISPRO 100/ML
VIAL (ML) SUBCUTANEOUS
Refills: 0 | Status: DISCONTINUED | OUTPATIENT
Start: 2023-11-30 | End: 2023-12-06

## 2023-11-30 RX ORDER — AMLODIPINE BESYLATE 2.5 MG/1
1 TABLET ORAL
Qty: 0 | Refills: 0 | DISCHARGE
Start: 2023-11-30

## 2023-11-30 RX ORDER — ATORVASTATIN CALCIUM 80 MG/1
80 TABLET, FILM COATED ORAL AT BEDTIME
Refills: 0 | Status: DISCONTINUED | OUTPATIENT
Start: 2023-11-30 | End: 2023-12-13

## 2023-11-30 RX ORDER — PANTOPRAZOLE SODIUM 20 MG/1
40 TABLET, DELAYED RELEASE ORAL DAILY
Refills: 0 | Status: DISCONTINUED | OUTPATIENT
Start: 2023-12-01 | End: 2023-12-13

## 2023-11-30 RX ORDER — METOPROLOL TARTRATE 50 MG
50 TABLET ORAL THREE TIMES A DAY
Refills: 0 | Status: DISCONTINUED | OUTPATIENT
Start: 2023-11-30 | End: 2023-12-13

## 2023-11-30 RX ADMIN — Medication 60 MILLIGRAM(S): at 05:29

## 2023-11-30 RX ADMIN — APIXABAN 5 MILLIGRAM(S): 2.5 TABLET, FILM COATED ORAL at 05:30

## 2023-11-30 RX ADMIN — ATORVASTATIN CALCIUM 80 MILLIGRAM(S): 80 TABLET, FILM COATED ORAL at 22:58

## 2023-11-30 RX ADMIN — LOSARTAN POTASSIUM 25 MILLIGRAM(S): 100 TABLET, FILM COATED ORAL at 05:29

## 2023-11-30 RX ADMIN — Medication 20 MILLIEQUIVALENT(S): at 09:23

## 2023-11-30 RX ADMIN — Medication 50 MILLIGRAM(S): at 23:00

## 2023-11-30 RX ADMIN — APIXABAN 5 MILLIGRAM(S): 2.5 TABLET, FILM COATED ORAL at 17:03

## 2023-11-30 RX ADMIN — Medication 20 MILLIEQUIVALENT(S): at 11:08

## 2023-11-30 RX ADMIN — AMIODARONE HYDROCHLORIDE 400 MILLIGRAM(S): 400 TABLET ORAL at 13:02

## 2023-11-30 RX ADMIN — Medication 1: at 11:07

## 2023-11-30 RX ADMIN — Medication 25 MILLIGRAM(S): at 15:34

## 2023-11-30 RX ADMIN — Medication 25 MILLIGRAM(S): at 09:37

## 2023-11-30 RX ADMIN — AMLODIPINE BESYLATE 10 MILLIGRAM(S): 2.5 TABLET ORAL at 05:29

## 2023-11-30 RX ADMIN — Medication 25 MILLIGRAM(S): at 04:24

## 2023-11-30 RX ADMIN — Medication 50 MILLIGRAM(S): at 13:02

## 2023-11-30 RX ADMIN — PANTOPRAZOLE SODIUM 40 MILLIGRAM(S): 20 TABLET, DELAYED RELEASE ORAL at 07:56

## 2023-11-30 RX ADMIN — SENNA PLUS 2 TABLET(S): 8.6 TABLET ORAL at 23:01

## 2023-11-30 RX ADMIN — AMIODARONE HYDROCHLORIDE 400 MILLIGRAM(S): 400 TABLET ORAL at 05:30

## 2023-11-30 RX ADMIN — Medication 5 MILLIGRAM(S): at 23:01

## 2023-11-30 NOTE — H&P ADULT - NS ATTEND AMEND GEN_ALL_CORE FT
I independently performed the documented the history, exam, and medical decision making. I have made amendments to the documentation where necessary. I have personally seen and examined the patient. Medical records were reviewed and I have made amendments to the documentation where necessary and adjusted the history, physical examination, and plan as documented by the Nurse Practitioner and Resident Physician. Patient was seen and evaluated at bedside today. Reported no overnight events and is in no acute distress. Eager to participate on the recommended rehabilitation program. Denies any CP, SOB, LAURA, palpitations, fever, chills, body aches, cough, congestion, or any other symptoms at this time. Admission vitals, labs, and physical exam are outlined below.    LAB                        11.2   14.18 )-----------( 392      ( 01 Dec 2023 06:18 )             31.4     12-01    139  |  101  |  20  ----------------------------<  96  3.1<L>   |  31  |  0.98    Ca    8.9      01 Dec 2023 06:18    TPro  6.8  /  Alb  2.5<L>  /  TBili  0.4  /  DBili  x   /  AST  17  /  ALT  28  /  AlkPhos  65  12-01    LIVER FUNCTIONS - ( 01 Dec 2023 06:18 )  Alb: 2.5 g/dL / Pro: 6.8 g/dL / ALK PHOS: 65 U/L / ALT: 28 U/L / AST: 17 U/L / GGT: x             PHYSICAL EXAM  Vital Signs Last 24 Hrs  T(C): 37 (30 Nov 2023 20:28), Max: 37 (30 Nov 2023 20:28)  T(F): 98.6 (30 Nov 2023 20:28), Max: 98.6 (30 Nov 2023 20:28)  HR: 100 (30 Nov 2023 22:40) (92 - 121)  BP: 144/86 (30 Nov 2023 22:40) (129/78 - 152/76)  BP(mean): 101 (30 Nov 2023 12:45) (101 - 105)  RR: 16 (30 Nov 2023 20:28) (16 - 24)  SpO2: 95% (30 Nov 2023 20:28) (95% - 100%)    Gen - NAD, Comfortable  HEENT - NCAT, EOMI, MMM  Neck - Supple, No limited ROM  Pulm - CTAB, No wheeze, No rhonchi, No crackles  Cardiovascular - RRR, S1S2  Chest - good chest expansion, good respiratory effort  Abdomen - Soft, NT/ND, +BS  Extremities - No Cyanosis, no clubbing, no edema, no calf tenderness  Neuro-     Cognitive - awake, alert, oriented to person, place, date, year, and situation. Able to follow command     Communication - Fluent, Comprehensible     Attention: Intact     Judgement: Good evidence of judgement     Memory:  memory intact     Cranial Nerves - CN 2-12 intact.     Motor -                     LEFT    UE - 4+/5                    RIGHT UE - 5/5                    LEFT    LE - 4+/5                    RIGHT LE -  5/5        Sensory - Intact  to LT      Reflexes - DTR Intact, No primitive reflexive     Coordination - FTN intact      Tone - normal   Psychiatric - Mood stable, Affect WNL  Skin:  all skin intact
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
## 2023-11-30 NOTE — PROGRESS NOTE ADULT - PROVIDER SPECIALTY LIST ADULT
Neurology
Neurology
Rheumatology
Cardiology
Neurology
Neuroradiology
Rehab Medicine
Rheumatology
Cardiology
Neurology
Rheumatology
Cardiology
Neurology
Neurosurgery
Rheumatology
Cardiology

## 2023-11-30 NOTE — H&P ADULT - NSHPSOCIALHISTORY_GEN_ALL_CORE
Smoking -   EtOH -   Drugs -     Lives alone in apartment. Has elevators to second floor.  PTA: Independent in ADLs and ambulation     CURRENT FUNCTIONAL STATUS (11/29 PT, 11/27 OT)  Bed Mobility: Didi  Transfers: Didi with RW  Gait: Didi with RW x 25 ft Smoking - Denies  EtOH - Denies  Drugs - Denies    Lives alone in apartment. Has elevators to second floor.  PTA: Independent in ADLs and ambulation     CURRENT FUNCTIONAL STATUS (11/29 PT, 11/27 OT)  Bed Mobility: Didi  Transfers: Didi with RW  Gait: Didi with RW x 25 ft

## 2023-11-30 NOTE — DISCHARGE NOTE PROVIDER - NSDCMRMEDTOKEN_GEN_ALL_CORE_FT
amLODIPine 10 mg oral tablet: 1 tab(s) orally once a day  atorvastatin 80 mg oral tablet: 1 tab(s) orally once a day (at bedtime)  losartan 25 mg oral tablet: 1 tab(s) orally once a day  spironolactone 25 mg oral tablet: 2 tab(s) orally once a day   amiodarone 200 mg oral tablet: 1 tab(s) orally once a day  amLODIPine 10 mg oral tablet: 1 tab(s) orally once a day  apixaban 5 mg oral tablet: 1 tab(s) orally 2 times a day  atorvastatin 80 mg oral tablet: 1 tab(s) orally once a day (at bedtime)  bisacodyl 5 mg oral delayed release tablet: 1 tab(s) orally once a day (at bedtime)  insulin lispro 100 units/mL injectable solution: injectable 3 times a day (before meals) 1 Unit(s) if Glucose 151 - 200  2 Unit(s) if Glucose 201 - 250  3 Unit(s) if Glucose 251 - 300  4 Unit(s) if Glucose 301 - 350  5 Unit(s) if Glucose 351 - 400  6 Unit(s) if Glucose Greater Than 400  Three times a day before meal  insulin lispro 100 units/mL injectable solution: injectable once a day (at bedtime) 0 Unit(s) if Glucose 0 - 250  1 Unit(s) if Glucose 251 - 300  2 Unit(s) if Glucose 301 - 350  3 Unit(s) if Glucose 351 - 400  4 Unit(s) if Glucose Greater Than 400  At bedtime  losartan 25 mg oral tablet: 1 tab(s) orally once a day  metoprolol tartrate 50 mg oral tablet: 1 tab(s) orally 3 times a day  pantoprazole 40 mg oral delayed release tablet: 1 tab(s) orally once a day  predniSONE 50 mg oral tablet: 1 tab(s) orally once a day  senna leaf extract oral tablet: 2 tab(s) orally once a day (at bedtime)

## 2023-11-30 NOTE — DISCHARGE NOTE PROVIDER - NPI NUMBER (FOR SYSADMIN USE ONLY) :
[[id number] [[id number] [[id number]]

## 2023-11-30 NOTE — H&P ADULT - NSHPREVIEWOFSYSTEMS_GEN_ALL_CORE
REVIEW OF SYSTEMS  Constitutional: No fever, No Chills, + fatigue  HEENT: No eye pain, No visual disturbances, No difficulty hearing  Pulm: No cough,  No shortness of breath  Cardio: No chest pain, No palpitations  GI:  No abdominal pain, No nausea, No vomiting, No diarrhea, No constipation  : No dysuria, No frequency, No hematuria  Neuro: No headaches, No memory loss, + loss of strength, No numbness, No tremors  Skin: No itching, No rashes, No lesions   Endo: No temperature intolerance  MSK: No joint pain, No joint swelling, No muscle pain, No Neck pain,  No back pain  Psych:  No depression, No anxiety

## 2023-11-30 NOTE — PROGRESS NOTE ADULT - ATTENDING COMMENTS
Agree with fellow's A/P as above  challenging case from a diagnostic and therapeutic aspect. I reached out to PACNS expert at [facility] to review the case.   discussed today with Dr Mayen, neuro plan to d/c to Rehab and repeat imaging/ angio in 4 weeks  we informed our colleague rheumatologist Dr Botello about the patient, to evaluate the patient in 2-3 weeks at rehab to ensure no worsening and advise regarding steroid management  brother at bedside, aware of our recommendations, in agreement with plan and understands the diagnostic/ therapeutic dilemma

## 2023-11-30 NOTE — PROGRESS NOTE ADULT - NS ATTEND OPT1 GEN_ALL_CORE
I attest my time as attending is greater than 50% of the total combined time spent on qualifying patient care activities by the PA/NP and attending.
I independently performed the documented:
I attest my time as attending is greater than 50% of the total combined time spent on qualifying patient care activities by the PA/NP and attending.
I independently performed the documented:
I attest my time as attending is greater than 50% of the total combined time spent on qualifying patient care activities by the PA/NP and attending.
I attest my time as attending is greater than 50% of the total combined time spent on qualifying patient care activities by the PA/NP and attending.

## 2023-11-30 NOTE — DISCHARGE NOTE NURSING/CASE MANAGEMENT/SOCIAL WORK - PATIENT PORTAL LINK FT
You can access the FollowMyHealth Patient Portal offered by [facility] by registering at the following website: [email or web address]. By joining [facility]’s FollowMyHealth portal, you will also be able to view your health information using other applications (apps) compatible with our system.

## 2023-11-30 NOTE — PROGRESS NOTE ADULT - PROBLEM SELECTOR PROBLEM 3
Tobacco use
HTN (hypertension)
Tobacco use
HTN (hypertension)
Tobacco use
HTN (hypertension)

## 2023-11-30 NOTE — PATIENT PROFILE ADULT - FALL HARM RISK - HARM RISK INTERVENTIONS

## 2023-11-30 NOTE — DISCHARGE NOTE PROVIDER - PROVIDER TOKENS
PROVIDER:[TOKEN:[[id number]],FOLLOWUP:[2 weeks]],PROVIDER:[TOKEN:[[id number]],FOLLOWUP:[2 weeks]],PROVIDER:[TOKEN:[[id number]],FOLLOWUP:[2 weeks]],PROVIDER:[TOKEN:[[id number]],FOLLOWUP:[2 weeks]] PROVIDER:[TOKEN:[[id number]],FOLLOWUP:[2 weeks]],PROVIDER:[TOKEN:[[id number]],FOLLOWUP:[2 weeks]],PROVIDER:[TOKEN:[[id number]],FOLLOWUP:[2 weeks]],PROVIDER:[TOKEN:[[id number]],FOLLOWUP:[2 weeks]] PROVIDER:[TOKEN:[[id number]],FOLLOWUP:[2 weeks]],PROVIDER:[TOKEN:[[id number]],FOLLOWUP:[2 weeks]],PROVIDER:[TOKEN:[[id number]],FOLLOWUP:[2 weeks]],PROVIDER:[TOKEN:[[id number]],FOLLOWUP:[2 weeks]]

## 2023-11-30 NOTE — H&P ADULT - NSHPLABSRESULTS_GEN_ALL_CORE
LABS:               9.8    11.58 )-----------( 361      ( 30 Nov 2023 06:19 )             26.9     11-30    142  |  107  |  23  ----------------------------<  102<H>  3.2<L>   |  28  |  0.77    Ca    8.6      30 Nov 2023 06:19      CT Head No Cont (11.14.23 @ 22:44)   Small hyperdensity at the right thalamocapsular junction on thinner slices, which may represent dystrophic calcification or residual blood products from prior hemorrhage (given surrounding hypodensity, possibly gliosis or residual edema). Acute hemorrhage is considered less likely as it is not prominent on the thicker slice. Recommend comparison to previous outside study and follow-up to as indicated.    CT Head No Cont (11.15.23 @ 13:38)   No evidence of hemorrhage on this study.  The ventricles are prominent, appearing out of proportion to degree of sulcal dilatation, and clinical correlation for normal pressure hydrocephalus is recommended.    MR Head w/ IV Cont (11.17.23 @ 13:09)   1. Right posterior corona radiata subacute infarction  2. Multiple remote infarctions within the cerebral hemispheric white matter basal ganglia and thalami  3. Pervasive cerebral hemispheric white matter abnormality, possibly accelerated form of ischemic white matter disease versus and/or superimposed other inflammatory metabolic toxic or infectious process  4. Numerous remote petechial hemorrhages most prominently in the basal ganglia and thalami  5. Diffuse brain volume loss upper range typical for age    CT Head No Cont (11.18.23 @ 20:14)   No acute intracranial hemorrhage, mass effect, or shift of the midline structures.  Similar-appearing extensive chronic white matter microvascular type changes and chronic lacunar infarcts, as discussed.    CT Angio Brain Stroke Protocol  w/ IV Cont (11.18.23 @ 20:16)   Calcified plaques affect the bilateral carotid siphons without associated stenosis. Otherwise, the bilateral intracranial internal carotid, anterior, and middle cerebral arteries appear unremarkable.  The anterior and bilateral posterior communicating arteries are seen. The posterior circulation appears intact. The intracranial venous structures are patent.    CT Angio Neck Stroke Protocol w/ IV Cont (11.18.23 @ 20:17)   There is a standard anatomic configuration to the aortic arch. Atherosclerosis affects the arch. The origins of the great vessels appear unremarkable.The bilateral common carotid arteries appear unremarkable. Minimal   calcified plaque affects the bilateral carotid bifurcations without associated stenosis. The cervical internal carotid arteries are patent extending to the skull base.The origins of the bilateral vertebral arteries are normal. The bilateral vertebral arteries are unremarkable.    CT Angio Head w/ IV Cont (11.24.23 @ 23:48)   Bilateral intracranial internal carotid arteries are patent. There is calcified plaque carotid siphons without high-grade narrowing internal   carotid arteries. The proximal anterior, middle and posterior cerebral arteries are patent bilaterally. Multifocal high-grade narrowing is demonstrated in the bilateral anterior cerebral arteries to segment (5-4 17 on the left and 5-4 22 on the right), right MCA (5-373). No occlusions. Patent vertebrobasilar system.    MR Head No Cont (11.29.23 @ 21:03)  Left parietal small cortical acute infarction. No acute intracranial hemorrhage. Innumerable scattered chronic microhemorrhages in the brainstem,   bilateral cerebellar hemispheres, deep gray nuclei and peripherally within the cerebral hemispheres which may be secondary to chronic   hypertensive encephalopathy. Severe extensive chronic microvascular ischemic changes and multifocal chronic lacunar infarcts as detailed above.

## 2023-11-30 NOTE — DISCHARGE NOTE PROVIDER - DETAILS OF MALNUTRITION DIAGNOSIS/DIAGNOSES
This patient has been assessed with a concern for Malnutrition and was treated during this hospitalization for the following Nutrition diagnosis/diagnoses:     -  11/22/2023: Morbid obesity (BMI > 40)

## 2023-11-30 NOTE — PROGRESS NOTE ADULT - NS ATTEND AMEND GEN_ALL_CORE FT
I reviewed available diagnostic studies, and reviewed images personally. I agree with resident's history, exam, orders placed, and plan of care. Medical issues needing to be addressed include: cerebral infarction,  HTN, TA, smoker s/p cessation counseling, AMS, possible PACNS. s/p solumedrol. Case discussed with rheum - khanh recs. Will continue 60mg prednisone now, 50mg on DC - pt to follow up with Dr. Calvo outpatient in 4-6 wks, also repeat angio around that time. New onset Afib, Eliquis 5mg BID, to transition amio gtt to po. MRI repeat pending to assess for any additional lesion burden as this may alter therapy. Vasovagal event today after bowel movement. Pt is back to baseline.
I reviewed available diagnostic studies, and reviewed images personally. I agree with resident's history, exam, orders placed, and plan of care. Medical issues needing to be addressed include: cerebral infarction,  HTN, TA, smoker s/p cessation counseling, AMS, possible PACNS. s/p solumedrol. prednisone taper, f up with Dr. Calvo outpatient in 4-6 wks with repeat angio. Pt went int afib, now amio gtt started, tx to stroke unit, start Eliquis 5mg BID.
Thirty five minutes of discharge time was spent on patient exam and discussion including test results, pathophysiology, natural history, stroke risk factors, medication changes and secondary prophylaxis and importance of medication compliance. We also discussed follow up plan. This was discussed with patient/family, who expresses understanding.
I reviewed available diagnostic studies, and reviewed images personally. I agree with resident's history, exam, orders placed, and plan of care. Medical issues needing to be addressed include: cerebral infarction,  HTN, TA, smoker s/p cessation counseling, AMS, possible PACNS. s/p solumedrol x3days 1g. plan prednisone w/ taper. Pending rehab.
I saw and examined the patient on AM stroke rounds with NP Ita Vila.   Patient endorses doing well today.  She is still disoriented, and having having impaired insight.      On exam:   GEN: NAD  CV: RRR  NEURO:   Awake, alert, oriented to year, not the month, disoriented to hospital, said she [facility], normal naming, and repetition.  Impaired insight.   Pupils 3-2mm, symmetric, full VF on blink to threat, conjugate gaze, no nystagmus, no overt facial weakness, tongue midline, no dysarthria.   MOTOR: normal bulk and tone.  no drift.  5/5 throughout to confrontation upper and lower extremities.   COORDINATION: normal FNF    MRI brain images reviewed: diffusion restriction R corona radiata.  Multiple microhemorrhages, deep and cortical.   Cerebral angiogram reviewed: multiple areas of focal stenosis and dilatation    AP: 58 year old woman with HTN, Rheumatoid arthritis, presenting with decompensated confusion, subacute course since about February 2023.  On exam, more awake, talkative, and slightly more oriented.  Initial L face and arm weakness not apparent on exam today.  MRI showing the acute stroke, and cerebral angiogram concerning for vasculitic appearance.    CSF sampled on 11/21, thus far no clear inflammatory profile, will follow pending studies  Rheumatology consultation appreciated, recommended biopsy for confirmation of vasculitis.    Will initiate on solumedrol 1,000mg IV for next 3 days, and likely steroid taper.    Follow up remaining CSF labs, and Notch3.
Saw and examined the patient on AM stroke rounds with NP Ita Vila.     No acute events overnight.  Following CSF studies.  Thus far, no clear inflammatory pattern.  Viral PCR and culture negative.  Slightly increased CSF IgG, 1 WBC, lymphoctyic predominance.      On exam;   GEN: sitting up in bed, NAD  NEURO:   Awake, alert, making eye contact, states her name, names some objects, not all, follows bulbar commands, no appendicular commands.     Conjugate gaze, trace L facial weakness, no dysarthria.   MOTOR: MOTOR: normal bulk and tone.  L pronator drift.  Lower extremities antigravity.   COORDINATION: no ataxia on FNF.      MRI brain images reviewed: diffusion restriction R corona radiata.  Multiple microhemorrhages, deep and cortical.   Cerebral angiogram reviewed: multiple areas of focal stenosis and dilatation    AP: 58 year old woman with HTN, Rheumatoid arthritis, presenting with decompensated confusion, subacute course since about February 2023.  On exam, slightly more verbal, trace L face, and arm weakness.  MRI showing the acute stroke, and cerebral angiogram concerning for vasculitic appearance.    CSF sampled on 11/21, thus far no clear inflammatory profile, will follow results, rheumatology consultation.
I saw and examined the patient on AM stroke rounds on the date of service.  This is a late attestation.     Patient endorses being more alert today.      On exam;   GEN: sitting up in bed, NAD  NEURO:   Awake, alert, making eye contact, she is more verbal than on date of transfer.  Follows some commands.   Conjugate gaze, trace L facial weakness, no dysarthria.   MOTOR: MOTOR: normal bulk and tone.  L pronator drift.  Lower extremities antigravity.   COORDINATION: no ataxia on FNF.      MRI brain images reviewed: diffusion restriction R coronoa radiata.  Mulittiple microhemorrhages, deep and cortical.   Cerebral angiogram reviewed: multiple areas of focal stenosis and dilatation    AP: 58 year old woman with HTN, Rheumatoid arthritis, presenting with decompensated confusion, subacute course since about February 2023.  On exam, more attentive and verbal, trace L face, and arm weakness.  MRI showing the acute stroke, and cerebral angiogram concerning for vasculitic appearance.    CSF sampled on 11/21, rheumatology consultation, and likely initiation of IV steroids.
I saw and examined the patient on AM stroke rounds.   patient endorses doing better.      On exam:   Awake, alert, attending, disoriented, says she is at home, follows most commands, possible L/R confusion, speaking more fluently.   Conjugate gaze, no facial weakness, no dysarthria.   MOTOR: MOTOR: normal bulk and tone.  5/5 throughout.    COORDINATION: no ataxia on FNF.      MRI brain images reviewed: diffusion restriction R corona radiata.  Multiple microhemorrhages, deep and cortical.   Cerebral angiogram reviewed: multiple areas of focal stenosis and dilatation    AP: 58 year old woman with HTN, Rheumatoid arthritis, presenting with decompensated confusion, subacute course since about February 2023.  On exam, slightly more verbal, trace L face, and arm weakness.  MRI showing the acute stroke, and cerebral angiogram concerning for vasculitic appearance.    CSF sampled on 11/21, thus far no clear inflammatory profile, will follow results, rheumatology consultation appreciated, recommended biopsy for confirmation of vasculitis.  Holding off on steroids at this point while CSF studies pending.

## 2023-11-30 NOTE — PROGRESS NOTE ADULT - SUBJECTIVE AND OBJECTIVE BOX
Jamal Alvarado MD, PGY-5  Rheumatology Fellow  Reachable on TEAMS    NADINE NIRALI  [phone]    INTERVAL EVENTS    ROS         MEDICATIONS  (STANDING):  aMIOdarone    Tablet 400 milliGRAM(s) Oral every 8 hours  aMIOdarone    Tablet   Oral   amLODIPine   Tablet 10 milliGRAM(s) Oral daily  apixaban 5 milliGRAM(s) Oral two times a day  atorvastatin 80 milliGRAM(s) Oral at bedtime  bisacodyl 5 milliGRAM(s) Oral at bedtime  insulin lispro (ADMELOG) corrective regimen sliding scale   SubCutaneous at bedtime  insulin lispro (ADMELOG) corrective regimen sliding scale   SubCutaneous three times a day before meals  losartan 25 milliGRAM(s) Oral daily  metoprolol tartrate 50 milliGRAM(s) Oral three times a day  pantoprazole    Tablet 40 milliGRAM(s) Oral daily  potassium chloride    Tablet ER 20 milliEquivalent(s) Oral every 2 hours  predniSONE   Tablet 60 milliGRAM(s) Oral daily  senna 2 Tablet(s) Oral at bedtime  sodium chloride 0.9%. 1000 milliLiter(s) (50 mL/Hr) IV Continuous <Continuous>    MEDICATIONS  (PRN):      Vital Signs Last 24 Hrs  T(C): 36.9 (30 Nov 2023 08:00), Max: 36.9 (30 Nov 2023 08:00)  T(F): 98.4 (30 Nov 2023 08:00), Max: 98.4 (30 Nov 2023 08:00)  HR: 121 (30 Nov 2023 08:00) (91 - 127)  BP: 152/76 (30 Nov 2023 08:00) (124/64 - 173/82)  BP(mean): 101 (30 Nov 2023 08:00) (78 - 120)  RR: 24 (30 Nov 2023 08:00) (17 - 24)  SpO2: 100% (30 Nov 2023 08:00) (97% - 100%)    Parameters below as of 30 Nov 2023 08:00  Patient On (Oxygen Delivery Method): nasal cannula  O2 Flow (L/min): 2      Physical Exam:  General: lying in bed comfortable  Resp: no respiratory distress  MSK: no swelling/warmth/erythema of the joints of the UE/LE  Neuro: AAOx1-2.  Skin: no visible rashes      LABS:  cret                        9.8    11.58 )-----------( 361      ( 30 Nov 2023 06:19 )             26.9     11-30    142  |  107  |  23  ----------------------------<  102<H>  3.2<L>   |  28  |  0.77    Ca    8.6      30 Nov 2023 06:19      Urinalysis Basic - ( 22 Nov 2023 07:18 )    Color: x / Appearance: x / SG: x / pH: x  Gluc: 94 mg/dL / Ketone: x  / Bili: x / Urobili: x   Blood: x / Protein: x / Nitrite: x   Leuk Esterase: x / RBC: x / WBC x   Sq Epi: x / Non Sq Epi: x / Bacteria: x      Culture - Fungal, CSF (collected 11-21-23 @ 14:51)  Source: .CSF CSF  Preliminary Report (11-22-23 @ 08:55):    Testing in progress    Culture - CSF with Gram Stain (collected 11-21-23 @ 14:51)  Source: .CSF CSF  Gram Stain (11-21-23 @ 19:09):    No polymorphonuclear cells seen    No organisms seen    by cytocentrifuge      Rheumatology Work Up    C3 Complement, Serum: 152 mg/dL [81 - 157] (11-21-23 @ 07:19)  C4 Complement, Serum: 36 mg/dL [13 - 39] (11-21-23 @ 07:19)  Ribosomal P Protein Antibody: <0.2 AI [Fluorescent Bead Immunoassay                     Ribosomal P Antibodies, IgG                     <1.0 AI (negative)                     > or =1.0 AI (positive)                     Reference values  apply  to all ages.] (11-21-23 @ 07:19)  Anti SS-A Antibody: <0.2 AI (11-21-23 @ 07:19)  Anti SS-B Antibody: <0.2: Fluorescent Bead Immunoassay      Neutrophil Cytoplasmic Antibody (11.21.23 @ 07:19)    Cytoplasmic (c-ANCA) Antibody: Negative   Perinuclear (p-ANCA) Antibody: Negative   Atypical ANCA: Negative    Acute Hepatitis Panel (11.21.23 @ 07:19)    Hepatitis C Virus Interpretation: Nonreact: Hepatitis C AB   Hepatitis C Virus S/CO Ratio: 0.06 S/CO   Hepatitis B Core IgM Antibody: Nonreact   Hepatitis B Surface Antigen: Nonreact   Hepatitis A IgM Antibody: Nonreact    BONI Antibody Screening Test (11.21.23 @ 07:19)    SM (Ferrera) Ab FBIA: <0.2 AI   Anti-Ribonuclear Protein: <0.2: Fluorescent Bead Immunoassy    Double Stranded DNA Antibody (11.17.23 @ 20:00)    Double Stranded DNA Antibody: <12: Method: EIA    Syphilis Screen (11.16.23 @ 16:01)    Treponema Pallidum Antibody Interpretation: Negative      RADIOLOGY & ADDITIONAL STUDIES:  < from: CT Head No Cont (11.21.23 @ 17:11) >    INTERPRETATION:  Noncontrast CT of the brain.    CLINICAL INDICATION:  Altered mental status    TECHNIQUE : Axial CT scanning of the brain was obtained from the skull   base to the vertex without the administration of intravenous contrast.   Sagittal and coronal reformats were provided.    COMPARISON: CT brain 11/18/2023    FINDINGS:    No hydrocephalus, mass effect, midline shift, acute intracranial   hemorrhage, or brain edema.    Moderate white matter microvascular ischemic disease.    Visualized paranasal sinuses and mastoid air cells are clear.    IMPRESSION:    No hydrocephalus, acute intracranial hemorrhage, mass effect, or brain   edema.    --- End of Report ---      < end of copied text >    < from: CT Angio Neck Stroke Protocol w/ IV Cont (11.18.23 @ 20:17) >    ACC: [id number] EXAM:  CT ANGIO BRAIN STROKE PROTC IC   ORDERED BY: АННА VERDUZCO     ACC: [id number] EXAM:  CT ANGIO NECK STROKE PROTCL IC   ORDERED BY: АННА VERDUZCO     ACC: [id number] EXAM:  CT BRAIN   ORDERED BY: KEELEY MONTEIRO     PROCEDURE DATE:  11/18/2023      IMPRESSION:    Head CT:No acute intracranial hemorrhage, mass effect, or shift of the   midline structures.    Similar-appearing extensive chronic white matter microvascular type   changes and chronic lacunar infarcts, as discussed.    CTA neck and head: No large vessel occlusion or major stenosis.    --- End of Report ---      < end of copied text >    < from: MR Head w/ IV Cont (11.17.23 @ 13:09) >    ACC: [id number] EXAM:  MR BRAIN IC   ORDERED BY: BRANDEN FERNANDEZ     PROCEDURE DATE:  11/17/2023          INTERPRETATION:  MR of the brain with and without gadolinium contrast    CLINICAL INFORMATION:   ams  FMR        IMPRESSION:    1. Right posterior corona radiata subacute infarction    2. Multiple remote infarctions within the cerebral hemispheric white   matter basal ganglia and thalami    3. Pervasive cerebral hemispheric white matter abnormality, possibly   accelerated form of ischemic white matter disease versus and/or   superimposed other inflammatory metabolic toxic or infectious process    4. Numerous remote petechial hemorrhages most prominently in the basal   ganglia andthalami    5. Diffuse brain volume loss upper range typical for age    --- End of Report ---    < end of copied text >      < from: TTE Echo Complete w/o Contrast w/ Doppler (11.15.23 @ 17:46) >  PHYSICIAN INTERPRETATION:    Summary:   1. Normal global left ventricular systolic function.   2. Mild mitral valve regurgitation.   3. Mild aortic regurgitation.   4. Sclerotic aortic valve with normal opening.    < end of copied text >    < from: CT Abdomen and Pelvis w/ IV Cont (11.17.23 @ 12:40) >    ACC: [id number] EXAM:  CT ABDOMEN AND PELVIS IC   ORDERED BY: BRANDEN FERNANDEZ     ACC: [id number] EXAM:  CT CHEST   ORDERED BY: BRANDEN FERNANDEZ     PROCEDURE DATE:  11/17/2023          I    IMPRESSION:    CHEST:  -Nopneumonia. Mosaic attenuation of the lung parenchyma, nonspecific.  -Aortic valve and coronary artery calcifications.    ABDOMEN/PELVIS:  -No acute bowel pathology. Sleeve gastrectomy postoperative changes.  -Common bile duct is mildly distended for patient age measuring 8 mm.   Correlate with LFTs and MRCP as warranted. Cholelithiasis.  -Mild mesenteric haziness and small mesenteric nodes may reflect   mesenteric panniculitis. Follow up CT abdomen pelvis is recommended in   6-12 months to evaluate for stability.    --- End of Report ---          < end of copied text >   Jamal Alvarado MD, PGY-5  Rheumatology Fellow  Reachable on TEAMS    NADINE CAMPOVERDE  [phone]    INTERVAL EVENTS  HR better controlled. More somnulent upon initial assessment. On reassessment more awake and with brother at bedside. MR brain with new stroke. Likely related to new afib as per neuro    MEDICATIONS  (STANDING):  aMIOdarone    Tablet 400 milliGRAM(s) Oral every 8 hours  aMIOdarone    Tablet   Oral   amLODIPine   Tablet 10 milliGRAM(s) Oral daily  apixaban 5 milliGRAM(s) Oral two times a day  atorvastatin 80 milliGRAM(s) Oral at bedtime  bisacodyl 5 milliGRAM(s) Oral at bedtime  insulin lispro (ADMELOG) corrective regimen sliding scale   SubCutaneous at bedtime  insulin lispro (ADMELOG) corrective regimen sliding scale   SubCutaneous three times a day before meals  losartan 25 milliGRAM(s) Oral daily  metoprolol tartrate 50 milliGRAM(s) Oral three times a day  pantoprazole    Tablet 40 milliGRAM(s) Oral daily  potassium chloride    Tablet ER 20 milliEquivalent(s) Oral every 2 hours  predniSONE   Tablet 60 milliGRAM(s) Oral daily  senna 2 Tablet(s) Oral at bedtime  sodium chloride 0.9%. 1000 milliLiter(s) (50 mL/Hr) IV Continuous <Continuous>    MEDICATIONS  (PRN):      Vital Signs Last 24 Hrs  T(C): 36.9 (30 Nov 2023 08:00), Max: 36.9 (30 Nov 2023 08:00)  T(F): 98.4 (30 Nov 2023 08:00), Max: 98.4 (30 Nov 2023 08:00)  HR: 121 (30 Nov 2023 08:00) (91 - 127)  BP: 152/76 (30 Nov 2023 08:00) (124/64 - 173/82)  BP(mean): 101 (30 Nov 2023 08:00) (78 - 120)  RR: 24 (30 Nov 2023 08:00) (17 - 24)  SpO2: 100% (30 Nov 2023 08:00) (97% - 100%)    Parameters below as of 30 Nov 2023 08:00  Patient On (Oxygen Delivery Method): nasal cannula  O2 Flow (L/min): 2      Physical Exam:  General: lying in bed comfortable  Resp: no respiratory distress  MSK: no swelling/warmth/erythema of the joints of the UE/LE  Neuro: AAOx1-2.  Skin: no visible rashes      LABS:  cret                        9.8    11.58 )-----------( 361      ( 30 Nov 2023 06:19 )             26.9     11-30    142  |  107  |  23  ----------------------------<  102<H>  3.2<L>   |  28  |  0.77    Ca    8.6      30 Nov 2023 06:19      Urinalysis Basic - ( 22 Nov 2023 07:18 )    Color: x / Appearance: x / SG: x / pH: x  Gluc: 94 mg/dL / Ketone: x  / Bili: x / Urobili: x   Blood: x / Protein: x / Nitrite: x   Leuk Esterase: x / RBC: x / WBC x   Sq Epi: x / Non Sq Epi: x / Bacteria: x      Culture - Fungal, CSF (collected 11-21-23 @ 14:51)  Source: .CSF CSF  Preliminary Report (11-22-23 @ 08:55):    Testing in progress    Culture - CSF with Gram Stain (collected 11-21-23 @ 14:51)  Source: .CSF CSF  Gram Stain (11-21-23 @ 19:09):    No polymorphonuclear cells seen    No organisms seen    by cytocentrifuge      Rheumatology Work Up    C3 Complement, Serum: 152 mg/dL [81 - 157] (11-21-23 @ 07:19)  C4 Complement, Serum: 36 mg/dL [13 - 39] (11-21-23 @ 07:19)  Ribosomal P Protein Antibody: <0.2 AI [Fluorescent Bead Immunoassay                     Ribosomal P Antibodies, IgG                     <1.0 AI (negative)                     > or =1.0 AI (positive)                     Reference values  apply  to all ages.] (11-21-23 @ 07:19)  Anti SS-A Antibody: <0.2 AI (11-21-23 @ 07:19)  Anti SS-B Antibody: <0.2: Fluorescent Bead Immunoassay      Neutrophil Cytoplasmic Antibody (11.21.23 @ 07:19)    Cytoplasmic (c-ANCA) Antibody: Negative   Perinuclear (p-ANCA) Antibody: Negative   Atypical ANCA: Negative    Acute Hepatitis Panel (11.21.23 @ 07:19)    Hepatitis C Virus Interpretation: Nonreact: Hepatitis C AB   Hepatitis C Virus S/CO Ratio: 0.06 S/CO   Hepatitis B Core IgM Antibody: Nonreact   Hepatitis B Surface Antigen: Nonreact   Hepatitis A IgM Antibody: Nonreact    BONI Antibody Screening Test (11.21.23 @ 07:19)    SM (Ferrera) Ab FBIA: <0.2 AI   Anti-Ribonuclear Protein: <0.2: Fluorescent Bead Immunoassy    Double Stranded DNA Antibody (11.17.23 @ 20:00)    Double Stranded DNA Antibody: <12: Method: EIA    Syphilis Screen (11.16.23 @ 16:01)    Treponema Pallidum Antibody Interpretation: Negative      RADIOLOGY & ADDITIONAL STUDIES:  < from: MR Head No Cont (11.29.23 @ 21:03) >  IMPRESSION:  Left parietal small cortical acute infarction. No acute intracranial   hemorrhage.  Innumerable scattered chronic microhemorrhages in the brainstem,   bilateral cerebellar hemispheres, deep gray nuclei and peripherally   within thecerebral hemispheres which may be secondary to chronic   hypertensive encephalopathy.  Severe extensive chronic microvascular ischemic changes and multifocal   chronic lacunar infarcts as detailed above.    Findings discussed by Dr. Soni with NP Ita Vila with readback   confirmation on 11/30/2023 11:03 AM.    --- End of Report ---    < end of copied text >      < from: CT Head No Cont (11.21.23 @ 17:11) >    INTERPRETATION:  Noncontrast CT of the brain.    CLINICAL INDICATION:  Altered mental status    TECHNIQUE : Axial CT scanning of the brain was obtained from the skull   base to the vertex without the administration of intravenous contrast.   Sagittal and coronal reformats were provided.    COMPARISON: CT brain 11/18/2023    FINDINGS:    No hydrocephalus, mass effect, midline shift, acute intracranial   hemorrhage, or brain edema.    Moderate white matter microvascular ischemic disease.    Visualized paranasal sinuses and mastoid air cells are clear.    IMPRESSION:    No hydrocephalus, acute intracranial hemorrhage, mass effect, or brain   edema.    --- End of Report ---      < end of copied text >    < from: CT Angio Neck Stroke Protocol w/ IV Cont (11.18.23 @ 20:17) >    ACC: [id number] EXAM:  CT ANGIO BRAIN STROKE PROTC IC   ORDERED BY: АННА VERDUZCO     ACC: [id number] EXAM:  CT ANGIO NECK STROKE PROTCL IC   ORDERED BY: АННА VERDUZCO     ACC: [id number] EXAM:  CT BRAIN   ORDERED BY: KEELEY MONTEIRO     PROCEDURE DATE:  11/18/2023      IMPRESSION:    Head CT:No acute intracranial hemorrhage, mass effect, or shift of the   midline structures.    Similar-appearing extensive chronic white matter microvascular type   changes and chronic lacunar infarcts, as discussed.    CTA neck and head: No large vessel occlusion or major stenosis.    --- End of Report ---      < end of copied text >    < from: MR Head w/ IV Cont (11.17.23 @ 13:09) >    ACC: [id number] EXAM:  MR BRAIN IC   ORDERED BY: BRANDEN FERNANDEZ     PROCEDURE DATE:  11/17/2023          INTERPRETATION:  MR of the brain with and without gadolinium contrast    CLINICAL INFORMATION:   ams  FMR        IMPRESSION:    1. Right posterior corona radiata subacute infarction    2. Multiple remote infarctions within the cerebral hemispheric white   matter basal ganglia and thalami    3. Pervasive cerebral hemispheric white matter abnormality, possibly   accelerated form of ischemic white matter disease versus and/or   superimposed other inflammatory metabolic toxic or infectious process    4. Numerous remote petechial hemorrhages most prominently in the basal   ganglia andthalami    5. Diffuse brain volume loss upper range typical for age    --- End of Report ---    < end of copied text >      < from: TTE Echo Complete w/o Contrast w/ Doppler (11.15.23 @ 17:46) >  PHYSICIAN INTERPRETATION:    Summary:   1. Normal global left ventricular systolic function.   2. Mild mitral valve regurgitation.   3. Mild aortic regurgitation.   4. Sclerotic aortic valve with normal opening.    < end of copied text >    < from: CT Abdomen and Pelvis w/ IV Cont (11.17.23 @ 12:40) >    ACC: [id number] EXAM:  CT ABDOMEN AND PELVIS IC   ORDERED BY: BRANDEN FERNANDEZ     ACC: [id number] EXAM:  CT CHEST   ORDERED BY: BRANDEN FERNANDEZ     PROCEDURE DATE:  11/17/2023          I    IMPRESSION:    CHEST:  -Nopneumonia. Mosaic attenuation of the lung parenchyma, nonspecific.  -Aortic valve and coronary artery calcifications.    ABDOMEN/PELVIS:  -No acute bowel pathology. Sleeve gastrectomy postoperative changes.  -Common bile duct is mildly distended for patient age measuring 8 mm.   Correlate with LFTs and MRCP as warranted. Cholelithiasis.  -Mild mesenteric haziness and small mesenteric nodes may reflect   mesenteric panniculitis. Follow up CT abdomen pelvis is recommended in   6-12 months to evaluate for stability.    --- End of Report ---        < end of copied text >   Jamal Alvarado MD, PGY-5  Rheumatology Fellow  Reachable on TEAMS    NADINE NIRALI  [phone]    INTERVAL EVENTS  HR better controlled. More somnolent upon initial assessment. On reassessment more awake and with brother at bedside. MR brain with new stroke. Likely related to new afib as per neuro    MEDICATIONS  (STANDING):  aMIOdarone    Tablet 400 milliGRAM(s) Oral every 8 hours  aMIOdarone    Tablet   Oral   amLODIPine   Tablet 10 milliGRAM(s) Oral daily  apixaban 5 milliGRAM(s) Oral two times a day  atorvastatin 80 milliGRAM(s) Oral at bedtime  bisacodyl 5 milliGRAM(s) Oral at bedtime  insulin lispro (ADMELOG) corrective regimen sliding scale   SubCutaneous at bedtime  insulin lispro (ADMELOG) corrective regimen sliding scale   SubCutaneous three times a day before meals  losartan 25 milliGRAM(s) Oral daily  metoprolol tartrate 50 milliGRAM(s) Oral three times a day  pantoprazole    Tablet 40 milliGRAM(s) Oral daily  potassium chloride    Tablet ER 20 milliEquivalent(s) Oral every 2 hours  predniSONE   Tablet 60 milliGRAM(s) Oral daily  senna 2 Tablet(s) Oral at bedtime  sodium chloride 0.9%. 1000 milliLiter(s) (50 mL/Hr) IV Continuous <Continuous>    MEDICATIONS  (PRN):      Vital Signs Last 24 Hrs  T(C): 36.9 (30 Nov 2023 08:00), Max: 36.9 (30 Nov 2023 08:00)  T(F): 98.4 (30 Nov 2023 08:00), Max: 98.4 (30 Nov 2023 08:00)  HR: 121 (30 Nov 2023 08:00) (91 - 127)  BP: 152/76 (30 Nov 2023 08:00) (124/64 - 173/82)  BP(mean): 101 (30 Nov 2023 08:00) (78 - 120)  RR: 24 (30 Nov 2023 08:00) (17 - 24)  SpO2: 100% (30 Nov 2023 08:00) (97% - 100%)    Parameters below as of 30 Nov 2023 08:00  Patient On (Oxygen Delivery Method): nasal cannula  O2 Flow (L/min): 2      Physical Exam:  General: lying in bed comfortable  Resp: no respiratory distress  MSK: no swelling/warmth/erythema of the joints of the UE/LE  Neuro: AAOx1-2.  Skin: no visible rashes      LABS:  cret                        9.8    11.58 )-----------( 361      ( 30 Nov 2023 06:19 )             26.9     11-30    142  |  107  |  23  ----------------------------<  102<H>  3.2<L>   |  28  |  0.77    Ca    8.6      30 Nov 2023 06:19      Urinalysis Basic - ( 22 Nov 2023 07:18 )    Color: x / Appearance: x / SG: x / pH: x  Gluc: 94 mg/dL / Ketone: x  / Bili: x / Urobili: x   Blood: x / Protein: x / Nitrite: x   Leuk Esterase: x / RBC: x / WBC x   Sq Epi: x / Non Sq Epi: x / Bacteria: x      Culture - Fungal, CSF (collected 11-21-23 @ 14:51)  Source: .CSF CSF  Preliminary Report (11-22-23 @ 08:55):    Testing in progress    Culture - CSF with Gram Stain (collected 11-21-23 @ 14:51)  Source: .CSF CSF  Gram Stain (11-21-23 @ 19:09):    No polymorphonuclear cells seen    No organisms seen    by cytocentrifuge      Rheumatology Work Up    C3 Complement, Serum: 152 mg/dL [81 - 157] (11-21-23 @ 07:19)  C4 Complement, Serum: 36 mg/dL [13 - 39] (11-21-23 @ 07:19)  Ribosomal P Protein Antibody: <0.2 AI [Fluorescent Bead Immunoassay                     Ribosomal P Antibodies, IgG                     <1.0 AI (negative)                     > or =1.0 AI (positive)                     Reference values  apply  to all ages.] (11-21-23 @ 07:19)  Anti SS-A Antibody: <0.2 AI (11-21-23 @ 07:19)  Anti SS-B Antibody: <0.2: Fluorescent Bead Immunoassay      Neutrophil Cytoplasmic Antibody (11.21.23 @ 07:19)    Cytoplasmic (c-ANCA) Antibody: Negative   Perinuclear (p-ANCA) Antibody: Negative   Atypical ANCA: Negative    Acute Hepatitis Panel (11.21.23 @ 07:19)    Hepatitis C Virus Interpretation: Nonreact: Hepatitis C AB   Hepatitis C Virus S/CO Ratio: 0.06 S/CO   Hepatitis B Core IgM Antibody: Nonreact   Hepatitis B Surface Antigen: Nonreact   Hepatitis A IgM Antibody: Nonreact    BONI Antibody Screening Test (11.21.23 @ 07:19)    SM (Ferrera) Ab FBIA: <0.2 AI   Anti-Ribonuclear Protein: <0.2: Fluorescent Bead Immunoassy    Double Stranded DNA Antibody (11.17.23 @ 20:00)    Double Stranded DNA Antibody: <12: Method: EIA    Syphilis Screen (11.16.23 @ 16:01)    Treponema Pallidum Antibody Interpretation: Negative      RADIOLOGY & ADDITIONAL STUDIES:  < from: MR Head No Cont (11.29.23 @ 21:03) >  IMPRESSION:  Left parietal small cortical acute infarction. No acute intracranial   hemorrhage.  Innumerable scattered chronic microhemorrhages in the brainstem,   bilateral cerebellar hemispheres, deep gray nuclei and peripherally   within thecerebral hemispheres which may be secondary to chronic   hypertensive encephalopathy.  Severe extensive chronic microvascular ischemic changes and multifocal   chronic lacunar infarcts as detailed above.    Findings discussed by Dr. Soni with NP Ita Vila with readback   confirmation on 11/30/2023 11:03 AM.    --- End of Report ---    < end of copied text >      < from: CT Head No Cont (11.21.23 @ 17:11) >    INTERPRETATION:  Noncontrast CT of the brain.    CLINICAL INDICATION:  Altered mental status    TECHNIQUE : Axial CT scanning of the brain was obtained from the skull   base to the vertex without the administration of intravenous contrast.   Sagittal and coronal reformats were provided.    COMPARISON: CT brain 11/18/2023    FINDINGS:    No hydrocephalus, mass effect, midline shift, acute intracranial   hemorrhage, or brain edema.    Moderate white matter microvascular ischemic disease.    Visualized paranasal sinuses and mastoid air cells are clear.    IMPRESSION:    No hydrocephalus, acute intracranial hemorrhage, mass effect, or brain   edema.    --- End of Report ---      < end of copied text >    < from: CT Angio Neck Stroke Protocol w/ IV Cont (11.18.23 @ 20:17) >    ACC: [id number] EXAM:  CT ANGIO BRAIN STROKE PROTC IC   ORDERED BY: АННА VERDUZCO     ACC: [id number] EXAM:  CT ANGIO NECK STROKE PROTCL IC   ORDERED BY: АННА VERDUZCO     ACC: [id number] EXAM:  CT BRAIN   ORDERED BY: KEELEY MONTEIRO     PROCEDURE DATE:  11/18/2023      IMPRESSION:    Head CT:No acute intracranial hemorrhage, mass effect, or shift of the   midline structures.    Similar-appearing extensive chronic white matter microvascular type   changes and chronic lacunar infarcts, as discussed.    CTA neck and head: No large vessel occlusion or major stenosis.    --- End of Report ---      < end of copied text >    < from: MR Head w/ IV Cont (11.17.23 @ 13:09) >    ACC: [id number] EXAM:  MR BRAIN IC   ORDERED BY: BRANDEN FERNANDEZ     PROCEDURE DATE:  11/17/2023          INTERPRETATION:  MR of the brain with and without gadolinium contrast    CLINICAL INFORMATION:   ams  FMR        IMPRESSION:    1. Right posterior corona radiata subacute infarction    2. Multiple remote infarctions within the cerebral hemispheric white   matter basal ganglia and thalami    3. Pervasive cerebral hemispheric white matter abnormality, possibly   accelerated form of ischemic white matter disease versus and/or   superimposed other inflammatory metabolic toxic or infectious process    4. Numerous remote petechial hemorrhages most prominently in the basal   ganglia andthalami    5. Diffuse brain volume loss upper range typical for age    --- End of Report ---    < end of copied text >      < from: TTE Echo Complete w/o Contrast w/ Doppler (11.15.23 @ 17:46) >  PHYSICIAN INTERPRETATION:    Summary:   1. Normal global left ventricular systolic function.   2. Mild mitral valve regurgitation.   3. Mild aortic regurgitation.   4. Sclerotic aortic valve with normal opening.    < end of copied text >    < from: CT Abdomen and Pelvis w/ IV Cont (11.17.23 @ 12:40) >    ACC: [id number] EXAM:  CT ABDOMEN AND PELVIS IC   ORDERED BY: BRANDEN FERNANDEZ     ACC: [id number] EXAM:  CT CHEST   ORDERED BY: BRANDEN FERNANDEZ     PROCEDURE DATE:  11/17/2023          I    IMPRESSION:    CHEST:  -Nopneumonia. Mosaic attenuation of the lung parenchyma, nonspecific.  -Aortic valve and coronary artery calcifications.    ABDOMEN/PELVIS:  -No acute bowel pathology. Sleeve gastrectomy postoperative changes.  -Common bile duct is mildly distended for patient age measuring 8 mm.   Correlate with LFTs and MRCP as warranted. Cholelithiasis.  -Mild mesenteric haziness and small mesenteric nodes may reflect   mesenteric panniculitis. Follow up CT abdomen pelvis is recommended in   6-12 months to evaluate for stability.    --- End of Report ---        < end of copied text >

## 2023-11-30 NOTE — DISCHARGE NOTE PROVIDER - CARE PROVIDERS DIRECT ADDRESSES
,DirectAddress_Unknown,DirectAddress_Unknown,DirectAddress_Unknown,[email or web address] ,DirectAddress_Unknown,DirectAddress_Unknown,DirectAddress_Unknown,[email or web address] ,DirectAddress_Unknown,DirectAddress_Unknown,DirectAddress_Unknown,[email or web address]

## 2023-11-30 NOTE — PROGRESS NOTE ADULT - PROBLEM SELECTOR PROBLEM 2
HTN (hypertension)
HTN (hypertension)
Paroxysmal atrial fibrillation
HTN (hypertension)
Paroxysmal atrial fibrillation
Paroxysmal atrial fibrillation

## 2023-11-30 NOTE — DISCHARGE NOTE PROVIDER - CARE PROVIDER_API CALL
Gavin Calvo  Neurology  [address], [address]  [place] [address]  Phone: [phone]  Fax: [phone]  Follow Up Time: 2 weeks    Khadijah Hartley  Radiology  [address], [address]  [place] [address]  Phone: [phone]  Fax: [phone]  Follow Up Time: 2 weeks    Brando Lazar  Cardiology  [address], [address]  [place] [address]  Phone: [phone]  Fax: [phone]  Follow Up Time: 2 weeks    Kenya Naik  Rheumatology  [address], [address]  [place] [address]  Phone: [phone]  Fax: [phone]  Follow Up Time: 2 weeks   Gavin Calvo  Neurology  [address], [address]  [place] [address]  Phone: [phone]  Fax: [phone]  Follow Up Time: 2 weeks    Khadijah Hartley  Radiology  [address], [address]  [place] [address]  Phone: [phone]  Fax: [phone]  Follow Up Time: 2 weeks    Brando Lazar  Cardiology  [address], [address]  [place] [address]  Phone: [phone]  Fax: [phone]  Follow Up Time: 2 weeks    Kenya Naik  Rheumatology  [address], [address]  [place] [address]  Phone: [phone]  Fax: [phone]  Follow Up Time: 2 weeks   Gavin Calvo  Neurology  [address], [address]  [place] [address]  Phone: [phone]  Fax: [phone]  Follow Up Time: 2 weeks    Khadijah Hartley  Radiology  [address], [address]  [place] [address]  Phone: [phone]  Fax: [phone]  Follow Up Time: 2 weeks    Brando Lazar  Cardiology  [address], [address]  [place] [address]  Phone: [phone]  Fax: [phone]  Follow Up Time: 2 weeks    Kenya Naik  Rheumatology  [address], [address]  [place] [address]  Phone: [phone]  Fax: [phone]  Follow Up Time: 2 weeks

## 2023-11-30 NOTE — PROGRESS NOTE ADULT - NUTRITIONAL ASSESSMENT
This patient has been assessed with a concern for Malnutrition and has been determined to have a diagnosis/diagnoses of Morbid obesity (BMI > 40).    This patient is being managed with:   Diet Regular-  Entered: Nov 21 2023 12:48PM  

## 2023-11-30 NOTE — H&P ADULT - ASSESSMENT
Assessment/Plan:  NADINE CAMPOVERDE is a 58y with PMH of HTN and rheumatoid arthritis presented to [place] on 11/15 for AMS, found to have bilateral subacute to remote embolic strokes, right posterior corona radiata acute infarct and multiple petechial hemorrhages in b/l thalami and basal ganglia. Transferred to [facility] on 11/20 for cerebral angiogram. 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 Patient started on Eliquis 5 mg BID, amiodarone, and metoprolol for Afib. Patient now admitted for a multidisciplinary rehab program. 11-30-23 @ 16:20    Left hemiparesis 2/2 Right Gonzalez Radiata Infarct in setting of Vasculitis  - [facility] Neurology Impression: Left hemiparesis due to right corona radiata infarct, multiple micro hemorrhages, deep and cortical. Cerebral angiogram concerning for PACNS. Pt also with Afib, less likely cardioembolic event  - New CVA noted on MRI 11/29: L parietal acute infarct likely 2/2 cardioembolism in setting of Afib  - Steroids started on 11/24 - s/p Solumedrol 1g (11/24-11/27), Prednisone 60mg (11/28-11/30), now on prednisone 50mg QD. Maintain dose per rheumatology eval/recommendation.  - Impaired ADLs and mobility  - Need for assistance with personal care   - Start comprehensive rehab program of PT/OT/SLP - 3 hours a day, 5 days a week. P&O as needed   - Fall/Aspiration precautions  - Will need repeat cerebral angiogram in 1 month  - Continue prednisone 50 mg QD    Afib  - s/p amiodarone gtt  - continue amiodarone 200 mg QD, metoprolol 50 mg TID, and Eliquis 5 mg BID    HTN  - continue Norvasc 10 mg QD and Losartan 25 mg QD  - Monitor BP    HLD  - cont atorvastatin 80 mg QHS    Hyperglycemia in setting of steroid use  - SSI  - Monitor fingersticks      Mood / Cognition  - Neuropsychology consult PRN    Sleep  - Maintain quiet hours and a low stim environment.     GI / Bowel  - at risk for constipation due to neurologic diagnosis, immobility, and/or medication use  - Senna qHS  - Dulcolax 5 mg QHS  - GI ppx: pantoprazole 40 mg QD     / Bladder  - Bladder scans Q8 hours with straight cath for >400cc.  - Toileting schedule every 4 hours    Skin / Pressure injury  - Skin assessment on admission performed [  ] :   - Monitor Incisions:    - Pressure Injury/Skin: OOB to chair, PT/OT  - nursing to monitor skin qShift    Diet/Dysphagia:  - Diet Consistency: regular  - Aspiration Precautions  - SLP consult for swallow function evaluation and treatment  - Nutrition consult    DVT prophylaxis:   - on Eliquis 5 mg BID      Outpatient Follow-up:  Gavin Calvo  Neurology  [address], [address]  [place] [address]  Phone: [phone]  Fax: [phone]  Follow Up Time: 2 weeks    Khadijah Hartley  Radiology  [address], [address]  [place] [address]  Phone: [phone]  Fax: [phone]  Follow Up Time: 2 weeks    Brando Lazar  Cardiology  [address], [address]  [place] [address]  Phone: [phone]  Fax: [phone]  Follow Up Time: 2 weeks    Kenya Naik  Rheumatology  [address], [address]  [place] [address]  Phone: [phone]  Fax: [phone]  Follow Up Time: 2 weeks      Code Status/Emergency Contact:      ---------------    Goals: Safe discharge to home  Estimated Length of Stay: 10-14 days  Rehab Potential: Good  Medical Prognosis: Good  Estimated Disposition: Home with home care      PRESCREEN COMPARISON:  I have reviewed the prescreen information and I have found no relevant changes between the preadmission screening and my post admission evaluation.    RATIONALE FOR INPATIENT ADMISSION: Patient demonstrates the following:  [X] Medically appropriate for rehabilitation admission  [X] Has attainable rehab goals with an appropriate initial discharge plan  [X]Has rehabilitation potential (expected to make a significant improvement within a reasonable period of time)  [X] Requires close medical management by a rehab physician, rehab nursing care, Hospitalist and comprehensive interdisciplinary team (including PT, OT and/or SLP, Prosthetics and Orthotics)     Assessment/Plan:  NADINE CAMPOVERDE is a 58y with PMH of HTN and rheumatoid arthritis presented to [place] on 11/15 for AMS, found to have bilateral subacute to remote embolic strokes, right posterior corona radiata acute infarct and multiple petechial hemorrhages in b/l thalami and basal ganglia. Transferred to [facility] on 11/20 for cerebral angiogram. 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 Patient started on Eliquis 5 mg BID, amiodarone, and metoprolol for Afib. Patient now admitted for a multidisciplinary rehab program. 11-30-23 @ 16:20    Left hemiparesis 2/2 Right Gonzalez Radiata Infarct in setting of Vasculitis  - [facility] Neurology Impression: Left hemiparesis due to right corona radiata infarct, multiple micro hemorrhages, deep and cortical. Cerebral angiogram concerning for PACNS. Pt also with Afib, less likely cardioembolic event  - New CVA noted on MRI 11/29: L parietal acute infarct likely 2/2 cardioembolism in setting of A-fib  - Steroids started on 11/24 - s/p Solumedrol 1g (11/24-11/27), Prednisone 60mg (11/28-11/30), now on prednisone 50mg QD. Maintain dose per rheumatology eval/recommendation.  - Impaired ADLs and mobility  - Need for assistance with personal care   - Start comprehensive rehab program of PT/OT/SLP - 3 hours a day, 5 days a week. P&O as needed   - Fall/Aspiration precautions  - Will need repeat cerebral angiogram in 1 month  - Continue prednisone 50 mg QD    Afib  - s/p amiodarone gtt  - continue amiodarone 200 mg QD, metoprolol 50 mg TID, and Eliquis 5 mg BID    HTN  - continue Norvasc 10 mg QD and Losartan 25 mg QD  - Monitor BP    HLD  - cont atorvastatin 80 mg QHS    Hyperglycemia in setting of steroid use  - SSI  - Monitor fingersticks    Mood / Cognition  - Neuropsychology consult PRN    Sleep  - Maintain quiet hours and a low stim environment.     GI / Bowel  - at risk for constipation due to neurologic diagnosis, immobility, and/or medication use  - Senna qHS  - Dulcolax 5 mg QHS  - GI ppx: pantoprazole 40 mg QD     / Bladder  - Bladder scans Q8 hours with straight cath for >400cc.  - Toileting schedule every 4 hours    Skin / Pressure injury  - Skin assessment on admission performed : Intact  - Pressure Injury/Skin: OOB to chair, PT/OT  - nursing to monitor skin q Shift    Diet/Dysphagia:  - Diet Consistency: regular  - Aspiration Precautions  - SLP consult for swallow function evaluation and treatment  - Nutrition consult    DVT prophylaxis:   - on Eliquis 5 mg BID      Outpatient Follow-up:  Gavin Calvo  Neurology  [address], [address]  [place] [address]  Phone: [phone]  Fax: [phone]  Follow Up Time: 2 weeks    Khadijah Hartley  Radiology  [address], [address]  [place] [address]  Phone: [phone]  Fax: [phone]  Follow Up Time: 2 weeks    Brando Lazar  Cardiology  [address], [address]  [place] [address]  Phone: [phone]  Fax: [phone]  Follow Up Time: 2 weeks    Kenya Naik  Rheumatology  [address], [address]  [place] [address]  Phone: [phone]  Fax: [phone]  Follow Up Time: 2 weeks      Code Status/Emergency Contact:    ---------------    Goals: Safe discharge to home  Estimated Length of Stay: 10-14 days  Rehab Potential: Good  Medical Prognosis: Good  Estimated Disposition: Home with home care      PRESCREEN COMPARISON:  I have reviewed the prescreen information and I have found no relevant changes between the preadmission screening and my post admission evaluation.    RATIONALE FOR INPATIENT ADMISSION: Patient demonstrates the following:  [X] Medically appropriate for rehabilitation admission  [X] Has attainable rehab goals with an appropriate initial discharge plan  [X]Has rehabilitation potential (expected to make a significant improvement within a reasonable period of time)  [X] Requires close medical management by a rehab physician, rehab nursing care, Hospitalist and comprehensive interdisciplinary team (including PT, OT and/or SLP, Prosthetics and Orthotics)     Assessment/Plan:  Mrs. Екатерина Poole is a 58 year old female patient with past medical history of HTN and rheumatoid arthritis who is admitted for Acute Inpatient Rehabilitation with a multidisciplinary rehab program at [facility] with functional impairments in ADLs and mobility secondary to left hemiparesis resulting from bilateral subacute to remote embolic strokes, a right posterior corona radiata acute infarct, multiple petechial hemorrhages in bilateral thalamus and basal ganglia with etiology highly indicative from underlying vasculitis and a subsequent left parietal cortical acute infarction of a likely cardioembolic source.      CVA  - Event on 11/15/23 likely related to vasculitis      * Right Corona Radiata Infarct      * bilateral subacute to remote embolic strokes      * multiple petechial hemorrhages in bilateral thalamus and basal ganglia  - Most recent event on 11/29/23 likely related to cardioembolic source in setting of AFib:      * Left parietal small cortical acute infarction      * Innumerable scattered chronic microhemorrhages in the brainstem, bilateral cerebellar hemispheres, deep gray nuclei and peripherally within the cerebral hemispheres which may be secondary to chronic hypertensive encephalopathy.      * Severe extensive chronic microvascular ischemic changes and multifocal chronic lacunar infarcts.  - [facility] Neurology Impression: Left hemiparesis due to right corona radiata infarct, multiple micro hemorrhages, deep and cortical. Cerebral angiogram concerning for PACNS. Pt also with Afib, less likely cardioembolic event  - Steroids started on 11/24 - s/p Solumedrol 1g (11/24-11/27), Prednisone 60mg (11/28-11/30), now on prednisone 50mg QD. Maintain dose per rheumatology eval/recommendation.  - Left hemiparesis  - Impaired ADLs and mobility  - Need for assistance with personal care   - Start comprehensive rehab program of PT/OT/SLP - 3 hours a day, 5 days a week. P&O as needed   - Fall/Aspiration precautions  - Will need repeat cerebral angiogram in 1 month (~12/20/23)  - Continue prednisone 50 mg QD    Afib  - s/p amiodarone gtt  - continue amiodarone 200 mg QD, metoprolol 50 mg TID, and Eliquis 5 mg BID    HTN  - continue Norvasc 10 mg QD and Losartan 25 mg QD  - Monitor BP    HLD  - cont atorvastatin 80 mg QHS    Hyperglycemia in setting of steroid use  - SSI  - Monitor fingersticks    Mood / Cognition  - Neuropsychology consult PRN    Sleep  - Maintain quiet hours and a low stim environment.     GI / Bowel  - at risk for constipation due to neurologic diagnosis, immobility, and/or medication use  - Senna qHS  - Dulcolax 5 mg QHS  - GI ppx: pantoprazole 40 mg QD     / Bladder  - Bladder scans Q8 hours with straight cath for >400cc.  - Toileting schedule every 4 hours    Skin:  - Skin assessment on admission performed : Intact  - Pressure Injury/Skin: OOB to chair, PT/OT  - nursing to monitor skin q Shift    Diet/Dysphagia:  - Diet Consistency: regular  - Aspiration Precautions  - SLP consult for swallow function evaluation and treatment  - Nutrition consult    DVT prophylaxis:   - on Eliquis 5 mg BID      Outpatient Follow-up:  Gavin Calvo  Neurology  [address], [address]  [place] [address]  Phone: [phone]  Fax: [phone]  Follow Up Time: 2 weeks    Khadijah Hartley  Radiology  [address], [address]  [place] [address]  Phone: [phone]  Fax: [phone]  Follow Up Time: 2 weeks    Brando Lazar  Cardiology  [address]  [place] [address]  Phone: [phone]  Fax: [phone]  Follow Up Time: 2 weeks    Kenya Naik  Rheumatology  [address]  [place] [address]  Phone: [phone]  Fax: [phone]  Follow Up Time: 2 weeks      Code Status/Emergency Contact:    ---------------    Goals: Safe discharge to home  Estimated Length of Stay: 10-14 days  Rehab Potential: Good  Medical Prognosis: Good  Estimated Disposition: Home with home care      PRESCREEN COMPARISON:  I have reviewed the prescreen information and I have found no relevant changes between the preadmission screening and my post admission evaluation.    RATIONALE FOR INPATIENT ADMISSION: Patient demonstrates the following:  [X] Medically appropriate for rehabilitation admission  [X] Has attainable rehab goals with an appropriate initial discharge plan  [X]Has rehabilitation potential (expected to make a significant improvement within a reasonable period of time)  [X] Requires close medical management by a rehab physician, rehab nursing care, Hospitalist and comprehensive interdisciplinary team (including PT, OT and/or SLP, Prosthetics and Orthotics)

## 2023-11-30 NOTE — H&P ADULT - HISTORY OF PRESENT ILLNESS
57 y/o F with PMH of HTN, rheumatoid arthritis presented to [place] on 11/15 for AMS. Neighbor overhead patient was looking for her mom, was confused/wandering around apartment complex. Imaging showed bilateral subacute to remote embolic strokes, right posterior corona radiata acute infarct and multiple petechial hemorrhages in b/l thalami and basal ganglia. Noted to have KRUNAL which resolved. Hypertension managed with amlodipine, aldactone, and losartan. Started on B12 supplements. Seen by rheumatology, Transferred to [facility] on 11/20 for cerebral angiogram.        Patient evaluated by PT/OT and was recommended for acute inpatient rehab. Patient is medically stable for discharge to [facility] on 11/30. 59 y/o F with PMH of HTN, rheumatoid arthritis presented to [place] on 11/15 for AMS. Neighbor overhead patient was looking for her mom, was confused/wandering around apartment complex. Imaging showed bilateral subacute to remote embolic strokes, right posterior corona radiata acute infarct and multiple petechial hemorrhages in b/l thalami and basal ganglia. Noted to have KRUNAL which resolved. Hypertension managed with amlodipine, aldactone, and losartan. Started on B12 supplements. Patient seen by Rheumatology. Transferred to [facility] on 11/20 for cerebral angiogram.    Cerebral angiogram performed on 11/20 showed multiple areas of focal stenosis and dilatation concerning for vasculitis. LP completed on 11/21 with normal profile. Rheumatology consulted and recommended possible brain biopsy to confirm diagnosis of vasculitis: The risks of performing an open biopsy outweigh the benefits if the results of the biopsy are unlikely to . 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 Patient started on steroids given she is having micro hemorrhage and strokes from vasculitis. Steroids started on 11/24, initial dose Solumderol 1g for 3 days. Will continue Prednisone 60mg and decreasing to prednisone 50mg daily for discharge, No need for taper as per rheumatology eval/recommendation. EEG shows Moderate diffuse/multi-focal cerebral dysfunction, not specific as to etiology. There were no epileptiform abnormalities recorded. Will need repeat cerebral angiogram in 1 month. Patient started on Eliquis 5 mg BID, amiodarone, and metoprolol for Afib. TTE Normal global left ventricular systolic function. Mild mitral valve regurgitation. Mild aortic regurgitation. Sclerotic aortic valve with normal opening. Patient evaluated by PT/OT and was recommended for acute inpatient rehab. Patient is medically stable for discharge to [facility] on 11/30. Mrs. Екатерина Poole is a 58 year old female patient with past medical history of HTN and rheumatoid arthritis who presented to [place] on 11/15 for AMS when a neighbor overhead patient was looking for her mom and she was found confused/wandering around apartment complex. Imaging studies initially performed reported bilateral subacute to remote embolic strokes, a right posterior corona radiata acute infarct, and multiple petechial hemorrhages in bilateral thalamus and basal ganglia. She was additionally noted to have KRUNAL which has since resolved. Hypertension managed with amlodipine, aldactone, and losartan and she was started on B12 supplements. Patient seen by Rheumatology and was transferred to [facility] on 11/20 for cerebral angiogram.    A cerebral angiogram performed on 11/20 reported multiple areas of focal stenosis and dilatation concerning for vasculitis. An LP was completed on 11/21 with a normal profile. Rheumatology consulted and recommended possible brain biopsy to confirm diagnosis of vasculitis but the risks of performing an open biopsy outweighed the benefits as the results of the biopsy were deemed unlikely to . She was seen by Neurosurgery with patient's neuroradiologic findings including beading on cerebral angiogram (consistent with vasculitis) and clinical presentation, with no other etiology of vasculitis, point to PACNS. Patient was started on steroids given her micro hemorrhage and strokes likely attributed to vasculitis. Steroids started on 11/24 with initial dose Solumedrol 1g for 3 days. She was to continue Prednisone 60mg with a decrease to 50mg daily for discharge to acute in-patient rehabilitation. No taper indicated per rheumatology evaluation and recommendations. An EEG was additionally performed and reported moderate diffuse/multi-focal cerebral dysfunction, not specific as to etiology. There were no epileptiform abnormalities recorded. Will need a repeat cerebral angiogram in 1 month which should take place around 12/20/23. Patient started on Eliquis 5 mg BID, amiodarone, and metoprolol for Afib. TTE reported as normal with global left ventricular systolic function, mild mitral valve regurgitation, mild aortic regurgitation, and a sclerotic aortic valve with normal opening. Hospitalization additionally significant for a rapid response on 11/20 due to acute mental status changes after being found to be slumped over while on toilet by staff prompting MRI imaging reporting:  ·	Left parietal small cortical acute infarction  ·	Innumerable scattered chronic microhemorrhages in the brainstem, bilateral cerebellar hemispheres, deep gray nuclei and peripherally within the cerebral hemispheres which may be secondary to chronic hypertensive encephalopathy.  ·	Severe extensive chronic microvascular ischemic changes and multifocal chronic lacunar infarcts as detailed above.  Patient evaluated by PT/OT and was recommended for acute inpatient rehab. Patient is medically stable for discharge to [facility] on 11/30.

## 2023-11-30 NOTE — H&P ADULT - NS ATTEND BILL GEN_ALL_CORE
1) Check your sugar before dinner and adjust your humalog as listed below:  Blood sugar            Less than 90 Eat first, take 25 units      30 units    151-200  35 units      201-250  40 units     251-300  45 units      301-350  50 units      351-400  55 units    If you miss the humalog before you eat, still plan on taking 20 units if over 30 minutes after eating (if up to 30 minutes, still take 30 units). 2) Check your sugar before breakfast and at bedtime and if your sugar is over 150, take a small dose of humalog based on the scale below:  Blood sugar            151-200  5 units      201-250  10 units     251-300  15 units      301-350  20 units      351-400  25 units    This dose of humalog is IN ADDITION to the lantus 80 units     3) If your sugar is still staying over 180 more than 3 times a week after 2 weeks, then let me know. 4) If your sugar is coming back down and staying under 150, then you will only need to use the scales for humalog as needed. 5) I will send you a reminder letter 3-4 weeks prior to your next visit and you will have the order already in the [facility] system so you can just go sometime in the 3-7 days before the next visit to have your labs drawn.
Attending to bill

## 2023-11-30 NOTE — PROGRESS NOTE ADULT - SUBJECTIVE AND OBJECTIVE BOX
no new complaints   rapid response yesterday, vasovagal in bathroom     REVIEW OF SYSTEMS  Constitutional - No fever,  No fatigue  HEENT - No vertigo, No neck pain  Neurological - No headaches, No memory loss, No loss of strength, No numbness, No tremors  Skin - No rashes, No lesions   Musculoskeletal - No joint pain, No joint swelling, No muscle pain  Psychiatric - No depression, No anxiety    FUNCTIONAL PROGRESS  11/29 PT  bed mobility min assist  transfers min assist with RW  gait min assist with RW x 25 feet     11/27 OT  transfers min assist with RW  toilet training mod assist     VITALS  T(C): 36.9 (11-30-23 @ 08:00), Max: 36.9 (11-30-23 @ 08:00)  HR: 121 (11-30-23 @ 08:00) (91 - 127)  BP: 152/76 (11-30-23 @ 08:00) (124/64 - 173/82)  RR: 24 (11-30-23 @ 08:00) (17 - 24)  SpO2: 100% (11-30-23 @ 08:00) (97% - 100%)  Wt(kg): --    MEDICATIONS   aMIOdarone    Tablet     aMIOdarone    Tablet 400 milliGRAM(s) every 8 hours  amLODIPine   Tablet 10 milliGRAM(s) daily  apixaban 5 milliGRAM(s) two times a day  atorvastatin 80 milliGRAM(s) at bedtime  bisacodyl 5 milliGRAM(s) at bedtime  insulin lispro (ADMELOG) corrective regimen sliding scale   at bedtime  insulin lispro (ADMELOG) corrective regimen sliding scale   three times a day before meals  losartan 25 milliGRAM(s) daily  metoprolol tartrate 50 milliGRAM(s) three times a day  pantoprazole    Tablet 40 milliGRAM(s) daily  potassium chloride    Tablet ER 20 milliEquivalent(s) every 2 hours  predniSONE   Tablet 60 milliGRAM(s) daily  senna 2 Tablet(s) at bedtime  sodium chloride 0.9%. 1000 milliLiter(s) <Continuous>      RECENT LABS - Reviewed                        9.8    11.58 )-----------( 361      ( 30 Nov 2023 06:19 )             26.9     11-30    142  |  107  |  23  ----------------------------<  102<H>  3.2<L>   |  28  |  0.77    Ca    8.6      30 Nov 2023 06:19        Urinalysis Basic - ( 30 Nov 2023 06:19 )    Color: x / Appearance: x / SG: x / pH: x  Gluc: 102 mg/dL / Ketone: x  / Bili: x / Urobili: x   Blood: x / Protein: x / Nitrite: x   Leuk Esterase: x / RBC: x / WBC x   Sq Epi: x / Non Sq Epi: x / Bacteria: x      < from: CT Head No Cont (11.24.23 @ 23:48) >    IMPRESSION:  CT head:  1.  No acute intracranial pathology.    CT angiogram head:  1. Multifocal stenoses in the bilateral anterior cerebral arteries and   right middle cerebral artery without change from prior CTA on 11/18/2023.   No proximal occlusions.    CT angiogram neck:  1.  No high-grade narrowing or dissection.      < end of copied text >    < from: MR Head w/ IV Cont (11.17.23 @ 13:09) >    IMPRESSION:    1. Right posterior corona radiata subacute infarction    2. Multiple remote infarctions within the cerebral hemispheric white   matter basal ganglia and thalami    3. Pervasive cerebral hemispheric white matter abnormality, possibly   accelerated form of ischemic white matter disease versus and/or   superimposed other inflammatory metabolic toxic or infectious process    4. Numerous remote petechial hemorrhages most prominently in the basal   ganglia andthalami    5. Diffuse brain volume loss upper range typical for age      < end of copied text >        PHYSICAL EXAM  Constitutional - NAD, Comfortable, in bed   Chest - No distress, no use of accessory muscles for respiration  Cardiovascular -Well perfused  Abdomen - BS+, Soft, NTND  Extremities - No C/C/E, No calf tenderness   Neurologic Exam -                    Cognitive - lethargic, AAO to self, place, date, year, situation; slow to follow complex commands     Communication - Fluent, No dysarthria, patient with intermittent pauses and fluctuating participation verbally       Motor - 4+/5 strength in LLE and LUE      Sensory - Intact to LT    Psychiatric - Mood stable, Affect flat      Assessment and Recommendation:   · Assessment	    Impression:  57 yo woman h/o HTN, RA, tobacco with functional deficits secondary to diagnosis of multiple subacute and acute infarcts  MRI with right posterior corona radiata infarct, petechial hemorrhages  s/p angio  afib, cardiology following, metoprolol increased, on eliquis   vasculitis, treated with solumedrol x 3 days, now on prednisone 50 mg daily   Plan:  Inpatient Rehab team with recommendations for Acute Inpatient  Rehab- patient requires active and ongoing therapeutic interventions of multiple disciplines and can tolerate 3 hours of therapies. Can actively participate and benefit from  an intensive rehabilitation program. Requires supervision by a rehabilitation physician and a coordinated interdisciplinary approach to providing rehabilitation.     PT- ROM, Bed Mob, Transfers, Amb w AD and bracing as needed  OT- ADLs, bracing  SLP- Dysphagia eval and treat  Prec- Falls, Aspiration  Skin- Turn q2 h  Dispo- acute inpatient rehabilitation when inpatient workup for potential embolic stroke and once patient is medically stabilized and deemed appropriate for discharge

## 2023-11-30 NOTE — PROGRESS NOTE ADULT - PROBLEM SELECTOR PLAN 2
s/p amiodarone gtt  amiodarone Po as ordered  Increase metoprolol 50 TID   will monitor   Discussed with Stroke unit PA   Will need lifelong AC
Stable   cont with norvasc
s/p amiodarone gtt , upon completion start amiodarone 400 tid x 12 doses   will monitor   Discussed with Stroke unit PA   Will need lifelong AC
Stable   cont with norvasc
Start amiodarone gtt   Discussed with Stroke unit PA   Will need lifelong AC

## 2023-11-30 NOTE — PROGRESS NOTE ADULT - ASSESSMENT
58-year old woman with history of hypertension, recurrent strokes, brought into ED at  initially for concern for altered mental status with findings concerning for PACNS    ##eval for PACNS  Progressive mental status change/ cognitive impairment over the past year  Brain imaging with findings of multiple infarcts, petechial hemorrhages  Cerebral angio showed diffuse beading   Negative MAX/cpANCA/dsDNA/Sm/Ribo P/BONI/SSA/SSB/SPEP/UA/C3: 152/C4: 36. CRP: 8. ESR: 113 RF: 30  apls negative  Negative Hepatitis/Treponema  -s/p 1g Methylprednisolone (11/24 - 11/27). now prednisone 60mg (11/28 - )  -difficult to assess for clinical response giving waxing/waning nature of disease  -also difficult to ascertain how much of these changes are now permanent vs how much can immunosuppression reverse. or if the role of immunosuppression is to stop recurrent strokes/progression   -risk/benefit of addition of Cytoxan at this point is unclear  -discussed her case during case conference, ?addition of Rituximab. however role of immunosuppression is unclear as stated above  -if evidence of objective worsening radiology from repeat MR then discussion for escalation of immunosuppression is warranted      PLAN  -pending MR brain repeat. if noted worsening of radiology then role of increasing immunosuppression is warranted  -recommend keeping patient on prednisone 60mg for now and decreasing to prednisone 50mg qD for discharge  -recommend against a taper since no steroid sparing agent is being offered until radiographic progression can be assessed for  -after discussion with stroke service/neuro-radiology/neurosurgery planned repeat angiogram in 2-6 weeks after initial intervention  -[place] IgG on CSF noted, unclear significance, will discuss with neurology     # Anemia,   no signs of hemolysis    #KRUNAL, resolved      #A.Fib, on anticoagulation      case d/w Dr. David Alvarado MD, PGY-5  Rheumatology Fellow  Reachable on TEAMS 58-year old woman with history of hypertension, recurrent strokes, brought into ED at  initially for concern for altered mental status with findings concerning for PACNS with new stroke noted on MR on 11/29    ##eval for PACNS  Progressive mental status change/ cognitive impairment over the past year  Brain imaging with findings of multiple infarcts, petechial hemorrhages  Cerebral angio showed diffuse beading   Negative MAX/cpANCA/dsDNA/Sm/Ribo P/BONI/SSA/SSB/SPEP/UA/C3: 152/C4: 36. CRP: 8. ESR: 113 RF: 30  apls negative  Negative Hepatitis/Treponema  -s/p 1g Methylprednisolone (11/24 - 11/27). now prednisone 60mg (11/28 - )  -difficult to assess for clinical response giving waxing/waning nature of disease  -also difficult to ascertain how much of these changes are now permanent vs how much can immunosuppression reverse. or if the role of immunosuppression is to stop recurrent strokes/progression   -risk/benefit of addition of Cytoxan at this point is unclear  -discussed her case during case conference, ?addition of Rituximab. however role of immunosuppression is unclear as stated above  -new stroke noted on MR on 11/29. per neurology likely from new a-fib and not from possible vasculitis activity  pending discharge to acute rehab  -d/w neuroradiology. remainder of findings on MR on 11/29 stable      PLAN  -discussed with stroke team and neuroradiology. apart from new stroke  11/29 (likely from a-fib) remainder of radiology is stable  -prednisone 50mg qD for discharge and maintain dose  -recommend against a taper since no steroid sparing agent is being offered until radiographic progression can be assessed for  -after discussion with stroke service/neuro-radiology/neurosurgery planned repeat angiogram in 2-6 weeks after initial intervention. patient going to acute rehab and re-admission for repeat angio/imaging being arranged by their service  -[place] IgG on CSF noted, per neurology not clinically concerning due to neg IgM. Myelin basic protein elevated, however not specific   -Rheumatology to reassess patient at [facility]    #new infarct  -from MR 11/29  -per neurology likely from new A-fib and thus embolic and not representative of vasculitis activity  -management per stroke team    # Anemia,   no signs of hemolysis    #KRUNAL, resolved    #A.Fib, on anticoagulation    case d/w Neuroradiology, stroke team    case d/w Dr. David Gonsales    patient's brother Jorge notified of miguel ángel Alvarado MD, PGY-5  Rheumatology Fellow  Reachable on TEAMS

## 2023-11-30 NOTE — PROGRESS NOTE ADULT - SUBJECTIVE AND OBJECTIVE BOX
THE PATIENT WAS SEEN AND EXAMINED BY ME WITH THE HOUSESTAFF AND STROKE TEAM DURING MORNING ROUNDS.   HPI:  58-year old woman with history of hypertension, rheumatoid arthritis, brought into ED at  initially for concerned for altered mental status.  Prior to hospital presentation: neighbor overhead patient was looking for her mom, was confused/wandering around apartment complex.   At [facility]: MR brain showed bilateral subacute to remote embolic strokes. MRI shows right posterior corona radiata acute infarct and multiple petechial hemorrhages in b/l thalami and basal ganglia. Noted to have KRUNAL. Also noted to have hypertension, on amlodipine, aldactone, and losartan. Started on B12 supplements. Seen by rheumatology,  Transferred to [facility] for cerebral angiogram.     Patient seen at bedside.   NIHSS: 2  LKW: prior to 11/15 ( hospitalizaton)      SUBJECTIVE: No events overnight.  No new neurologic complaints.  ROS reported negative unless otherwise noted.    aMIOdarone    Tablet 400 milliGRAM(s) Oral every 8 hours  aMIOdarone    Tablet   Oral   amLODIPine   Tablet 10 milliGRAM(s) Oral daily  apixaban 5 milliGRAM(s) Oral two times a day  atorvastatin 80 milliGRAM(s) Oral at bedtime  bisacodyl 5 milliGRAM(s) Oral at bedtime  insulin lispro (ADMELOG) corrective regimen sliding scale   SubCutaneous at bedtime  insulin lispro (ADMELOG) corrective regimen sliding scale   SubCutaneous three times a day before meals  losartan 25 milliGRAM(s) Oral daily  metoprolol tartrate 25 milliGRAM(s) Oral two times a day  pantoprazole    Tablet 40 milliGRAM(s) Oral daily  potassium chloride    Tablet ER 20 milliEquivalent(s) Oral every 2 hours  predniSONE   Tablet 60 milliGRAM(s) Oral daily  senna 2 Tablet(s) Oral at bedtime  sodium chloride 0.9%. 1000 milliLiter(s) IV Continuous <Continuous>      PHYSICAL EXAM:   Vital Signs Last 24 Hrs  T(C): 36.8 (30 Nov 2023 04:00), Max: 36.8 (29 Nov 2023 08:00)  T(F): 98.2 (30 Nov 2023 04:00), Max: 98.2 (29 Nov 2023 08:00)  HR: 91 (30 Nov 2023 05:09) (91 - 127)  BP: 140/88 (30 Nov 2023 05:09) (124/64 - 173/82)  BP(mean): 108 (30 Nov 2023 05:09) (78 - 120)  RR: 19 (30 Nov 2023 05:09) (17 - 21)  SpO2: 100% (30 Nov 2023 05:09) (97% - 100%)    Parameters below as of 30 Nov 2023 05:09  Patient On (Oxygen Delivery Method): nasal cannula  O2 Flow (L/min): 2      General: No acute distress  HEENT: EOM intact, visual fields full  Abdomen: Soft, nontender, nondistended   Extremities: No edema    NEUROLOGICAL EXAM:  Mental status: Eyes open, awake and alert says month december, 2023 for year, able to IDs objects, following commands,   Cranial Nerves: Subtle left facial droop, no nystagmus, no dysarthria,  tongue midline  Motor exam: Antigravity and symmetric throughout  Sensation: Intact to light touch   Coordination/ Gait: Deferred    LABS:                        9.8    11.58 )-----------( 361      ( 30 Nov 2023 06:19 )             26.9    11-30    142  |  107  |  23  ----------------------------<  102<H>  3.2<L>   |  28  |  0.77    Ca    8.6      30 Nov 2023 06:19          IMAGING: Reviewed by me.     11/21/23 CT HEAD: No hydrocephalus, acute intracranial hemorrhage, mass effect, or brain edema.    (11.18.2023)  Head CT: No acute intracranial hemorrhage, mass effect, or shift of the midline structures. Similar-appearing extensive chronic white matter microvascular type changes and chronic lacunar infarcts, as discussed.    CTA neck and head: No large vessel occlusion or major stenosis.    MR Head w/ IV Cont (11.17.2023) Right posterior corona radiata subacute infarction Multiple remote infarctions within the cerebral hemispheric white matter basal ganglia and thalami  Pervasive cerebral hemispheric white matter abnormality, possibly accelerated form of ischemic white matter disease versus and/or superimposed other inflammatory metabolic toxic or infectious process Numerous remote petechial hemorrhages most prominently in the basal ganglia and thalami. Diffuse brain volume loss upper range typical for age   THE PATIENT WAS SEEN AND EXAMINED BY ME WITH THE HOUSESTAFF AND STROKE TEAM DURING MORNING ROUNDS.   HPI:  58-year old woman with history of hypertension, rheumatoid arthritis, brought into ED at  initially for concerned for altered mental status.  Prior to hospital presentation: neighbor overhead patient was looking for her mom, was confused/wandering around apartment complex.   At [place]: MR brain showed bilateral subacute to remote embolic strokes. MRI shows right posterior corona radiata acute infarct and multiple petechial hemorrhages in b/l thalami and basal ganglia. Noted to have KRUNAL. Also noted to have hypertension, on amlodipine, aldactone, and losartan. Started on B12 supplements. Seen by rheumatology,  Transferred to [facility] for cerebral angiogram.     Patient seen at bedside.   NIHSS: 2  LKW: prior to 11/15       SUBJECTIVE: No events overnight.  No new neurologic complaints.  ROS reported negative unless otherwise noted.    aMIOdarone    Tablet 400 milliGRAM(s) Oral every 8 hours  aMIOdarone    Tablet   Oral   amLODIPine   Tablet 10 milliGRAM(s) Oral daily  apixaban 5 milliGRAM(s) Oral two times a day  atorvastatin 80 milliGRAM(s) Oral at bedtime  bisacodyl 5 milliGRAM(s) Oral at bedtime  insulin lispro (ADMELOG) corrective regimen sliding scale   SubCutaneous at bedtime  insulin lispro (ADMELOG) corrective regimen sliding scale   SubCutaneous three times a day before meals  losartan 25 milliGRAM(s) Oral daily  metoprolol tartrate 25 milliGRAM(s) Oral two times a day  pantoprazole    Tablet 40 milliGRAM(s) Oral daily  potassium chloride    Tablet ER 20 milliEquivalent(s) Oral every 2 hours  predniSONE   Tablet 60 milliGRAM(s) Oral daily  senna 2 Tablet(s) Oral at bedtime  sodium chloride 0.9%. 1000 milliLiter(s) IV Continuous <Continuous>      PHYSICAL EXAM:   Vital Signs Last 24 Hrs  T(C): 36.8 (30 Nov 2023 04:00), Max: 36.8 (29 Nov 2023 08:00)  T(F): 98.2 (30 Nov 2023 04:00), Max: 98.2 (29 Nov 2023 08:00)  HR: 91 (30 Nov 2023 05:09) (91 - 127)  BP: 140/88 (30 Nov 2023 05:09) (124/64 - 173/82)  BP(mean): 108 (30 Nov 2023 05:09) (78 - 120)  RR: 19 (30 Nov 2023 05:09) (17 - 21)  SpO2: 100% (30 Nov 2023 05:09) (97% - 100%)    Parameters below as of 30 Nov 2023 05:09  Patient On (Oxygen Delivery Method): nasal cannula  O2 Flow (L/min): 2      General: No acute distress  HEENT: EOM intact, visual fields full  Abdomen: Soft, nontender, nondistended   Extremities: No edema    NEUROLOGICAL EXAM:  Mental status: Eyes open, awake and alert says month december, 2023 for year, able to IDs objects, following commands,   Cranial Nerves: Subtle left facial droop, no nystagmus, no dysarthria,  tongue midline  Motor exam: Antigravity and symmetric throughout  Sensation: Intact to light touch   Coordination/ Gait: Deferred    LABS:                        9.8    11.58 )-----------( 361      ( 30 Nov 2023 06:19 )             26.9    11-30    142  |  107  |  23  ----------------------------<  102<H>  3.2<L>   |  28  |  0.77    Ca    8.6      30 Nov 2023 06:19      IMAGING: Reviewed by me.     11/21/23 CT HEAD: No hydrocephalus, acute intracranial hemorrhage, mass effect, or brain edema.    (11.18.2023)  Head CT: No acute intracranial hemorrhage, mass effect, or shift of the midline structures. Similar-appearing extensive chronic white matter microvascular type changes and chronic lacunar infarcts, as discussed.    CTA neck and head: No large vessel occlusion or major stenosis.    MR Head w/ IV Cont (11.17.2023) Right posterior corona radiata subacute infarction Multiple remote infarctions within the cerebral hemispheric white matter basal ganglia and thalami  Pervasive cerebral hemispheric white matter abnormality, possibly accelerated form of ischemic white matter disease versus and/or superimposed other inflammatory metabolic toxic or infectious process Numerous remote petechial hemorrhages most prominently in the basal ganglia and thalami. Diffuse brain volume loss upper range typical for age     MR Head No Cont (11.29.23)  Left parietal small cortical acute infarction. No acute intracranial   hemorrhage.  Innumerable scattered chronic microhemorrhages in the brainstem,   bilateral cerebellar hemispheres, deep gray nuclei and peripherally   within thecerebral hemispheres which may be secondary to chronic   hypertensive encephalopathy.  Severe extensive chronic microvascular ischemic changes and multifocal   chronic lacunar infarcts as detailed above.

## 2023-11-30 NOTE — DISCHARGE NOTE PROVIDER - NSDCCPCAREPLAN_GEN_ALL_CORE_FT
PRINCIPAL DISCHARGE DIAGNOSIS  Diagnosis: Stroke  Assessment and Plan of Treatment: Please follow up with neurologist within 1 week of discharge. The office will call you to schedule an appointment, if you do not hear from them please call [phone]. Continue taking medications as prescribed. If you were prescribed a statin for your cholesterol please make sure you have your liver enzymes checked with your primary care physician. Monitor your blood pressure. Reduce fat, cholesterol and salt in your diet. Increase intake of fruits and vegetables. Limit alcohol to minimum and do not smoke. You may be at risk for falling, make changes to your home to help you walk easier. Keep up to date on vaccinations.  If you experience any symptoms of facial drooping, slurred speech, arm or leg weakness, severe headache, vision changes or any worsening symptoms, notify provider immediately and return to ER.  Continue Prednisode 50 mg daily and repeat cerebral angiogram in 1 month.        PRINCIPAL DISCHARGE DIAGNOSIS  Diagnosis: Stroke  Assessment and Plan of Treatment: Please follow up with neurologist within 1 week of discharge. The office will call you to schedule an appointment, if you do not hear from them please call [phone]. Continue taking medications as prescribed. If you were prescribed a statin for your cholesterol please make sure you have your liver enzymes checked with your primary care physician. Monitor your blood pressure. Reduce fat, cholesterol and salt in your diet. Increase intake of fruits and vegetables. Limit alcohol to minimum and do not smoke. You may be at risk for falling, make changes to your home to help you walk easier. Keep up to date on vaccinations.  If you experience any symptoms of facial drooping, slurred speech, arm or leg weakness, severe headache, vision changes or any worsening symptoms, notify provider immediately and return to ER.  Continue Prednisode 50 mg daily and repeat cerebral angiogram in 1 month.        PRINCIPAL DISCHARGE DIAGNOSIS  Diagnosis: Stroke  Assessment and Plan of Treatment: Please follow up with neurologist within 1 week of discharge. The office will call you to schedule an appointment, if you do not hear from them please call [phone]. Continue taking medications as prescribed. If you were prescribed a statin for your cholesterol please make sure you have your liver enzymes checked with your primary care physician. Monitor your blood pressure. Reduce fat, cholesterol and salt in your diet. Increase intake of fruits and vegetables. Limit alcohol to minimum and do not smoke. You may be at risk for falling, make changes to your home to help you walk easier. Keep up to date on vaccinations.  If you experience any symptoms of facial drooping, slurred speech, arm or leg weakness, severe headache, vision changes or any worsening symptoms, notify provider immediately and return to ER.  Continue Prednisode 50 mg daily and repeat cerebral angiogram in 1 month.

## 2023-11-30 NOTE — PROGRESS NOTE ADULT - SUBJECTIVE AND OBJECTIVE BOX
Subjective: Patient seen and examined. No new events except as noted.   remains in Stroke unit   Afib RVR   REVIEW OF SYSTEMS:    CONSTITUTIONAL: + weakness, fevers or chills  EYES/ENT: No visual changes;  No vertigo or throat pain   NECK: No pain or stiffness  RESPIRATORY: No cough, wheezing, hemoptysis; No shortness of breath  CARDIOVASCULAR: No chest pain or palpitations  GASTROINTESTINAL: No abdominal or epigastric pain. No nausea, vomiting, or hematemesis; No diarrhea or constipation. No melena or hematochezia.  GENITOURINARY: No dysuria, frequency or hematuria  NEUROLOGICAL: No numbness or weakness  SKIN: No itching, burning, rashes, or lesions   All other review of systems is negative unless indicated above.    MEDICATIONS:  MEDICATIONS  (STANDING):  aMIOdarone    Tablet 400 milliGRAM(s) Oral every 8 hours  aMIOdarone    Tablet   Oral   amLODIPine   Tablet 10 milliGRAM(s) Oral daily  apixaban 5 milliGRAM(s) Oral two times a day  atorvastatin 80 milliGRAM(s) Oral at bedtime  bisacodyl 5 milliGRAM(s) Oral at bedtime  insulin lispro (ADMELOG) corrective regimen sliding scale   SubCutaneous at bedtime  insulin lispro (ADMELOG) corrective regimen sliding scale   SubCutaneous three times a day before meals  losartan 25 milliGRAM(s) Oral daily  metoprolol tartrate 50 milliGRAM(s) Oral two times a day  pantoprazole    Tablet 40 milliGRAM(s) Oral daily  potassium chloride    Tablet ER 20 milliEquivalent(s) Oral every 2 hours  predniSONE   Tablet 60 milliGRAM(s) Oral daily  senna 2 Tablet(s) Oral at bedtime  sodium chloride 0.9%. 1000 milliLiter(s) (50 mL/Hr) IV Continuous <Continuous>      PHYSICAL EXAM:  T(C): 36.9 (11-30-23 @ 08:00), Max: 36.9 (11-30-23 @ 08:00)  HR: 121 (11-30-23 @ 08:00) (91 - 127)  BP: 152/76 (11-30-23 @ 08:00) (124/64 - 173/82)  RR: 24 (11-30-23 @ 08:00) (17 - 24)  SpO2: 100% (11-30-23 @ 08:00) (97% - 100%)  Wt(kg): --  I&O's Summary    29 Nov 2023 07:01  -  30 Nov 2023 07:00  --------------------------------------------------------  IN: 583.4 mL / OUT: 1350 mL / NET: -766.6 mL            Appearance: NAD	  HEENT:   Dry oral mucosa, PERRL, EOMI	  Lymphatic: No lymphadenopathy  Cardiovascular: Irregular  S1 S2, No JVD, No murmurs, No edema  Respiratory: Lungs clear to auscultation	  Psychiatry: A & O x 3, Mood & affect appropriate  Gastrointestinal:  Soft, Non-tender, + BS	  Skin: No rashes, No ecchymoses, No cyanosis	  Neurologic: AOx2-3, EOMI, no facial, no drift CLEANING 5/5  Extremities: Normal range of motion, No clubbing, cyanosis or edema  Vascular: Peripheral pulses palpable 2+ bilaterally            LABS:    CARDIAC MARKERS:                                9.8    11.58 )-----------( 361      ( 30 Nov 2023 06:19 )             26.9     11-30    142  |  107  |  23  ----------------------------<  102<H>  3.2<L>   |  28  |  0.77    Ca    8.6      30 Nov 2023 06:19      proBNP:   Lipid Profile:   HgA1c:   TSH:             TELEMETRY: 	  -130s  ECG:  	  RADIOLOGY:   DIAGNOSTIC TESTING:  [ ] Echocardiogram:  [ ]  Catheterization:  [ ] Stress Test:    OTHER:

## 2023-11-30 NOTE — PROGRESS NOTE ADULT - PROBLEM SELECTOR PLAN 1
s/p cerebral angio   ASA  Orders per stroke team   aspiration precautions   PT eval

## 2023-11-30 NOTE — PATIENT PROFILE ADULT - PATIENT'S GENDER IDENTITY
Patient was called and states for about a week now she thinks that she has a small piece of glass in her foot. On her foot below the pinky toe. Last weekend a cup broke while she was outside and she went out barefoot and must have stepped on a small piece. She states it is not always painful feels occasionally sore. It is not red, swollen or does not look infected.     Patient was advised to soak foot in warm water to soften the skin and squeeze around the area to see if she can express the piece. Patient states she will try this. Advised that if things get worse she would need to be seen in urgent care. Patient verbalized understanding.    Female

## 2023-11-30 NOTE — DISCHARGE NOTE PROVIDER - HOSPITAL COURSE
58-year old woman with history of hypertension, rheumatoid arthritis, brought into ED at  initially for concerned for altered mental status.  Prior to hospital presentation: neighbor overhead patient was looking for her mom, was confused/wandering around apartment complex.   At [place]: MR brain showed bilateral subacute to remote embolic strokes. MRI shows right posterior corona radiata acute infarct and multiple petechial hemorrhages in b/l thalami and basal ganglia. Noted to have KRUNAL. Also noted to have hypertension, on amlodipine, aldactone, and losartan. Started on B12 supplements. Seen by rheumatology,  Transferred to [facility] for cerebral angiogram.   NIHSS: 2  LKW: prior to 11/15 ( hospitalizaton)    Imagin23 CT HEAD: No hydrocephalus, acute intracranial hemorrhage, mass effect, or brain edema.    (2023)  Head CT: No acute intracranial hemorrhage, mass effect, or shift of the midline structures. Similar-appearing extensive chronic white matter microvascular type changes and chronic lacunar infarcts, as discussed.    CTA neck and head: No large vessel occlusion or major stenosis.    MR Head w/ IV Cont (2023) Right posterior corona radiata subacute infarction Multiple remote infarctions within the cerebral hemispheric white matter basal ganglia and thalami  Pervasive cerebral hemispheric white matter abnormality, possibly accelerated form of ischemic white matter disease versus and/or superimposed other inflammatory metabolic toxic or infectious process Numerous remote petechial hemorrhages most prominently in the basal ganglia and thalami. Diffuse brain volume loss upper range typical for age 58-year old woman with history of hypertension, rheumatoid arthritis, brought into ED at  initially for concerned for altered mental status.  Prior to hospital presentation: neighbor overhead patient was looking for her mom, was confused/wandering around apartment complex.   At [place]: MR brain showed bilateral subacute to remote embolic strokes. MRI shows right posterior corona radiata acute infarct and multiple petechial hemorrhages in b/l thalami and basal ganglia. Noted to have KRUNAL. Also noted to have hypertension, on amlodipine, aldactone, and losartan. Started on B12 supplements. Seen by rheumatology,  Transferred to [facility] for cerebral angiogram.   NIHSS: 2  LKW: prior to 11/15 ( hospitalizaton)    Imagin23 CT HEAD: No hydrocephalus, acute intracranial hemorrhage, mass effect, or brain edema.    (2023)  Head CT: No acute intracranial hemorrhage, mass effect, or shift of the midline structures. Similar-appearing extensive chronic white matter microvascular type changes and chronic lacunar infarcts, as discussed.    CTA neck and head: No large vessel occlusion or major stenosis.    MR Head w/ IV Cont (2023) Right posterior corona radiata subacute infarction Multiple remote infarctions within the cerebral hemispheric white matter basal ganglia and thalami  Pervasive cerebral hemispheric white matter abnormality, possibly accelerated form of ischemic white matter disease versus and/or superimposed other inflammatory metabolic toxic or infectious process Numerous remote petechial hemorrhages most prominently in the basal ganglia and thalami. Diffuse brain volume loss upper range typical for age 58-year old woman with history of hypertension, rheumatoid arthritis, brought into ED at  initially for concerned for altered mental status.  Prior to hospital presentation: neighbor overhead patient was looking for her mom, was confused/wandering around apartment complex.   At [place]: MR brain showed bilateral subacute to remote embolic strokes. MRI shows right posterior corona radiata acute infarct and multiple petechial hemorrhages in b/l thalami and basal ganglia. Noted to have KRUNAL. Also noted to have hypertension, on amlodipine, aldactone, and losartan. Started on B12 supplements. Seen by rheumatology,  Transferred to [facility] for cerebral angiogram.   NIHSS: 2  LKW: prior to 11/15 ( hospitalizaton)    Imagin23 CT HEAD: No hydrocephalus, acute intracranial hemorrhage, mass effect, or brain edema.    (2023)  Head CT: No acute intracranial hemorrhage, mass effect, or shift of the midline structures. Similar-appearing extensive chronic white matter microvascular type changes and chronic lacunar infarcts, as discussed.    CTA neck and head: No large vessel occlusion or major stenosis.    MR Head w/ IV Cont (2023) Right posterior corona radiata subacute infarction Multiple remote infarctions within the cerebral hemispheric white matter basal ganglia and thalami  Pervasive cerebral hemispheric white matter abnormality, possibly accelerated form of ischemic white matter disease versus and/or superimposed other inflammatory metabolic toxic or infectious process Numerous remote petechial hemorrhages most prominently in the basal ganglia and thalami. Diffuse brain volume loss upper range typical for age 58-year old woman with history of hypertension, rheumatoid arthritis, brought into ED at  initially for concerned for altered mental status.  Prior to hospital presentation: neighbor overhead patient was looking for her mom, was confused/wandering around apartment complex.   At [place]: MR brain showed bilateral subacute to remote embolic strokes. MRI shows right posterior corona radiata acute infarct and multiple petechial hemorrhages in b/l thalami and basal ganglia. Noted to have KRUNAL. Also noted to have hypertension, on amlodipine, aldactone, and losartan. Started on B12 supplements. Seen by rheumatology,  Transferred to [facility] for cerebral angiogram.   NIHSS: 2  LKW: prior to 11/15 ( hospitalizato)    Imaging:  MRI HEAD 11/29/23: Left parietal small cortical acute infarction. No acute intracranial   hemorrhage.  Innumerable scattered chronic microhemorrhages in the brainstem,   bilateral cerebellar hemispheres, deep gray nuclei and peripherally   within the cerebral hemispheres which may be secondary to chronic   hypertensive encephalopathy.  Severe extensive chronic microvascular ischemic changes and multifocal   chronic lacunar infarcts     11/21/23 CT HEAD: No hydrocephalus, acute intracranial hemorrhage, mass effect, or brain edema.    (11.18.2023)  Head CT: No acute intracranial hemorrhage, mass effect, or shift of the midline structures. Similar-appearing extensive chronic white matter microvascular type changes and chronic lacunar infarcts, as discussed.    CTA neck and head: No large vessel occlusion or major stenosis.    MR Head w/ IV Cont (11.17.2023) Right posterior corona radiata subacute infarction Multiple remote infarctions within the cerebral hemispheric white matter basal ganglia and thalami  Pervasive cerebral hemispheric white matter abnormality, possibly accelerated form of ischemic white matter disease versus and/or superimposed other inflammatory metabolic toxic or infectious process Numerous remote petechial hemorrhages most prominently in the basal ganglia and thalami. Diffuse brain volume loss upper range typical for age    Impression: Impression: Left hemiparesis due to right corona radiata infarct, multiple micro hemorrhages, deep and cortical, cerebral angiogram concerning for PACNS. Pt also with afib, less likely cardioembolic  event  Asx with L parietal acute infarct due to cardioembolism in setting of afib     Cerebral angiogram performed on 11/20 showed multiple areas of focal stenosis and dilatation concerning for vasculitis. LP completed on 11/21 with normal profile. Rheumatology consulted and recommended possible brain biopsy to confirm diagnosis of vasculitis: The risks of performing an open biopsy outweigh the benefits if the results of the biopsy are unlikely to . 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 Patient started on steroids given she is having micro hemorrhage and strokes from vasculitis. Steroids started on 11/24, initial dose Solumderol 1g for 3 days. Will continue Prednisone 60mg and decreasing to prednisone 50mg daily for discharge, No need for taper as per rheumatology eval/recommendation. EEG shows Moderate diffuse/multi-focal cerebral dysfunction, not specific as to etiology. There were no epileptiform abnormalities recorded. Will need repeat cerebral angiogram in 1 month.     ANTITHROMBOTIC THERAPY: Apixaban 5mg BID for new onset afib  TTE Normal global left ventricular systolic function. Mild mitral valve regurgitation. Mild aortic regurgitation. Sclerotic aortic valve with normal opening, cardiac monitoring noted with afib on 11/27, will d/c amiodarone infusion. c/w amiodarone PO and metoprolol TID.      Evaluated by PT/OT and was recommended AR. Patient stable for discharge. 58-year old woman with history of hypertension, rheumatoid arthritis, brought into ED at  initially for concerned for altered mental status.  Prior to hospital presentation: neighbor overhead patient was looking for her mom, was confused/wandering around apartment complex.   At [place]: MR brain showed bilateral subacute to remote embolic strokes. MRI shows right posterior corona radiata acute infarct and multiple petechial hemorrhages in b/l thalami and basal ganglia. Noted to have KRUNAL. Also noted to have hypertension, on amlodipine, aldactone, and losartan. Started on B12 supplements. Seen by rheumatology,  Transferred to [facility] for cerebral angiogram.   NIHSS: 2  LKW: prior to 11/15 ( hospitalizato)    Imaging:  MRI HEAD 11/29/23: Left parietal small cortical acute infarction. No acute intracranial   hemorrhage.  Innumerable scattered chronic microhemorrhages in the brainstem,   bilateral cerebellar hemispheres, deep gray nuclei and peripherally   within the cerebral hemispheres which may be secondary to chronic   hypertensive encephalopathy.  Severe extensive chronic microvascular ischemic changes and multifocal   chronic lacunar infarcts     11/21/23 CT HEAD: No hydrocephalus, acute intracranial hemorrhage, mass effect, or brain edema.    (11.18.2023)  Head CT: No acute intracranial hemorrhage, mass effect, or shift of the midline structures. Similar-appearing extensive chronic white matter microvascular type changes and chronic lacunar infarcts, as discussed.    CTA neck and head: No large vessel occlusion or major stenosis.    MR Head w/ IV Cont (11.17.2023) Right posterior corona radiata subacute infarction Multiple remote infarctions within the cerebral hemispheric white matter basal ganglia and thalami  Pervasive cerebral hemispheric white matter abnormality, possibly accelerated form of ischemic white matter disease versus and/or superimposed other inflammatory metabolic toxic or infectious process Numerous remote petechial hemorrhages most prominently in the basal ganglia and thalami. Diffuse brain volume loss upper range typical for age    Impression: Impression: Left hemiparesis due to right corona radiata infarct, multiple micro hemorrhages, deep and cortical, cerebral angiogram concerning for PACNS. Pt also with afib, less likely cardioembolic  event  Asx with L parietal acute infarct due to cardioembolism in setting of afib     Cerebral angiogram performed on 11/20 showed multiple areas of focal stenosis and dilatation concerning for vasculitis. LP completed on 11/21 with normal profile. Rheumatology consulted and recommended possible brain biopsy to confirm diagnosis of vasculitis: The risks of performing an open biopsy outweigh the benefits if the results of the biopsy are unlikely to . 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 Patient started on steroids given she is having micro hemorrhage and strokes from vasculitis. Steroids started on 11/24, initial dose Solumderol 1g for 3 days. Will continue Prednisone 60mg and decreasing to prednisone 50mg daily for discharge, No need for taper as per rheumatology eval/recommendation. EEG shows Moderate diffuse/multi-focal cerebral dysfunction, not specific as to etiology. There were no epileptiform abnormalities recorded. Will need repeat cerebral angiogram in 1 month.     ANTITHROMBOTIC THERAPY: Apixaban 5mg BID for new onset afib  TTE Normal global left ventricular systolic function. Mild mitral valve regurgitation. Mild aortic regurgitation. Sclerotic aortic valve with normal opening, cardiac monitoring noted with afib on 11/27, will d/c amiodarone infusion. c/w amiodarone PO and metoprolol TID.      Evaluated by PT/OT and was recommended AR. Patient stable for discharge. 58-year old woman with history of hypertension, rheumatoid arthritis, brought into ED at  initially for concerned for altered mental status.  Prior to hospital presentation: neighbor overhead patient was looking for her mom, was confused/wandering around apartment complex.   At [place]: MR brain showed bilateral subacute to remote embolic strokes. MRI shows right posterior corona radiata acute infarct and multiple petechial hemorrhages in b/l thalami and basal ganglia. Noted to have KRUNAL. Also noted to have hypertension, on amlodipine, aldactone, and losartan. Started on B12 supplements. Seen by rheumatology,  Transferred to [facility] for cerebral angiogram.   NIHSS: 2  LKW: prior to 11/15 ( hospitalizato)    Imaging:  MRI HEAD 11/29/23: Left parietal small cortical acute infarction. No acute intracranial   hemorrhage.  Innumerable scattered chronic microhemorrhages in the brainstem,   bilateral cerebellar hemispheres, deep gray nuclei and peripherally   within the cerebral hemispheres which may be secondary to chronic   hypertensive encephalopathy.  Severe extensive chronic microvascular ischemic changes and multifocal   chronic lacunar infarcts     11/21/23 CT HEAD: No hydrocephalus, acute intracranial hemorrhage, mass effect, or brain edema.    (11.18.2023)  Head CT: No acute intracranial hemorrhage, mass effect, or shift of the midline structures. Similar-appearing extensive chronic white matter microvascular type changes and chronic lacunar infarcts, as discussed.    CTA neck and head: No large vessel occlusion or major stenosis.    MR Head w/ IV Cont (11.17.2023) Right posterior corona radiata subacute infarction Multiple remote infarctions within the cerebral hemispheric white matter basal ganglia and thalami  Pervasive cerebral hemispheric white matter abnormality, possibly accelerated form of ischemic white matter disease versus and/or superimposed other inflammatory metabolic toxic or infectious process Numerous remote petechial hemorrhages most prominently in the basal ganglia and thalami. Diffuse brain volume loss upper range typical for age    Impression: Impression: Left hemiparesis due to right corona radiata infarct, multiple micro hemorrhages, deep and cortical, cerebral angiogram concerning for PACNS. Pt also with afib, less likely cardioembolic  event  Asx with L parietal acute infarct due to cardioembolism in setting of afib     Cerebral angiogram performed on 11/20 showed multiple areas of focal stenosis and dilatation concerning for vasculitis. LP completed on 11/21 with normal profile. Rheumatology consulted and recommended possible brain biopsy to confirm diagnosis of vasculitis: The risks of performing an open biopsy outweigh the benefits if the results of the biopsy are unlikely to . 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 Patient started on steroids given she is having micro hemorrhage and strokes from vasculitis. Steroids started on 11/24, initial dose Solumderol 1g for 3 days. Will continue Prednisone 60mg and decreasing to prednisone 50mg daily for discharge, No need for taper as per rheumatology eval/recommendation. EEG shows Moderate diffuse/multi-focal cerebral dysfunction, not specific as to etiology. There were no epileptiform abnormalities recorded. Will need repeat cerebral angiogram in 1 month.     ANTITHROMBOTIC THERAPY: Apixaban 5mg BID for new onset afib  TTE Normal global left ventricular systolic function. Mild mitral valve regurgitation. Mild aortic regurgitation. Sclerotic aortic valve with normal opening, cardiac monitoring noted with afib on 11/27, will d/c amiodarone infusion. c/w amiodarone PO and metoprolol TID.      Evaluated by PT/OT and was recommended AR. Patient stable for discharge.

## 2023-11-30 NOTE — H&P ADULT - NSHPPHYSICALEXAM_GEN_ALL_CORE
PHYSICAL EXAM  VITALS  T(C): 36.7 (11-30-23 @ 16:00), Max: 36.9 (11-30-23 @ 08:00)  HR: 95 (11-30-23 @ 16:00) (91 - 127)  BP: 140/88 (11-30-23 @ 16:00) (134/84 - 173/82)  RR: 16 (11-30-23 @ 16:00) (16 - 24)  SpO2: 95% (11-30-23 @ 16:00) (95% - 100%)    Gen - NAD, Comfortable  HEENT - NCAT, EOMI, MMM  Neck - Supple, No limited ROM  Pulm - CTAB, No wheeze, No rhonchi, No crackles  Cardiovascular - RRR, S1S2  Chest - good chest expansion, good respiratory effort  Abdomen - Soft, NT/ND, +BS  Extremities - No Cyanosis, no clubbing, no edema, no calf tenderness  Neuro-     Cognitive - awake, alert, oriented to person, place, date, year, and situation. Able to follow command     Communication - Fluent, Comprehensible     Attention: Intact     Judgement: Good evidence of judgement     Memory:  memory intact     Cranial Nerves - CN 2-12 intact.     Motor -                     LEFT    UE - 4+/5                    RIGHT UE - 5/5                    LEFT    LE - 4+/5                    RIGHT LE -  5/5        Sensory - Intact  to LT      Reflexes - DTR Intact, No primitive reflexive     Coordination - FTN intact      Tone - normal   Psychiatric - Mood stable, Affect WNL  Skin:  all skin intact

## 2023-11-30 NOTE — PROGRESS NOTE ADULT - ASSESSMENT
58-year old woman with history of hypertension, rheumatoid arthritis, brought into ED at  initially for concerned for altered mental status. At [place]: MR brain showed bilateral subacute to remote embolic strokes. MRI shows right posterior corona radiata acute infarct and multiple petechial hemorrhages in b/l thalami and basal ganglia. Pt transferred to [facility] for further care and for cerebral angiogram. Not a Tenecteplase candidate due to multiple petechial hemorrhages. NOT a mechanical thrombectomy candidate due to no LVO on imaging.     Impression: Left hemiparesis due to right corona radiata infarct, multiple micro hemorrhages, deep and cortical, cerebral angiogram concerning for PACNS. Pt also with afib, less likely cardioembolic  event    NEURO: Vasovagal episode in am 11/29/23, 1L IV bolus administered, RRT contacted, pt now back to her baseline, continue neuro monitoring. Repeat MRI pending read- no evidence of acute infarct present. Goal for gradual normotension. A1c 4.9, LDL - 128, high intensity statin initiated. MRI Brain with contrast showed diffusion restriction R corona radiata. Multiple microhemorrhages, deep and cortical. Cerebral angiogram performed on 11/20 showed multiple areas of focal stenosis and dilatation concerning for vasculitis. LP completed on 11/21 with normal profile. Rheumatology consulted and recommended possible brain biopsy to confirm diagnosis of vasculitis: The risks of performing an open biopsy outweigh the benefits if the results of the biopsy are unlikely to . 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 Patient started on steroids given she is having micro hemorrhage and strokes from vasculitis. Steroids started on 11/24, initial dose Solumderol 1g for 3 days. Will continue Prednisone 60mg and decreasing to prednisone 50mg daily for discharge, No need for taper as per rheumatology eval/recommendation. EEG shows Moderate diffuse/multi-focal cerebral dysfunction, not specific as to etiology. There were no epileptiform abnormalities recorded.  Physical therapy/OT/PMR: AR. Plan to obtain repeat cerebral angiogram in 4-6 weeks    ANTITHROMBOTIC THERAPY: Apixaban 5mg BID for afib    PULMONARY: CXR (11/26) Clear lungs. No pleural effusions or pneumothorax. Protecting airway, saturating well     CARDIOVASCULAR: TTE Normal global left ventricular systolic function. Mild mitral valve regurgitation. Mild aortic regurgitation. Sclerotic aortic valve with normal opening, cardiac monitoring noted with afib on 11/27, will d/c amiodarone infusion. c/w amiodarone PO, Dr. Lazar (cardio) following.                        SBP goal: 100-160    GASTROINTESTINAL: Dysphagia screen passed on 11/20, tolerated        Diet: Regular    RENAL: BUN/Cr wnl, will continue to encourage PO fluid intake,  good urine output, hypokalemia: will provide KCl PO      Na Goal: Greater than 135     Hamm: no    HEMATOLOGY: H/H without change, Platelets 361 normal, will continue to monitor     DVT ppx: Lovenox    ID: afebrile, no sign of infection. Leukocytosis: resolved     OTHER: Plan discussed with patient at bedside, all questions and concerns addressed. Patient's brother (HCP): Jorge Stone ().     DISPOSITION: AR as per PT/OT eval once stable and workup is complete    CORE MEASURES:        Admission NIHSS: 2     Tenecteplase: NO      LDL/HDL: 128/43     Depression Screen: p     Statin Therapy: yes     Dysphagia Screen:  PASS      Smoking: YES Smoking cessation and education provided to patient [x] Nicotine patch ordered []     Afib NO     Stroke Education  YES    Obtain screening lower extremity venous ultrasound in patients who meet 1 or more of the following criteria as patient is high risk for DVT/PE on admission:   [] History of DVT/PE  [] Hypercoagulable states (Factor V Leiden, Cancer, OCP, etc. )  [] Prolonged immobility (hemiplegia/hemiparesis/post operative or any other extended immobilization)  [] Transferred from outside facility (Rehab or Long term care)  [] Age </= to 50       58-year old woman with history of hypertension, rheumatoid arthritis, brought into ED at  initially for concerned for altered mental status. At [place]: MR brain showed bilateral subacute to remote embolic strokes. MRI shows right posterior corona radiata acute infarct and multiple petechial hemorrhages in b/l thalami and basal ganglia. Pt transferred to [facility] for further care and for cerebral angiogram. Not a Tenecteplase candidate due to multiple petechial hemorrhages. NOT a mechanical thrombectomy candidate due to no LVO on imaging.     Impression: Left hemiparesis due to right corona radiata infarct, multiple micro hemorrhages, deep and cortical, cerebral angiogram concerning for PACNS. Pt also with afib, less likely cardioembolic  event  Asx with L parietal acute infarct due to cardioembolism in setting of afib     NEURO: Neurologically stable on 11/30. Repeat MRI showing evidence of small L parietal infarct- likely due to new onset afib. No change in management at this time. Goal for gradual normotension. A1c 4.9, LDL - 128, high intensity statin initiated. MRI Brain with contrast showed diffusion restriction R corona radiata. Multiple microhemorrhages, deep and cortical. Cerebral angiogram performed on 11/20 showed multiple areas of focal stenosis and dilatation concerning for vasculitis. LP completed on 11/21 with normal profile. Rheumatology consulted and recommended possible brain biopsy to confirm diagnosis of vasculitis: The risks of performing an open biopsy outweigh the benefits if the results of the biopsy are unlikely to . 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 Patient started on steroids given she is having micro hemorrhage and strokes from vasculitis. Steroids started on 11/24, initial dose Solumderol 1g for 3 days. Will continue Prednisone 60mg and decreasing to prednisone 50mg daily for discharge, No need for taper as per rheumatology eval/recommendation. EEG shows Moderate diffuse/multi-focal cerebral dysfunction, not specific as to etiology. There were no epileptiform abnormalities recorded.  Physical therapy/OT/PMR: AR. Plan to obtain repeat cerebral angiogram in 4-6 weeks    ANTITHROMBOTIC THERAPY: Apixaban 5mg BID for afib    PULMONARY: CXR (11/26) Clear lungs. No pleural effusions or pneumothorax. Protecting airway, saturating well on 2L NC     CARDIOVASCULAR: TTE Normal global left ventricular systolic function. Mild mitral valve regurgitation. Mild aortic regurgitation. Sclerotic aortic valve with normal opening, cardiac monitoring noted with afib on 11/27, will d/c amiodarone infusion. c/w amiodarone PO, Dr. Lazar (cardio) following.                        SBP goal: 100-160    GASTROINTESTINAL: Dysphagia screen passed on 11/20, tolerated        Diet: Regular    RENAL: BUN/Cr wnl, will continue to encourage PO fluid intake,  good urine output, hypokalemia: will provide KCl PO      Na Goal: Greater than 135     Hamm: no    HEMATOLOGY: H/H without change, Platelets 361 normal, will continue to monitor     DVT ppx: Lovenox    ID: afebrile, no sign of infection. Leukocytosis: resolved     OTHER: Plan discussed with patient at bedside, all questions and concerns addressed. Patient's brother (HCP): Jorge Stone ().     DISPOSITION: AR as per PT/OT eval once stable and workup is complete    CORE MEASURES:        Admission NIHSS: 2     Tenecteplase: NO      LDL/HDL: 128/43     Depression Screen: p     Statin Therapy: yes     Dysphagia Screen:  PASS      Smoking: YES Smoking cessation and education provided to patient [x] Nicotine patch ordered []     Afib NO     Stroke Education  YES    Obtain screening lower extremity venous ultrasound in patients who meet 1 or more of the following criteria as patient is high risk for DVT/PE on admission:   [] History of DVT/PE  [] Hypercoagulable states (Factor V Leiden, Cancer, OCP, etc. )  [] Prolonged immobility (hemiplegia/hemiparesis/post operative or any other extended immobilization)  [] Transferred from outside facility (Rehab or Long term care)  [] Age </= to 50

## 2023-12-01 LAB
ALBUMIN SERPL ELPH-MCNC: 2.5 G/DL — LOW (ref 3.3–5)
ALP SERPL-CCNC: 65 U/L — SIGNIFICANT CHANGE UP (ref 40–120)
ALP SERPL-CCNC: 65 U/L — SIGNIFICANT CHANGE UP (ref 40–120)
ALP SERPL-CCNC: 72 U/L — SIGNIFICANT CHANGE UP (ref 40–120)
ALP SERPL-CCNC: 72 U/L — SIGNIFICANT CHANGE UP (ref 40–120)
ALT FLD-CCNC: 28 U/L — SIGNIFICANT CHANGE UP (ref 10–45)
ALT FLD-CCNC: 28 U/L — SIGNIFICANT CHANGE UP (ref 10–45)
ALT FLD-CCNC: 30 U/L — SIGNIFICANT CHANGE UP (ref 10–45)
ALT FLD-CCNC: 30 U/L — SIGNIFICANT CHANGE UP (ref 10–45)
AMPHIPHYSIN AB TITR CSF: NEGATIVE — SIGNIFICANT CHANGE UP
AMPHIPHYSIN AB TITR CSF: NEGATIVE — SIGNIFICANT CHANGE UP
ANION GAP SERPL CALC-SCNC: 6 MMOL/L — SIGNIFICANT CHANGE UP (ref 5–17)
ANION GAP SERPL CALC-SCNC: 6 MMOL/L — SIGNIFICANT CHANGE UP (ref 5–17)
ANION GAP SERPL CALC-SCNC: 7 MMOL/L — SIGNIFICANT CHANGE UP (ref 5–17)
ANION GAP SERPL CALC-SCNC: 7 MMOL/L — SIGNIFICANT CHANGE UP (ref 5–17)
APPEARANCE UR: ABNORMAL
APPEARANCE UR: ABNORMAL
APTT BLD: 27.9 SEC — SIGNIFICANT CHANGE UP (ref 24.5–35.6)
APTT BLD: 27.9 SEC — SIGNIFICANT CHANGE UP (ref 24.5–35.6)
AST SERPL-CCNC: 17 U/L — SIGNIFICANT CHANGE UP (ref 10–40)
AST SERPL-CCNC: 17 U/L — SIGNIFICANT CHANGE UP (ref 10–40)
AST SERPL-CCNC: 18 U/L — SIGNIFICANT CHANGE UP (ref 10–40)
AST SERPL-CCNC: 18 U/L — SIGNIFICANT CHANGE UP (ref 10–40)
BACTERIA # UR AUTO: NEGATIVE /HPF — SIGNIFICANT CHANGE UP
BACTERIA # UR AUTO: NEGATIVE /HPF — SIGNIFICANT CHANGE UP
BASOPHILS # BLD AUTO: 0.01 K/UL — SIGNIFICANT CHANGE UP (ref 0–0.2)
BASOPHILS # BLD AUTO: 0.01 K/UL — SIGNIFICANT CHANGE UP (ref 0–0.2)
BASOPHILS NFR BLD AUTO: 0.1 % — SIGNIFICANT CHANGE UP (ref 0–2)
BASOPHILS NFR BLD AUTO: 0.1 % — SIGNIFICANT CHANGE UP (ref 0–2)
BILIRUB SERPL-MCNC: 0.3 MG/DL — SIGNIFICANT CHANGE UP (ref 0.2–1.2)
BILIRUB SERPL-MCNC: 0.3 MG/DL — SIGNIFICANT CHANGE UP (ref 0.2–1.2)
BILIRUB SERPL-MCNC: 0.4 MG/DL — SIGNIFICANT CHANGE UP (ref 0.2–1.2)
BILIRUB SERPL-MCNC: 0.4 MG/DL — SIGNIFICANT CHANGE UP (ref 0.2–1.2)
BILIRUB UR-MCNC: NEGATIVE — SIGNIFICANT CHANGE UP
BILIRUB UR-MCNC: NEGATIVE — SIGNIFICANT CHANGE UP
BUN SERPL-MCNC: 20 MG/DL — SIGNIFICANT CHANGE UP (ref 7–23)
BUN SERPL-MCNC: 20 MG/DL — SIGNIFICANT CHANGE UP (ref 7–23)
BUN SERPL-MCNC: 21 MG/DL — SIGNIFICANT CHANGE UP (ref 7–23)
BUN SERPL-MCNC: 21 MG/DL — SIGNIFICANT CHANGE UP (ref 7–23)
CALCIUM SERPL-MCNC: 8.5 MG/DL — SIGNIFICANT CHANGE UP (ref 8.4–10.5)
CALCIUM SERPL-MCNC: 8.5 MG/DL — SIGNIFICANT CHANGE UP (ref 8.4–10.5)
CALCIUM SERPL-MCNC: 8.9 MG/DL — SIGNIFICANT CHANGE UP (ref 8.4–10.5)
CALCIUM SERPL-MCNC: 8.9 MG/DL — SIGNIFICANT CHANGE UP (ref 8.4–10.5)
CHLORIDE SERPL-SCNC: 101 MMOL/L — SIGNIFICANT CHANGE UP (ref 96–108)
CHLORIDE SERPL-SCNC: 101 MMOL/L — SIGNIFICANT CHANGE UP (ref 96–108)
CHLORIDE SERPL-SCNC: 99 MMOL/L — SIGNIFICANT CHANGE UP (ref 96–108)
CHLORIDE SERPL-SCNC: 99 MMOL/L — SIGNIFICANT CHANGE UP (ref 96–108)
CK MB BLD-MCNC: 0.9 % — SIGNIFICANT CHANGE UP (ref 0–3.5)
CK MB BLD-MCNC: 0.9 % — SIGNIFICANT CHANGE UP (ref 0–3.5)
CK MB CFR SERPL CALC: 0.6 NG/ML — SIGNIFICANT CHANGE UP (ref 0.5–10)
CK MB CFR SERPL CALC: 0.6 NG/ML — SIGNIFICANT CHANGE UP (ref 0.5–10)
CK SERPL-CCNC: 69 U/L — SIGNIFICANT CHANGE UP (ref 25–170)
CK SERPL-CCNC: 69 U/L — SIGNIFICANT CHANGE UP (ref 25–170)
CO2 SERPL-SCNC: 29 MMOL/L — SIGNIFICANT CHANGE UP (ref 22–31)
CO2 SERPL-SCNC: 29 MMOL/L — SIGNIFICANT CHANGE UP (ref 22–31)
CO2 SERPL-SCNC: 31 MMOL/L — SIGNIFICANT CHANGE UP (ref 22–31)
CO2 SERPL-SCNC: 31 MMOL/L — SIGNIFICANT CHANGE UP (ref 22–31)
COLOR SPEC: YELLOW — SIGNIFICANT CHANGE UP
COLOR SPEC: YELLOW — SIGNIFICANT CHANGE UP
CREAT SERPL-MCNC: 0.98 MG/DL — SIGNIFICANT CHANGE UP (ref 0.5–1.3)
CREAT SERPL-MCNC: 0.98 MG/DL — SIGNIFICANT CHANGE UP (ref 0.5–1.3)
CREAT SERPL-MCNC: 1.04 MG/DL — SIGNIFICANT CHANGE UP (ref 0.5–1.3)
CREAT SERPL-MCNC: 1.04 MG/DL — SIGNIFICANT CHANGE UP (ref 0.5–1.3)
CRP SERPL-MCNC: 12 MG/L — HIGH
CRP SERPL-MCNC: 12 MG/L — HIGH
CV2 IGG TITR CSF: NEGATIVE — SIGNIFICANT CHANGE UP
CV2 IGG TITR CSF: NEGATIVE — SIGNIFICANT CHANGE UP
DIFF PNL FLD: NEGATIVE — SIGNIFICANT CHANGE UP
DIFF PNL FLD: NEGATIVE — SIGNIFICANT CHANGE UP
EGFR: 61 ML/MIN/1.73M2 — SIGNIFICANT CHANGE UP
EGFR: 61 ML/MIN/1.73M2 — SIGNIFICANT CHANGE UP
EGFR: 66 ML/MIN/1.73M2 — SIGNIFICANT CHANGE UP
EGFR: 66 ML/MIN/1.73M2 — SIGNIFICANT CHANGE UP
EOSINOPHIL # BLD AUTO: 0.29 K/UL — SIGNIFICANT CHANGE UP (ref 0–0.5)
EOSINOPHIL # BLD AUTO: 0.29 K/UL — SIGNIFICANT CHANGE UP (ref 0–0.5)
EOSINOPHIL NFR BLD AUTO: 2 % — SIGNIFICANT CHANGE UP (ref 0–6)
EOSINOPHIL NFR BLD AUTO: 2 % — SIGNIFICANT CHANGE UP (ref 0–6)
EPI CELLS # UR: 5 — SIGNIFICANT CHANGE UP
EPI CELLS # UR: 5 — SIGNIFICANT CHANGE UP
GLIAL NUC TYPE 1 AB TITR CSF: NEGATIVE — SIGNIFICANT CHANGE UP
GLIAL NUC TYPE 1 AB TITR CSF: NEGATIVE — SIGNIFICANT CHANGE UP
GLUCOSE BLDC GLUCOMTR-MCNC: 110 MG/DL — HIGH (ref 70–99)
GLUCOSE BLDC GLUCOMTR-MCNC: 110 MG/DL — HIGH (ref 70–99)
GLUCOSE BLDC GLUCOMTR-MCNC: 114 MG/DL — HIGH (ref 70–99)
GLUCOSE BLDC GLUCOMTR-MCNC: 114 MG/DL — HIGH (ref 70–99)
GLUCOSE BLDC GLUCOMTR-MCNC: 182 MG/DL — HIGH (ref 70–99)
GLUCOSE BLDC GLUCOMTR-MCNC: 182 MG/DL — HIGH (ref 70–99)
GLUCOSE BLDC GLUCOMTR-MCNC: 227 MG/DL — HIGH (ref 70–99)
GLUCOSE BLDC GLUCOMTR-MCNC: 227 MG/DL — HIGH (ref 70–99)
GLUCOSE BLDC GLUCOMTR-MCNC: 242 MG/DL — HIGH (ref 70–99)
GLUCOSE BLDC GLUCOMTR-MCNC: 242 MG/DL — HIGH (ref 70–99)
GLUCOSE SERPL-MCNC: 182 MG/DL — HIGH (ref 70–99)
GLUCOSE SERPL-MCNC: 182 MG/DL — HIGH (ref 70–99)
GLUCOSE SERPL-MCNC: 96 MG/DL — SIGNIFICANT CHANGE UP (ref 70–99)
GLUCOSE SERPL-MCNC: 96 MG/DL — SIGNIFICANT CHANGE UP (ref 70–99)
GLUCOSE UR QL: >=1000 MG/DL
GLUCOSE UR QL: >=1000 MG/DL
HCT VFR BLD CALC: 31.4 % — LOW (ref 34.5–45)
HCT VFR BLD CALC: 31.4 % — LOW (ref 34.5–45)
HCT VFR BLD CALC: 32.3 % — LOW (ref 34.5–45)
HCT VFR BLD CALC: 32.3 % — LOW (ref 34.5–45)
HGB BLD-MCNC: 11.2 G/DL — LOW (ref 11.5–15.5)
HGB BLD-MCNC: 11.2 G/DL — LOW (ref 11.5–15.5)
HGB BLD-MCNC: 11.3 G/DL — LOW (ref 11.5–15.5)
HGB BLD-MCNC: 11.3 G/DL — LOW (ref 11.5–15.5)
HU1 AB TITR CSF IF: NEGATIVE — SIGNIFICANT CHANGE UP
HU1 AB TITR CSF IF: NEGATIVE — SIGNIFICANT CHANGE UP
HU2 AB TITR CSF IF: NEGATIVE — SIGNIFICANT CHANGE UP
HU2 AB TITR CSF IF: NEGATIVE — SIGNIFICANT CHANGE UP
HU3 AB TITR CSF: NEGATIVE — SIGNIFICANT CHANGE UP
HU3 AB TITR CSF: NEGATIVE — SIGNIFICANT CHANGE UP
IMM GRANULOCYTES NFR BLD AUTO: 0.8 % — SIGNIFICANT CHANGE UP (ref 0–0.9)
IMM GRANULOCYTES NFR BLD AUTO: 0.8 % — SIGNIFICANT CHANGE UP (ref 0–0.9)
INR BLD: 1.34 RATIO — HIGH (ref 0.85–1.18)
INR BLD: 1.34 RATIO — HIGH (ref 0.85–1.18)
KETONES UR-MCNC: NEGATIVE MG/DL — SIGNIFICANT CHANGE UP
KETONES UR-MCNC: NEGATIVE MG/DL — SIGNIFICANT CHANGE UP
LACTATE SERPL-SCNC: 1.6 MMOL/L — SIGNIFICANT CHANGE UP (ref 0.7–2)
LACTATE SERPL-SCNC: 1.6 MMOL/L — SIGNIFICANT CHANGE UP (ref 0.7–2)
LACTATE SERPL-SCNC: 2.2 MMOL/L — HIGH (ref 0.7–2)
LACTATE SERPL-SCNC: 2.2 MMOL/L — HIGH (ref 0.7–2)
LEUKOCYTE ESTERASE UR-ACNC: NEGATIVE — SIGNIFICANT CHANGE UP
LEUKOCYTE ESTERASE UR-ACNC: NEGATIVE — SIGNIFICANT CHANGE UP
LYMPHOCYTES # BLD AUTO: 26.8 % — SIGNIFICANT CHANGE UP (ref 13–44)
LYMPHOCYTES # BLD AUTO: 26.8 % — SIGNIFICANT CHANGE UP (ref 13–44)
LYMPHOCYTES # BLD AUTO: 3.8 K/UL — HIGH (ref 1–3.3)
LYMPHOCYTES # BLD AUTO: 3.8 K/UL — HIGH (ref 1–3.3)
MAGNESIUM SERPL-MCNC: 1.9 MG/DL — SIGNIFICANT CHANGE UP (ref 1.6–2.6)
MAGNESIUM SERPL-MCNC: 1.9 MG/DL — SIGNIFICANT CHANGE UP (ref 1.6–2.6)
MCHC RBC-ENTMCNC: 28.9 PG — SIGNIFICANT CHANGE UP (ref 27–34)
MCHC RBC-ENTMCNC: 28.9 PG — SIGNIFICANT CHANGE UP (ref 27–34)
MCHC RBC-ENTMCNC: 29.4 PG — SIGNIFICANT CHANGE UP (ref 27–34)
MCHC RBC-ENTMCNC: 29.4 PG — SIGNIFICANT CHANGE UP (ref 27–34)
MCHC RBC-ENTMCNC: 35 GM/DL — SIGNIFICANT CHANGE UP (ref 32–36)
MCHC RBC-ENTMCNC: 35 GM/DL — SIGNIFICANT CHANGE UP (ref 32–36)
MCHC RBC-ENTMCNC: 35.7 GM/DL — SIGNIFICANT CHANGE UP (ref 32–36)
MCHC RBC-ENTMCNC: 35.7 GM/DL — SIGNIFICANT CHANGE UP (ref 32–36)
MCV RBC AUTO: 82.4 FL — SIGNIFICANT CHANGE UP (ref 80–100)
MCV RBC AUTO: 82.4 FL — SIGNIFICANT CHANGE UP (ref 80–100)
MCV RBC AUTO: 82.6 FL — SIGNIFICANT CHANGE UP (ref 80–100)
MCV RBC AUTO: 82.6 FL — SIGNIFICANT CHANGE UP (ref 80–100)
MONOCYTES # BLD AUTO: 1.27 K/UL — HIGH (ref 0–0.9)
MONOCYTES # BLD AUTO: 1.27 K/UL — HIGH (ref 0–0.9)
MONOCYTES NFR BLD AUTO: 9 % — SIGNIFICANT CHANGE UP (ref 2–14)
MONOCYTES NFR BLD AUTO: 9 % — SIGNIFICANT CHANGE UP (ref 2–14)
NEUTROPHILS # BLD AUTO: 8.7 K/UL — HIGH (ref 1.8–7.4)
NEUTROPHILS # BLD AUTO: 8.7 K/UL — HIGH (ref 1.8–7.4)
NEUTROPHILS NFR BLD AUTO: 61.3 % — SIGNIFICANT CHANGE UP (ref 43–77)
NEUTROPHILS NFR BLD AUTO: 61.3 % — SIGNIFICANT CHANGE UP (ref 43–77)
NITRITE UR-MCNC: NEGATIVE — SIGNIFICANT CHANGE UP
NITRITE UR-MCNC: NEGATIVE — SIGNIFICANT CHANGE UP
NRBC # BLD: 0 /100 WBCS — SIGNIFICANT CHANGE UP (ref 0–0)
PARANEOPLASTIC INTERPRETATION, CSF: SIGNIFICANT CHANGE UP
PARANEOPLASTIC INTERPRETATION, CSF: SIGNIFICANT CHANGE UP
PCA-TR AB TITR CSF: NEGATIVE — SIGNIFICANT CHANGE UP
PCA-TR AB TITR CSF: NEGATIVE — SIGNIFICANT CHANGE UP
PH UR: 5 — SIGNIFICANT CHANGE UP (ref 5–8)
PH UR: 5 — SIGNIFICANT CHANGE UP (ref 5–8)
PHOSPHATE SERPL-MCNC: 2.9 MG/DL — SIGNIFICANT CHANGE UP (ref 2.5–4.5)
PHOSPHATE SERPL-MCNC: 2.9 MG/DL — SIGNIFICANT CHANGE UP (ref 2.5–4.5)
PLATELET # BLD AUTO: 392 K/UL — SIGNIFICANT CHANGE UP (ref 150–400)
PLATELET # BLD AUTO: 392 K/UL — SIGNIFICANT CHANGE UP (ref 150–400)
PLATELET # BLD AUTO: 394 K/UL — SIGNIFICANT CHANGE UP (ref 150–400)
PLATELET # BLD AUTO: 394 K/UL — SIGNIFICANT CHANGE UP (ref 150–400)
POTASSIUM SERPL-MCNC: 3.1 MMOL/L — LOW (ref 3.5–5.3)
POTASSIUM SERPL-MCNC: 3.1 MMOL/L — LOW (ref 3.5–5.3)
POTASSIUM SERPL-MCNC: 3.6 MMOL/L — SIGNIFICANT CHANGE UP (ref 3.5–5.3)
POTASSIUM SERPL-MCNC: 3.6 MMOL/L — SIGNIFICANT CHANGE UP (ref 3.5–5.3)
POTASSIUM SERPL-SCNC: 3.1 MMOL/L — LOW (ref 3.5–5.3)
POTASSIUM SERPL-SCNC: 3.1 MMOL/L — LOW (ref 3.5–5.3)
POTASSIUM SERPL-SCNC: 3.6 MMOL/L — SIGNIFICANT CHANGE UP (ref 3.5–5.3)
POTASSIUM SERPL-SCNC: 3.6 MMOL/L — SIGNIFICANT CHANGE UP (ref 3.5–5.3)
PROT SERPL-MCNC: 6.8 G/DL — SIGNIFICANT CHANGE UP (ref 6–8.3)
PROT SERPL-MCNC: 6.8 G/DL — SIGNIFICANT CHANGE UP (ref 6–8.3)
PROT SERPL-MCNC: 6.9 G/DL — SIGNIFICANT CHANGE UP (ref 6–8.3)
PROT SERPL-MCNC: 6.9 G/DL — SIGNIFICANT CHANGE UP (ref 6–8.3)
PROT UR-MCNC: NEGATIVE MG/DL — SIGNIFICANT CHANGE UP
PROT UR-MCNC: NEGATIVE MG/DL — SIGNIFICANT CHANGE UP
PROTHROM AB SERPL-ACNC: 15.5 SEC — HIGH (ref 9.5–13)
PROTHROM AB SERPL-ACNC: 15.5 SEC — HIGH (ref 9.5–13)
RBC # BLD: 3.81 M/UL — SIGNIFICANT CHANGE UP (ref 3.8–5.2)
RBC # BLD: 3.81 M/UL — SIGNIFICANT CHANGE UP (ref 3.8–5.2)
RBC # BLD: 3.91 M/UL — SIGNIFICANT CHANGE UP (ref 3.8–5.2)
RBC # BLD: 3.91 M/UL — SIGNIFICANT CHANGE UP (ref 3.8–5.2)
RBC # FLD: 12.5 % — SIGNIFICANT CHANGE UP (ref 10.3–14.5)
RBC # FLD: 12.5 % — SIGNIFICANT CHANGE UP (ref 10.3–14.5)
RBC # FLD: 12.6 % — SIGNIFICANT CHANGE UP (ref 10.3–14.5)
RBC # FLD: 12.6 % — SIGNIFICANT CHANGE UP (ref 10.3–14.5)
RBC CASTS # UR COMP ASSIST: 2 /HPF — SIGNIFICANT CHANGE UP (ref 0–4)
RBC CASTS # UR COMP ASSIST: 2 /HPF — SIGNIFICANT CHANGE UP (ref 0–4)
SODIUM SERPL-SCNC: 134 MMOL/L — LOW (ref 135–145)
SODIUM SERPL-SCNC: 134 MMOL/L — LOW (ref 135–145)
SODIUM SERPL-SCNC: 139 MMOL/L — SIGNIFICANT CHANGE UP (ref 135–145)
SODIUM SERPL-SCNC: 139 MMOL/L — SIGNIFICANT CHANGE UP (ref 135–145)
SP GR SPEC: 1.01 — SIGNIFICANT CHANGE UP (ref 1–1.03)
SP GR SPEC: 1.01 — SIGNIFICANT CHANGE UP (ref 1–1.03)
TROPONIN I, HIGH SENSITIVITY RESULT: 6.7 NG/L — SIGNIFICANT CHANGE UP
TROPONIN I, HIGH SENSITIVITY RESULT: 6.7 NG/L — SIGNIFICANT CHANGE UP
UROBILINOGEN FLD QL: 0.2 MG/DL — SIGNIFICANT CHANGE UP (ref 0.2–1)
UROBILINOGEN FLD QL: 0.2 MG/DL — SIGNIFICANT CHANGE UP (ref 0.2–1)
WBC # BLD: 12.2 K/UL — HIGH (ref 3.8–10.5)
WBC # BLD: 12.2 K/UL — HIGH (ref 3.8–10.5)
WBC # BLD: 14.18 K/UL — HIGH (ref 3.8–10.5)
WBC # BLD: 14.18 K/UL — HIGH (ref 3.8–10.5)
WBC # FLD AUTO: 12.2 K/UL — HIGH (ref 3.8–10.5)
WBC # FLD AUTO: 12.2 K/UL — HIGH (ref 3.8–10.5)
WBC # FLD AUTO: 14.18 K/UL — HIGH (ref 3.8–10.5)
WBC # FLD AUTO: 14.18 K/UL — HIGH (ref 3.8–10.5)
WBC UR QL: 1 /HPF — SIGNIFICANT CHANGE UP (ref 0–5)
WBC UR QL: 1 /HPF — SIGNIFICANT CHANGE UP (ref 0–5)

## 2023-12-01 PROCEDURE — 70450 CT HEAD/BRAIN W/O DYE: CPT | Mod: 26,59

## 2023-12-01 PROCEDURE — 99223 1ST HOSP IP/OBS HIGH 75: CPT

## 2023-12-01 PROCEDURE — 70496 CT ANGIOGRAPHY HEAD: CPT | Mod: 26

## 2023-12-01 PROCEDURE — 70498 CT ANGIOGRAPHY NECK: CPT | Mod: 26

## 2023-12-01 PROCEDURE — 93010 ELECTROCARDIOGRAM REPORT: CPT

## 2023-12-01 PROCEDURE — 0042T: CPT

## 2023-12-01 RX ORDER — SODIUM CHLORIDE 9 MG/ML
250 INJECTION INTRAMUSCULAR; INTRAVENOUS; SUBCUTANEOUS ONCE
Refills: 0 | Status: COMPLETED | OUTPATIENT
Start: 2023-12-01 | End: 2023-12-01

## 2023-12-01 RX ORDER — SODIUM CHLORIDE 9 MG/ML
500 INJECTION INTRAMUSCULAR; INTRAVENOUS; SUBCUTANEOUS ONCE
Refills: 0 | Status: COMPLETED | OUTPATIENT
Start: 2023-12-01 | End: 2023-12-01

## 2023-12-01 RX ORDER — POTASSIUM CHLORIDE 20 MEQ
40 PACKET (EA) ORAL EVERY 4 HOURS
Refills: 0 | Status: COMPLETED | OUTPATIENT
Start: 2023-12-01 | End: 2023-12-01

## 2023-12-01 RX ADMIN — ATORVASTATIN CALCIUM 80 MILLIGRAM(S): 80 TABLET, FILM COATED ORAL at 22:36

## 2023-12-01 RX ADMIN — PANTOPRAZOLE SODIUM 40 MILLIGRAM(S): 20 TABLET, DELAYED RELEASE ORAL at 13:27

## 2023-12-01 RX ADMIN — SENNA PLUS 2 TABLET(S): 8.6 TABLET ORAL at 22:36

## 2023-12-01 RX ADMIN — Medication 5 MILLIGRAM(S): at 22:36

## 2023-12-01 RX ADMIN — Medication 1: at 17:47

## 2023-12-01 RX ADMIN — AMIODARONE HYDROCHLORIDE 200 MILLIGRAM(S): 400 TABLET ORAL at 06:16

## 2023-12-01 RX ADMIN — AMLODIPINE BESYLATE 10 MILLIGRAM(S): 2.5 TABLET ORAL at 06:17

## 2023-12-01 RX ADMIN — Medication 50 MILLIGRAM(S): at 06:16

## 2023-12-01 RX ADMIN — Medication 2: at 12:03

## 2023-12-01 RX ADMIN — SODIUM CHLORIDE 1000 MILLILITER(S): 9 INJECTION INTRAMUSCULAR; INTRAVENOUS; SUBCUTANEOUS at 15:33

## 2023-12-01 RX ADMIN — Medication 50 MILLIGRAM(S): at 22:36

## 2023-12-01 RX ADMIN — APIXABAN 5 MILLIGRAM(S): 2.5 TABLET, FILM COATED ORAL at 06:17

## 2023-12-01 RX ADMIN — SODIUM CHLORIDE 500 MILLILITER(S): 9 INJECTION INTRAMUSCULAR; INTRAVENOUS; SUBCUTANEOUS at 11:35

## 2023-12-01 RX ADMIN — LOSARTAN POTASSIUM 25 MILLIGRAM(S): 100 TABLET, FILM COATED ORAL at 06:16

## 2023-12-01 RX ADMIN — Medication 50 MILLIGRAM(S): at 13:32

## 2023-12-01 RX ADMIN — Medication 40 MILLIEQUIVALENT(S): at 08:44

## 2023-12-01 RX ADMIN — APIXABAN 5 MILLIGRAM(S): 2.5 TABLET, FILM COATED ORAL at 17:45

## 2023-12-01 RX ADMIN — Medication 40 MILLIEQUIVALENT(S): at 13:28

## 2023-12-01 NOTE — CHART NOTE - NSCHARTNOTEFT_GEN_A_CORE
Rapid response called at 10:19 AM. Patient was having a bowel movement in the bathroom and when PCA was pulling her pants back up patient started shaking and became unresponsive. Last well known was 10:10 AM. Patient seen and examined at bedside.     Vitals: /73, , RR 16, T 98.1 F, SpO2 95% on RA    PLAN:  - STAT CT scan w/o contrast  - STAT EKG  - STAT labs: CBC, CMP, Mg, Phos, Troponin, Lactate, ESR, CRP, PT/INR    ______________________________________________________________________________________________ Rapid response called at 10:19 AM. Patient was having a bowel movement in the bathroom and when PCA was pulling her pants back up patient started shaking and became unresponsive. Last well known was 10:10 AM. Patient seen and examined at bedside.     Vitals: /73, , RR 16, T 98.1 F, SpO2 95% on RA    PLAN:  - STAT CT scan w/o contrast  - STAT EKG  - STAT labs: CBC, CMP, Mg, Phos, Troponin, Lactate, ESR, CRP, PT/INR    _______________________________________________________________    Full Exam completed when patient back from imaging at 11 AM:    1A: Level of consciousness = 0       0= Alert; keenly responsive       +1= Arouses to minor stimulation       +2= Requires repeated stimulation to arouse       +2= Movements to pain       +3= Postures or unresponsive  1B: Ask month and age = +2       0= Both questions right       +1= 1 question right       +2= 0 questions right       +1= Dysarthric/intubated/trauma/language barrier       +2= Aphasic  1C: "Blink eyes" and "Squeeze Hands" = 0       0= Performs both       +1= Performs 1 task       +2= Performs 0 tasks    2: Horizontal EOMs = 0       0= Normal       +1= Partial gaze palsy: can be overcome       +1= Partial gaze palsy: corrects w/ oculocephalic reflex        +2= Forzed gaze palsy: cannot be overcome    3: Visual fields = 0       0= No visual loss       +1= Partial hemianopia       +2= Complete hemianopia       +3= Patient is b/l blind       +3= B/l hemianopia    4: Facial palsy (use grimace if obtunded) = 0       0= Normal symmetry       +1= Minor paralysis (flat NLF, smile asymmetry)       +2= Partial paralysis ( lower face)       +3= Unilateral complete paralysis (upper/lower face)       +3= B/l complete paralysis (upper/lower face)    5A: Left arm motor drift (count out loud and use fingers to show count) = 0       0= No drift x 10 seconds       +1= Drift but doesn't hit bed       +2= Drift, hits bed       +2= Some effort against gravity       +3= No effort against gravity       +4= No movement       0= Amputation/joint fusion  5B: Right arm motor drift = 0       0= No drift x 10 seconds       +1= Drift but doesn't hit bed       +2= Drift, hits bed       +2= Some effort against gravity       +3= No effort against gravity       +4= No movement       0= Amputation/joint fusion    6A: Left leg motor drift = +1       0= No drift x 10 seconds       +1= Drift but doesn't hit bed       +2= Drift, hits bed       +2= Some effort against gravity       +3= No effort against gravity       +4= No movement       0= Amputation/joint fusion    6B: Right leg motor drift       0= No drift x 10 seconds       +1= Drift but doesn't hit bed       +2= Drift, hits bed       +2= Some effort against gravity       +3= No effort against gravity       +4= No movement       0= Amputation/joint fusion    7: Limb ataxia (FNF/heel-shin)       0= No ataxia       +1= Ataxia in 1 limb       +2= Ataxia in 2 limbs       0= Does not understand       0= Paralyzed       0= Amputation/joint fusion    8: Sensation       0= Normal, no sensory loss       +1= mild-moderate loss: less sharp/more dull       +1= mild-moderate loss: can sense being touched       +2= Complete loss: cannot sense being touched at all       +2= No response and quadriplegic       +2= Coma/unresponsive    9: Language/aphasia- describe the scene (on tavo); name the items; read the sentences (on tavo)       0= Normal, no aphasia       +1= mild-moderate aphaisa: some obvious changes without significant limitation       +2= Severe aphasia: fragmentary expression, inference needed, cannot identify materials       +3= Mute/global aphasia: no usable speech/auditory comprehension       +3= coma/unresponsive    10: Dysarthria- read the words       0= Normal       +1= mild-moderate dysarthria: slurring but can be understood       +2= Severe dysarthria: unintelligible slurring or out of proportion to dysphasia       +2= Mute/anarthric        0= Intubated/unable to test    11: Extinction/inattention       0= No abnormality       +1= visual/tactile/auditory/spatial/personal inattention       +1= Extinction to b/l simultaneous stimulation       +2= Profound darío-inattention (e.g. does not recognize own hand)       +2= extinction to > 1 modality Rapid response called at 10:19 AM. Patient was having a bowel movement in the bathroom and when PCA was pulling her pants back up patient started shaking and became unresponsive. Last well known was 10:10 AM. Patient seen and examined at bedside.     Vitals: /73, , RR 16, T 98.1 F, SpO2 95% on RA    PLAN:  - STAT CT scan w/o contrast  - STAT EKG  - STAT labs: CBC, CMP, Mg, Phos, Troponin, Lactate, ESR, CRP, PT/INR    _______________________________________________________________    Full Exam completed when patient back from imaging at 11 AM:    1A: Level of consciousness = +1       0= Alert; keenly responsive       +1= Arouses to minor stimulation       +2= Requires repeated stimulation to arouse       +2= Movements to pain       +3= Postures or unresponsive    1B: Ask month and age = +2       0= Both questions right       +1= 1 question right       +2= 0 questions right       +1= Dysarthric/intubated/trauma/language barrier       +2= Aphasic    1C: "Blink eyes" and "Squeeze Hands" = 0       0= Performs both       +1= Performs 1 task       +2= Performs 0 tasks    2: Horizontal EOMs = 0       0= Normal       +1= Partial gaze palsy: can be overcome       +1= Partial gaze palsy: corrects w/ oculocephalic reflex        +2= Forzed gaze palsy: cannot be overcome    3: Visual fields = +1       0= No visual loss       +1= Partial hemianopia       +2= Complete hemianopia       +3= Patient is b/l blind       +3= B/l hemianopia    4: Facial palsy (use grimace if obtunded) = 0       0= Normal symmetry       +1= Minor paralysis (flat NLF, smile asymmetry)       +2= Partial paralysis ( lower face)       +3= Unilateral complete paralysis (upper/lower face)       +3= B/l complete paralysis (upper/lower face)    5A: Left arm motor drift (count out loud and use fingers to show count) = 0       0= No drift x 10 seconds       +1= Drift but doesn't hit bed       +2= Drift, hits bed       +2= Some effort against gravity       +3= No effort against gravity       +4= No movement       0= Amputation/joint fusion  5B: Right arm motor drift = 0       0= No drift x 10 seconds       +1= Drift but doesn't hit bed       +2= Drift, hits bed       +2= Some effort against gravity       +3= No effort against gravity       +4= No movement       0= Amputation/joint fusion    6A: Left leg motor drift = +1       0= No drift x 10 seconds       +1= Drift but doesn't hit bed       +2= Drift, hits bed       +2= Some effort against gravity       +3= No effort against gravity       +4= No movement       0= Amputation/joint fusion    6B: Right leg motor drift = +1       0= No drift x 10 seconds       +1= Drift but doesn't hit bed       +2= Drift, hits bed       +2= Some effort against gravity       +3= No effort against gravity       +4= No movement       0= Amputation/joint fusion    7: Limb ataxia (FNF/heel-shin) = +2       0= No ataxia       +1= Ataxia in 1 limb       +2= Ataxia in 2 limbs       0= Does not understand       0= Paralyzed       0= Amputation/joint fusion    8: Sensation = 0       0= Normal, no sensory loss       +1= mild-moderate loss: less sharp/more dull       +1= mild-moderate loss: can sense being touched       +2= Complete loss: cannot sense being touched at all       +2= No response and quadriplegic       +2= Coma/unresponsive    9: Language/aphasia- describe the scene (on tavo); name the items; read the sentences (on tavo) = 0       0= Normal, no aphasia       +1= mild-moderate aphaisa: some obvious changes without significant limitation       +2= Severe aphasia: fragmentary expression, inference needed, cannot identify materials       +3= Mute/global aphasia: no usable speech/auditory comprehension       +3= coma/unresponsive    10: Dysarthria- read the words = 0       0= Normal       +1= mild-moderate dysarthria: slurring but can be understood       +2= Severe dysarthria: unintelligible slurring or out of proportion to dysphasia       +2= Mute/anarthric        0= Intubated/unable to test    11: Extinction/inattention = 0       0= No abnormality       +1= visual/tactile/auditory/spatial/personal inattention       +1= Extinction to b/l simultaneous stimulation       +2= Profound darío-inattention (e.g. does not recognize own hand)       +2= extinction to > 1 modality    Total = 8 Rapid response called at 10:19 AM. Patient was having a bowel movement in the bathroom and when PCA was pulling her pants back up patient started shaking and became unresponsive. Last well known was 10:10 AM. Patient seen and examined at bedside.     Vitals: /73, , RR 16, T 98.1 F, SpO2 95% on RA    Patient brought down for CT head.  _______________________________________________________________    Full Exam completed when patient back from imaging at 11 AM:    1A: Level of consciousness = +1       0= Alert; keenly responsive       +1= Arouses to minor stimulation       +2= Requires repeated stimulation to arouse       +2= Movements to pain       +3= Postures or unresponsive    1B: Ask month and age = +2       0= Both questions right       +1= 1 question right       +2= 0 questions right       +1= Dysarthric/intubated/trauma/language barrier       +2= Aphasic    1C: "Blink eyes" and "Squeeze Hands" = 0       0= Performs both       +1= Performs 1 task       +2= Performs 0 tasks    2: Horizontal EOMs = 0       0= Normal       +1= Partial gaze palsy: can be overcome       +1= Partial gaze palsy: corrects w/ oculocephalic reflex        +2= Forzed gaze palsy: cannot be overcome    3: Visual fields = +1       0= No visual loss       +1= Partial hemianopia       +2= Complete hemianopia       +3= Patient is b/l blind       +3= B/l hemianopia    4: Facial palsy (use grimace if obtunded) = 0       0= Normal symmetry       +1= Minor paralysis (flat NLF, smile asymmetry)       +2= Partial paralysis ( lower face)       +3= Unilateral complete paralysis (upper/lower face)       +3= B/l complete paralysis (upper/lower face)    5A: Left arm motor drift (count out loud and use fingers to show count) = 0       0= No drift x 10 seconds       +1= Drift but doesn't hit bed       +2= Drift, hits bed       +2= Some effort against gravity       +3= No effort against gravity       +4= No movement       0= Amputation/joint fusion  5B: Right arm motor drift = 0       0= No drift x 10 seconds       +1= Drift but doesn't hit bed       +2= Drift, hits bed       +2= Some effort against gravity       +3= No effort against gravity       +4= No movement       0= Amputation/joint fusion    6A: Left leg motor drift = +1       0= No drift x 10 seconds       +1= Drift but doesn't hit bed       +2= Drift, hits bed       +2= Some effort against gravity       +3= No effort against gravity       +4= No movement       0= Amputation/joint fusion    6B: Right leg motor drift = +1       0= No drift x 10 seconds       +1= Drift but doesn't hit bed       +2= Drift, hits bed       +2= Some effort against gravity       +3= No effort against gravity       +4= No movement       0= Amputation/joint fusion    7: Limb ataxia (FNF/heel-shin) = +2       0= No ataxia       +1= Ataxia in 1 limb       +2= Ataxia in 2 limbs       0= Does not understand       0= Paralyzed       0= Amputation/joint fusion    8: Sensation = 0       0= Normal, no sensory loss       +1= mild-moderate loss: less sharp/more dull       +1= mild-moderate loss: can sense being touched       +2= Complete loss: cannot sense being touched at all       +2= No response and quadriplegic       +2= Coma/unresponsive    9: Language/aphasia- describe the scene (on tavo); name the items; read the sentences (on tavo) = 0       0= Normal, no aphasia       +1= mild-moderate aphaisa: some obvious changes without significant limitation       +2= Severe aphasia: fragmentary expression, inference needed, cannot identify materials       +3= Mute/global aphasia: no usable speech/auditory comprehension       +3= coma/unresponsive    10: Dysarthria- read the words = 0       0= Normal       +1= mild-moderate dysarthria: slurring but can be understood       +2= Severe dysarthria: unintelligible slurring or out of proportion to dysphasia       +2= Mute/anarthric        0= Intubated/unable to test    11: Extinction/inattention = 0       0= No abnormality       +1= visual/tactile/auditory/spatial/personal inattention       +1= Extinction to b/l simultaneous stimulation       +2= Profound darío-inattention (e.g. does not recognize own hand)       +2= extinction to > 1 modality    Total = 8    _____________________________________________________________________________________________    Gen - NAD, arouses to voice  HEENT - NCAT, EOMI  Pulm - CTAB  Cardiovascular - irregularly irregular  Abdomen - Soft, NT/ND  Neuro-     Cognitive - oriented to self, disoriented to time and palce     Communication - Fluent, No dysarthria     Attention: slowly able to state days of the week backwards     Memory: Recall 3 objects immediate. Only able to recall 1/3 objects delayed after given multiple choice prompt.         Cranial Nerves - no facial asymmetry, tongue midline, EOMI, left shoulder shrug impaired     Motor -                     LEFT    UE - ShAB 5/5, EF 5/5, EE 5/5, WE 5/5,  5/5                    RIGHT UE - ShAB 5/5, EF 5/5, EE 5/5, WE 5/5,  5/5                    LEFT    LE - HF 4/5, KE 5/5, DF 5/5, PF 5/5                    RIGHT LE - HF 4/5, KE 5/5, DF 5/5, PF 5/5        Sensory - Intact to LT     Reflexes - DTR Intact, No primitive reflexive     Coordination - FTN impaired b/l, heel to shin impaired in LLE    _______________________________________________________________________________________    PLAN:  - Code stroke imaging  - Labs: Rapid response called at 10:19 AM. Patient was having a bowel movement in the bathroom and when PCA was pulling her pants back up patient started shaking and became unresponsive. Last well known was 10:10 AM. Patient seen and examined at bedside.     Vitals: /73, , RR 16, T 98.1 F, SpO2 95% on RA    Patient brought down for CT head.  _______________________________________________________________    Full Exam completed when patient back from imaging at 11 AM:    1A: Level of consciousness = +1       0= Alert; keenly responsive       +1= Arouses to minor stimulation       +2= Requires repeated stimulation to arouse       +2= Movements to pain       +3= Postures or unresponsive    1B: Ask month and age = +2       0= Both questions right       +1= 1 question right       +2= 0 questions right       +1= Dysarthric/intubated/trauma/language barrier       +2= Aphasic    1C: "Blink eyes" and "Squeeze Hands" = 0       0= Performs both       +1= Performs 1 task       +2= Performs 0 tasks    2: Horizontal EOMs = 0       0= Normal       +1= Partial gaze palsy: can be overcome       +1= Partial gaze palsy: corrects w/ oculocephalic reflex        +2= Forzed gaze palsy: cannot be overcome    3: Visual fields = 0       0= No visual loss       +1= Partial hemianopia       +2= Complete hemianopia       +3= Patient is b/l blind       +3= B/l hemianopia    4: Facial palsy (use grimace if obtunded) = 0       0= Normal symmetry       +1= Minor paralysis (flat NLF, smile asymmetry)       +2= Partial paralysis ( lower face)       +3= Unilateral complete paralysis (upper/lower face)       +3= B/l complete paralysis (upper/lower face)    5A: Left arm motor drift (count out loud and use fingers to show count) = 0       0= No drift x 10 seconds       +1= Drift but doesn't hit bed       +2= Drift, hits bed       +2= Some effort against gravity       +3= No effort against gravity       +4= No movement       0= Amputation/joint fusion  5B: Right arm motor drift = 0       0= No drift x 10 seconds       +1= Drift but doesn't hit bed       +2= Drift, hits bed       +2= Some effort against gravity       +3= No effort against gravity       +4= No movement       0= Amputation/joint fusion    6A: Left leg motor drift = +1       0= No drift x 10 seconds       +1= Drift but doesn't hit bed       +2= Drift, hits bed       +2= Some effort against gravity       +3= No effort against gravity       +4= No movement       0= Amputation/joint fusion    6B: Right leg motor drift = +1       0= No drift x 10 seconds       +1= Drift but doesn't hit bed       +2= Drift, hits bed       +2= Some effort against gravity       +3= No effort against gravity       +4= No movement       0= Amputation/joint fusion    7: Limb ataxia (FNF/heel-shin) = +2       0= No ataxia       +1= Ataxia in 1 limb       +2= Ataxia in 2 limbs       0= Does not understand       0= Paralyzed       0= Amputation/joint fusion    8: Sensation = 0       0= Normal, no sensory loss       +1= mild-moderate loss: less sharp/more dull       +1= mild-moderate loss: can sense being touched       +2= Complete loss: cannot sense being touched at all       +2= No response and quadriplegic       +2= Coma/unresponsive    9: Language/aphasia- describe the scene (on tavo); name the items; read the sentences (on tavo) = 0       0= Normal, no aphasia       +1= mild-moderate aphaisa: some obvious changes without significant limitation       +2= Severe aphasia: fragmentary expression, inference needed, cannot identify materials       +3= Mute/global aphasia: no usable speech/auditory comprehension       +3= coma/unresponsive    10: Dysarthria- read the words = 0       0= Normal       +1= mild-moderate dysarthria: slurring but can be understood       +2= Severe dysarthria: unintelligible slurring or out of proportion to dysphasia       +2= Mute/anarthric        0= Intubated/unable to test    11: Extinction/inattention = 0       0= No abnormality       +1= visual/tactile/auditory/spatial/personal inattention       +1= Extinction to b/l simultaneous stimulation       +2= Profound darío-inattention (e.g. does not recognize own hand)       +2= extinction to > 1 modality    Total = 8    _____________________________________________________________________________________________    Gen - NAD, arouses to voice  HEENT - NCAT, EOMI  Pulm - CTAB  Cardiovascular - irregularly irregular  Abdomen - Soft, NT/ND  Neuro-     Cognitive - oriented to self, disoriented to time and palce     Communication - Fluent, No dysarthria     Attention: slowly able to state days of the week backwards     Memory: Recall 3 objects immediate. Only able to recall 1/3 objects delayed after given multiple choice prompt.         Cranial Nerves - no facial asymmetry, tongue midline, EOMI, left shoulder shrug impaired     Motor -                     LEFT    UE - ShAB 5/5, EF 5/5, EE 5/5, WE 5/5,  5/5                    RIGHT UE - ShAB 5/5, EF 5/5, EE 5/5, WE 5/5,  5/5                    LEFT    LE - HF 4/5, KE 5/5, DF 5/5, PF 5/5                    RIGHT LE - HF 4/5, KE 5/5, DF 5/5, PF 5/5        Sensory - Intact to LT     Reflexes - DTR Intact, No primitive reflexive     Coordination - FTN impaired b/l, heel to shin impaired in LLE    _______________________________________________________________________________________    PLAN:  - Code stroke imaging  - Labs: Rapid response called at 10:19 AM. Patient was having a bowel movement in the bathroom and when PCA was pulling her pants back up patient started shaking and became unresponsive. Last well known was 10:10 AM. Patient seen and examined at bedside.     Vitals: /73, , RR 16, T 98.1 F, SpO2 95% on RA    Patient brought down for CT head.  _______________________________________________________________    Full Exam completed when patient back from imaging at 11 AM:    1A: Level of consciousness = +1       0= Alert; keenly responsive       +1= Arouses to minor stimulation       +2= Requires repeated stimulation to arouse       +2= Movements to pain       +3= Postures or unresponsive    1B: Ask month and age = +2       0= Both questions right       +1= 1 question right       +2= 0 questions right       +1= Dysarthric/intubated/trauma/language barrier       +2= Aphasic    1C: "Blink eyes" and "Squeeze Hands" = 0       0= Performs both       +1= Performs 1 task       +2= Performs 0 tasks    2: Horizontal EOMs = 0       0= Normal       +1= Partial gaze palsy: can be overcome       +1= Partial gaze palsy: corrects w/ oculocephalic reflex        +2= Forzed gaze palsy: cannot be overcome    3: Visual fields = 0       0= No visual loss       +1= Partial hemianopia       +2= Complete hemianopia       +3= Patient is b/l blind       +3= B/l hemianopia    4: Facial palsy (use grimace if obtunded) = 0       0= Normal symmetry       +1= Minor paralysis (flat NLF, smile asymmetry)       +2= Partial paralysis ( lower face)       +3= Unilateral complete paralysis (upper/lower face)       +3= B/l complete paralysis (upper/lower face)    5A: Left arm motor drift (count out loud and use fingers to show count) = 0       0= No drift x 10 seconds       +1= Drift but doesn't hit bed       +2= Drift, hits bed       +2= Some effort against gravity       +3= No effort against gravity       +4= No movement       0= Amputation/joint fusion  5B: Right arm motor drift = 0       0= No drift x 10 seconds       +1= Drift but doesn't hit bed       +2= Drift, hits bed       +2= Some effort against gravity       +3= No effort against gravity       +4= No movement       0= Amputation/joint fusion    6A: Left leg motor drift = +1       0= No drift x 10 seconds       +1= Drift but doesn't hit bed       +2= Drift, hits bed       +2= Some effort against gravity       +3= No effort against gravity       +4= No movement       0= Amputation/joint fusion    6B: Right leg motor drift = +1       0= No drift x 10 seconds       +1= Drift but doesn't hit bed       +2= Drift, hits bed       +2= Some effort against gravity       +3= No effort against gravity       +4= No movement       0= Amputation/joint fusion    7: Limb ataxia (FNF/heel-shin) = +2       0= No ataxia       +1= Ataxia in 1 limb       +2= Ataxia in 2 limbs       0= Does not understand       0= Paralyzed       0= Amputation/joint fusion    8: Sensation = 0       0= Normal, no sensory loss       +1= mild-moderate loss: less sharp/more dull       +1= mild-moderate loss: can sense being touched       +2= Complete loss: cannot sense being touched at all       +2= No response and quadriplegic       +2= Coma/unresponsive    9: Language/aphasia- describe the scene (on tavo); name the items; read the sentences (on tavo) = 0       0= Normal, no aphasia       +1= mild-moderate aphaisa: some obvious changes without significant limitation       +2= Severe aphasia: fragmentary expression, inference needed, cannot identify materials       +3= Mute/global aphasia: no usable speech/auditory comprehension       +3= coma/unresponsive    10: Dysarthria- read the words = 0       0= Normal       +1= mild-moderate dysarthria: slurring but can be understood       +2= Severe dysarthria: unintelligible slurring or out of proportion to dysphasia       +2= Mute/anarthric        0= Intubated/unable to test    11: Extinction/inattention = 0       0= No abnormality       +1= visual/tactile/auditory/spatial/personal inattention       +1= Extinction to b/l simultaneous stimulation       +2= Profound darío-inattention (e.g. does not recognize own hand)       +2= extinction to > 1 modality    Total = 8    _____________________________________________________________________________________________    Gen - NAD, arouses to voice, slip smacking  HEENT - NCAT, EOMI  Pulm - CTAB  Cardiovascular - irregularly irregular  Abdomen - Soft, NT/ND  Neuro-     Cognitive - oriented to self, disoriented to time and place     Communication - Fluent, No dysarthria     Attention: slowly able to state days of the week backwards     Memory: Recall 3 objects immediate. Only able to recall 1/3 objects delayed after given multiple choice prompt.         Cranial Nerves - no facial asymmetry, tongue midline, EOMI, shoulder shrug intact     Motor -                     LEFT    UE - ShAB 5/5, EF 5/5, EE 5/5, WE 5/5,  5/5                    RIGHT UE - ShAB 5/5, EF 5/5, EE 5/5, WE 5/5,  5/5                    LEFT    LE - HF 4/5, KE 5/5, DF 5/5, PF 5/5                    RIGHT LE - HF 4/5, KE 5/5, DF 5/5, PF 5/5        Sensory - Intact to LT     Reflexes - DTR Intact, No primitive reflexive     Coordination - FTN impaired b/l, heel to shin impaired in LLE    _______________________________________________________________________________________    PLAN:  - Imaging: CT Brain Stroke Protocol, CT brain Perfusion, CT Angio Neck w/ IV contrast, CT Angio Brain w/ IV contrast  - Labs: POCT Glucose, CBC, CMP, Mg, Phos, PT/INR, APTT, ESR, CRP, Lactate, CK, CKMB, Troponin, UA  - EKG  - Swallow re-eval for dysphagia  - Neuro checks Q2H x2, then Q4H if stable      Case and plan discussed with Physiatry Attending and Hospitalist Rapid response called at 10:19 AM. Patient was having a bowel movement in the bathroom and when PCA was pulling her pants back up patient started shaking and became unresponsive. Last well known was 10:10 AM. Patient seen and examined at bedside.     Vitals: /73, , RR 16, T 98.1 F, SpO2 95% on RA    Patient brought down for CT head non-con.  _______________________________________________________________    Full Exam completed when patient back from imaging at 11 AM:    1A: Level of consciousness = +1       0= Alert; keenly responsive       +1= Arouses to minor stimulation       +2= Requires repeated stimulation to arouse       +2= Movements to pain       +3= Postures or unresponsive    1B: Ask month and age = +2       0= Both questions right       +1= 1 question right       +2= 0 questions right       +1= Dysarthric/intubated/trauma/language barrier       +2= Aphasic    1C: "Blink eyes" and "Squeeze Hands" = 0       0= Performs both       +1= Performs 1 task       +2= Performs 0 tasks    2: Horizontal EOMs = 0       0= Normal       +1= Partial gaze palsy: can be overcome       +1= Partial gaze palsy: corrects w/ oculocephalic reflex        +2= Forzed gaze palsy: cannot be overcome    3: Visual fields = 0       0= No visual loss       +1= Partial hemianopia       +2= Complete hemianopia       +3= Patient is b/l blind       +3= B/l hemianopia    4: Facial palsy (use grimace if obtunded) = 0       0= Normal symmetry       +1= Minor paralysis (flat NLF, smile asymmetry)       +2= Partial paralysis ( lower face)       +3= Unilateral complete paralysis (upper/lower face)       +3= B/l complete paralysis (upper/lower face)    5A: Left arm motor drift (count out loud and use fingers to show count) = 0       0= No drift x 10 seconds       +1= Drift but doesn't hit bed       +2= Drift, hits bed       +2= Some effort against gravity       +3= No effort against gravity       +4= No movement       0= Amputation/joint fusion  5B: Right arm motor drift = 0       0= No drift x 10 seconds       +1= Drift but doesn't hit bed       +2= Drift, hits bed       +2= Some effort against gravity       +3= No effort against gravity       +4= No movement       0= Amputation/joint fusion    6A: Left leg motor drift = +1       0= No drift x 10 seconds       +1= Drift but doesn't hit bed       +2= Drift, hits bed       +2= Some effort against gravity       +3= No effort against gravity       +4= No movement       0= Amputation/joint fusion    6B: Right leg motor drift = +1       0= No drift x 10 seconds       +1= Drift but doesn't hit bed       +2= Drift, hits bed       +2= Some effort against gravity       +3= No effort against gravity       +4= No movement       0= Amputation/joint fusion    7: Limb ataxia (FNF/heel-shin) = +2       0= No ataxia       +1= Ataxia in 1 limb       +2= Ataxia in 2 limbs       0= Does not understand       0= Paralyzed       0= Amputation/joint fusion    8: Sensation = 0       0= Normal, no sensory loss       +1= mild-moderate loss: less sharp/more dull       +1= mild-moderate loss: can sense being touched       +2= Complete loss: cannot sense being touched at all       +2= No response and quadriplegic       +2= Coma/unresponsive    9: Language/aphasia- describe the scene (on tavo); name the items; read the sentences (on tavo) = 0       0= Normal, no aphasia       +1= mild-moderate aphaisa: some obvious changes without significant limitation       +2= Severe aphasia: fragmentary expression, inference needed, cannot identify materials       +3= Mute/global aphasia: no usable speech/auditory comprehension       +3= coma/unresponsive    10: Dysarthria- read the words = 0       0= Normal       +1= mild-moderate dysarthria: slurring but can be understood       +2= Severe dysarthria: unintelligible slurring or out of proportion to dysphasia       +2= Mute/anarthric        0= Intubated/unable to test    11: Extinction/inattention = 0       0= No abnormality       +1= visual/tactile/auditory/spatial/personal inattention       +1= Extinction to b/l simultaneous stimulation       +2= Profound darío-inattention (e.g. does not recognize own hand)       +2= extinction to > 1 modality    Total = 8    _____________________________________________________________________________________________    Gen - NAD, arouses to voice, slip smacking  HEENT - NCAT, EOMI  Pulm - CTAB  Cardiovascular - irregularly irregular  Abdomen - Soft, NT/ND  Neuro-     Cognitive - oriented to self, disoriented to time and place     Communication - Fluent, No dysarthria     Attention: slowly able to state days of the week backwards     Memory: Recall 3 objects immediate. Only able to recall 1/3 objects delayed after given multiple choice prompt.         Cranial Nerves - no facial asymmetry, tongue midline, EOMI, shoulder shrug intact     Motor -                     LEFT    UE - ShAB 5/5, EF 5/5, EE 5/5, WE 5/5,  5/5                    RIGHT UE - ShAB 5/5, EF 5/5, EE 5/5, WE 5/5,  5/5                    LEFT    LE - HF 4/5, KE 5/5, DF 5/5, PF 5/5                    RIGHT LE - HF 4/5, KE 5/5, DF 5/5, PF 5/5        Sensory - Intact to LT     Reflexes - DTR Intact, No primitive reflexive     Coordination - FTN impaired b/l, heel to shin impaired in LLE    _______________________________________________________________________________________    PLAN:  - Imaging: CT Brain Stroke Protocol, CT brain Perfusion, CT Angio Neck w/ IV contrast, CT Angio Brain w/ IV contrast  - Labs: POCT Glucose, CBC, CMP, Mg, Phos, PT/INR, APTT, ESR, CRP, Lactate, CK, CKMB, Troponin, UA  - EKG  - Swallow re-eval for dysphagia  - Neuro checks Q2H x2, then Q4H if stable      Case and plan discussed with Physiatry Attending and Hospitalist Rapid response called at 10:19 AM. Patient was having a bowel movement in the bathroom and when PCA was pulling her pants back up patient started shaking and became unresponsive. Last well known was 10:10 AM. Patient seen and examined at bedside.     Vitals: /73, , RR 16, T 98.1 F, SpO2 95% on RA    Patient brought down for CT head non-con.  _______________________________________________________________    Full Exam completed when patient back from imaging at 11 AM:    1A: Level of consciousness = +1       0= Alert; keenly responsive       +1= Arouses to minor stimulation       +2= Requires repeated stimulation to arouse       +2= Movements to pain       +3= Postures or unresponsive    1B: Ask month and age = +2       0= Both questions right       +1= 1 question right       +2= 0 questions right       +1= Dysarthric/intubated/trauma/language barrier       +2= Aphasic    1C: "Blink eyes" and "Squeeze Hands" = 0       0= Performs both       +1= Performs 1 task       +2= Performs 0 tasks    2: Horizontal EOMs = 0       0= Normal       +1= Partial gaze palsy: can be overcome       +1= Partial gaze palsy: corrects w/ oculocephalic reflex        +2= Forzed gaze palsy: cannot be overcome    3: Visual fields = 0       0= No visual loss       +1= Partial hemianopia       +2= Complete hemianopia       +3= Patient is b/l blind       +3= B/l hemianopia    4: Facial palsy (use grimace if obtunded) = 0       0= Normal symmetry       +1= Minor paralysis (flat NLF, smile asymmetry)       +2= Partial paralysis ( lower face)       +3= Unilateral complete paralysis (upper/lower face)       +3= B/l complete paralysis (upper/lower face)    5A: Left arm motor drift (count out loud and use fingers to show count) = 0       0= No drift x 10 seconds       +1= Drift but doesn't hit bed       +2= Drift, hits bed       +2= Some effort against gravity       +3= No effort against gravity       +4= No movement       0= Amputation/joint fusion  5B: Right arm motor drift = 0       0= No drift x 10 seconds       +1= Drift but doesn't hit bed       +2= Drift, hits bed       +2= Some effort against gravity       +3= No effort against gravity       +4= No movement       0= Amputation/joint fusion    6A: Left leg motor drift = +1       0= No drift x 10 seconds       +1= Drift but doesn't hit bed       +2= Drift, hits bed       +2= Some effort against gravity       +3= No effort against gravity       +4= No movement       0= Amputation/joint fusion    6B: Right leg motor drift = +1       0= No drift x 10 seconds       +1= Drift but doesn't hit bed       +2= Drift, hits bed       +2= Some effort against gravity       +3= No effort against gravity       +4= No movement       0= Amputation/joint fusion    7: Limb ataxia (FNF/heel-shin) = +2       0= No ataxia       +1= Ataxia in 1 limb       +2= Ataxia in 2 limbs       0= Does not understand       0= Paralyzed       0= Amputation/joint fusion    8: Sensation = 0       0= Normal, no sensory loss       +1= mild-moderate loss: less sharp/more dull       +1= mild-moderate loss: can sense being touched       +2= Complete loss: cannot sense being touched at all       +2= No response and quadriplegic       +2= Coma/unresponsive    9: Language/aphasia- describe the scene (on tavo); name the items; read the sentences (on tavo) = 0       0= Normal, no aphasia       +1= mild-moderate aphaisa: some obvious changes without significant limitation       +2= Severe aphasia: fragmentary expression, inference needed, cannot identify materials       +3= Mute/global aphasia: no usable speech/auditory comprehension       +3= coma/unresponsive    10: Dysarthria- read the words = 0       0= Normal       +1= mild-moderate dysarthria: slurring but can be understood       +2= Severe dysarthria: unintelligible slurring or out of proportion to dysphasia       +2= Mute/anarthric        0= Intubated/unable to test    11: Extinction/inattention = 0       0= No abnormality       +1= visual/tactile/auditory/spatial/personal inattention       +1= Extinction to b/l simultaneous stimulation       +2= Profound darío-inattention (e.g. does not recognize own hand)       +2= extinction to > 1 modality    Total = 7    _____________________________________________________________________________________________    Gen - NAD, arouses to voice, slip smacking  HEENT - NCAT, EOMI  Pulm - CTAB  Cardiovascular - irregularly irregular  Abdomen - Soft, NT/ND  Neuro-     Cognitive - oriented to self, disoriented to time and place     Communication - Fluent, No dysarthria     Attention: slowly able to state days of the week backwards     Memory: Recall 3 objects immediate. Only able to recall 1/3 objects delayed after given multiple choice prompt.         Cranial Nerves - no facial asymmetry, tongue midline, EOMI, shoulder shrug intact     Motor -                     LEFT    UE - ShAB 5/5, EF 5/5, EE 5/5, WE 5/5,  5/5                    RIGHT UE - ShAB 5/5, EF 5/5, EE 5/5, WE 5/5,  5/5                    LEFT    LE - HF 4/5, KE 5/5, DF 5/5, PF 5/5                    RIGHT LE - HF 4/5, KE 5/5, DF 5/5, PF 5/5        Sensory - Intact to LT     Reflexes - DTR Intact, No primitive reflexive     Coordination - FTN impaired b/l, heel to shin impaired in LLE    _______________________________________________________________________________________    PLAN:  - Imaging: CT Brain Stroke Protocol, CT brain Perfusion, CT Angio Neck w/ IV contrast, CT Angio Brain w/ IV contrast  - Labs: POCT Glucose, CBC, CMP, Mg, Phos, PT/INR, APTT, ESR, CRP, Lactate, CK, CKMB, Troponin, UA  - EKG  - Swallow re-eval for dysphagia  - Neuro checks Q2H x2, then Q4H if stable      Case and plan discussed with Physiatry Attending and Hospitalist Rapid response called at 10:19 AM. Patient was having a bowel movement in the bathroom and when PCA was pulling her pants back up patient started shaking and became unresponsive. Last well known was 10:10 AM. Patient seen and examined at bedside.     Vitals: /73, , RR 16, T 98.1 F, SpO2 95% on RA    Patient brought down for CT head non-con.  _______________________________________________________________    Full Exam completed when patient back from imaging at 11 AM:    1A: Level of consciousness = +1       0= Alert; keenly responsive       +1= Arouses to minor stimulation       +2= Requires repeated stimulation to arouse       +2= Movements to pain       +3= Postures or unresponsive    1B: Ask month and age = +2       0= Both questions right       +1= 1 question right       +2= 0 questions right       +1= Dysarthric/intubated/trauma/language barrier       +2= Aphasic    1C: "Blink eyes" and "Squeeze Hands" = 0       0= Performs both       +1= Performs 1 task       +2= Performs 0 tasks    2: Horizontal EOMs = 0       0= Normal       +1= Partial gaze palsy: can be overcome       +1= Partial gaze palsy: corrects w/ oculocephalic reflex        +2= Forzed gaze palsy: cannot be overcome    3: Visual fields = 0       0= No visual loss       +1= Partial hemianopia       +2= Complete hemianopia       +3= Patient is b/l blind       +3= B/l hemianopia    4: Facial palsy (use grimace if obtunded) = 0       0= Normal symmetry       +1= Minor paralysis (flat NLF, smile asymmetry)       +2= Partial paralysis ( lower face)       +3= Unilateral complete paralysis (upper/lower face)       +3= B/l complete paralysis (upper/lower face)    5A: Left arm motor drift (count out loud and use fingers to show count) = 0       0= No drift x 10 seconds       +1= Drift but doesn't hit bed       +2= Drift, hits bed       +2= Some effort against gravity       +3= No effort against gravity       +4= No movement       0= Amputation/joint fusion  5B: Right arm motor drift = 0       0= No drift x 10 seconds       +1= Drift but doesn't hit bed       +2= Drift, hits bed       +2= Some effort against gravity       +3= No effort against gravity       +4= No movement       0= Amputation/joint fusion    6A: Left leg motor drift = +1       0= No drift x 10 seconds       +1= Drift but doesn't hit bed       +2= Drift, hits bed       +2= Some effort against gravity       +3= No effort against gravity       +4= No movement       0= Amputation/joint fusion    6B: Right leg motor drift = +1       0= No drift x 10 seconds       +1= Drift but doesn't hit bed       +2= Drift, hits bed       +2= Some effort against gravity       +3= No effort against gravity       +4= No movement       0= Amputation/joint fusion    7: Limb ataxia (FNF/heel-shin) = +2       0= No ataxia       +1= Ataxia in 1 limb       +2= Ataxia in 2 limbs       0= Does not understand       0= Paralyzed       0= Amputation/joint fusion    8: Sensation = 0       0= Normal, no sensory loss       +1= mild-moderate loss: less sharp/more dull       +1= mild-moderate loss: can sense being touched       +2= Complete loss: cannot sense being touched at all       +2= No response and quadriplegic       +2= Coma/unresponsive    9: Language/aphasia- describe the scene (on tavo); name the items; read the sentences (on tavo) = 0       0= Normal, no aphasia       +1= mild-moderate aphaisa: some obvious changes without significant limitation       +2= Severe aphasia: fragmentary expression, inference needed, cannot identify materials       +3= Mute/global aphasia: no usable speech/auditory comprehension       +3= coma/unresponsive    10: Dysarthria- read the words = 0       0= Normal       +1= mild-moderate dysarthria: slurring but can be understood       +2= Severe dysarthria: unintelligible slurring or out of proportion to dysphasia       +2= Mute/anarthric        0= Intubated/unable to test    11: Extinction/inattention = 0       0= No abnormality       +1= visual/tactile/auditory/spatial/personal inattention       +1= Extinction to b/l simultaneous stimulation       +2= Profound darío-inattention (e.g. does not recognize own hand)       +2= extinction to > 1 modality    Total = 7    _____________________________________________________________________________________________    Gen - NAD, arouses to voice, slip smacking  HEENT - NCAT, EOMI  Pulm - CTAB  Cardiovascular - irregularly irregular  Abdomen - Soft, NT/ND  Neuro-     Cognitive - oriented to self, disoriented to time, situation and place     Communication - Fluent, No dysarthria     Attention: slowly able to state days of the week backwards     Memory: Recall 3 objects immediate. Only able to recall 1/3 objects delayed after given multiple choice prompt.         Cranial Nerves - no facial asymmetry, tongue midline, EOMI, shoulder shrug intact     Motor -                     LEFT    UE - ShAB 5/5, EF 5/5, EE 5/5, WE 5/5,  5/5                    RIGHT UE - ShAB 5/5, EF 5/5, EE 5/5, WE 5/5,  5/5                    LEFT    LE - HF 4/5, KE 5/5, DF 5/5, PF 5/5                    RIGHT LE - HF 4/5, KE 5/5, DF 5/5, PF 5/5        Sensory - Intact to LT     Reflexes - DTR Intact, No primitive reflexive     Coordination - FTN impaired b/l, heel to shin impaired in LLE    _______________________________________________________________________________________    PLAN:  - Imaging: CT Brain Stroke Protocol, CT brain Perfusion, CT Angio Neck w/ IV contrast, CT Angio Brain w/ IV contrast  - Labs: POCT Glucose, CBC, CMP, Mg, Phos, PT/INR, APTT, ESR, CRP, Lactate, CK, CKMB, Troponin, UA  - EKG  - Swallow re-eval for dysphagia  - Neuro checks Q2H x2, then Q4H if stable      Case and plan discussed with Physiatry Attending and Hospitalist

## 2023-12-01 NOTE — PROVIDER CONTACT NOTE (OTHER) - SITUATION
4 Hours post rapid response
Patient became unresponsive after having bowel movement in bathroom.
2 Hours post rapid response
6 hours post rapid response

## 2023-12-01 NOTE — DIETITIAN INITIAL EVALUATION ADULT - OTHER INFO
Refer to H&P for full medical hx. Patient evaluated by PT/OT and was recommended for acute inpatient rehab. Patient is medically stable for discharge to [facility] on 11/30.    Met with pt this AM at bedside. Pt was seated upright, however disoriented, yet able to answer questions about her nutrition hx. Shrimp allergy reported. No food intolerances reported. Pt reported good appetite PTA, which has decreased, however she did eat lunch. Pt denied dry/sore mouth and chewing and swallowing difficulties. Pt denied N/V/D/C, mentioned last BM today 12/1. Pt reported UBW ~234lbs last week and denied any noticeable weight loss/gain. Weight loss questionable due to pt's current mental status and accuracy of bed scale. RD will continue to follow and monitor weight trends. NFPE performed revealing no signs of muscle or subcutaneous fat wasting.     *Note:   - Pt is currently hyponatremic 134 mmol/L (12/1) and hypokalemic 3.1 mmol/L (12/1) - addressed with K+ chloride  - POCT BG x72 hours 122-242mg/dL  - Previous RD nutrition dx from previous facility: overweight/obesity

## 2023-12-01 NOTE — CONSULT NOTE ADULT - SUBJECTIVE AND OBJECTIVE BOX
NADINE NIRALI  [id number]    HISTORY OF PRESENT ILLNESS:     S/p pulse steroids, currently on prednisone 50mg.     PAST MEDICAL & SURGICAL HISTORY:    Review of Systems:  Gen:  No fevers/chills, weight loss  HEENT: No blurry vision, no difficulty swallowing  CVS: No chest pain/palpitations  Resp: No SOB/wheezing  GI: No N/V/C/D/abdominal pain  MSK: no synovitis   Skin: No new rashes  Neuro: No headaches    MEDICATIONS  (STANDING):  aMIOdarone    Tablet 200 milliGRAM(s) Oral daily  apixaban 5 milliGRAM(s) Oral two times a day  atorvastatin 80 milliGRAM(s) Oral at bedtime  bisacodyl 5 milliGRAM(s) Oral at bedtime  dextrose 5%. 1000 milliLiter(s) (50 mL/Hr) IV Continuous <Continuous>  dextrose 5%. 1000 milliLiter(s) (100 mL/Hr) IV Continuous <Continuous>  dextrose 50% Injectable 25 Gram(s) IV Push once  dextrose 50% Injectable 12.5 Gram(s) IV Push once  dextrose 50% Injectable 25 Gram(s) IV Push once  glucagon  Injectable 1 milliGRAM(s) IntraMuscular once  influenza   Vaccine 0.5 milliLiter(s) IntraMuscular once  insulin lispro (ADMELOG) corrective regimen sliding scale   SubCutaneous three times a day before meals  insulin lispro (ADMELOG) corrective regimen sliding scale   SubCutaneous at bedtime  losartan 25 milliGRAM(s) Oral daily  metoprolol tartrate 50 milliGRAM(s) Oral three times a day  pantoprazole    Tablet 40 milliGRAM(s) Oral daily  predniSONE   Tablet 50 milliGRAM(s) Oral daily  senna 2 Tablet(s) Oral at bedtime    MEDICATIONS  (PRN):  dextrose Oral Gel 15 Gram(s) Oral once PRN Blood Glucose LESS THAN 70 milliGRAM(s)/deciliter    Allergies    Shrimp (Unknown)  Drug Allergies Not Recorded    Intolerances    PERTINENT MEDICATION HISTORY:    SOCIAL HISTORY:  OCCUPATION:  TRAVEL HISTORY:    FAMILY HISTORY:      Vital Signs Last 24 Hrs  T(C): 36.9 (01 Dec 2023 13:32), Max: 37.3 (01 Dec 2023 08:52)  T(F): 98.4 (01 Dec 2023 13:32), Max: 99.1 (01 Dec 2023 08:52)  HR: 88 (01 Dec 2023 13:32) (88 - 100)  BP: 110/77 (01 Dec 2023 13:32) (107/73 - 146/78)  RR: 16 (01 Dec 2023 13:32) (16 - 16)  SpO2: 95% (01 Dec 2023 13:32) (94% - 95%)    Parameters below as of 01 Dec 2023 13:32  Patient On (Oxygen Delivery Method): room air    Daily Height in cm: 157.48 (30 Nov 2023 20:28)      Physical Exam:  General: No apparent distress  HEENT: EOMI, MMM  CVS: +S1/S2, RRR  Resp: CTA b/l  GI: Soft, NT/ND  MSK:  Neuro: AAOx3  muscle strength RUE LUE ; RLE LLE   Skin: no  rashes    LABS:                        11.3   12.20 )-----------( 394      ( 01 Dec 2023 12:35 )             32.3     12-01    134<L>  |  99  |  21  ----------------------------<  182<H>  3.6   |  29  |  1.04    Ca    8.5      01 Dec 2023 12:35  Phos  2.9     12-01  Mg     1.9     12-01    TPro  6.9  /  Alb  2.5<L>  /  TBili  0.3  /  DBili  x   /  AST  18  /  ALT  30  /  AlkPhos  72  12-01    PT/INR - ( 01 Dec 2023 12:35 )   PT: 15.5 sec;   INR: 1.34 ratio         PTT - ( 01 Dec 2023 12:35 )  PTT:27.9 sec  Urinalysis Basic - ( 01 Dec 2023 12:35 )    Color: x / Appearance: x / SG: x / pH: x  Gluc: 182 mg/dL / Ketone: x  / Bili: x / Urobili: x   Blood: x / Protein: x / Nitrite: x   Leuk Esterase: x / RBC: x / WBC x   Sq Epi: x / Non Sq Epi: x / Bacteria: x    RADIOLOGY & ADDITIONAL STUDIES: NADINE EMMANUELOD  [id number]    HISTORY OF PRESENT ILLNESS:  58 year old female patient with past medical history of HTN and rheumatoid arthritis who presented to [place] on 11/15 for AMS when a neighbor overhead patient was looking for her mom and she was found confused/wandering around apartment complex. Imaging studies initially performed reported bilateral subacute to remote embolic strokes, a right posterior corona radiata acute infarct, and multiple petechial hemorrhages in bilateral thalamus and basal ganglia. She was additionally noted to have KRUNAL which has since resolved. Hypertension managed with amlodipine, aldactone, and losartan and she was started on B12 supplements. Patient seen by Rheumatology and was transferred to [facility] on 11/20 for cerebral angiogram.    A cerebral angiogram performed on 11/20 reported multiple areas of focal stenosis and dilatation concerning for vasculitis. An LP was completed on 11/21 with a normal profile. Rheumatology consulted and recommended possible brain biopsy to confirm diagnosis of vasculitis but the risks of performing an open biopsy outweighed the benefits as the results of the biopsy were deemed unlikely to . She was seen by Neurosurgery with patient's neuroradiologic findings including beading on cerebral angiogram (consistent with vasculitis) and clinical presentation, with no other etiology of vasculitis, point to PACNS. Patient was started on steroids given her micro hemorrhage and strokes likely attributed to vasculitis. Steroids started on 11/24 with initial dose Solumedrol 1g for 3 days. She was to continue Prednisone 60mg with a decrease to 50mg daily for discharge to acute in-patient rehabilitation. No taper indicated per rheumatology evaluation and recommendations. An EEG was additionally performed and reported moderate diffuse/multi-focal cerebral dysfunction, not specific as to etiology. There were no epileptiform abnormalities recorded. Will need a repeat cerebral angiogram in 1 month which should take place around 12/20/23. Patient started on Eliquis 5 mg BID, amiodarone, and metoprolol for Afib. TTE reported as normal with global left ventricular systolic function, mild mitral valve regurgitation, mild aortic regurgitation, and a sclerotic aortic valve with normal opening. Hospitalization additionally significant for a rapid response on 11/20 due to acute mental status changes after being found to be slumped over while on toilet by staff prompting MRI imaging reporting:  Left parietal small cortical acute infarction  Innumerable scattered chronic microhemorrhages in the brainstem, bilateral cerebellar hemispheres, deep gray nuclei and peripherally within the cerebral hemispheres which may be secondary to chronic hypertensive encephalopathy.  Severe extensive chronic microvascular ischemic changes and multifocal chronic lacunar infarcts as detailed above.  Patient evaluated by PT/OT and was recommended for acute inpatient rehab. Patient is medically stable for discharge to [facility] on 11/30.    Rheumatology consulted for ongoing care for suspected PACNS. S/p pulse steroids, currently on prednisone 50mg but no current steroid sparing meds initiated as without definitive dx by tissue and unclear clinical response to steroids to date, and awaiting 4 week repeat angio to see if radiographically improving due to high risk/benefit ratio of these meds. Pt reports has been able to work with PT since transfer. Today with syncopal episode just after a BM, no clear etiology, repeat imaging showing stability but no improvement and no new CVA. Pt currently denies any pain, synovitis, rashes, dysphagia.     PAST MEDICAL & SURGICAL HISTORY: as above     Review of Systems:  Gen:  No fevers/chills   HEENT: No blurry vision, no difficulty swallowing  CVS: No chest pain/palpitations  Resp: No SOB/wheezing  GI: No N/V/C/D/abdominal pain  MSK: no synovitis   Skin: No new rashes  Neuro: No headaches, as above     MEDICATIONS  (STANDING):  aMIOdarone    Tablet 200 milliGRAM(s) Oral daily  apixaban 5 milliGRAM(s) Oral two times a day  atorvastatin 80 milliGRAM(s) Oral at bedtime  bisacodyl 5 milliGRAM(s) Oral at bedtime  dextrose 5%. 1000 milliLiter(s) (50 mL/Hr) IV Continuous <Continuous>  dextrose 5%. 1000 milliLiter(s) (100 mL/Hr) IV Continuous <Continuous>  dextrose 50% Injectable 25 Gram(s) IV Push once  dextrose 50% Injectable 12.5 Gram(s) IV Push once  dextrose 50% Injectable 25 Gram(s) IV Push once  glucagon  Injectable 1 milliGRAM(s) IntraMuscular once  influenza   Vaccine 0.5 milliLiter(s) IntraMuscular once  insulin lispro (ADMELOG) corrective regimen sliding scale   SubCutaneous three times a day before meals  insulin lispro (ADMELOG) corrective regimen sliding scale   SubCutaneous at bedtime  losartan 25 milliGRAM(s) Oral daily  metoprolol tartrate 50 milliGRAM(s) Oral three times a day  pantoprazole    Tablet 40 milliGRAM(s) Oral daily  predniSONE   Tablet 50 milliGRAM(s) Oral daily  senna 2 Tablet(s) Oral at bedtime    MEDICATIONS  (PRN):  dextrose Oral Gel 15 Gram(s) Oral once PRN Blood Glucose LESS THAN 70 milliGRAM(s)/deciliter    Allergies    Shrimp (Unknown)  Drug Allergies Not Recorded    Intolerances    PERTINENT MEDICATION HISTORY: per HPI and med rec   SOCIAL HISTORY: no illicit habits   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   FAMILY HISTORY: no rheum d/o    Vital Signs Last 24 Hrs  T(C): 36.9 (01 Dec 2023 13:32), Max: 37.3 (01 Dec 2023 08:52)  T(F): 98.4 (01 Dec 2023 13:32), Max: 99.1 (01 Dec 2023 08:52)  HR: 88 (01 Dec 2023 13:32) (88 - 100)  BP: 110/77 (01 Dec 2023 13:32) (107/73 - 146/78)  RR: 16 (01 Dec 2023 13:32) (16 - 16)  SpO2: 95% (01 Dec 2023 13:32) (94% - 95%)    Parameters below as of 01 Dec 2023 13:32  Patient On (Oxygen Delivery Method): room air    Daily Height in cm: 157.48 (30 Nov 2023 20:28)      Physical Exam:  General: No apparent distress, awake and alert, AAox 2, cooperative with exam and questions   HEENT: EOMI, MMM  CVS: +S1/S2, RRR  Resp: CTA b/l  GI: Soft, NT/ND  MSK: No synovitis   Skin: no inflammatory rashes    LABS:                        11.3   12.20 )-----------( 394      ( 01 Dec 2023 12:35 )             32.3     12-01    134<L>  |  99  |  21  ----------------------------<  182<H>  3.6   |  29  |  1.04    Ca    8.5      01 Dec 2023 12:35  Phos  2.9     12-01  Mg     1.9     12-01    TPro  6.9  /  Alb  2.5<L>  /  TBili  0.3  /  DBili  x   /  AST  18  /  ALT  30  /  AlkPhos  72  12-01    PT/INR - ( 01 Dec 2023 12:35 )   PT: 15.5 sec;   INR: 1.34 ratio         PTT - ( 01 Dec 2023 12:35 )  PTT:27.9 sec  Urinalysis Basic - ( 01 Dec 2023 12:35 )    Color: x / Appearance: x / SG: x / pH: x  Gluc: 182 mg/dL / Ketone: x  / Bili: x / Urobili: x   Blood: x / Protein: x / Nitrite: x   Leuk Esterase: x / RBC: x / WBC x   Sq Epi: x / Non Sq Epi: x / Bacteria: x    RADIOLOGY & ADDITIONAL STUDIES:    < from: CT Angio Brain Stroke Protocol  w/ IV Cont (12.01.23 @ 13:03) >  IMPRESSION: Negative CT perfusion. CTA demonstrates persistent narrowing   of the anterior and middle cerebral arteries consistent with vasospasm or   vasculitis.    --- End of Report ---      BRI COLLINS MD; Attending Radiologist  This document has been electronically signed. Dec  1 2023  3:51PM    < end of copied text >

## 2023-12-01 NOTE — PROVIDER CONTACT NOTE (OTHER) - ASSESSMENT
Patient stable, No complaints, status unchanged.
Patient stable, no complaints, neuro check completed AOx2 easily reoriented.
vitals stable, altered mental status, MD escalated rapid response to code stroke.
Patient stable and forgetful, neuro check completed OAx2, able to reorient.

## 2023-12-01 NOTE — DIETITIAN INITIAL EVALUATION ADULT - PERTINENT LABORATORY DATA
12-01    134<L>  |  99  |  21  ----------------------------<  182<H>  3.6   |  29  |  1.04    Ca    8.5      01 Dec 2023 12:35  Phos  2.9     12-01  Mg     1.9     12-01    TPro  6.9  /  Alb  2.5<L>  /  TBili  0.3  /  DBili  x   /  AST  18  /  ALT  30  /  AlkPhos  72  12-01  POCT Blood Glucose.: 227 mg/dL (12-01-23 @ 12:02)  A1C with Estimated Average Glucose Result: 4.9 % (11-22-23 @ 07:20)

## 2023-12-01 NOTE — PROVIDER CONTACT NOTE (OTHER) - ACTION/TREATMENT ORDERED:
CT scan ordered, cbc, cmp, PT/INR, lactate, troponin CT scan ordered, cbc, cmp, PT/INR, lactate, troponin.

## 2023-12-01 NOTE — CONSULT NOTE ADULT - ASSESSMENT
Assessment/Plan:  Mrs. Екатерина Poole is a 58 year old female patient with past medical history of HTN and rheumatoid arthritis who is admitted for Acute Inpatient Rehabilitation with a multidisciplinary rehab program at [facility] with functional impairments in ADLs and mobility secondary to left hemiparesis resulting from bilateral subacute to remote embolic strokes, a right posterior corona radiata acute infarct, multiple petechial hemorrhages in bilateral thalamus and basal ganglia with etiology highly indicative from underlying vasculitis and a subsequent left parietal cortical acute infarction of a likely cardioembolic source.      CVA  - Event on 11/15/23 likely related to vasculitis      * Right Corona Radiata Infarct      * bilateral subacute to remote embolic strokes      * multiple petechial hemorrhages in bilateral thalamus and basal ganglia  - Most recent event on 11/29/23 likely related to cardioembolic source in setting of AFib:      * Left parietal small cortical acute infarction      * Innumerable scattered chronic microhemorrhages in the brainstem, bilateral cerebellar hemispheres, deep gray nuclei and peripherally within the cerebral hemispheres which may be secondary to chronic hypertensive encephalopathy.      * Severe extensive chronic microvascular ischemic changes and multifocal chronic lacunar infarcts.  - [facility] Impression: Left hemiparesis due to right corona radiata infarct, multiple micro hemorrhages, deep and cortical. Cerebral angiogram concerning for PACNS. Pt also with Afib, less likely cardioembolic event  - Steroids started on 11/24 - s/p Solumedrol 1g (11/24-11/27), Prednisone 60mg (11/28-11/30), now on prednisone 50mg QD. Maintain dose per rheumatology eval/recommendation.  - Rapid response called on 12/1 after pt had a syncopal episode after a BM. Code stroke called  - CT scan: no acute changes  - Continue prednisone 50 mg QD  - Obtain MR Brain  - Neuro consult   - comprehensive rehab program  - Fall/Aspiration precautions  - Will need repeat cerebral angiogram in 1 month (~12/20/23)      Afib  - s/p amiodarone gtt  - continue amiodarone 200 mg QD, metoprolol 50 mg TID, and Eliquis 5 mg BID    Hypokalemia  - Supplement K ordered  - Replete PRN    HTN  - continue Losartan 25 mg QD  - Hold Amlodipine 10mg  - Monitor BP    HLD  - cont atorvastatin 80 mg QHS    Hyperglycemia in setting of steroid use  - AIC 4.9  - SSI  - Monitor fingersticks        DVT prophylaxis:   - on Eliquis 5 mg BID    GO prophylaxis  - Protonix

## 2023-12-01 NOTE — DIETITIAN INITIAL EVALUATION ADULT - ADD RECOMMEND
1. Advance diet to DASH/TLC and add consistent CHO, as cleared by SLP  2. Monitor Na and K+ and replete PRN  - RD will continue to monitor and assess removal of DASH diet in setting of hyponatremia, however appropriate at this time as pt has hx of HTN  3. Monitor POCT BG - insulin dosage per team  4. Appreciate weekly weight trends

## 2023-12-01 NOTE — CONSULT NOTE ADULT - SUBJECTIVE AND OBJECTIVE BOX
Patient is a 58y old  Female who presents with a chief complaint of CVA (30 Nov 2023 14:36)      HPI:  Mrs. Екатерина Poole is a 58 year old female patient with past medical history of HTN and rheumatoid arthritis who presented to [place] on 11/15 for AMS when a neighbor overhead patient was looking for her mom and she was found confused/wandering around apartment complex. Imaging studies initially performed reported bilateral subacute to remote embolic strokes, a right posterior corona radiata acute infarct, and multiple petechial hemorrhages in bilateral thalamus and basal ganglia. She was additionally noted to have KRUNAL which has since resolved. Hypertension managed with amlodipine, aldactone, and losartan and she was started on B12 supplements. Patient seen by Rheumatology and was transferred to [facility] on 11/20 for cerebral angiogram.    A cerebral angiogram performed on 11/20 reported multiple areas of focal stenosis and dilatation concerning for vasculitis. An LP was completed on 11/21 with a normal profile. Rheumatology consulted and recommended possible brain biopsy to confirm diagnosis of vasculitis but the risks of performing an open biopsy outweighed the benefits as the results of the biopsy were deemed unlikely to . She was seen by Neurosurgery with patient's neuroradiologic findings including beading on cerebral angiogram (consistent with vasculitis) and clinical presentation, with no other etiology of vasculitis, point to PACNS. Patient was started on steroids given her micro hemorrhage and strokes likely attributed to vasculitis. Steroids started on 11/24 with initial dose Solumedrol 1g for 3 days. She was to continue Prednisone 60mg with a decrease to 50mg daily for discharge to acute in-patient rehabilitation. No taper indicated per rheumatology evaluation and recommendations. An EEG was additionally performed and reported moderate diffuse/multi-focal cerebral dysfunction, not specific as to etiology. There were no epileptiform abnormalities recorded. Will need a repeat cerebral angiogram in 1 month which should take place around 12/20/23. Patient started on Eliquis 5 mg BID, amiodarone, and metoprolol for Afib. TTE reported as normal with global left ventricular systolic function, mild mitral valve regurgitation, mild aortic regurgitation, and a sclerotic aortic valve with normal opening. Hospitalization additionally significant for a rapid response on 11/20 due to acute mental status changes after being found to be slumped over while on toilet by staff prompting MRI imaging reporting:  ·	Left parietal small cortical acute infarction  ·	Innumerable scattered chronic microhemorrhages in the brainstem, bilateral cerebellar hemispheres, deep gray nuclei and peripherally within the cerebral hemispheres which may be secondary to chronic hypertensive encephalopathy.  ·	Severe extensive chronic microvascular ischemic changes and multifocal chronic lacunar infarcts as detailed above.  Patient evaluated by PT/OT and was recommended for acute inpatient rehab. Patient is medically stable for discharge to [facility] on 11/30. (30 Nov 2023 14:36)      PAST MEDICAL & SURGICAL HISTORY:            Substance Use (street drugs): ( X ) never used  (  ) other:  Tobacco Usage:  (  X ) never smoked   (   ) former smoker   (   ) current smoker  (     ) pack year  Alcohol Usage: Denies        Allergies    No Known Allergies    Intolerances        influenza   Vaccine 0.5 milliLiter(s) IntraMuscular once  metoprolol tartrate 50 milliGRAM(s) Oral three times a day      REVIEW OF SYSTEMS:  Constitutional: No fever, No Chills, + fatigue  HEENT: No eye pain, No visual disturbances, No difficulty hearing  Pulm: No cough,  No shortness of breath  Cardio: No chest pain, No palpitations  GI:  No abdominal pain, No nausea, No vomiting, No diarrhea, No constipation  : No dysuria, No frequency, No hematuria  Neuro: No headaches, No memory loss, + loss of strength, No numbness, No tremors  Skin: No itching, No rashes, No lesions   Endo: No temperature intolerance  MSK: No joint pain, No joint swelling, No muscle pain, No Neck pain,  No back pain  Psych:  No depression, No anxiety      T(C): 36.9 (12-01-23 @ 13:32), Max: 37.3 (12-01-23 @ 08:52)  HR: 88 (12-01-23 @ 13:32) (88 - 100)  BP: 110/77 (12-01-23 @ 13:32) (107/73 - 146/78)  RR: 16 (12-01-23 @ 13:32) (16 - 16)  SpO2: 95% (12-01-23 @ 13:32) (94% - 95%)  Wt(kg): --Vital Signs Last 24 Hrs  T(C): 36.9 (01 Dec 2023 13:32), Max: 37.3 (01 Dec 2023 08:52)  T(F): 98.4 (01 Dec 2023 13:32), Max: 99.1 (01 Dec 2023 08:52)  HR: 88 (01 Dec 2023 13:32) (88 - 100)  BP: 110/77 (01 Dec 2023 13:32) (107/73 - 146/78)  BP(mean): --  RR: 16 (01 Dec 2023 13:32) (16 - 16)  SpO2: 95% (01 Dec 2023 13:32) (94% - 95%)    Parameters below as of 01 Dec 2023 13:32  Patient On (Oxygen Delivery Method): room air        PHYSICAL EXAM:  GENERAL: NAD, comfortable  EYES: EOMI, conjunctiva and sclera clear  ENMT: Moist mucous membranes  NECK: Supple, No JVD  NERVOUS SYSTEM:  Alert & Oriented  CHEST/LUNG: Clear to percussion bilaterally; No rales, rhonchi, wheezing  HEART: irregularly irregular  ABDOMEN: Soft, Nontender, Nondistended; Bowel sounds present  EXTREMITIES:   No clubbing, cyanosis, or edema      LABS:                        11.3   12.20 )-----------( 394      ( 01 Dec 2023 12:35 )             32.3     12-01    134<L>  |  99  |  21  ----------------------------<  182<H>  3.6   |  29  |  1.04    Ca    8.5      01 Dec 2023 12:35  Phos  2.9     12-01  Mg     1.9     12-01    TPro  6.9  /  Alb  2.5<L>  /  TBili  0.3  /  DBili  x   /  AST  18  /  ALT  30  /  AlkPhos  72  12-01    PT/INR - ( 01 Dec 2023 12:35 )   PT: 15.5 sec;   INR: 1.34 ratio         PTT - ( 01 Dec 2023 12:35 )  PTT:27.9 sec  Urinalysis Basic - ( 01 Dec 2023 12:35 )    Color: x / Appearance: x / SG: x / pH: x  Gluc: 182 mg/dL / Ketone: x  / Bili: x / Urobili: x   Blood: x / Protein: x / Nitrite: x   Leuk Esterase: x / RBC: x / WBC x   Sq Epi: x / Non Sq Epi: x / Bacteria: x       CAPILLARY BLOOD GLUCOSE      POCT Blood Glucose.: 227 mg/dL (01 Dec 2023 12:02)  POCT Blood Glucose.: 242 mg/dL (01 Dec 2023 10:20)  POCT Blood Glucose.: 110 mg/dL (01 Dec 2023 08:29)  POCT Blood Glucose.: 120 mg/dL (30 Nov 2023 23:06)  POCT Blood Glucose.: 138 mg/dL (30 Nov 2023 16:26)        Urinalysis Basic - ( 01 Dec 2023 12:35 )    Color: x / Appearance: x / SG: x / pH: x  Gluc: 182 mg/dL / Ketone: x  / Bili: x / Urobili: x   Blood: x / Protein: x / Nitrite: x   Leuk Esterase: x / RBC: x / WBC x   Sq Epi: x / Non Sq Epi: x / Bacteria: x        RADIOLOGY & ADDITIONAL TESTS:    < from: CT Head No Cont (12.01.23 @ 10:40) >  IMPRESSION:  Moderate to severe chronic microvascular changeswithout   evidence of an acute transcortical infarction or hemorrhage.    < end of copied text >      Consultant(s) Notes Reviewed:  [x ] YES  [ ] NO  Care Discussed with Consultants/Other Providers [ x] YES  [ ] NO  Imaging Personally Reviewed:  [ ] YES  [ ] NO

## 2023-12-01 NOTE — CHART NOTE - NSCHARTNOTEFT_GEN_A_CORE
REHABILITATION DIAGNOSIS/IMPAIRMENT GROUP CODE:  CVA      COMORBIDITIES/COMPLICATING CONDITIONS IMPACTING REHABILITATION:  HEALTH ISSUES - PROBLEM Dx:  - Event on 11/15/23 likely related to vasculitis      * Right Corona Radiata Infarct      * bilateral subacute to remote embolic strokes      * multiple petechial hemorrhages in bilateral thalamus and basal ganglia  - Most recent event on 11/29/23 likely related to cardioembolic source in setting of AFib:      * Left parietal small cortical acute infarction      * Innumerable scattered chronic microhemorrhages in the brainstem, bilateral cerebellar hemispheres, deep gray nuclei and peripherally within the cerebral hemispheres which may be secondary to chronic hypertensive encephalopathy.      * Severe extensive chronic microvascular ischemic changes and multifocal chronic lacunar infarcts.  - [facility] Neurology Impression: Left hemiparesis due to right corona radiata infarct, multiple micro hemorrhages, deep and cortical. Cerebral angiogram concerning for PACNS. Pt also with Afib, less likely cardioembolic event  - Steroids started on 11/24 - s/p Solumedrol 1g (11/24-11/27), Prednisone 60mg (11/28-11/30), now on prednisone 50mg QD. Maintain dose per rheumatology eval/recommendation.  - Left hemiparesis  - Impaired ADLs and mobility  - Need for assistance with personal care       PAST MEDICAL & SURGICAL HISTORY:      Based upon consideration of the patient's impairments, functional status, complicating conditions and any other contributing factors and after information garnered from the assessments of all therapy disciplines involved in treating the patient and other pertinent clinicians:    INTERDISCIPLINARY REHABILITATION INTERVENTIONS:    [ X  ] Transfer Training  [X   ] Bed Mobility  [ X  ] Therapeutic Exercise  [ X  ] Balance/Coordination Exercises  [ X  ] Locomotion retraining  [ X  ] Stairs  [ X  ] Functional Transfer Training  [ X  ] Bowel/Bladder program  [  X ] Pain Management  [ X  ] Skin/Wound Care  [X   ] Visual/Perceptual Training  [X   ] Therapeutic Recreation Activities  [  X ] Neuromuscular Re-education  [  X ] Activities of Daily Living  [ X  ] Speech Exercise  [   ] Swallowing Exercises  [   ] Vital Stim  [   ] Dietary Supplements  [ X  ] Calorie Count  [X   ] Cognitive Exercises  [  X ] Congnitive/Linguistic Treatment  [   ] Behavior Program  [  X ] Neuropsych Therapy  [ X  ] Patient/Family Counseling  [ X  ] Family Training  [ X  ] Community Re-entry  [ X  ] Orthotic Evaluation  [   ] Prosthetic Eval/Training    MEDICAL PROGNOSIS:  good     REHAB POTENTIAL:  good     EXPECTED DAILY THERAPY:         PT: 1hr/day       OT: 1hr/day       ST: 1hr/day       P&O: 0    EXPECTED INTENSITY OF PROGRAM:  3 hrs/day    EXPECTED FREQUENCY OF PROGRAM:  5 days/week    ESTIMATED LOS:  10-14 days    ESTIMATED DISPOSITION:  home    INTERDISCIPLINARY FUNCTIONAL OUTCOMES?GOALS:         Gait/Mobility: mod-independent       Transfers: mod-independent       ADLs: mod-independent       Functional Transfers: mod-independent       Medication Management: mod-independent       Communication: independent       Cognitive:               Bladder: continent       Bowel: continent

## 2023-12-01 NOTE — DIETITIAN INITIAL EVALUATION ADULT - NS FNS DIET ORDER
Diet, NPO:      Special Instructions for Nursing:  CODE STROKE; NPO until Dysphagia screen or Speech Bedside Swallow Evaluation completed and passed (12-01-23 @ 12:08)

## 2023-12-01 NOTE — DIETITIAN INITIAL EVALUATION ADULT - PERTINENT MEDS FT
MEDICATIONS  (STANDING):  aMIOdarone    Tablet 200 milliGRAM(s) Oral daily  apixaban 5 milliGRAM(s) Oral two times a day  atorvastatin 80 milliGRAM(s) Oral at bedtime  bisacodyl 5 milliGRAM(s) Oral at bedtime  dextrose 5%. 1000 milliLiter(s) (100 mL/Hr) IV Continuous <Continuous>  dextrose 5%. 1000 milliLiter(s) (50 mL/Hr) IV Continuous <Continuous>  dextrose 50% Injectable 25 Gram(s) IV Push once  dextrose 50% Injectable 12.5 Gram(s) IV Push once  dextrose 50% Injectable 25 Gram(s) IV Push once  glucagon  Injectable 1 milliGRAM(s) IntraMuscular once  influenza   Vaccine 0.5 milliLiter(s) IntraMuscular once  insulin lispro (ADMELOG) corrective regimen sliding scale   SubCutaneous at bedtime  insulin lispro (ADMELOG) corrective regimen sliding scale   SubCutaneous three times a day before meals  losartan 25 milliGRAM(s) Oral daily  metoprolol tartrate 50 milliGRAM(s) Oral three times a day  pantoprazole    Tablet 40 milliGRAM(s) Oral daily  predniSONE   Tablet 50 milliGRAM(s) Oral daily  senna 2 Tablet(s) Oral at bedtime    MEDICATIONS  (PRN):  dextrose Oral Gel 15 Gram(s) Oral once PRN Blood Glucose LESS THAN 70 milliGRAM(s)/deciliter

## 2023-12-01 NOTE — CONSULT NOTE ADULT - ASSESSMENT
58-year old woman with history of hypertension, recurrent strokes, brought into ED at  initially for concern for altered mental status with findings concerning for PACNS with new stroke noted on MR on 11/29. Now with syncopal event but imaging unchanged, ?vasovagal.     ##possible PACNS  Progressive mental status change/ cognitive impairment over the past year  Brain imaging with findings of multiple infarcts, petechial hemorrhages  Cerebral angio showed diffuse beading -- no improvement or worsening on repeat CTA 12/1/23   Negative MAX/cpANCA/dsDNA/Sm/Ribo P/BONI/SSA/SSB/SPEP/UA/C3: 152/C4: 36. CRP: 8. ESR: 113 RF: 30  apls negative  Negative Hepatitis/Treponema  -s/p 1g Methylprednisolone (11/24 - 11/27). now prednisone 60mg (11/28 - )  -difficult to assess for clinical response giving waxing/waning nature of disease as [facility]  -also difficult to ascertain how much of these changes are now permanent vs how much can immunosuppression reverse -risk/benefit of addition of Cytoxan or Rituxan at thus unclear at this point, case discussed extensively at rheum department meeting with concensus to maintain prednisone 50mg x 1 month and repeat imaging to see if convincing evidence of improvement as well as monitor mental status in same time frame to see if improvement in waxing/waning status   -new stroke noted on MR on 11/29. per neurology likely from new a-fib and not from possible vasculitis activity    PLAN  - c/w prednisone 50mg qD and maintain dose until can repeat angio arond 12/20/23 (either while admitted or as OP) and can determine if there is a role for steroid sparing agent   - c/w PPI for GI ppx  - add on Bactrim DS 1 tab MWF for PCP ppx while on high dose steroids   - c/w eliquis   - agree with neuro eval   - will follow

## 2023-12-02 LAB
ALBUMIN SERPL ELPH-MCNC: 2.3 G/DL — LOW (ref 3.3–5)
ALBUMIN SERPL ELPH-MCNC: 2.3 G/DL — LOW (ref 3.3–5)
ALP SERPL-CCNC: 66 U/L — SIGNIFICANT CHANGE UP (ref 40–120)
ALP SERPL-CCNC: 66 U/L — SIGNIFICANT CHANGE UP (ref 40–120)
ALT FLD-CCNC: 23 U/L — SIGNIFICANT CHANGE UP (ref 10–45)
ALT FLD-CCNC: 23 U/L — SIGNIFICANT CHANGE UP (ref 10–45)
ANION GAP SERPL CALC-SCNC: 10 MMOL/L — SIGNIFICANT CHANGE UP (ref 5–17)
ANION GAP SERPL CALC-SCNC: 10 MMOL/L — SIGNIFICANT CHANGE UP (ref 5–17)
AST SERPL-CCNC: 13 U/L — SIGNIFICANT CHANGE UP (ref 10–40)
AST SERPL-CCNC: 13 U/L — SIGNIFICANT CHANGE UP (ref 10–40)
BASOPHILS # BLD AUTO: 0.02 K/UL — SIGNIFICANT CHANGE UP (ref 0–0.2)
BASOPHILS # BLD AUTO: 0.02 K/UL — SIGNIFICANT CHANGE UP (ref 0–0.2)
BASOPHILS NFR BLD AUTO: 0.1 % — SIGNIFICANT CHANGE UP (ref 0–2)
BASOPHILS NFR BLD AUTO: 0.1 % — SIGNIFICANT CHANGE UP (ref 0–2)
BILIRUB SERPL-MCNC: 0.4 MG/DL — SIGNIFICANT CHANGE UP (ref 0.2–1.2)
BILIRUB SERPL-MCNC: 0.4 MG/DL — SIGNIFICANT CHANGE UP (ref 0.2–1.2)
BUN SERPL-MCNC: 29 MG/DL — HIGH (ref 7–23)
BUN SERPL-MCNC: 29 MG/DL — HIGH (ref 7–23)
CALCIUM SERPL-MCNC: 8.7 MG/DL — SIGNIFICANT CHANGE UP (ref 8.4–10.5)
CALCIUM SERPL-MCNC: 8.7 MG/DL — SIGNIFICANT CHANGE UP (ref 8.4–10.5)
CHLORIDE SERPL-SCNC: 102 MMOL/L — SIGNIFICANT CHANGE UP (ref 96–108)
CHLORIDE SERPL-SCNC: 102 MMOL/L — SIGNIFICANT CHANGE UP (ref 96–108)
CO2 SERPL-SCNC: 26 MMOL/L — SIGNIFICANT CHANGE UP (ref 22–31)
CO2 SERPL-SCNC: 26 MMOL/L — SIGNIFICANT CHANGE UP (ref 22–31)
CREAT SERPL-MCNC: 1.16 MG/DL — SIGNIFICANT CHANGE UP (ref 0.5–1.3)
CREAT SERPL-MCNC: 1.16 MG/DL — SIGNIFICANT CHANGE UP (ref 0.5–1.3)
EGFR: 54 ML/MIN/1.73M2 — LOW
EGFR: 54 ML/MIN/1.73M2 — LOW
EOSINOPHIL # BLD AUTO: 0.35 K/UL — SIGNIFICANT CHANGE UP (ref 0–0.5)
EOSINOPHIL # BLD AUTO: 0.35 K/UL — SIGNIFICANT CHANGE UP (ref 0–0.5)
EOSINOPHIL NFR BLD AUTO: 2.2 % — SIGNIFICANT CHANGE UP (ref 0–6)
EOSINOPHIL NFR BLD AUTO: 2.2 % — SIGNIFICANT CHANGE UP (ref 0–6)
GLUCOSE BLDC GLUCOMTR-MCNC: 122 MG/DL — HIGH (ref 70–99)
GLUCOSE BLDC GLUCOMTR-MCNC: 122 MG/DL — HIGH (ref 70–99)
GLUCOSE BLDC GLUCOMTR-MCNC: 124 MG/DL — HIGH (ref 70–99)
GLUCOSE BLDC GLUCOMTR-MCNC: 124 MG/DL — HIGH (ref 70–99)
GLUCOSE BLDC GLUCOMTR-MCNC: 128 MG/DL — HIGH (ref 70–99)
GLUCOSE BLDC GLUCOMTR-MCNC: 128 MG/DL — HIGH (ref 70–99)
GLUCOSE BLDC GLUCOMTR-MCNC: 173 MG/DL — HIGH (ref 70–99)
GLUCOSE BLDC GLUCOMTR-MCNC: 173 MG/DL — HIGH (ref 70–99)
GLUCOSE SERPL-MCNC: 79 MG/DL — SIGNIFICANT CHANGE UP (ref 70–99)
GLUCOSE SERPL-MCNC: 79 MG/DL — SIGNIFICANT CHANGE UP (ref 70–99)
HCT VFR BLD CALC: 28.1 % — LOW (ref 34.5–45)
HCT VFR BLD CALC: 28.1 % — LOW (ref 34.5–45)
HGB BLD-MCNC: 9.9 G/DL — LOW (ref 11.5–15.5)
HGB BLD-MCNC: 9.9 G/DL — LOW (ref 11.5–15.5)
IMM GRANULOCYTES NFR BLD AUTO: 1.1 % — HIGH (ref 0–0.9)
IMM GRANULOCYTES NFR BLD AUTO: 1.1 % — HIGH (ref 0–0.9)
LACTATE SERPL-SCNC: 1.9 MMOL/L — SIGNIFICANT CHANGE UP (ref 0.7–2)
LACTATE SERPL-SCNC: 1.9 MMOL/L — SIGNIFICANT CHANGE UP (ref 0.7–2)
LYMPHOCYTES # BLD AUTO: 23.7 % — SIGNIFICANT CHANGE UP (ref 13–44)
LYMPHOCYTES # BLD AUTO: 23.7 % — SIGNIFICANT CHANGE UP (ref 13–44)
LYMPHOCYTES # BLD AUTO: 3.83 K/UL — HIGH (ref 1–3.3)
LYMPHOCYTES # BLD AUTO: 3.83 K/UL — HIGH (ref 1–3.3)
MCHC RBC-ENTMCNC: 29.2 PG — SIGNIFICANT CHANGE UP (ref 27–34)
MCHC RBC-ENTMCNC: 29.2 PG — SIGNIFICANT CHANGE UP (ref 27–34)
MCHC RBC-ENTMCNC: 35.2 GM/DL — SIGNIFICANT CHANGE UP (ref 32–36)
MCHC RBC-ENTMCNC: 35.2 GM/DL — SIGNIFICANT CHANGE UP (ref 32–36)
MCV RBC AUTO: 82.9 FL — SIGNIFICANT CHANGE UP (ref 80–100)
MCV RBC AUTO: 82.9 FL — SIGNIFICANT CHANGE UP (ref 80–100)
MONOCYTES # BLD AUTO: 1.27 K/UL — HIGH (ref 0–0.9)
MONOCYTES # BLD AUTO: 1.27 K/UL — HIGH (ref 0–0.9)
MONOCYTES NFR BLD AUTO: 7.9 % — SIGNIFICANT CHANGE UP (ref 2–14)
MONOCYTES NFR BLD AUTO: 7.9 % — SIGNIFICANT CHANGE UP (ref 2–14)
NEUTROPHILS # BLD AUTO: 10.5 K/UL — HIGH (ref 1.8–7.4)
NEUTROPHILS # BLD AUTO: 10.5 K/UL — HIGH (ref 1.8–7.4)
NEUTROPHILS NFR BLD AUTO: 65 % — SIGNIFICANT CHANGE UP (ref 43–77)
NEUTROPHILS NFR BLD AUTO: 65 % — SIGNIFICANT CHANGE UP (ref 43–77)
NRBC # BLD: 0 /100 WBCS — SIGNIFICANT CHANGE UP (ref 0–0)
NRBC # BLD: 0 /100 WBCS — SIGNIFICANT CHANGE UP (ref 0–0)
PLATELET # BLD AUTO: 330 K/UL — SIGNIFICANT CHANGE UP (ref 150–400)
PLATELET # BLD AUTO: 330 K/UL — SIGNIFICANT CHANGE UP (ref 150–400)
POTASSIUM SERPL-MCNC: 3.8 MMOL/L — SIGNIFICANT CHANGE UP (ref 3.5–5.3)
POTASSIUM SERPL-MCNC: 3.8 MMOL/L — SIGNIFICANT CHANGE UP (ref 3.5–5.3)
POTASSIUM SERPL-SCNC: 3.8 MMOL/L — SIGNIFICANT CHANGE UP (ref 3.5–5.3)
POTASSIUM SERPL-SCNC: 3.8 MMOL/L — SIGNIFICANT CHANGE UP (ref 3.5–5.3)
PROT SERPL-MCNC: 6 G/DL — SIGNIFICANT CHANGE UP (ref 6–8.3)
PROT SERPL-MCNC: 6 G/DL — SIGNIFICANT CHANGE UP (ref 6–8.3)
RBC # BLD: 3.39 M/UL — LOW (ref 3.8–5.2)
RBC # BLD: 3.39 M/UL — LOW (ref 3.8–5.2)
RBC # FLD: 12.7 % — SIGNIFICANT CHANGE UP (ref 10.3–14.5)
RBC # FLD: 12.7 % — SIGNIFICANT CHANGE UP (ref 10.3–14.5)
SODIUM SERPL-SCNC: 138 MMOL/L — SIGNIFICANT CHANGE UP (ref 135–145)
SODIUM SERPL-SCNC: 138 MMOL/L — SIGNIFICANT CHANGE UP (ref 135–145)
WBC # BLD: 16.14 K/UL — HIGH (ref 3.8–10.5)
WBC # BLD: 16.14 K/UL — HIGH (ref 3.8–10.5)
WBC # FLD AUTO: 16.14 K/UL — HIGH (ref 3.8–10.5)
WBC # FLD AUTO: 16.14 K/UL — HIGH (ref 3.8–10.5)

## 2023-12-02 PROCEDURE — 99232 SBSQ HOSP IP/OBS MODERATE 35: CPT

## 2023-12-02 PROCEDURE — 99223 1ST HOSP IP/OBS HIGH 75: CPT

## 2023-12-02 PROCEDURE — 70450 CT HEAD/BRAIN W/O DYE: CPT | Mod: 26

## 2023-12-02 PROCEDURE — 71045 X-RAY EXAM CHEST 1 VIEW: CPT | Mod: 26

## 2023-12-02 RX ADMIN — AMIODARONE HYDROCHLORIDE 200 MILLIGRAM(S): 400 TABLET ORAL at 06:45

## 2023-12-02 RX ADMIN — LOSARTAN POTASSIUM 25 MILLIGRAM(S): 100 TABLET, FILM COATED ORAL at 06:45

## 2023-12-02 RX ADMIN — Medication 50 MILLIGRAM(S): at 06:44

## 2023-12-02 RX ADMIN — Medication 50 MILLIGRAM(S): at 22:11

## 2023-12-02 RX ADMIN — Medication 5 MILLIGRAM(S): at 22:11

## 2023-12-02 RX ADMIN — ATORVASTATIN CALCIUM 80 MILLIGRAM(S): 80 TABLET, FILM COATED ORAL at 22:11

## 2023-12-02 RX ADMIN — Medication 50 MILLIGRAM(S): at 06:45

## 2023-12-02 RX ADMIN — APIXABAN 5 MILLIGRAM(S): 2.5 TABLET, FILM COATED ORAL at 17:33

## 2023-12-02 RX ADMIN — SENNA PLUS 2 TABLET(S): 8.6 TABLET ORAL at 22:11

## 2023-12-02 RX ADMIN — PANTOPRAZOLE SODIUM 40 MILLIGRAM(S): 20 TABLET, DELAYED RELEASE ORAL at 12:33

## 2023-12-02 RX ADMIN — APIXABAN 5 MILLIGRAM(S): 2.5 TABLET, FILM COATED ORAL at 06:44

## 2023-12-02 RX ADMIN — Medication 1: at 12:31

## 2023-12-02 NOTE — CONSULT NOTE ADULT - TIME BILLING
chart reviewed, including past admissions, face to face with patient for care, physical examination, discussion with patient regarding possible diagnosis, treatment plan, and prognosis, and documentation

## 2023-12-02 NOTE — PROGRESS NOTE ADULT - ASSESSMENT
58 year old female patient PMH HTN and rheumatoid arthritis p/w bilateral subacute to remote embolic strokes, a right posterior corona radiata acute infarct, multiple petechial hemorrhages in bilateral thalamus and basal ganglia with etiology highly indicative from underlying vasculitis and a subsequent left parietal cortical acute infarction of a likely cardioembolic source.    # multiple bilateral CVA with left HP  - Event on 11/15/23 likely related to vasculitis  - Most recent event on 11/29/23 likely related to cardioembolic source in setting of AFib:  - Head CT 12/1: Moderate to severe chronic microvascular changes without evidence of an acute transcortical infarction or hemorrhage. Reviewed with pateint  - Currently on prednisone 50mg QD. Rheumatology f/u pending  - continue comprehensive rehab program of PT/OT/SLP - 3 hours a day, 5 days a week. P&O as needed   - Will need repeat cerebral angiogram in 1 month (~12/20/23)    # RRT with syncope after BM 12/1  - vitals stable  - Head CT stable as above  - EEG pending  - rheumatology and neuro consults pending  - discussed with patient 12/2    # Afib  - amiodarone 200 mg QD  - metoprolol 50 mg TID  - Eliquis 5 mg BID  - HR 68-93 12/2    # HTN  - continue Norvasc 10 mg QD and Losartan 25 mg QD  - metoprolol  - BP (107/73 - 146/78) 12/2    # HLD  - cont atorvastatin 80 mg QHS    # Hyperglycemia in setting of steroid use  - SSI  - Monitor fingersticks    # GI / Bowel  - Senna qHS  - Dulcolax 5 mg QHS  - GI ppx: pantoprazole 40 mg QD    # Diet  - Diet Consistency: regular    # DVT prophylaxis:   - on Eliquis 5 mg BID    # LABS   EEG  neuro consult  rheum consult      Outpatient Follow-up:  Gavin Calvo  Neurology  [address], [address]  [place] [address]  Phone: [phone]  Fax: [phone]  Follow Up Time: 2 weeks    Khadijah Hartley  Radiology  [address], [address]  [place] [address]  Phone: [phone]  Fax: [phone]  Follow Up Time: 2 weeks    Brando Lazar  Cardiology  [address], [address]  [place] [address]  Phone: [phone]  Fax: [phone]  Follow Up Time: 2 weeks    Kenya Naik  Rheumatology  [address]  [place] [address]  Phone: [phone]  Fax: [phone]  Follow Up Time: 2 weeks      Code Status/Emergency Contact:

## 2023-12-02 NOTE — PROGRESS NOTE ADULT - SUBJECTIVE AND OBJECTIVE BOX
Patient is a 58y old  Female who presents with a chief complaint of CVA (02 Dec 2023 11:29)      Subjective and overnight events:  Patient seen and examined at bedside. no complaints. no fever, chills, sob, cp, abd pain. .      ALLERGIES:  No Known Drug Allergies  Shrimp (Unknown)    MEDICATIONS  (STANDING):  aMIOdarone    Tablet 200 milliGRAM(s) Oral daily  apixaban 5 milliGRAM(s) Oral two times a day  atorvastatin 80 milliGRAM(s) Oral at bedtime  bisacodyl 5 milliGRAM(s) Oral at bedtime  dextrose 5%. 1000 milliLiter(s) (100 mL/Hr) IV Continuous <Continuous>  dextrose 5%. 1000 milliLiter(s) (50 mL/Hr) IV Continuous <Continuous>  dextrose 50% Injectable 25 Gram(s) IV Push once  dextrose 50% Injectable 12.5 Gram(s) IV Push once  dextrose 50% Injectable 25 Gram(s) IV Push once  glucagon  Injectable 1 milliGRAM(s) IntraMuscular once  influenza   Vaccine 0.5 milliLiter(s) IntraMuscular once  insulin lispro (ADMELOG) corrective regimen sliding scale   SubCutaneous at bedtime  insulin lispro (ADMELOG) corrective regimen sliding scale   SubCutaneous three times a day before meals  losartan 25 milliGRAM(s) Oral daily  metoprolol tartrate 50 milliGRAM(s) Oral three times a day  pantoprazole    Tablet 40 milliGRAM(s) Oral daily  predniSONE   Tablet 50 milliGRAM(s) Oral daily  senna 2 Tablet(s) Oral at bedtime  trimethoprim  160 mG/sulfamethoxazole 800 mG 1 Tablet(s) Oral <User Schedule>    MEDICATIONS  (PRN):  dextrose Oral Gel 15 Gram(s) Oral once PRN Blood Glucose LESS THAN 70 milliGRAM(s)/deciliter    Vital Signs Last 24 Hrs  T(F): 98.9 (02 Dec 2023 08:59), Max: 98.9 (01 Dec 2023 22:34)  HR: 65 (02 Dec 2023 13:22) (61 - 71)  BP: 103/68 (02 Dec 2023 13:22) (103/68 - 142/68)  RR: 16 (02 Dec 2023 08:59) (16 - 16)  SpO2: 97% (02 Dec 2023 08:59) (96% - 97%)  I&O's Summary    PHYSICAL EXAM:  General: NAD, A/O x 3  ENT: MMM  Neck: Supple, No JVD  Lungs: Clear to auscultation bilaterally  Cardio: RRR, S1/S2, No murmurs  Abdomen: Soft, Nontender, Nondistended; Bowel sounds present  Extremities: No calf tenderness, No pitting edema    LABS:                        9.9    16.14 )-----------( 330      ( 02 Dec 2023 05:37 )             28.1     12-02    138  |  102  |  29  ----------------------------<  79  3.8   |  26  |  1.16    Ca    8.7      02 Dec 2023 05:37  Phos  2.9     12-01  Mg     1.9     12-01    TPro  6.0  /  Alb  2.3  /  TBili  0.4  /  DBili  x   /  AST  13  /  ALT  23  /  AlkPhos  66  12-02      PT/INR - ( 01 Dec 2023 12:35 )   PT: 15.5 sec;   INR: 1.34 ratio         PTT - ( 01 Dec 2023 12:35 )  PTT:27.9 sec  Lactate, Blood: 1.9 mmol/L (12-02 @ 12:24)  Lactate, Blood: 1.6 mmol/L (12-01 @ 16:20)  Lactate, Blood: 2.2 mmol/L (12-01 @ 12:35)    CARDIAC MARKERS ( 01 Dec 2023 12:35 )  x     / 6.7 ng/L / 69 U/L / x     / 0.6 ng/mL      11-22 Chol 186 mg/dL LDL -- HDL 43 mg/dL Trig 80 mg/dL          POCT Blood Glucose.: 173 mg/dL (02 Dec 2023 12:28)  POCT Blood Glucose.: 124 mg/dL (02 Dec 2023 08:33)  POCT Blood Glucose.: 114 mg/dL (01 Dec 2023 23:11)  POCT Blood Glucose.: 182 mg/dL (01 Dec 2023 17:45)      Urinalysis Basic - ( 02 Dec 2023 05:37 )    Color: x / Appearance: x / SG: x / pH: x  Gluc: 79 mg/dL / Ketone: x  / Bili: x / Urobili: x   Blood: x / Protein: x / Nitrite: x   Leuk Esterase: x / RBC: x / WBC x   Sq Epi: x / Non Sq Epi: x / Bacteria: x            RADIOLOGY & ADDITIONAL TESTS:    Care Discussed with Consultants/Other Providers:

## 2023-12-02 NOTE — CONSULT NOTE ADULT - SUBJECTIVE AND OBJECTIVE BOX
Neurology consult    NADINE [id number]    HPI:  Mrs. Nadine Poole is a 58 year old female patient with past medical history of HTN and rheumatoid arthritis who presented to [place] on 11/15 for AMS when a neighbor overhead patient was looking for her mom and she was found confused/wandering around apartment complex. Imaging studies initially performed reported bilateral subacute to remote embolic strokes, a right posterior corona radiata acute infarct, and multiple petechial hemorrhages in bilateral thalamus and basal ganglia. She was additionally noted to have KRUNAL which has since resolved. Hypertension managed with amlodipine, aldactone, and losartan and she was started on B12 supplements. Patient seen by Rheumatology and was transferred to [facility] on 11/20 for cerebral angiogram.    A cerebral angiogram performed on 11/20 reported multiple areas of focal stenosis and dilatation concerning for vasculitis. An LP was completed on 11/21 with a normal profile. Rheumatology consulted and recommended possible brain biopsy to confirm diagnosis of vasculitis but the risks of performing an open biopsy outweighed the benefits as the results of the biopsy were deemed unlikely to . She was seen by Neurosurgery with patient's neuroradiologic findings including beading on cerebral angiogram (consistent with vasculitis) and clinical presentation, with no other etiology of vasculitis, point to PACNS. Patient was started on steroids given her micro hemorrhage and strokes likely attributed to vasculitis. Steroids started on 11/24 with initial dose Solumedrol 1g for 3 days. She was to continue Prednisone 60mg with a decrease to 50mg daily for discharge to acute in-patient rehabilitation. No taper indicated per rheumatology evaluation and recommendations. An EEG was additionally performed and reported moderate diffuse/multi-focal cerebral dysfunction, not specific as to etiology. There were no epileptiform abnormalities recorded. Will need a repeat cerebral angiogram in 1 month which should take place around 12/20/23. Patient started on Eliquis 5 mg BID, amiodarone, and metoprolol for Afib. TTE reported as normal with global left ventricular systolic function, mild mitral valve regurgitation, mild aortic regurgitation, and a sclerotic aortic valve with normal opening. Hospitalization additionally significant for a rapid response on 11/20 due to acute mental status changes after being found to be slumped over while on toilet by staff prompting MRI imaging reporting:  ·	Left parietal small cortical acute infarction  ·	Innumerable scattered chronic microhemorrhages in the brainstem, bilateral cerebellar hemispheres, deep gray nuclei and peripherally within the cerebral hemispheres which may be secondary to chronic hypertensive encephalopathy.  ·	Severe extensive chronic microvascular ischemic changes and multifocal chronic lacunar infarcts as detailed above.  Patient evaluated by PT/OT and was recommended for acute inpatient rehab. Patient is medically stable for discharge to [facility] on 11/30. (30 Nov 2023 14:36)          MEDICATIONS    aMIOdarone    Tablet 200 milliGRAM(s) Oral daily  apixaban 5 milliGRAM(s) Oral two times a day  atorvastatin 80 milliGRAM(s) Oral at bedtime  bisacodyl 5 milliGRAM(s) Oral at bedtime  dextrose 5%. 1000 milliLiter(s) IV Continuous <Continuous>  dextrose 5%. 1000 milliLiter(s) IV Continuous <Continuous>  dextrose 50% Injectable 25 Gram(s) IV Push once  dextrose 50% Injectable 12.5 Gram(s) IV Push once  dextrose 50% Injectable 25 Gram(s) IV Push once  dextrose Oral Gel 15 Gram(s) Oral once PRN  glucagon  Injectable 1 milliGRAM(s) IntraMuscular once  influenza   Vaccine 0.5 milliLiter(s) IntraMuscular once  insulin lispro (ADMELOG) corrective regimen sliding scale   SubCutaneous at bedtime  insulin lispro (ADMELOG) corrective regimen sliding scale   SubCutaneous three times a day before meals  losartan 25 milliGRAM(s) Oral daily  metoprolol tartrate 50 milliGRAM(s) Oral three times a day  pantoprazole    Tablet 40 milliGRAM(s) Oral daily  predniSONE   Tablet 50 milliGRAM(s) Oral daily  senna 2 Tablet(s) Oral at bedtime         Family history: No history of dementia, strokes, or seizures   FAMILY HISTORY:    SOCIAL HISTORY -- No history of tobacco or alcohol use     Allergies    Shrimp (Unknown)  Drug Allergies Not Recorded    Intolerances            Vital Signs Last 24 Hrs  T(C): 37.2 (02 Dec 2023 08:59), Max: 37.2 (01 Dec 2023 22:34)  T(F): 98.9 (02 Dec 2023 08:59), Max: 98.9 (01 Dec 2023 22:34)  HR: 61 (02 Dec 2023 08:59) (61 - 88)  BP: 107/73 (02 Dec 2023 08:59) (107/73 - 142/68)  BP(mean): --  RR: 16 (02 Dec 2023 08:59) (16 - 16)  SpO2: 97% (02 Dec 2023 08:59) (95% - 97%)    Parameters below as of 02 Dec 2023 08:59  Patient On (Oxygen Delivery Method): room air          REVIEW OF SYSTEMS:    Constitutional: No fever, chills, fatigue, weakness  Eyes: no eye pain, visual disturbances, or discharge  ENT:  No difficulty hearing, tinnitus, vertigo; No sinus or throat pain  Neck: No pain or stiffness  Respiratory: No cough, dyspnea, wheezing   Cardiovascular: No chest pain, palpitations,   Gastrointestinal: No abdominal or epigastric pain. No nausea, vomiting  No diarrhea or constipation.   Genitourinary: No dysuria, frequency, hematuria or incontinence  Neurological: No headaches, lightheadedness, vertigo, numbness or tremors  Psychiatric: No depression, anxiety, mood swings or difficulty sleeping  Musculoskeletal: No joint pain or swelling; No muscle, back or extremity pain  Skin: No itching, burning, rashes or lesions   Lymph Nodes: No enlarged glands  Endocrine: No heat or cold intolerance; No hair loss, No h/o diabetes or thyroid dysfunction  Allergy and Immunologic: No hives or eczema    On Neurological Examination:    Mental Status - Patient is alert, awake, oriented X3. Confused Dementia Lethargic .     Follows commands well and able to answer questions appropriately. Mood and affect  normal  Follow simple commands  Follow complex commands  Does not follow commands    Speech -   Fluent    Dysarthria  Aphasia                              Cranial Nerves - Pupils 3 mm equal and reactive to light,   extraocular eye movements intact.     Motor Exam -   Right upper  Left upper  Right lower  Left lower     With stimuli positive movement of all 4 extremities    Muscle tone - is normal all over. No asymmetry is seen.      Sensory    Bilateral intact to light touch    Gait -  normal  ataxia     GENERAL Exam:     Nontoxic , No Acute Distress   	  HEENT:  normocephalic, atraumatic  		  LUNGS:	Clear bilaterally  No Wheeze  Decreased bilaterally  	  HEART:	 Normal S1S2   No murmur RRR        	  GI/ ABDOMEN:  Soft  Non tender    EXTREMITIES:   No Edema  No Clubbing  No Cyanosis No Edema    MUSCULOSKELETAL: Normal Range of Motion  	   SKIN:      Normal   No Ecchymosis               LABS:  CBC Full  -  ( 02 Dec 2023 05:37 )  WBC Count : 16.14 K/uL  RBC Count : 3.39 M/uL  Hemoglobin : 9.9 g/dL  Hematocrit : 28.1 %  Platelet Count - Automated : 330 K/uL  Mean Cell Volume : 82.9 fl  Mean Cell Hemoglobin : 29.2 pg  Mean Cell Hemoglobin Concentration : 35.2 gm/dL  Auto Neutrophil # : 10.50 K/uL  Auto Lymphocyte # : 3.83 K/uL  Auto Monocyte # : 1.27 K/uL  Auto Eosinophil # : 0.35 K/uL  Auto Basophil # : 0.02 K/uL  Auto Neutrophil % : 65.0 %  Auto Lymphocyte % : 23.7 %  Auto Monocyte % : 7.9 %  Auto Eosinophil % : 2.2 %  Auto Basophil % : 0.1 %    Urinalysis Basic - ( 02 Dec 2023 05:37 )    Color: x / Appearance: x / SG: x / pH: x  Gluc: 79 mg/dL / Ketone: x  / Bili: x / Urobili: x   Blood: x / Protein: x / Nitrite: x   Leuk Esterase: x / RBC: x / WBC x   Sq Epi: x / Non Sq Epi: x / Bacteria: x      12-02    138  |  102  |  29<H>  ----------------------------<  79  3.8   |  26  |  1.16    Ca    8.7      02 Dec 2023 05:37  Phos  2.9     12-01  Mg     1.9     12-01    TPro  6.0  /  Alb  2.3<L>  /  TBili  0.4  /  DBili  x   /  AST  13  /  ALT  23  /  AlkPhos  66  12-02    Hemoglobin A1C:     LIVER FUNCTIONS - ( 02 Dec 2023 05:37 )  Alb: 2.3 g/dL / Pro: 6.0 g/dL / ALK PHOS: 66 U/L / ALT: 23 U/L / AST: 13 U/L / GGT: x           Vitamin B12   PT/INR - ( 01 Dec 2023 12:35 )   PT: 15.5 sec;   INR: 1.34 ratio         PTT - ( 01 Dec 2023 12:35 )  PTT:27.9 sec      RADIOLOGY    EKG                     Neurology consult    NADINE [id number]    HPI:  Mrs. Nadine Poole is a 58 year old female patient with past medical history of HTN and rheumatoid arthritis who presented to [place] on 11/15 for AMS when a neighbor overhead patient was looking for her mom and she was found confused/wandering around apartment complex. Imaging studies initially performed reported bilateral subacute to remote embolic strokes, a right posterior corona radiata acute infarct, and multiple petechial hemorrhages in bilateral thalamus and basal ganglia. She was additionally noted to have KRUNAL which has since resolved. Hypertension managed with amlodipine, aldactone, and losartan and she was started on B12 supplements. Patient seen by Rheumatology and was transferred to [facility] on 11/20 for cerebral angiogram.    A cerebral angiogram performed on 11/20 reported multiple areas of focal stenosis and dilatation concerning for vasculitis. An LP was completed on 11/21 with a normal profile. Rheumatology consulted and recommended possible brain biopsy to confirm diagnosis of vasculitis but the risks of performing an open biopsy outweighed the benefits as the results of the biopsy were deemed unlikely to . She was seen by Neurosurgery with patient's neuroradiologic findings including beading on cerebral angiogram (consistent with vasculitis) and clinical presentation, with no other etiology of vasculitis, point to PACNS. Patient was started on steroids given her micro hemorrhage and strokes likely attributed to vasculitis. Steroids started on 11/24 with initial dose Solumedrol 1g for 3 days. She was to continue Prednisone 60mg with a decrease to 50mg daily for discharge to acute in-patient rehabilitation. No taper indicated per rheumatology evaluation and recommendations. An EEG was additionally performed and reported moderate diffuse/multi-focal cerebral dysfunction, not specific as to etiology. There were no epileptiform abnormalities recorded. Will need a repeat cerebral angiogram in 1 month which should take place around 12/20/23. Patient started on Eliquis 5 mg BID, amiodarone, and metoprolol for Afib. TTE reported as normal with global left ventricular systolic function, mild mitral valve regurgitation, mild aortic regurgitation, and a sclerotic aortic valve with normal opening. Hospitalization additionally significant for a rapid response on 11/20 due to acute mental status changes after being found to be slumped over while on toilet by staff prompting MRI imaging reporting:  ·	Left parietal small cortical acute infarction  ·	Innumerable scattered chronic microhemorrhages in the brainstem, bilateral cerebellar hemispheres, deep gray nuclei and peripherally within the cerebral hemispheres which may be secondary to chronic hypertensive encephalopathy.  ·	Severe extensive chronic microvascular ischemic changes and multifocal chronic lacunar infarcts as detailed above.  Patient evaluated by PT/OT and was recommended for acute inpatient rehab. Patient is medically stable for discharge to [facility] on 11/30. (30 Nov 2023 14:36)      Yesterday 12/1/2023 Patient had a syncopal episode after having BM. PCA reported patient started shaking then became unresponsive. At that time, on exam, patient was disoriented, arousable to minor stimuli. Stat CT head showed mod-severe chronic microvascular changes without  evidence of acute transcortical infarction or hemorrhage. Neurology was called for syncopal episode.   She was treated with steroid for Primary CNS vasculitis.  Today she is back to baseline, participated in physical therapy, AAOx3.       MEDICATIONS    aMIOdarone    Tablet 200 milliGRAM(s) Oral daily  apixaban 5 milliGRAM(s) Oral two times a day  atorvastatin 80 milliGRAM(s) Oral at bedtime  bisacodyl 5 milliGRAM(s) Oral at bedtime  dextrose 5%. 1000 milliLiter(s) IV Continuous <Continuous>  dextrose 5%. 1000 milliLiter(s) IV Continuous <Continuous>  dextrose 50% Injectable 25 Gram(s) IV Push once  dextrose 50% Injectable 12.5 Gram(s) IV Push once  dextrose 50% Injectable 25 Gram(s) IV Push once  dextrose Oral Gel 15 Gram(s) Oral once PRN  glucagon  Injectable 1 milliGRAM(s) IntraMuscular once  influenza   Vaccine 0.5 milliLiter(s) IntraMuscular once  insulin lispro (ADMELOG) corrective regimen sliding scale   SubCutaneous at bedtime  insulin lispro (ADMELOG) corrective regimen sliding scale   SubCutaneous three times a day before meals  losartan 25 milliGRAM(s) Oral daily  metoprolol tartrate 50 milliGRAM(s) Oral three times a day  pantoprazole    Tablet 40 milliGRAM(s) Oral daily  predniSONE   Tablet 50 milliGRAM(s) Oral daily  senna 2 Tablet(s) Oral at bedtime         Family history: No history of dementia, strokes, or seizures   FAMILY HISTORY:    SOCIAL HISTORY -- No history of tobacco or alcohol use     Allergies    Shrimp (Unknown)  Drug Allergies Not Recorded    Intolerances            Vital Signs Last 24 Hrs  T(C): 37.2 (02 Dec 2023 08:59), Max: 37.2 (01 Dec 2023 22:34)  T(F): 98.9 (02 Dec 2023 08:59), Max: 98.9 (01 Dec 2023 22:34)  HR: 61 (02 Dec 2023 08:59) (61 - 88)  BP: 107/73 (02 Dec 2023 08:59) (107/73 - 142/68)  BP(mean): --  RR: 16 (02 Dec 2023 08:59) (16 - 16)  SpO2: 97% (02 Dec 2023 08:59) (95% - 97%)    Parameters below as of 02 Dec 2023 08:59  Patient On (Oxygen Delivery Method): room air          REVIEW OF SYSTEMS:    Constitutional: No fever, chills, fatigue, weakness  Eyes: no eye pain, visual disturbances, or discharge  ENT:  No difficulty hearing, tinnitus, vertigo; No sinus or throat pain  Neck: No pain or stiffness  Respiratory: No cough, dyspnea, wheezing   Cardiovascular: No chest pain, palpitations,   Gastrointestinal: No abdominal or epigastric pain. No nausea, vomiting  No diarrhea or constipation.   Genitourinary: No dysuria, frequency, hematuria or incontinence  Neurological: No headaches, lightheadedness, vertigo, numbness or tremors  Psychiatric: No depression, anxiety, mood swings or difficulty sleeping  Musculoskeletal: No joint pain or swelling; No muscle, back or extremity pain  Skin: No itching, burning, rashes or lesions   Lymph Nodes: No enlarged glands  Endocrine: No heat or cold intolerance; No hair loss, No h/o diabetes or thyroid dysfunction  Allergy and Immunologic: No hives or eczema    On Neurological Examination:    Mental Status - Patient is alert, awake, oriented X3.   Follows commands well and able to answer questions appropriately. Mood and affect  normal  Speech -   Fluent                          Cranial Nerves - Pupils 3 mm equal and reactive to light, extraocular eye movements intact, face symmetric, tongue midline.     Motor Exam - able to lift all extremities against gravity.   Muscle tone - is normal all over. No asymmetry is seen.    Sensory    Bilateral intact to light touch    Gait -  deferred     LABS:  CBC Full  -  ( 02 Dec 2023 05:37 )  WBC Count : 16.14 K/uL  RBC Count : 3.39 M/uL  Hemoglobin : 9.9 g/dL  Hematocrit : 28.1 %  Platelet Count - Automated : 330 K/uL  Mean Cell Volume : 82.9 fl  Mean Cell Hemoglobin : 29.2 pg  Mean Cell Hemoglobin Concentration : 35.2 gm/dL  Auto Neutrophil # : 10.50 K/uL  Auto Lymphocyte # : 3.83 K/uL  Auto Monocyte # : 1.27 K/uL  Auto Eosinophil # : 0.35 K/uL  Auto Basophil # : 0.02 K/uL  Auto Neutrophil % : 65.0 %  Auto Lymphocyte % : 23.7 %  Auto Monocyte % : 7.9 %  Auto Eosinophil % : 2.2 %  Auto Basophil % : 0.1 %    Urinalysis Basic - ( 02 Dec 2023 05:37 )    Color: x / Appearance: x / SG: x / pH: x  Gluc: 79 mg/dL / Ketone: x  / Bili: x / Urobili: x   Blood: x / Protein: x / Nitrite: x   Leuk Esterase: x / RBC: x / WBC x   Sq Epi: x / Non Sq Epi: x / Bacteria: x      12-02    138  |  102  |  29<H>  ----------------------------<  79  3.8   |  26  |  1.16    Ca    8.7      02 Dec 2023 05:37  Phos  2.9     12-01  Mg     1.9     12-01    TPro  6.0  /  Alb  2.3<L>  /  TBili  0.4  /  DBili  x   /  AST  13  /  ALT  23  /  AlkPhos  66  12-02      LIVER FUNCTIONS - ( 02 Dec 2023 05:37 )  Alb: 2.3 g/dL / Pro: 6.0 g/dL / ALK PHOS: 66 U/L / ALT: 23 U/L / AST: 13 U/L / GGT: x           Vitamin B12   PT/INR - ( 01 Dec 2023 12:35 )   PT: 15.5 sec;   INR: 1.34 ratio         PTT - ( 01 Dec 2023 12:35 )  PTT:27.9 sec      ACC: [id number] EXAM:  CT BRAIN   ORDERED BY:  BRAIN GONZALEZ     PROCEDURE DATE:  12/02/2023          INTERPRETATION:  Clinical Indication: Altered mental status, syncope,   vasculitis    5mm axial sections of the brain were obtained from base to vertex,   without the intravenous administration of contrast material. Coronal and   sagittal computer generated reconstructed views are available.    Comparison is made with prior CT of 12/1/2023 and demonstrates no   significant interval change.    Moderate atrophy is identified with ventricular and sulcal prominence.   Small vessel white matter ischemic changes are noted. There is no   hemorrhage. There is no change since the prior exam. Bone window   examination is unremarkable.    IMPRESSION:No change since 12/1/2023. Moderate atrophy and small vessel   white matter ischemic changes.    --- End of Report ---        ACC: [id number] EXAM:  MR BRAIN   ORDERED BY: KARIE ALLANJUAN     PROCEDURE DATE:  11/29/2023          INTERPRETATION:  STUDY: MR BRAIN WITHOUT CONTRAST.    CLINICAL INDICATION: r/o new infarct.    TECHNIQUE: Multiplanar, multisequence MR imaging of the brain was   performed.    COMPARISON: CT head 11/24/2023    FINDINGS:  Left parietal (11; 22) small cortical focus of FLAIR hyperintense signal   abnormality with corresponding restricted diffusion consistent with an   acute infarction. No associated acute intracranial hemorrhage.     The ventricles and sulci are age appropriate  There are extensive patchy   and confluent areas of T2  hyperintensity within the periventricular and   subcortical white matter which are non specific and may be related to   severe chronic microvascular ischemic changes.There are innumerable foci   of susceptibility scattered throughout the brainstem, cerebral   hemispheres and cerebellar hemispheres which likely represent chronic   microhemorrhages. Right pontine, bilateral thalamic, bilateral basal   ganglia and bilateral corona radiata chronic lacunar infarcts. There is   no evidence of mass or acute intracranial hemorrhage. There is no extra   axial collection. No midline shift or other significant mass effect is   noted.    There is normal flow-void within major cranial vessels suggestive of   their patency.    The orbital contents are grossly unremarkable. The paranasal sinuses are   predominantly clear.. The mastoid air cells are predominantly clear.    IMPRESSION:  Left parietal small cortical acute infarction. No acute intracranial   hemorrhage.  Innumerable scattered chronic microhemorrhages in the brainstem,   bilateral cerebellar hemispheres, deep gray nuclei and peripherally   within thecerebral hemispheres which may be secondary to chronic   hypertensive encephalopathy.  Severe extensive chronic microvascular ischemic changes and multifocal   chronic lacunar infarcts as detailed above.    Findings discussed by Dr. Soni with NP Ita Vila with readback   confirmation on 11/30/2023 11:03 AM.    --- End of Report ---           CLOVIS SONI MD; Resident Radiologist  This document has been electronically signed.  ELIANA HEART MD; Attending Radiologist  This document hasbeen electronically signed. Nov 30 2023 11:12AM

## 2023-12-02 NOTE — PROGRESS NOTE ADULT - SUBJECTIVE AND OBJECTIVE BOX
Patient is a 58y old  Female who presents with a chief complaint of CVA (01 Dec 2023 17:25)      HPI:  Mrs. Екатерина Poole is a 58 year old female patient with past medical history of HTN and rheumatoid arthritis who presented to [place] on 11/15 for AMS when a neighbor overhead patient was looking for her mom and she was found confused/wandering around apartment complex. Imaging studies initially performed reported bilateral subacute to remote embolic strokes, a right posterior corona radiata acute infarct, and multiple petechial hemorrhages in bilateral thalamus and basal ganglia. She was additionally noted to have KRUNAL which has since resolved. Hypertension managed with amlodipine, aldactone, and losartan and she was started on B12 supplements. Patient seen by Rheumatology and was transferred to [facility] on 11/20 for cerebral angiogram.    A cerebral angiogram performed on 11/20 reported multiple areas of focal stenosis and dilatation concerning for vasculitis. An LP was completed on 11/21 with a normal profile. Rheumatology consulted and recommended possible brain biopsy to confirm diagnosis of vasculitis but the risks of performing an open biopsy outweighed the benefits as the results of the biopsy were deemed unlikely to . She was seen by Neurosurgery with patient's neuroradiologic findings including beading on cerebral angiogram (consistent with vasculitis) and clinical presentation, with no other etiology of vasculitis, point to PACNS. Patient was started on steroids given her micro hemorrhage and strokes likely attributed to vasculitis. Steroids started on 11/24 with initial dose Solumedrol 1g for 3 days. She was to continue Prednisone 60mg with a decrease to 50mg daily for discharge to acute in-patient rehabilitation. No taper indicated per rheumatology evaluation and recommendations. An EEG was additionally performed and reported moderate diffuse/multi-focal cerebral dysfunction, not specific as to etiology. There were no epileptiform abnormalities recorded. Will need a repeat cerebral angiogram in 1 month which should take place around 12/20/23. Patient started on Eliquis 5 mg BID, amiodarone, and metoprolol for Afib. TTE reported as normal with global left ventricular systolic function, mild mitral valve regurgitation, mild aortic regurgitation, and a sclerotic aortic valve with normal opening. Hospitalization additionally significant for a rapid response on 11/20 due to acute mental status changes after being found to be slumped over while on toilet by staff prompting MRI imaging reporting:  ·	Left parietal small cortical acute infarction  ·	Innumerable scattered chronic microhemorrhages in the brainstem, bilateral cerebellar hemispheres, deep gray nuclei and peripherally within the cerebral hemispheres which may be secondary to chronic hypertensive encephalopathy.  ·	Severe extensive chronic microvascular ischemic changes and multifocal chronic lacunar infarcts as detailed above.  Patient evaluated by PT/OT and was recommended for acute inpatient rehab. Patient is medically stable for discharge to [facility] on 11/30. (30 Nov 2023 14:36)      PAST MEDICAL & SURGICAL HISTORY:      MEDICATIONS  (STANDING):  aMIOdarone    Tablet 200 milliGRAM(s) Oral daily  apixaban 5 milliGRAM(s) Oral two times a day  atorvastatin 80 milliGRAM(s) Oral at bedtime  bisacodyl 5 milliGRAM(s) Oral at bedtime  dextrose 5%. 1000 milliLiter(s) (50 mL/Hr) IV Continuous <Continuous>  dextrose 5%. 1000 milliLiter(s) (100 mL/Hr) IV Continuous <Continuous>  dextrose 50% Injectable 25 Gram(s) IV Push once  dextrose 50% Injectable 12.5 Gram(s) IV Push once  dextrose 50% Injectable 25 Gram(s) IV Push once  glucagon  Injectable 1 milliGRAM(s) IntraMuscular once  influenza   Vaccine 0.5 milliLiter(s) IntraMuscular once  insulin lispro (ADMELOG) corrective regimen sliding scale   SubCutaneous at bedtime  insulin lispro (ADMELOG) corrective regimen sliding scale   SubCutaneous three times a day before meals  losartan 25 milliGRAM(s) Oral daily  metoprolol tartrate 50 milliGRAM(s) Oral three times a day  pantoprazole    Tablet 40 milliGRAM(s) Oral daily  predniSONE   Tablet 50 milliGRAM(s) Oral daily  senna 2 Tablet(s) Oral at bedtime    MEDICATIONS  (PRN):  dextrose Oral Gel 15 Gram(s) Oral once PRN Blood Glucose LESS THAN 70 milliGRAM(s)/deciliter      Allergies    Shrimp (Unknown)  Drug Allergies Not Recorded    Intolerances          VITALS  58y  Vital Signs Last 24 Hrs  T(C): 37.2 (01 Dec 2023 22:34), Max: 37.3 (01 Dec 2023 08:52)  T(F): 98.9 (01 Dec 2023 22:34), Max: 99.1 (01 Dec 2023 08:52)  HR: 71 (02 Dec 2023 06:43) (68 - 93)  BP: 142/68 (02 Dec 2023 06:43) (107/73 - 146/78)  BP(mean): --  RR: 16 (01 Dec 2023 22:34) (16 - 16)  SpO2: 96% (01 Dec 2023 22:34) (94% - 96%)    Parameters below as of 01 Dec 2023 22:34  Patient On (Oxygen Delivery Method): room air      Daily     Daily         RECENT LABS:                          9.9    16.14 )-----------( 330      ( 02 Dec 2023 05:37 )             28.1     12-02    138  |  102  |  29<H>  ----------------------------<  79  3.8   |  26  |  1.16    Ca    8.7      02 Dec 2023 05:37  Phos  2.9     12-01  Mg     1.9     12-01    TPro  6.0  /  Alb  2.3<L>  /  TBili  0.4  /  DBili  x   /  AST  13  /  ALT  23  /  AlkPhos  66  12-02    LIVER FUNCTIONS - ( 02 Dec 2023 05:37 )  Alb: 2.3 g/dL / Pro: 6.0 g/dL / ALK PHOS: 66 U/L / ALT: 23 U/L / AST: 13 U/L / GGT: x           PT/INR - ( 01 Dec 2023 12:35 )   PT: 15.5 sec;   INR: 1.34 ratio         PTT - ( 01 Dec 2023 12:35 )  PTT:27.9 sec  Urinalysis Basic - ( 02 Dec 2023 05:37 )    Color: x / Appearance: x / SG: x / pH: x  Gluc: 79 mg/dL / Ketone: x  / Bili: x / Urobili: x   Blood: x / Protein: x / Nitrite: x   Leuk Esterase: x / RBC: x / WBC x   Sq Epi: x / Non Sq Epi: x / Bacteria: x        ACC: [id number] EXAM:  CT BRAIN   ORDERED BY:  BRAIN GONZALEZ     PROCEDURE DATE:  12/01/2023          INTERPRETATION:  CLINICAL INDICATIONS:  AMS/vasovagal, prior CVA    COMPARISON: Head CT dated 11/24/2023.    TECHNIQUE: Noncontrast CT of the head. Multiplanar reformations are   submitted.    FINDINGS:  Chronic bilateral thalamic infarctions.  There is periventricular and subcortical white matter hypodensity without   mass effect, nonspecific, likely representing moderate to severe chronic   microvascular ischemic changes. There is no compelling evidence for an   acute transcortical infarction. There is no evidence of mass, mass   effect, midline shift or extra-axial fluid collection. The lateral   ventricles and cortical sulci are age-appropriate in size and   configuration. The orbits, mastoid air cells and visualized paranasal   sinuses are unremarkable. The calvarium is intact. Consider MRI as   clinically warranted.    IMPRESSION:  Moderate to severe chronic microvascular changes without   evidence of an acute transcortical infarction or hemorrhage.    --- End of Report ---            ROSEMARY MORA MD; Attending Radiologist  This document has been electronically signed. Dec  1 2023 10:54AM    CAPILLARY BLOOD GLUCOSE      POCT Blood Glucose.: 124 mg/dL (02 Dec 2023 08:33)  POCT Blood Glucose.: 114 mg/dL (01 Dec 2023 23:11)  POCT Blood Glucose.: 182 mg/dL (01 Dec 2023 17:45)  POCT Blood Glucose.: 227 mg/dL (01 Dec 2023 12:02)  POCT Blood Glucose.: 242 mg/dL (01 Dec 2023 10:20)

## 2023-12-02 NOTE — CONSULT NOTE ADULT - ASSESSMENT
In summary, Ms. Poole is a 59 yo woman with multiple scattered chronic cerebral infarction with l;eft parietal small cortical acute infarction, work up showed beading pattern on MRA/angiogram which consistent with CNS vasculitis, workup so far negative, and she was treated as primary angiitis CNS with high dose steroids, PMH AFIB now on eliquis.  Yesterday she had a syncopal episode while passing BM.  # Primary CNS vasculitis.  # multiple stroke  #Afib on Eliquis  #HTN  # RA  # syncopal. This could be due to vasovagal reflexes. DDX seizure (high risk from lesion in the brain), arrhythmia, new stroke (less likely),  Recommendation  EEG routine  repeat CT head: No change since 12/1/2023. Moderate atrophy and small vessel white matter ischemic changes.  rheumatology consult for management PACNS.  BP management, euvolemia, cardiac monitoring for possible arrhythmia (other than known Afib).

## 2023-12-02 NOTE — PROGRESS NOTE ADULT - ASSESSMENT
58 year old female patient with past medical history of HTN and rheumatoid arthritis who is admitted for Acute Inpatient Rehabilitation with a multidisciplinary rehab program at [facility] with functional impairments in ADLs and mobility secondary to left hemiparesis resulting from bilateral subacute to remote embolic strokes, a right posterior corona radiata acute infarct, multiple petechial hemorrhages in bilateral thalamus and basal ganglia with etiology highly indicative from underlying vasculitis and a subsequent left parietal cortical acute infarction of a likely cardioembolic source.      CVA  - suspect due to vasculitis  - [facility] Neurology Impression: Left hemiparesis due to right corona radiata infarct, multiple micro hemorrhages, deep and cortical. Cerebral angiogram concerning for PACNS. Pt also with Afib, less likely cardioembolic event  - s/p IV solumedrol. now on prednisone 50mg QD   - per rheumatology: cont po prednisone 50mg daily until repeat cerebral angiogram in 1 month (~12/20/23)  - cont PPI while on steroid    - Rapid response called on 12/1 for syncopal episode after a BM- likely vasovagal syncope  - CT scan: no acute changes  - Continue prednisone 50 mg QD  - Neuro consult appreciated     Leukocytosis - possibly steroid induced?  elevated lactic acid - resolved with IVF  - afebrile. no focal symptoms  - UA negative  - check CXR, blood cx to be complete  - monitor off abx    Afib  - s/p amiodarone gtt  - continue amiodarone 200 mg QD, metoprolol 50 mg TID, and Eliquis 5 mg BID    HTN  - continue Losartan 25 mg QD  - Hold Amlodipine 10mg  - Monitor BP    HLD  - cont atorvastatin 80 mg QHS    Hyperglycemia in setting of steroid use  - AIC 4.9  - SSI  - Monitor fingersticks      DVT prophylaxis:   - on Eliquis 5 mg BID    GI prophylaxis  - Protonix

## 2023-12-03 LAB
BASOPHILS # BLD AUTO: 0.04 K/UL — SIGNIFICANT CHANGE UP (ref 0–0.2)
BASOPHILS # BLD AUTO: 0.04 K/UL — SIGNIFICANT CHANGE UP (ref 0–0.2)
BASOPHILS NFR BLD AUTO: 0.2 % — SIGNIFICANT CHANGE UP (ref 0–2)
BASOPHILS NFR BLD AUTO: 0.2 % — SIGNIFICANT CHANGE UP (ref 0–2)
EOSINOPHIL # BLD AUTO: 0.33 K/UL — SIGNIFICANT CHANGE UP (ref 0–0.5)
EOSINOPHIL # BLD AUTO: 0.33 K/UL — SIGNIFICANT CHANGE UP (ref 0–0.5)
EOSINOPHIL NFR BLD AUTO: 1.9 % — SIGNIFICANT CHANGE UP (ref 0–6)
EOSINOPHIL NFR BLD AUTO: 1.9 % — SIGNIFICANT CHANGE UP (ref 0–6)
GLUCOSE BLDC GLUCOMTR-MCNC: 104 MG/DL — HIGH (ref 70–99)
GLUCOSE BLDC GLUCOMTR-MCNC: 104 MG/DL — HIGH (ref 70–99)
GLUCOSE BLDC GLUCOMTR-MCNC: 115 MG/DL — HIGH (ref 70–99)
GLUCOSE BLDC GLUCOMTR-MCNC: 115 MG/DL — HIGH (ref 70–99)
GLUCOSE BLDC GLUCOMTR-MCNC: 120 MG/DL — HIGH (ref 70–99)
GLUCOSE BLDC GLUCOMTR-MCNC: 120 MG/DL — HIGH (ref 70–99)
GLUCOSE BLDC GLUCOMTR-MCNC: 200 MG/DL — HIGH (ref 70–99)
GLUCOSE BLDC GLUCOMTR-MCNC: 200 MG/DL — HIGH (ref 70–99)
HCT VFR BLD CALC: 27.6 % — LOW (ref 34.5–45)
HCT VFR BLD CALC: 27.6 % — LOW (ref 34.5–45)
HGB BLD-MCNC: 9.9 G/DL — LOW (ref 11.5–15.5)
HGB BLD-MCNC: 9.9 G/DL — LOW (ref 11.5–15.5)
IMM GRANULOCYTES NFR BLD AUTO: 1.3 % — HIGH (ref 0–0.9)
IMM GRANULOCYTES NFR BLD AUTO: 1.3 % — HIGH (ref 0–0.9)
LYMPHOCYTES # BLD AUTO: 21.6 % — SIGNIFICANT CHANGE UP (ref 13–44)
LYMPHOCYTES # BLD AUTO: 21.6 % — SIGNIFICANT CHANGE UP (ref 13–44)
LYMPHOCYTES # BLD AUTO: 3.71 K/UL — HIGH (ref 1–3.3)
LYMPHOCYTES # BLD AUTO: 3.71 K/UL — HIGH (ref 1–3.3)
MCHC RBC-ENTMCNC: 29.7 PG — SIGNIFICANT CHANGE UP (ref 27–34)
MCHC RBC-ENTMCNC: 29.7 PG — SIGNIFICANT CHANGE UP (ref 27–34)
MCHC RBC-ENTMCNC: 35.9 GM/DL — SIGNIFICANT CHANGE UP (ref 32–36)
MCHC RBC-ENTMCNC: 35.9 GM/DL — SIGNIFICANT CHANGE UP (ref 32–36)
MCV RBC AUTO: 82.9 FL — SIGNIFICANT CHANGE UP (ref 80–100)
MCV RBC AUTO: 82.9 FL — SIGNIFICANT CHANGE UP (ref 80–100)
MONOCYTES # BLD AUTO: 1.38 K/UL — HIGH (ref 0–0.9)
MONOCYTES # BLD AUTO: 1.38 K/UL — HIGH (ref 0–0.9)
MONOCYTES NFR BLD AUTO: 8 % — SIGNIFICANT CHANGE UP (ref 2–14)
MONOCYTES NFR BLD AUTO: 8 % — SIGNIFICANT CHANGE UP (ref 2–14)
NEUTROPHILS # BLD AUTO: 11.49 K/UL — HIGH (ref 1.8–7.4)
NEUTROPHILS # BLD AUTO: 11.49 K/UL — HIGH (ref 1.8–7.4)
NEUTROPHILS NFR BLD AUTO: 67 % — SIGNIFICANT CHANGE UP (ref 43–77)
NEUTROPHILS NFR BLD AUTO: 67 % — SIGNIFICANT CHANGE UP (ref 43–77)
NRBC # BLD: 0 /100 WBCS — SIGNIFICANT CHANGE UP (ref 0–0)
NRBC # BLD: 0 /100 WBCS — SIGNIFICANT CHANGE UP (ref 0–0)
PLATELET # BLD AUTO: 397 K/UL — SIGNIFICANT CHANGE UP (ref 150–400)
PLATELET # BLD AUTO: 397 K/UL — SIGNIFICANT CHANGE UP (ref 150–400)
RBC # BLD: 3.33 M/UL — LOW (ref 3.8–5.2)
RBC # BLD: 3.33 M/UL — LOW (ref 3.8–5.2)
RBC # FLD: 12.5 % — SIGNIFICANT CHANGE UP (ref 10.3–14.5)
RBC # FLD: 12.5 % — SIGNIFICANT CHANGE UP (ref 10.3–14.5)
WBC # BLD: 17.18 K/UL — HIGH (ref 3.8–10.5)
WBC # BLD: 17.18 K/UL — HIGH (ref 3.8–10.5)
WBC # FLD AUTO: 17.18 K/UL — HIGH (ref 3.8–10.5)
WBC # FLD AUTO: 17.18 K/UL — HIGH (ref 3.8–10.5)

## 2023-12-03 PROCEDURE — 99232 SBSQ HOSP IP/OBS MODERATE 35: CPT

## 2023-12-03 RX ADMIN — SENNA PLUS 2 TABLET(S): 8.6 TABLET ORAL at 21:10

## 2023-12-03 RX ADMIN — Medication 50 MILLIGRAM(S): at 06:52

## 2023-12-03 RX ADMIN — LOSARTAN POTASSIUM 25 MILLIGRAM(S): 100 TABLET, FILM COATED ORAL at 06:53

## 2023-12-03 RX ADMIN — APIXABAN 5 MILLIGRAM(S): 2.5 TABLET, FILM COATED ORAL at 18:07

## 2023-12-03 RX ADMIN — ATORVASTATIN CALCIUM 80 MILLIGRAM(S): 80 TABLET, FILM COATED ORAL at 21:09

## 2023-12-03 RX ADMIN — Medication 50 MILLIGRAM(S): at 06:53

## 2023-12-03 RX ADMIN — PANTOPRAZOLE SODIUM 40 MILLIGRAM(S): 20 TABLET, DELAYED RELEASE ORAL at 12:37

## 2023-12-03 RX ADMIN — Medication 1: at 12:36

## 2023-12-03 RX ADMIN — Medication 50 MILLIGRAM(S): at 21:10

## 2023-12-03 RX ADMIN — APIXABAN 5 MILLIGRAM(S): 2.5 TABLET, FILM COATED ORAL at 06:52

## 2023-12-03 RX ADMIN — Medication 50 MILLIGRAM(S): at 13:14

## 2023-12-03 RX ADMIN — Medication 5 MILLIGRAM(S): at 21:09

## 2023-12-03 RX ADMIN — AMIODARONE HYDROCHLORIDE 200 MILLIGRAM(S): 400 TABLET ORAL at 06:52

## 2023-12-03 NOTE — PROGRESS NOTE ADULT - COMMENTS
Patient is alert, appears comfortable. She is having normal bowel movements, denies any need to strain, no dysuria. Denies any headache or dizziness. Slept well overnight. afebrile, no cough, no chest discomfort    Neurology appreciated, recommendation reviewed with patient. EEG pending
Patient seen in bed, alert, pleasant, NAD. Reports sleeping well. Deneis any H/A, dizziness, visual change. Does not remember details of yesterday's RRT event; results of Head CT discussed as well as plan for EEG. Neuro and rheum recommendations pending, also discussed with patient

## 2023-12-03 NOTE — PROGRESS NOTE ADULT - MOTOR
left shoulder 4/5 gross grasp 4+/5  right shoulder 5-/5 grasp 5/5  right HF 4+/5 quad 5/5 ankle PF 5/5  left HF 4/5 quad 5-/5 ankle PF 5/5 Df 5-/5  calves soft no TTP

## 2023-12-03 NOTE — PROGRESS NOTE ADULT - SUBJECTIVE AND OBJECTIVE BOX
Patient is a 58y old  Female who presents with a chief complaint of CVA (02 Dec 2023 15:47)      HPI:  Mrs. Екатерина Poole is a 58 year old female patient with past medical history of HTN and rheumatoid arthritis who presented to [place] on 11/15 for AMS when a neighbor overhead patient was looking for her mom and she was found confused/wandering around apartment complex. Imaging studies initially performed reported bilateral subacute to remote embolic strokes, a right posterior corona radiata acute infarct, and multiple petechial hemorrhages in bilateral thalamus and basal ganglia. She was additionally noted to have KRUNAL which has since resolved. Hypertension managed with amlodipine, aldactone, and losartan and she was started on B12 supplements. Patient seen by Rheumatology and was transferred to [facility] on 11/20 for cerebral angiogram.    A cerebral angiogram performed on 11/20 reported multiple areas of focal stenosis and dilatation concerning for vasculitis. An LP was completed on 11/21 with a normal profile. Rheumatology consulted and recommended possible brain biopsy to confirm diagnosis of vasculitis but the risks of performing an open biopsy outweighed the benefits as the results of the biopsy were deemed unlikely to . She was seen by Neurosurgery with patient's neuroradiologic findings including beading on cerebral angiogram (consistent with vasculitis) and clinical presentation, with no other etiology of vasculitis, point to PACNS. Patient was started on steroids given her micro hemorrhage and strokes likely attributed to vasculitis. Steroids started on 11/24 with initial dose Solumedrol 1g for 3 days. She was to continue Prednisone 60mg with a decrease to 50mg daily for discharge to acute in-patient rehabilitation. No taper indicated per rheumatology evaluation and recommendations. An EEG was additionally performed and reported moderate diffuse/multi-focal cerebral dysfunction, not specific as to etiology. There were no epileptiform abnormalities recorded. Will need a repeat cerebral angiogram in 1 month which should take place around 12/20/23. Patient started on Eliquis 5 mg BID, amiodarone, and metoprolol for Afib. TTE reported as normal with global left ventricular systolic function, mild mitral valve regurgitation, mild aortic regurgitation, and a sclerotic aortic valve with normal opening. Hospitalization additionally significant for a rapid response on 11/20 due to acute mental status changes after being found to be slumped over while on toilet by staff prompting MRI imaging reporting:  ·	Left parietal small cortical acute infarction  ·	Innumerable scattered chronic microhemorrhages in the brainstem, bilateral cerebellar hemispheres, deep gray nuclei and peripherally within the cerebral hemispheres which may be secondary to chronic hypertensive encephalopathy.  ·	Severe extensive chronic microvascular ischemic changes and multifocal chronic lacunar infarcts as detailed above.  Patient evaluated by PT/OT and was recommended for acute inpatient rehab. Patient is medically stable for discharge to [facility] on 11/30. (30 Nov 2023 14:36)      PAST MEDICAL & SURGICAL HISTORY:      MEDICATIONS  (STANDING):  aMIOdarone    Tablet 200 milliGRAM(s) Oral daily  apixaban 5 milliGRAM(s) Oral two times a day  atorvastatin 80 milliGRAM(s) Oral at bedtime  bisacodyl 5 milliGRAM(s) Oral at bedtime  dextrose 5%. 1000 milliLiter(s) (50 mL/Hr) IV Continuous <Continuous>  dextrose 5%. 1000 milliLiter(s) (100 mL/Hr) IV Continuous <Continuous>  dextrose 50% Injectable 25 Gram(s) IV Push once  dextrose 50% Injectable 12.5 Gram(s) IV Push once  dextrose 50% Injectable 25 Gram(s) IV Push once  glucagon  Injectable 1 milliGRAM(s) IntraMuscular once  influenza   Vaccine 0.5 milliLiter(s) IntraMuscular once  insulin lispro (ADMELOG) corrective regimen sliding scale   SubCutaneous at bedtime  insulin lispro (ADMELOG) corrective regimen sliding scale   SubCutaneous three times a day before meals  losartan 25 milliGRAM(s) Oral daily  metoprolol tartrate 50 milliGRAM(s) Oral three times a day  pantoprazole    Tablet 40 milliGRAM(s) Oral daily  predniSONE   Tablet 50 milliGRAM(s) Oral daily  senna 2 Tablet(s) Oral at bedtime  trimethoprim  160 mG/sulfamethoxazole 800 mG 1 Tablet(s) Oral <User Schedule>    MEDICATIONS  (PRN):  dextrose Oral Gel 15 Gram(s) Oral once PRN Blood Glucose LESS THAN 70 milliGRAM(s)/deciliter      Allergies    No Known Drug Allergies  Shrimp (Unknown)    Intolerances          VITALS  58y  Vital Signs Last 24 Hrs  T(C): 37.1 (02 Dec 2023 22:07), Max: 37.2 (02 Dec 2023 08:59)  T(F): 98.7 (02 Dec 2023 22:07), Max: 98.9 (02 Dec 2023 08:59)  HR: 64 (03 Dec 2023 06:50) (61 - 69)  BP: 148/80 (03 Dec 2023 06:50) (103/68 - 148/80)  BP(mean): --  RR: 16 (02 Dec 2023 22:07) (16 - 16)  SpO2: 98% (02 Dec 2023 22:07) (97% - 98%)    Parameters below as of 02 Dec 2023 22:07  Patient On (Oxygen Delivery Method): room air      Daily     Daily         RECENT LABS:                          9.9    17.18 )-----------( 397      ( 03 Dec 2023 05:26 )             27.6     12-02    138  |  102  |  29<H>  ----------------------------<  79  3.8   |  26  |  1.16    Ca    8.7      02 Dec 2023 05:37  Phos  2.9     12-01  Mg     1.9     12-01    TPro  6.0  /  Alb  2.3<L>  /  TBili  0.4  /  DBili  x   /  AST  13  /  ALT  23  /  AlkPhos  66  12-02    LIVER FUNCTIONS - ( 02 Dec 2023 05:37 )  Alb: 2.3 g/dL / Pro: 6.0 g/dL / ALK PHOS: 66 U/L / ALT: 23 U/L / AST: 13 U/L / GGT: x           PT/INR - ( 01 Dec 2023 12:35 )   PT: 15.5 sec;   INR: 1.34 ratio         PTT - ( 01 Dec 2023 12:35 )  PTT:27.9 sec  Urinalysis Basic - ( 02 Dec 2023 05:37 )    Color: x / Appearance: x / SG: x / pH: x  Gluc: 79 mg/dL / Ketone: x  / Bili: x / Urobili: x   Blood: x / Protein: x / Nitrite: x   Leuk Esterase: x / RBC: x / WBC x   Sq Epi: x / Non Sq Epi: x / Bacteria: x          CAPILLARY BLOOD GLUCOSE      POCT Blood Glucose.: 128 mg/dL (02 Dec 2023 22:16)  POCT Blood Glucose.: 122 mg/dL (02 Dec 2023 17:30)  POCT Blood Glucose.: 173 mg/dL (02 Dec 2023 12:28)  POCT Blood Glucose.: 124 mg/dL (02 Dec 2023 08:33)               Patient is a 58y old  Female who presents with a chief complaint of CVA (02 Dec 2023 15:47)      HPI:  Mrs. Екатерина Poole is a 58 year old female patient with past medical history of HTN and rheumatoid arthritis who presented to [place] on 11/15 for AMS when a neighbor overhead patient was looking for her mom and she was found confused/wandering around apartment complex. Imaging studies initially performed reported bilateral subacute to remote embolic strokes, a right posterior corona radiata acute infarct, and multiple petechial hemorrhages in bilateral thalamus and basal ganglia. She was additionally noted to have KRUNAL which has since resolved. Hypertension managed with amlodipine, aldactone, and losartan and she was started on B12 supplements. Patient seen by Rheumatology and was transferred to [facility] on 11/20 for cerebral angiogram.    A cerebral angiogram performed on 11/20 reported multiple areas of focal stenosis and dilatation concerning for vasculitis. An LP was completed on 11/21 with a normal profile. Rheumatology consulted and recommended possible brain biopsy to confirm diagnosis of vasculitis but the risks of performing an open biopsy outweighed the benefits as the results of the biopsy were deemed unlikely to . She was seen by Neurosurgery with patient's neuroradiologic findings including beading on cerebral angiogram (consistent with vasculitis) and clinical presentation, with no other etiology of vasculitis, point to PACNS. Patient was started on steroids given her micro hemorrhage and strokes likely attributed to vasculitis. Steroids started on 11/24 with initial dose Solumedrol 1g for 3 days. She was to continue Prednisone 60mg with a decrease to 50mg daily for discharge to acute in-patient rehabilitation. No taper indicated per rheumatology evaluation and recommendations. An EEG was additionally performed and reported moderate diffuse/multi-focal cerebral dysfunction, not specific as to etiology. There were no epileptiform abnormalities recorded. Will need a repeat cerebral angiogram in 1 month which should take place around 12/20/23. Patient started on Eliquis 5 mg BID, amiodarone, and metoprolol for Afib. TTE reported as normal with global left ventricular systolic function, mild mitral valve regurgitation, mild aortic regurgitation, and a sclerotic aortic valve with normal opening. Hospitalization additionally significant for a rapid response on 11/20 due to acute mental status changes after being found to be slumped over while on toilet by staff prompting MRI imaging reporting:  ·	Left parietal small cortical acute infarction  ·	Innumerable scattered chronic microhemorrhages in the brainstem, bilateral cerebellar hemispheres, deep gray nuclei and peripherally within the cerebral hemispheres which may be secondary to chronic hypertensive encephalopathy.  ·	Severe extensive chronic microvascular ischemic changes and multifocal chronic lacunar infarcts as detailed above.  Patient evaluated by PT/OT and was recommended for acute inpatient rehab. Patient is medically stable for discharge to [facility] on 11/30. (30 Nov 2023 14:36)      PAST MEDICAL & SURGICAL HISTORY:      MEDICATIONS  (STANDING):  aMIOdarone    Tablet 200 milliGRAM(s) Oral daily  apixaban 5 milliGRAM(s) Oral two times a day  atorvastatin 80 milliGRAM(s) Oral at bedtime  bisacodyl 5 milliGRAM(s) Oral at bedtime  dextrose 5%. 1000 milliLiter(s) (50 mL/Hr) IV Continuous <Continuous>  dextrose 5%. 1000 milliLiter(s) (100 mL/Hr) IV Continuous <Continuous>  dextrose 50% Injectable 25 Gram(s) IV Push once  dextrose 50% Injectable 12.5 Gram(s) IV Push once  dextrose 50% Injectable 25 Gram(s) IV Push once  glucagon  Injectable 1 milliGRAM(s) IntraMuscular once  influenza   Vaccine 0.5 milliLiter(s) IntraMuscular once  insulin lispro (ADMELOG) corrective regimen sliding scale   SubCutaneous at bedtime  insulin lispro (ADMELOG) corrective regimen sliding scale   SubCutaneous three times a day before meals  losartan 25 milliGRAM(s) Oral daily  metoprolol tartrate 50 milliGRAM(s) Oral three times a day  pantoprazole    Tablet 40 milliGRAM(s) Oral daily  predniSONE   Tablet 50 milliGRAM(s) Oral daily  senna 2 Tablet(s) Oral at bedtime  trimethoprim  160 mG/sulfamethoxazole 800 mG 1 Tablet(s) Oral <User Schedule>    MEDICATIONS  (PRN):  dextrose Oral Gel 15 Gram(s) Oral once PRN Blood Glucose LESS THAN 70 milliGRAM(s)/deciliter      Allergies    No Known Drug Allergies  Shrimp (Unknown)    Intolerances          VITALS  58y  Vital Signs Last 24 Hrs  T(C): 37.1 (02 Dec 2023 22:07), Max: 37.2 (02 Dec 2023 08:59)  T(F): 98.7 (02 Dec 2023 22:07), Max: 98.9 (02 Dec 2023 08:59)  HR: 64 (03 Dec 2023 06:50) (61 - 69)  BP: 148/80 (03 Dec 2023 06:50) (103/68 - 148/80)  BP(mean): --  RR: 16 (02 Dec 2023 22:07) (16 - 16)  SpO2: 98% (02 Dec 2023 22:07) (97% - 98%)    Parameters below as of 02 Dec 2023 22:07  Patient On (Oxygen Delivery Method): room air      Daily     Daily         RECENT LABS:                          9.9    17.18 )-----------( 397      ( 03 Dec 2023 05:26 )             27.6     12-02    138  |  102  |  29<H>  ----------------------------<  79  3.8   |  26  |  1.16    Ca    8.7      02 Dec 2023 05:37  Phos  2.9     12-01  Mg     1.9     12-01    TPro  6.0  /  Alb  2.3<L>  /  TBili  0.4  /  DBili  x   /  AST  13  /  ALT  23  /  AlkPhos  66  12-02    LIVER FUNCTIONS - ( 02 Dec 2023 05:37 )  Alb: 2.3 g/dL / Pro: 6.0 g/dL / ALK PHOS: 66 U/L / ALT: 23 U/L / AST: 13 U/L / GGT: x           PT/INR - ( 01 Dec 2023 12:35 )   PT: 15.5 sec;   INR: 1.34 ratio         PTT - ( 01 Dec 2023 12:35 )  PTT:27.9 sec  Urinalysis Basic - ( 02 Dec 2023 05:37 )    Color: x / Appearance: x / SG: x / pH: x  Gluc: 79 mg/dL / Ketone: x  / Bili: x / Urobili: x   Blood: x / Protein: x / Nitrite: x   Leuk Esterase: x / RBC: x / WBC x   Sq Epi: x / Non Sq Epi: x / Bacteria: x          CAPILLARY BLOOD GLUCOSE      POCT Blood Glucose.: 128 mg/dL (02 Dec 2023 22:16)  POCT Blood Glucose.: 122 mg/dL (02 Dec 2023 17:30)  POCT Blood Glucose.: 173 mg/dL (02 Dec 2023 12:28)  POCT Blood Glucose.: 124 mg/dL (02 Dec 2023 08:33)               Patient is a 58y old  Female who presents with a chief complaint of CVA (02 Dec 2023 15:47)      HPI:  Mrs. Екатерина Poole is a 58 year old female patient with past medical history of HTN and rheumatoid arthritis who presented to [place] on 11/15 for AMS when a neighbor overhead patient was looking for her mom and she was found confused/wandering around apartment complex. Imaging studies initially performed reported bilateral subacute to remote embolic strokes, a right posterior corona radiata acute infarct, and multiple petechial hemorrhages in bilateral thalamus and basal ganglia. She was additionally noted to have KRUNAL which has since resolved. Hypertension managed with amlodipine, aldactone, and losartan and she was started on B12 supplements. Patient seen by Rheumatology and was transferred to [facility] on 11/20 for cerebral angiogram.    A cerebral angiogram performed on 11/20 reported multiple areas of focal stenosis and dilatation concerning for vasculitis. An LP was completed on 11/21 with a normal profile. Rheumatology consulted and recommended possible brain biopsy to confirm diagnosis of vasculitis but the risks of performing an open biopsy outweighed the benefits as the results of the biopsy were deemed unlikely to . She was seen by Neurosurgery with patient's neuroradiologic findings including beading on cerebral angiogram (consistent with vasculitis) and clinical presentation, with no other etiology of vasculitis, point to PACNS. Patient was started on steroids given her micro hemorrhage and strokes likely attributed to vasculitis. Steroids started on 11/24 with initial dose Solumedrol 1g for 3 days. She was to continue Prednisone 60mg with a decrease to 50mg daily for discharge to acute in-patient rehabilitation. No taper indicated per rheumatology evaluation and recommendations. An EEG was additionally performed and reported moderate diffuse/multi-focal cerebral dysfunction, not specific as to etiology. There were no epileptiform abnormalities recorded. Will need a repeat cerebral angiogram in 1 month which should take place around 12/20/23. Patient started on Eliquis 5 mg BID, amiodarone, and metoprolol for Afib. TTE reported as normal with global left ventricular systolic function, mild mitral valve regurgitation, mild aortic regurgitation, and a sclerotic aortic valve with normal opening. Hospitalization additionally significant for a rapid response on 11/20 due to acute mental status changes after being found to be slumped over while on toilet by staff prompting MRI imaging reporting:  ·	Left parietal small cortical acute infarction  ·	Innumerable scattered chronic microhemorrhages in the brainstem, bilateral cerebellar hemispheres, deep gray nuclei and peripherally within the cerebral hemispheres which may be secondary to chronic hypertensive encephalopathy.  ·	Severe extensive chronic microvascular ischemic changes and multifocal chronic lacunar infarcts as detailed above.  Patient evaluated by PT/OT and was recommended for acute inpatient rehab. Patient is medically stable for discharge to [facility] on 11/30. (30 Nov 2023 14:36)      PAST MEDICAL & SURGICAL HISTORY:      MEDICATIONS  (STANDING):  aMIOdarone    Tablet 200 milliGRAM(s) Oral daily  apixaban 5 milliGRAM(s) Oral two times a day  atorvastatin 80 milliGRAM(s) Oral at bedtime  bisacodyl 5 milliGRAM(s) Oral at bedtime  dextrose 5%. 1000 milliLiter(s) (50 mL/Hr) IV Continuous <Continuous>  dextrose 5%. 1000 milliLiter(s) (100 mL/Hr) IV Continuous <Continuous>  dextrose 50% Injectable 25 Gram(s) IV Push once  dextrose 50% Injectable 12.5 Gram(s) IV Push once  dextrose 50% Injectable 25 Gram(s) IV Push once  glucagon  Injectable 1 milliGRAM(s) IntraMuscular once  influenza   Vaccine 0.5 milliLiter(s) IntraMuscular once  insulin lispro (ADMELOG) corrective regimen sliding scale   SubCutaneous at bedtime  insulin lispro (ADMELOG) corrective regimen sliding scale   SubCutaneous three times a day before meals  losartan 25 milliGRAM(s) Oral daily  metoprolol tartrate 50 milliGRAM(s) Oral three times a day  pantoprazole    Tablet 40 milliGRAM(s) Oral daily  predniSONE   Tablet 50 milliGRAM(s) Oral daily  senna 2 Tablet(s) Oral at bedtime  trimethoprim  160 mG/sulfamethoxazole 800 mG 1 Tablet(s) Oral <User Schedule>    MEDICATIONS  (PRN):  dextrose Oral Gel 15 Gram(s) Oral once PRN Blood Glucose LESS THAN 70 milliGRAM(s)/deciliter      Allergies    No Known Drug Allergies  Shrimp (Unknown)    Intolerances          VITALS  58y  Vital Signs Last 24 Hrs  T(C): 37.1 (02 Dec 2023 22:07), Max: 37.2 (02 Dec 2023 08:59)  T(F): 98.7 (02 Dec 2023 22:07), Max: 98.9 (02 Dec 2023 08:59)  HR: 64 (03 Dec 2023 06:50) (61 - 69)  BP: 148/80 (03 Dec 2023 06:50) (103/68 - 148/80)  BP(mean): --  RR: 16 (02 Dec 2023 22:07) (16 - 16)  SpO2: 98% (02 Dec 2023 22:07) (97% - 98%)    Parameters below as of 02 Dec 2023 22:07  Patient On (Oxygen Delivery Method): room air      Daily     Daily         RECENT LABS:                          9.9    17.18 )-----------( 397      ( 03 Dec 2023 05:26 )             27.6     12-02    138  |  102  |  29<H>  ----------------------------<  79  3.8   |  26  |  1.16    Ca    8.7      02 Dec 2023 05:37  Phos  2.9     12-01  Mg     1.9     12-01    TPro  6.0  /  Alb  2.3<L>  /  TBili  0.4  /  DBili  x   /  AST  13  /  ALT  23  /  AlkPhos  66  12-02    LIVER FUNCTIONS - ( 02 Dec 2023 05:37 )  Alb: 2.3 g/dL / Pro: 6.0 g/dL / ALK PHOS: 66 U/L / ALT: 23 U/L / AST: 13 U/L / GGT: x           PT/INR - ( 01 Dec 2023 12:35 )   PT: 15.5 sec;   INR: 1.34 ratio         PTT - ( 01 Dec 2023 12:35 )  PTT:27.9 sec  Urinalysis Basic - ( 02 Dec 2023 05:37 )    Color: x / Appearance: x / SG: x / pH: x  Gluc: 79 mg/dL / Ketone: x  / Bili: x / Urobili: x   Blood: x / Protein: x / Nitrite: x   Leuk Esterase: x / RBC: x / WBC x   Sq Epi: x / Non Sq Epi: x / Bacteria: x          CAPILLARY BLOOD GLUCOSE      POCT Blood Glucose.: 128 mg/dL (02 Dec 2023 22:16)  POCT Blood Glucose.: 122 mg/dL (02 Dec 2023 17:30)  POCT Blood Glucose.: 173 mg/dL (02 Dec 2023 12:28)  POCT Blood Glucose.: 124 mg/dL (02 Dec 2023 08:33)

## 2023-12-03 NOTE — PROGRESS NOTE ADULT - ASSESSMENT
58 year old female patient with past medical history of HTN and rheumatoid arthritis who is admitted for Acute Inpatient Rehabilitation with a multidisciplinary rehab program at [facility] with functional impairments in ADLs and mobility secondary to left hemiparesis resulting from bilateral subacute to remote embolic strokes, a right posterior corona radiata acute infarct, multiple petechial hemorrhages in bilateral thalamus and basal ganglia with etiology highly indicative from underlying vasculitis and a subsequent left parietal cortical acute infarction of a likely cardioembolic source.      CVA  - suspect due to vasculitis  - [facility] Impression: Left hemiparesis due to right corona radiata infarct, multiple micro hemorrhages, deep and cortical. Cerebral angiogram concerning for PACNS. Pt also with Afib, less likely cardioembolic event  - s/p IV solumedrol. now on prednisone 50mg QD   - per rheumatology: cont po prednisone 50mg daily until repeat cerebral angiogram in 1 month (~12/20/23)  - cont PPI while on steroid    - Rapid response on 12/1 for syncopal episode after a BM- likely vasovagal syncope  - CT scan: no acute changes  - Continue prednisone 50 mg QD  - Neuro consult appreciated     Leukocytosis - possibly steroid induced ?  Elevated lactic acid - resolved with IVF  - afebrile. no focal symptoms  - UA negative  - CXR wet read negative. f/u official report   - blood cx pending   - monitor off abx  - hematology for persistent leukocytosis     Afib  - s/p amiodarone gtt  - continue amiodarone 200 mg QD, metoprolol 50 mg TID, and Eliquis 5 mg BID    HTN  - continue Losartan 25 mg QD  - Hold Amlodipine 10mg  - Monitor BP    HLD  - cont atorvastatin 80 mg QHS    Hyperglycemia in setting of steroid use  - AIC 4.9  - SSI  - Monitor fingersticks    DVT prophylaxis:   - on Eliquis 5 mg BID    GI prophylaxis  - Protonix 58 year old female patient with past medical history of HTN and rheumatoid arthritis who is admitted for Acute Inpatient Rehabilitation with a multidisciplinary rehab program at [facility] with functional impairments in ADLs and mobility secondary to left hemiparesis resulting from bilateral subacute to remote embolic strokes, a right posterior corona radiata acute infarct, multiple petechial hemorrhages in bilateral thalamus and basal ganglia with etiology highly indicative from underlying vasculitis and a subsequent left parietal cortical acute infarction of a likely cardioembolic source.      CVA  - suspect due to vasculitis  - [facility] Impression: Left hemiparesis due to right corona radiata infarct, multiple micro hemorrhages, deep and cortical. Cerebral angiogram concerning for PACNS. Pt also with Afib, less likely cardioembolic event  - s/p IV solumedrol. now on prednisone 50mg QD   - per rheumatology: cont po prednisone 50mg daily until repeat cerebral angiogram in 1 month (~12/20/23)  - cont PPI while on steroid    - Rapid response on 12/1 for syncopal episode after a BM- likely vasovagal syncope  - CT scan: no acute changes  - Continue prednisone 50 mg QD  - Neuro consult appreciated     Leukocytosis - possibly steroid induced ?  Elevated lactic acid - resolved with IVF  - afebrile. no focal symptoms  - UA negative  - CXR wet read negative. f/u official report   - blood cx pending   - monitor off abx  - hematology for persistent leukocytosis     Afib  - s/p amiodarone gtt  - continue amiodarone 200 mg QD, metoprolol 50 mg TID, and Eliquis 5 mg BID    HTN  - continue Losartan 25 mg QD  - Hold Amlodipine 10mg  - Monitor BP    HLD  - cont atorvastatin 80 mg QHS    Hyperglycemia in setting of steroid use  - AIC 4.9  - SSI  - Monitor fingersticks    DVT prophylaxis:   - on Eliquis 5 mg BID    GI prophylaxis  - Protonix 58 year old female patient with past medical history of HTN and rheumatoid arthritis who is admitted for Acute Inpatient Rehabilitation with a multidisciplinary rehab program at [facility] with functional impairments in ADLs and mobility secondary to left hemiparesis resulting from bilateral subacute to remote embolic strokes, a right posterior corona radiata acute infarct, multiple petechial hemorrhages in bilateral thalamus and basal ganglia with etiology highly indicative from underlying vasculitis and a subsequent left parietal cortical acute infarction of a likely cardioembolic source.      CVA  - suspect due to vasculitis  - [facility] Impression: Left hemiparesis due to right corona radiata infarct, multiple micro hemorrhages, deep and cortical. Cerebral angiogram concerning for PACNS. Pt also with Afib, less likely cardioembolic event  - s/p IV solumedrol. now on prednisone 50mg QD   - per rheumatology: cont po prednisone 50mg daily until repeat cerebral angiogram in 1 month (~12/20/23)  - cont PPI while on steroid    - Rapid response on 12/1 for syncopal episode after a BM- likely vasovagal syncope  - CT scan: no acute changes  - Continue prednisone 50 mg QD  - Neuro consult appreciated     Leukocytosis - possibly steroid induced ?  Elevated lactic acid - resolved with IVF  - afebrile. no focal symptoms  - UA negative  - CXR wet read negative. f/u official report   - blood cx pending   - monitor off abx  - hematology for persistent leukocytosis     Afib  - s/p amiodarone gtt  - continue amiodarone 200 mg QD, metoprolol 50 mg TID, and Eliquis 5 mg BID    HTN  - continue Losartan 25 mg QD  - Hold Amlodipine 10mg  - Monitor BP    HLD  - cont atorvastatin 80 mg QHS    Hyperglycemia in setting of steroid use  - AIC 4.9  - SSI  - Monitor fingersticks    DVT prophylaxis:   - on Eliquis 5 mg BID    GI prophylaxis  - Protonix

## 2023-12-03 NOTE — PROGRESS NOTE ADULT - RESPIRATORY
normal/clear to auscultation bilaterally/no wheezes/no rales/no rhonchi
normal/clear to auscultation bilaterally/no wheezes/no rales/no rhonchi/no respiratory distress

## 2023-12-03 NOTE — PROGRESS NOTE ADULT - CONSTITUTIONAL COMMENTS
afebrile, pleasant. No facial droop or dysarthria, good- initiation and simple attention NAD
alert, pleasant, reduced initiation and processing. Reduced recall of discussion yesterday re: syncopal workup

## 2023-12-03 NOTE — PROGRESS NOTE ADULT - SUBJECTIVE AND OBJECTIVE BOX
Patient is a 58y old  Female who presents with a chief complaint of CVA (03 Dec 2023 08:15)      Subjective and overnight events:  Patient seen and examined at bedside. no new complaints. pt denies any cough, runny nose, dysuria, abd pain, n/v/d. no fever ,chills, sob, cp.    ALLERGIES:  No Known Drug Allergies  Shrimp (Unknown)    MEDICATIONS  (STANDING):  aMIOdarone    Tablet 200 milliGRAM(s) Oral daily  apixaban 5 milliGRAM(s) Oral two times a day  atorvastatin 80 milliGRAM(s) Oral at bedtime  bisacodyl 5 milliGRAM(s) Oral at bedtime  dextrose 5%. 1000 milliLiter(s) (50 mL/Hr) IV Continuous <Continuous>  dextrose 5%. 1000 milliLiter(s) (100 mL/Hr) IV Continuous <Continuous>  dextrose 50% Injectable 12.5 Gram(s) IV Push once  dextrose 50% Injectable 25 Gram(s) IV Push once  dextrose 50% Injectable 25 Gram(s) IV Push once  glucagon  Injectable 1 milliGRAM(s) IntraMuscular once  influenza   Vaccine 0.5 milliLiter(s) IntraMuscular once  insulin lispro (ADMELOG) corrective regimen sliding scale   SubCutaneous at bedtime  insulin lispro (ADMELOG) corrective regimen sliding scale   SubCutaneous three times a day before meals  losartan 25 milliGRAM(s) Oral daily  metoprolol tartrate 50 milliGRAM(s) Oral three times a day  pantoprazole    Tablet 40 milliGRAM(s) Oral daily  predniSONE   Tablet 50 milliGRAM(s) Oral daily  senna 2 Tablet(s) Oral at bedtime  trimethoprim  160 mG/sulfamethoxazole 800 mG 1 Tablet(s) Oral <User Schedule>    MEDICATIONS  (PRN):  dextrose Oral Gel 15 Gram(s) Oral once PRN Blood Glucose LESS THAN 70 milliGRAM(s)/deciliter    Vital Signs Last 24 Hrs  T(F): 98.4 (03 Dec 2023 08:36), Max: 98.7 (02 Dec 2023 22:07)  HR: 67 (03 Dec 2023 13:15) (52 - 69)  BP: 111/69 (03 Dec 2023 13:15) (111/69 - 148/80)  RR: 16 (03 Dec 2023 08:36) (16 - 16)  SpO2: 99% (03 Dec 2023 08:36) (98% - 99%)  I&O's Summary    PHYSICAL EXAM:  General: NAD, Awake alert. answering questions   ENT: MMM  Neck: Supple, No JVD  Lungs: Clear to auscultation bilaterally  Cardio: RRR, S1/S2, No murmurs  Abdomen: Soft, Nontender, Nondistended; Bowel sounds present  Extremities: No calf tenderness, No pitting edema    LABS:                        9.9    17.18 )-----------( 397      ( 03 Dec 2023 05:26 )             27.6     12-02    138  |  102  |  29  ----------------------------<  79  3.8   |  26  |  1.16    Ca    8.7      02 Dec 2023 05:37  Phos  2.9     12-01  Mg     1.9     12-01    TPro  6.0  /  Alb  2.3  /  TBili  0.4  /  DBili  x   /  AST  13  /  ALT  23  /  AlkPhos  66  12-02      PT/INR - ( 01 Dec 2023 12:35 )   PT: 15.5 sec;   INR: 1.34 ratio         PTT - ( 01 Dec 2023 12:35 )  PTT:27.9 sec  Lactate, Blood: 1.9 mmol/L (12-02 @ 12:24)  Lactate, Blood: 1.6 mmol/L (12-01 @ 16:20)  Lactate, Blood: 2.2 mmol/L (12-01 @ 12:35)    CARDIAC MARKERS ( 01 Dec 2023 12:35 )  x     / 6.7 ng/L / 69 U/L / x     / 0.6 ng/mL      11-22 Chol 186 mg/dL LDL -- HDL 43 mg/dL Trig 80 mg/dL        POCT Blood Glucose.: 200 mg/dL (03 Dec 2023 12:32)  POCT Blood Glucose.: 104 mg/dL (03 Dec 2023 08:30)  POCT Blood Glucose.: 128 mg/dL (02 Dec 2023 22:16)  POCT Blood Glucose.: 122 mg/dL (02 Dec 2023 17:30)      Urinalysis Basic - ( 02 Dec 2023 05:37 )    Color: x / Appearance: x / SG: x / pH: x  Gluc: 79 mg/dL / Ketone: x  / Bili: x / Urobili: x   Blood: x / Protein: x / Nitrite: x   Leuk Esterase: x / RBC: x / WBC x   Sq Epi: x / Non Sq Epi: x / Bacteria: x            RADIOLOGY & ADDITIONAL TESTS:    Care Discussed with Consultants/Other Providers:

## 2023-12-03 NOTE — PROGRESS NOTE ADULT - ASSESSMENT
58 year old female patient PMH HTN and rheumatoid arthritis p/w bilateral subacute to remote embolic strokes, a right posterior corona radiata acute infarct, multiple petechial hemorrhages in bilateral thalamus and basal ganglia with etiology highly indicative from underlying vasculitis and a subsequent left parietal cortical acute infarction of a likely cardioembolic source.    # multiple bilateral CVA with left HP  - Event on 11/15/23 likely related to vasculitis  - Most recent event on 11/29/23 likely related to cardioembolic source in setting of AFib:  - Head CT 12/1: Moderate to severe chronic microvascular changes without evidence of an acute transcortical infarction or hemorrhage. Reviewed with patient  - continue comprehensive rehab program of PT/OT/SLP - 3 hours a day, 5 days a week. P&O as needed   - Will need repeat cerebral angiogram in 1 month (~12/20/23)    # r/o Primary Angiitis CNS (PACNS)  - rheumatology consult appreciated  - c/w prednisone 50mg qD and maintain dose until can repeat angio arond 12/20/23  - c/w PPI for GI ppx  - add on Bactrim DS 1 tab MWF for PCP ppx while on high dose steroids     # leukocytosis  - WBC 17.18 12/3  - likely steroid induced, was started on steroids 11/24, WBC started uptrending after  - afebrile, asymptomatic  - monitor CBC, hospitalist follow up    # RRT with syncope after BM 12/1  - vitals stable  - Head CT stable as above  - EEG pending  - neuro consult appreciated 12/2. Agree with non urgent EEG    # Afib  - amiodarone 200 mg QD  - metoprolol 50 mg TID  - Eliquis 5 mg BID  - HR 61-69 12/3    # HTN  - continue Norvasc 10 mg QD and Losartan 25 mg QD  - metoprolol  - /68 - 148/80) 12/3    # HLD  - cont atorvastatin 80 mg QHS    # Hyperglycemia in setting of steroid use  - SSI  - Monitor fingersticks    # GI / Bowel  - Senna qHS  - Dulcolax 5 mg QHS  - GI ppx: pantoprazole 40 mg QD    # Diet  - Diet Consistency: regular    # DVT prophylaxis:   - on Eliquis 5 mg BID    # LABS   EEG  CBC BMP 12/4    Outpatient Follow-up:  Gavin Calvo  Neurology  [address], [address]  [place] [address]  Phone: [phone]  Fax: [phone]  Follow Up Time: 2 weeks    Khadijah Hartley  Radiology  [address], [address]  [place] [address]  Phone: [phone]  Fax: [phone]  Follow Up Time: 2 weeks    Brando Lazar  Cardiology  [facility]  [place] [address]  Phone: [phone]  Fax: [phone]  Follow Up Time: 2 weeks    Kenya Naik  Rheumatology  [address]  [place] [address]  Phone: [phone]  Fax: [phone]  Follow Up Time: 2 weeks      Code Status/Emergency Contact:         58 year old female patient PMH HTN and rheumatoid arthritis p/w bilateral subacute to remote embolic strokes, a right posterior corona radiata acute infarct, multiple petechial hemorrhages in bilateral thalamus and basal ganglia with etiology highly indicative from underlying vasculitis and a subsequent left parietal cortical acute infarction of a likely cardioembolic source.    # multiple bilateral CVA with left HP  - Event on 11/15/23 likely related to vasculitis  - Most recent event on 11/29/23 likely related to cardioembolic source in setting of AFib:  - Head CT 12/1: Moderate to severe chronic microvascular changes without evidence of an acute transcortical infarction or hemorrhage. Reviewed with patient  - continue comprehensive rehab program of PT/OT/SLP - 3 hours a day, 5 days a week. P&O as needed   - Will need repeat cerebral angiogram in 1 month (~12/20/23)    # r/o Primary Angiitis CNS (PACNS)  - rheumatology consult appreciated  - c/w prednisone 50mg qD and maintain dose until can repeat angio arond 12/20/23  - c/w PPI for GI ppx  - add on Bactrim DS 1 tab MWF for PCP ppx while on high dose steroids     # leukocytosis  - WBC 17.18 12/3  - likely steroid induced, was started on steroids 11/24, WBC started uptrending after  - afebrile, asymptomatic  - monitor CBC, hospitalist follow up    # RRT with syncope after BM 12/1  - vitals stable  - Head CT stable as above  - EEG pending  - neuro consult appreciated 12/2. Agree with non urgent EEG    # Afib  - amiodarone 200 mg QD  - metoprolol 50 mg TID  - Eliquis 5 mg BID  - HR 61-69 12/3    # HTN  - continue Norvasc 10 mg QD and Losartan 25 mg QD  - metoprolol  - /68 - 148/80) 12/3    # HLD  - cont atorvastatin 80 mg QHS    # Hyperglycemia in setting of steroid use  - SSI  - Monitor fingersticks    # GI / Bowel  - Senna qHS  - Dulcolax 5 mg QHS  - GI ppx: pantoprazole 40 mg QD    # Diet  - Diet Consistency: regular    # DVT prophylaxis:   - on Eliquis 5 mg BID    # LABS   EEG  CBC BMP 12/4    Outpatient Follow-up:  Gavin Calvo  Neurology  [address]  [place] [address]  Phone: [phone]  Fax: [phone]  Follow Up Time: 2 weeks    Khadijah Hartley  Radiology  [address]  [place] [address]  Phone: [phone]  Fax: [phone]  Follow Up Time: 2 weeks    Brando Lazar  Cardiology  [facility] [address]  [place] [address]  Phone: [phone]  Fax: [phone]  Follow Up Time: 2 weeks    Kenya Naik  Rheumatology  [address]  [place] [address]  Phone: [phone]  Fax: [phone]  Follow Up Time: 2 weeks      Code Status/Emergency Contact:         58 year old female patient PMH HTN and rheumatoid arthritis p/w bilateral subacute to remote embolic strokes, a right posterior corona radiata acute infarct, multiple petechial hemorrhages in bilateral thalamus and basal ganglia with etiology highly indicative from underlying vasculitis and a subsequent left parietal cortical acute infarction of a likely cardioembolic source.    # multiple bilateral CVA with left HP  - Event on 11/15/23 likely related to vasculitis  - Most recent event on 11/29/23 likely related to cardioembolic source in setting of AFib:  - Head CT 12/1: Moderate to severe chronic microvascular changes without evidence of an acute transcortical infarction or hemorrhage. Reviewed with patient  - continue comprehensive rehab program of PT/OT/SLP - 3 hours a day, 5 days a week. P&O as needed   - Will need repeat cerebral angiogram in 1 month (~12/20/23)    # r/o Primary Angiitis CNS (PACNS)  - rheumatology consult appreciated  - c/w prednisone 50mg qD and maintain dose until can repeat angio arond 12/20/23  - c/w PPI for GI ppx  - add on Bactrim DS 1 tab MWF for PCP ppx while on high dose steroids     # leukocytosis  - WBC 17.18 12/3  - likely steroid induced, was started on steroids 11/24, WBC started uptrending after  - afebrile, asymptomatic  - monitor CBC, hospitalist follow up    # RRT with syncope after BM 12/1  - vitals stable  - Head CT stable as above  - EEG pending  - neuro consult appreciated 12/2. Agree with non urgent EEG    # Afib  - amiodarone 200 mg QD  - metoprolol 50 mg TID  - Eliquis 5 mg BID  - HR 61-69 12/3    # HTN  - continue Norvasc 10 mg QD and Losartan 25 mg QD  - metoprolol  - /68 - 148/80) 12/3    # HLD  - cont atorvastatin 80 mg QHS    # Hyperglycemia in setting of steroid use  - SSI  - Monitor fingersticks    # GI / Bowel  - Senna qHS  - Dulcolax 5 mg QHS  - GI ppx: pantoprazole 40 mg QD    # Diet  - Diet Consistency: regular    # DVT prophylaxis:   - on Eliquis 5 mg BID    # LABS   EEG  CBC BMP 12/4    Outpatient Follow-up:  Gavin Calvo  Neurology  [address] [facility] [facility], [address]  [place] [address]  Phone: [phone]  Fax: [phone]  Follow Up Time: 2 weeks    Khadijah Hartley  Radiology  [address] [facility] [address]  [place] [address]  Phone: [phone]  Fax: [phone]  Follow Up Time: 2 weeks    Brando Lazar  Cardiology  [facility] [address]  [place] [address]  Phone: [phone]  Fax: [phone]  Follow Up Time: 2 weeks    Kenya Naik  Rheumatology  [address] [facility] [address] [facility] [address]  [place] [address]  Phone: [phone]  Fax: [phone]  Follow Up Time: 2 weeks      Code Status/Emergency Contact:

## 2023-12-04 DIAGNOSIS — D72.829 ELEVATED WHITE BLOOD CELL COUNT, UNSPECIFIED: ICD-10-CM

## 2023-12-04 LAB
ALBUMIN SERPL ELPH-MCNC: 2.5 G/DL — LOW (ref 3.3–5)
ALBUMIN SERPL ELPH-MCNC: 2.5 G/DL — LOW (ref 3.3–5)
ALP SERPL-CCNC: 71 U/L — SIGNIFICANT CHANGE UP (ref 40–120)
ALP SERPL-CCNC: 71 U/L — SIGNIFICANT CHANGE UP (ref 40–120)
ALT FLD-CCNC: 23 U/L — SIGNIFICANT CHANGE UP (ref 10–45)
ALT FLD-CCNC: 23 U/L — SIGNIFICANT CHANGE UP (ref 10–45)
ANION GAP SERPL CALC-SCNC: 8 MMOL/L — SIGNIFICANT CHANGE UP (ref 5–17)
ANION GAP SERPL CALC-SCNC: 8 MMOL/L — SIGNIFICANT CHANGE UP (ref 5–17)
AST SERPL-CCNC: 11 U/L — SIGNIFICANT CHANGE UP (ref 10–40)
AST SERPL-CCNC: 11 U/L — SIGNIFICANT CHANGE UP (ref 10–40)
BILIRUB SERPL-MCNC: 0.3 MG/DL — SIGNIFICANT CHANGE UP (ref 0.2–1.2)
BILIRUB SERPL-MCNC: 0.3 MG/DL — SIGNIFICANT CHANGE UP (ref 0.2–1.2)
BUN SERPL-MCNC: 26 MG/DL — HIGH (ref 7–23)
BUN SERPL-MCNC: 26 MG/DL — HIGH (ref 7–23)
CALCIUM SERPL-MCNC: 8.4 MG/DL — SIGNIFICANT CHANGE UP (ref 8.4–10.5)
CALCIUM SERPL-MCNC: 8.4 MG/DL — SIGNIFICANT CHANGE UP (ref 8.4–10.5)
CHLORIDE SERPL-SCNC: 100 MMOL/L — SIGNIFICANT CHANGE UP (ref 96–108)
CHLORIDE SERPL-SCNC: 100 MMOL/L — SIGNIFICANT CHANGE UP (ref 96–108)
CO2 SERPL-SCNC: 30 MMOL/L — SIGNIFICANT CHANGE UP (ref 22–31)
CO2 SERPL-SCNC: 30 MMOL/L — SIGNIFICANT CHANGE UP (ref 22–31)
CREAT SERPL-MCNC: 1.11 MG/DL — SIGNIFICANT CHANGE UP (ref 0.5–1.3)
CREAT SERPL-MCNC: 1.11 MG/DL — SIGNIFICANT CHANGE UP (ref 0.5–1.3)
EGFR: 57 ML/MIN/1.73M2 — LOW
EGFR: 57 ML/MIN/1.73M2 — LOW
ERYTHROCYTE [SEDIMENTATION RATE] IN BLOOD: 45 MM/HR — HIGH (ref 0–20)
ERYTHROCYTE [SEDIMENTATION RATE] IN BLOOD: 45 MM/HR — HIGH (ref 0–20)
GLUCOSE BLDC GLUCOMTR-MCNC: 110 MG/DL — HIGH (ref 70–99)
GLUCOSE BLDC GLUCOMTR-MCNC: 110 MG/DL — HIGH (ref 70–99)
GLUCOSE BLDC GLUCOMTR-MCNC: 112 MG/DL — HIGH (ref 70–99)
GLUCOSE BLDC GLUCOMTR-MCNC: 112 MG/DL — HIGH (ref 70–99)
GLUCOSE BLDC GLUCOMTR-MCNC: 135 MG/DL — HIGH (ref 70–99)
GLUCOSE BLDC GLUCOMTR-MCNC: 135 MG/DL — HIGH (ref 70–99)
GLUCOSE BLDC GLUCOMTR-MCNC: 209 MG/DL — HIGH (ref 70–99)
GLUCOSE BLDC GLUCOMTR-MCNC: 209 MG/DL — HIGH (ref 70–99)
GLUCOSE SERPL-MCNC: 89 MG/DL — SIGNIFICANT CHANGE UP (ref 70–99)
GLUCOSE SERPL-MCNC: 89 MG/DL — SIGNIFICANT CHANGE UP (ref 70–99)
HCT VFR BLD CALC: 27 % — LOW (ref 34.5–45)
HCT VFR BLD CALC: 27 % — LOW (ref 34.5–45)
HGB BLD-MCNC: 9.6 G/DL — LOW (ref 11.5–15.5)
HGB BLD-MCNC: 9.6 G/DL — LOW (ref 11.5–15.5)
MCHC RBC-ENTMCNC: 29.2 PG — SIGNIFICANT CHANGE UP (ref 27–34)
MCHC RBC-ENTMCNC: 29.2 PG — SIGNIFICANT CHANGE UP (ref 27–34)
MCHC RBC-ENTMCNC: 35.6 GM/DL — SIGNIFICANT CHANGE UP (ref 32–36)
MCHC RBC-ENTMCNC: 35.6 GM/DL — SIGNIFICANT CHANGE UP (ref 32–36)
MCV RBC AUTO: 82.1 FL — SIGNIFICANT CHANGE UP (ref 80–100)
MCV RBC AUTO: 82.1 FL — SIGNIFICANT CHANGE UP (ref 80–100)
NRBC # BLD: 0 /100 WBCS — SIGNIFICANT CHANGE UP (ref 0–0)
NRBC # BLD: 0 /100 WBCS — SIGNIFICANT CHANGE UP (ref 0–0)
PLATELET # BLD AUTO: 383 K/UL — SIGNIFICANT CHANGE UP (ref 150–400)
PLATELET # BLD AUTO: 383 K/UL — SIGNIFICANT CHANGE UP (ref 150–400)
POTASSIUM SERPL-MCNC: 3.5 MMOL/L — SIGNIFICANT CHANGE UP (ref 3.5–5.3)
POTASSIUM SERPL-MCNC: 3.5 MMOL/L — SIGNIFICANT CHANGE UP (ref 3.5–5.3)
POTASSIUM SERPL-SCNC: 3.5 MMOL/L — SIGNIFICANT CHANGE UP (ref 3.5–5.3)
POTASSIUM SERPL-SCNC: 3.5 MMOL/L — SIGNIFICANT CHANGE UP (ref 3.5–5.3)
PROT SERPL-MCNC: 6.3 G/DL — SIGNIFICANT CHANGE UP (ref 6–8.3)
PROT SERPL-MCNC: 6.3 G/DL — SIGNIFICANT CHANGE UP (ref 6–8.3)
RBC # BLD: 3.29 M/UL — LOW (ref 3.8–5.2)
RBC # BLD: 3.29 M/UL — LOW (ref 3.8–5.2)
RBC # FLD: 12.7 % — SIGNIFICANT CHANGE UP (ref 10.3–14.5)
RBC # FLD: 12.7 % — SIGNIFICANT CHANGE UP (ref 10.3–14.5)
SODIUM SERPL-SCNC: 138 MMOL/L — SIGNIFICANT CHANGE UP (ref 135–145)
SODIUM SERPL-SCNC: 138 MMOL/L — SIGNIFICANT CHANGE UP (ref 135–145)
WBC # BLD: 16.54 K/UL — HIGH (ref 3.8–10.5)
WBC # BLD: 16.54 K/UL — HIGH (ref 3.8–10.5)
WBC # FLD AUTO: 16.54 K/UL — HIGH (ref 3.8–10.5)
WBC # FLD AUTO: 16.54 K/UL — HIGH (ref 3.8–10.5)

## 2023-12-04 PROCEDURE — 95816 EEG AWAKE AND DROWSY: CPT | Mod: 26

## 2023-12-04 PROCEDURE — 99221 1ST HOSP IP/OBS SF/LOW 40: CPT

## 2023-12-04 PROCEDURE — 99232 SBSQ HOSP IP/OBS MODERATE 35: CPT

## 2023-12-04 RX ADMIN — Medication 50 MILLIGRAM(S): at 05:25

## 2023-12-04 RX ADMIN — APIXABAN 5 MILLIGRAM(S): 2.5 TABLET, FILM COATED ORAL at 18:10

## 2023-12-04 RX ADMIN — ATORVASTATIN CALCIUM 80 MILLIGRAM(S): 80 TABLET, FILM COATED ORAL at 21:41

## 2023-12-04 RX ADMIN — APIXABAN 5 MILLIGRAM(S): 2.5 TABLET, FILM COATED ORAL at 05:25

## 2023-12-04 RX ADMIN — Medication 50 MILLIGRAM(S): at 15:21

## 2023-12-04 RX ADMIN — PANTOPRAZOLE SODIUM 40 MILLIGRAM(S): 20 TABLET, DELAYED RELEASE ORAL at 11:39

## 2023-12-04 RX ADMIN — Medication 5 MILLIGRAM(S): at 21:41

## 2023-12-04 RX ADMIN — LOSARTAN POTASSIUM 25 MILLIGRAM(S): 100 TABLET, FILM COATED ORAL at 05:26

## 2023-12-04 RX ADMIN — SENNA PLUS 2 TABLET(S): 8.6 TABLET ORAL at 21:42

## 2023-12-04 RX ADMIN — AMIODARONE HYDROCHLORIDE 200 MILLIGRAM(S): 400 TABLET ORAL at 05:25

## 2023-12-04 RX ADMIN — Medication 2: at 18:11

## 2023-12-04 RX ADMIN — Medication 1 TABLET(S): at 05:25

## 2023-12-04 RX ADMIN — Medication 50 MILLIGRAM(S): at 21:41

## 2023-12-04 RX ADMIN — Medication 50 MILLIGRAM(S): at 05:26

## 2023-12-04 NOTE — CONSULT NOTE ADULT - SUBJECTIVE AND OBJECTIVE BOX
NADINE CAMPOVERDE  58y  Female  Admitting: CORY Jacobs    HPI:  58 year old female patient with past medical history of HTN and rheumatoid arthritis who presented to [place] on 11/15 for AMS. Imaging studies initially performed reported bilateral subacute to remote embolic strokes, a right posterior corona radiata acute infarct, and multiple petechial hemorrhages in bilateral thalamus and basal ganglia. She was additionally noted to have KRUNAL which has since resolved. Hypertension managed with amlodipine, aldactone, and losartan and she was started on B12 supplements. Patient seen by Rheumatology and was transferred to [facility] on 11/20 for cerebral angiogram.    A cerebral angiogram performed on 11/20 reported multiple areas of focal stenosis and dilatation concerning for vasculitis. An LP was completed on 11/21 with a normal profile. Rheumatology consulted and recommended possible brain biopsy to confirm diagnosis of vasculitis but the risks of performing an open biopsy outweighed the benefits as the results of the biopsy were deemed unlikely to . She was seen by Neurosurgery with patient's neuroradiologic findings including beading on cerebral angiogram (consistent with vasculitis) and clinical presentation, with no other etiology of vasculitis, point to PACNS. Patient was started on steroids given her micro hemorrhage and strokes likely attributed to vasculitis. Steroids started on 11/24 with initial dose Solumedrol 1g for 3 days. She was to continue Prednisone 60mg with a decrease to 50mg daily for discharge to acute in-patient rehabilitation. No taper indicated per rheumatology evaluation and recommendations. An EEG was additionally performed and reported moderate diffuse/multi-focal cerebral dysfunction, not specific as to etiology. There were no epileptiform abnormalities recorded. Will need a repeat cerebral angiogram in 1 month which should take place around 12/20/23. Patient started on Eliquis 5 mg BID, amiodarone, and metoprolol for Afib. TTE reported as normal with global left ventricular systolic function, mild mitral valve regurgitation, mild aortic regurgitation, and a sclerotic aortic valve with normal opening. Hospitalization additionally significant for a rapid response on 11/20 due to acute mental status changes after being found to be slumped over while on toilet by staff prompting MRI imaging reporting:  ·	Left parietal small cortical acute infarction  ·	Innumerable scattered chronic microhemorrhages in the brainstem, bilateral cerebellar hemispheres, deep gray nuclei and peripherally within the cerebral hemispheres which may be secondary to chronic hypertensive encephalopathy.  ·	Severe extensive chronic microvascular ischemic changes and multifocal chronic lacunar infarcts as detailed above.  Patient evaluated by PT/OT and was recommended for acute inpatient rehab.  Hematology consulted for leukocytosis.    PAST MEDICAL & SURGICAL HISTORY:    MEDICATIONS  (STANDING):  aMIOdarone    Tablet 200 milliGRAM(s) Oral daily  apixaban 5 milliGRAM(s) Oral two times a day  atorvastatin 80 milliGRAM(s) Oral at bedtime  bisacodyl 5 milliGRAM(s) Oral at bedtime  dextrose 5%. 1000 milliLiter(s) (50 mL/Hr) IV Continuous <Continuous>  dextrose 5%. 1000 milliLiter(s) (100 mL/Hr) IV Continuous <Continuous>  dextrose 50% Injectable 12.5 Gram(s) IV Push once  dextrose 50% Injectable 25 Gram(s) IV Push once  dextrose 50% Injectable 25 Gram(s) IV Push once  glucagon  Injectable 1 milliGRAM(s) IntraMuscular once  influenza   Vaccine 0.5 milliLiter(s) IntraMuscular once  insulin lispro (ADMELOG) corrective regimen sliding scale   SubCutaneous three times a day before meals  insulin lispro (ADMELOG) corrective regimen sliding scale   SubCutaneous at bedtime  losartan 25 milliGRAM(s) Oral daily  metoprolol tartrate 50 milliGRAM(s) Oral three times a day  pantoprazole    Tablet 40 milliGRAM(s) Oral daily  predniSONE   Tablet 50 milliGRAM(s) Oral daily  senna 2 Tablet(s) Oral at bedtime  trimethoprim  160 mG/sulfamethoxazole 800 mG 1 Tablet(s) Oral <User Schedule>    MEDICATIONS  (PRN):  dextrose Oral Gel 15 Gram(s) Oral once PRN Blood Glucose LESS THAN 70 milliGRAM(s)/deciliter    Allergies    No Known Drug Allergies  Shrimp (Unknown)    FAMILY HISTORY: no known hematologic disorder in first degree relatives      SOCIAL HISTORY: No EtOH, no tobacco    REVIEW OF SYSTEMS:    CONSTITUTIONAL: No fevers or chills  EYES/ENT: No throat pain   NECK: No pain or stiffness  RESPIRATORY: No cough, wheezing, hemoptysis; No shortness of breath  CARDIOVASCULAR: No chest pain or palpitations  GASTROINTESTINAL: No nausea, vomiting, or hematemesis; No melena or hematochezia.  GENITOURINARY: No hematuria  NEUROLOGICAL: +weakness  SKIN: No itching  All other review of systems is negative unless indicated above.    T(F): 98.6 (12-03-23 @ 19:13), Max: 98.6 (12-03-23 @ 19:13)  HR: 65 (12-04-23 @ 05:10)  BP: 137/78 (12-04-23 @ 05:10)  RR: 15 (12-03-23 @ 19:13)  SpO2: 97% (12-03-23 @ 19:13)      GENERAL: NAD, well-developed; non-toxic appearing  HEAD:  Atraumatic, Normocephalic  EYES: EOMI, PERRLA, conjunctiva and sclera clear  NECK: Supple, No JVD  CHEST/LUNG: Clear to auscultation ant. ; No wheeze  HEART: Regular rate and rhythm; No murmurs, rubs, or gallops  ABDOMEN: Soft, Nontender, Nondistended; Bowel sounds present  EXTREMITIES: no calf tenderness  NEUROLOGY: awake, alert  SKIN: No rashes     Labs:             9.6    16.54 )-----------( 383      ( 12-04 @ 05:51 )             27.0                9.9    17.18 )-----------( 397      ( 12-03 @ 05:26 )             27.6                9.9    16.14 )-----------( 330      ( 12-02 @ 05:37 )             28.1                11.3   12.20 )-----------( 394      ( 12-01 @ 12:35 )             32.3       12-04    138  |  100  |  26<H>  ----------------------------<  89  3.5   |  30  |  1.11    Ca    8.4      04 Dec 2023 05:51    TPro  6.3  /  Alb  2.5<L>  /  TBili  0.3  /  DBili  x   /  AST  11  /  ALT  23  /  AlkPhos  71  12-04    Culture - Blood (collected 02 Dec 2023 13:17)  Source: .Blood Blood  Preliminary Report (03 Dec 2023 22:03):    No growth at 24 hours    Culture - Blood (collected 02 Dec 2023 12:56)  Source: .Blood Blood  Preliminary Report (03 Dec 2023 22:03):    No growth at 24 hours      Consultant notes reviewed : YES [x ] ; NO [ ]      Radiology and additional tests:  < from: Xray Chest 1 View AP/PA (12.02.23 @ 15:12) >  FINDINGS/  IMPRESSION: Heart size, mediastinal and hilar contours are unchanged and   within normal limits. Lung zones are clear. Soft tissues and osseous   structures are intact.    < end of copied text >   NADINE CAMPOVERDE  58y  Female  Admitting: CORY Jacobs    HPI:  58 year old female patient with past medical history of HTN and rheumatoid arthritis who presented to [place] on 11/15 for AMS. Imaging studies initially performed reported bilateral subacute to remote embolic strokes, a right posterior corona radiata acute infarct, and multiple petechial hemorrhages in bilateral thalamus and basal ganglia. She was additionally noted to have KRUNAL which has since resolved. Hypertension managed with amlodipine, aldactone, and losartan and she was started on B12 supplements. Patient seen by Rheumatology and was transferred to [facility] on 11/20 for cerebral angiogram.    A cerebral angiogram performed on 11/20 reported multiple areas of focal stenosis and dilatation concerning for vasculitis. An LP was completed on 11/21 with a normal profile. Rheumatology consulted and recommended possible brain biopsy to confirm diagnosis of vasculitis but the risks of performing an open biopsy outweighed the benefits as the results of the biopsy were deemed unlikely to . She was seen by Neurosurgery with patient's neuroradiologic findings including beading on cerebral angiogram (consistent with vasculitis) and clinical presentation, with no other etiology of vasculitis, point to PACNS. Patient was started on steroids given her micro hemorrhage and strokes likely attributed to vasculitis. Steroids started on 11/24 with initial dose Solumedrol 1g for 3 days. She was to continue Prednisone 60mg with a decrease to 50mg daily for discharge to acute in-patient rehabilitation. No taper indicated per rheumatology evaluation and recommendations. An EEG was additionally performed and reported moderate diffuse/multi-focal cerebral dysfunction, not specific as to etiology. There were no epileptiform abnormalities recorded. Will need a repeat cerebral angiogram in 1 month which should take place around 12/20/23. Patient started on Eliquis 5 mg BID, amiodarone, and metoprolol for Afib. TTE reported as normal with global left ventricular systolic function, mild mitral valve regurgitation, mild aortic regurgitation, and a sclerotic aortic valve with normal opening. Hospitalization additionally significant for a rapid response on 11/20 due to acute mental status changes after being found to be slumped over while on toilet by staff prompting MRI imaging reporting:  ·	Left parietal small cortical acute infarction  ·	Innumerable scattered chronic microhemorrhages in the brainstem, bilateral cerebellar hemispheres, deep gray nuclei and peripherally within the cerebral hemispheres which may be secondary to chronic hypertensive encephalopathy.  ·	Severe extensive chronic microvascular ischemic changes and multifocal chronic lacunar infarcts as detailed above.  Patient evaluated by PT/OT and was recommended for acute inpatient rehab.  Hematology consulted for leukocytosis.    PAST MEDICAL & SURGICAL HISTORY:    MEDICATIONS  (STANDING):  aMIOdarone    Tablet 200 milliGRAM(s) Oral daily  apixaban 5 milliGRAM(s) Oral two times a day  atorvastatin 80 milliGRAM(s) Oral at bedtime  bisacodyl 5 milliGRAM(s) Oral at bedtime  dextrose 5%. 1000 milliLiter(s) (50 mL/Hr) IV Continuous <Continuous>  dextrose 5%. 1000 milliLiter(s) (100 mL/Hr) IV Continuous <Continuous>  dextrose 50% Injectable 12.5 Gram(s) IV Push once  dextrose 50% Injectable 25 Gram(s) IV Push once  dextrose 50% Injectable 25 Gram(s) IV Push once  glucagon  Injectable 1 milliGRAM(s) IntraMuscular once  influenza   Vaccine 0.5 milliLiter(s) IntraMuscular once  insulin lispro (ADMELOG) corrective regimen sliding scale   SubCutaneous three times a day before meals  insulin lispro (ADMELOG) corrective regimen sliding scale   SubCutaneous at bedtime  losartan 25 milliGRAM(s) Oral daily  metoprolol tartrate 50 milliGRAM(s) Oral three times a day  pantoprazole    Tablet 40 milliGRAM(s) Oral daily  predniSONE   Tablet 50 milliGRAM(s) Oral daily  senna 2 Tablet(s) Oral at bedtime  trimethoprim  160 mG/sulfamethoxazole 800 mG 1 Tablet(s) Oral <User Schedule>    MEDICATIONS  (PRN):  dextrose Oral Gel 15 Gram(s) Oral once PRN Blood Glucose LESS THAN 70 milliGRAM(s)/deciliter    Allergies    No Known Drug Allergies  Shrimp (Unknown)    FAMILY HISTORY: no known hematologic disorder in first degree relatives      SOCIAL HISTORY: No EtOH, no tobacco    REVIEW OF SYSTEMS:    CONSTITUTIONAL: No fevers or chills  EYES/ENT: No throat pain   NECK: No pain or stiffness  RESPIRATORY: No cough, wheezing, hemoptysis; No shortness of breath  CARDIOVASCULAR: No chest pain or palpitations  GASTROINTESTINAL: No nausea, vomiting, or hematemesis; No melena or hematochezia.  GENITOURINARY: No hematuria  NEUROLOGICAL: +weakness  SKIN: No itching  All other review of systems is negative unless indicated above.    T(F): 98.6 (12-03-23 @ 19:13), Max: 98.6 (12-03-23 @ 19:13)  HR: 65 (12-04-23 @ 05:10)  BP: 137/78 (12-04-23 @ 05:10)  RR: 15 (12-03-23 @ 19:13)  SpO2: 97% (12-03-23 @ 19:13)      GENERAL: NAD, well-developed; non-toxic appearing  HEAD:  Atraumatic, Normocephalic  EYES: EOMI, PERRLA, conjunctiva and sclera clear  NECK: Supple, No JVD  CHEST/LUNG: Clear to auscultation ant. ; No wheeze  HEART: Regular rate and rhythm; No murmurs, rubs, or gallops  ABDOMEN: Soft, Nontender, Nondistended; Bowel sounds present  EXTREMITIES: no calf tenderness  NEUROLOGY: awake, alert  SKIN: No rashes     Labs:             9.6    16.54 )-----------( 383      ( 12-04 @ 05:51 )             27.0                9.9    17.18 )-----------( 397      ( 12-03 @ 05:26 )             27.6                9.9    16.14 )-----------( 330      ( 12-02 @ 05:37 )             28.1                11.3   12.20 )-----------( 394      ( 12-01 @ 12:35 )             32.3       12-04    138  |  100  |  26<H>  ----------------------------<  89  3.5   |  30  |  1.11    Ca    8.4      04 Dec 2023 05:51    TPro  6.3  /  Alb  2.5<L>  /  TBili  0.3  /  DBili  x   /  AST  11  /  ALT  23  /  AlkPhos  71  12-04    Culture - Blood (collected 02 Dec 2023 13:17)  Source: .Blood Blood  Preliminary Report (03 Dec 2023 22:03):    No growth at 24 hours    Culture - Blood (collected 02 Dec 2023 12:56)  Source: .Blood Blood  Preliminary Report (03 Dec 2023 22:03):    No growth at 24 hours      Consultant notes reviewed : YES [x ] ; NO [ ]      Radiology and additional tests:  < from: Xray Chest 1 View AP/PA (12.02.23 @ 15:12) >  FINDINGS/  IMPRESSION: Heart size, mediastinal and hilar contours are unchanged and   within normal limits. Lung zones are clear. Soft tissues and osseous   structures are intact.    < end of copied text >

## 2023-12-04 NOTE — CONSULT NOTE ADULT - ASSESSMENT
58 year old female patient with past medical history of HTN and rheumatoid arthritis who presented to [place] on 11/15 for AMS. Imaging studies initially performed reported bilateral subacute to remote embolic strokes, a right posterior corona radiata acute infarct, and multiple petechial hemorrhages in bilateral thalamus and basal ganglia. She was additionally noted to have KRUNAL which has since resolved. Hypertension managed with amlodipine, aldactone, and losartan and she was started on B12 supplements. Patient seen by Rheumatology and was transferred to [facility] on 11/20 for cerebral angiogram.    A cerebral angiogram performed on 11/20 reported multiple areas of focal stenosis and dilatation concerning for vasculitis. An LP was completed on 11/21 with a normal profile. Rheumatology consulted and recommended possible brain biopsy to confirm diagnosis of vasculitis but the risks of performing an open biopsy outweighed the benefits as the results of the biopsy were deemed unlikely to . She was seen by Neurosurgery with patient's neuroradiologic findings including beading on cerebral angiogram (consistent with vasculitis) and clinical presentation, with no other etiology of vasculitis, point to PACNS. Patient was started on steroids given her micro hemorrhage and strokes likely attributed to vasculitis. Steroids started on 11/24 with initial dose Solumedrol 1g for 3 days. She was to continue Prednisone 60mg with a decrease to 50mg daily for discharge to acute in-patient rehabilitation. No taper indicated per rheumatology evaluation and recommendations. An EEG was additionally performed and reported moderate diffuse/multi-focal cerebral dysfunction, not specific as to etiology. There were no epileptiform abnormalities recorded. Will need a repeat cerebral angiogram in 1 month which should take place around 12/20/23. Patient started on Eliquis 5 mg BID, amiodarone, and metoprolol for Afib. TTE reported as normal with global left ventricular systolic function, mild mitral valve regurgitation, mild aortic regurgitation, and a sclerotic aortic valve with normal opening. Hospitalization additionally significant for a rapid response on 11/20 due to acute mental status changes after being found to be slumped over while on toilet by staff prompting MRI imaging reporting:  ·	Left parietal small cortical acute infarction  ·	Innumerable scattered chronic microhemorrhages in the brainstem, bilateral cerebellar hemispheres, deep gray nuclei and peripherally within the cerebral hemispheres which may be secondary to chronic hypertensive encephalopathy.  ·	Severe extensive chronic microvascular ischemic changes and multifocal chronic lacunar infarcts as detailed above.  Patient evaluated by PT/OT and was recommended for acute inpatient rehab.  Hematology consulted for leukocytosis.   58 year old female patient with past medical history of HTN and rheumatoid arthritis who presented to [place] on 11/15 for AMS. Imaging studies initially performed reported bilateral subacute to remote embolic strokes, a right posterior corona radiata acute infarct, and multiple petechial hemorrhages in bilateral thalamus and basal ganglia. She was additionally noted to have KRUNAL which has since resolved. Hypertension managed with amlodipine, aldactone, and losartan and she was started on B12 supplements. Patient seen by Rheumatology and was transferred to [facility] on 11/20 for cerebral angiogram.    A cerebral angiogram performed on 11/20 reported multiple areas of focal stenosis and dilatation concerning for vasculitis. An LP was completed on 11/21 with a normal profile. Rheumatology consulted and recommended possible brain biopsy to confirm diagnosis of vasculitis but the risks of performing an open biopsy outweighed the benefits as the results of the biopsy were deemed unlikely to . She was seen by Neurosurgery with patient's neuroradiologic findings including beading on cerebral angiogram (consistent with vasculitis) and clinical presentation, with no other etiology of vasculitis, point to PACNS. Patient was started on steroids given her micro hemorrhage and strokes likely attributed to vasculitis. Steroids started on 11/24 with initial dose Solumedrol 1g for 3 days. She was to continue Prednisone 60mg with a decrease to 50mg daily for discharge to acute in-patient rehabilitation. No taper indicated per rheumatology evaluation and recommendations. An EEG was additionally performed and reported moderate diffuse/multi-focal cerebral dysfunction, not specific as to etiology. There were no epileptiform abnormalities recorded. Will need a repeat cerebral angiogram in 1 month which should take place around 12/20/23. Patient started on Eliquis 5 mg BID, amiodarone, and metoprolol for Afib. TTE reported as normal with global left ventricular systolic function, mild mitral valve regurgitation, mild aortic regurgitation, and a sclerotic aortic valve with normal opening. Hospitalization additionally significant for a rapid response on 11/20 due to acute mental status changes after being found to be slumped over while on toilet by staff prompting MRI imaging reporting:  ·	Left parietal small cortical acute infarction  ·	Innumerable scattered chronic microhemorrhages in the brainstem, bilateral cerebellar hemispheres, deep gray nuclei and peripherally within the cerebral hemispheres which may be secondary to chronic hypertensive encephalopathy.  ·	Severe extensive chronic microvascular ischemic changes and multifocal chronic lacunar infarcts as detailed above.  Patient evaluated by PT/OT and was recommended for acute inpatient rehab.  Hematology consulted for leukocytosis.

## 2023-12-04 NOTE — CONSULT NOTE ADULT - PROBLEM SELECTOR RECOMMENDATION 9
Patient with recent normal WBC (for example, 11/16/23-6.97). Subsequent leukocytosis with fluctuating WBC suggest of reactive process-note patient is receiving steroids. Would continue to monitor CBC with diff., clinical status, watching for s/sx. of infection. If/when patient is off steroids, should elevated WBC persist and continue to trend upward with no obvious infectious source, then could consider further eval. for evolving underlying MPD (not suggestive of, at this time).

## 2023-12-04 NOTE — PROGRESS NOTE ADULT - ASSESSMENT
Assessment/Plan:  Mrs. Екатерина Poole is a 58 year old female patient with past medical history of HTN and rheumatoid arthritis who is admitted for Acute Inpatient Rehabilitation with a multidisciplinary rehab program at [facility] with functional impairments in ADLs and mobility secondary to left hemiparesis resulting from bilateral subacute to remote embolic strokes, a right posterior corona radiata acute infarct, multiple petechial hemorrhages in bilateral thalamus and basal ganglia with etiology highly indicative from underlying vasculitis and a subsequent left parietal cortical acute infarction of a likely cardioembolic source.      CVA  - Event on 11/15/23 likely related to vasculitis      * Right Corona Radiata Infarct      * bilateral subacute to remote embolic strokes      * multiple petechial hemorrhages in bilateral thalamus and basal ganglia  - Most recent event on 11/29/23 likely related to cardioembolic source in setting of AFib:      * Left parietal small cortical acute infarction      * Innumerable scattered chronic microhemorrhages in the brainstem, bilateral cerebellar hemispheres, deep gray nuclei and peripherally within the cerebral hemispheres which may be secondary to chronic hypertensive encephalopathy.      * Severe extensive chronic microvascular ischemic changes and multifocal chronic lacunar infarcts.  - [facility] Neurology Impression: Left hemiparesis due to right corona radiata infarct, multiple micro hemorrhages, deep and cortical. Cerebral angiogram concerning for PACNS. Pt also with Afib, less likely cardioembolic event  - Steroids started on 11/24 - s/p Solumedrol 1g (11/24-11/27), Prednisone 60mg (11/28-11/30), now on prednisone 50mg QD. Maintain dose per rheumatology eval/recommendation.  - Left hemiparesis  - Impaired ADLs and mobility  - Need for assistance with personal care   - Start comprehensive rehab program of PT/OT/SLP - 3 hours a day, 5 days a week. P&O as needed   - Fall/Aspiration precautions  - Will need repeat cerebral angiogram in 1 month (~12/20/23)  - Continue prednisone 50 mg QD    Afib  - s/p amiodarone gtt  - continue amiodarone 200 mg QD, metoprolol 50 mg TID, and Eliquis 5 mg BID    HTN  - continue Norvasc 10 mg QD and Losartan 25 mg QD  - Monitor BP    HLD  - cont atorvastatin 80 mg QHS    Hyperglycemia in setting of steroid use  - SSI  - Monitor fingersticks    Mood / Cognition  - Neuropsychology consult PRN    Sleep  - Maintain quiet hours and a low stim environment.     GI / Bowel  - at risk for constipation due to neurologic diagnosis, immobility, and/or medication use  - Senna qHS  - Dulcolax 5 mg QHS  - GI ppx: pantoprazole 40 mg QD     / Bladder  - Bladder scans Q8 hours with straight cath for >400cc.  - Toileting schedule every 4 hours    Skin:  - Skin assessment on admission performed : Intact  - Pressure Injury/Skin: OOB to chair, PT/OT  - nursing to monitor skin q Shift    Diet/Dysphagia:  - Diet Consistency: regular  - Aspiration Precautions  - SLP consult for swallow function evaluation and treatment  - Nutrition consult    DVT prophylaxis:   - on Eliquis 5 mg BID      Outpatient Follow-up:  Gavin Calvo  Neurology  [address], [address]  [place] [address]  Phone: [phone]  Fax: [phone]  Follow Up Time: 2 weeks    Khadijah Hartley  Radiology  [address]  [place] [address]  Phone: [phone]  Fax: [phone]  Follow Up Time: 2 weeks    Brando Lazar  Cardiology  [address], [address]  [place] [address]  Phone: [phone]  Fax: [phone]  Follow Up Time: 2 weeks    Kenya Naik  Rheumatology  [address]  [place] [address]  Phone: [phone]  Fax: [phone]  Follow Up Time: 2 weeks      Code Status/Emergency Contact:    ---------------    Goals: Safe discharge to home  Estimated Length of Stay: 10-14 days  Rehab Potential: Good  Medical Prognosis: Good  Estimated Disposition: Home with home care      PRESCREEN COMPARISON:  I have reviewed the prescreen information and I have found no relevant changes between the preadmission screening and my post admission evaluation.    RATIONALE FOR INPATIENT ADMISSION: Patient demonstrates the following:  [X] Medically appropriate for rehabilitation admission  [X] Has attainable rehab goals with an appropriate initial discharge plan  [X]Has rehabilitation potential (expected to make a significant improvement within a reasonable period of time)  [X] Requires close medical management by a rehab physician, rehab nursing care, Hospitalist and comprehensive interdisciplinary team (including PT, OT and/or SLP, Prosthetics and Orthotics)     Assessment/Plan:  Mrs. Екатерина Poole is a 58 year old female patient with past medical history of HTN and rheumatoid arthritis who is admitted for Acute Inpatient Rehabilitation with a multidisciplinary rehab program at [facility] with functional impairments in ADLs and mobility secondary to left hemiparesis resulting from bilateral subacute to remote embolic strokes, a right posterior corona radiata acute infarct, multiple petechial hemorrhages in bilateral thalamus and basal ganglia with etiology highly indicative from underlying vasculitis and a subsequent left parietal cortical acute infarction of a likely cardioembolic source.      CVA  - Event on 11/15/23 likely related to vasculitis      * Right Corona Radiata Infarct      * bilateral subacute to remote embolic strokes      * multiple petechial hemorrhages in bilateral thalamus and basal ganglia  - Most recent event on 11/29/23 likely related to cardioembolic source in setting of AFib:      * Left parietal small cortical acute infarction      * Innumerable scattered chronic microhemorrhages in the brainstem, bilateral cerebellar hemispheres, deep gray nuclei and peripherally within the cerebral hemispheres which may be secondary to chronic hypertensive encephalopathy.      * Severe extensive chronic microvascular ischemic changes and multifocal chronic lacunar infarcts.  - [facility] Neurology Impression: Left hemiparesis due to right corona radiata infarct, multiple micro hemorrhages, deep and cortical. Cerebral angiogram concerning for PACNS. Pt also with Afib, less likely cardioembolic event  - Steroids started on 11/24 - s/p Solumedrol 1g (11/24-11/27), Prednisone 60mg (11/28-11/30), now on prednisone 50mg QD. Maintain dose per rheumatology eval/recommendation.  - Left hemiparesis  - Impaired ADLs and mobility  - Need for assistance with personal care   - Start comprehensive rehab program of PT/OT/SLP - 3 hours a day, 5 days a week. P&O as needed   - Fall/Aspiration precautions  - Will need repeat cerebral angiogram in 1 month (~12/20/23)  - Continue prednisone 50 mg QD    Afib  - s/p amiodarone gtt  - continue amiodarone 200 mg QD, metoprolol 50 mg TID, and Eliquis 5 mg BID    HTN  - continue Norvasc 10 mg QD and Losartan 25 mg QD  - Monitor BP    HLD  - cont atorvastatin 80 mg QHS    Hyperglycemia in setting of steroid use  - SSI  - Monitor fingersticks    Mood / Cognition  - Neuropsychology consult PRN    Sleep  - Maintain quiet hours and a low stim environment.     GI / Bowel  - at risk for constipation due to neurologic diagnosis, immobility, and/or medication use  - Senna qHS  - Dulcolax 5 mg QHS  - GI ppx: pantoprazole 40 mg QD     / Bladder  - Bladder scans Q8 hours with straight cath for >400cc.  - Toileting schedule every 4 hours    Skin:  - Skin assessment on admission performed : Intact  - Pressure Injury/Skin: OOB to chair, PT/OT  - nursing to monitor skin q Shift    Diet/Dysphagia:  - Diet Consistency: regular  - Aspiration Precautions  - SLP consult for swallow function evaluation and treatment  - Nutrition consult    DVT prophylaxis:   - on Eliquis 5 mg BID      Outpatient Follow-up:  Gavin Calvo  Neurology  [address], [address]  [place] [address]  Phone: [phone]  Fax: [phone]  Follow Up Time: 2 weeks    Khadijah Hartley  Radiology  [address]  [place] [address]  Phone: [phone]  Fax: [phone]  Follow Up Time: 2 weeks    Brando Lazar  Cardiology  [address], [address]  [place] [address]  Phone: [phone]  Fax: [phone]  Follow Up Time: 2 weeks    Kenya Naik  Rheumatology  [address]  [place] [address]  Phone: [phone]  Fax: [phone]  Follow Up Time: 2 weeks      Code Status/Emergency Contact:    ---------------    Goals: Safe discharge to home  Estimated Length of Stay: 10-14 days  Rehab Potential: Good  Medical Prognosis: Good  Estimated Disposition: Home with home care      PRESCREEN COMPARISON:  I have reviewed the prescreen information and I have found no relevant changes between the preadmission screening and my post admission evaluation.    RATIONALE FOR INPATIENT ADMISSION: Patient demonstrates the following:  [X] Medically appropriate for rehabilitation admission  [X] Has attainable rehab goals with an appropriate initial discharge plan  [X]Has rehabilitation potential (expected to make a significant improvement within a reasonable period of time)  [X] Requires close medical management by a rehab physician, rehab nursing care, Hospitalist and comprehensive interdisciplinary team (including PT, OT and/or SLP, Prosthetics and Orthotics)     Assessment/Plan:  Mrs. Екатерина Poole is a 58 year old female patient with past medical history of HTN and rheumatoid arthritis who is admitted for Acute Inpatient Rehabilitation with a multidisciplinary rehab program at [facility] with functional impairments in ADLs and mobility secondary to left hemiparesis resulting from bilateral subacute to remote embolic strokes, a right posterior corona radiata acute infarct, multiple petechial hemorrhages in bilateral thalamus and basal ganglia with etiology highly indicative from underlying vasculitis and a subsequent left parietal cortical acute infarction of a likely cardioembolic source.    CVA  - Event on 11/15/23 likely related to vasculitis (PACNS)      * Right Corona Radiata Infarct      * bilateral subacute to remote embolic strokes      * multiple petechial hemorrhages in bilateral thalamus and basal ganglia  - Most recent event on 11/29/23 likely related to cardioembolic source in setting of AFib:      * Left parietal small cortical acute infarction      * Innumerable scattered chronic microhemorrhages in the brainstem, bilateral cerebellar hemispheres, deep gray nuclei and peripherally within the cerebral hemispheres which may be secondary to chronic hypertensive encephalopathy.      * Severe extensive chronic microvascular ischemic changes and multifocal chronic lacunar infarcts.  - s/p Solumedrol 1g (11/24-11/27), Prednisone 60mg (11/28-11/30), now on prednisone 50mg QD. Maintain dose per rheumatology eval/recommendation.  - Left hemiparesis, impaired ADLs and mobility, need for assistance with personal care   - Start comprehensive rehab program of PT/OT/SLP - 3 hours a day, 5 days a week. P&O as needed   - Fall/Aspiration precautions  - Will need repeat cerebral angiogram in 1 month (~12/20/23)  - Rheumatology and Neurology following  - Continue prednisone 50 mg QD  - Bactrim DS 1 tab MWF for PCP ppx while on high dose steroids    # RRT/Code Stroke with syncope after BM 12/1  - Head CT stable as above  - rheumatology and neuro consulted  - f/u EEG     Afib  - amiodarone 200 mg QD  - metoprolol 50 mg TID  - Eliquis 5 mg BID    HTN  - continue Norvasc 10 mg QD and Losartan 25 mg QD  - Monitor BP    HLD  - cont atorvastatin 80 mg QHS    Hyperglycemia in setting of steroid use  - SSI  - Monitor fingersticks    Mood / Cognition  - Neuropsychology consult PRN    Sleep  - Maintain quiet hours and a low stim environment.     GI / Bowel  - Senna qHS  - Dulcolax 5 mg QHS  - GI ppx: pantoprazole 40 mg QD     / Bladder  - Bladder scans Q8 hours with straight cath for >400cc.  - Toileting schedule every 4 hours    Skin:  - Skin assessment on admission performed : Intact  - Pressure Injury/Skin: OOB to chair, PT/OT  - nursing to monitor skin q Shift    Diet/Dysphagia:  - Diet Consistency: regular  - Aspiration Precautions  - SLP consult for swallow function evaluation and treatment  - Nutrition consult    DVT prophylaxis:   - on Eliquis 5 mg BID      Outpatient Follow-up:  Gavin Calvo  Neurology  [address] [facility] [address], [address]  [place] [address]  Phone: [phone]  Fax: [phone]  Follow Up Time: 2 weeks    Khadijah Hartley  Radiology  [address] [facility] [address]  [place] [address]  Phone: [phone]  Fax: [phone]  Follow Up Time: 2 weeks    Brando Lazar  Cardiology  [address], [address]  [place] [address]  Phone: [phone]  Fax: [phone]  Follow Up Time: 2 weeks    Kenya Naik  Rheumatology  [address] [facility]  [place] [address]  Phone: [phone]  Fax: [phone]  Follow Up Time: 2 weeks      Code Status/Emergency Contact:    ---------------    Goals: Safe discharge to home  Estimated Length of Stay: 10-14 days  Rehab Potential: Good  Medical Prognosis: Good  Estimated Disposition: Home with home care      PRESCREEN COMPARISON:  I have reviewed the prescreen information and I have found no relevant changes between the preadmission screening and my post admission evaluation.    RATIONALE FOR INPATIENT ADMISSION: Patient demonstrates the following:  [X] Medically appropriate for rehabilitation admission  [X] Has attainable rehab goals with an appropriate initial discharge plan  [X]Has rehabilitation potential (expected to make a significant improvement within a reasonable period of time)  [X] Requires close medical management by a rehab physician, rehab nursing care, Hospitalist and comprehensive interdisciplinary team (including PT, OT and/or SLP, Prosthetics and Orthotics)     Assessment/Plan:  Mrs. Екатерина Poole is a 58 year old female patient with past medical history of HTN and rheumatoid arthritis who is admitted for Acute Inpatient Rehabilitation with a multidisciplinary rehab program at [facility] with functional impairments in ADLs and mobility secondary to left hemiparesis resulting from bilateral subacute to remote embolic strokes, a right posterior corona radiata acute infarct, multiple petechial hemorrhages in bilateral thalamus and basal ganglia with etiology highly indicative from underlying vasculitis and a subsequent left parietal cortical acute infarction of a likely cardioembolic source.    CVA  - Event on 11/15/23 likely related to vasculitis (PACNS)      * Right Corona Radiata Infarct      * bilateral subacute to remote embolic strokes      * multiple petechial hemorrhages in bilateral thalamus and basal ganglia  - Most recent event on 11/29/23 likely related to cardioembolic source in setting of AFib:      * Left parietal small cortical acute infarction      * Innumerable scattered chronic microhemorrhages in the brainstem, bilateral cerebellar hemispheres, deep gray nuclei and peripherally within the cerebral hemispheres which may be secondary to chronic hypertensive encephalopathy.      * Severe extensive chronic microvascular ischemic changes and multifocal chronic lacunar infarcts.  - s/p Solumedrol 1g (11/24-11/27), Prednisone 60mg (11/28-11/30), now on prednisone 50mg QD. Maintain dose per rheumatology eval/recommendation.  - Left hemiparesis, impaired ADLs and mobility, need for assistance with personal care   - Start comprehensive rehab program of PT/OT/SLP - 3 hours a day, 5 days a week. P&O as needed   - Fall/Aspiration precautions  - Will need repeat cerebral angiogram in 1 month (~12/20/23)  - Rheumatology and Neurology following  - Continue prednisone 50 mg QD  - Bactrim DS 1 tab MWF for PCP ppx while on high dose steroids    # RRT/Code Stroke with syncope after BM 12/1  - Head CT stable as above  - rheumatology and neuro consulted  - f/u EEG     Afib  - amiodarone 200 mg QD  - metoprolol 50 mg TID  - Eliquis 5 mg BID    HTN  - continue Norvasc 10 mg QD and Losartan 25 mg QD  - Monitor BP    HLD  - cont atorvastatin 80 mg QHS    Hyperglycemia in setting of steroid use  - SSI  - Monitor fingersticks    Mood / Cognition  - Neuropsychology consult PRN    Sleep  - Maintain quiet hours and a low stim environment.     GI / Bowel  - Senna qHS  - Dulcolax 5 mg QHS  - GI ppx: pantoprazole 40 mg QD     / Bladder  - Bladder scans Q8 hours with straight cath for >400cc.  - Toileting schedule every 4 hours    Skin:  - Skin assessment on admission performed : Intact  - Pressure Injury/Skin: OOB to chair, PT/OT  - nursing to monitor skin q Shift    Diet/Dysphagia:  - Diet Consistency: regular  - Aspiration Precautions  - SLP consult for swallow function evaluation and treatment  - Nutrition consult    DVT prophylaxis:   - on Eliquis 5 mg BID      Outpatient Follow-up:  Gavin Calvo  Neurology  [address], [address]  [place] [address]  Phone: [phone]  Fax: [phone]  Follow Up Time: 2 weeks    Khadijah Hartley  Radiology  [address] [address]  [place] [address]  Phone: [phone]  Fax: [phone]  Follow Up Time: 2 weeks    Brando Lazar  Cardiology  [address] [facility] [address]  [place] [address]  Phone: [phone]  Fax: [phone]  Follow Up Time: 2 weeks    Kenya Naik  Rheumatology  [address] [facility] [address] [facility] [address]  [place] [address]  Phone: [phone]  Fax: [phone]  Follow Up Time: 2 weeks      Code Status/Emergency Contact:    ---------------    Goals: Safe discharge to home  Estimated Length of Stay: 10-14 days  Rehab Potential: Good  Medical Prognosis: Good  Estimated Disposition: Home with home care      PRESCREEN COMPARISON:  I have reviewed the prescreen information and I have found no relevant changes between the preadmission screening and my post admission evaluation.    RATIONALE FOR INPATIENT ADMISSION: Patient demonstrates the following:  [X] Medically appropriate for rehabilitation admission  [X] Has attainable rehab goals with an appropriate initial discharge plan  [X]Has rehabilitation potential (expected to make a significant improvement within a reasonable period of time)  [X] Requires close medical management by a rehab physician, rehab nursing care, Hospitalist and comprehensive interdisciplinary team (including PT, OT and/or SLP, Prosthetics and Orthotics)     Assessment/Plan:  Mrs. Екатерина Poole is a 58 year old female patient with past medical history of HTN and rheumatoid arthritis who is admitted for Acute Inpatient Rehabilitation with a multidisciplinary rehab program at [facility] with functional impairments in ADLs and mobility secondary to left hemiparesis resulting from bilateral subacute to remote embolic strokes, a right posterior corona radiata acute infarct, multiple petechial hemorrhages in bilateral thalamus and basal ganglia with etiology highly indicative from underlying vasculitis and a subsequent left parietal cortical acute infarction of a likely cardioembolic source.    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 Maintain dose per rheumatology eval/recommendation.  - Left hemiparesis, impaired ADLs and mobility, need for assistance with personal care   - Start comprehensive rehab program of PT/OT/SLP - 3 hours a day, 5 days a week. P&O as needed   - Fall/Aspiration precautions  - Will need repeat cerebral angiogram in 1 month (~12/20/23)  - Rheumatology and Neurology following  - Continue prednisone 50 mg QD  - Bactrim DS 1 tab MWF for PCP ppx while on high dose steroids    # leukocytosis  - WBC 17.18->16.54  - likely steroid induced, was started on steroids 11/24, WBC started uptrending after  - afebrile, asymptomatic  - monitor CBC, hospitalist follow up    # RRT/Code Stroke with syncope after BM 12/1  - Head CT stable as above  - rheumatology and neuro consulted  - f/u EEG     Afib  - amiodarone 200 mg QD  - metoprolol 50 mg TID  - Eliquis 5 mg BID    HTN  - continue Norvasc 10 mg QD and Losartan 25 mg QD  - Monitor BP    HLD  - cont atorvastatin 80 mg QHS    Hyperglycemia in setting of steroid use  - SSI  - Monitor fingersticks    Mood / Cognition  - Neuropsychology consult PRN    Sleep  - Maintain quiet hours and a low stim environment.     GI / Bowel  - Senna qHS  - Dulcolax 5 mg QHS  - GI ppx: pantoprazole 40 mg QD     / Bladder  - Bladder scans Q8 hours with straight cath for >400cc.  - Toileting schedule every 4 hours    Skin:  - Skin assessment on admission performed : Intact  - Pressure Injury/Skin: OOB to chair, PT/OT  - nursing to monitor skin q Shift    Diet/Dysphagia:  - Diet Consistency: regular  - Aspiration Precautions  - SLP consult for swallow function evaluation and treatment  - Nutrition consult    DVT prophylaxis:   - on Eliquis 5 mg BID      Outpatient Follow-up:  Gavin Calvo  Neurology  [address] [facility], [address]  [place] [address]  Phone: [phone]  Fax: [phone]  Follow Up Time: 2 weeks    Khadijah Hartley  Radiology  [address] [facility]  [place] [address]  Phone: [phone]  Fax: [phone]  Follow Up Time: 2 weeks    Brando Lazar  Cardiology  [address], [address]  [place] [address]  Phone: [phone]  Fax: [phone]  Follow Up Time: 2 weeks    Kenya Naik  Rheumatology  [address] [facility]  [place] [address]  Phone: [phone]  Fax: [phone]  Follow Up Time: 2 weeks      Code Status/Emergency Contact:    ---------------    Goals: Safe discharge to home  Estimated Length of Stay: 10-14 days  Rehab Potential: Good  Medical Prognosis: Good  Estimated Disposition: Home with home care      PRESCREEN COMPARISON:  I have reviewed the prescreen information and I have found no relevant changes between the preadmission screening and my post admission evaluation.    RATIONALE FOR INPATIENT ADMISSION: Patient demonstrates the following:  [X] Medically appropriate for rehabilitation admission  [X] Has attainable rehab goals with an appropriate initial discharge plan  [X]Has rehabilitation potential (expected to make a significant improvement within a reasonable period of time)  [X] Requires close medical management by a rehab physician, rehab nursing care, Hospitalist and comprehensive interdisciplinary team (including PT, OT and/or SLP, Prosthetics and Orthotics)     Assessment/Plan:  Mrs. Екатерина Poole is a 58 year old female patient with past medical history of HTN and rheumatoid arthritis who is admitted for Acute Inpatient Rehabilitation with a multidisciplinary rehab program at [facility] with functional impairments in ADLs and mobility secondary to left hemiparesis resulting from bilateral subacute to remote embolic strokes, a right posterior corona radiata acute infarct, multiple petechial hemorrhages in bilateral thalamus and basal ganglia with etiology highly indicative from underlying vasculitis and a subsequent left parietal cortical acute infarction of a likely cardioembolic source.    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 Maintain dose per rheumatology eval/recommendation.  - Left hemiparesis, impaired ADLs and mobility, need for assistance with personal care   - Start comprehensive rehab program of PT/OT/SLP - 3 hours a day, 5 days a week. P&O as needed   - Fall/Aspiration precautions  - Will need repeat cerebral angiogram in 1 month (~12/20/23)  - Rheumatology and Neurology following  - Continue prednisone 50 mg QD  - Bactrim DS 1 tab MWF for PCP ppx while on high dose steroids    # leukocytosis  - WBC 17.18->16.54  - likely steroid induced, was started on steroids 11/24, WBC started uptrending after  - afebrile, asymptomatic  - monitor CBC, hospitalist follow up    # RRT/Code Stroke with syncope after BM 12/1  - Head CT stable as above  - rheumatology and neuro consulted  - f/u EEG     Afib  - amiodarone 200 mg QD  - metoprolol 50 mg TID  - Eliquis 5 mg BID    HTN  - continue Norvasc 10 mg QD and Losartan 25 mg QD  - Monitor BP    HLD  - cont atorvastatin 80 mg QHS    Hyperglycemia in setting of steroid use  - SSI  - Monitor fingersticks    Mood / Cognition  - Neuropsychology consult PRN    Sleep  - Maintain quiet hours and a low stim environment.     GI / Bowel  - Senna qHS  - Dulcolax 5 mg QHS  - GI ppx: pantoprazole 40 mg QD     / Bladder  - Bladder scans Q8 hours with straight cath for >400cc.  - Toileting schedule every 4 hours    Skin:  - Skin assessment on admission performed : Intact  - Pressure Injury/Skin: OOB to chair, PT/OT  - nursing to monitor skin q Shift    Diet/Dysphagia:  - Diet Consistency: regular  - Aspiration Precautions  - SLP consult for swallow function evaluation and treatment  - Nutrition consult    DVT prophylaxis:   - on Eliquis 5 mg BID      Outpatient Follow-up:  Gavin Calvo  Neurology  [address], [address]  [place] [address]  Phone: [phone]  Fax: [phone]  Follow Up Time: 2 weeks    Khadijah Hartley  Radiology  [address], [address]  [place] [address]  Phone: [phone]  Fax: [phone]  Follow Up Time: 2 weeks    Brando Lazar  Cardiology  [address], [address]  [place] [address]  Phone: [phone]  Fax: [phone]  Follow Up Time: 2 weeks    Kenya Naik  Rheumatology  [address], [address]  [place] [address]  Phone: [phone]  Fax: [phone]  Follow Up Time: 2 weeks      Code Status/Emergency Contact:    ---------------    Goals: Safe discharge to home  Estimated Length of Stay: 10-14 days  Rehab Potential: Good  Medical Prognosis: Good  Estimated Disposition: Home with home care      PRESCREEN COMPARISON:  I have reviewed the prescreen information and I have found no relevant changes between the preadmission screening and my post admission evaluation.    RATIONALE FOR INPATIENT ADMISSION: Patient demonstrates the following:  [X] Medically appropriate for rehabilitation admission  [X] Has attainable rehab goals with an appropriate initial discharge plan  [X]Has rehabilitation potential (expected to make a significant improvement within a reasonable period of time)  [X] Requires close medical management by a rehab physician, rehab nursing care, Hospitalist and comprehensive interdisciplinary team (including PT, OT and/or SLP, Prosthetics and Orthotics)

## 2023-12-04 NOTE — PROGRESS NOTE ADULT - SUBJECTIVE AND OBJECTIVE BOX
HPI:  Mrs. Екатерина Poole is a 58 year old female patient with past medical history of HTN and rheumatoid arthritis who presented to [place] on 11/15 for AMS when a neighbor overhead patient was looking for her mom and she was found confused/wandering around apartment complex. Imaging studies initially performed reported bilateral subacute to remote embolic strokes, a right posterior corona radiata acute infarct, and multiple petechial hemorrhages in bilateral thalamus and basal ganglia. She was additionally noted to have KRUNAL which has since resolved. Hypertension managed with amlodipine, aldactone, and losartan and she was started on B12 supplements. Patient seen by Rheumatology and was transferred to [facility] on 11/20 for cerebral angiogram.    A cerebral angiogram performed on 11/20 reported multiple areas of focal stenosis and dilatation concerning for vasculitis. An LP was completed on 11/21 with a normal profile. Rheumatology consulted and recommended possible brain biopsy to confirm diagnosis of vasculitis but the risks of performing an open biopsy outweighed the benefits as the results of the biopsy were deemed unlikely to . She was seen by Neurosurgery with patient's neuroradiologic findings including beading on cerebral angiogram (consistent with vasculitis) and clinical presentation, with no other etiology of vasculitis, point to PACNS. Patient was started on steroids given her micro hemorrhage and strokes likely attributed to vasculitis. Steroids started on 11/24 with initial dose Solumedrol 1g for 3 days. She was to continue Prednisone 60mg with a decrease to 50mg daily for discharge to acute in-patient rehabilitation. No taper indicated per rheumatology evaluation and recommendations. An EEG was additionally performed and reported moderate diffuse/multi-focal cerebral dysfunction, not specific as to etiology. There were no epileptiform abnormalities recorded. Will need a repeat cerebral angiogram in 1 month which should take place around 12/20/23. Patient started on Eliquis 5 mg BID, amiodarone, and metoprolol for Afib. TTE reported as normal with global left ventricular systolic function, mild mitral valve regurgitation, mild aortic regurgitation, and a sclerotic aortic valve with normal opening. Hospitalization additionally significant for a rapid response on 11/20 due to acute mental status changes after being found to be slumped over while on toilet by staff prompting MRI imaging reporting:  ·	Left parietal small cortical acute infarction  ·	Innumerable scattered chronic microhemorrhages in the brainstem, bilateral cerebellar hemispheres, deep gray nuclei and peripherally within the cerebral hemispheres which may be secondary to chronic hypertensive encephalopathy.  ·	Severe extensive chronic microvascular ischemic changes and multifocal chronic lacunar infarcts as detailed above.  Patient evaluated by PT/OT and was recommended for acute inpatient rehab. Patient is medically stable for discharge to [facility] on 11/30. (30 Nov 2023 14:36)      ___________________________________________________________________________    SUBJECTIVE/ROS  Patient was seen and evaluated at bedside today.  Reported no overnight events and is in no acute distress.  Patient is motivated to continue participation on the recommended rehabilitation program.   Denies any CP, SOB, LAURA, palpitations, fever, chills, body aches, cough, congestion, or any other symptoms at this time.   ___________________________________________________________________________    VITALS  T(C): 37.2 (12-04-23 @ 09:01), Max: 37.2 (12-04-23 @ 09:01)  HR: 70 (12-04-23 @ 09:01) (64 - 70)  BP: 142/72 (12-04-23 @ 09:01) (137/78 - 145/80)  RR: 16 (12-04-23 @ 09:01) (15 - 16)  SpO2: 95% (12-04-23 @ 09:01) (95% - 97%)  ___________________________________________________________________________    LABS                          9.6    16.54 )-----------( 383      ( 04 Dec 2023 05:51 )             27.0       12-04    138  |  100  |  26<H>  ----------------------------<  89  3.5   |  30  |  1.11    Ca    8.4      04 Dec 2023 05:51    TPro  6.3  /  Alb  2.5<L>  /  TBili  0.3  /  DBili  x   /  AST  11  /  ALT  23  /  AlkPhos  71  12-04      CAPILLARY BLOOD GLUCOSE  POCT Blood Glucose.: 135 mg/dL (04 Dec 2023 11:40)  POCT Blood Glucose.: 112 mg/dL (04 Dec 2023 08:31)  POCT Blood Glucose.: 120 mg/dL (03 Dec 2023 21:06)  POCT Blood Glucose.: 115 mg/dL (03 Dec 2023 18:06)  ___________________________________________________________________________    MEDICATIONS  (STANDING):  aMIOdarone    Tablet 200 milliGRAM(s) Oral daily  apixaban 5 milliGRAM(s) Oral two times a day  atorvastatin 80 milliGRAM(s) Oral at bedtime  bisacodyl 5 milliGRAM(s) Oral at bedtime  dextrose 5%. 1000 milliLiter(s) (50 mL/Hr) IV Continuous <Continuous>  dextrose 5%. 1000 milliLiter(s) (100 mL/Hr) IV Continuous <Continuous>  dextrose 50% Injectable 25 Gram(s) IV Push once  dextrose 50% Injectable 12.5 Gram(s) IV Push once  dextrose 50% Injectable 25 Gram(s) IV Push once  glucagon  Injectable 1 milliGRAM(s) IntraMuscular once  influenza   Vaccine 0.5 milliLiter(s) IntraMuscular once  insulin lispro (ADMELOG) corrective regimen sliding scale   SubCutaneous at bedtime  insulin lispro (ADMELOG) corrective regimen sliding scale   SubCutaneous three times a day before meals  losartan 25 milliGRAM(s) Oral daily  metoprolol tartrate 50 milliGRAM(s) Oral three times a day  pantoprazole    Tablet 40 milliGRAM(s) Oral daily  predniSONE   Tablet 50 milliGRAM(s) Oral daily  senna 2 Tablet(s) Oral at bedtime  trimethoprim  160 mG/sulfamethoxazole 800 mG 1 Tablet(s) Oral <User Schedule>    MEDICATIONS  (PRN):  dextrose Oral Gel 15 Gram(s) Oral once PRN Blood Glucose LESS THAN 70 milliGRAM(s)/deciliter    ___________________________________________________________________________    PHYSICAL EXAM:  Gen - NAD, Comfortable  HEENT - NCAT, EOMI, MMM  Neck - Supple, No limited ROM  Pulm - CTAB, No wheeze, No rhonchi, No crackles  Cardiovascular - RRR, S1S2, No murmurs  Abdomen - Soft, NT/ND, +BS  Extremities - No C/C/E, No calf tenderness  Neuro-     Cognitive - AAOx3     Communication - Fluent, No dysarthria     Attention: able to state days of the week backwards     Judgement: Good evidence of judgement     Memory: Recall 3 objects immediate and 3 min later         Cranial Nerves - CN 2-12 intact     Motor -                     LEFT    UE - ShAB 5/5, EF 5/5, EE 5/5, WE 5/5,  5/5                    RIGHT UE - ShAB 5/5, EF 5/5, EE 5/5, WE 5/5,  5/5                    LEFT    LE - HF 5/5, KE 5/5, DF 5/5, PF 5/5                    RIGHT LE - HF 5/5, KE 5/5, DF 5/5, PF 5/5        Sensory - Intact to LT     Reflexes - DTR Intact, No primitive reflexive     Coordination - FTN intact     Tone - normal  Psychiatric - Mood stable, Affect WNL  Skin:  all skin intact    Wounds: None Present    ___________________________________________________________________________ HPI:  Mrs. Екатерина Poole is a 58 year old female patient with past medical history of HTN and rheumatoid arthritis who presented to [place] on 11/15 for AMS when a neighbor overhead patient was looking for her mom and she was found confused/wandering around apartment complex. Imaging studies initially performed reported bilateral subacute to remote embolic strokes, a right posterior corona radiata acute infarct, and multiple petechial hemorrhages in bilateral thalamus and basal ganglia. She was additionally noted to have KRUNAL which has since resolved. Hypertension managed with amlodipine, aldactone, and losartan and she was started on B12 supplements. Patient seen by Rheumatology and was transferred to [facility] on 11/20 for cerebral angiogram.    A cerebral angiogram performed on 11/20 reported multiple areas of focal stenosis and dilatation concerning for vasculitis. An LP was completed on 11/21 with a normal profile. Rheumatology consulted and recommended possible brain biopsy to confirm diagnosis of vasculitis but the risks of performing an open biopsy outweighed the benefits as the results of the biopsy were deemed unlikely to . She was seen by Neurosurgery with patient's neuroradiologic findings including beading on cerebral angiogram (consistent with vasculitis) and clinical presentation, with no other etiology of vasculitis, point to PACNS. Patient was started on steroids given her micro hemorrhage and strokes likely attributed to vasculitis. Steroids started on 11/24 with initial dose Solumedrol 1g for 3 days. She was to continue Prednisone 60mg with a decrease to 50mg daily for discharge to acute in-patient rehabilitation. No taper indicated per rheumatology evaluation and recommendations. An EEG was additionally performed and reported moderate diffuse/multi-focal cerebral dysfunction, not specific as to etiology. There were no epileptiform abnormalities recorded. Will need a repeat cerebral angiogram in 1 month which should take place around 12/20/23. Patient started on Eliquis 5 mg BID, amiodarone, and metoprolol for Afib. TTE reported as normal with global left ventricular systolic function, mild mitral valve regurgitation, mild aortic regurgitation, and a sclerotic aortic valve with normal opening. Hospitalization additionally significant for a rapid response on 11/20 due to acute mental status changes after being found to be slumped over while on toilet by staff prompting MRI imaging reporting:  ·	Left parietal small cortical acute infarction  ·	Innumerable scattered chronic microhemorrhages in the brainstem, bilateral cerebellar hemispheres, deep gray nuclei and peripherally within the cerebral hemispheres which may be secondary to chronic hypertensive encephalopathy.  ·	Severe extensive chronic microvascular ischemic changes and multifocal chronic lacunar infarcts as detailed above.  Patient evaluated by PT/OT and was recommended for acute inpatient rehab. Patient is medically stable for discharge to [facility] on 11/30. (30 Nov 2023 14:36)      ___________________________________________________________________________    SUBJECTIVE/ROS  Patient was seen and evaluated at bedside today.  Reported no overnight events and is in no acute distress.  Patient is motivated to continue participation on the recommended rehabilitation program.   Denies any CP, SOB, LAURA, palpitations, fever, chills, body aches, cough, congestion, or any other symptoms at this time.   ___________________________________________________________________________    VITALS  T(C): 37.2 (12-04-23 @ 09:01), Max: 37.2 (12-04-23 @ 09:01)  HR: 70 (12-04-23 @ 09:01) (64 - 70)  BP: 142/72 (12-04-23 @ 09:01) (137/78 - 145/80)  RR: 16 (12-04-23 @ 09:01) (15 - 16)  SpO2: 95% (12-04-23 @ 09:01) (95% - 97%)  ___________________________________________________________________________    LABS                          9.6    16.54 )-----------( 383      ( 04 Dec 2023 05:51 )             27.0       12-04    138  |  100  |  26<H>  ----------------------------<  89  3.5   |  30  |  1.11    Ca    8.4      04 Dec 2023 05:51    TPro  6.3  /  Alb  2.5<L>  /  TBili  0.3  /  DBili  x   /  AST  11  /  ALT  23  /  AlkPhos  71  12-04      CAPILLARY BLOOD GLUCOSE  POCT Blood Glucose.: 135 mg/dL (04 Dec 2023 11:40)  POCT Blood Glucose.: 112 mg/dL (04 Dec 2023 08:31)  POCT Blood Glucose.: 120 mg/dL (03 Dec 2023 21:06)  POCT Blood Glucose.: 115 mg/dL (03 Dec 2023 18:06)  ___________________________________________________________________________    MEDICATIONS  (STANDING):  aMIOdarone    Tablet 200 milliGRAM(s) Oral daily  apixaban 5 milliGRAM(s) Oral two times a day  atorvastatin 80 milliGRAM(s) Oral at bedtime  bisacodyl 5 milliGRAM(s) Oral at bedtime  dextrose 5%. 1000 milliLiter(s) (50 mL/Hr) IV Continuous <Continuous>  dextrose 5%. 1000 milliLiter(s) (100 mL/Hr) IV Continuous <Continuous>  dextrose 50% Injectable 25 Gram(s) IV Push once  dextrose 50% Injectable 12.5 Gram(s) IV Push once  dextrose 50% Injectable 25 Gram(s) IV Push once  glucagon  Injectable 1 milliGRAM(s) IntraMuscular once  influenza   Vaccine 0.5 milliLiter(s) IntraMuscular once  insulin lispro (ADMELOG) corrective regimen sliding scale   SubCutaneous at bedtime  insulin lispro (ADMELOG) corrective regimen sliding scale   SubCutaneous three times a day before meals  losartan 25 milliGRAM(s) Oral daily  metoprolol tartrate 50 milliGRAM(s) Oral three times a day  pantoprazole    Tablet 40 milliGRAM(s) Oral daily  predniSONE   Tablet 50 milliGRAM(s) Oral daily  senna 2 Tablet(s) Oral at bedtime  trimethoprim  160 mG/sulfamethoxazole 800 mG 1 Tablet(s) Oral <User Schedule>    MEDICATIONS  (PRN):  dextrose Oral Gel 15 Gram(s) Oral once PRN Blood Glucose LESS THAN 70 milliGRAM(s)/deciliter    ___________________________________________________________________________    PHYSICAL EXAM:  Gen - NAD, Comfortable  HEENT - NCAT, EOMI, MMM  Neck - Supple, No limited ROM  Pulm - CTAB, No wheeze, No rhonchi, No crackles  Cardiovascular - RRR, S1S2, No murmurs  Abdomen - Soft, NT/ND, +BS  Extremities - No C/C/E, No calf tenderness  Neuro-     Cognitive - AAOx3     Communication - Fluent, No dysarthria     Attention: able to state days of the week backwards     Judgement: Good evidence of judgement     Memory: Recall 3 objects immediate and 3 min later         Cranial Nerves - CN 2-12 intact     Motor -                     LEFT    UE - ShAB 5/5, EF 5/5, EE 5/5, WE 5/5,  5/5                    RIGHT UE - ShAB 5/5, EF 5/5, EE 5/5, WE 5/5,  5/5                    LEFT    LE - HF 5/5, KE 5/5, DF 5/5, PF 5/5                    RIGHT LE - HF 5/5, KE 5/5, DF 5/5, PF 5/5        Sensory - Intact to LT     Reflexes - DTR Intact, No primitive reflexive     Coordination - FTN intact     Tone - normal  Psychiatric - Mood stable, Affect WNL  Skin:  all skin intact    Wounds: None Present    ___________________________________________________________________________ HPI:  Mrs. Екатерина Poole is a 58 year old female patient with past medical history of HTN and rheumatoid arthritis who presented to [place] on 11/15 for AMS when a neighbor overhead patient was looking for her mom and she was found confused/wandering around apartment complex. Imaging studies initially performed reported bilateral subacute to remote embolic strokes, a right posterior corona radiata acute infarct, and multiple petechial hemorrhages in bilateral thalamus and basal ganglia. She was additionally noted to have KRUNAL which has since resolved. Hypertension managed with amlodipine, aldactone, and losartan and she was started on B12 supplements. Patient seen by Rheumatology and was transferred to [facility] on 11/20 for cerebral angiogram.    A cerebral angiogram performed on 11/20 reported multiple areas of focal stenosis and dilatation concerning for vasculitis. An LP was completed on 11/21 with a normal profile. Rheumatology consulted and recommended possible brain biopsy to confirm diagnosis of vasculitis but the risks of performing an open biopsy outweighed the benefits as the results of the biopsy were deemed unlikely to . She was seen by Neurosurgery with patient's neuroradiologic findings including beading on cerebral angiogram (consistent with vasculitis) and clinical presentation, with no other etiology of vasculitis, point to PACNS. Patient was started on steroids given her micro hemorrhage and strokes likely attributed to vasculitis. Steroids started on 11/24 with initial dose Solumedrol 1g for 3 days. She was to continue Prednisone 60mg with a decrease to 50mg daily for discharge to acute in-patient rehabilitation. No taper indicated per rheumatology evaluation and recommendations. An EEG was additionally performed and reported moderate diffuse/multi-focal cerebral dysfunction, not specific as to etiology. There were no epileptiform abnormalities recorded. Will need a repeat cerebral angiogram in 1 month which should take place around 12/20/23. Patient started on Eliquis 5 mg BID, amiodarone, and metoprolol for Afib. TTE reported as normal with global left ventricular systolic function, mild mitral valve regurgitation, mild aortic regurgitation, and a sclerotic aortic valve with normal opening. Hospitalization additionally significant for a rapid response on 11/20 due to acute mental status changes after being found to be slumped over while on toilet by staff prompting MRI imaging reporting:  ·	Left parietal small cortical acute infarction  ·	Innumerable scattered chronic microhemorrhages in the brainstem, bilateral cerebellar hemispheres, deep gray nuclei and peripherally within the cerebral hemispheres which may be secondary to chronic hypertensive encephalopathy.  ·	Severe extensive chronic microvascular ischemic changes and multifocal chronic lacunar infarcts as detailed above.  Patient evaluated by PT/OT and was recommended for acute inpatient rehab. Patient is medically stable for discharge to [facility] on 11/30. (30 Nov 2023 14:36)  ___________________________________________________________________________    SUBJECTIVE/ROS  Patient was seen and evaluated at bedside today.  Reported no overnight events and is in no acute distress.  Patient is motivated to continue participation on the recommended rehabilitation program.   Denies any CP, SOB, LAURA, palpitations, fever, chills, body aches, cough, congestion, or any other symptoms at this time.   ___________________________________________________________________________    CT Head No Cont (12.01.23 @ 10:40)   Moderate to severe chronic microvascular changeswithout evidence of an acute transcortical infarction or hemorrhage.    CT Angio Brain Stroke Protocol  w/ IV Cont (12.01.23 @ 13:03)   Negative CT perfusion. CTA demonstrates persistent narrowing of the anterior and middle cerebral arteries consistent with vasospasm or vasculitis.  ___________________________________________________________________________    VITALS  T(C): 37.2 (12-04-23 @ 09:01), Max: 37.2 (12-04-23 @ 09:01)  HR: 70 (12-04-23 @ 09:01) (64 - 70)  BP: 142/72 (12-04-23 @ 09:01) (137/78 - 145/80)  RR: 16 (12-04-23 @ 09:01) (15 - 16)  SpO2: 95% (12-04-23 @ 09:01) (95% - 97%)  ___________________________________________________________________________    LABS                          9.6    16.54 )-----------( 383      ( 04 Dec 2023 05:51 )             27.0       12-04    138  |  100  |  26<H>  ----------------------------<  89  3.5   |  30  |  1.11    Ca    8.4      04 Dec 2023 05:51    TPro  6.3  /  Alb  2.5<L>  /  TBili  0.3  /  DBili  x   /  AST  11  /  ALT  23  /  AlkPhos  71  12-04      CAPILLARY BLOOD GLUCOSE  POCT Blood Glucose.: 135 mg/dL (04 Dec 2023 11:40)  POCT Blood Glucose.: 112 mg/dL (04 Dec 2023 08:31)  POCT Blood Glucose.: 120 mg/dL (03 Dec 2023 21:06)  POCT Blood Glucose.: 115 mg/dL (03 Dec 2023 18:06)  ___________________________________________________________________________    MEDICATIONS  (STANDING):  aMIOdarone    Tablet 200 milliGRAM(s) Oral daily  apixaban 5 milliGRAM(s) Oral two times a day  atorvastatin 80 milliGRAM(s) Oral at bedtime  bisacodyl 5 milliGRAM(s) Oral at bedtime  dextrose 5%. 1000 milliLiter(s) (50 mL/Hr) IV Continuous <Continuous>  dextrose 5%. 1000 milliLiter(s) (100 mL/Hr) IV Continuous <Continuous>  dextrose 50% Injectable 25 Gram(s) IV Push once  dextrose 50% Injectable 12.5 Gram(s) IV Push once  dextrose 50% Injectable 25 Gram(s) IV Push once  glucagon  Injectable 1 milliGRAM(s) IntraMuscular once  influenza   Vaccine 0.5 milliLiter(s) IntraMuscular once  insulin lispro (ADMELOG) corrective regimen sliding scale   SubCutaneous at bedtime  insulin lispro (ADMELOG) corrective regimen sliding scale   SubCutaneous three times a day before meals  losartan 25 milliGRAM(s) Oral daily  metoprolol tartrate 50 milliGRAM(s) Oral three times a day  pantoprazole    Tablet 40 milliGRAM(s) Oral daily  predniSONE   Tablet 50 milliGRAM(s) Oral daily  senna 2 Tablet(s) Oral at bedtime  trimethoprim  160 mG/sulfamethoxazole 800 mG 1 Tablet(s) Oral <User Schedule>    MEDICATIONS  (PRN):  dextrose Oral Gel 15 Gram(s) Oral once PRN Blood Glucose LESS THAN 70 milliGRAM(s)/deciliter    ___________________________________________________________________________    PHYSICAL EXAM:  Gen - NAD, Comfortable  HEENT - NCAT, EOMI, MMM  Pulm - breathing comfortably on room air  Cardiovascular - warm and well perfused  Abdomen - Soft, NT/ND  Extremities - No C/C/E, No calf tenderness  Neuro-     Cognitive - oriented to name, [facility] rehab in [place] for place, [facility] February 2023 for time     Communication - Fluent, No dysarthria     Attention: able to state days of the week backwards     Cranial Nerves - no facial asymmetry, EOMI     Motor -                     LEFT    UE - ShAB 5/5, EF 5/5, EE 5/5, WE 5/5,  5/5                    RIGHT UE - ShAB 5/5, EF 5/5, EE 5/5, WE 5/5,  5/5                    LEFT    LE - HF 4/5, KE 5/5, DF 5/5, PF 5/5                    RIGHT LE - HF 4/5, KE 5/5, DF 5/5, PF 5/5        Sensory - Intact to LT     Reflexes - DTR Intact, No primitive reflexive     Coordination - FTN intact     Tone - normal  Psychiatric - Mood stable, Affect WNL  Skin:  all skin intact    ___________________________________________________________________________ HPI:  Mrs. Екатерина Poole is a 58 year old female patient with past medical history of HTN and rheumatoid arthritis who presented to [place] on 11/15 for AMS when a neighbor overhead patient was looking for her mom and she was found confused/wandering around apartment complex. Imaging studies initially performed reported bilateral subacute to remote embolic strokes, a right posterior corona radiata acute infarct, and multiple petechial hemorrhages in bilateral thalamus and basal ganglia. She was additionally noted to have KRUNAL which has since resolved. Hypertension managed with amlodipine, aldactone, and losartan and she was started on B12 supplements. Patient seen by Rheumatology and was transferred to [facility] on 11/20 for cerebral angiogram.    A cerebral angiogram performed on 11/20 reported multiple areas of focal stenosis and dilatation concerning for vasculitis. An LP was completed on 11/21 with a normal profile. Rheumatology consulted and recommended possible brain biopsy to confirm diagnosis of vasculitis but the risks of performing an open biopsy outweighed the benefits as the results of the biopsy were deemed unlikely to . She was seen by Neurosurgery with patient's neuroradiologic findings including beading on cerebral angiogram (consistent with vasculitis) and clinical presentation, with no other etiology of vasculitis, point to PACNS. Patient was started on steroids given her micro hemorrhage and strokes likely attributed to vasculitis. Steroids started on 11/24 with initial dose Solumedrol 1g for 3 days. She was to continue Prednisone 60mg with a decrease to 50mg daily for discharge to acute in-patient rehabilitation. No taper indicated per rheumatology evaluation and recommendations. An EEG was additionally performed and reported moderate diffuse/multi-focal cerebral dysfunction, not specific as to etiology. There were no epileptiform abnormalities recorded. Will need a repeat cerebral angiogram in 1 month which should take place around 12/20/23. Patient started on Eliquis 5 mg BID, amiodarone, and metoprolol for Afib. TTE reported as normal with global left ventricular systolic function, mild mitral valve regurgitation, mild aortic regurgitation, and a sclerotic aortic valve with normal opening. Hospitalization additionally significant for a rapid response on 11/20 due to acute mental status changes after being found to be slumped over while on toilet by staff prompting MRI imaging reporting:  ·	Left parietal small cortical acute infarction  ·	Innumerable scattered chronic microhemorrhages in the brainstem, bilateral cerebellar hemispheres, deep gray nuclei and peripherally within the cerebral hemispheres which may be secondary to chronic hypertensive encephalopathy.  ·	Severe extensive chronic microvascular ischemic changes and multifocal chronic lacunar infarcts as detailed above.  Patient evaluated by PT/OT and was recommended for acute inpatient rehab. Patient is medically stable for discharge to [facility] on 11/30. (30 Nov 2023 14:36)  ___________________________________________________________________________    SUBJECTIVE/ROS  Patient was seen and evaluated at bedside today.  Reported no overnight events and is in no acute distress.  Patient is motivated to continue participation on the recommended rehabilitation program.   Denies any CP, SOB, LAURA, palpitations, fever, chills, body aches, cough, congestion, or any other symptoms at this time.   ___________________________________________________________________________    CT Head No Cont (12.01.23 @ 10:40)   Moderate to severe chronic microvascular changeswithout evidence of an acute transcortical infarction or hemorrhage.    CT Angio Brain Stroke Protocol  w/ IV Cont (12.01.23 @ 13:03)   Negative CT perfusion. CTA demonstrates persistent narrowing of the anterior and middle cerebral arteries consistent with vasospasm or vasculitis.  ___________________________________________________________________________    VITALS  T(C): 37.2 (12-04-23 @ 09:01), Max: 37.2 (12-04-23 @ 09:01)  HR: 70 (12-04-23 @ 09:01) (64 - 70)  BP: 142/72 (12-04-23 @ 09:01) (137/78 - 145/80)  RR: 16 (12-04-23 @ 09:01) (15 - 16)  SpO2: 95% (12-04-23 @ 09:01) (95% - 97%)  ___________________________________________________________________________    LABS                          9.6    16.54 )-----------( 383      ( 04 Dec 2023 05:51 )             27.0       12-04    138  |  100  |  26<H>  ----------------------------<  89  3.5   |  30  |  1.11    Ca    8.4      04 Dec 2023 05:51    TPro  6.3  /  Alb  2.5<L>  /  TBili  0.3  /  DBili  x   /  AST  11  /  ALT  23  /  AlkPhos  71  12-04      CAPILLARY BLOOD GLUCOSE  POCT Blood Glucose.: 135 mg/dL (04 Dec 2023 11:40)  POCT Blood Glucose.: 112 mg/dL (04 Dec 2023 08:31)  POCT Blood Glucose.: 120 mg/dL (03 Dec 2023 21:06)  POCT Blood Glucose.: 115 mg/dL (03 Dec 2023 18:06)  ___________________________________________________________________________    MEDICATIONS  (STANDING):  aMIOdarone    Tablet 200 milliGRAM(s) Oral daily  apixaban 5 milliGRAM(s) Oral two times a day  atorvastatin 80 milliGRAM(s) Oral at bedtime  bisacodyl 5 milliGRAM(s) Oral at bedtime  dextrose 5%. 1000 milliLiter(s) (50 mL/Hr) IV Continuous <Continuous>  dextrose 5%. 1000 milliLiter(s) (100 mL/Hr) IV Continuous <Continuous>  dextrose 50% Injectable 25 Gram(s) IV Push once  dextrose 50% Injectable 12.5 Gram(s) IV Push once  dextrose 50% Injectable 25 Gram(s) IV Push once  glucagon  Injectable 1 milliGRAM(s) IntraMuscular once  influenza   Vaccine 0.5 milliLiter(s) IntraMuscular once  insulin lispro (ADMELOG) corrective regimen sliding scale   SubCutaneous at bedtime  insulin lispro (ADMELOG) corrective regimen sliding scale   SubCutaneous three times a day before meals  losartan 25 milliGRAM(s) Oral daily  metoprolol tartrate 50 milliGRAM(s) Oral three times a day  pantoprazole    Tablet 40 milliGRAM(s) Oral daily  predniSONE   Tablet 50 milliGRAM(s) Oral daily  senna 2 Tablet(s) Oral at bedtime  trimethoprim  160 mG/sulfamethoxazole 800 mG 1 Tablet(s) Oral <User Schedule>    MEDICATIONS  (PRN):  dextrose Oral Gel 15 Gram(s) Oral once PRN Blood Glucose LESS THAN 70 milliGRAM(s)/deciliter    ___________________________________________________________________________    PHYSICAL EXAM:  Gen - NAD, Comfortable  HEENT - NCAT, EOMI, MMM  Pulm - breathing comfortably on room air  Cardiovascular - warm and well perfused  Abdomen - Soft, NT/ND  Extremities - No C/C/E, No calf tenderness  Neuro-     Cognitive - oriented to name, [facility] [place] for place, [facility] February 2023 for time     Communication - Fluent, No dysarthria     Attention: able to state days of the week backwards     Cranial Nerves - no facial asymmetry, EOMI     Motor -                     LEFT    UE - ShAB 5/5, EF 5/5, EE 5/5, WE 5/5,  5/5                    RIGHT UE - ShAB 5/5, EF 5/5, EE 5/5, WE 5/5,  5/5                    LEFT    LE - HF 4/5, KE 5/5, DF 5/5, PF 5/5                    RIGHT LE - HF 4/5, KE 5/5, DF 5/5, PF 5/5        Sensory - Intact to LT     Reflexes - DTR Intact, No primitive reflexive     Coordination - FTN intact     Tone - normal  Psychiatric - Mood stable, Affect WNL  Skin:  all skin intact    ___________________________________________________________________________ HPI:  Mrs. Екатерина Poole is a 58 year old female patient with past medical history of HTN and rheumatoid arthritis who presented to [place] on 11/15 for AMS when a neighbor overhead patient was looking for her mom and she was found confused/wandering around apartment complex. Imaging studies initially performed reported bilateral subacute to remote embolic strokes, a right posterior corona radiata acute infarct, and multiple petechial hemorrhages in bilateral thalamus and basal ganglia. She was additionally noted to have KRUNAL which has since resolved. Hypertension managed with amlodipine, aldactone, and losartan and she was started on B12 supplements. Patient seen by Rheumatology and was transferred to [facility] on 11/20 for cerebral angiogram.    A cerebral angiogram performed on 11/20 reported multiple areas of focal stenosis and dilatation concerning for vasculitis. An LP was completed on 11/21 with a normal profile. Rheumatology consulted and recommended possible brain biopsy to confirm diagnosis of vasculitis but the risks of performing an open biopsy outweighed the benefits as the results of the biopsy were deemed unlikely to . She was seen by Neurosurgery with patient's neuroradiologic findings including beading on cerebral angiogram (consistent with vasculitis) and clinical presentation, with no other etiology of vasculitis, point to PACNS. Patient was started on steroids given her micro hemorrhage and strokes likely attributed to vasculitis. Steroids started on 11/24 with initial dose Solumedrol 1g for 3 days. She was to continue Prednisone 60mg with a decrease to 50mg daily for discharge to acute in-patient rehabilitation. No taper indicated per rheumatology evaluation and recommendations. An EEG was additionally performed and reported moderate diffuse/multi-focal cerebral dysfunction, not specific as to etiology. There were no epileptiform abnormalities recorded. Will need a repeat cerebral angiogram in 1 month which should take place around 12/20/23. Patient started on Eliquis 5 mg BID, amiodarone, and metoprolol for Afib. TTE reported as normal with global left ventricular systolic function, mild mitral valve regurgitation, mild aortic regurgitation, and a sclerotic aortic valve with normal opening. Hospitalization additionally significant for a rapid response on 11/20 due to acute mental status changes after being found to be slumped over while on toilet by staff prompting MRI imaging reporting:  ·	Left parietal small cortical acute infarction  ·	Innumerable scattered chronic microhemorrhages in the brainstem, bilateral cerebellar hemispheres, deep gray nuclei and peripherally within the cerebral hemispheres which may be secondary to chronic hypertensive encephalopathy.  ·	Severe extensive chronic microvascular ischemic changes and multifocal chronic lacunar infarcts as detailed above.  ·	Patient evaluated by PT/OT and was recommended for acute inpatient rehab. Patient is medically stable for discharge to [facility] on 11/30. (30 Nov 2023 14:36)  ___________________________________________________________________________    SUBJECTIVE/ROS  Patient was seen and evaluated at bedside today.  Reported no overnight events and is in no acute distress.  Patient more purposeful and cooperative today.  Case to be evaluated at Interdisciplinary Team meeting tomorrow.  Patient is motivated to continue participation on the recommended rehabilitation program.   Denies any CP, SOB, LAURA, palpitations, fever, chills, body aches, cough, congestion, or any other symptoms at this time.   ___________________________________________________________________________    CT Head No Cont (12.01.23 @ 10:40)   Moderate to severe chronic microvascular changeswithout evidence of an acute transcortical infarction or hemorrhage.    CT Angio Brain Stroke Protocol  w/ IV Cont (12.01.23 @ 13:03)   Negative CT perfusion. CTA demonstrates persistent narrowing of the anterior and middle cerebral arteries consistent with vasospasm or vasculitis.  ___________________________________________________________________________    VITALS  T(C): 37.2 (12-04-23 @ 09:01), Max: 37.2 (12-04-23 @ 09:01)  HR: 70 (12-04-23 @ 09:01) (64 - 70)  BP: 142/72 (12-04-23 @ 09:01) (137/78 - 145/80)  RR: 16 (12-04-23 @ 09:01) (15 - 16)  SpO2: 95% (12-04-23 @ 09:01) (95% - 97%)  ___________________________________________________________________________    LABS                          9.6    16.54 )-----------( 383      ( 04 Dec 2023 05:51 )             27.0       12-04    138  |  100  |  26<H>  ----------------------------<  89  3.5   |  30  |  1.11    Ca    8.4      04 Dec 2023 05:51    TPro  6.3  /  Alb  2.5<L>  /  TBili  0.3  /  DBili  x   /  AST  11  /  ALT  23  /  AlkPhos  71  12-04      CAPILLARY BLOOD GLUCOSE  POCT Blood Glucose.: 135 mg/dL (04 Dec 2023 11:40)  POCT Blood Glucose.: 112 mg/dL (04 Dec 2023 08:31)  POCT Blood Glucose.: 120 mg/dL (03 Dec 2023 21:06)  POCT Blood Glucose.: 115 mg/dL (03 Dec 2023 18:06)  ___________________________________________________________________________    MEDICATIONS  (STANDING):  aMIOdarone    Tablet 200 milliGRAM(s) Oral daily  apixaban 5 milliGRAM(s) Oral two times a day  atorvastatin 80 milliGRAM(s) Oral at bedtime  bisacodyl 5 milliGRAM(s) Oral at bedtime  dextrose 5%. 1000 milliLiter(s) (50 mL/Hr) IV Continuous <Continuous>  dextrose 5%. 1000 milliLiter(s) (100 mL/Hr) IV Continuous <Continuous>  dextrose 50% Injectable 25 Gram(s) IV Push once  dextrose 50% Injectable 12.5 Gram(s) IV Push once  dextrose 50% Injectable 25 Gram(s) IV Push once  glucagon  Injectable 1 milliGRAM(s) IntraMuscular once  influenza   Vaccine 0.5 milliLiter(s) IntraMuscular once  insulin lispro (ADMELOG) corrective regimen sliding scale   SubCutaneous at bedtime  insulin lispro (ADMELOG) corrective regimen sliding scale   SubCutaneous three times a day before meals  losartan 25 milliGRAM(s) Oral daily  metoprolol tartrate 50 milliGRAM(s) Oral three times a day  pantoprazole    Tablet 40 milliGRAM(s) Oral daily  predniSONE   Tablet 50 milliGRAM(s) Oral daily  senna 2 Tablet(s) Oral at bedtime  trimethoprim  160 mG/sulfamethoxazole 800 mG 1 Tablet(s) Oral <User Schedule>    MEDICATIONS  (PRN):  dextrose Oral Gel 15 Gram(s) Oral once PRN Blood Glucose LESS THAN 70 milliGRAM(s)/deciliter    ___________________________________________________________________________    PHYSICAL EXAM:  Gen - NAD, Comfortable  HEENT - NCAT, EOMI, MMM  Pulm - breathing comfortably on room air  Cardiovascular - warm and well perfused  Abdomen - Soft, NT/ND  Extremities - No C/C/E, No calf tenderness  Neuro-     Cognitive - oriented to name, states rehab in [place] for place, states February 2023 for time     Communication - Fluent, No dysarthria     Attention: able to state days of the week backwards     Cranial Nerves - no facial asymmetry, EOMI     Motor -                     LEFT    UE - ShAB 5/5, EF 5/5, EE 5/5, WE 5/5,  5/5                    RIGHT UE - ShAB 5/5, EF 5/5, EE 5/5, WE 5/5,  5/5                    LEFT    LE - HF 4/5, KE 5/5, DF 5/5, PF 5/5                    RIGHT LE - HF 4/5, KE 5/5, DF 5/5, PF 5/5        Sensory - Intact to LT     Reflexes - DTR Intact, No primitive reflexive     Coordination - FTN intact     Tone - normal  Psychiatric - Mood stable, Affect WNL  Skin:  all skin intact    ___________________________________________________________________________ HPI:  Mrs. Екатерина Poole is a 58 year old female patient with past medical history of HTN and rheumatoid arthritis who presented to [place] on 11/15 for AMS when a neighbor overhead patient was looking for her mom and she was found confused/wandering around apartment complex. Imaging studies initially performed reported bilateral subacute to remote embolic strokes, a right posterior corona radiata acute infarct, and multiple petechial hemorrhages in bilateral thalamus and basal ganglia. She was additionally noted to have KRUNAL which has since resolved. Hypertension managed with amlodipine, aldactone, and losartan and she was started on B12 supplements. Patient seen by Rheumatology and was transferred to [facility] on 11/20 for cerebral angiogram.    A cerebral angiogram performed on 11/20 reported multiple areas of focal stenosis and dilatation concerning for vasculitis. An LP was completed on 11/21 with a normal profile. Rheumatology consulted and recommended possible brain biopsy to confirm diagnosis of vasculitis but the risks of performing an open biopsy outweighed the benefits as the results of the biopsy were deemed unlikely to . She was seen by Neurosurgery with patient's neuroradiologic findings including beading on cerebral angiogram (consistent with vasculitis) and clinical presentation, with no other etiology of vasculitis, point to PACNS. Patient was started on steroids given her micro hemorrhage and strokes likely attributed to vasculitis. Steroids started on 11/24 with initial dose Solumedrol 1g for 3 days. She was to continue Prednisone 60mg with a decrease to 50mg daily for discharge to acute in-patient rehabilitation. No taper indicated per rheumatology evaluation and recommendations. An EEG was additionally performed and reported moderate diffuse/multi-focal cerebral dysfunction, not specific as to etiology. There were no epileptiform abnormalities recorded. Will need a repeat cerebral angiogram in 1 month which should take place around 12/20/23. Patient started on Eliquis 5 mg BID, amiodarone, and metoprolol for Afib. TTE reported as normal with global left ventricular systolic function, mild mitral valve regurgitation, mild aortic regurgitation, and a sclerotic aortic valve with normal opening. Hospitalization additionally significant for a rapid response on 11/20 due to acute mental status changes after being found to be slumped over while on toilet by staff prompting MRI imaging reporting:  ·	Left parietal small cortical acute infarction  ·	Innumerable scattered chronic microhemorrhages in the brainstem, bilateral cerebellar hemispheres, deep gray nuclei and peripherally within the cerebral hemispheres which may be secondary to chronic hypertensive encephalopathy.  ·	Severe extensive chronic microvascular ischemic changes and multifocal chronic lacunar infarcts as detailed above.  ·	Patient evaluated by PT/OT and was recommended for acute inpatient rehab. Patient is medically stable for discharge to [facility] on 11/30. (30 Nov 2023 14:36)  ___________________________________________________________________________    SUBJECTIVE/ROS  Patient was seen and evaluated at bedside today.  Reported no overnight events and is in no acute distress.  Patient more purposeful and cooperative today.  Case to be evaluated at Interdisciplinary Team meeting tomorrow.  Patient is motivated to continue participation on the recommended rehabilitation program.   Denies any CP, SOB, LAURA, palpitations, fever, chills, body aches, cough, congestion, or any other symptoms at this time.   ___________________________________________________________________________    CT Head No Cont (12.01.23 @ 10:40)   Moderate to severe chronic microvascular changeswithout evidence of an acute transcortical infarction or hemorrhage.    CT Angio Brain Stroke Protocol  w/ IV Cont (12.01.23 @ 13:03)   Negative CT perfusion. CTA demonstrates persistent narrowing of the anterior and middle cerebral arteries consistent with vasospasm or vasculitis.  ___________________________________________________________________________    VITALS  T(C): 37.2 (12-04-23 @ 09:01), Max: 37.2 (12-04-23 @ 09:01)  HR: 70 (12-04-23 @ 09:01) (64 - 70)  BP: 142/72 (12-04-23 @ 09:01) (137/78 - 145/80)  RR: 16 (12-04-23 @ 09:01) (15 - 16)  SpO2: 95% (12-04-23 @ 09:01) (95% - 97%)  ___________________________________________________________________________    LABS                          9.6    16.54 )-----------( 383      ( 04 Dec 2023 05:51 )             27.0       12-04    138  |  100  |  26<H>  ----------------------------<  89  3.5   |  30  |  1.11    Ca    8.4      04 Dec 2023 05:51    TPro  6.3  /  Alb  2.5<L>  /  TBili  0.3  /  DBili  x   /  AST  11  /  ALT  23  /  AlkPhos  71  12-04      CAPILLARY BLOOD GLUCOSE  POCT Blood Glucose.: 135 mg/dL (04 Dec 2023 11:40)  POCT Blood Glucose.: 112 mg/dL (04 Dec 2023 08:31)  POCT Blood Glucose.: 120 mg/dL (03 Dec 2023 21:06)  POCT Blood Glucose.: 115 mg/dL (03 Dec 2023 18:06)  ___________________________________________________________________________    MEDICATIONS  (STANDING):  aMIOdarone    Tablet 200 milliGRAM(s) Oral daily  apixaban 5 milliGRAM(s) Oral two times a day  atorvastatin 80 milliGRAM(s) Oral at bedtime  bisacodyl 5 milliGRAM(s) Oral at bedtime  dextrose 5%. 1000 milliLiter(s) (50 mL/Hr) IV Continuous <Continuous>  dextrose 5%. 1000 milliLiter(s) (100 mL/Hr) IV Continuous <Continuous>  dextrose 50% Injectable 25 Gram(s) IV Push once  dextrose 50% Injectable 12.5 Gram(s) IV Push once  dextrose 50% Injectable 25 Gram(s) IV Push once  glucagon  Injectable 1 milliGRAM(s) IntraMuscular once  influenza   Vaccine 0.5 milliLiter(s) IntraMuscular once  insulin lispro (ADMELOG) corrective regimen sliding scale   SubCutaneous at bedtime  insulin lispro (ADMELOG) corrective regimen sliding scale   SubCutaneous three times a day before meals  losartan 25 milliGRAM(s) Oral daily  metoprolol tartrate 50 milliGRAM(s) Oral three times a day  pantoprazole    Tablet 40 milliGRAM(s) Oral daily  predniSONE   Tablet 50 milliGRAM(s) Oral daily  senna 2 Tablet(s) Oral at bedtime  trimethoprim  160 mG/sulfamethoxazole 800 mG 1 Tablet(s) Oral <User Schedule>    MEDICATIONS  (PRN):  dextrose Oral Gel 15 Gram(s) Oral once PRN Blood Glucose LESS THAN 70 milliGRAM(s)/deciliter    ___________________________________________________________________________    PHYSICAL EXAM:  Gen - NAD, Comfortable  HEENT - NCAT, EOMI, MMM  Pulm - breathing comfortably on room air  Cardiovascular - warm and well perfused  Abdomen - Soft, NT/ND  Extremities - No C/C/E, No calf tenderness  Neuro-     Cognitive - oriented to name, states rehab in [place] for place, states February 2023 for time     Communication - Fluent, No dysarthria     Attention: able to state days of the week backwards     Cranial Nerves - no facial asymmetry, EOMI     Motor -                     LEFT    UE - ShAB 5/5, EF 5/5, EE 5/5, WE 5/5,  5/5                    RIGHT UE - ShAB 5/5, EF 5/5, EE 5/5, WE 5/5,  5/5                    LEFT    LE - HF 4/5, KE 5/5, DF 5/5, PF 5/5                    RIGHT LE - HF 4/5, KE 5/5, DF 5/5, PF 5/5        Sensory - Intact to LT     Reflexes - DTR Intact, No primitive reflexive     Coordination - FTN intact     Tone - normal  Psychiatric - Mood stable, Affect WNL  Skin:  all skin intact    ___________________________________________________________________________

## 2023-12-04 NOTE — EEG REPORT - NS EEG TEXT BOX
NADINE CAMPOVERDE [facility]-[id number]     Study Date: 12-04-23  Duration:   --------------------------------------------------------------------------------------------------  History:  CC/ HPI Patient is a 58y old  Female who presents with a chief complaint of CVA (04 Dec 2023 14:02)    MEDICATIONS  (STANDING):  aMIOdarone    Tablet 200 milliGRAM(s) Oral daily  apixaban 5 milliGRAM(s) Oral two times a day  atorvastatin 80 milliGRAM(s) Oral at bedtime  bisacodyl 5 milliGRAM(s) Oral at bedtime  dextrose 5%. 1000 milliLiter(s) (50 mL/Hr) IV Continuous <Continuous>  dextrose 5%. 1000 milliLiter(s) (100 mL/Hr) IV Continuous <Continuous>  dextrose 50% Injectable 25 Gram(s) IV Push once  dextrose 50% Injectable 12.5 Gram(s) IV Push once  dextrose 50% Injectable 25 Gram(s) IV Push once  glucagon  Injectable 1 milliGRAM(s) IntraMuscular once  influenza   Vaccine 0.5 milliLiter(s) IntraMuscular once  insulin lispro (ADMELOG) corrective regimen sliding scale   SubCutaneous three times a day before meals  insulin lispro (ADMELOG) corrective regimen sliding scale   SubCutaneous at bedtime  losartan 25 milliGRAM(s) Oral daily  metoprolol tartrate 50 milliGRAM(s) Oral three times a day  pantoprazole    Tablet 40 milliGRAM(s) Oral daily  predniSONE   Tablet 50 milliGRAM(s) Oral daily  senna 2 Tablet(s) Oral at bedtime  trimethoprim  160 mG/sulfamethoxazole 800 mG 1 Tablet(s) Oral <User Schedule>    --------------------------------------------------------------------------------------------------  Study Interpretation:    [[[Abbreviation Key:  PDR=alpha rhythm/posterior dominant rhythm. A-P=anterior posterior.  Amplitude: ‘very low’:<20; ‘low’:20-49; ‘medium’:; ‘high’:>150uV.  Persistence for periodic/rhythmic patterns (% of epoch) ‘rare’:<1%; ‘occasional’:1-10%; ‘frequent’:10-50%; ‘abundant’:50-90%; ‘continuous’:>90%.  Persistence for sporadic discharges: ‘rare’:<1/hr; ‘occasional’:1/min-1/hr; ‘frequent’:>1/min; ‘abundant’:>1/10 sec.  RPP=rhythmic and periodic patterns; GRDA=generalized rhythmic delta activity; FIRDA=frontal intermittent GRDA; LRDA=lateralized rhythmic delta activity; TIRDA=temporal intermittent rhythmic delta activity;  LPD=PLED=lateralized periodic discharges; GPD=generalized periodic discharges; BIPDs =bilateral independent periodic discharges; Mf=multifocal; SIRPDs=stimulus induced rhythmic, periodic, or ictal appearing discharges; BIRDs=brief potentially ictal rhythmic discharges >4 Hz, lasting .5-10s; PFA (paroxysmal bursts >13 Hz or =8 Hz <10s).  Modifiers: +F=with fast component; +S=with spike component; +R=with rhythmic component.  S-B=burst suppression pattern.  Max=maximal. N1-drowsy; N2-stage II sleep; N3-slow wave sleep. SSS/BETS=small sharp spikes/benign epileptiform transients of sleep. HV=hyperventilation; PS=photic stimulation]]]    Daily EEG Visual Analysis    FINDINGS:      Background:  Unclear if full wakefulness is captured.  Continuity: Continuous  Symmetry: Symmetric  Posterior dominant rhythm (PDR): 8 Hz, reactive to eye closure. Symmetric low-amplitude frontal beta in wakefulness.  State Change: Present  Voltage: Normal  Anterior-Posterior Gradient: Present  Other background findings: None  Breach: Absent    Background Slowing:  Generalized slowing: None  Focal slowing: Occasional left > right temporal focal polymorphic delta slowing    State Changes:   Drowsiness is characterized by slowing of the background activity.  Stage 2 sleep is characterized by symmetric K complexes and sleep spindles.     Interictal Findings:  None    Electrographic and Electroclinical seizures:  None    Other Clinical Events:  None    Activation Procedures:   Hyperventilation is not performed.    Photic stimulation is performed and does not elicit any abnormalities.      Artifacts:  Intermittent myogenic and movement artifacts are present.    EKG:  Single-lead EKG shows regular rhythm.    EEG Classification / Summary:  Abnormal routine EEG in the drowsy and asleep states with intermittent arousals. Unclear if full wakefulness is captured.  Occasional left > right temporal focal slowing.  Mild diffuse slowing vs. excess drowsiness.  No epileptiform abnormalities are captured.     Clinical Impression:  Left > right temporal focal cerebral dysfunction can be structural or functional in etiology.  Mild diffuse cerebral dysfunction is nonspecific in etiology. Alternatively, excess drowsiness may be contributing.          -------------------------------------------------------------------------------------------------------  [facility] EEG Reading Room Ph#: [phone]  Epilepsy Answering Service after 5PM and before 8:30AM: Ph#: [phone]    Brittany Prieto MD  Attending Physician, [facility]   NADINE CAMPOVERDE [facility]-[id number]     Study Date: 12-04-23  Duration:   --------------------------------------------------------------------------------------------------  History:  CC/ HPI Patient is a 58y old  Female who presents with a chief complaint of CVA (04 Dec 2023 14:02)    MEDICATIONS  (STANDING):  aMIOdarone    Tablet 200 milliGRAM(s) Oral daily  apixaban 5 milliGRAM(s) Oral two times a day  atorvastatin 80 milliGRAM(s) Oral at bedtime  bisacodyl 5 milliGRAM(s) Oral at bedtime  dextrose 5%. 1000 milliLiter(s) (50 mL/Hr) IV Continuous <Continuous>  dextrose 5%. 1000 milliLiter(s) (100 mL/Hr) IV Continuous <Continuous>  dextrose 50% Injectable 25 Gram(s) IV Push once  dextrose 50% Injectable 12.5 Gram(s) IV Push once  dextrose 50% Injectable 25 Gram(s) IV Push once  glucagon  Injectable 1 milliGRAM(s) IntraMuscular once  influenza   Vaccine 0.5 milliLiter(s) IntraMuscular once  insulin lispro (ADMELOG) corrective regimen sliding scale   SubCutaneous three times a day before meals  insulin lispro (ADMELOG) corrective regimen sliding scale   SubCutaneous at bedtime  losartan 25 milliGRAM(s) Oral daily  metoprolol tartrate 50 milliGRAM(s) Oral three times a day  pantoprazole    Tablet 40 milliGRAM(s) Oral daily  predniSONE   Tablet 50 milliGRAM(s) Oral daily  senna 2 Tablet(s) Oral at bedtime  trimethoprim  160 mG/sulfamethoxazole 800 mG 1 Tablet(s) Oral <User Schedule>    --------------------------------------------------------------------------------------------------  Study Interpretation:    [[[Abbreviation Key:  PDR=alpha rhythm/posterior dominant rhythm. A-P=anterior posterior.  Amplitude: ‘very low’:<20; ‘low’:20-49; ‘medium’:; ‘high’:>150uV.  Persistence for periodic/rhythmic patterns (% of epoch) ‘rare’:<1%; ‘occasional’:1-10%; ‘frequent’:10-50%; ‘abundant’:50-90%; ‘continuous’:>90%.  Persistence for sporadic discharges: ‘rare’:<1/hr; ‘occasional’:1/min-1/hr; ‘frequent’:>1/min; ‘abundant’:>1/10 sec.  RPP=rhythmic and periodic patterns; GRDA=generalized rhythmic delta activity; FIRDA=frontal intermittent GRDA; LRDA=lateralized rhythmic delta activity; TIRDA=temporal intermittent rhythmic delta activity;  LPD=PLED=lateralized periodic discharges; GPD=generalized periodic discharges; BIPDs =bilateral independent periodic discharges; Mf=multifocal; SIRPDs=stimulus induced rhythmic, periodic, or ictal appearing discharges; BIRDs=brief potentially ictal rhythmic discharges >4 Hz, lasting .5-10s; PFA (paroxysmal bursts >13 Hz or =8 Hz <10s).  Modifiers: +F=with fast component; +S=with spike component; +R=with rhythmic component.  S-B=burst suppression pattern.  Max=maximal. N1-drowsy; N2-stage II sleep; N3-slow wave sleep. SSS/BETS=small sharp spikes/benign epileptiform transients of sleep. HV=hyperventilation; PS=photic stimulation]]]    Daily EEG Visual Analysis    FINDINGS:      Background:  Unclear if full wakefulness is captured.  Continuity: Continuous  Symmetry: Symmetric  Posterior dominant rhythm (PDR): 8 Hz, reactive to eye closure. Symmetric low-amplitude frontal beta in wakefulness.  State Change: Present  Voltage: Normal  Anterior-Posterior Gradient: Present  Other background findings: None  Breach: Absent    Background Slowing:  Generalized slowing: None  Focal slowing: Occasional left > right temporal focal polymorphic delta slowing    State Changes:   Drowsiness is characterized by slowing of the background activity.  Stage 2 sleep is characterized by symmetric K complexes and sleep spindles.     Interictal Findings:  None    Electrographic and Electroclinical seizures:  None    Other Clinical Events:  None    Activation Procedures:   Hyperventilation is not performed.    Photic stimulation is performed and does not elicit any abnormalities.      Artifacts:  Intermittent myogenic and movement artifacts are present.    EKG:  Single-lead EKG shows regular rhythm.    EEG Classification / Summary:  Abnormal routine EEG in the drowsy and asleep states with intermittent arousals. Unclear if full wakefulness is captured.  Occasional left > right temporal focal slowing.  Mild diffuse slowing vs. excess drowsiness.  No epileptiform abnormalities are captured.     Clinical Impression:  Left > right temporal focal cerebral dysfunction can be structural or functional in etiology.  Mild diffuse cerebral dysfunction is nonspecific in etiology. Alternatively, excess drowsiness may be contributing.          -------------------------------------------------------------------------------------------------------  [facility] EEG Reading Room Ph#: [phone]  Epilepsy Answering Service after 5PM and before 8:30AM: Ph#: [phone]    Brittany Prieto MD  Attending Physician, [facility]   NADINE CAMPOVERDE [facility]-[id number]     Study Date: 12-04-23  Duration: 24 min  --------------------------------------------------------------------------------------------------  History:  CC/ HPI Patient is a 58y old  Female who presents with a chief complaint of CVA (04 Dec 2023 14:02)    MEDICATIONS  (STANDING):  aMIOdarone    Tablet 200 milliGRAM(s) Oral daily  apixaban 5 milliGRAM(s) Oral two times a day  atorvastatin 80 milliGRAM(s) Oral at bedtime  bisacodyl 5 milliGRAM(s) Oral at bedtime  dextrose 5%. 1000 milliLiter(s) (50 mL/Hr) IV Continuous <Continuous>  dextrose 5%. 1000 milliLiter(s) (100 mL/Hr) IV Continuous <Continuous>  dextrose 50% Injectable 25 Gram(s) IV Push once  dextrose 50% Injectable 12.5 Gram(s) IV Push once  dextrose 50% Injectable 25 Gram(s) IV Push once  glucagon  Injectable 1 milliGRAM(s) IntraMuscular once  influenza   Vaccine 0.5 milliLiter(s) IntraMuscular once  insulin lispro (ADMELOG) corrective regimen sliding scale   SubCutaneous three times a day before meals  insulin lispro (ADMELOG) corrective regimen sliding scale   SubCutaneous at bedtime  losartan 25 milliGRAM(s) Oral daily  metoprolol tartrate 50 milliGRAM(s) Oral three times a day  pantoprazole    Tablet 40 milliGRAM(s) Oral daily  predniSONE   Tablet 50 milliGRAM(s) Oral daily  senna 2 Tablet(s) Oral at bedtime  trimethoprim  160 mG/sulfamethoxazole 800 mG 1 Tablet(s) Oral <User Schedule>    --------------------------------------------------------------------------------------------------  Study Interpretation:    [[[Abbreviation Key:  PDR=alpha rhythm/posterior dominant rhythm. A-P=anterior posterior.  Amplitude: ‘very low’:<20; ‘low’:20-49; ‘medium’:; ‘high’:>150uV.  Persistence for periodic/rhythmic patterns (% of epoch) ‘rare’:<1%; ‘occasional’:1-10%; ‘frequent’:10-50%; ‘abundant’:50-90%; ‘continuous’:>90%.  Persistence for sporadic discharges: ‘rare’:<1/hr; ‘occasional’:1/min-1/hr; ‘frequent’:>1/min; ‘abundant’:>1/10 sec.  RPP=rhythmic and periodic patterns; GRDA=generalized rhythmic delta activity; FIRDA=frontal intermittent GRDA; LRDA=lateralized rhythmic delta activity; TIRDA=temporal intermittent rhythmic delta activity;  LPD=PLED=lateralized periodic discharges; GPD=generalized periodic discharges; BIPDs =bilateral independent periodic discharges; Mf=multifocal; SIRPDs=stimulus induced rhythmic, periodic, or ictal appearing discharges; BIRDs=brief potentially ictal rhythmic discharges >4 Hz, lasting .5-10s; PFA (paroxysmal bursts >13 Hz or =8 Hz <10s).  Modifiers: +F=with fast component; +S=with spike component; +R=with rhythmic component.  S-B=burst suppression pattern.  Max=maximal. N1-drowsy; N2-stage II sleep; N3-slow wave sleep. SSS/BETS=small sharp spikes/benign epileptiform transients of sleep. HV=hyperventilation; PS=photic stimulation]]]    Daily EEG Visual Analysis    FINDINGS:      Background:  Unclear if full wakefulness is captured.  Continuity: Continuous  Symmetry: Symmetric  Posterior dominant rhythm (PDR): 8 Hz, reactive to eye closure. Symmetric low-amplitude frontal beta in wakefulness.  State Change: Present  Voltage: Normal  Anterior-Posterior Gradient: Present  Other background findings: None  Breach: Absent    Background Slowing:  Generalized slowing: None  Focal slowing: Occasional left > right temporal focal polymorphic delta slowing    State Changes:   Drowsiness is characterized by slowing of the background activity.  Stage 2 sleep is characterized by symmetric K complexes and sleep spindles.     Interictal Findings:  None    Electrographic and Electroclinical seizures:  None    Other Clinical Events:  None    Activation Procedures:   Hyperventilation is not performed.    Photic stimulation is performed and does not elicit any abnormalities.      Artifacts:  Intermittent myogenic and movement artifacts are present.    EKG:  Single-lead EKG shows regular rhythm.    EEG Classification / Summary:  Abnormal routine EEG in the drowsy and asleep states with intermittent arousals. Unclear if full wakefulness is captured.  Occasional left > right temporal focal slowing.  Mild diffuse slowing vs. excess drowsiness.  No epileptiform abnormalities are captured.     Clinical Impression:  Left > right temporal focal cerebral dysfunction can be structural or functional in etiology.  Mild diffuse cerebral dysfunction is nonspecific in etiology. Alternatively, excess drowsiness may be contributing.          -------------------------------------------------------------------------------------------------------  [facility] EEG Reading Room Ph#: [phone]  Epilepsy Answering Service after 5PM and before 8:30AM: Ph#: [phone]    Brittany Prieto MD  Attending Physician, [facility]   NADINE CAMPOVERDE [facility]-[id number]     Study Date: 12-04-23  Duration: 24 min  --------------------------------------------------------------------------------------------------  History:  CC/ HPI Patient is a 58y old  Female who presents with a chief complaint of CVA (04 Dec 2023 14:02)    MEDICATIONS  (STANDING):  aMIOdarone    Tablet 200 milliGRAM(s) Oral daily  apixaban 5 milliGRAM(s) Oral two times a day  atorvastatin 80 milliGRAM(s) Oral at bedtime  bisacodyl 5 milliGRAM(s) Oral at bedtime  dextrose 5%. 1000 milliLiter(s) (50 mL/Hr) IV Continuous <Continuous>  dextrose 5%. 1000 milliLiter(s) (100 mL/Hr) IV Continuous <Continuous>  dextrose 50% Injectable 25 Gram(s) IV Push once  dextrose 50% Injectable 12.5 Gram(s) IV Push once  dextrose 50% Injectable 25 Gram(s) IV Push once  glucagon  Injectable 1 milliGRAM(s) IntraMuscular once  influenza   Vaccine 0.5 milliLiter(s) IntraMuscular once  insulin lispro (ADMELOG) corrective regimen sliding scale   SubCutaneous three times a day before meals  insulin lispro (ADMELOG) corrective regimen sliding scale   SubCutaneous at bedtime  losartan 25 milliGRAM(s) Oral daily  metoprolol tartrate 50 milliGRAM(s) Oral three times a day  pantoprazole    Tablet 40 milliGRAM(s) Oral daily  predniSONE   Tablet 50 milliGRAM(s) Oral daily  senna 2 Tablet(s) Oral at bedtime  trimethoprim  160 mG/sulfamethoxazole 800 mG 1 Tablet(s) Oral <User Schedule>    --------------------------------------------------------------------------------------------------  Study Interpretation:    [[[Abbreviation Key:  PDR=alpha rhythm/posterior dominant rhythm. A-P=anterior posterior.  Amplitude: ‘very low’:<20; ‘low’:20-49; ‘medium’:; ‘high’:>150uV.  Persistence for periodic/rhythmic patterns (% of epoch) ‘rare’:<1%; ‘occasional’:1-10%; ‘frequent’:10-50%; ‘abundant’:50-90%; ‘continuous’:>90%.  Persistence for sporadic discharges: ‘rare’:<1/hr; ‘occasional’:1/min-1/hr; ‘frequent’:>1/min; ‘abundant’:>1/10 sec.  RPP=rhythmic and periodic patterns; GRDA=generalized rhythmic delta activity; FIRDA=frontal intermittent GRDA; LRDA=lateralized rhythmic delta activity; TIRDA=temporal intermittent rhythmic delta activity;  LPD=PLED=lateralized periodic discharges; GPD=generalized periodic discharges; BIPDs =bilateral independent periodic discharges; Mf=multifocal; SIRPDs=stimulus induced rhythmic, periodic, or ictal appearing discharges; BIRDs=brief potentially ictal rhythmic discharges >4 Hz, lasting .5-10s; PFA (paroxysmal bursts >13 Hz or =8 Hz <10s).  Modifiers: +F=with fast component; +S=with spike component; +R=with rhythmic component.  S-B=burst suppression pattern.  Max=maximal. N1-drowsy; N2-stage II sleep; N3-slow wave sleep. SSS/BETS=small sharp spikes/benign epileptiform transients of sleep. HV=hyperventilation; PS=photic stimulation]]]    Daily EEG Visual Analysis    FINDINGS:      Background:  Unclear if full wakefulness is captured.  Continuity: Continuous  Symmetry: Symmetric  Posterior dominant rhythm (PDR): 8 Hz, reactive to eye closure. Symmetric low-amplitude frontal beta in wakefulness.  State Change: Present  Voltage: Normal  Anterior-Posterior Gradient: Present  Other background findings: None  Breach: Absent    Background Slowing:  Generalized slowing: None  Focal slowing: Occasional left > right temporal focal polymorphic delta slowing    State Changes:   Drowsiness is characterized by slowing of the background activity.  Stage 2 sleep is characterized by symmetric K complexes and sleep spindles.     Interictal Findings:  None    Electrographic and Electroclinical seizures:  None    Other Clinical Events:  None    Activation Procedures:   Hyperventilation is not performed.    Photic stimulation is performed and does not elicit any abnormalities.      Artifacts:  Intermittent myogenic and movement artifacts are present.    EKG:  Single-lead EKG shows regular rhythm.    EEG Classification / Summary:  Abnormal routine EEG in the drowsy and asleep states with intermittent arousals. Unclear if full wakefulness is captured.  Occasional left > right temporal focal slowing.  Mild diffuse slowing vs. excess drowsiness.  No epileptiform abnormalities are captured.     Clinical Impression:  Left > right temporal focal cerebral dysfunction can be structural or functional in etiology.  Mild diffuse cerebral dysfunction is nonspecific in etiology. Alternatively, excess drowsiness may be contributing.          -------------------------------------------------------------------------------------------------------  [facility] EEG Reading Room Ph#: [phone]  Epilepsy Answering Service after 5PM and before 8:30AM: Ph#: [phone]    Brittany Prieto MD  Attending Physician, [facility]

## 2023-12-05 LAB
GLUCOSE BLDC GLUCOMTR-MCNC: 123 MG/DL — HIGH (ref 70–99)
GLUCOSE BLDC GLUCOMTR-MCNC: 123 MG/DL — HIGH (ref 70–99)
GLUCOSE BLDC GLUCOMTR-MCNC: 129 MG/DL — HIGH (ref 70–99)
GLUCOSE BLDC GLUCOMTR-MCNC: 129 MG/DL — HIGH (ref 70–99)
GLUCOSE BLDC GLUCOMTR-MCNC: 174 MG/DL — HIGH (ref 70–99)
GLUCOSE BLDC GLUCOMTR-MCNC: 174 MG/DL — HIGH (ref 70–99)
GLUCOSE BLDC GLUCOMTR-MCNC: 99 MG/DL — SIGNIFICANT CHANGE UP (ref 70–99)
GLUCOSE BLDC GLUCOMTR-MCNC: 99 MG/DL — SIGNIFICANT CHANGE UP (ref 70–99)

## 2023-12-05 PROCEDURE — 99232 SBSQ HOSP IP/OBS MODERATE 35: CPT

## 2023-12-05 RX ADMIN — Medication 50 MILLIGRAM(S): at 05:11

## 2023-12-05 RX ADMIN — PANTOPRAZOLE SODIUM 40 MILLIGRAM(S): 20 TABLET, DELAYED RELEASE ORAL at 12:30

## 2023-12-05 RX ADMIN — AMIODARONE HYDROCHLORIDE 200 MILLIGRAM(S): 400 TABLET ORAL at 05:11

## 2023-12-05 RX ADMIN — Medication 1: at 12:29

## 2023-12-05 RX ADMIN — SENNA PLUS 2 TABLET(S): 8.6 TABLET ORAL at 21:39

## 2023-12-05 RX ADMIN — Medication 50 MILLIGRAM(S): at 13:19

## 2023-12-05 RX ADMIN — APIXABAN 5 MILLIGRAM(S): 2.5 TABLET, FILM COATED ORAL at 18:01

## 2023-12-05 RX ADMIN — Medication 5 MILLIGRAM(S): at 21:39

## 2023-12-05 RX ADMIN — APIXABAN 5 MILLIGRAM(S): 2.5 TABLET, FILM COATED ORAL at 05:11

## 2023-12-05 RX ADMIN — ATORVASTATIN CALCIUM 80 MILLIGRAM(S): 80 TABLET, FILM COATED ORAL at 21:40

## 2023-12-05 RX ADMIN — Medication 50 MILLIGRAM(S): at 21:40

## 2023-12-05 RX ADMIN — LOSARTAN POTASSIUM 25 MILLIGRAM(S): 100 TABLET, FILM COATED ORAL at 05:11

## 2023-12-05 NOTE — PROGRESS NOTE ADULT - ASSESSMENT
Assessment/Plan:  Mrs. Екатерина Poole is a 58 year old female patient with past medical history of HTN and rheumatoid arthritis who is admitted for Acute Inpatient Rehabilitation with a multidisciplinary rehab program at [facility] with functional impairments in ADLs and mobility secondary to left hemiparesis resulting from bilateral subacute to remote embolic strokes, a right posterior corona radiata acute infarct, multiple petechial hemorrhages in bilateral thalamus and basal ganglia with etiology highly indicative from underlying vasculitis and a subsequent left parietal cortical acute infarction of a likely cardioembolic source.    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 Maintain dose per rheumatology eval/recommendation.  - Left hemiparesis, impaired ADLs and mobility, need for assistance with personal care   - Start comprehensive rehab program of PT/OT/SLP - 3 hours a day, 5 days a week. P&O as needed   - Fall/Aspiration precautions  - Will need repeat cerebral angiogram in 1 month (~12/20/23)  - Rheumatology and Neurology following  - Continue prednisone 50 mg QD  - Bactrim DS 1 tab MWF for PCP ppx while on high dose steroids    # leukocytosis  - WBC 17.18->16.54  - likely steroid induced, was started on steroids 11/24, WBC started uptrending after  - afebrile, asymptomatic  - monitor CBC, hospitalist follow up    # RRT/Code Stroke with syncope after BM 12/1  - Head CT stable as above  - rheumatology and neuro consulted  - f/u EEG     Afib  - amiodarone 200 mg QD  - metoprolol 50 mg TID  - Eliquis 5 mg BID    HTN  - continue Norvasc 10 mg QD and Losartan 25 mg QD  - Monitor BP    HLD  - cont atorvastatin 80 mg QHS    Hyperglycemia in setting of steroid use  - SSI  - Monitor fingersticks    Mood / Cognition  - Neuropsychology consult PRN    Sleep  - Maintain quiet hours and a low stim environment.     GI / Bowel  - Senna qHS  - Dulcolax 5 mg QHS  - GI ppx: pantoprazole 40 mg QD     / Bladder  - Bladder scans Q8 hours with straight cath for >400cc.  - Toileting schedule every 4 hours    Skin:  - Skin assessment on admission performed : Intact  - Pressure Injury/Skin: OOB to chair, PT/OT  - nursing to monitor skin q Shift    Diet/Dysphagia:  - Diet Consistency: regular  - Aspiration Precautions  - SLP consult for swallow function evaluation and treatment  - Nutrition consult    DVT prophylaxis:   - on Eliquis 5 mg BID      Outpatient Follow-up:  Gavin Calvo  Neurology  [address], [address]  [place] [address]  Phone: [phone]  Fax: [phone]  Follow Up Time: 2 weeks    Khadijah Hartley  Radiology  [address]  [place] [address]  Phone: [phone]  Fax: [phone]  Follow Up Time: 2 weeks    Brando Lazar  Cardiology  [address], [address]  [place] [address]  Phone: [phone]  Fax: [phone]  Follow Up Time: 2 weeks    Kenya Naik  Rheumatology  [address]  [place] [address]  Phone: [phone]  Fax: [phone]  Follow Up Time: 2 weeks      Code Status/Emergency Contact:    ---------------    Goals: Safe discharge to home  Estimated Length of Stay: 10-14 days  Rehab Potential: Good  Medical Prognosis: Good  Estimated Disposition: Home with home care      PRESCREEN COMPARISON:  I have reviewed the prescreen information and I have found no relevant changes between the preadmission screening and my post admission evaluation.    RATIONALE FOR INPATIENT ADMISSION: Patient demonstrates the following:  [X] Medically appropriate for rehabilitation admission  [X] Has attainable rehab goals with an appropriate initial discharge plan  [X]Has rehabilitation potential (expected to make a significant improvement within a reasonable period of time)  [X] Requires close medical management by a rehab physician, rehab nursing care, Hospitalist and comprehensive interdisciplinary team (including PT, OT and/or SLP, Prosthetics and Orthotics)     Assessment/Plan:  Mrs. Екатерина Poole is a 58 year old female patient with past medical history of HTN and rheumatoid arthritis who is admitted for Acute Inpatient Rehabilitation with a multidisciplinary rehab program at [facility] with functional impairments in ADLs and mobility secondary to left hemiparesis resulting from bilateral subacute to remote embolic strokes, a right posterior corona radiata acute infarct, multiple petechial hemorrhages in bilateral thalamus and basal ganglia with etiology highly indicative from underlying vasculitis and a subsequent left parietal cortical acute infarction of a likely cardioembolic source.    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 Maintain dose per rheumatology eval/recommendation.  - Left hemiparesis, impaired ADLs and mobility, need for assistance with personal care   - Start comprehensive rehab program of PT/OT/SLP - 3 hours a day, 5 days a week. P&O as needed   - Fall/Aspiration precautions  - Will need repeat cerebral angiogram in 1 month (~12/20/23)  - Rheumatology and Neurology following  - Continue prednisone 50 mg QD  - Bactrim DS 1 tab MWF for PCP ppx while on high dose steroids    # leukocytosis  - WBC 17.18->16.54  - likely steroid induced, was started on steroids 11/24, WBC started uptrending after  - afebrile, asymptomatic  - monitor CBC, hospitalist follow up    # RRT/Code Stroke with syncope after BM 12/1  - Head CT stable as above  - rheumatology and neuro consulted  - f/u EEG     Afib  - amiodarone 200 mg QD  - metoprolol 50 mg TID  - Eliquis 5 mg BID    HTN  - continue Norvasc 10 mg QD and Losartan 25 mg QD  - Monitor BP    HLD  - cont atorvastatin 80 mg QHS    Hyperglycemia in setting of steroid use  - SSI  - Monitor fingersticks    Mood / Cognition  - Neuropsychology consult PRN    Sleep  - Maintain quiet hours and a low stim environment.     GI / Bowel  - Senna qHS  - Dulcolax 5 mg QHS  - GI ppx: pantoprazole 40 mg QD     / Bladder  - Bladder scans Q8 hours with straight cath for >400cc.  - Toileting schedule every 4 hours    Skin:  - Skin assessment on admission performed : Intact  - Pressure Injury/Skin: OOB to chair, PT/OT  - nursing to monitor skin q Shift    Diet/Dysphagia:  - Diet Consistency: regular  - Aspiration Precautions  - SLP consult for swallow function evaluation and treatment  - Nutrition consult    DVT prophylaxis:   - on Eliquis 5 mg BID      Outpatient Follow-up:  Gavin Calvo  Neurology  [address], [address]  [place] [address]  Phone: [phone]  Fax: [phone]  Follow Up Time: 2 weeks    Khadijah Hartley  Radiology  [address]  [place] [address]  Phone: [phone]  Fax: [phone]  Follow Up Time: 2 weeks    Brando Lazar  Cardiology  [address], [address]  [place] [address]  Phone: [phone]  Fax: [phone]  Follow Up Time: 2 weeks    Kenya Naik  Rheumatology  [address]  [place] [address]  Phone: [phone]  Fax: [phone]  Follow Up Time: 2 weeks      Code Status/Emergency Contact:    ---------------    Goals: Safe discharge to home  Estimated Length of Stay: 10-14 days  Rehab Potential: Good  Medical Prognosis: Good  Estimated Disposition: Home with home care      PRESCREEN COMPARISON:  I have reviewed the prescreen information and I have found no relevant changes between the preadmission screening and my post admission evaluation.    RATIONALE FOR INPATIENT ADMISSION: Patient demonstrates the following:  [X] Medically appropriate for rehabilitation admission  [X] Has attainable rehab goals with an appropriate initial discharge plan  [X]Has rehabilitation potential (expected to make a significant improvement within a reasonable period of time)  [X] Requires close medical management by a rehab physician, rehab nursing care, Hospitalist and comprehensive interdisciplinary team (including PT, OT and/or SLP, Prosthetics and Orthotics)

## 2023-12-05 NOTE — PROGRESS NOTE ADULT - SUBJECTIVE AND OBJECTIVE BOX
HPI:  Mrs. Екатерина Poole is a 58 year old female patient with past medical history of HTN and rheumatoid arthritis who presented to [place] on 11/15 for AMS when a neighbor overhead patient was looking for her mom and she was found confused/wandering around apartment complex. Imaging studies initially performed reported bilateral subacute to remote embolic strokes, a right posterior corona radiata acute infarct, and multiple petechial hemorrhages in bilateral thalamus and basal ganglia. She was additionally noted to have KRUNAL which has since resolved. Hypertension managed with amlodipine, aldactone, and losartan and she was started on B12 supplements. Patient seen by Rheumatology and was transferred to [facility] on 11/20 for cerebral angiogram.    A cerebral angiogram performed on 11/20 reported multiple areas of focal stenosis and dilatation concerning for vasculitis. An LP was completed on 11/21 with a normal profile. Rheumatology consulted and recommended possible brain biopsy to confirm diagnosis of vasculitis but the risks of performing an open biopsy outweighed the benefits as the results of the biopsy were deemed unlikely to . She was seen by Neurosurgery with patient's neuroradiologic findings including beading on cerebral angiogram (consistent with vasculitis) and clinical presentation, with no other etiology of vasculitis, point to PACNS. Patient was started on steroids given her micro hemorrhage and strokes likely attributed to vasculitis. Steroids started on 11/24 with initial dose Solumedrol 1g for 3 days. She was to continue Prednisone 60mg with a decrease to 50mg daily for discharge to acute in-patient rehabilitation. No taper indicated per rheumatology evaluation and recommendations. An EEG was additionally performed and reported moderate diffuse/multi-focal cerebral dysfunction, not specific as to etiology. There were no epileptiform abnormalities recorded. Will need a repeat cerebral angiogram in 1 month which should take place around 12/20/23. Patient started on Eliquis 5 mg BID, amiodarone, and metoprolol for Afib. TTE reported as normal with global left ventricular systolic function, mild mitral valve regurgitation, mild aortic regurgitation, and a sclerotic aortic valve with normal opening. Hospitalization additionally significant for a rapid response on 11/20 due to acute mental status changes after being found to be slumped over while on toilet by staff prompting MRI imaging reporting:  ·	Left parietal small cortical acute infarction  ·	Innumerable scattered chronic microhemorrhages in the brainstem, bilateral cerebellar hemispheres, deep gray nuclei and peripherally within the cerebral hemispheres which may be secondary to chronic hypertensive encephalopathy.  ·	Severe extensive chronic microvascular ischemic changes and multifocal chronic lacunar infarcts as detailed above.  ·	Patient evaluated by PT/OT and was recommended for acute inpatient rehab. Patient is medically stable for discharge to [facility] on 11/30. (30 Nov 2023 14:36)  ___________________________________________________________________________    SUBJECTIVE/ROS  Patient was seen and evaluated at bedside today.  Reported no overnight events and is in no acute distress.  Patient more purposeful and cooperative today.  No additional recommendations per Hematology evaluation and EEG findings noted below.  Case to be evaluated at Interdisciplinary Team meeting later today.  Patient is motivated to continue participation on the recommended rehabilitation program.   Denies any CP, SOB, LAURA, palpitations, fever, chills, body aches, cough, congestion, or any other symptoms at this time.   ___________________________________________________________________________    EEG (12/04/23)  Classification / Summary:  Abnormal routine EEG in the drowsy and asleep states with intermittent arousals. Unclear if full wakefulness is captured.  Occasional left > right temporal focal slowing.  Mild diffuse slowing vs. excess drowsiness.  No epileptiform abnormalities are captured.     Clinical Impression:  Left > right temporal focal cerebral dysfunction can be structural or functional in etiology.  Mild diffuse cerebral dysfunction is nonspecific in etiology. Alternatively, excess drowsiness may be contributing.    ___________________________________________________________________________    CT Head No Cont (12.01.23 @ 10:40)   Moderate to severe chronic microvascular changes without evidence of an acute transcortical infarction or hemorrhage.    CT Angio Brain Stroke Protocol  w/ IV Cont (12.01.23 @ 13:03)   Negative CT perfusion. CTA demonstrates persistent narrowing of the anterior and middle cerebral arteries consistent with vasospasm or vasculitis.  ___________________________________________________________________________    VITALS  T(C): 37.2 (12-04-23 @ 09:01), Max: 37.2 (12-04-23 @ 09:01)  HR: 70 (12-04-23 @ 09:01) (64 - 70)  BP: 142/72 (12-04-23 @ 09:01) (137/78 - 145/80)  RR: 16 (12-04-23 @ 09:01) (15 - 16)  SpO2: 95% (12-04-23 @ 09:01) (95% - 97%)  ___________________________________________________________________________    LABS                          9.6    16.54 )-----------( 383      ( 04 Dec 2023 05:51 )             27.0       12-04    138  |  100  |  26<H>  ----------------------------<  89  3.5   |  30  |  1.11    Ca    8.4      04 Dec 2023 05:51    TPro  6.3  /  Alb  2.5<L>  /  TBili  0.3  /  DBili  x   /  AST  11  /  ALT  23  /  AlkPhos  71  12-04      ___________________________________________________________________________    MEDICATIONS  (STANDING):  aMIOdarone    Tablet 200 milliGRAM(s) Oral daily  apixaban 5 milliGRAM(s) Oral two times a day  atorvastatin 80 milliGRAM(s) Oral at bedtime  bisacodyl 5 milliGRAM(s) Oral at bedtime  dextrose 5%. 1000 milliLiter(s) (50 mL/Hr) IV Continuous <Continuous>  dextrose 5%. 1000 milliLiter(s) (100 mL/Hr) IV Continuous <Continuous>  dextrose 50% Injectable 25 Gram(s) IV Push once  dextrose 50% Injectable 12.5 Gram(s) IV Push once  dextrose 50% Injectable 25 Gram(s) IV Push once  glucagon  Injectable 1 milliGRAM(s) IntraMuscular once  influenza   Vaccine 0.5 milliLiter(s) IntraMuscular once  insulin lispro (ADMELOG) corrective regimen sliding scale   SubCutaneous at bedtime  insulin lispro (ADMELOG) corrective regimen sliding scale   SubCutaneous three times a day before meals  losartan 25 milliGRAM(s) Oral daily  metoprolol tartrate 50 milliGRAM(s) Oral three times a day  pantoprazole    Tablet 40 milliGRAM(s) Oral daily  predniSONE   Tablet 50 milliGRAM(s) Oral daily  senna 2 Tablet(s) Oral at bedtime  trimethoprim  160 mG/sulfamethoxazole 800 mG 1 Tablet(s) Oral <User Schedule>    MEDICATIONS  (PRN):  dextrose Oral Gel 15 Gram(s) Oral once PRN Blood Glucose LESS THAN 70 milliGRAM(s)/deciliter    ___________________________________________________________________________    PHYSICAL EXAM:  Gen - NAD, Comfortable  HEENT - NCAT, EOMI, MMM  Pulm - breathing comfortably on room air  Cardiovascular - warm and well perfused  Abdomen - Soft, NT/ND  Extremities - No C/C/E, No calf tenderness  Neuro-     Cognitive - oriented to name, states rehab in [place] for place, states February 2023 for time     Communication - Fluent, No dysarthria     Attention: able to state days of the week backwards     Cranial Nerves - no facial asymmetry, EOMI     Motor -                     LEFT    UE - ShAB 5/5, EF 5/5, EE 5/5, WE 5/5,  5/5                    RIGHT UE - ShAB 5/5, EF 5/5, EE 5/5, WE 5/5,  5/5                    LEFT    LE - HF 4/5, KE 5/5, DF 5/5, PF 5/5                    RIGHT LE - HF 4/5, KE 5/5, DF 5/5, PF 5/5        Sensory - Intact to LT     Reflexes - DTR Intact, No primitive reflexive     Coordination - FTN intact     Tone - normal  Psychiatric - Mood stable, Affect WNL  Skin:  all skin intact    ___________________________________________________________________________ HPI:  Mrs. Екатерина Poole is a 58 year old female patient with past medical history of HTN and rheumatoid arthritis who presented to [place] on 11/15 for AMS when a neighbor overhead patient was looking for her mom and she was found confused/wandering around apartment complex. Imaging studies initially performed reported bilateral subacute to remote embolic strokes, a right posterior corona radiata acute infarct, and multiple petechial hemorrhages in bilateral thalamus and basal ganglia. She was additionally noted to have KRUNAL which has since resolved. Hypertension managed with amlodipine, aldactone, and losartan and she was started on B12 supplements. Patient seen by Rheumatology and was transferred to [facility] on 11/20 for cerebral angiogram.    A cerebral angiogram performed on 11/20 reported multiple areas of focal stenosis and dilatation concerning for vasculitis. An LP was completed on 11/21 with a normal profile. Rheumatology consulted and recommended possible brain biopsy to confirm diagnosis of vasculitis but the risks of performing an open biopsy outweighed the benefits as the results of the biopsy were deemed unlikely to . She was seen by Neurosurgery with patient's neuroradiologic findings including beading on cerebral angiogram (consistent with vasculitis) and clinical presentation, with no other etiology of vasculitis, point to PACNS. Patient was started on steroids given her micro hemorrhage and strokes likely attributed to vasculitis. Steroids started on 11/24 with initial dose Solumedrol 1g for 3 days. She was to continue Prednisone 60mg with a decrease to 50mg daily for discharge to acute in-patient rehabilitation. No taper indicated per rheumatology evaluation and recommendations. An EEG was additionally performed and reported moderate diffuse/multi-focal cerebral dysfunction, not specific as to etiology. There were no epileptiform abnormalities recorded. Will need a repeat cerebral angiogram in 1 month which should take place around 12/20/23. Patient started on Eliquis 5 mg BID, amiodarone, and metoprolol for Afib. TTE reported as normal with global left ventricular systolic function, mild mitral valve regurgitation, mild aortic regurgitation, and a sclerotic aortic valve with normal opening. Hospitalization additionally significant for a rapid response on 11/20 due to acute mental status changes after being found to be slumped over while on toilet by staff prompting MRI imaging reporting:  ·	Left parietal small cortical acute infarction  ·	Innumerable scattered chronic microhemorrhages in the brainstem, bilateral cerebellar hemispheres, deep gray nuclei and peripherally within the cerebral hemispheres which may be secondary to chronic hypertensive encephalopathy.  ·	Severe extensive chronic microvascular ischemic changes and multifocal chronic lacunar infarcts as detailed above.  ·	Patient evaluated by PT/OT and was recommended for acute inpatient rehab. Patient is medically stable for discharge to [facility] on 11/30. (30 Nov 2023 14:36)  ___________________________________________________________________________    SUBJECTIVE/ROS  Patient was seen and evaluated at bedside today.  Reported no overnight events and is in no acute distress.  Patient more purposeful and cooperative today.  No additional recommendations per Hematology evaluation and EEG findings noted below.  Case to be evaluated at Interdisciplinary Team meeting later today.  Patient is motivated to continue participation on the recommended rehabilitation program.   Denies any CP, SOB, LAURA, palpitations, fever, chills, body aches, cough, congestion, or any other symptoms at this time.   ___________________________________________________________________________    EEG (12/04/23)  Classification / Summary:  Abnormal routine EEG in the drowsy and asleep states with intermittent arousals. Unclear if full wakefulness is captured.  Occasional left > right temporal focal slowing.  Mild diffuse slowing vs. excess drowsiness.  No epileptiform abnormalities are captured.     Clinical Impression:  Left > right temporal focal cerebral dysfunction can be structural or functional in etiology.  Mild diffuse cerebral dysfunction is nonspecific in etiology. Alternatively, excess drowsiness may be contributing.    ___________________________________________________________________________    CT Head No Cont (12.01.23 @ 10:40)   Moderate to severe chronic microvascular changes without evidence of an acute transcortical infarction or hemorrhage.    CT Angio Brain Stroke Protocol  w/ IV Cont (12.01.23 @ 13:03)   Negative CT perfusion. CTA demonstrates persistent narrowing of the anterior and middle cerebral arteries consistent with vasospasm or vasculitis.  ___________________________________________________________________________    VITALS  T(C): 37.2 (12-04-23 @ 09:01), Max: 37.2 (12-04-23 @ 09:01)  HR: 70 (12-04-23 @ 09:01) (64 - 70)  BP: 142/72 (12-04-23 @ 09:01) (137/78 - 145/80)  RR: 16 (12-04-23 @ 09:01) (15 - 16)  SpO2: 95% (12-04-23 @ 09:01) (95% - 97%)  ___________________________________________________________________________    LABS                          9.6    16.54 )-----------( 383      ( 04 Dec 2023 05:51 )             27.0       12-04    138  |  100  |  26<H>  ----------------------------<  89  3.5   |  30  |  1.11    Ca    8.4      04 Dec 2023 05:51    TPro  6.3  /  Alb  2.5<L>  /  TBili  0.3  /  DBili  x   /  AST  11  /  ALT  23  /  AlkPhos  71  12-04      ___________________________________________________________________________    MEDICATIONS  (STANDING):  aMIOdarone    Tablet 200 milliGRAM(s) Oral daily  apixaban 5 milliGRAM(s) Oral two times a day  atorvastatin 80 milliGRAM(s) Oral at bedtime  bisacodyl 5 milliGRAM(s) Oral at bedtime  dextrose 5%. 1000 milliLiter(s) (50 mL/Hr) IV Continuous <Continuous>  dextrose 5%. 1000 milliLiter(s) (100 mL/Hr) IV Continuous <Continuous>  dextrose 50% Injectable 25 Gram(s) IV Push once  dextrose 50% Injectable 12.5 Gram(s) IV Push once  dextrose 50% Injectable 25 Gram(s) IV Push once  glucagon  Injectable 1 milliGRAM(s) IntraMuscular once  influenza   Vaccine 0.5 milliLiter(s) IntraMuscular once  insulin lispro (ADMELOG) corrective regimen sliding scale   SubCutaneous at bedtime  insulin lispro (ADMELOG) corrective regimen sliding scale   SubCutaneous three times a day before meals  losartan 25 milliGRAM(s) Oral daily  metoprolol tartrate 50 milliGRAM(s) Oral three times a day  pantoprazole    Tablet 40 milliGRAM(s) Oral daily  predniSONE   Tablet 50 milliGRAM(s) Oral daily  senna 2 Tablet(s) Oral at bedtime  trimethoprim  160 mG/sulfamethoxazole 800 mG 1 Tablet(s) Oral <User Schedule>    MEDICATIONS  (PRN):  dextrose Oral Gel 15 Gram(s) Oral once PRN Blood Glucose LESS THAN 70 milliGRAM(s)/deciliter    ___________________________________________________________________________    PHYSICAL EXAM:  Gen - NAD, Comfortable  HEENT - NCAT, EOMI, MMM  Pulm - breathing comfortably on room air  Cardiovascular - warm and well perfused  Abdomen - Soft, NT/ND  Extremities - No C/C/E, No calf tenderness  Neuro-     Cognitive - oriented to name, states rehab in [place] for place, states February 2023 for time     Communication - Fluent, No dysarthria     Attention: able to state days of the week backwards     Cranial Nerves - no facial asymmetry, EOMI     Motor -                     LEFT    UE - ShAB 5/5, EF 5/5, EE 5/5, WE 5/5,  5/5                    RIGHT UE - ShAB 5/5, EF 5/5, EE 5/5, WE 5/5,  5/5                    LEFT    LE - HF 4/5, KE 5/5, DF 5/5, PF 5/5                    RIGHT LE - HF 4/5, KE 5/5, DF 5/5, PF 5/5        Sensory - Intact to LT     Reflexes - DTR Intact, No primitive reflexive     Coordination - FTN intact     Tone - normal  Psychiatric - Mood stable, Affect WNL  Skin:  all skin intact    ___________________________________________________________________________

## 2023-12-06 ENCOUNTER — TRANSCRIPTION ENCOUNTER (OUTPATIENT)
Age: 58
End: 2023-12-06

## 2023-12-06 LAB
GLUCOSE BLDC GLUCOMTR-MCNC: 112 MG/DL — HIGH (ref 70–99)
GLUCOSE BLDC GLUCOMTR-MCNC: 112 MG/DL — HIGH (ref 70–99)

## 2023-12-06 PROCEDURE — 99232 SBSQ HOSP IP/OBS MODERATE 35: CPT

## 2023-12-06 RX ADMIN — ATORVASTATIN CALCIUM 80 MILLIGRAM(S): 80 TABLET, FILM COATED ORAL at 21:55

## 2023-12-06 RX ADMIN — APIXABAN 5 MILLIGRAM(S): 2.5 TABLET, FILM COATED ORAL at 05:20

## 2023-12-06 RX ADMIN — PANTOPRAZOLE SODIUM 40 MILLIGRAM(S): 20 TABLET, DELAYED RELEASE ORAL at 11:56

## 2023-12-06 RX ADMIN — AMIODARONE HYDROCHLORIDE 200 MILLIGRAM(S): 400 TABLET ORAL at 05:21

## 2023-12-06 RX ADMIN — LOSARTAN POTASSIUM 25 MILLIGRAM(S): 100 TABLET, FILM COATED ORAL at 05:20

## 2023-12-06 RX ADMIN — Medication 50 MILLIGRAM(S): at 05:20

## 2023-12-06 RX ADMIN — APIXABAN 5 MILLIGRAM(S): 2.5 TABLET, FILM COATED ORAL at 17:37

## 2023-12-06 RX ADMIN — Medication 50 MILLIGRAM(S): at 13:07

## 2023-12-06 RX ADMIN — Medication 1 TABLET(S): at 05:21

## 2023-12-06 RX ADMIN — SENNA PLUS 2 TABLET(S): 8.6 TABLET ORAL at 21:56

## 2023-12-06 RX ADMIN — Medication 50 MILLIGRAM(S): at 21:56

## 2023-12-06 RX ADMIN — Medication 5 MILLIGRAM(S): at 21:55

## 2023-12-06 NOTE — DISCHARGE NOTE PROVIDER - NSDCMRMEDTOKEN_GEN_ALL_CORE_FT
amiodarone 200 mg oral tablet: 1 tab(s) orally once a day  amLODIPine 10 mg oral tablet: 1 tab(s) orally once a day  apixaban 5 mg oral tablet: 1 tab(s) orally 2 times a day  atorvastatin 80 mg oral tablet: 1 tab(s) orally once a day (at bedtime)  bisacodyl 5 mg oral delayed release tablet: 1 tab(s) orally once a day (at bedtime)  insulin lispro 100 units/mL injectable solution: injectable 3 times a day (before meals) 1 Unit(s) if Glucose 151 - 200  2 Unit(s) if Glucose 201 - 250  3 Unit(s) if Glucose 251 - 300  4 Unit(s) if Glucose 301 - 350  5 Unit(s) if Glucose 351 - 400  6 Unit(s) if Glucose Greater Than 400  Three times a day before meal  insulin lispro 100 units/mL injectable solution: injectable once a day (at bedtime) 0 Unit(s) if Glucose 0 - 250  1 Unit(s) if Glucose 251 - 300  2 Unit(s) if Glucose 301 - 350  3 Unit(s) if Glucose 351 - 400  4 Unit(s) if Glucose Greater Than 400  At bedtime  losartan 25 mg oral tablet: 1 tab(s) orally once a day  metoprolol tartrate 50 mg oral tablet: 1 tab(s) orally 3 times a day  pantoprazole 40 mg oral delayed release tablet: 1 tab(s) orally once a day  predniSONE 50 mg oral tablet: 1 tab(s) orally once a day  senna leaf extract oral tablet: 2 tab(s) orally once a day (at bedtime)   amiodarone 200 mg oral tablet: 1 tab(s) orally once a day  apixaban 5 mg oral tablet: 1 tab(s) orally 2 times a day  atorvastatin 80 mg oral tablet: 1 tab(s) orally once a day (at bedtime)  bisacodyl 5 mg oral delayed release tablet: 1 tab(s) orally once a day (at bedtime)  losartan 25 mg oral tablet: 1 tab(s) orally once a day  metoprolol tartrate 50 mg oral tablet: 1 tab(s) orally 3 times a day  pantoprazole 40 mg oral delayed release tablet: 1 tab(s) orally once a day  predniSONE 50 mg oral tablet: 1 tab(s) orally once a day  senna leaf extract oral tablet: 2 tab(s) orally once a day (at bedtime)  sulfamethoxazole-trimethoprim 800 mg-160 mg oral tablet: 1 tab(s) orally 3 times a week Take M/W/F

## 2023-12-06 NOTE — PROGRESS NOTE ADULT - ASSESSMENT
Assessment/Plan:  Mrs. Екатерина Poole is a 58 year old female patient with past medical history of HTN and rheumatoid arthritis who is admitted for Acute Inpatient Rehabilitation with a multidisciplinary rehab program at [facility] with functional impairments in ADLs and mobility secondary to left hemiparesis resulting from bilateral subacute to remote embolic strokes, a right posterior corona radiata acute infarct, multiple petechial hemorrhages in bilateral thalamus and basal ganglia with etiology highly indicative from underlying vasculitis and a subsequent left parietal cortical acute infarction of a likely cardioembolic source.    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 Maintain dose per rheumatology eval/recommendation.  - Left hemiparesis, impaired ADLs and mobility, need for assistance with personal care   - Start comprehensive rehab program of PT/OT/SLP - 3 hours a day, 5 days a week. P&O as needed   - Fall/Aspiration precautions  - Will need repeat cerebral angiogram in 1 month (~12/20/23)  - Rheumatology and Neurology following  - Continue prednisone 50 mg QD  - Bactrim DS 1 tab MWF for PCP ppx while on high dose steroids    # leukocytosis  - WBC 17.18->16.54  - likely steroid induced, was started on steroids 11/24, WBC started uptrending after  - afebrile, asymptomatic  - monitor CBC, hospitalist follow up    # RRT/Code Stroke with syncope after BM 12/1  - Head CT stable as above  - rheumatology and neuro consulted  - f/u EEG     Afib  - amiodarone 200 mg QD  - metoprolol 50 mg TID  - Eliquis 5 mg BID    HTN  - continue Norvasc 10 mg QD and Losartan 25 mg QD  - Monitor BP    HLD  - cont atorvastatin 80 mg QHS    Hyperglycemia in setting of steroid use  - SSI  - Monitor fingersticks    Mood / Cognition  - Neuropsychology consult PRN    Sleep  - Maintain quiet hours and a low stim environment.     GI / Bowel  - Senna qHS  - Dulcolax 5 mg QHS  - GI ppx: pantoprazole 40 mg QD     / Bladder  - Bladder scans Q8 hours with straight cath for >400cc.  - Toileting schedule every 4 hours    Skin:  - Skin assessment on admission performed : Intact  - Pressure Injury/Skin: OOB to chair, PT/OT  - nursing to monitor skin q Shift    Diet/Dysphagia:  - Diet Consistency: regular  - Aspiration Precautions  - SLP consult for swallow function evaluation and treatment  - Nutrition consult    DVT prophylaxis:   - on Eliquis 5 mg BID      Outpatient Follow-up:  Gavin Calvo  Neurology  [address], [address]  [place] [address]  Phone: [phone]  Fax: [phone]  Follow Up Time: 2 weeks    Khadijah Hartley  Radiology  [address], [address]  [place] [address]  Phone: [phone]  Fax: [phone]  Follow Up Time: 2 weeks    Brando Lazar  Cardiology  [address], [address]  [place] [address]  Phone: [phone]  Fax: [phone]  Follow Up Time: 2 weeks    Kenya Naik  Rheumatology  [address], [address]  [place] [address]  Phone: [phone]  Fax: [phone]  Follow Up Time: 2 weeks      Code Status/Emergency Contact:    ---------------    Goals: Safe discharge to home  Estimated Length of Stay: 10-14 days  Rehab Potential: Good  Medical Prognosis: Good  Estimated Disposition: Home with home care      PRESCREEN COMPARISON:  I have reviewed the prescreen information and I have found no relevant changes between the preadmission screening and my post admission evaluation.    RATIONALE FOR INPATIENT ADMISSION: Patient demonstrates the following:  [X] Medically appropriate for rehabilitation admission  [X] Has attainable rehab goals with an appropriate initial discharge plan  [X]Has rehabilitation potential (expected to make a significant improvement within a reasonable period of time)  [X] Requires close medical management by a rehab physician, rehab nursing care, Hospitalist and comprehensive interdisciplinary team (including PT, OT and/or SLP, Prosthetics and Orthotics)     Assessment/Plan:  Mrs. Екатерина Poole is a 58 year old female patient with past medical history of HTN and rheumatoid arthritis who is admitted for Acute Inpatient Rehabilitation with a multidisciplinary rehab program at [facility] with functional impairments in ADLs and mobility secondary to left hemiparesis resulting from bilateral subacute to remote embolic strokes, a right posterior corona radiata acute infarct, multiple petechial hemorrhages in bilateral thalamus and basal ganglia with etiology highly indicative from underlying vasculitis and a subsequent left parietal cortical acute infarction of a likely cardioembolic source.    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 Maintain dose per rheumatology eval/recommendation.  - Left hemiparesis, impaired ADLs and mobility, need for assistance with personal care   - Start comprehensive rehab program of PT/OT/SLP - 3 hours a day, 5 days a week. P&O as needed   - Fall/Aspiration precautions  - Will need repeat cerebral angiogram in 1 month (~12/20/23)  - Rheumatology and Neurology following  - Continue prednisone 50 mg QD  - Bactrim DS 1 tab MWF for PCP ppx while on high dose steroids    # leukocytosis  - WBC 17.18->16.54  - likely steroid induced, was started on steroids 11/24, WBC started uptrending after  - afebrile, asymptomatic  - monitor CBC, hospitalist follow up    # RRT/Code Stroke with syncope after BM 12/1  - Head CT stable as above  - rheumatology and neuro consulted  - f/u EEG     Afib  - amiodarone 200 mg QD  - metoprolol 50 mg TID  - Eliquis 5 mg BID    HTN  - continue Norvasc 10 mg QD and Losartan 25 mg QD  - Monitor BP    HLD  - cont atorvastatin 80 mg QHS    Hyperglycemia in setting of steroid use  - SSI  - Monitor fingersticks    Mood / Cognition  - Neuropsychology consult PRN    Sleep  - Maintain quiet hours and a low stim environment.     GI / Bowel  - Senna qHS  - Dulcolax 5 mg QHS  - GI ppx: pantoprazole 40 mg QD     / Bladder  - Bladder scans Q8 hours with straight cath for >400cc.  - Toileting schedule every 4 hours    Skin:  - Skin assessment on admission performed : Intact  - Pressure Injury/Skin: OOB to chair, PT/OT  - nursing to monitor skin q Shift    Diet/Dysphagia:  - Diet Consistency: regular  - Aspiration Precautions  - SLP consult for swallow function evaluation and treatment  - Nutrition consult    DVT prophylaxis:   - on Eliquis 5 mg BID      Outpatient Follow-up:  Gavin Calvo  Neurology  [address], [address]  [place] [address]  Phone: [phone]  Fax: [phone]  Follow Up Time: 2 weeks    Khadijah Hartley  Radiology  [address]  [place] [address]  Phone: [phone]  Fax: [phone]  Follow Up Time: 2 weeks    Brando Lazar  Cardiology  [address], [address]  [place] [address]  Phone: [phone]  Fax: [phone]  Follow Up Time: 2 weeks    Kenya Naik  Rheumatology  [address]  [place] [address]  Phone: [phone]  Fax: [phone]  Follow Up Time: 2 weeks      Code Status/Emergency Contact:    ---------------    Goals: Safe discharge to home  Estimated Length of Stay: 10-14 days  Rehab Potential: Good  Medical Prognosis: Good  Estimated Disposition: Home with home care      PRESCREEN COMPARISON:  I have reviewed the prescreen information and I have found no relevant changes between the preadmission screening and my post admission evaluation.    RATIONALE FOR INPATIENT ADMISSION: Patient demonstrates the following:  [X] Medically appropriate for rehabilitation admission  [X] Has attainable rehab goals with an appropriate initial discharge plan  [X]Has rehabilitation potential (expected to make a significant improvement within a reasonable period of time)  [X] Requires close medical management by a rehab physician, rehab nursing care, Hospitalist and comprehensive interdisciplinary team (including PT, OT and/or SLP, Prosthetics and Orthotics)

## 2023-12-06 NOTE — DISCHARGE NOTE PROVIDER - NSDCCPCAREPLAN_GEN_ALL_CORE_FT
PRINCIPAL DISCHARGE DIAGNOSIS  Diagnosis: Cerebrovascular accident (CVA)  Assessment and Plan of Treatment: You were admitted to [facility] due to weakness and functional impairments after your stroke. You participated in daily therapies and made functional gains throughout your stay. Please continue taking your medications as prescribed and monitor your blood pressure. Reduce fat, cholesterol and salt in your diet. Increase intake of fruits and vegetables. If you experience any symptoms of facial drooping, slurred speech, arm or leg weakness, severe headache, vision changes or any worsening symptoms, notify provider immediately and return to ER. Please follow up with neurology, PM&R, and your primary care doctor regarding your recent rehab admission.      SECONDARY DISCHARGE DIAGNOSES  Diagnosis: Cerebral vasculitis  Assessment and Plan of Treatment: Your imaging at [facility] was concerning for vasculitis. You were started on steroids and continued on prednisone 50 mg daily while at rehab. You were followed by rheumatology and started on antibiotic prophylaxis while on high dose steroids. You will need a repeat cerebral angiogram in one month (~12/20/23). Please follow up with rheumatology and neurology on discharge and continue taking your medications as prescribed.    Diagnosis: Atrial fibrillation  Assessment and Plan of Treatment: Atrial fibrillation (A-fib) is an irregular and often very rapid heart rhythm (arrhythmia) that can lead to blood clots in the heart. A-fib increases the risk of stroke, heart failure and other heart-related complications. You should continue your medications and follow-up with your Cardiologist for further re-assessments and management.     PRINCIPAL DISCHARGE DIAGNOSIS  Diagnosis: Cerebrovascular accident (CVA)  Assessment and Plan of Treatment: You were admitted to [facility] due to weakness and functional impairments after your stroke. You participated in daily therapies and made functional gains throughout your stay. Please continue taking your medications as prescribed and monitor your blood pressure. Reduce fat, cholesterol and salt in your diet. Increase intake of fruits and vegetables. If you experience any symptoms of facial drooping, slurred speech, arm or leg weakness, severe headache, vision changes or any worsening symptoms, notify provider immediately and return to ER. Please follow up with neurology, PM&R, and your primary care doctor regarding your recent rehab admission.      SECONDARY DISCHARGE DIAGNOSES  Diagnosis: Cerebral vasculitis  Assessment and Plan of Treatment: Your imaging at [facility] was concerning for vasculitis. You were started on steroids and continued on prednisone 50 mg daily while at rehab. You were followed by rheumatology and started on antibiotic prophylaxis while on high dose steroids. You will need a repeat cerebral angiogram in one month (~12/20/23). Please follow up with rheumatology and neurology on discharge and continue taking your medications as prescribed.    Diagnosis: Atrial fibrillation  Assessment and Plan of Treatment: Atrial fibrillation (A-fib) is an irregular and often very rapid heart rhythm (arrhythmia) that can lead to blood clots in the heart. A-fib increases the risk of stroke, heart failure and other heart-related complications. You should continue your medications and follow-up with your Cardiologist for further re-assessments and management.

## 2023-12-06 NOTE — DISCHARGE NOTE PROVIDER - DETAILS OF MALNUTRITION DIAGNOSIS/DIAGNOSES
This patient has been assessed with a concern for Malnutrition and was treated during this hospitalization for the following Nutrition diagnosis/diagnoses:     -  12/07/2023: Morbid obesity (BMI > 40)

## 2023-12-06 NOTE — DISCHARGE NOTE PROVIDER - CARE PROVIDER_API CALL
Demetrius Jacobs  Physical/Rehab Medicine  [address] [place] [address]  Phone: [phone]  Fax: [phone]  Follow Up Time: 1 month    Gavin Calvo  Neurology  [address]  [place] [address]  Phone: [phone]  Fax: [phone]  Follow Up Time: 1 week    Khadijah Hartley  Radiology  [address]  [place] [address]  Phone: [phone]  Fax: [phone]  Follow Up Time: 1 week    Brando Lazar  Cardiology  [address], [address]  [place] [address]  Phone: [phone]  Fax: [phone]  Follow Up Time: 1 week    Kenya Naik  Rheumatology  [address]  [place] [address]  Phone: [phone]  Fax: [phone]  Follow Up Time: 1 week   Demetrius Jacobs  Physical/Rehab Medicine  [address] [place] [address]  Phone: [phone]  Fax: [phone]  Follow Up Time: 1 month    Gavin Calvo  Neurology  [address]  [place] [address]  Phone: [phone]  Fax: [phone]  Follow Up Time: 1 week    Khadijah Hartley  Radiology  [address]  [place] [address]  Phone: [phone]  Fax: [phone]  Follow Up Time: 1 week    Brando Lazar  Cardiology  [address]  [place] [address]  Phone: [phone]  Fax: [phone]  Follow Up Time: 1 week    Kenya Naik  Rheumatology  [address]  [place] [address]  Phone: [phone]  Fax: [phone]  Follow Up Time: 1 week

## 2023-12-06 NOTE — DISCHARGE NOTE PROVIDER - CARE PROVIDERS DIRECT ADDRESSES
,DirectAddress_Unknown,DirectAddress_Unknown,DirectAddress_Unknown,DirectAddress_Unknown,[email or web address] ,DirectAddress_Unknown,DirectAddress_Unknown,DirectAddress_Unknown,DirectAddress_Unknown,[email or web address]

## 2023-12-06 NOTE — DISCHARGE NOTE PROVIDER - NPI NUMBER (FOR SYSADMIN USE ONLY) :
[[id number]] [[id number]],[[id number]],[[id number]],[[id number]]

## 2023-12-06 NOTE — DISCHARGE NOTE PROVIDER - NSDCHHBASESERVICE_GEN_ALL_CORE
ED Time Seen By Provider Entered On:  1/9/2020 10:53     Performed On:  1/9/2020 10:53  by Rian Hill MD               Time Seen By Provider   Time Seen by Provider :   1/9/2020 10:53    Rian Hill MD - 1/9/2020 10:53                Electronically Signed On 01.09.2020 10:53  ___________________________________________________   Rian Hill MD     Occupational therapy/Physical therapy/Speech language pathology

## 2023-12-06 NOTE — DISCHARGE NOTE PROVIDER - PROVIDER TOKENS
PROVIDER:[TOKEN:[[id number]],FOLLOWUP:[1 month]],PROVIDER:[TOKEN:[[id number]],FOLLOWUP:[1 week]],PROVIDER:[TOKEN:[[id number]],FOLLOWUP:[1 week]],PROVIDER:[TOKEN:[[id number]],FOLLOWUP:[1 week]],PROVIDER:[TOKEN:[[id number]],FOLLOWUP:[1 week]] PROVIDER:[TOKEN:[[id number]],FOLLOWUP:[1 month]],PROVIDER:[TOKEN:[[id number]],FOLLOWUP:[1 week]],PROVIDER:[TOKEN:[[id number]],FOLLOWUP:[1 week]],PROVIDER:[TOKEN:[[id number]],FOLLOWUP:[1 week]],PROVIDER:[TOKEN:[[id number]],FOLLOWUP:[1 week]]

## 2023-12-06 NOTE — DISCHARGE NOTE PROVIDER - HOSPITAL COURSE
HPI:  Mrs. Екатерина Poole is a 58 year old female patient with past medical history of HTN and rheumatoid arthritis who presented to [facility] on 11/15 for AMS when a neighbor overhead patient was looking for her mom and she was found confused/wandering around apartment complex. Imaging studies initially performed reported bilateral subacute to remote embolic strokes, a right posterior corona radiata acute infarct, and multiple petechial hemorrhages in bilateral thalamus and basal ganglia. She was additionally noted to have KRUNAL which has since resolved. Hypertension managed with amlodipine, aldactone, and losartan and she was started on B12 supplements. Patient seen by Rheumatology and was transferred to [facility] on 11/20 for cerebral angiogram.    A cerebral angiogram performed on 11/20 reported multiple areas of focal stenosis and dilatation concerning for vasculitis. An LP was completed on 11/21 with a normal profile. Rheumatology consulted and recommended possible brain biopsy to confirm diagnosis of vasculitis but the risks of performing an open biopsy outweighed the benefits as the results of the biopsy were deemed unlikely to . She was seen by Neurosurgery with patient's neuroradiologic findings including beading on cerebral angiogram (consistent with vasculitis) and clinical presentation, with no other etiology of vasculitis, point to PACNS. Patient was started on steroids given her micro hemorrhage and strokes likely attributed to vasculitis. Steroids started on 11/24 with initial dose Solumedrol 1g for 3 days. She was to continue Prednisone 60mg with a decrease to 50mg daily for discharge to acute in-patient rehabilitation. No taper indicated per rheumatology evaluation and recommendations. An EEG was additionally performed and reported moderate diffuse/multi-focal cerebral dysfunction, not specific as to etiology. There were no epileptiform abnormalities recorded. Will need a repeat cerebral angiogram in 1 month which should take place around 12/20/23. Patient started on Eliquis 5 mg BID, amiodarone, and metoprolol for Afib. TTE reported as normal with global left ventricular systolic function, mild mitral valve regurgitation, mild aortic regurgitation, and a sclerotic aortic valve with normal opening. Hospitalization additionally significant for a rapid response on 11/20 due to acute mental status changes after being found to be slumped over while on toilet by staff prompting MRI imaging reporting:  ·	Left parietal small cortical acute infarction  ·	Innumerable scattered chronic microhemorrhages in the brainstem, bilateral cerebellar hemispheres, deep gray nuclei and peripherally within the cerebral hemispheres which may be secondary to chronic hypertensive encephalopathy.  ·	Severe extensive chronic microvascular ischemic changes and multifocal chronic lacunar infarcts as detailed above.  Patient evaluated by PT/OT and was recommended for acute inpatient rehab. Patient is medically stable for discharge to [facility] on 11/30. (30 Nov 2023 14:36)    REHAB COURSE:  Patient participated in daily therapies and made good functional gains.  Rehab course notable for RRT/Code Stroke on the day after arrival due to syncope after BM. CT head obtained and stable. Mental status improved. Rheumatology and Neurology consulted. Patient started on Bactrim MWF for PCP ppx while on high dose steroids. Patient developed leukocytosis likely 2/2/ steroids. Afebrile and asymptomatic.     Patient tolerated course of inpatient PT/OT/SLP rehab with significant functional improvements. Patient seen and examined on day of discharge.  Medications, medication side effects, and discharge instructions were reviewed with the patient, who expressed understanding of all information.  Patient was medically and functionally optimized and cleared for discharge. HPI:  Mrs. Екатерина Poole is a 58 year old female patient with past medical history of HTN and rheumatoid arthritis who presented to [place] on 11/15 for AMS when a neighbor overhead patient was looking for her mom and she was found confused/wandering around apartment complex. Imaging studies initially performed reported bilateral subacute to remote embolic strokes, a right posterior corona radiata acute infarct, and multiple petechial hemorrhages in bilateral thalamus and basal ganglia. She was additionally noted to have KRUNAL which has since resolved. Hypertension managed with amlodipine, aldactone, and losartan and she was started on B12 supplements. Patient seen by Rheumatology and was transferred to [facility] on 11/20 for cerebral angiogram.    A cerebral angiogram performed on 11/20 reported multiple areas of focal stenosis and dilatation concerning for vasculitis. An LP was completed on 11/21 with a normal profile. Rheumatology consulted and recommended possible brain biopsy to confirm diagnosis of vasculitis but the risks of performing an open biopsy outweighed the benefits as the results of the biopsy were deemed unlikely to . She was seen by Neurosurgery with patient's neuroradiologic findings including beading on cerebral angiogram (consistent with vasculitis) and clinical presentation, with no other etiology of vasculitis, point to PACNS. Patient was started on steroids given her micro hemorrhage and strokes likely attributed to vasculitis. Steroids started on 11/24 with initial dose Solumedrol 1g for 3 days. She was to continue Prednisone 60mg with a decrease to 50mg daily for discharge to acute in-patient rehabilitation. No taper indicated per rheumatology evaluation and recommendations. An EEG was additionally performed and reported moderate diffuse/multi-focal cerebral dysfunction, not specific as to etiology. There were no epileptiform abnormalities recorded. Will need a repeat cerebral angiogram in 1 month which should take place around 12/20/23. Patient started on Eliquis 5 mg BID, amiodarone, and metoprolol for Afib. TTE reported as normal with global left ventricular systolic function, mild mitral valve regurgitation, mild aortic regurgitation, and a sclerotic aortic valve with normal opening. Hospitalization additionally significant for a rapid response on 11/20 due to acute mental status changes after being found to be slumped over while on toilet by staff prompting MRI imaging reporting:  ·	Left parietal small cortical acute infarction  ·	Innumerable scattered chronic microhemorrhages in the brainstem, bilateral cerebellar hemispheres, deep gray nuclei and peripherally within the cerebral hemispheres which may be secondary to chronic hypertensive encephalopathy.  ·	Severe extensive chronic microvascular ischemic changes and multifocal chronic lacunar infarcts as detailed above.  Patient evaluated by PT/OT and was recommended for acute inpatient rehab. Patient is medically stable for discharge to [facility] on 11/30. (30 Nov 2023 14:36)    REHAB COURSE:  Patient participated in daily therapies and made good functional gains.  Rehab course notable for RRT/Code Stroke on the day after arrival due to syncope after BM. CT head obtained and stable. Mental status improved. Rheumatology and Neurology consulted. Patient started on Bactrim MWF for PCP ppx while on high dose steroids. Patient developed leukocytosis likely 2/2/ steroids. Afebrile and asymptomatic.     Patient tolerated course of inpatient PT/OT/SLP rehab with significant functional improvements. Patient seen and examined on day of discharge.  Medications, medication side effects, and discharge instructions were reviewed with the patient, who expressed understanding of all information.  Patient was medically and functionally optimized and cleared for discharge.

## 2023-12-06 NOTE — PROGRESS NOTE ADULT - SUBJECTIVE AND OBJECTIVE BOX
HPI:  Mrs. Екатерина Poole is a 58 year old female patient with past medical history of HTN and rheumatoid arthritis who presented to [facility] on 11/15 for AMS when a neighbor overhead patient was looking for her mom and she was found confused/wandering around apartment complex. Imaging studies initially performed reported bilateral subacute to remote embolic strokes, a right posterior corona radiata acute infarct, and multiple petechial hemorrhages in bilateral thalamus and basal ganglia. She was additionally noted to have KRUNAL which has since resolved. Hypertension managed with amlodipine, aldactone, and losartan and she was started on B12 supplements. Patient seen by Rheumatology and was transferred to [facility] on 11/20 for cerebral angiogram.    A cerebral angiogram performed on 11/20 reported multiple areas of focal stenosis and dilatation concerning for vasculitis. An LP was completed on 11/21 with a normal profile. Rheumatology consulted and recommended possible brain biopsy to confirm diagnosis of vasculitis but the risks of performing an open biopsy outweighed the benefits as the results of the biopsy were deemed unlikely to . She was seen by Neurosurgery with patient's neuroradiologic findings including beading on cerebral angiogram (consistent with vasculitis) and clinical presentation, with no other etiology of vasculitis, point to PACNS. Patient was started on steroids given her micro hemorrhage and strokes likely attributed to vasculitis. Steroids started on 11/24 with initial dose Solumedrol 1g for 3 days. She was to continue Prednisone 60mg with a decrease to 50mg daily for discharge to acute in-patient rehabilitation. No taper indicated per rheumatology evaluation and recommendations. An EEG was additionally performed and reported moderate diffuse/multi-focal cerebral dysfunction, not specific as to etiology. There were no epileptiform abnormalities recorded. Will need a repeat cerebral angiogram in 1 month which should take place around 12/20/23. Patient started on Eliquis 5 mg BID, amiodarone, and metoprolol for Afib. TTE reported as normal with global left ventricular systolic function, mild mitral valve regurgitation, mild aortic regurgitation, and a sclerotic aortic valve with normal opening. Hospitalization additionally significant for a rapid response on 11/20 due to acute mental status changes after being found to be slumped over while on toilet by staff prompting MRI imaging reporting:  ·	Left parietal small cortical acute infarction  ·	Innumerable scattered chronic microhemorrhages in the brainstem, bilateral cerebellar hemispheres, deep gray nuclei and peripherally within the cerebral hemispheres which may be secondary to chronic hypertensive encephalopathy.  ·	Severe extensive chronic microvascular ischemic changes and multifocal chronic lacunar infarcts as detailed above.  ·	Patient evaluated by PT/OT and was recommended for acute inpatient rehab. Patient is medically stable for discharge to [facility] on 11/30. (30 Nov 2023 14:36)    TDD: 12/14/23 JUAN  ___________________________________________________________________________    SUBJECTIVE/ROS  Patient was seen and evaluated at bedside today.  Reported no overnight events and is in no acute distress.  BP elevated and discussed with Hospitalist for recommendations.  Patient remains purposeful and cooperative .  Case was discussed at Interdisciplinary Team meeting yesterday.  A tentative discharge date is outlined above.  Patient is motivated to continue participation on the recommended rehabilitation program.   Denies any CP, SOB, LAURA, palpitations, fever, chills, body aches, cough, congestion, or any other symptoms at this time.   ___________________________________________________________________________    EEG (12/04/23)  Classification / Summary:  Abnormal routine EEG in the drowsy and asleep states with intermittent arousals. Unclear if full wakefulness is captured.  Occasional left > right temporal focal slowing.  Mild diffuse slowing vs. excess drowsiness.  No epileptiform abnormalities are captured.     Clinical Impression:  Left > right temporal focal cerebral dysfunction can be structural or functional in etiology.  Mild diffuse cerebral dysfunction is nonspecific in etiology. Alternatively, excess drowsiness may be contributing.    ___________________________________________________________________________    CT Head No Cont (12.01.23 @ 10:40)   Moderate to severe chronic microvascular changes without evidence of an acute transcortical infarction or hemorrhage.    CT Angio Brain Stroke Protocol  w/ IV Cont (12.01.23 @ 13:03)   Negative CT perfusion. CTA demonstrates persistent narrowing of the anterior and middle cerebral arteries consistent with vasospasm or vasculitis.  ___________________________________________________________________________    Vital Signs Last 24 Hrs  T(C): 37 (06 Dec 2023 08:55), Max: 37.1 (05 Dec 2023 19:29)  T(F): 98.6 (06 Dec 2023 08:55), Max: 98.7 (05 Dec 2023 19:29)  HR: 58 (06 Dec 2023 08:55) (55 - 64)  BP: 151/85 (06 Dec 2023 08:55) (136/84 - 169/75)  RR: 16 (06 Dec 2023 08:55) (16 - 16)  SpO2: 95% (06 Dec 2023 08:55) (95% - 98%)  ___________________________________________________________________________    LABS                          9.6    16.54 )-----------( 383      ( 04 Dec 2023 05:51 )             27.0       12-04    138  |  100  |  26<H>  ----------------------------<  89  3.5   |  30  |  1.11    Ca    8.4      04 Dec 2023 05:51    TPro  6.3  /  Alb  2.5<L>  /  TBili  0.3  /  DBili  x   /  AST  11  /  ALT  23  /  AlkPhos  71  12-04      ___________________________________________________________________________    MEDICATIONS  (STANDING):  aMIOdarone    Tablet 200 milliGRAM(s) Oral daily  apixaban 5 milliGRAM(s) Oral two times a day  atorvastatin 80 milliGRAM(s) Oral at bedtime  bisacodyl 5 milliGRAM(s) Oral at bedtime  dextrose 5%. 1000 milliLiter(s) (50 mL/Hr) IV Continuous <Continuous>  dextrose 5%. 1000 milliLiter(s) (100 mL/Hr) IV Continuous <Continuous>  dextrose 50% Injectable 25 Gram(s) IV Push once  dextrose 50% Injectable 12.5 Gram(s) IV Push once  dextrose 50% Injectable 25 Gram(s) IV Push once  glucagon  Injectable 1 milliGRAM(s) IntraMuscular once  influenza   Vaccine 0.5 milliLiter(s) IntraMuscular once  insulin lispro (ADMELOG) corrective regimen sliding scale   SubCutaneous at bedtime  insulin lispro (ADMELOG) corrective regimen sliding scale   SubCutaneous three times a day before meals  losartan 25 milliGRAM(s) Oral daily  metoprolol tartrate 50 milliGRAM(s) Oral three times a day  pantoprazole    Tablet 40 milliGRAM(s) Oral daily  predniSONE   Tablet 50 milliGRAM(s) Oral daily  senna 2 Tablet(s) Oral at bedtime  trimethoprim  160 mG/sulfamethoxazole 800 mG 1 Tablet(s) Oral <User Schedule>    MEDICATIONS  (PRN):  dextrose Oral Gel 15 Gram(s) Oral once PRN Blood Glucose LESS THAN 70 milliGRAM(s)/deciliter    ___________________________________________________________________________    PHYSICAL EXAM:  Gen - NAD, Comfortable  HEENT - NCAT, EOMI, MMM  Pulm - breathing comfortably on room air  Cardiovascular - warm and well perfused  Abdomen - Soft, NT/ND  Extremities - No C/C/E, No calf tenderness  Neuro-     Cognitive - oriented to name, states rehab in [place] for place, states February 2023 for time     Communication - Fluent, No dysarthria     Attention: able to state days of the week backwards     Cranial Nerves - no facial asymmetry, EOMI     Motor -                     LEFT    UE - ShAB 5/5, EF 5/5, EE 5/5, WE 5/5,  5/5                    RIGHT UE - ShAB 5/5, EF 5/5, EE 5/5, WE 5/5,  5/5                    LEFT    LE - HF 4/5, KE 5/5, DF 5/5, PF 5/5                    RIGHT LE - HF 4/5, KE 5/5, DF 5/5, PF 5/5        Sensory - Intact to LT     Reflexes - DTR Intact, No primitive reflexive     Coordination - FTN intact     Tone - normal  Psychiatric - Mood stable, Affect WNL  Skin:  all skin intact    ___________________________________________________________________________ HPI:  Mrs. Екатерина Poole is a 58 year old female patient with past medical history of HTN and rheumatoid arthritis who presented to [place] on 11/15 for AMS when a neighbor overhead patient was looking for her mom and she was found confused/wandering around apartment complex. Imaging studies initially performed reported bilateral subacute to remote embolic strokes, a right posterior corona radiata acute infarct, and multiple petechial hemorrhages in bilateral thalamus and basal ganglia. She was additionally noted to have KRUNAL which has since resolved. Hypertension managed with amlodipine, aldactone, and losartan and she was started on B12 supplements. Patient seen by Rheumatology and was transferred to [facility] on 11/20 for cerebral angiogram.    A cerebral angiogram performed on 11/20 reported multiple areas of focal stenosis and dilatation concerning for vasculitis. An LP was completed on 11/21 with a normal profile. Rheumatology consulted and recommended possible brain biopsy to confirm diagnosis of vasculitis but the risks of performing an open biopsy outweighed the benefits as the results of the biopsy were deemed unlikely to . She was seen by Neurosurgery with patient's neuroradiologic findings including beading on cerebral angiogram (consistent with vasculitis) and clinical presentation, with no other etiology of vasculitis, point to PACNS. Patient was started on steroids given her micro hemorrhage and strokes likely attributed to vasculitis. Steroids started on 11/24 with initial dose Solumedrol 1g for 3 days. She was to continue Prednisone 60mg with a decrease to 50mg daily for discharge to acute in-patient rehabilitation. No taper indicated per rheumatology evaluation and recommendations. An EEG was additionally performed and reported moderate diffuse/multi-focal cerebral dysfunction, not specific as to etiology. There were no epileptiform abnormalities recorded. Will need a repeat cerebral angiogram in 1 month which should take place around 12/20/23. Patient started on Eliquis 5 mg BID, amiodarone, and metoprolol for Afib. TTE reported as normal with global left ventricular systolic function, mild mitral valve regurgitation, mild aortic regurgitation, and a sclerotic aortic valve with normal opening. Hospitalization additionally significant for a rapid response on 11/20 due to acute mental status changes after being found to be slumped over while on toilet by staff prompting MRI imaging reporting:  ·	Left parietal small cortical acute infarction  ·	Innumerable scattered chronic microhemorrhages in the brainstem, bilateral cerebellar hemispheres, deep gray nuclei and peripherally within the cerebral hemispheres which may be secondary to chronic hypertensive encephalopathy.  ·	Severe extensive chronic microvascular ischemic changes and multifocal chronic lacunar infarcts as detailed above.  ·	Patient evaluated by PT/OT and was recommended for acute inpatient rehab. Patient is medically stable for discharge to [facility] on 11/30. (30 Nov 2023 14:36)    TDD: 12/14/23 JUAN  ___________________________________________________________________________    SUBJECTIVE/ROS  Patient was seen and evaluated at bedside today.  Reported no overnight events and is in no acute distress.  BP elevated and discussed with Hospitalist for recommendations.  Patient remains purposeful and cooperative .  Case was discussed at Interdisciplinary Team meeting yesterday.  A tentative discharge date is outlined above.  Patient is motivated to continue participation on the recommended rehabilitation program.   Denies any CP, SOB, LAURA, palpitations, fever, chills, body aches, cough, congestion, or any other symptoms at this time.   ___________________________________________________________________________    EEG (12/04/23)  Classification / Summary:  Abnormal routine EEG in the drowsy and asleep states with intermittent arousals. Unclear if full wakefulness is captured.  Occasional left > right temporal focal slowing.  Mild diffuse slowing vs. excess drowsiness.  No epileptiform abnormalities are captured.     Clinical Impression:  Left > right temporal focal cerebral dysfunction can be structural or functional in etiology.  Mild diffuse cerebral dysfunction is nonspecific in etiology. Alternatively, excess drowsiness may be contributing.    ___________________________________________________________________________    CT Head No Cont (12.01.23 @ 10:40)   Moderate to severe chronic microvascular changes without evidence of an acute transcortical infarction or hemorrhage.    CT Angio Brain Stroke Protocol  w/ IV Cont (12.01.23 @ 13:03)   Negative CT perfusion. CTA demonstrates persistent narrowing of the anterior and middle cerebral arteries consistent with vasospasm or vasculitis.  ___________________________________________________________________________    Vital Signs Last 24 Hrs  T(C): 37 (06 Dec 2023 08:55), Max: 37.1 (05 Dec 2023 19:29)  T(F): 98.6 (06 Dec 2023 08:55), Max: 98.7 (05 Dec 2023 19:29)  HR: 58 (06 Dec 2023 08:55) (55 - 64)  BP: 151/85 (06 Dec 2023 08:55) (136/84 - 169/75)  RR: 16 (06 Dec 2023 08:55) (16 - 16)  SpO2: 95% (06 Dec 2023 08:55) (95% - 98%)  ___________________________________________________________________________    LABS                          9.6    16.54 )-----------( 383      ( 04 Dec 2023 05:51 )             27.0       12-04    138  |  100  |  26<H>  ----------------------------<  89  3.5   |  30  |  1.11    Ca    8.4      04 Dec 2023 05:51    TPro  6.3  /  Alb  2.5<L>  /  TBili  0.3  /  DBili  x   /  AST  11  /  ALT  23  /  AlkPhos  71  12-04      ___________________________________________________________________________    MEDICATIONS  (STANDING):  aMIOdarone    Tablet 200 milliGRAM(s) Oral daily  apixaban 5 milliGRAM(s) Oral two times a day  atorvastatin 80 milliGRAM(s) Oral at bedtime  bisacodyl 5 milliGRAM(s) Oral at bedtime  dextrose 5%. 1000 milliLiter(s) (50 mL/Hr) IV Continuous <Continuous>  dextrose 5%. 1000 milliLiter(s) (100 mL/Hr) IV Continuous <Continuous>  dextrose 50% Injectable 25 Gram(s) IV Push once  dextrose 50% Injectable 12.5 Gram(s) IV Push once  dextrose 50% Injectable 25 Gram(s) IV Push once  glucagon  Injectable 1 milliGRAM(s) IntraMuscular once  influenza   Vaccine 0.5 milliLiter(s) IntraMuscular once  insulin lispro (ADMELOG) corrective regimen sliding scale   SubCutaneous at bedtime  insulin lispro (ADMELOG) corrective regimen sliding scale   SubCutaneous three times a day before meals  losartan 25 milliGRAM(s) Oral daily  metoprolol tartrate 50 milliGRAM(s) Oral three times a day  pantoprazole    Tablet 40 milliGRAM(s) Oral daily  predniSONE   Tablet 50 milliGRAM(s) Oral daily  senna 2 Tablet(s) Oral at bedtime  trimethoprim  160 mG/sulfamethoxazole 800 mG 1 Tablet(s) Oral <User Schedule>    MEDICATIONS  (PRN):  dextrose Oral Gel 15 Gram(s) Oral once PRN Blood Glucose LESS THAN 70 milliGRAM(s)/deciliter    ___________________________________________________________________________    PHYSICAL EXAM:  Gen - NAD, Comfortable  HEENT - NCAT, EOMI, MMM  Pulm - breathing comfortably on room air  Cardiovascular - warm and well perfused  Abdomen - Soft, NT/ND  Extremities - No C/C/E, No calf tenderness  Neuro-     Cognitive - oriented to name, states rehab in [place] for place, states February 2023 for time     Communication - Fluent, No dysarthria     Attention: able to state days of the week backwards     Cranial Nerves - no facial asymmetry, EOMI     Motor -                     LEFT    UE - ShAB 5/5, EF 5/5, EE 5/5, WE 5/5,  5/5                    RIGHT UE - ShAB 5/5, EF 5/5, EE 5/5, WE 5/5,  5/5                    LEFT    LE - HF 4/5, KE 5/5, DF 5/5, PF 5/5                    RIGHT LE - HF 4/5, KE 5/5, DF 5/5, PF 5/5        Sensory - Intact to LT     Reflexes - DTR Intact, No primitive reflexive     Coordination - FTN intact     Tone - normal  Psychiatric - Mood stable, Affect WNL  Skin:  all skin intact    ___________________________________________________________________________

## 2023-12-07 LAB
ALBUMIN SERPL ELPH-MCNC: 2.7 G/DL — LOW (ref 3.3–5)
ALBUMIN SERPL ELPH-MCNC: 2.7 G/DL — LOW (ref 3.3–5)
ALP SERPL-CCNC: 67 U/L — SIGNIFICANT CHANGE UP (ref 40–120)
ALP SERPL-CCNC: 67 U/L — SIGNIFICANT CHANGE UP (ref 40–120)
ALT FLD-CCNC: 23 U/L — SIGNIFICANT CHANGE UP (ref 10–45)
ALT FLD-CCNC: 23 U/L — SIGNIFICANT CHANGE UP (ref 10–45)
ANION GAP SERPL CALC-SCNC: 7 MMOL/L — SIGNIFICANT CHANGE UP (ref 5–17)
ANION GAP SERPL CALC-SCNC: 7 MMOL/L — SIGNIFICANT CHANGE UP (ref 5–17)
AST SERPL-CCNC: 8 U/L — LOW (ref 10–40)
AST SERPL-CCNC: 8 U/L — LOW (ref 10–40)
BILIRUB SERPL-MCNC: 0.4 MG/DL — SIGNIFICANT CHANGE UP (ref 0.2–1.2)
BILIRUB SERPL-MCNC: 0.4 MG/DL — SIGNIFICANT CHANGE UP (ref 0.2–1.2)
BUN SERPL-MCNC: 27 MG/DL — HIGH (ref 7–23)
BUN SERPL-MCNC: 27 MG/DL — HIGH (ref 7–23)
CALCIUM SERPL-MCNC: 9 MG/DL — SIGNIFICANT CHANGE UP (ref 8.4–10.5)
CALCIUM SERPL-MCNC: 9 MG/DL — SIGNIFICANT CHANGE UP (ref 8.4–10.5)
CHLORIDE SERPL-SCNC: 99 MMOL/L — SIGNIFICANT CHANGE UP (ref 96–108)
CHLORIDE SERPL-SCNC: 99 MMOL/L — SIGNIFICANT CHANGE UP (ref 96–108)
CO2 SERPL-SCNC: 33 MMOL/L — HIGH (ref 22–31)
CO2 SERPL-SCNC: 33 MMOL/L — HIGH (ref 22–31)
CREAT SERPL-MCNC: 1.17 MG/DL — SIGNIFICANT CHANGE UP (ref 0.5–1.3)
CREAT SERPL-MCNC: 1.17 MG/DL — SIGNIFICANT CHANGE UP (ref 0.5–1.3)
CULTURE RESULTS: SIGNIFICANT CHANGE UP
EGFR: 53 ML/MIN/1.73M2 — LOW
EGFR: 53 ML/MIN/1.73M2 — LOW
GLUCOSE SERPL-MCNC: 83 MG/DL — SIGNIFICANT CHANGE UP (ref 70–99)
GLUCOSE SERPL-MCNC: 83 MG/DL — SIGNIFICANT CHANGE UP (ref 70–99)
HCT VFR BLD CALC: 29.4 % — LOW (ref 34.5–45)
HCT VFR BLD CALC: 29.4 % — LOW (ref 34.5–45)
HGB BLD-MCNC: 10.3 G/DL — LOW (ref 11.5–15.5)
HGB BLD-MCNC: 10.3 G/DL — LOW (ref 11.5–15.5)
MCHC RBC-ENTMCNC: 29.2 PG — SIGNIFICANT CHANGE UP (ref 27–34)
MCHC RBC-ENTMCNC: 29.2 PG — SIGNIFICANT CHANGE UP (ref 27–34)
MCHC RBC-ENTMCNC: 35 GM/DL — SIGNIFICANT CHANGE UP (ref 32–36)
MCHC RBC-ENTMCNC: 35 GM/DL — SIGNIFICANT CHANGE UP (ref 32–36)
MCV RBC AUTO: 83.3 FL — SIGNIFICANT CHANGE UP (ref 80–100)
MCV RBC AUTO: 83.3 FL — SIGNIFICANT CHANGE UP (ref 80–100)
NRBC # BLD: 0 /100 WBCS — SIGNIFICANT CHANGE UP (ref 0–0)
NRBC # BLD: 0 /100 WBCS — SIGNIFICANT CHANGE UP (ref 0–0)
PLATELET # BLD AUTO: 340 K/UL — SIGNIFICANT CHANGE UP (ref 150–400)
PLATELET # BLD AUTO: 340 K/UL — SIGNIFICANT CHANGE UP (ref 150–400)
POTASSIUM SERPL-MCNC: 3.6 MMOL/L — SIGNIFICANT CHANGE UP (ref 3.5–5.3)
POTASSIUM SERPL-MCNC: 3.6 MMOL/L — SIGNIFICANT CHANGE UP (ref 3.5–5.3)
POTASSIUM SERPL-SCNC: 3.6 MMOL/L — SIGNIFICANT CHANGE UP (ref 3.5–5.3)
POTASSIUM SERPL-SCNC: 3.6 MMOL/L — SIGNIFICANT CHANGE UP (ref 3.5–5.3)
PROT SERPL-MCNC: 6.5 G/DL — SIGNIFICANT CHANGE UP (ref 6–8.3)
PROT SERPL-MCNC: 6.5 G/DL — SIGNIFICANT CHANGE UP (ref 6–8.3)
RBC # BLD: 3.53 M/UL — LOW (ref 3.8–5.2)
RBC # BLD: 3.53 M/UL — LOW (ref 3.8–5.2)
RBC # FLD: 13.4 % — SIGNIFICANT CHANGE UP (ref 10.3–14.5)
RBC # FLD: 13.4 % — SIGNIFICANT CHANGE UP (ref 10.3–14.5)
SODIUM SERPL-SCNC: 139 MMOL/L — SIGNIFICANT CHANGE UP (ref 135–145)
SODIUM SERPL-SCNC: 139 MMOL/L — SIGNIFICANT CHANGE UP (ref 135–145)
SPECIMEN SOURCE: SIGNIFICANT CHANGE UP
WBC # BLD: 16.98 K/UL — HIGH (ref 3.8–10.5)
WBC # BLD: 16.98 K/UL — HIGH (ref 3.8–10.5)
WBC # FLD AUTO: 16.98 K/UL — HIGH (ref 3.8–10.5)
WBC # FLD AUTO: 16.98 K/UL — HIGH (ref 3.8–10.5)

## 2023-12-07 PROCEDURE — 99232 SBSQ HOSP IP/OBS MODERATE 35: CPT

## 2023-12-07 RX ADMIN — Medication 50 MILLIGRAM(S): at 05:31

## 2023-12-07 RX ADMIN — AMIODARONE HYDROCHLORIDE 200 MILLIGRAM(S): 400 TABLET ORAL at 05:31

## 2023-12-07 RX ADMIN — APIXABAN 5 MILLIGRAM(S): 2.5 TABLET, FILM COATED ORAL at 18:09

## 2023-12-07 RX ADMIN — Medication 50 MILLIGRAM(S): at 13:17

## 2023-12-07 RX ADMIN — SENNA PLUS 2 TABLET(S): 8.6 TABLET ORAL at 21:07

## 2023-12-07 RX ADMIN — LOSARTAN POTASSIUM 25 MILLIGRAM(S): 100 TABLET, FILM COATED ORAL at 05:31

## 2023-12-07 RX ADMIN — PANTOPRAZOLE SODIUM 40 MILLIGRAM(S): 20 TABLET, DELAYED RELEASE ORAL at 11:24

## 2023-12-07 RX ADMIN — Medication 5 MILLIGRAM(S): at 21:07

## 2023-12-07 RX ADMIN — Medication 50 MILLIGRAM(S): at 21:07

## 2023-12-07 RX ADMIN — ATORVASTATIN CALCIUM 80 MILLIGRAM(S): 80 TABLET, FILM COATED ORAL at 21:07

## 2023-12-07 RX ADMIN — APIXABAN 5 MILLIGRAM(S): 2.5 TABLET, FILM COATED ORAL at 05:31

## 2023-12-07 NOTE — CHART NOTE - [id number]_GEN_A_CORE
NUTRITION FOLLOW UP    SOURCE: Patient [X)   Family [ ]    Medical Record (X)    Diet, Consistent Carbohydrate w/Evening Snack:   DASH/TLC {Sodium & Cholesterol Restricted} (12-01-23 @ 15:18) [Active]      Pt tolerating diet with report of good appetite, consuming >75% at most meals. POCT reviewed, WDL, A1c 4.9% 11/22/23, +prednisone. Pt denies N/V/D, constipation. Last BM 12/6. No nutrition related questions at this time.       CURRENT WEIGHT:  222.6lbs (11/30)    PERTINENT MEDS:   Pertinent Medications: MEDICATIONS  (STANDING):  aMIOdarone    Tablet 200 milliGRAM(s) Oral daily  apixaban 5 milliGRAM(s) Oral two times a day  atorvastatin 80 milliGRAM(s) Oral at bedtime  bisacodyl 5 milliGRAM(s) Oral at bedtime  glucagon  Injectable 1 milliGRAM(s) IntraMuscular once  influenza   Vaccine 0.5 milliLiter(s) IntraMuscular once  losartan 25 milliGRAM(s) Oral daily  metoprolol tartrate 50 milliGRAM(s) Oral three times a day  pantoprazole    Tablet 40 milliGRAM(s) Oral daily  predniSONE   Tablet 50 milliGRAM(s) Oral daily  senna 2 Tablet(s) Oral at bedtime  trimethoprim  160 mG/sulfamethoxazole 800 mG 1 Tablet(s) Oral <User Schedule>    MEDICATIONS  (PRN):      PERTINENT LABS:  12-07 Na139 mmol/L Glu 83 mg/dL K+ 3.6 mmol/L Cr  1.17 mg/dL BUN 27 mg/dL<H> 12-01 Phos 2.9 mg/dL 12-07 Alb 2.7 g/dL<L> 11-22 Chol 186 mg/dL LDL --    HDL 43 mg/dL<L> Trig 80 mg/dL    CAPILLARY BLOOD GLUCOSE      POCT Blood Glucose.: 112 mg/dL (06 Dec 2023 07:29)      SKIN:  no pressure ulcers  EDEMA: none  LAST BM: 12/6    ESTIMATED NEEDS:   [X] no change since previous assessment  [ ] recalculated:     PREVIOUS NUTRITION DIAGNOSIS:    1. Altered nutrition related lab values- improving     NUTRITION DIAGNOSIS is :  (x)  Ongoing       NEW NUTRITION DIAGNOSIS: N/A    NUTRITION RECOMMENDATIONS:   1. Continue current nutrition plan of care    MONITORING AND EVALUATION:   1. Tolerance to diet prescription   2. PO intake  3. Weights  4. Labs  5. Follow Up per protocol     RD to remain available   Felicia Berry RDN   [phone] or TEAMS NUTRITION FOLLOW UP    SOURCE: Patient [X)   Family [ ]    Medical Record (X)    Diet, Consistent Carbohydrate w/Evening Snack:   DASH/TLC {Sodium & Cholesterol Restricted} (12-01-23 @ 15:18) [Active]      Pt tolerating diet with report of good appetite, consuming >75% at most meals. POCT reviewed, WDL, A1c 4.9% 11/22/23, +prednisone. Pt denies N/V/D, constipation. Last BM 12/6. No nutrition related questions at this time.       CURRENT WEIGHT:  222.6lbs (11/30)    PERTINENT MEDS:   Pertinent Medications: MEDICATIONS  (STANDING):  aMIOdarone    Tablet 200 milliGRAM(s) Oral daily  apixaban 5 milliGRAM(s) Oral two times a day  atorvastatin 80 milliGRAM(s) Oral at bedtime  bisacodyl 5 milliGRAM(s) Oral at bedtime  glucagon  Injectable 1 milliGRAM(s) IntraMuscular once  influenza   Vaccine 0.5 milliLiter(s) IntraMuscular once  losartan 25 milliGRAM(s) Oral daily  metoprolol tartrate 50 milliGRAM(s) Oral three times a day  pantoprazole    Tablet 40 milliGRAM(s) Oral daily  predniSONE   Tablet 50 milliGRAM(s) Oral daily  senna 2 Tablet(s) Oral at bedtime  trimethoprim  160 mG/sulfamethoxazole 800 mG 1 Tablet(s) Oral <User Schedule>    MEDICATIONS  (PRN):      PERTINENT LABS:  12-07 Na139 mmol/L Glu 83 mg/dL K+ 3.6 mmol/L Cr  1.17 mg/dL BUN 27 mg/dL<H> 12-01 Phos 2.9 mg/dL 12-07 Alb 2.7 g/dL<L> 11-22 Chol 186 mg/dL LDL --    HDL 43 mg/dL<L> Trig 80 mg/dL    CAPILLARY BLOOD GLUCOSE      POCT Blood Glucose.: 112 mg/dL (06 Dec 2023 07:29)      SKIN:  no pressure ulcers  EDEMA: none  LAST BM: 12/6    ESTIMATED NEEDS:   [X] no change since previous assessment  [ ] recalculated:     PREVIOUS NUTRITION DIAGNOSIS:    1. Altered nutrition related lab values- improving     NUTRITION DIAGNOSIS is :  (x)  Ongoing       NEW NUTRITION DIAGNOSIS: N/A    NUTRITION RECOMMENDATIONS:   1. Continue current nutrition plan of care    MONITORING AND EVALUATION:   1. Tolerance to diet prescription   2. PO intake  3. Weights  4. Labs  5. Follow Up per protocol     RD to remain available   Felicia Berry RDN   [phone] or TEAMS NUTRITION FOLLOW UP    SOURCE: Patient [X)   Family [ ]    Medical Record (X)    Diet, Consistent Carbohydrate w/Evening Snack:   DASH/TLC {Sodium & Cholesterol Restricted} (12-01-23 @ 15:18) [Active]      Pt tolerating diet with report of good appetite, consuming >75% at most meals. POCT reviewed, WDL, A1c 4.9% 11/22/23, +prednisone. Pt denies N/V/D, constipation. Last BM 12/6. No nutrition related questions at this time.       CURRENT WEIGHT:  222.6lbs (11/30)  BMI 40.7    PERTINENT MEDS:   Pertinent Medications: MEDICATIONS  (STANDING):  aMIOdarone    Tablet 200 milliGRAM(s) Oral daily  apixaban 5 milliGRAM(s) Oral two times a day  atorvastatin 80 milliGRAM(s) Oral at bedtime  bisacodyl 5 milliGRAM(s) Oral at bedtime  glucagon  Injectable 1 milliGRAM(s) IntraMuscular once  influenza   Vaccine 0.5 milliLiter(s) IntraMuscular once  losartan 25 milliGRAM(s) Oral daily  metoprolol tartrate 50 milliGRAM(s) Oral three times a day  pantoprazole    Tablet 40 milliGRAM(s) Oral daily  predniSONE   Tablet 50 milliGRAM(s) Oral daily  senna 2 Tablet(s) Oral at bedtime  trimethoprim  160 mG/sulfamethoxazole 800 mG 1 Tablet(s) Oral <User Schedule>    MEDICATIONS  (PRN):      PERTINENT LABS:  12-07 Na139 mmol/L Glu 83 mg/dL K+ 3.6 mmol/L Cr  1.17 mg/dL BUN 27 mg/dL<H> 12-01 Phos 2.9 mg/dL 12-07 Alb 2.7 g/dL<L> 11-22 Chol 186 mg/dL LDL --    HDL 43 mg/dL<L> Trig 80 mg/dL    CAPILLARY BLOOD GLUCOSE      POCT Blood Glucose.: 112 mg/dL (06 Dec 2023 07:29)      SKIN:  no pressure ulcers  EDEMA: none  LAST BM: 12/6    ESTIMATED NEEDS:   [X] no change since previous assessment  [ ] recalculated:     PREVIOUS NUTRITION DIAGNOSIS:    1. Altered nutrition related lab values- improving     NUTRITION DIAGNOSIS is :  (x)  Ongoing       NEW NUTRITION DIAGNOSIS: Obesity related to excessive energy intake relative to expenditure as evidenced by BMI 40.7    NUTRITION RECOMMENDATIONS:   1. Continue current nutrition plan of care  2. Ongoing diet education     MONITORING AND EVALUATION:   1. Tolerance to diet prescription   2. PO intake  3. Weights  4. Labs  5. Follow Up per protocol     RD to remain available   Felicia Berry RDN   [phone] or TEAMS NUTRITION FOLLOW UP    SOURCE: Patient [X)   Family [ ]    Medical Record (X)    Diet, Consistent Carbohydrate w/Evening Snack:   DASH/TLC {Sodium & Cholesterol Restricted} (12-01-23 @ 15:18) [Active]      Pt tolerating diet with report of good appetite, consuming >75% at most meals. POCT reviewed, WDL, A1c 4.9% 11/22/23, +prednisone. Pt denies N/V/D, constipation. Last BM 12/6. No nutrition related questions at this time.       CURRENT WEIGHT:  222.6lbs (11/30)  BMI 40.7    PERTINENT MEDS:   Pertinent Medications: MEDICATIONS  (STANDING):  aMIOdarone    Tablet 200 milliGRAM(s) Oral daily  apixaban 5 milliGRAM(s) Oral two times a day  atorvastatin 80 milliGRAM(s) Oral at bedtime  bisacodyl 5 milliGRAM(s) Oral at bedtime  glucagon  Injectable 1 milliGRAM(s) IntraMuscular once  influenza   Vaccine 0.5 milliLiter(s) IntraMuscular once  losartan 25 milliGRAM(s) Oral daily  metoprolol tartrate 50 milliGRAM(s) Oral three times a day  pantoprazole    Tablet 40 milliGRAM(s) Oral daily  predniSONE   Tablet 50 milliGRAM(s) Oral daily  senna 2 Tablet(s) Oral at bedtime  trimethoprim  160 mG/sulfamethoxazole 800 mG 1 Tablet(s) Oral <User Schedule>    MEDICATIONS  (PRN):      PERTINENT LABS:  12-07 Na139 mmol/L Glu 83 mg/dL K+ 3.6 mmol/L Cr  1.17 mg/dL BUN 27 mg/dL<H> 12-01 Phos 2.9 mg/dL 12-07 Alb 2.7 g/dL<L> 11-22 Chol 186 mg/dL LDL --    HDL 43 mg/dL<L> Trig 80 mg/dL    CAPILLARY BLOOD GLUCOSE      POCT Blood Glucose.: 112 mg/dL (06 Dec 2023 07:29)      SKIN:  no pressure ulcers  EDEMA: none  LAST BM: 12/6    ESTIMATED NEEDS:   [X] no change since previous assessment  [ ] recalculated:     PREVIOUS NUTRITION DIAGNOSIS:    1. Altered nutrition related lab values- improving     NUTRITION DIAGNOSIS is :  (x)  Ongoing       NEW NUTRITION DIAGNOSIS: Obesity related to excessive energy intake relative to expenditure as evidenced by BMI 40.7    NUTRITION RECOMMENDATIONS:   1. Continue current nutrition plan of care  2. Ongoing diet education     MONITORING AND EVALUATION:   1. Tolerance to diet prescription   2. PO intake  3. Weights  4. Labs  5. Follow Up per protocol     RD to remain available   Felicia Berry RDN   [phone] or TEAMS

## 2023-12-07 NOTE — DIETITIAN NUTRITION RISK NOTIFICATION - TREATMENT: THE FOLLOWING DIET HAS BEEN RECOMMENDED
Diet, Consistent Carbohydrate w/Evening Snack:   DASH/TLC {Sodium & Cholesterol Restricted} (12-01-23 @ 15:18) [Active]

## 2023-12-07 NOTE — PROGRESS NOTE ADULT - ASSESSMENT
Assessment/Plan:  Mrs. Екатерина Poole is a 58 year old female patient with past medical history of HTN and rheumatoid arthritis who is admitted for Acute Inpatient Rehabilitation with a multidisciplinary rehab program at [facility] with functional impairments in ADLs and mobility secondary to left hemiparesis resulting from bilateral subacute to remote embolic strokes, a right posterior corona radiata acute infarct, multiple petechial hemorrhages in bilateral thalamus and basal ganglia with etiology highly indicative from underlying vasculitis and a subsequent left parietal cortical acute infarction of a likely cardioembolic source.    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 Maintain dose per rheumatology eval/recommendation.  - Left hemiparesis, impaired ADLs and mobility, need for assistance with personal care   - Start comprehensive rehab program of PT/OT/SLP - 3 hours a day, 5 days a week. P&O as needed   - Fall/Aspiration precautions  - Will need repeat cerebral angiogram in 1 month (~12/20/23)  - Rheumatology and Neurology following  - Continue prednisone 50 mg QD  - Bactrim DS 1 tab MWF for PCP ppx while on high dose steroids  - EEG (12/4) no epileptic activity    # leukocytosis  - WBC 17.18->16.54->16.98  - likely steroid induced, was started on steroids 11/24, WBC started uptrending after  - afebrile, asymptomatic  - monitor CBC, hospitalist follow up    # RRT/Code Stroke with syncope after BM 12/1  - Head CT stable as above  - rheumatology and neuro consulted  - EEG (12/4) no epileptic activity    Afib  - amiodarone 200 mg QD  - metoprolol 50 mg TID  - Eliquis 5 mg BID    HTN  - continue Losartan 25 mg QD  - Norvasc 10 mg discontinued per Hospitalist  - Monitor BP    HLD  - cont atorvastatin 80 mg QHS    Hyperglycemia in setting of steroid use  - SSI  - Monitor fingersticks    Mood / Cognition  - Neuropsychology consult PRN    Sleep  - Maintain quiet hours and a low stim environment.     GI / Bowel  - Senna qHS  - Dulcolax 5 mg QHS  - GI ppx: pantoprazole 40 mg QD     / Bladder  - Bladder scans Q8 hours with straight cath for >400cc.  - Toileting schedule every 4 hours    Skin:  - Skin assessment on admission performed : Intact  - Pressure Injury/Skin: OOB to chair, PT/OT  - nursing to monitor skin q Shift    Diet/Dysphagia:  - Diet Consistency: regular  - Aspiration Precautions  - SLP consult for swallow function evaluation and treatment  - Nutrition consult    DVT prophylaxis:   - on Eliquis 5 mg BID      Outpatient Follow-up:  Gavin Calvo  Neurology  [address], [address]  [place] [address]  Phone: [phone]  Fax: [phone]  Follow Up Time: 2 weeks    Khadijah Hartley  Radiology  [address]  [place] [address]  Phone: [phone]  Fax: [phone]  Follow Up Time: 2 weeks    Brando Lazar  Cardiology  [address], [address]  [place] [address]  Phone: [phone]  Fax: [phone]  Follow Up Time: 2 weeks    Kenya Naik  Rheumatology  [address]  [place] [address]  Phone: [phone]  Fax: [phone]  Follow Up Time: 2 weeks      Code Status/Emergency Contact:    ---------------    Goals: Safe discharge to home  Estimated Length of Stay: 10-14 days  Rehab Potential: Good  Medical Prognosis: Good  Estimated Disposition: Home with home care      PRESCREEN COMPARISON:  I have reviewed the prescreen information and I have found no relevant changes between the preadmission screening and my post admission evaluation.    RATIONALE FOR INPATIENT ADMISSION: Patient demonstrates the following:  [X] Medically appropriate for rehabilitation admission  [X] Has attainable rehab goals with an appropriate initial discharge plan  [X]Has rehabilitation potential (expected to make a significant improvement within a reasonable period of time)  [X] Requires close medical management by a rehab physician, rehab nursing care, Hospitalist and comprehensive interdisciplinary team (including PT, OT and/or SLP, Prosthetics and Orthotics)     Assessment/Plan:  Mrs. Екатерина Poole is a 58 year old female patient with past medical history of HTN and rheumatoid arthritis who is admitted for Acute Inpatient Rehabilitation with a multidisciplinary rehab program at [facility] with functional impairments in ADLs and mobility secondary to left hemiparesis resulting from bilateral subacute to remote embolic strokes, a right posterior corona radiata acute infarct, multiple petechial hemorrhages in bilateral thalamus and basal ganglia with etiology highly indicative from underlying vasculitis and a subsequent left parietal cortical acute infarction of a likely cardioembolic source.    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 Maintain dose per rheumatology eval/recommendation.  - Left hemiparesis, impaired ADLs and mobility, need for assistance with personal care   - Start comprehensive rehab program of PT/OT/SLP - 3 hours a day, 5 days a week. P&O as needed   - Fall/Aspiration precautions  - Will need repeat cerebral angiogram in 1 month (~12/20/23)  - Rheumatology and Neurology following  - Continue prednisone 50 mg QD  - Bactrim DS 1 tab MWF for PCP ppx while on high dose steroids  - EEG (12/4) no epileptic activity    # leukocytosis  - WBC 17.18->16.54->16.98  - likely steroid induced, was started on steroids 11/24, WBC started uptrending after  - afebrile, asymptomatic  - monitor CBC, hospitalist follow up    # RRT/Code Stroke with syncope after BM 12/1  - Head CT stable as above  - rheumatology and neuro consulted  - EEG (12/4) no epileptic activity    Afib  - amiodarone 200 mg QD  - metoprolol 50 mg TID  - Eliquis 5 mg BID    HTN  - continue Losartan 25 mg QD  - Norvasc 10 mg discontinued per Hospitalist  - Monitor BP    HLD  - cont atorvastatin 80 mg QHS    Hyperglycemia in setting of steroid use  - SSI  - Monitor fingersticks    Mood / Cognition  - Neuropsychology consult PRN    Sleep  - Maintain quiet hours and a low stim environment.     GI / Bowel  - Senna qHS  - Dulcolax 5 mg QHS  - GI ppx: pantoprazole 40 mg QD     / Bladder  - Bladder scans Q8 hours with straight cath for >400cc.  - Toileting schedule every 4 hours    Skin:  - Skin assessment on admission performed : Intact  - Pressure Injury/Skin: OOB to chair, PT/OT  - nursing to monitor skin q Shift    Diet/Dysphagia:  - Diet Consistency: regular  - Aspiration Precautions  - SLP consult for swallow function evaluation and treatment  - Nutrition consult    DVT prophylaxis:   - on Eliquis 5 mg BID      Outpatient Follow-up:  Gavin Calvo  Neurology  [address], [address]  [place] [address]  Phone: [phone]  Fax: [phone]  Follow Up Time: 2 weeks    Khadijah Hartley  Radiology  [address]  [place] [address]  Phone: [phone]  Fax: [phone]  Follow Up Time: 2 weeks    Brando Lazar  Cardiology  [address], [address]  [place] [address]  Phone: [phone]  Fax: [phone]  Follow Up Time: 2 weeks    Kenya Naik  Rheumatology  [address]  [place] [address]  Phone: [phone]  Fax: [phone]  Follow Up Time: 2 weeks      Code Status/Emergency Contact:    ---------------    Goals: Safe discharge to home  Estimated Length of Stay: 10-14 days  Rehab Potential: Good  Medical Prognosis: Good  Estimated Disposition: Home with home care      PRESCREEN COMPARISON:  I have reviewed the prescreen information and I have found no relevant changes between the preadmission screening and my post admission evaluation.    RATIONALE FOR INPATIENT ADMISSION: Patient demonstrates the following:  [X] Medically appropriate for rehabilitation admission  [X] Has attainable rehab goals with an appropriate initial discharge plan  [X]Has rehabilitation potential (expected to make a significant improvement within a reasonable period of time)  [X] Requires close medical management by a rehab physician, rehab nursing care, Hospitalist and comprehensive interdisciplinary team (including PT, OT and/or SLP, Prosthetics and Orthotics)     Assessment/Plan:  Mrs. Екатерина Poole is a 58 year old female patient with past medical history of HTN and rheumatoid arthritis who is admitted for Acute Inpatient Rehabilitation with a multidisciplinary rehab program at [facility] with functional impairments in ADLs and mobility secondary to left hemiparesis resulting from bilateral subacute to remote embolic strokes, a right posterior corona radiata acute infarct, multiple petechial hemorrhages in bilateral thalamus and basal ganglia with etiology highly indicative from underlying vasculitis and a subsequent left parietal cortical acute infarction of a likely cardioembolic source.    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 Maintain dose per rheumatology eval/recommendation.  - Left hemiparesis, impaired ADLs and mobility, need for assistance with personal care   - Start comprehensive rehab program of PT/OT/SLP - 3 hours a day, 5 days a week. P&O as needed   - Fall /Aspiration precautions  - Will need repeat cerebral angiogram in 1 month (~12/20/23)  - Rheumatology and Neurology following  - Continue prednisone 50 mg QD  - Bactrim DS 1 tab MWF for PCP ppx while on high dose steroids  - EEG (12/4) no epileptic activity    # leukocytosis  - WBC 17.18->16.54->16.98  - likely steroid induced, was started on steroids 11/24, WBC started uptrending after  - afebrile, asymptomatic  - monitor CBC, hospitalist follow up    # RRT/Code Stroke with syncope after BM 12/1  - Head CT stable as above  - rheumatology and neuro consulted  - EEG (12/4) no epileptic activity    Afib  - amiodarone 200 mg QD  - metoprolol 50 mg TID  - Eliquis 5 mg BID    HTN  - continue Losartan 25 mg QD  - Norvasc 10 mg discontinued per Hospitalist  - Monitor BP    HLD  - cont atorvastatin 80 mg QHS    Hyperglycemia in setting of steroid use  - SSI  - Monitor fingersticks    Mood / Cognition  - Neuropsychology consult PRN    Sleep  - Maintain quiet hours and a low stim environment.     GI / Bowel  - Senna qHS  - Dulcolax 5 mg QHS  - GI ppx: pantoprazole 40 mg QD     / Bladder  - Bladder scans Q8 hours with straight cath for >400cc.  - Toileting schedule every 4 hours    Skin:  - Skin assessment on admission performed : Intact  - Pressure Injury/Skin: OOB to chair, PT/OT  - nursing to monitor skin q Shift    Diet/Dysphagia:  - Diet Consistency: regular  - Aspiration Precautions  - SLP consult for swallow function evaluation and treatment  - Nutrition consult    DVT prophylaxis:   - on Eliquis 5 mg BID      Outpatient Follow-up:  Gavin Calvo  Neurology  [address], [address]  [place] [address]  Phone: [phone]  Fax: [phone]  Follow Up Time: 2 weeks    Khadijah Hartley  Radiology  [address]  [place] [address]  Phone: [phone]  Fax: [phone]  Follow Up Time: 2 weeks    Brando Lazar  Cardiology  [address], [address]  [place] [address]  Phone: [phone]  Fax: [phone]  Follow Up Time: 2 weeks    Kenya Naik  Rheumatology  [address]  [place] [address]  Phone: [phone]  Fax: [phone]  Follow Up Time: 2 weeks      Code Status/Emergency Contact:    ---------------    Goals: Safe discharge to home  Estimated Length of Stay: 10-14 days  Rehab Potential: Good  Medical Prognosis: Good  Estimated Disposition: Home with home care      PRESCREEN COMPARISON:  I have reviewed the prescreen information and I have found no relevant changes between the preadmission screening and my post admission evaluation.    RATIONALE FOR INPATIENT ADMISSION: Patient demonstrates the following:  [X] Medically appropriate for rehabilitation admission  [X] Has attainable rehab goals with an appropriate initial discharge plan  [X]Has rehabilitation potential (expected to make a significant improvement within a reasonable period of time)  [X] Requires close medical management by a rehab physician, rehab nursing care, Hospitalist and comprehensive interdisciplinary team (including PT, OT and/or SLP, Prosthetics and Orthotics)     Assessment/Plan:  Mrs. Екатерина Poole is a 58 year old female patient with past medical history of HTN and rheumatoid arthritis who is admitted for Acute Inpatient Rehabilitation with a multidisciplinary rehab program at [facility] with functional impairments in ADLs and mobility secondary to left hemiparesis resulting from bilateral subacute to remote embolic strokes, a right posterior corona radiata acute infarct, multiple petechial hemorrhages in bilateral thalamus and basal ganglia with etiology highly indicative from underlying vasculitis and a subsequent left parietal cortical acute infarction of a likely cardioembolic source.    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 Maintain dose per rheumatology eval/recommendation.  - Left hemiparesis, impaired ADLs and mobility, need for assistance with personal care   - Start comprehensive rehab program of PT/OT/SLP - 3 hours a day, 5 days a week. P&O as needed   - Fall /Aspiration precautions  - Will need repeat cerebral angiogram in 1 month (~12/20/23)  - Rheumatology and Neurology following  - Continue prednisone 50 mg QD  - Bactrim DS 1 tab MWF for PCP ppx while on high dose steroids  - EEG (12/4) no epileptic activity    # leukocytosis  - WBC 17.18->16.54->16.98  - likely steroid induced, was started on steroids 11/24, WBC started uptrending after  - afebrile, asymptomatic  - monitor CBC, hospitalist follow up    # RRT/Code Stroke with syncope after BM 12/1  - Head CT stable as above  - rheumatology and neuro consulted  - EEG (12/4) no epileptic activity    Afib  - amiodarone 200 mg QD  - metoprolol 50 mg TID  - Eliquis 5 mg BID    HTN  - continue Losartan 25 mg QD  - Norvasc 10 mg discontinued per Hospitalist  - Monitor BP    HLD  - cont atorvastatin 80 mg QHS    Hyperglycemia in setting of steroid use  - SSI  - Monitor fingersticks    Mood / Cognition  - Neuropsychology consult PRN    Sleep  - Maintain quiet hours and a low stim environment.     GI / Bowel  - Senna qHS  - Dulcolax 5 mg QHS  - GI ppx: pantoprazole 40 mg QD     / Bladder  - Bladder scans Q8 hours with straight cath for >400cc.  - Toileting schedule every 4 hours    Skin:  - Skin assessment on admission performed : Intact  - Pressure Injury/Skin: OOB to chair, PT/OT  - nursing to monitor skin q Shift    Diet/Dysphagia:  - Diet Consistency: regular  - Aspiration Precautions  - SLP consult for swallow function evaluation and treatment  - Nutrition consult    DVT prophylaxis:   - on Eliquis 5 mg BID      Outpatient Follow-up:  Gavin Calvo  Neurology  [address], [address]  [place] [address]  Phone: [phone]  Fax: [phone]  Follow Up Time: 2 weeks    Khadijah Hartley  Radiology  [address]  [place] [address]  Phone: [phone]  Fax: [phone]  Follow Up Time: 2 weeks    Brando Lazar  Cardiology  [address]  [place] [address]  Phone: [phone]  Fax: [phone]  Follow Up Time: 2 weeks    Kenya Naik  Rheumatology  [address]  [place] [address]  Phone: [phone]  Fax: [phone]  Follow Up Time: 2 weeks      Code Status/Emergency Contact:    ---------------    Goals: Safe discharge to home  Estimated Length of Stay: 10-14 days  Rehab Potential: Good  Medical Prognosis: Good  Estimated Disposition: Home with home care      PRESCREEN COMPARISON:  I have reviewed the prescreen information and I have found no relevant changes between the preadmission screening and my post admission evaluation.    RATIONALE FOR INPATIENT ADMISSION: Patient demonstrates the following:  [X] Medically appropriate for rehabilitation admission  [X] Has attainable rehab goals with an appropriate initial discharge plan  [X]Has rehabilitation potential (expected to make a significant improvement within a reasonable period of time)  [X] Requires close medical management by a rehab physician, rehab nursing care, Hospitalist and comprehensive interdisciplinary team (including PT, OT and/or SLP, Prosthetics and Orthotics)

## 2023-12-07 NOTE — PROGRESS NOTE ADULT - SUBJECTIVE AND OBJECTIVE BOX
HPI:  Mrs. Екатерина Poole is a 58 year old female patient with past medical history of HTN and rheumatoid arthritis who presented to [place] on 11/15 for AMS when a neighbor overhead patient was looking for her mom and she was found confused/wandering around apartment complex. Imaging studies initially performed reported bilateral subacute to remote embolic strokes, a right posterior corona radiata acute infarct, and multiple petechial hemorrhages in bilateral thalamus and basal ganglia. She was additionally noted to have KRUNAL which has since resolved. Hypertension managed with amlodipine, aldactone, and losartan and she was started on B12 supplements. Patient seen by Rheumatology and was transferred to [facility] on 11/20 for cerebral angiogram.    A cerebral angiogram performed on 11/20 reported multiple areas of focal stenosis and dilatation concerning for vasculitis. An LP was completed on 11/21 with a normal profile. Rheumatology consulted and recommended possible brain biopsy to confirm diagnosis of vasculitis but the risks of performing an open biopsy outweighed the benefits as the results of the biopsy were deemed unlikely to . She was seen by Neurosurgery with patient's neuroradiologic findings including beading on cerebral angiogram (consistent with vasculitis) and clinical presentation, with no other etiology of vasculitis, point to PACNS. Patient was started on steroids given her micro hemorrhage and strokes likely attributed to vasculitis. Steroids started on 11/24 with initial dose Solumedrol 1g for 3 days. She was to continue Prednisone 60mg with a decrease to 50mg daily for discharge to acute in-patient rehabilitation. No taper indicated per rheumatology evaluation and recommendations. An EEG was additionally performed and reported moderate diffuse/multi-focal cerebral dysfunction, not specific as to etiology. There were no epileptiform abnormalities recorded. Will need a repeat cerebral angiogram in 1 month which should take place around 12/20/23. Patient started on Eliquis 5 mg BID, amiodarone, and metoprolol for Afib. TTE reported as normal with global left ventricular systolic function, mild mitral valve regurgitation, mild aortic regurgitation, and a sclerotic aortic valve with normal opening. Hospitalization additionally significant for a rapid response on 11/20 due to acute mental status changes after being found to be slumped over while on toilet by staff prompting MRI imaging reporting:  ·	Left parietal small cortical acute infarction  ·	Innumerable scattered chronic microhemorrhages in the brainstem, bilateral cerebellar hemispheres, deep gray nuclei and peripherally within the cerebral hemispheres which may be secondary to chronic hypertensive encephalopathy.  ·	Severe extensive chronic microvascular ischemic changes and multifocal chronic lacunar infarcts as detailed above.  ·	Patient evaluated by PT/OT and was recommended for acute inpatient rehab. Patient is medically stable for discharge to [facility] on 11/30. (30 Nov 2023 14:36)    TDD: 12/14/23 JUAN  ___________________________________________________________________________    SUBJECTIVE/ROS  Patient was seen and evaluated at bedside today.  Reported no overnight events and is in no acute distress.  BP more controlled today.   Patient remains purposeful and cooperative.  Case was discussed at Interdisciplinary Team meeting 12/5.  A tentative discharge date is outlined above.  Patient is motivated to continue participation on the recommended rehabilitation program.   Denies any CP, SOB, LAURA, palpitations, fever, chills, body aches, cough, congestion, or any other symptoms at this time.   ___________________________________________________________________________    EEG (12/04/23)  Classification / Summary:  Abnormal routine EEG in the drowsy and asleep states with intermittent arousals. Unclear if full wakefulness is captured.  Occasional left > right temporal focal slowing.  Mild diffuse slowing vs. excess drowsiness.  No epileptiform abnormalities are captured.     Clinical Impression:  Left > right temporal focal cerebral dysfunction can be structural or functional in etiology.  Mild diffuse cerebral dysfunction is nonspecific in etiology. Alternatively, excess drowsiness may be contributing.    ___________________________________________________________________________    CT Head No Cont (12.01.23 @ 10:40)   Moderate to severe chronic microvascular changes without evidence of an acute transcortical infarction or hemorrhage.    CT Angio Brain Stroke Protocol  w/ IV Cont (12.01.23 @ 13:03)   Negative CT perfusion. CTA demonstrates persistent narrowing of the anterior and middle cerebral arteries consistent with vasospasm or vasculitis.  ___________________________________________________________________________    Vital Signs Last 24 Hrs  T(C): 37.1 (07 Dec 2023 08:36), Max: 37.1 (07 Dec 2023 08:36)  T(F): 98.8 (07 Dec 2023 08:36), Max: 98.8 (07 Dec 2023 08:36)  HR: 62 (07 Dec 2023 08:36) (62 - 64)  BP: 138/81 (07 Dec 2023 08:36) (131/76 - 152/78)  BP(mean): --  RR: 16 (07 Dec 2023 08:36) (16 - 16)  SpO2: 95% (07 Dec 2023 08:36) (95% - 98%)  ___________________________________________________________________________    LABS                          9.6    16.54 )-----------( 383      ( 04 Dec 2023 05:51 )             27.0       12-04    138  |  100  |  26<H>  ----------------------------<  89  3.5   |  30  |  1.11    Ca    8.4      04 Dec 2023 05:51    TPro  6.3  /  Alb  2.5<L>  /  TBili  0.3  /  DBili  x   /  AST  11  /  ALT  23  /  AlkPhos  71  12-04      ___________________________________________________________________________    MEDICATIONS  (STANDING):  aMIOdarone    Tablet 200 milliGRAM(s) Oral daily  apixaban 5 milliGRAM(s) Oral two times a day  atorvastatin 80 milliGRAM(s) Oral at bedtime  bisacodyl 5 milliGRAM(s) Oral at bedtime  dextrose 5%. 1000 milliLiter(s) (50 mL/Hr) IV Continuous <Continuous>  dextrose 5%. 1000 milliLiter(s) (100 mL/Hr) IV Continuous <Continuous>  dextrose 50% Injectable 25 Gram(s) IV Push once  dextrose 50% Injectable 12.5 Gram(s) IV Push once  dextrose 50% Injectable 25 Gram(s) IV Push once  glucagon  Injectable 1 milliGRAM(s) IntraMuscular once  influenza   Vaccine 0.5 milliLiter(s) IntraMuscular once  insulin lispro (ADMELOG) corrective regimen sliding scale   SubCutaneous at bedtime  insulin lispro (ADMELOG) corrective regimen sliding scale   SubCutaneous three times a day before meals  losartan 25 milliGRAM(s) Oral daily  metoprolol tartrate 50 milliGRAM(s) Oral three times a day  pantoprazole    Tablet 40 milliGRAM(s) Oral daily  predniSONE   Tablet 50 milliGRAM(s) Oral daily  senna 2 Tablet(s) Oral at bedtime  trimethoprim  160 mG/sulfamethoxazole 800 mG 1 Tablet(s) Oral <User Schedule>    MEDICATIONS  (PRN):  dextrose Oral Gel 15 Gram(s) Oral once PRN Blood Glucose LESS THAN 70 milliGRAM(s)/deciliter    ___________________________________________________________________________    PHYSICAL EXAM:  Gen - NAD, Comfortable  HEENT - NCAT, EOMI, MMM  Pulm - breathing comfortably on room air  Cardiovascular - warm and well perfused  Abdomen - Soft, NT/ND  Extremities - No C/C/E, No calf tenderness  Neuro-     Cognitive - oriented to name, states rehab in [place] for place, states February 2023 for time     Communication - Fluent, No dysarthria     Attention: able to state days of the week backwards     Cranial Nerves - no facial asymmetry, EOMI     Motor -                     LEFT    UE - ShAB 5/5, EF 5/5, EE 5/5, WE 5/5,  5/5                    RIGHT UE - ShAB 5/5, EF 5/5, EE 5/5, WE 5/5,  5/5                    LEFT    LE - HF 4/5, KE 5/5, DF 5/5, PF 5/5                    RIGHT LE - HF 4/5, KE 5/5, DF 5/5, PF 5/5        Sensory - Intact to LT     Reflexes - DTR Intact, No primitive reflexive     Coordination - FTN intact     Tone - normal  Psychiatric - Mood stable, Affect WNL  Skin:  all skin intact    ___________________________________________________________________________ HPI:  Mrs. Екатерина Poole is a 58 year old female patient with past medical history of HTN and rheumatoid arthritis who presented to [place] on 11/15 for AMS when a neighbor overhead patient was looking for her mom and she was found confused/wandering around apartment complex. Imaging studies initially performed reported bilateral subacute to remote embolic strokes, a right posterior corona radiata acute infarct, and multiple petechial hemorrhages in bilateral thalamus and basal ganglia. She was additionally noted to have KRUNAL which has since resolved. Hypertension managed with amlodipine, aldactone, and losartan and she was started on B12 supplements. Patient seen by Rheumatology and was transferred to [facility] on 11/20 for cerebral angiogram.    A cerebral angiogram performed on 11/20 reported multiple areas of focal stenosis and dilatation concerning for vasculitis. An LP was completed on 11/21 with a normal profile. Rheumatology consulted and recommended possible brain biopsy to confirm diagnosis of vasculitis but the risks of performing an open biopsy outweighed the benefits as the results of the biopsy were deemed unlikely to . She was seen by Neurosurgery with patient's neuroradiologic findings including beading on cerebral angiogram (consistent with vasculitis) and clinical presentation, with no other etiology of vasculitis, point to PACNS. Patient was started on steroids given her micro hemorrhage and strokes likely attributed to vasculitis. Steroids started on 11/24 with initial dose Solumedrol 1g for 3 days. She was to continue Prednisone 60mg with a decrease to 50mg daily for discharge to acute in-patient rehabilitation. No taper indicated per rheumatology evaluation and recommendations. An EEG was additionally performed and reported moderate diffuse/multi-focal cerebral dysfunction, not specific as to etiology. There were no epileptiform abnormalities recorded. Will need a repeat cerebral angiogram in 1 month which should take place around 12/20/23. Patient started on Eliquis 5 mg BID, amiodarone, and metoprolol for Afib. TTE reported as normal with global left ventricular systolic function, mild mitral valve regurgitation, mild aortic regurgitation, and a sclerotic aortic valve with normal opening. Hospitalization additionally significant for a rapid response on 11/20 due to acute mental status changes after being found to be slumped over while on toilet by staff prompting MRI imaging reporting:  ·	Left parietal small cortical acute infarction  ·	Innumerable scattered chronic microhemorrhages in the brainstem, bilateral cerebellar hemispheres, deep gray nuclei and peripherally within the cerebral hemispheres which may be secondary to chronic hypertensive encephalopathy.  ·	Severe extensive chronic microvascular ischemic changes and multifocal chronic lacunar infarcts as detailed above.  ·	Patient evaluated by PT/OT and was recommended for acute inpatient rehab. Patient is medically stable for discharge to [facility] on 11/30. (30 Nov 2023 14:36)    TDD: 12/14/23 JUAN  ___________________________________________________________________________    SUBJECTIVE/ROS  Patient was seen and evaluated at bedside today.  Reported no overnight events and is in no acute distress.  BP more controlled today.   Patient remains purposeful and cooperative.  Case was discussed at Interdisciplinary Team meeting 12/5.  A tentative discharge date is outlined above.  Patient is motivated to continue participation on the recommended rehabilitation program.   Denies any CP, SOB, LAURA, palpitations, fever, chills, body aches, cough, congestion, or any other symptoms at this time.   ___________________________________________________________________________    EEG (12/04/23)  Classification / Summary:  Abnormal routine EEG in the drowsy and asleep states with intermittent arousals. Unclear if full wakefulness is captured.  Occasional left > right temporal focal slowing.  Mild diffuse slowing vs. excess drowsiness.  No epileptiform abnormalities are captured.     Clinical Impression:  Left > right temporal focal cerebral dysfunction can be structural or functional in etiology.  Mild diffuse cerebral dysfunction is nonspecific in etiology. Alternatively, excess drowsiness may be contributing.    ___________________________________________________________________________    CT Head No Cont (12.01.23 @ 10:40)   Moderate to severe chronic microvascular changes without evidence of an acute transcortical infarction or hemorrhage.    CT Angio Brain Stroke Protocol  w/ IV Cont (12.01.23 @ 13:03)   Negative CT perfusion. CTA demonstrates persistent narrowing of the anterior and middle cerebral arteries consistent with vasospasm or vasculitis.  ___________________________________________________________________________    Vital Signs Last 24 Hrs  T(C): 37.1 (07 Dec 2023 08:36), Max: 37.1 (07 Dec 2023 08:36)  T(F): 98.8 (07 Dec 2023 08:36), Max: 98.8 (07 Dec 2023 08:36)  HR: 62 (07 Dec 2023 08:36) (62 - 64)  BP: 138/81 (07 Dec 2023 08:36) (131/76 - 152/78)  BP(mean): --  RR: 16 (07 Dec 2023 08:36) (16 - 16)  SpO2: 95% (07 Dec 2023 08:36) (95% - 98%)  ___________________________________________________________________________    LABS                          9.6    16.54 )-----------( 383      ( 04 Dec 2023 05:51 )             27.0       12-04    138  |  100  |  26<H>  ----------------------------<  89  3.5   |  30  |  1.11    Ca    8.4      04 Dec 2023 05:51    TPro  6.3  /  Alb  2.5<L>  /  TBili  0.3  /  DBili  x   /  AST  11  /  ALT  23  /  AlkPhos  71  12-04      ___________________________________________________________________________    MEDICATIONS  (STANDING):  aMIOdarone    Tablet 200 milliGRAM(s) Oral daily  apixaban 5 milliGRAM(s) Oral two times a day  atorvastatin 80 milliGRAM(s) Oral at bedtime  bisacodyl 5 milliGRAM(s) Oral at bedtime  dextrose 5%. 1000 milliLiter(s) (50 mL/Hr) IV Continuous <Continuous>  dextrose 5%. 1000 milliLiter(s) (100 mL/Hr) IV Continuous <Continuous>  dextrose 50% Injectable 25 Gram(s) IV Push once  dextrose 50% Injectable 12.5 Gram(s) IV Push once  dextrose 50% Injectable 25 Gram(s) IV Push once  glucagon  Injectable 1 milliGRAM(s) IntraMuscular once  influenza   Vaccine 0.5 milliLiter(s) IntraMuscular once  insulin lispro (ADMELOG) corrective regimen sliding scale   SubCutaneous at bedtime  insulin lispro (ADMELOG) corrective regimen sliding scale   SubCutaneous three times a day before meals  losartan 25 milliGRAM(s) Oral daily  metoprolol tartrate 50 milliGRAM(s) Oral three times a day  pantoprazole    Tablet 40 milliGRAM(s) Oral daily  predniSONE   Tablet 50 milliGRAM(s) Oral daily  senna 2 Tablet(s) Oral at bedtime  trimethoprim  160 mG/sulfamethoxazole 800 mG 1 Tablet(s) Oral <User Schedule>    MEDICATIONS  (PRN):  dextrose Oral Gel 15 Gram(s) Oral once PRN Blood Glucose LESS THAN 70 milliGRAM(s)/deciliter    ___________________________________________________________________________    PHYSICAL EXAM:  Gen - NAD, Comfortable  HEENT - NCAT, EOMI, MMM  Pulm - breathing comfortably on room air  Cardiovascular - warm and well perfused  Abdomen - Soft, NT/ND  Extremities - No C/C/E, No calf tenderness  Neuro-     Cognitive - oriented to name, states rehab in [place] for place, states February 2023 for time     Communication - Fluent, No dysarthria     Attention: able to state days of the week backwards     Cranial Nerves - no facial asymmetry, EOMI     Motor -                     LEFT    UE - ShAB 5/5, EF 5/5, EE 5/5, WE 5/5,  5/5                    RIGHT UE - ShAB 5/5, EF 5/5, EE 5/5, WE 5/5,  5/5                    LEFT    LE - HF 4/5, KE 5/5, DF 5/5, PF 5/5                    RIGHT LE - HF 4/5, KE 5/5, DF 5/5, PF 5/5        Sensory - Intact to LT     Reflexes - DTR Intact, No primitive reflexive     Coordination - FTN intact     Tone - normal  Psychiatric - Mood stable, Affect WNL  Skin:  all skin intact    ___________________________________________________________________________ HPI:  Mrs. Екатерина Poole is a 58 year old female patient with past medical history of HTN and rheumatoid arthritis who presented to [place] on 11/15 for AMS when a neighbor overhead patient was looking for her mom and she was found confused/wandering around apartment complex. Imaging studies initially performed reported bilateral subacute to remote embolic strokes, a right posterior corona radiata acute infarct, and multiple petechial hemorrhages in bilateral thalamus and basal ganglia. She was additionally noted to have KRUNAL which has since resolved. Hypertension managed with amlodipine, aldactone, and losartan and she was started on B12 supplements. Patient seen by Rheumatology and was transferred to [facility] on 11/20 for cerebral angiogram.    A cerebral angiogram performed on 11/20 reported multiple areas of focal stenosis and dilatation concerning for vasculitis. An LP was completed on 11/21 with a normal profile. Rheumatology consulted and recommended possible brain biopsy to confirm diagnosis of vasculitis but the risks of performing an open biopsy outweighed the benefits as the results of the biopsy were deemed unlikely to . She was seen by Neurosurgery with patient's neuroradiologic findings including beading on cerebral angiogram (consistent with vasculitis) and clinical presentation, with no other etiology of vasculitis, point to PACNS. Patient was started on steroids given her micro hemorrhage and strokes likely attributed to vasculitis. Steroids started on 11/24 with initial dose Solumedrol 1g for 3 days. She was to continue Prednisone 60mg with a decrease to 50mg daily for discharge to acute in-patient rehabilitation. No taper indicated per rheumatology evaluation and recommendations. An EEG was additionally performed and reported moderate diffuse/multi-focal cerebral dysfunction, not specific as to etiology. There were no epileptiform abnormalities recorded. Will need a repeat cerebral angiogram in 1 month which should take place around 12/20/23. Patient started on Eliquis 5 mg BID, amiodarone, and metoprolol for Afib. TTE reported as normal with global left ventricular systolic function, mild mitral valve regurgitation, mild aortic regurgitation, and a sclerotic aortic valve with normal opening. Hospitalization additionally significant for a rapid response on 11/20 due to acute mental status changes after being found to be slumped over while on toilet by staff prompting MRI imaging reporting:  ·	Left parietal small cortical acute infarction  ·	Innumerable scattered chronic microhemorrhages in the brainstem, bilateral cerebellar hemispheres, deep gray nuclei and peripherally within the cerebral hemispheres which may be secondary to chronic hypertensive encephalopathy.  ·	Severe extensive chronic microvascular ischemic changes and multifocal chronic lacunar infarcts as detailed above.  ·	Patient evaluated by PT/OT and was recommended for acute inpatient rehab. Patient is medically stable for discharge to [facility] on 11/30. (30 Nov 2023 14:36)    TDD: 12/14/23 JUAN  ___________________________________________________________________________    SUBJECTIVE/ROS  Patient was seen and evaluated at bedside today.  Reported no overnight events and is in no acute distress.  BP more controlled today.   Patient remains purposeful and cooperative.  Case was discussed at Interdisciplinary Team meeting 12/5.  A tentative discharge date is outlined above.  Patient is motivated to continue participation on the recommended rehabilitation program.   Denies any CP, SOB, LAURA, palpitations, fever, chills, body aches, cough, congestion, or any other symptoms at this time.   ___________________________________________________________________________    EEG (12/04/23)  Classification / Summary:  Abnormal routine EEG in the drowsy and asleep states with intermittent arousals. Unclear if full wakefulness is captured.  Occasional left > right temporal focal slowing.  Mild diffuse slowing vs. excess drowsiness.  No epileptiform abnormalities are captured.     Clinical Impression:  Left > right temporal focal cerebral dysfunction can be structural or functional in etiology.  Mild diffuse cerebral dysfunction is nonspecific in etiology. Alternatively, excess drowsiness may be contributing.    ___________________________________________________________________________    CT Head No Cont (12.01.23 @ 10:40)   Moderate to severe chronic microvascular changes without evidence of an acute transcortical infarction or hemorrhage.    CT Angio Brain Stroke Protocol  w/ IV Cont (12.01.23 @ 13:03)   Negative CT perfusion. CTA demonstrates persistent narrowing of the anterior and middle cerebral arteries consistent with vasospasm or vasculitis.  ___________________________________________________________________________    Vital Signs Last 24 Hrs  T(C): 37.1 (07 Dec 2023 08:36), Max: 37.1 (07 Dec 2023 08:36)  T(F): 98.8 (07 Dec 2023 08:36), Max: 98.8 (07 Dec 2023 08:36)  HR: 62 (07 Dec 2023 08:36) (62 - 64)  BP: 138/81 (07 Dec 2023 08:36) (131/76 - 152/78)  BP(mean): --  RR: 16 (07 Dec 2023 08:36) (16 - 16)  SpO2: 95% (07 Dec 2023 08:36) (95% - 98%)  ___________________________________________________________________________    LABS                          9.6    16.54 )-----------( 383      ( 04 Dec 2023 05:51 )             27.0       12-04    138  |  100  |  26<H>  ----------------------------<  89  3.5   |  30  |  1.11    Ca    8.4      04 Dec 2023 05:51    TPro  6.3  /  Alb  2.5<L>  /  TBili  0.3  /  DBili  x   /  AST  11  /  ALT  23  /  AlkPhos  71  12-04      ___________________________________________________________________________    MEDICATIONS  (STANDING):  aMIOdarone    Tablet 200 milliGRAM(s) Oral daily  apixaban 5 milliGRAM(s) Oral two times a day  atorvastatin 80 milliGRAM(s) Oral at bedtime  bisacodyl 5 milliGRAM(s) Oral at bedtime  dextrose 5%. 1000 milliLiter(s) (50 mL/Hr) IV Continuous <Continuous>  dextrose 5%. 1000 milliLiter(s) (100 mL/Hr) IV Continuous <Continuous>  dextrose 50% Injectable 25 Gram(s) IV Push once  dextrose 50% Injectable 12.5 Gram(s) IV Push once  dextrose 50% Injectable 25 Gram(s) IV Push once  glucagon  Injectable 1 milliGRAM(s) IntraMuscular once  influenza   Vaccine 0.5 milliLiter(s) IntraMuscular once  insulin lispro (ADMELOG) corrective regimen sliding scale   SubCutaneous at bedtime  insulin lispro (ADMELOG) corrective regimen sliding scale   SubCutaneous three times a day before meals  losartan 25 milliGRAM(s) Oral daily  metoprolol tartrate 50 milliGRAM(s) Oral three times a day  pantoprazole    Tablet 40 milliGRAM(s) Oral daily  predniSONE   Tablet 50 milliGRAM(s) Oral daily  senna 2 Tablet(s) Oral at bedtime  trimethoprim  160 mG/sulfamethoxazole 800 mG 1 Tablet(s) Oral <User Schedule>    MEDICATIONS  (PRN):  dextrose Oral Gel 15 Gram(s) Oral once PRN Blood Glucose LESS THAN 70 milliGRAM(s)/deciliter    ___________________________________________________________________________    PHYSICAL EXAM:  Gen - NAD, Comfortable  HEENT - NCAT, EOMI, MMM  Pulm - breathing comfortably on room air  Cardiovascular - warm and well perfused  Abdomen - Soft, NT/ND  Extremities - No C/C/E, No calf tenderness  Neuro-     Cognitive - oriented to name and place, states 2023 for year, knows Month with cues     Communication - Fluent, No dysarthria     Attention: able to state days of the week backwards     Cranial Nerves - no facial asymmetry, EOMI     Motor -                     LEFT    UE - ShAB 5/5, EF 5/5, EE 5/5, WE 5/5,  5/5                    RIGHT UE - ShAB 5/5, EF 5/5, EE 5/5, WE 5/5,  5/5                    LEFT    LE - HF 4/5, KE 5/5, DF 5/5, PF 5/5                    RIGHT LE - HF 4/5, KE 5/5, DF 5/5, PF 5/5        Sensory - Intact to LT     Reflexes - DTR Intact, No primitive reflexive     Coordination - FTN intact     Tone - normal  Psychiatric - Mood stable, Affect WNL  Skin:  all skin intact    ___________________________________________________________________________ HPI:  Mrs. Екатерина Poole is a 58 year old female patient with past medical history of HTN and rheumatoid arthritis who presented to [place] on 11/15 for AMS when a neighbor overhead patient was looking for her mom and she was found confused/wandering around apartment complex. Imaging studies initially performed reported bilateral subacute to remote embolic strokes, a right posterior corona radiata acute infarct, and multiple petechial hemorrhages in bilateral thalamus and basal ganglia. She was additionally noted to have KRUNAL which has since resolved. Hypertension managed with amlodipine, aldactone, and losartan and she was started on B12 supplements. Patient seen by Rheumatology and was transferred to [facility] on 11/20 for cerebral angiogram.    A cerebral angiogram performed on 11/20 reported multiple areas of focal stenosis and dilatation concerning for vasculitis. An LP was completed on 11/21 with a normal profile. Rheumatology consulted and recommended possible brain biopsy to confirm diagnosis of vasculitis but the risks of performing an open biopsy outweighed the benefits as the results of the biopsy were deemed unlikely to . She was seen by Neurosurgery with patient's neuroradiologic findings including beading on cerebral angiogram (consistent with vasculitis) and clinical presentation, with no other etiology of vasculitis, point to PACNS. Patient was started on steroids given her micro hemorrhage and strokes likely attributed to vasculitis. Steroids started on 11/24 with initial dose Solumedrol 1g for 3 days. She was to continue Prednisone 60mg with a decrease to 50mg daily for discharge to acute in-patient rehabilitation. No taper indicated per rheumatology evaluation and recommendations. An EEG was additionally performed and reported moderate diffuse/multi-focal cerebral dysfunction, not specific as to etiology. There were no epileptiform abnormalities recorded. Will need a repeat cerebral angiogram in 1 month which should take place around 12/20/23. Patient started on Eliquis 5 mg BID, amiodarone, and metoprolol for Afib. TTE reported as normal with global left ventricular systolic function, mild mitral valve regurgitation, mild aortic regurgitation, and a sclerotic aortic valve with normal opening. Hospitalization additionally significant for a rapid response on 11/20 due to acute mental status changes after being found to be slumped over while on toilet by staff prompting MRI imaging reporting:  ·	Left parietal small cortical acute infarction  ·	Innumerable scattered chronic microhemorrhages in the brainstem, bilateral cerebellar hemispheres, deep gray nuclei and peripherally within the cerebral hemispheres which may be secondary to chronic hypertensive encephalopathy.  ·	Severe extensive chronic microvascular ischemic changes and multifocal chronic lacunar infarcts as detailed above.  ·	Patient evaluated by PT/OT and was recommended for acute inpatient rehab. Patient is medically stable for discharge to [facility] on 11/30. (30 Nov 2023 14:36)    TDD: 12/14/23 JUAN  ___________________________________________________________________________    SUBJECTIVE/ROS  Patient was seen and evaluated at bedside today.  Reported no overnight events and is in no acute distress.  BP more controlled today.   Patient remains purposeful and cooperative.  Case was discussed at Interdisciplinary Team meeting 12/5.  A tentative discharge date is outlined above.  Patient is motivated to continue participation on the recommended rehabilitation program.   Denies any CP, SOB, LAURA, palpitations, fever, chills, body aches, cough, congestion, or any other symptoms at this time.   ___________________________________________________________________________    EEG (12/04/23)  Classification / Summary:  Abnormal routine EEG in the drowsy and asleep states with intermittent arousals. Unclear if full wakefulness is captured.  Occasional left > right temporal focal slowing.  Mild diffuse slowing vs. excess drowsiness.  No epileptiform abnormalities are captured.     Clinical Impression:  Left > right temporal focal cerebral dysfunction can be structural or functional in etiology.  Mild diffuse cerebral dysfunction is nonspecific in etiology. Alternatively, excess drowsiness may be contributing.    ___________________________________________________________________________    CT Head No Cont (12.01.23 @ 10:40)   Moderate to severe chronic microvascular changes without evidence of an acute transcortical infarction or hemorrhage.    CT Angio Brain Stroke Protocol  w/ IV Cont (12.01.23 @ 13:03)   Negative CT perfusion. CTA demonstrates persistent narrowing of the anterior and middle cerebral arteries consistent with vasospasm or vasculitis.  ___________________________________________________________________________    Vital Signs Last 24 Hrs  T(C): 37.1 (07 Dec 2023 08:36), Max: 37.1 (07 Dec 2023 08:36)  T(F): 98.8 (07 Dec 2023 08:36), Max: 98.8 (07 Dec 2023 08:36)  HR: 62 (07 Dec 2023 08:36) (62 - 64)  BP: 138/81 (07 Dec 2023 08:36) (131/76 - 152/78)  BP(mean): --  RR: 16 (07 Dec 2023 08:36) (16 - 16)  SpO2: 95% (07 Dec 2023 08:36) (95% - 98%)  ___________________________________________________________________________    LABS                          9.6    16.54 )-----------( 383      ( 04 Dec 2023 05:51 )             27.0       12-04    138  |  100  |  26<H>  ----------------------------<  89  3.5   |  30  |  1.11    Ca    8.4      04 Dec 2023 05:51    TPro  6.3  /  Alb  2.5<L>  /  TBili  0.3  /  DBili  x   /  AST  11  /  ALT  23  /  AlkPhos  71  12-04      ___________________________________________________________________________    MEDICATIONS  (STANDING):  aMIOdarone    Tablet 200 milliGRAM(s) Oral daily  apixaban 5 milliGRAM(s) Oral two times a day  atorvastatin 80 milliGRAM(s) Oral at bedtime  bisacodyl 5 milliGRAM(s) Oral at bedtime  dextrose 5%. 1000 milliLiter(s) (50 mL/Hr) IV Continuous <Continuous>  dextrose 5%. 1000 milliLiter(s) (100 mL/Hr) IV Continuous <Continuous>  dextrose 50% Injectable 25 Gram(s) IV Push once  dextrose 50% Injectable 12.5 Gram(s) IV Push once  dextrose 50% Injectable 25 Gram(s) IV Push once  glucagon  Injectable 1 milliGRAM(s) IntraMuscular once  influenza   Vaccine 0.5 milliLiter(s) IntraMuscular once  insulin lispro (ADMELOG) corrective regimen sliding scale   SubCutaneous at bedtime  insulin lispro (ADMELOG) corrective regimen sliding scale   SubCutaneous three times a day before meals  losartan 25 milliGRAM(s) Oral daily  metoprolol tartrate 50 milliGRAM(s) Oral three times a day  pantoprazole    Tablet 40 milliGRAM(s) Oral daily  predniSONE   Tablet 50 milliGRAM(s) Oral daily  senna 2 Tablet(s) Oral at bedtime  trimethoprim  160 mG/sulfamethoxazole 800 mG 1 Tablet(s) Oral <User Schedule>    MEDICATIONS  (PRN):  dextrose Oral Gel 15 Gram(s) Oral once PRN Blood Glucose LESS THAN 70 milliGRAM(s)/deciliter    ___________________________________________________________________________    PHYSICAL EXAM:  Gen - NAD, Comfortable  HEENT - NCAT, EOMI, MMM  Pulm - breathing comfortably on room air  Cardiovascular - warm and well perfused  Abdomen - Soft, NT/ND  Extremities - No C/C/E, No calf tenderness  Neuro-     Cognitive - oriented to name and place, states 2023 for year, knows Month with cues     Communication - Fluent, No dysarthria     Attention: able to state days of the week backwards     Cranial Nerves - no facial asymmetry, EOMI     Motor -                     LEFT    UE - ShAB 5/5, EF 5/5, EE 5/5, WE 5/5,  5/5                    RIGHT UE - ShAB 5/5, EF 5/5, EE 5/5, WE 5/5,  5/5                    LEFT    LE - HF 4/5, KE 5/5, DF 5/5, PF 5/5                    RIGHT LE - HF 4/5, KE 5/5, DF 5/5, PF 5/5        Sensory - Intact to LT     Reflexes - DTR Intact, No primitive reflexive     Coordination - FTN intact     Tone - normal  Psychiatric - Mood stable, Affect WNL  Skin:  all skin intact    ___________________________________________________________________________ HPI:  Mrs. Екатерина Poole is a 58 year old female patient with past medical history of HTN and rheumatoid arthritis who presented to [place] on 11/15 for AMS when a neighbor overhead patient was looking for her mom and she was found confused/wandering around apartment complex. Imaging studies initially performed reported bilateral subacute to remote embolic strokes, a right posterior corona radiata acute infarct, and multiple petechial hemorrhages in bilateral thalamus and basal ganglia. She was additionally noted to have KRUNAL which has since resolved. Hypertension managed with amlodipine, aldactone, and losartan and she was started on B12 supplements. Patient seen by Rheumatology and was transferred to [facility] on 11/20 for cerebral angiogram.    A cerebral angiogram performed on 11/20 reported multiple areas of focal stenosis and dilatation concerning for vasculitis. An LP was completed on 11/21 with a normal profile. Rheumatology consulted and recommended possible brain biopsy to confirm diagnosis of vasculitis but the risks of performing an open biopsy outweighed the benefits as the results of the biopsy were deemed unlikely to . She was seen by Neurosurgery with patient's neuroradiologic findings including beading on cerebral angiogram (consistent with vasculitis) and clinical presentation, with no other etiology of vasculitis, point to PACNS. Patient was started on steroids given her micro hemorrhage and strokes likely attributed to vasculitis. Steroids started on 11/24 with initial dose Solumedrol 1g for 3 days. She was to continue Prednisone 60mg with a decrease to 50mg daily for discharge to acute in-patient rehabilitation. No taper indicated per rheumatology evaluation and recommendations. An EEG was additionally performed and reported moderate diffuse/multi-focal cerebral dysfunction, not specific as to etiology. There were no epileptiform abnormalities recorded. Will need a repeat cerebral angiogram in 1 month which should take place around 12/20/23. Patient started on Eliquis 5 mg BID, amiodarone, and metoprolol for Afib. TTE reported as normal with global left ventricular systolic function, mild mitral valve regurgitation, mild aortic regurgitation, and a sclerotic aortic valve with normal opening. Hospitalization additionally significant for a rapid response on 11/20 due to acute mental status changes after being found to be slumped over while on toilet by staff prompting MRI imaging reporting:  Left parietal small cortical acute infarction  Innumerable scattered chronic microhemorrhages in the brainstem, bilateral cerebellar hemispheres, deep gray nuclei and peripherally within the cerebral hemispheres which may be secondary to chronic hypertensive encephalopathy.  Severe extensive chronic microvascular ischemic changes and multifocal chronic lacunar infarcts as detailed above.  Patient evaluated by PT/OT and was recommended for acute inpatient rehab. Patient is medically stable for discharge to [facility] on 11/30. (30 Nov 2023 14:36)    TDD: 12/14/23 JUAN  ___________________________________________________________________________    SUBJECTIVE/ROS  Patient was seen and evaluated at bedside and while participating on Physical Therapy today.  Reported no overnight events and is in no acute distress. BP more controlled today.   Some episodes of elevated BP which was discussed with Hospitalist and will monitor.  Patient remains purposeful and cooperative.  Case was discussed at Interdisciplinary Team meeting 12/5.  A tentative discharge date is outlined above.  Patient is motivated to continue participation on the recommended rehabilitation program.   Denies any CP, SOB, LAURA, palpitations, fever, chills, body aches, cough, congestion, or any other symptoms at this time.   ___________________________________________________________________________    EEG (12/04/23)  Classification / Summary:  Abnormal routine EEG in the drowsy and asleep states with intermittent arousals. Unclear if full wakefulness is captured.  Occasional left > right temporal focal slowing.  Mild diffuse slowing vs. excess drowsiness.  No epileptiform abnormalities are captured.     Clinical Impression:  Left > right temporal focal cerebral dysfunction can be structural or functional in etiology.  Mild diffuse cerebral dysfunction is nonspecific in etiology. Alternatively, excess drowsiness may be contributing.    ___________________________________________________________________________    CT Head No Cont (12.01.23 @ 10:40)   Moderate to severe chronic microvascular changes without evidence of an acute transcortical infarction or hemorrhage.    CT Angio Brain Stroke Protocol  w/ IV Cont (12.01.23 @ 13:03)   Negative CT perfusion. CTA demonstrates persistent narrowing of the anterior and middle cerebral arteries consistent with vasospasm or vasculitis.  ___________________________________________________________________________    Vital Signs Last 24 Hrs  T(C): 37.1 (07 Dec 2023 08:36), Max: 37.1 (07 Dec 2023 08:36)  T(F): 98.8 (07 Dec 2023 08:36), Max: 98.8 (07 Dec 2023 08:36)  HR: 62 (07 Dec 2023 08:36) (62 - 64)  BP: 138/81 (07 Dec 2023 08:36) (131/76 - 152/78)  RR: 16 (07 Dec 2023 08:36) (16 - 16)  SpO2: 95% (07 Dec 2023 08:36) (95% - 98%)  ___________________________________________________________________________    LABS                          9.6    16.54 )-----------( 383      ( 04 Dec 2023 05:51 )             27.0       12-04    138  |  100  |  26<H>  ----------------------------<  89  3.5   |  30  |  1.11    Ca    8.4      04 Dec 2023 05:51    TPro  6.3  /  Alb  2.5<L>  /  TBili  0.3  /  DBili  x   /  AST  11  /  ALT  23  /  AlkPhos  71  12-04      ___________________________________________________________________________    MEDICATIONS  (STANDING):  aMIOdarone    Tablet 200 milliGRAM(s) Oral daily  apixaban 5 milliGRAM(s) Oral two times a day  atorvastatin 80 milliGRAM(s) Oral at bedtime  bisacodyl 5 milliGRAM(s) Oral at bedtime  dextrose 5%. 1000 milliLiter(s) (50 mL/Hr) IV Continuous <Continuous>  dextrose 5%. 1000 milliLiter(s) (100 mL/Hr) IV Continuous <Continuous>  dextrose 50% Injectable 25 Gram(s) IV Push once  dextrose 50% Injectable 12.5 Gram(s) IV Push once  dextrose 50% Injectable 25 Gram(s) IV Push once  glucagon  Injectable 1 milliGRAM(s) IntraMuscular once  influenza   Vaccine 0.5 milliLiter(s) IntraMuscular once  insulin lispro (ADMELOG) corrective regimen sliding scale   SubCutaneous at bedtime  insulin lispro (ADMELOG) corrective regimen sliding scale   SubCutaneous three times a day before meals  losartan 25 milliGRAM(s) Oral daily  metoprolol tartrate 50 milliGRAM(s) Oral three times a day  pantoprazole    Tablet 40 milliGRAM(s) Oral daily  predniSONE   Tablet 50 milliGRAM(s) Oral daily  senna 2 Tablet(s) Oral at bedtime  trimethoprim  160 mG/sulfamethoxazole 800 mG 1 Tablet(s) Oral <User Schedule>    MEDICATIONS  (PRN):  dextrose Oral Gel 15 Gram(s) Oral once PRN Blood Glucose LESS THAN 70 milliGRAM(s)/deciliter    ___________________________________________________________________________    PHYSICAL EXAM:  Gen - NAD, Comfortable  HEENT - NCAT, EOMI, MMM  Pulm - breathing comfortably on room air  Cardiovascular - warm and well perfused  Abdomen - Soft, NT/ND  Extremities - No C/C/E, No calf tenderness  Neuro-     Cognitive - oriented to name, states rehab in [place] for place, states February 2023 for time     Communication - Fluent, No dysarthria     Attention: able to state days of the week backwards     Cranial Nerves - no facial asymmetry, EOMI     Motor -                     LEFT    UE - ShAB 5/5, EF 5/5, EE 5/5, WE 5/5,  5/5                    RIGHT UE - ShAB 5/5, EF 5/5, EE 5/5, WE 5/5,  5/5                    LEFT    LE - HF 4/5, KE 5/5, DF 5/5, PF 5/5                    RIGHT LE - HF 4/5, KE 5/5, DF 5/5, PF 5/5        Sensory - Intact to LT     Reflexes - DTR Intact, No primitive reflexive     Coordination - FTN intact     Tone - normal  Psychiatric - Mood stable, Affect WNL  Skin:  all skin intact    ___________________________________________________________________________   HPI:  Mrs. Екатерина Poole is a 58 year old female patient with past medical history of HTN and rheumatoid arthritis who presented to [place] on 11/15 for AMS when a neighbor overhead patient was looking for her mom and she was found confused/wandering around apartment complex. Imaging studies initially performed reported bilateral subacute to remote embolic strokes, a right posterior corona radiata acute infarct, and multiple petechial hemorrhages in bilateral thalamus and basal ganglia. She was additionally noted to have KRUNAL which has since resolved. Hypertension managed with amlodipine, aldactone, and losartan and she was started on B12 supplements. Patient seen by Rheumatology and was transferred to [facility] on 11/20 for cerebral angiogram.    A cerebral angiogram performed on 11/20 reported multiple areas of focal stenosis and dilatation concerning for vasculitis. An LP was completed on 11/21 with a normal profile. Rheumatology consulted and recommended possible brain biopsy to confirm diagnosis of vasculitis but the risks of performing an open biopsy outweighed the benefits as the results of the biopsy were deemed unlikely to . She was seen by Neurosurgery with patient's neuroradiologic findings including beading on cerebral angiogram (consistent with vasculitis) and clinical presentation, with no other etiology of vasculitis, point to PACNS. Patient was started on steroids given her micro hemorrhage and strokes likely attributed to vasculitis. Steroids started on 11/24 with initial dose Solumedrol 1g for 3 days. She was to continue Prednisone 60mg with a decrease to 50mg daily for discharge to acute in-patient rehabilitation. No taper indicated per rheumatology evaluation and recommendations. An EEG was additionally performed and reported moderate diffuse/multi-focal cerebral dysfunction, not specific as to etiology. There were no epileptiform abnormalities recorded. Will need a repeat cerebral angiogram in 1 month which should take place around 12/20/23. Patient started on Eliquis 5 mg BID, amiodarone, and metoprolol for Afib. TTE reported as normal with global left ventricular systolic function, mild mitral valve regurgitation, mild aortic regurgitation, and a sclerotic aortic valve with normal opening. Hospitalization additionally significant for a rapid response on 11/20 due to acute mental status changes after being found to be slumped over while on toilet by staff prompting MRI imaging reporting:  Left parietal small cortical acute infarction  Innumerable scattered chronic microhemorrhages in the brainstem, bilateral cerebellar hemispheres, deep gray nuclei and peripherally within the cerebral hemispheres which may be secondary to chronic hypertensive encephalopathy.  Severe extensive chronic microvascular ischemic changes and multifocal chronic lacunar infarcts as detailed above.  Patient evaluated by PT/OT and was recommended for acute inpatient rehab. Patient is medically stable for discharge to [facility] on 11/30. (30 Nov 2023 14:36)    TDD: 12/14/23 JUAN  ___________________________________________________________________________    SUBJECTIVE/ROS  Patient was seen and evaluated at bedside and while participating on Physical Therapy today.  Reported no overnight events and is in no acute distress. BP more controlled today.   Some episodes of elevated BP which was discussed with Hospitalist and will monitor.  Patient remains purposeful and cooperative.  Case was discussed at Interdisciplinary Team meeting 12/5.  A tentative discharge date is outlined above.  Patient is motivated to continue participation on the recommended rehabilitation program.   Denies any CP, SOB, LAURA, palpitations, fever, chills, body aches, cough, congestion, or any other symptoms at this time.   ___________________________________________________________________________    EEG (12/04/23)  Classification / Summary:  Abnormal routine EEG in the drowsy and asleep states with intermittent arousals. Unclear if full wakefulness is captured.  Occasional left > right temporal focal slowing.  Mild diffuse slowing vs. excess drowsiness.  No epileptiform abnormalities are captured.     Clinical Impression:  Left > right temporal focal cerebral dysfunction can be structural or functional in etiology.  Mild diffuse cerebral dysfunction is nonspecific in etiology. Alternatively, excess drowsiness may be contributing.    ___________________________________________________________________________    CT Head No Cont (12.01.23 @ 10:40)   Moderate to severe chronic microvascular changes without evidence of an acute transcortical infarction or hemorrhage.    CT Angio Brain Stroke Protocol  w/ IV Cont (12.01.23 @ 13:03)   Negative CT perfusion. CTA demonstrates persistent narrowing of the anterior and middle cerebral arteries consistent with vasospasm or vasculitis.  ___________________________________________________________________________    Vital Signs Last 24 Hrs  T(C): 37.1 (07 Dec 2023 08:36), Max: 37.1 (07 Dec 2023 08:36)  T(F): 98.8 (07 Dec 2023 08:36), Max: 98.8 (07 Dec 2023 08:36)  HR: 62 (07 Dec 2023 08:36) (62 - 64)  BP: 138/81 (07 Dec 2023 08:36) (131/76 - 152/78)  RR: 16 (07 Dec 2023 08:36) (16 - 16)  SpO2: 95% (07 Dec 2023 08:36) (95% - 98%)  ___________________________________________________________________________    LABS                          9.6    16.54 )-----------( 383      ( 04 Dec 2023 05:51 )             27.0       12-04    138  |  100  |  26<H>  ----------------------------<  89  3.5   |  30  |  1.11    Ca    8.4      04 Dec 2023 05:51    TPro  6.3  /  Alb  2.5<L>  /  TBili  0.3  /  DBili  x   /  AST  11  /  ALT  23  /  AlkPhos  71  12-04      ___________________________________________________________________________    MEDICATIONS  (STANDING):  aMIOdarone    Tablet 200 milliGRAM(s) Oral daily  apixaban 5 milliGRAM(s) Oral two times a day  atorvastatin 80 milliGRAM(s) Oral at bedtime  bisacodyl 5 milliGRAM(s) Oral at bedtime  dextrose 5%. 1000 milliLiter(s) (50 mL/Hr) IV Continuous <Continuous>  dextrose 5%. 1000 milliLiter(s) (100 mL/Hr) IV Continuous <Continuous>  dextrose 50% Injectable 25 Gram(s) IV Push once  dextrose 50% Injectable 12.5 Gram(s) IV Push once  dextrose 50% Injectable 25 Gram(s) IV Push once  glucagon  Injectable 1 milliGRAM(s) IntraMuscular once  influenza   Vaccine 0.5 milliLiter(s) IntraMuscular once  insulin lispro (ADMELOG) corrective regimen sliding scale   SubCutaneous at bedtime  insulin lispro (ADMELOG) corrective regimen sliding scale   SubCutaneous three times a day before meals  losartan 25 milliGRAM(s) Oral daily  metoprolol tartrate 50 milliGRAM(s) Oral three times a day  pantoprazole    Tablet 40 milliGRAM(s) Oral daily  predniSONE   Tablet 50 milliGRAM(s) Oral daily  senna 2 Tablet(s) Oral at bedtime  trimethoprim  160 mG/sulfamethoxazole 800 mG 1 Tablet(s) Oral <User Schedule>    MEDICATIONS  (PRN):  dextrose Oral Gel 15 Gram(s) Oral once PRN Blood Glucose LESS THAN 70 milliGRAM(s)/deciliter    ___________________________________________________________________________    PHYSICAL EXAM:  Gen - NAD, Comfortable  HEENT - NCAT, EOMI, MMM  Pulm - breathing comfortably on room air  Cardiovascular - warm and well perfused  Abdomen - Soft, NT/ND  Extremities - No C/C/E, No calf tenderness  Neuro-     Cognitive - oriented to name, states rehab in [place] for place, states February 2023 for time     Communication - Fluent, No dysarthria     Attention: able to state days of the week backwards     Cranial Nerves - no facial asymmetry, EOMI     Motor -                     LEFT    UE - ShAB 5/5, EF 5/5, EE 5/5, WE 5/5,  5/5                    RIGHT UE - ShAB 5/5, EF 5/5, EE 5/5, WE 5/5,  5/5                    LEFT    LE - HF 4/5, KE 5/5, DF 5/5, PF 5/5                    RIGHT LE - HF 4/5, KE 5/5, DF 5/5, PF 5/5        Sensory - Intact to LT     Reflexes - DTR Intact, No primitive reflexive     Coordination - FTN intact     Tone - normal  Psychiatric - Mood stable, Affect WNL  Skin:  all skin intact    ___________________________________________________________________________

## 2023-12-08 PROCEDURE — 99232 SBSQ HOSP IP/OBS MODERATE 35: CPT

## 2023-12-08 RX ADMIN — Medication 50 MILLIGRAM(S): at 05:25

## 2023-12-08 RX ADMIN — APIXABAN 5 MILLIGRAM(S): 2.5 TABLET, FILM COATED ORAL at 18:09

## 2023-12-08 RX ADMIN — Medication 1 TABLET(S): at 05:25

## 2023-12-08 RX ADMIN — PANTOPRAZOLE SODIUM 40 MILLIGRAM(S): 20 TABLET, DELAYED RELEASE ORAL at 11:40

## 2023-12-08 RX ADMIN — Medication 50 MILLIGRAM(S): at 05:24

## 2023-12-08 RX ADMIN — SENNA PLUS 2 TABLET(S): 8.6 TABLET ORAL at 21:01

## 2023-12-08 RX ADMIN — ATORVASTATIN CALCIUM 80 MILLIGRAM(S): 80 TABLET, FILM COATED ORAL at 21:01

## 2023-12-08 RX ADMIN — AMIODARONE HYDROCHLORIDE 200 MILLIGRAM(S): 400 TABLET ORAL at 05:24

## 2023-12-08 RX ADMIN — Medication 5 MILLIGRAM(S): at 21:02

## 2023-12-08 RX ADMIN — LOSARTAN POTASSIUM 25 MILLIGRAM(S): 100 TABLET, FILM COATED ORAL at 05:24

## 2023-12-08 RX ADMIN — APIXABAN 5 MILLIGRAM(S): 2.5 TABLET, FILM COATED ORAL at 05:24

## 2023-12-08 RX ADMIN — Medication 50 MILLIGRAM(S): at 14:14

## 2023-12-08 NOTE — PROGRESS NOTE ADULT - ASSESSMENT
Assessment/Plan:  Mrs. Екатерина Poole is a 58 year old female patient with past medical history of HTN and rheumatoid arthritis who is admitted for Acute Inpatient Rehabilitation with a multidisciplinary rehab program at [facility] with functional impairments in ADLs and mobility secondary to left hemiparesis resulting from bilateral subacute to remote embolic strokes, a right posterior corona radiata acute infarct, multiple petechial hemorrhages in bilateral thalamus and basal ganglia with etiology highly indicative from underlying vasculitis and a subsequent left parietal cortical acute infarction of a likely cardioembolic source.    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 Maintain dose per rheumatology eval/recommendation.  - Left hemiparesis, impaired ADLs and mobility, need for assistance with personal care   - Start comprehensive rehab program of PT/OT/SLP - 3 hours a day, 5 days a week. P&O as needed   - Fall /Aspiration precautions  - Will need repeat cerebral angiogram in 1 month (~12/20/23)  - Rheumatology and Neurology following  - Continue prednisone 50 mg QD  - Bactrim DS 1 tab MWF for PCP ppx while on high dose steroids  - EEG (12/4) no epileptic activity    # leukocytosis  - WBC 17.18->16.54->16.98  - likely steroid induced, was started on steroids 11/24, WBC started uptrending after  - afebrile, asymptomatic  - monitor CBC, hospitalist follow up    # RRT/Code Stroke with syncope after BM 12/1  - Head CT stable as above  - rheumatology and neuro consulted  - EEG (12/4) no epileptic activity    Afib  - amiodarone 200 mg QD  - metoprolol 50 mg TID  - Eliquis 5 mg BID    HTN  - continue Losartan 25 mg QD  - Norvasc 10 mg discontinued per Hospitalist  - Monitor BP    HLD  - cont atorvastatin 80 mg QHS    Hyperglycemia in setting of steroid use  - SSI  - Monitor fingersticks    Mood / Cognition  - Neuropsychology consult PRN    Sleep  - Maintain quiet hours and a low stim environment.     GI / Bowel  - Senna qHS  - Dulcolax 5 mg QHS  - GI ppx: pantoprazole 40 mg QD     / Bladder  - Bladder scans Q8 hours with straight cath for >400cc.  - Toileting schedule every 4 hours    Skin:  - Skin assessment on admission performed : Intact  - Pressure Injury/Skin: OOB to chair, PT/OT  - nursing to monitor skin q Shift    Diet/Dysphagia:  - Diet Consistency: regular  - Aspiration Precautions  - SLP consult for swallow function evaluation and treatment  - Nutrition consult    DVT prophylaxis:   - on Eliquis 5 mg BID      Outpatient Follow-up:  Gavin Calvo  Neurology  [address], [address]  [place] [address]  Phone: [phone]  Fax: [phone]  Follow Up Time: 2 weeks    Khadijah Hartley  Radiology  [address], [address]  [place] [address]  Phone: [phone]  Fax: [phone]  Follow Up Time: 2 weeks    Brando Lazar  Cardiology  [address], [address]  [place] [address]  Phone: [phone]  Fax: [phone]  Follow Up Time: 2 weeks    Kenya Naik  Rheumatology  [address]  [place] [address]  Phone: [phone]  Fax: [phone]  Follow Up Time: 2 weeks      Code Status/Emergency Contact:    --------------- Assessment/Plan:  Mrs. Екатерина Poole is a 58 year old female patient with past medical history of HTN and rheumatoid arthritis who is admitted for Acute Inpatient Rehabilitation with a multidisciplinary rehab program at [facility] with functional impairments in ADLs and mobility secondary to left hemiparesis resulting from bilateral subacute to remote embolic strokes, a right posterior corona radiata acute infarct, multiple petechial hemorrhages in bilateral thalamus and basal ganglia with etiology highly indicative from underlying vasculitis and a subsequent left parietal cortical acute infarction of a likely cardioembolic source.    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 Maintain dose per rheumatology eval/recommendation.  - Left hemiparesis, impaired ADLs and mobility, need for assistance with personal care   - Start comprehensive rehab program of PT/OT/SLP - 3 hours a day, 5 days a week. P&O as needed   - Fall /Aspiration precautions  - Will need repeat cerebral angiogram in 1 month (~12/20/23)  - Rheumatology and Neurology following  - Continue prednisone 50 mg QD  - Bactrim DS 1 tab MWF for PCP ppx while on high dose steroids  - EEG (12/4) no epileptic activity    # leukocytosis  - WBC 17.18->16.54->16.98  - likely steroid induced, was started on steroids 11/24, WBC started uptrending after  - afebrile, asymptomatic  - monitor CBC, hospitalist follow up    # RRT/Code Stroke with syncope after BM 12/1  - Head CT stable as above  - rheumatology and neuro consulted  - EEG (12/4) no epileptic activity    Afib  - amiodarone 200 mg QD  - metoprolol 50 mg TID  - Eliquis 5 mg BID    HTN  - continue Losartan 25 mg QD  - Norvasc 10 mg discontinued per Hospitalist  - Monitor BP    HLD  - cont atorvastatin 80 mg QHS    Hyperglycemia in setting of steroid use  - SSI  - Monitor fingersticks    Mood / Cognition  - Neuropsychology consult PRN    Sleep  - Maintain quiet hours and a low stim environment.     GI / Bowel  - Senna qHS  - Dulcolax 5 mg QHS  - GI ppx: pantoprazole 40 mg QD     / Bladder  - Bladder scans Q8 hours with straight cath for >400cc.  - Toileting schedule every 4 hours    Skin:  - Skin assessment on admission performed : Intact  - Pressure Injury/Skin: OOB to chair, PT/OT  - nursing to monitor skin q Shift    Diet/Dysphagia:  - Diet Consistency: regular  - Aspiration Precautions  - SLP consult for swallow function evaluation and treatment  - Nutrition consult    DVT prophylaxis:   - on Eliquis 5 mg BID      Outpatient Follow-up:  Gavin Calvo  Neurology  [address], [address]  [place] [address]  Phone: [phone]  Fax: [phone]  Follow Up Time: 2 weeks    Khadijah Hartley  Radiology  [address]  [place] [address]  Phone: [phone]  Fax: [phone]  Follow Up Time: 2 weeks    Brando Lazar  Cardiology  [address], [address]  [place] [address]  Phone: [phone]  Fax: [phone]  Follow Up Time: 2 weeks    Kenya Naik  Rheumatology  [address]  [place] [address]  Phone: [phone]  Fax: [phone]  Follow Up Time: 2 weeks      Code Status/Emergency Contact:    ---------------

## 2023-12-08 NOTE — PROGRESS NOTE ADULT - SUBJECTIVE AND OBJECTIVE BOX
NADINE CAMPOVERDE   58y   Female    Admitting: CORY Jacobs  HPI:  58 year old female patient with past medical history of HTN and rheumatoid arthritis who presented to [facility] on 11/15 for AMS. Imaging studies initially performed reported bilateral subacute to remote embolic strokes, a right posterior corona radiata acute infarct, and multiple petechial hemorrhages in bilateral thalamus and basal ganglia. She was additionally noted to have KRUNAL which has since resolved. Hypertension managed with amlodipine, aldactone, and losartan and she was started on B12 supplements. Patient seen by Rheumatology and was transferred to [facility] on 11/20 for cerebral angiogram.    A cerebral angiogram performed on 11/20 reported multiple areas of focal stenosis and dilatation concerning for vasculitis. An LP was completed on 11/21 with a normal profile. Rheumatology consulted and recommended possible brain biopsy to confirm diagnosis of vasculitis but the risks of performing an open biopsy outweighed the benefits as the results of the biopsy were deemed unlikely to . She was seen by Neurosurgery with patient's neuroradiologic findings including beading on cerebral angiogram (consistent with vasculitis) and clinical presentation, with no other etiology of vasculitis, point to PACNS. Patient was started on steroids given her micro hemorrhage and strokes likely attributed to vasculitis. Steroids started on 11/24 with initial dose Solumedrol 1g for 3 days. She was to continue Prednisone 60mg with a decrease to 50mg daily for discharge to acute in-patient rehabilitation. No taper indicated per rheumatology evaluation and recommendations. An EEG was additionally performed and reported moderate diffuse/multi-focal cerebral dysfunction, not specific as to etiology. There were no epileptiform abnormalities recorded. Will need a repeat cerebral angiogram in 1 month which should take place around 12/20/23. Patient started on Eliquis 5 mg BID, amiodarone, and metoprolol for Afib. TTE reported as normal with global left ventricular systolic function, mild mitral valve regurgitation, mild aortic regurgitation, and a sclerotic aortic valve with normal opening. Hospitalization additionally significant for a rapid response on 11/20 due to acute mental status changes after being found to be slumped over while on toilet by staff prompting MRI imaging reporting:  ·	Left parietal small cortical acute infarction  ·	Innumerable scattered chronic microhemorrhages in the brainstem, bilateral cerebellar hemispheres, deep gray nuclei and peripherally within the cerebral hemispheres which may be secondary to chronic hypertensive encephalopathy.  ·	Severe extensive chronic microvascular ischemic changes and multifocal chronic lacunar infarcts as detailed above.  Patient evaluated by PT/OT and was recommended for acute inpatient rehab.  Hematology consulted for leukocytosis.    PAST MEDICAL & SURGICAL HISTORY:    HEALTH ISSUES - PROBLEM Dx:  Leukocytosis    MEDICATIONS  (STANDING):  aMIOdarone    Tablet 200 milliGRAM(s) Oral daily  apixaban 5 milliGRAM(s) Oral two times a day  atorvastatin 80 milliGRAM(s) Oral at bedtime  bisacodyl 5 milliGRAM(s) Oral at bedtime  glucagon  Injectable 1 milliGRAM(s) IntraMuscular once  influenza   Vaccine 0.5 milliLiter(s) IntraMuscular once  losartan 25 milliGRAM(s) Oral daily  metoprolol tartrate 50 milliGRAM(s) Oral three times a day  pantoprazole    Tablet 40 milliGRAM(s) Oral daily  predniSONE   Tablet 50 milliGRAM(s) Oral daily  senna 2 Tablet(s) Oral at bedtime  trimethoprim  160 mG/sulfamethoxazole 800 mG 1 Tablet(s) Oral <User Schedule>    MEDICATIONS  (PRN):    Allergies    No Known Drug Allergies  Shrimp (Unknown)    INTERVAL HPI/OVERNIGHT EVENTS:  Patient S&E at bedside. No c/o CP or SOB.    VITAL SIGNS:  T(F): 98.6 (12-08-23 @ 08:22)  HR: 62 (12-08-23 @ 08:22)  BP: 145/83 (12-08-23 @ 08:22)  RR: 16 (12-08-23 @ 08:22)  SpO2: 96% (12-08-23 @ 08:22)      PHYSICAL EXAM:  Constitutional: NAD, non-toxic appearing  Eyes: sclera non-icteric  Neck: no JVD  Respiratory: CTA ant.; no wheezes  Cardiovascular: RRR, no M/R/G  Gastrointestinal: soft, NTND, no masses palpable  Extremities: no calf tenderness  Neurological: Awake, alert.    Labs:             10.3   16.98 )-----------( 340      ( 12-07 @ 05:48 )             29.4       12-07    139  |  99  |  27<H>  ----------------------------<  83  3.6   |  33<H>  |  1.17    Ca    9.0      07 Dec 2023 05:48    TPro  6.5  /  Alb  2.7<L>  /  TBili  0.4  /  DBili  x   /  AST  8<L>  /  ALT  23  /  AlkPhos  67  12-07    Consultant notes reviewed : YES [x ] ; NO [ ]   NADINE CAMPOVERDE   58y   Female    Admitting: CORY Jacobs  HPI:  58 year old female patient with past medical history of HTN and rheumatoid arthritis who presented to [facility] on 11/15 for AMS. Imaging studies initially performed reported bilateral subacute to remote embolic strokes, a right posterior corona radiata acute infarct, and multiple petechial hemorrhages in bilateral thalamus and basal ganglia. She was additionally noted to have KRUNAL which has since resolved. Hypertension managed with amlodipine, aldactone, and losartan and she was started on B12 supplements. Patient seen by Rheumatology and was transferred to [facility] on 11/20 for cerebral angiogram.    A cerebral angiogram performed on 11/20 reported multiple areas of focal stenosis and dilatation concerning for vasculitis. An LP was completed on 11/21 with a normal profile. Rheumatology consulted and recommended possible brain biopsy to confirm diagnosis of vasculitis but the risks of performing an open biopsy outweighed the benefits as the results of the biopsy were deemed unlikely to . She was seen by Neurosurgery with patient's neuroradiologic findings including beading on cerebral angiogram (consistent with vasculitis) and clinical presentation, with no other etiology of vasculitis, point to PACNS. Patient was started on steroids given her micro hemorrhage and strokes likely attributed to vasculitis. Steroids started on 11/24 with initial dose Solumedrol 1g for 3 days. She was to continue Prednisone 60mg with a decrease to 50mg daily for discharge to acute in-patient rehabilitation. No taper indicated per rheumatology evaluation and recommendations. An EEG was additionally performed and reported moderate diffuse/multi-focal cerebral dysfunction, not specific as to etiology. There were no epileptiform abnormalities recorded. Will need a repeat cerebral angiogram in 1 month which should take place around 12/20/23. Patient started on Eliquis 5 mg BID, amiodarone, and metoprolol for Afib. TTE reported as normal with global left ventricular systolic function, mild mitral valve regurgitation, mild aortic regurgitation, and a sclerotic aortic valve with normal opening. Hospitalization additionally significant for a rapid response on 11/20 due to acute mental status changes after being found to be slumped over while on toilet by staff prompting MRI imaging reporting:  ·	Left parietal small cortical acute infarction  ·	Innumerable scattered chronic microhemorrhages in the brainstem, bilateral cerebellar hemispheres, deep gray nuclei and peripherally within the cerebral hemispheres which may be secondary to chronic hypertensive encephalopathy.  ·	Severe extensive chronic microvascular ischemic changes and multifocal chronic lacunar infarcts as detailed above.  Patient evaluated by PT/OT and was recommended for acute inpatient rehab.  Hematology consulted for leukocytosis.    PAST MEDICAL & SURGICAL HISTORY:    HEALTH ISSUES - PROBLEM Dx:  Leukocytosis    MEDICATIONS  (STANDING):  aMIOdarone    Tablet 200 milliGRAM(s) Oral daily  apixaban 5 milliGRAM(s) Oral two times a day  atorvastatin 80 milliGRAM(s) Oral at bedtime  bisacodyl 5 milliGRAM(s) Oral at bedtime  glucagon  Injectable 1 milliGRAM(s) IntraMuscular once  influenza   Vaccine 0.5 milliLiter(s) IntraMuscular once  losartan 25 milliGRAM(s) Oral daily  metoprolol tartrate 50 milliGRAM(s) Oral three times a day  pantoprazole    Tablet 40 milliGRAM(s) Oral daily  predniSONE   Tablet 50 milliGRAM(s) Oral daily  senna 2 Tablet(s) Oral at bedtime  trimethoprim  160 mG/sulfamethoxazole 800 mG 1 Tablet(s) Oral <User Schedule>    MEDICATIONS  (PRN):    Allergies    No Known Drug Allergies  Shrimp (Unknown)    INTERVAL HPI/OVERNIGHT EVENTS:  Patient S&E at bedside. No c/o CP or SOB.    VITAL SIGNS:  T(F): 98.6 (12-08-23 @ 08:22)  HR: 62 (12-08-23 @ 08:22)  BP: 145/83 (12-08-23 @ 08:22)  RR: 16 (12-08-23 @ 08:22)  SpO2: 96% (12-08-23 @ 08:22)      PHYSICAL EXAM:  Constitutional: NAD, non-toxic appearing  Eyes: sclera non-icteric  Neck: no JVD  Respiratory: CTA ant.; no wheezes  Cardiovascular: RRR, no M/R/G  Gastrointestinal: soft, NTND, no masses palpable  Extremities: no calf tenderness  Neurological: Awake, alert.    Labs:             10.3   16.98 )-----------( 340      ( 12-07 @ 05:48 )             29.4       12-07    139  |  99  |  27<H>  ----------------------------<  83  3.6   |  33<H>  |  1.17    Ca    9.0      07 Dec 2023 05:48    TPro  6.5  /  Alb  2.7<L>  /  TBili  0.4  /  DBili  x   /  AST  8<L>  /  ALT  23  /  AlkPhos  67  12-07    Consultant notes reviewed : YES [x ] ; NO [ ]

## 2023-12-08 NOTE — PROGRESS NOTE ADULT - SUBJECTIVE AND OBJECTIVE BOX
HPI:  Mrs. Екатерина Poole is a 58 year old female patient with past medical history of HTN and rheumatoid arthritis who presented to [facility] on 11/15 for AMS when a neighbor overhead patient was looking for her mom and she was found confused/wandering around apartment complex. Imaging studies initially performed reported bilateral subacute to remote embolic strokes, a right posterior corona radiata acute infarct, and multiple petechial hemorrhages in bilateral thalamus and basal ganglia. She was additionally noted to have KRUNAL which has since resolved. Hypertension managed with amlodipine, aldactone, and losartan and she was started on B12 supplements. Patient seen by Rheumatology and was transferred to [facility] on 11/20 for cerebral angiogram.    A cerebral angiogram performed on 11/20 reported multiple areas of focal stenosis and dilatation concerning for vasculitis. An LP was completed on 11/21 with a normal profile. Rheumatology consulted and recommended possible brain biopsy to confirm diagnosis of vasculitis but the risks of performing an open biopsy outweighed the benefits as the results of the biopsy were deemed unlikely to . She was seen by Neurosurgery with patient's neuroradiologic findings including beading on cerebral angiogram (consistent with vasculitis) and clinical presentation, with no other etiology of vasculitis, point to PACNS. Patient was started on steroids given her micro hemorrhage and strokes likely attributed to vasculitis. Steroids started on 11/24 with initial dose Solumedrol 1g for 3 days. She was to continue Prednisone 60mg with a decrease to 50mg daily for discharge to acute in-patient rehabilitation. No taper indicated per rheumatology evaluation and recommendations. An EEG was additionally performed and reported moderate diffuse/multi-focal cerebral dysfunction, not specific as to etiology. There were no epileptiform abnormalities recorded. Will need a repeat cerebral angiogram in 1 month which should take place around 12/20/23. Patient started on Eliquis 5 mg BID, amiodarone, and metoprolol for Afib. TTE reported as normal with global left ventricular systolic function, mild mitral valve regurgitation, mild aortic regurgitation, and a sclerotic aortic valve with normal opening. Hospitalization additionally significant for a rapid response on 11/20 due to acute mental status changes after being found to be slumped over while on toilet by staff prompting MRI imaging reporting:  Left parietal small cortical acute infarction  Innumerable scattered chronic microhemorrhages in the brainstem, bilateral cerebellar hemispheres, deep gray nuclei and peripherally within the cerebral hemispheres which may be secondary to chronic hypertensive encephalopathy.  Severe extensive chronic microvascular ischemic changes and multifocal chronic lacunar infarcts as detailed above.  Patient evaluated by PT/OT and was recommended for acute inpatient rehab. Patient is medically stable for discharge to [facility] on 11/30. (30 Nov 2023 14:36)    TDD: 12/14/23 JUAN  ___________________________________________________________________________    SUBJECTIVE/ROS  Patient was seen and evaluated at bedside today.  Reported no overnight events and is in no acute distress.   Some episodes of elevated BP which was discussed with Hospitalist and will monitor over weekend.  Patient remains purposeful and cooperative on treatment sessions and evaluations.  Case was discussed at Interdisciplinary Team meeting 12/5.  A tentative discharge date is outlined above.  Reviewed weekend coverage with patient.  Patient expressed understanding and felt comfortable.  Patient is motivated to continue participation on the recommended rehabilitation program.   Denies any CP, SOB, LAURA, palpitations, fever, chills, body aches, cough, congestion, or any other symptoms at this time.   ___________________________________________________________________________    EEG (12/04/23)  Classification / Summary:  Abnormal routine EEG in the drowsy and asleep states with intermittent arousals. Unclear if full wakefulness is captured.  Occasional left > right temporal focal slowing.  Mild diffuse slowing vs. excess drowsiness.  No epileptiform abnormalities are captured.     Clinical Impression:  Left > right temporal focal cerebral dysfunction can be structural or functional in etiology.  Mild diffuse cerebral dysfunction is nonspecific in etiology. Alternatively, excess drowsiness may be contributing.    ___________________________________________________________________________    CT Head No Cont (12.01.23 @ 10:40)   Moderate to severe chronic microvascular changes without evidence of an acute transcortical infarction or hemorrhage.    CT Angio Brain Stroke Protocol  w/ IV Cont (12.01.23 @ 13:03)   Negative CT perfusion. CTA demonstrates persistent narrowing of the anterior and middle cerebral arteries consistent with vasospasm or vasculitis.  ___________________________________________________________________________    Vital Signs Last 24 Hrs  T(C): 37 (08 Dec 2023 08:22), Max: 37 (08 Dec 2023 08:22)  T(F): 98.6 (08 Dec 2023 08:22), Max: 98.6 (08 Dec 2023 08:22)  HR: 62 (08 Dec 2023 08:22) (56 - 67)  BP: 145/83 (08 Dec 2023 08:22) (129/75 - 163/77)  RR: 16 (08 Dec 2023 08:22) (16 - 16)  SpO2: 96% (08 Dec 2023 08:22) (95% - 96%)    ___________________________________________________________________________    LAB                        10.3   16.98 )-----------( 340      ( 07 Dec 2023 05:48 )             29.4                          9.6    16.54 )-----------( 383      ( 04 Dec 2023 05:51 )             27.0       12-07    139  |  99  |  27<H>  ----------------------------<  83  3.6   |  33<H>  |  1.17    Ca    9.0      07 Dec 2023 05:48    TPro  6.5  /  Alb  2.7<L>  /  TBili  0.4  /  DBili  x   /  AST  8<L>  /  ALT  23  /  AlkPhos  67  12-07    LIVER FUNCTIONS - ( 07 Dec 2023 05:48 )  Alb: 2.7 g/dL / Pro: 6.5 g/dL / ALK PHOS: 67 U/L / ALT: 23 U/L / AST: 8 U/L / GGT: x             12-04    138  |  100  |  26<H>  ----------------------------<  89  3.5   |  30  |  1.11    Ca    8.4      04 Dec 2023 05:51    TPro  6.3  /  Alb  2.5<L>  /  TBili  0.3  /  DBili  x   /  AST  11  /  ALT  23  /  AlkPhos  71  12-04      ___________________________________________________________________________    MEDICATIONS  (STANDING):  aMIOdarone    Tablet 200 milliGRAM(s) Oral daily  apixaban 5 milliGRAM(s) Oral two times a day  atorvastatin 80 milliGRAM(s) Oral at bedtime  bisacodyl 5 milliGRAM(s) Oral at bedtime  dextrose 5%. 1000 milliLiter(s) (50 mL/Hr) IV Continuous <Continuous>  dextrose 5%. 1000 milliLiter(s) (100 mL/Hr) IV Continuous <Continuous>  dextrose 50% Injectable 25 Gram(s) IV Push once  dextrose 50% Injectable 12.5 Gram(s) IV Push once  dextrose 50% Injectable 25 Gram(s) IV Push once  glucagon  Injectable 1 milliGRAM(s) IntraMuscular once  influenza   Vaccine 0.5 milliLiter(s) IntraMuscular once  insulin lispro (ADMELOG) corrective regimen sliding scale   SubCutaneous at bedtime  insulin lispro (ADMELOG) corrective regimen sliding scale   SubCutaneous three times a day before meals  losartan 25 milliGRAM(s) Oral daily  metoprolol tartrate 50 milliGRAM(s) Oral three times a day  pantoprazole    Tablet 40 milliGRAM(s) Oral daily  predniSONE   Tablet 50 milliGRAM(s) Oral daily  senna 2 Tablet(s) Oral at bedtime  trimethoprim  160 mG/sulfamethoxazole 800 mG 1 Tablet(s) Oral <User Schedule>    MEDICATIONS  (PRN):  dextrose Oral Gel 15 Gram(s) Oral once PRN Blood Glucose LESS THAN 70 milliGRAM(s)/deciliter    ___________________________________________________________________________    PHYSICAL EXAM:  Gen - NAD, Comfortable  HEENT - NCAT, EOMI, MMM  Pulm - breathing comfortably on room air  Cardiovascular - warm and well perfused  Abdomen - Soft, NT/ND  Extremities - No C/C/E, No calf tenderness  Neuro-     Cognitive - oriented to name, states rehab in [place] for place, states February 2023 for time     Communication - Fluent, No dysarthria     Attention: able to state days of the week backwards     Cranial Nerves - no facial asymmetry, EOMI     Motor -                     LEFT    UE - ShAB 5/5, EF 5/5, EE 5/5, WE 5/5,  5/5                    RIGHT UE - ShAB 5/5, EF 5/5, EE 5/5, WE 5/5,  5/5                    LEFT    LE - HF 4/5, KE 5/5, DF 5/5, PF 5/5                    RIGHT LE - HF 4/5, KE 5/5, DF 5/5, PF 5/5        Sensory - Intact to LT     Reflexes - DTR Intact, No primitive reflexive     Coordination - FTN intact     Tone - normal  Psychiatric - Mood stable, Affect WNL  Skin:  all skin intact    ___________________________________________________________________________   HPI:  Mrs. Екатерина Poole is a 58 year old female patient with past medical history of HTN and rheumatoid arthritis who presented to [place] on 11/15 for AMS when a neighbor overhead patient was looking for her mom and she was found confused/wandering around apartment complex. Imaging studies initially performed reported bilateral subacute to remote embolic strokes, a right posterior corona radiata acute infarct, and multiple petechial hemorrhages in bilateral thalamus and basal ganglia. She was additionally noted to have KRUNAL which has since resolved. Hypertension managed with amlodipine, aldactone, and losartan and she was started on B12 supplements. Patient seen by Rheumatology and was transferred to [facility] on 11/20 for cerebral angiogram.    A cerebral angiogram performed on 11/20 reported multiple areas of focal stenosis and dilatation concerning for vasculitis. An LP was completed on 11/21 with a normal profile. Rheumatology consulted and recommended possible brain biopsy to confirm diagnosis of vasculitis but the risks of performing an open biopsy outweighed the benefits as the results of the biopsy were deemed unlikely to . She was seen by Neurosurgery with patient's neuroradiologic findings including beading on cerebral angiogram (consistent with vasculitis) and clinical presentation, with no other etiology of vasculitis, point to PACNS. Patient was started on steroids given her micro hemorrhage and strokes likely attributed to vasculitis. Steroids started on 11/24 with initial dose Solumedrol 1g for 3 days. She was to continue Prednisone 60mg with a decrease to 50mg daily for discharge to acute in-patient rehabilitation. No taper indicated per rheumatology evaluation and recommendations. An EEG was additionally performed and reported moderate diffuse/multi-focal cerebral dysfunction, not specific as to etiology. There were no epileptiform abnormalities recorded. Will need a repeat cerebral angiogram in 1 month which should take place around 12/20/23. Patient started on Eliquis 5 mg BID, amiodarone, and metoprolol for Afib. TTE reported as normal with global left ventricular systolic function, mild mitral valve regurgitation, mild aortic regurgitation, and a sclerotic aortic valve with normal opening. Hospitalization additionally significant for a rapid response on 11/20 due to acute mental status changes after being found to be slumped over while on toilet by staff prompting MRI imaging reporting:  Left parietal small cortical acute infarction  Innumerable scattered chronic microhemorrhages in the brainstem, bilateral cerebellar hemispheres, deep gray nuclei and peripherally within the cerebral hemispheres which may be secondary to chronic hypertensive encephalopathy.  Severe extensive chronic microvascular ischemic changes and multifocal chronic lacunar infarcts as detailed above.  Patient evaluated by PT/OT and was recommended for acute inpatient rehab. Patient is medically stable for discharge to [facility] on 11/30. (30 Nov 2023 14:36)    TDD: 12/14/23 JUAN  ___________________________________________________________________________    SUBJECTIVE/ROS  Patient was seen and evaluated at bedside today.  Reported no overnight events and is in no acute distress.   Some episodes of elevated BP which was discussed with Hospitalist and will monitor over weekend.  Patient remains purposeful and cooperative on treatment sessions and evaluations.  Case was discussed at Interdisciplinary Team meeting 12/5.  A tentative discharge date is outlined above.  Reviewed weekend coverage with patient.  Patient expressed understanding and felt comfortable.  Patient is motivated to continue participation on the recommended rehabilitation program.   Denies any CP, SOB, LAURA, palpitations, fever, chills, body aches, cough, congestion, or any other symptoms at this time.   ___________________________________________________________________________    EEG (12/04/23)  Classification / Summary:  Abnormal routine EEG in the drowsy and asleep states with intermittent arousals. Unclear if full wakefulness is captured.  Occasional left > right temporal focal slowing.  Mild diffuse slowing vs. excess drowsiness.  No epileptiform abnormalities are captured.     Clinical Impression:  Left > right temporal focal cerebral dysfunction can be structural or functional in etiology.  Mild diffuse cerebral dysfunction is nonspecific in etiology. Alternatively, excess drowsiness may be contributing.    ___________________________________________________________________________    CT Head No Cont (12.01.23 @ 10:40)   Moderate to severe chronic microvascular changes without evidence of an acute transcortical infarction or hemorrhage.    CT Angio Brain Stroke Protocol  w/ IV Cont (12.01.23 @ 13:03)   Negative CT perfusion. CTA demonstrates persistent narrowing of the anterior and middle cerebral arteries consistent with vasospasm or vasculitis.  ___________________________________________________________________________    Vital Signs Last 24 Hrs  T(C): 37 (08 Dec 2023 08:22), Max: 37 (08 Dec 2023 08:22)  T(F): 98.6 (08 Dec 2023 08:22), Max: 98.6 (08 Dec 2023 08:22)  HR: 62 (08 Dec 2023 08:22) (56 - 67)  BP: 145/83 (08 Dec 2023 08:22) (129/75 - 163/77)  RR: 16 (08 Dec 2023 08:22) (16 - 16)  SpO2: 96% (08 Dec 2023 08:22) (95% - 96%)    ___________________________________________________________________________    LAB                        10.3   16.98 )-----------( 340      ( 07 Dec 2023 05:48 )             29.4                          9.6    16.54 )-----------( 383      ( 04 Dec 2023 05:51 )             27.0       12-07    139  |  99  |  27<H>  ----------------------------<  83  3.6   |  33<H>  |  1.17    Ca    9.0      07 Dec 2023 05:48    TPro  6.5  /  Alb  2.7<L>  /  TBili  0.4  /  DBili  x   /  AST  8<L>  /  ALT  23  /  AlkPhos  67  12-07    LIVER FUNCTIONS - ( 07 Dec 2023 05:48 )  Alb: 2.7 g/dL / Pro: 6.5 g/dL / ALK PHOS: 67 U/L / ALT: 23 U/L / AST: 8 U/L / GGT: x             12-04    138  |  100  |  26<H>  ----------------------------<  89  3.5   |  30  |  1.11    Ca    8.4      04 Dec 2023 05:51    TPro  6.3  /  Alb  2.5<L>  /  TBili  0.3  /  DBili  x   /  AST  11  /  ALT  23  /  AlkPhos  71  12-04      ___________________________________________________________________________    MEDICATIONS  (STANDING):  aMIOdarone    Tablet 200 milliGRAM(s) Oral daily  apixaban 5 milliGRAM(s) Oral two times a day  atorvastatin 80 milliGRAM(s) Oral at bedtime  bisacodyl 5 milliGRAM(s) Oral at bedtime  dextrose 5%. 1000 milliLiter(s) (50 mL/Hr) IV Continuous <Continuous>  dextrose 5%. 1000 milliLiter(s) (100 mL/Hr) IV Continuous <Continuous>  dextrose 50% Injectable 25 Gram(s) IV Push once  dextrose 50% Injectable 12.5 Gram(s) IV Push once  dextrose 50% Injectable 25 Gram(s) IV Push once  glucagon  Injectable 1 milliGRAM(s) IntraMuscular once  influenza   Vaccine 0.5 milliLiter(s) IntraMuscular once  insulin lispro (ADMELOG) corrective regimen sliding scale   SubCutaneous at bedtime  insulin lispro (ADMELOG) corrective regimen sliding scale   SubCutaneous three times a day before meals  losartan 25 milliGRAM(s) Oral daily  metoprolol tartrate 50 milliGRAM(s) Oral three times a day  pantoprazole    Tablet 40 milliGRAM(s) Oral daily  predniSONE   Tablet 50 milliGRAM(s) Oral daily  senna 2 Tablet(s) Oral at bedtime  trimethoprim  160 mG/sulfamethoxazole 800 mG 1 Tablet(s) Oral <User Schedule>    MEDICATIONS  (PRN):  dextrose Oral Gel 15 Gram(s) Oral once PRN Blood Glucose LESS THAN 70 milliGRAM(s)/deciliter    ___________________________________________________________________________    PHYSICAL EXAM:  Gen - NAD, Comfortable  HEENT - NCAT, EOMI, MMM  Pulm - breathing comfortably on room air  Cardiovascular - warm and well perfused  Abdomen - Soft, NT/ND  Extremities - No C/C/E, No calf tenderness  Neuro-     Cognitive - oriented to name, states rehab in [place] for place, states February 2023 for time     Communication - Fluent, No dysarthria     Attention: able to state days of the week backwards     Cranial Nerves - no facial asymmetry, EOMI     Motor -                     LEFT    UE - ShAB 5/5, EF 5/5, EE 5/5, WE 5/5,  5/5                    RIGHT UE - ShAB 5/5, EF 5/5, EE 5/5, WE 5/5,  5/5                    LEFT    LE - HF 4/5, KE 5/5, DF 5/5, PF 5/5                    RIGHT LE - HF 4/5, KE 5/5, DF 5/5, PF 5/5        Sensory - Intact to LT     Reflexes - DTR Intact, No primitive reflexive     Coordination - FTN intact     Tone - normal  Psychiatric - Mood stable, Affect WNL  Skin:  all skin intact    ___________________________________________________________________________

## 2023-12-08 NOTE — PROGRESS NOTE ADULT - PROBLEM SELECTOR PLAN 1
Patient with recent normal WBC (11/16/23-6.97). Subsequent leukocytosis with fluctuating WBC suggest of reactive process-note patient is receiving steroids. Would continue to monitor CBC with diff., clinical status, watching for s/sx. of infection. If/when patient is off steroids, should elevated WBC persist and continue to trend upward with no obvious infectious source, then could consider further eval. for evolving underlying MPD (not suggestive of, at this time).  Patient continues with rehab. Will not actively follow. Please re-call if further questions for us.  Thank you.

## 2023-12-08 NOTE — PROGRESS NOTE ADULT - ASSESSMENT
58 year old female patient with past medical history of HTN and rheumatoid arthritis who presented to [facility] on 11/15 for AMS. Imaging studies initially performed reported bilateral subacute to remote embolic strokes, a right posterior corona radiata acute infarct, and multiple petechial hemorrhages in bilateral thalamus and basal ganglia. She was additionally noted to have KRUNAL which has since resolved. Hypertension managed with amlodipine, aldactone, and losartan and she was started on B12 supplements. Patient seen by Rheumatology and was transferred to [facility] on 11/20 for cerebral angiogram.    A cerebral angiogram performed on 11/20 reported multiple areas of focal stenosis and dilatation concerning for vasculitis. An LP was completed on 11/21 with a normal profile. Rheumatology consulted and recommended possible brain biopsy to confirm diagnosis of vasculitis but the risks of performing an open biopsy outweighed the benefits as the results of the biopsy were deemed unlikely to . She was seen by Neurosurgery with patient's neuroradiologic findings including beading on cerebral angiogram (consistent with vasculitis) and clinical presentation, with no other etiology of vasculitis, point to PACNS. Patient was started on steroids given her micro hemorrhage and strokes likely attributed to vasculitis. Steroids started on 11/24 with initial dose Solumedrol 1g for 3 days. She was to continue Prednisone 60mg with a decrease to 50mg daily for discharge to acute in-patient rehabilitation. No taper indicated per rheumatology evaluation and recommendations. An EEG was additionally performed and reported moderate diffuse/multi-focal cerebral dysfunction, not specific as to etiology. There were no epileptiform abnormalities recorded. Will need a repeat cerebral angiogram in 1 month which should take place around 12/20/23. Patient started on Eliquis 5 mg BID, amiodarone, and metoprolol for Afib. TTE reported as normal with global left ventricular systolic function, mild mitral valve regurgitation, mild aortic regurgitation, and a sclerotic aortic valve with normal opening. Hospitalization additionally significant for a rapid response on 11/20 due to acute mental status changes after being found to be slumped over while on toilet by staff prompting MRI imaging reporting:  ·	Left parietal small cortical acute infarction  ·	Innumerable scattered chronic microhemorrhages in the brainstem, bilateral cerebellar hemispheres, deep gray nuclei and peripherally within the cerebral hemispheres which may be secondary to chronic hypertensive encephalopathy.  ·	Severe extensive chronic microvascular ischemic changes and multifocal chronic lacunar infarcts as detailed above.  Patient evaluated by PT/OT and was recommended for acute inpatient rehab.  Hematology consulted for leukocytosis.   58 year old female patient with past medical history of HTN and rheumatoid arthritis who presented to [facility] on 11/15 for AMS. Imaging studies initially performed reported bilateral subacute to remote embolic strokes, a right posterior corona radiata acute infarct, and multiple petechial hemorrhages in bilateral thalamus and basal ganglia. She was additionally noted to have KRUNAL which has since resolved. Hypertension managed with amlodipine, aldactone, and losartan and she was started on B12 supplements. Patient seen by Rheumatology and was transferred to [facility] on 11/20 for cerebral angiogram.    A cerebral angiogram performed on 11/20 reported multiple areas of focal stenosis and dilatation concerning for vasculitis. An LP was completed on 11/21 with a normal profile. Rheumatology consulted and recommended possible brain biopsy to confirm diagnosis of vasculitis but the risks of performing an open biopsy outweighed the benefits as the results of the biopsy were deemed unlikely to . She was seen by Neurosurgery with patient's neuroradiologic findings including beading on cerebral angiogram (consistent with vasculitis) and clinical presentation, with no other etiology of vasculitis, point to PACNS. Patient was started on steroids given her micro hemorrhage and strokes likely attributed to vasculitis. Steroids started on 11/24 with initial dose Solumedrol 1g for 3 days. She was to continue Prednisone 60mg with a decrease to 50mg daily for discharge to acute in-patient rehabilitation. No taper indicated per rheumatology evaluation and recommendations. An EEG was additionally performed and reported moderate diffuse/multi-focal cerebral dysfunction, not specific as to etiology. There were no epileptiform abnormalities recorded. Will need a repeat cerebral angiogram in 1 month which should take place around 12/20/23. Patient started on Eliquis 5 mg BID, amiodarone, and metoprolol for Afib. TTE reported as normal with global left ventricular systolic function, mild mitral valve regurgitation, mild aortic regurgitation, and a sclerotic aortic valve with normal opening. Hospitalization additionally significant for a rapid response on 11/20 due to acute mental status changes after being found to be slumped over while on toilet by staff prompting MRI imaging reporting:  ·	Left parietal small cortical acute infarction  ·	Innumerable scattered chronic microhemorrhages in the brainstem, bilateral cerebellar hemispheres, deep gray nuclei and peripherally within the cerebral hemispheres which may be secondary to chronic hypertensive encephalopathy.  ·	Severe extensive chronic microvascular ischemic changes and multifocal chronic lacunar infarcts as detailed above.  Patient evaluated by PT/OT and was recommended for acute inpatient rehab.  Hematology consulted for leukocytosis.

## 2023-12-09 PROCEDURE — 99232 SBSQ HOSP IP/OBS MODERATE 35: CPT

## 2023-12-09 PROCEDURE — 99231 SBSQ HOSP IP/OBS SF/LOW 25: CPT | Mod: GC

## 2023-12-09 RX ADMIN — PANTOPRAZOLE SODIUM 40 MILLIGRAM(S): 20 TABLET, DELAYED RELEASE ORAL at 13:08

## 2023-12-09 RX ADMIN — Medication 50 MILLIGRAM(S): at 06:09

## 2023-12-09 RX ADMIN — SENNA PLUS 2 TABLET(S): 8.6 TABLET ORAL at 21:35

## 2023-12-09 RX ADMIN — ATORVASTATIN CALCIUM 80 MILLIGRAM(S): 80 TABLET, FILM COATED ORAL at 21:35

## 2023-12-09 RX ADMIN — Medication 5 MILLIGRAM(S): at 21:36

## 2023-12-09 RX ADMIN — Medication 50 MILLIGRAM(S): at 15:00

## 2023-12-09 RX ADMIN — Medication 50 MILLIGRAM(S): at 21:36

## 2023-12-09 RX ADMIN — LOSARTAN POTASSIUM 25 MILLIGRAM(S): 100 TABLET, FILM COATED ORAL at 06:10

## 2023-12-09 RX ADMIN — AMIODARONE HYDROCHLORIDE 200 MILLIGRAM(S): 400 TABLET ORAL at 06:09

## 2023-12-09 RX ADMIN — APIXABAN 5 MILLIGRAM(S): 2.5 TABLET, FILM COATED ORAL at 06:09

## 2023-12-09 RX ADMIN — APIXABAN 5 MILLIGRAM(S): 2.5 TABLET, FILM COATED ORAL at 18:18

## 2023-12-09 NOTE — PROGRESS NOTE ADULT - SUBJECTIVE AND OBJECTIVE BOX
Patient is a 58y old  Female who presents with a chief complaint of CVA (08 Dec 2023 10:42)      Subjective and overnight events:  Patient seen and examined at bedside. no complaints. pt reports feeling well. no headache, dizziness, fever, chills, cough, sob, cp, abd pain.     ALLERGIES:  No Known Drug Allergies  Shrimp (Unknown)    MEDICATIONS  (STANDING):  aMIOdarone    Tablet 200 milliGRAM(s) Oral daily  apixaban 5 milliGRAM(s) Oral two times a day  atorvastatin 80 milliGRAM(s) Oral at bedtime  bisacodyl 5 milliGRAM(s) Oral at bedtime  glucagon  Injectable 1 milliGRAM(s) IntraMuscular once  influenza   Vaccine 0.5 milliLiter(s) IntraMuscular once  losartan 25 milliGRAM(s) Oral daily  metoprolol tartrate 50 milliGRAM(s) Oral three times a day  pantoprazole    Tablet 40 milliGRAM(s) Oral daily  predniSONE   Tablet 50 milliGRAM(s) Oral daily  senna 2 Tablet(s) Oral at bedtime  trimethoprim  160 mG/sulfamethoxazole 800 mG 1 Tablet(s) Oral <User Schedule>    MEDICATIONS  (PRN):    Vital Signs Last 24 Hrs  T(F): 98.1 (09 Dec 2023 08:45), Max: 98.2 (08 Dec 2023 21:07)  HR: 59 (09 Dec 2023 10:00) (46 - 66)  BP: 145/68 (09 Dec 2023 10:00) (138/77 - 159/73)  RR: 16 (09 Dec 2023 10:00) (16 - 16)  SpO2: 96% (09 Dec 2023 10:00) (96% - 98%)  I&O's Summary    PHYSICAL EXAM:  General: NAD, A/O x 3  ENT: MMM  Neck: Supple, No JVD  Lungs: Clear to auscultation bilaterally  Cardio: RRR, S1/S2, No murmurs  Abdomen: Soft, Nontender, Nondistended; Bowel sounds present  Extremities: No calf tenderness, No pitting edema    LABS:                        10.3   16.98 )-----------( 340      ( 07 Dec 2023 05:48 )             29.4     12-07    139  |  99  |  27  ----------------------------<  83  3.6   |  33  |  1.17    Ca    9.0      07 Dec 2023 05:48    TPro  6.5  /  Alb  2.7  /  TBili  0.4  /  DBili  x   /  AST  8   /  ALT  23  /  AlkPhos  67  12-07              11-22 Chol 186 mg/dL LDL -- HDL 43 mg/dL Trig 80 mg/dL                  Urinalysis Basic - ( 07 Dec 2023 05:48 )    Color: x / Appearance: x / SG: x / pH: x  Gluc: 83 mg/dL / Ketone: x  / Bili: x / Urobili: x   Blood: x / Protein: x / Nitrite: x   Leuk Esterase: x / RBC: x / WBC x   Sq Epi: x / Non Sq Epi: x / Bacteria: x            RADIOLOGY & ADDITIONAL TESTS:    Care Discussed with Consultants/Other Providers:

## 2023-12-09 NOTE — PROGRESS NOTE ADULT - SUBJECTIVE AND OBJECTIVE BOX
HPI:  Mrs. Екатерина Poole is a 58 year old female patient with past medical history of HTN and rheumatoid arthritis who presented to [facility] on 11/15 for AMS when a neighbor overhead patient was looking for her mom and she was found confused/wandering around apartment complex. Imaging studies initially performed reported bilateral subacute to remote embolic strokes, a right posterior corona radiata acute infarct, and multiple petechial hemorrhages in bilateral thalamus and basal ganglia. She was additionally noted to have KRUNAL which has since resolved. Hypertension managed with amlodipine, aldactone, and losartan and she was started on B12 supplements. Patient seen by Rheumatology and was transferred to [facility] on 11/20 for cerebral angiogram.    A cerebral angiogram performed on 11/20 reported multiple areas of focal stenosis and dilatation concerning for vasculitis. An LP was completed on 11/21 with a normal profile. Rheumatology consulted and recommended possible brain biopsy to confirm diagnosis of vasculitis but the risks of performing an open biopsy outweighed the benefits as the results of the biopsy were deemed unlikely to . She was seen by Neurosurgery with patient's neuroradiologic findings including beading on cerebral angiogram (consistent with vasculitis) and clinical presentation, with no other etiology of vasculitis, point to PACNS. Patient was started on steroids given her micro hemorrhage and strokes likely attributed to vasculitis. Steroids started on 11/24 with initial dose Solumedrol 1g for 3 days. She was to continue Prednisone 60mg with a decrease to 50mg daily for discharge to acute in-patient rehabilitation. No taper indicated per rheumatology evaluation and recommendations. An EEG was additionally performed and reported moderate diffuse/multi-focal cerebral dysfunction, not specific as to etiology. There were no epileptiform abnormalities recorded. Will need a repeat cerebral angiogram in 1 month which should take place around 12/20/23. Patient started on Eliquis 5 mg BID, amiodarone, and metoprolol for Afib. TTE reported as normal with global left ventricular systolic function, mild mitral valve regurgitation, mild aortic regurgitation, and a sclerotic aortic valve with normal opening. Hospitalization additionally significant for a rapid response on 11/20 due to acute mental status changes after being found to be slumped over while on toilet by staff prompting MRI imaging reporting:  Left parietal small cortical acute infarction  Innumerable scattered chronic microhemorrhages in the brainstem, bilateral cerebellar hemispheres, deep gray nuclei and peripherally within the cerebral hemispheres which may be secondary to chronic hypertensive encephalopathy.  Severe extensive chronic microvascular ischemic changes and multifocal chronic lacunar infarcts as detailed above.  Patient evaluated by PT/OT and was recommended for acute inpatient rehab. Patient is medically stable for discharge to [facility] on 11/30. (30 Nov 2023 14:36)    TDD: 12/14/23 JUAN  ___________________________________________________________________________    SUBJECTIVE/ROS  Patient was seen and evaluated at bedside today.  no reported issues overnight.   BPs OK today  reports no BM x 2 days- documented BM yesterday by nursing  Patient is motivated to continue participation on the recommended rehabilitation program.   Denies any CP, SOB, LAURA, palpitations, fever, chills, body aches, cough, congestion, or any other symptoms at this time.   ___________________________________________________________________________    EEG (12/04/23)  Classification / Summary:  Abnormal routine EEG in the drowsy and asleep states with intermittent arousals. Unclear if full wakefulness is captured.  Occasional left > right temporal focal slowing.  Mild diffuse slowing vs. excess drowsiness.  No epileptiform abnormalities are captured.     Clinical Impression:  Left > right temporal focal cerebral dysfunction can be structural or functional in etiology.  Mild diffuse cerebral dysfunction is nonspecific in etiology. Alternatively, excess drowsiness may be contributing.    ___________________________________________________________________________    CT Head No Cont (12.01.23 @ 10:40)   Moderate to severe chronic microvascular changes without evidence of an acute transcortical infarction or hemorrhage.    CT Angio Brain Stroke Protocol  w/ IV Cont (12.01.23 @ 13:03)   Negative CT perfusion. CTA demonstrates persistent narrowing of the anterior and middle cerebral arteries consistent with vasospasm or vasculitis.  ___________________________________________________________________________    Vital Signs Last 24 Hrs  T(C): 36.7 (09 Dec 2023 08:45), Max: 36.8 (08 Dec 2023 21:07)  T(F): 98.1 (09 Dec 2023 08:45), Max: 98.2 (08 Dec 2023 21:07)  HR: 65 (09 Dec 2023 15:00) (46 - 66)  BP: 111/70 (09 Dec 2023 15:00) (111/70 - 159/73)  BP(mean): --  RR: 17 (09 Dec 2023 15:00) (16 - 17)  SpO2: 94% (09 Dec 2023 15:00) (94% - 98%)    Parameters below as of 09 Dec 2023 15:00  Patient On (Oxygen Delivery Method): room air        ___________________________________________________________________________    LAB                        10.3   16.98 )-----------( 340      ( 07 Dec 2023 05:48 )             29.4                          9.6    16.54 )-----------( 383      ( 04 Dec 2023 05:51 )             27.0       12-07    139  |  99  |  27<H>  ----------------------------<  83  3.6   |  33<H>  |  1.17    Ca    9.0      07 Dec 2023 05:48    TPro  6.5  /  Alb  2.7<L>  /  TBili  0.4  /  DBili  x   /  AST  8<L>  /  ALT  23  /  AlkPhos  67  12-07    LIVER FUNCTIONS - ( 07 Dec 2023 05:48 )  Alb: 2.7 g/dL / Pro: 6.5 g/dL / ALK PHOS: 67 U/L / ALT: 23 U/L / AST: 8 U/L / GGT: x             12-04    138  |  100  |  26<H>  ----------------------------<  89  3.5   |  30  |  1.11    Ca    8.4      04 Dec 2023 05:51    TPro  6.3  /  Alb  2.5<L>  /  TBili  0.3  /  DBili  x   /  AST  11  /  ALT  23  /  AlkPhos  71  12-04      ___________________________________________________________________________    MEDICATIONS  (STANDING):  aMIOdarone    Tablet 200 milliGRAM(s) Oral daily  apixaban 5 milliGRAM(s) Oral two times a day  atorvastatin 80 milliGRAM(s) Oral at bedtime  bisacodyl 5 milliGRAM(s) Oral at bedtime  dextrose 5%. 1000 milliLiter(s) (50 mL/Hr) IV Continuous <Continuous>  dextrose 5%. 1000 milliLiter(s) (100 mL/Hr) IV Continuous <Continuous>  dextrose 50% Injectable 25 Gram(s) IV Push once  dextrose 50% Injectable 12.5 Gram(s) IV Push once  dextrose 50% Injectable 25 Gram(s) IV Push once  glucagon  Injectable 1 milliGRAM(s) IntraMuscular once  influenza   Vaccine 0.5 milliLiter(s) IntraMuscular once  insulin lispro (ADMELOG) corrective regimen sliding scale   SubCutaneous at bedtime  insulin lispro (ADMELOG) corrective regimen sliding scale   SubCutaneous three times a day before meals  losartan 25 milliGRAM(s) Oral daily  metoprolol tartrate 50 milliGRAM(s) Oral three times a day  pantoprazole    Tablet 40 milliGRAM(s) Oral daily  predniSONE   Tablet 50 milliGRAM(s) Oral daily  senna 2 Tablet(s) Oral at bedtime  trimethoprim  160 mG/sulfamethoxazole 800 mG 1 Tablet(s) Oral <User Schedule>    MEDICATIONS  (PRN):  dextrose Oral Gel 15 Gram(s) Oral once PRN Blood Glucose LESS THAN 70 milliGRAM(s)/deciliter    ___________________________________________________________________________    PHYSICAL EXAM:  Gen - NAD, Comfortable  HEENT - NCAT, EOMI, MMM  Pulm - breathing comfortably on room air  Cardiovascular - warm and well perfused  Abdomen - Soft, NT/ND  Extremities - No C/C/E, No calf tenderness  Neuro-     Cognitive - alert cooperative oriented x2     Cranial Nerves - no facial asymmetry, EOMI     Motor -                     LEFT    UE - ShAB 5/5, EF 5/5, EE 5/5, WE 5/5,  5/5                    RIGHT UE - ShAB 5/5, EF 5/5, EE 5/5, WE 5/5,  5/5                    LEFT    LE - HF 4/5, KE 5/5, DF 5/5, PF 5/5                    RIGHT LE - HF 4/5, KE 5/5, DF 5/5, PF 5/5        Sensory - Intact to LT     Reflexes - DTR Intact, No primitive reflexive     Coordination - FTN intact     Tone - normal  Psychiatric - Mood stable, Affect WNL  Skin:  all skin intact    ___________________________________________________________________________   HPI:  Mrs. Екатерина Poole is a 58 year old female patient with past medical history of HTN and rheumatoid arthritis who presented to [place] on 11/15 for AMS when a neighbor overhead patient was looking for her mom and she was found confused/wandering around apartment complex. Imaging studies initially performed reported bilateral subacute to remote embolic strokes, a right posterior corona radiata acute infarct, and multiple petechial hemorrhages in bilateral thalamus and basal ganglia. She was additionally noted to have KRUNAL which has since resolved. Hypertension managed with amlodipine, aldactone, and losartan and she was started on B12 supplements. Patient seen by Rheumatology and was transferred to [facility] on 11/20 for cerebral angiogram.    A cerebral angiogram performed on 11/20 reported multiple areas of focal stenosis and dilatation concerning for vasculitis. An LP was completed on 11/21 with a normal profile. Rheumatology consulted and recommended possible brain biopsy to confirm diagnosis of vasculitis but the risks of performing an open biopsy outweighed the benefits as the results of the biopsy were deemed unlikely to . She was seen by Neurosurgery with patient's neuroradiologic findings including beading on cerebral angiogram (consistent with vasculitis) and clinical presentation, with no other etiology of vasculitis, point to PACNS. Patient was started on steroids given her micro hemorrhage and strokes likely attributed to vasculitis. Steroids started on 11/24 with initial dose Solumedrol 1g for 3 days. She was to continue Prednisone 60mg with a decrease to 50mg daily for discharge to acute in-patient rehabilitation. No taper indicated per rheumatology evaluation and recommendations. An EEG was additionally performed and reported moderate diffuse/multi-focal cerebral dysfunction, not specific as to etiology. There were no epileptiform abnormalities recorded. Will need a repeat cerebral angiogram in 1 month which should take place around 12/20/23. Patient started on Eliquis 5 mg BID, amiodarone, and metoprolol for Afib. TTE reported as normal with global left ventricular systolic function, mild mitral valve regurgitation, mild aortic regurgitation, and a sclerotic aortic valve with normal opening. Hospitalization additionally significant for a rapid response on 11/20 due to acute mental status changes after being found to be slumped over while on toilet by staff prompting MRI imaging reporting:  Left parietal small cortical acute infarction  Innumerable scattered chronic microhemorrhages in the brainstem, bilateral cerebellar hemispheres, deep gray nuclei and peripherally within the cerebral hemispheres which may be secondary to chronic hypertensive encephalopathy.  Severe extensive chronic microvascular ischemic changes and multifocal chronic lacunar infarcts as detailed above.  Patient evaluated by PT/OT and was recommended for acute inpatient rehab. Patient is medically stable for discharge to [facility] on 11/30. (30 Nov 2023 14:36)    TDD: 12/14/23 JUAN  ___________________________________________________________________________    SUBJECTIVE/ROS  Patient was seen and evaluated at bedside today.  no reported issues overnight.   BPs OK today  reports no BM x 2 days- documented BM yesterday by nursing  Patient is motivated to continue participation on the recommended rehabilitation program.   Denies any CP, SOB, LAURA, palpitations, fever, chills, body aches, cough, congestion, or any other symptoms at this time.   ___________________________________________________________________________    EEG (12/04/23)  Classification / Summary:  Abnormal routine EEG in the drowsy and asleep states with intermittent arousals. Unclear if full wakefulness is captured.  Occasional left > right temporal focal slowing.  Mild diffuse slowing vs. excess drowsiness.  No epileptiform abnormalities are captured.     Clinical Impression:  Left > right temporal focal cerebral dysfunction can be structural or functional in etiology.  Mild diffuse cerebral dysfunction is nonspecific in etiology. Alternatively, excess drowsiness may be contributing.    ___________________________________________________________________________    CT Head No Cont (12.01.23 @ 10:40)   Moderate to severe chronic microvascular changes without evidence of an acute transcortical infarction or hemorrhage.    CT Angio Brain Stroke Protocol  w/ IV Cont (12.01.23 @ 13:03)   Negative CT perfusion. CTA demonstrates persistent narrowing of the anterior and middle cerebral arteries consistent with vasospasm or vasculitis.  ___________________________________________________________________________    Vital Signs Last 24 Hrs  T(C): 36.7 (09 Dec 2023 08:45), Max: 36.8 (08 Dec 2023 21:07)  T(F): 98.1 (09 Dec 2023 08:45), Max: 98.2 (08 Dec 2023 21:07)  HR: 65 (09 Dec 2023 15:00) (46 - 66)  BP: 111/70 (09 Dec 2023 15:00) (111/70 - 159/73)  BP(mean): --  RR: 17 (09 Dec 2023 15:00) (16 - 17)  SpO2: 94% (09 Dec 2023 15:00) (94% - 98%)    Parameters below as of 09 Dec 2023 15:00  Patient On (Oxygen Delivery Method): room air        ___________________________________________________________________________    LAB                        10.3   16.98 )-----------( 340      ( 07 Dec 2023 05:48 )             29.4                          9.6    16.54 )-----------( 383      ( 04 Dec 2023 05:51 )             27.0       12-07    139  |  99  |  27<H>  ----------------------------<  83  3.6   |  33<H>  |  1.17    Ca    9.0      07 Dec 2023 05:48    TPro  6.5  /  Alb  2.7<L>  /  TBili  0.4  /  DBili  x   /  AST  8<L>  /  ALT  23  /  AlkPhos  67  12-07    LIVER FUNCTIONS - ( 07 Dec 2023 05:48 )  Alb: 2.7 g/dL / Pro: 6.5 g/dL / ALK PHOS: 67 U/L / ALT: 23 U/L / AST: 8 U/L / GGT: x             12-04    138  |  100  |  26<H>  ----------------------------<  89  3.5   |  30  |  1.11    Ca    8.4      04 Dec 2023 05:51    TPro  6.3  /  Alb  2.5<L>  /  TBili  0.3  /  DBili  x   /  AST  11  /  ALT  23  /  AlkPhos  71  12-04      ___________________________________________________________________________    MEDICATIONS  (STANDING):  aMIOdarone    Tablet 200 milliGRAM(s) Oral daily  apixaban 5 milliGRAM(s) Oral two times a day  atorvastatin 80 milliGRAM(s) Oral at bedtime  bisacodyl 5 milliGRAM(s) Oral at bedtime  dextrose 5%. 1000 milliLiter(s) (50 mL/Hr) IV Continuous <Continuous>  dextrose 5%. 1000 milliLiter(s) (100 mL/Hr) IV Continuous <Continuous>  dextrose 50% Injectable 25 Gram(s) IV Push once  dextrose 50% Injectable 12.5 Gram(s) IV Push once  dextrose 50% Injectable 25 Gram(s) IV Push once  glucagon  Injectable 1 milliGRAM(s) IntraMuscular once  influenza   Vaccine 0.5 milliLiter(s) IntraMuscular once  insulin lispro (ADMELOG) corrective regimen sliding scale   SubCutaneous at bedtime  insulin lispro (ADMELOG) corrective regimen sliding scale   SubCutaneous three times a day before meals  losartan 25 milliGRAM(s) Oral daily  metoprolol tartrate 50 milliGRAM(s) Oral three times a day  pantoprazole    Tablet 40 milliGRAM(s) Oral daily  predniSONE   Tablet 50 milliGRAM(s) Oral daily  senna 2 Tablet(s) Oral at bedtime  trimethoprim  160 mG/sulfamethoxazole 800 mG 1 Tablet(s) Oral <User Schedule>    MEDICATIONS  (PRN):  dextrose Oral Gel 15 Gram(s) Oral once PRN Blood Glucose LESS THAN 70 milliGRAM(s)/deciliter    ___________________________________________________________________________    PHYSICAL EXAM:  Gen - NAD, Comfortable  HEENT - NCAT, EOMI, MMM  Pulm - breathing comfortably on room air  Cardiovascular - warm and well perfused  Abdomen - Soft, NT/ND  Extremities - No C/C/E, No calf tenderness  Neuro-     Cognitive - alert cooperative oriented x2     Cranial Nerves - no facial asymmetry, EOMI     Motor -                     LEFT    UE - ShAB 5/5, EF 5/5, EE 5/5, WE 5/5,  5/5                    RIGHT UE - ShAB 5/5, EF 5/5, EE 5/5, WE 5/5,  5/5                    LEFT    LE - HF 4/5, KE 5/5, DF 5/5, PF 5/5                    RIGHT LE - HF 4/5, KE 5/5, DF 5/5, PF 5/5        Sensory - Intact to LT     Reflexes - DTR Intact, No primitive reflexive     Coordination - FTN intact     Tone - normal  Psychiatric - Mood stable, Affect WNL  Skin:  all skin intact    ___________________________________________________________________________

## 2023-12-09 NOTE — PROGRESS NOTE ADULT - ASSESSMENT
58 year old female patient with past medical history of HTN and rheumatoid arthritis who is admitted for Acute Inpatient Rehabilitation with a multidisciplinary rehab program at [facility] with functional impairments in ADLs and mobility secondary to left hemiparesis resulting from bilateral subacute to remote embolic strokes, a right posterior corona radiata acute infarct, multiple petechial hemorrhages in bilateral thalamus and basal ganglia with etiology highly indicative from underlying vasculitis and a subsequent left parietal cortical acute infarction of a likely cardioembolic source.      CVA  - suspect due to vasculitis  - [facility] Neurology Impression: Left hemiparesis due to right corona radiata infarct, multiple micro hemorrhages, deep and cortical. Cerebral angiogram concerning for PACNS. Pt also with Afib, less likely cardioembolic event  - s/p IV solumedrol. now on prednisone 50mg QD   - per rheumatology: cont po prednisone 50mg daily until repeat cerebral angiogram in 1 month (~12/20/23)  - cont PPI while on steroid    - Rapid response on 12/1 for syncopal episode after a BM- likely vasovagal syncope  - CT scan: no acute changes  - Continue prednisone 50 mg QD  - Neuro consult appreciated     Leukocytosis - possibly steroid induced and reactive  - afebrile. no focal symptoms  - infectious work up negative   - monitor off abx  - hematology consult appreciated.     Afib  - s/p amiodarone gtt  - continue amiodarone 200 mg QD, metoprolol 50 mg TID, and Eliquis 5 mg BID    HTN  - continue Losartan 25 mg QD  - Hold Amlodipine 10mg  - Monitor BP    HLD  - cont atorvastatin 80 mg QHS    Hyperglycemia in setting of steroid use  - AIC 4.9  - off fingerstick monitoriong    DVT prophylaxis:   - on Eliquis 5 mg BID    GI prophylaxis  - Protonix 58 year old female patient with past medical history of HTN and rheumatoid arthritis who is admitted for Acute Inpatient Rehabilitation with a multidisciplinary rehab program at [facility] with functional impairments in ADLs and mobility secondary to left hemiparesis resulting from bilateral subacute to remote embolic strokes, a right posterior corona radiata acute infarct, multiple petechial hemorrhages in bilateral thalamus and basal ganglia with etiology highly indicative from underlying vasculitis and a subsequent left parietal cortical acute infarction of a likely cardioembolic source.      CVA  - suspect due to vasculitis  - [facility] Neurology Impression: Left hemiparesis due to right corona radiata infarct, multiple micro hemorrhages, deep and cortical. Cerebral angiogram concerning for PACNS. Pt also with Afib, less likely cardioembolic event  - s/p IV solumedrol. now on prednisone 50mg QD   - per rheumatology: cont po prednisone 50mg daily until repeat cerebral angiogram in 1 month (~12/20/23)  - cont PPI while on steroid    - Rapid response on 12/1 for syncopal episode after a BM- likely vasovagal syncope  - CT scan: no acute changes  - Continue prednisone 50 mg QD  - Neuro consult appreciated     Leukocytosis - possibly steroid induced and reactive  - afebrile. no focal symptoms  - infectious work up negative   - monitor off abx  - hematology consult appreciated.     Afib  - s/p amiodarone gtt  - continue amiodarone 200 mg QD, metoprolol 50 mg TID, and Eliquis 5 mg BID    HTN  - continue Losartan 25 mg QD  - Hold Amlodipine 10mg  - Monitor BP    HLD  - cont atorvastatin 80 mg QHS    Hyperglycemia in setting of steroid use  - AIC 4.9  - off fingerstick monitoriong    DVT prophylaxis:   - on Eliquis 5 mg BID    GI prophylaxis  - Protonix

## 2023-12-09 NOTE — PROGRESS NOTE ADULT - ASSESSMENT
Assessment/Plan:  Mrs. Екатерина Poole is a 58 year old female patient with past medical history of HTN and rheumatoid arthritis who is admitted for Acute Inpatient Rehabilitation with a multidisciplinary rehab program at [facility] with functional impairments in ADLs and mobility secondary to left hemiparesis resulting from bilateral subacute to remote embolic strokes, a right posterior corona radiata acute infarct, multiple petechial hemorrhages in bilateral thalamus and basal ganglia with etiology highly indicative from underlying vasculitis and a subsequent left parietal cortical acute infarction of a likely cardioembolic source.    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 Maintain dose per rheumatology eval/recommendation.  - Left hemiparesis, impaired ADLs and mobility, need for assistance with personal care   - comprehensive rehab program of PT/OT/SLP - 3 hours a day, 5 days a week. P&O as needed   - Fall /Aspiration precautions  - Will need repeat cerebral angiogram in 1 month (~12/20/23)  - Rheumatology and Neurology following  - Continue prednisone 50 mg QD  - Bactrim DS 1 tab MWF for PCP ppx while on high dose steroids  - EEG (12/4) no epileptic activity    # leukocytosis  - WBC 17.18->16.54->16.98  - likely steroid induced, was started on steroids 11/24, WBC started uptrending after  - afebrile, asymptomatic  - monitor CBC, hospitalist follow up    # RRT/Code Stroke with syncope after BM 12/1  - Head CT stable as above  - rheumatology and neuro consulted  - EEG (12/4) no epileptic activity    Afib  - amiodarone 200 mg QD  - metoprolol 50 mg TID  - Eliquis 5 mg BID    HTN  - continue Losartan 25 mg QD  - Norvasc 10 mg discontinued per Hospitalist  - Monitor BP    HLD  - cont atorvastatin 80 mg QHS    Hyperglycemia in setting of steroid use  - SSI  - Monitor fingersticks    Mood / Cognition  - Neuropsychology consult PRN    Sleep  - Maintain quiet hours and a low stim environment.     GI / Bowel  - Senna qHS  - Dulcolax 5 mg QHS  - GI ppx: pantoprazole 40 mg QD     / Bladder  - Bladder scans Q8 hours with straight cath for >400cc.  - Toileting schedule every 4 hours    Skin:  - Skin assessment on admission performed : Intact  - Pressure Injury/Skin: OOB to chair, PT/OT  - nursing to monitor skin q Shift    Diet/Dysphagia:  - Diet Consistency: regular  - Aspiration Precautions  - SLP consult for swallow function evaluation and treatment  - Nutrition consult    DVT prophylaxis:   - on Eliquis 5 mg BID      Outpatient Follow-up:  Gavin Calvo  Neurology  [address], [address]  [place] [address]  Phone: [phone]  Fax: [phone]  Follow Up Time: 2 weeks    Khadijah Hartley  Radiology  [address], [address]  [place] [address]  Phone: [phone]  Fax: [phone]  Follow Up Time: 2 weeks    Brando Lazar  Cardiology  [address], [address]  [place] [address]  Phone: [phone]  Fax: [phone]  Follow Up Time: 2 weeks    Kenya Naik  Rheumatology  [address], [address]  [place] [address]  Phone: [phone]  Fax: [phone]  Follow Up Time: 2 weeks      Code Status/Emergency Contact:    --------------- Assessment/Plan:  Mrs. Екатерина Poole is a 58 year old female patient with past medical history of HTN and rheumatoid arthritis who is admitted for Acute Inpatient Rehabilitation with a multidisciplinary rehab program at [facility] with functional impairments in ADLs and mobility secondary to left hemiparesis resulting from bilateral subacute to remote embolic strokes, a right posterior corona radiata acute infarct, multiple petechial hemorrhages in bilateral thalamus and basal ganglia with etiology highly indicative from underlying vasculitis and a subsequent left parietal cortical acute infarction of a likely cardioembolic source.    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 Maintain dose per rheumatology eval/recommendation.  - Left hemiparesis, impaired ADLs and mobility, need for assistance with personal care   - comprehensive rehab program of PT/OT/SLP - 3 hours a day, 5 days a week. P&O as needed   - Fall /Aspiration precautions  - Will need repeat cerebral angiogram in 1 month (~12/20/23)  - Rheumatology and Neurology following  - Continue prednisone 50 mg QD  - Bactrim DS 1 tab MWF for PCP ppx while on high dose steroids  - EEG (12/4) no epileptic activity    # leukocytosis  - WBC 17.18->16.54->16.98  - likely steroid induced, was started on steroids 11/24, WBC started uptrending after  - afebrile, asymptomatic  - monitor CBC, hospitalist follow up    # RRT/Code Stroke with syncope after BM 12/1  - Head CT stable as above  - rheumatology and neuro consulted  - EEG (12/4) no epileptic activity    Afib  - amiodarone 200 mg QD  - metoprolol 50 mg TID  - Eliquis 5 mg BID    HTN  - continue Losartan 25 mg QD  - Norvasc 10 mg discontinued per Hospitalist  - Monitor BP    HLD  - cont atorvastatin 80 mg QHS    Hyperglycemia in setting of steroid use  - SSI  - Monitor fingersticks    Mood / Cognition  - Neuropsychology consult PRN    Sleep  - Maintain quiet hours and a low stim environment.     GI / Bowel  - Senna qHS  - Dulcolax 5 mg QHS  - GI ppx: pantoprazole 40 mg QD     / Bladder  - Bladder scans Q8 hours with straight cath for >400cc.  - Toileting schedule every 4 hours    Skin:  - Skin assessment on admission performed : Intact  - Pressure Injury/Skin: OOB to chair, PT/OT  - nursing to monitor skin q Shift    Diet/Dysphagia:  - Diet Consistency: regular  - Aspiration Precautions  - SLP consult for swallow function evaluation and treatment  - Nutrition consult    DVT prophylaxis:   - on Eliquis 5 mg BID      Outpatient Follow-up:  Gavin Calvo  Neurology  [address], [address]  [place] [address]  Phone: [phone]  Fax: [phone]  Follow Up Time: 2 weeks    Khadijah Hartley  Radiology  [address], [address]  [place] [address]  Phone: [phone]  Fax: [phone]  Follow Up Time: 2 weeks    Brando Lazar  Cardiology  [address], [address]  [place] [address]  Phone: [phone]  Fax: [phone]  Follow Up Time: 2 weeks    Kenya Naik  Rheumatology  [address], [address]  [place] [address]  Phone: [phone]  Fax: [phone]  Follow Up Time: 2 weeks      Code Status/Emergency Contact:    ---------------

## 2023-12-10 PROCEDURE — 99231 SBSQ HOSP IP/OBS SF/LOW 25: CPT | Mod: GC

## 2023-12-10 PROCEDURE — 99232 SBSQ HOSP IP/OBS MODERATE 35: CPT

## 2023-12-10 RX ADMIN — Medication 5 MILLIGRAM(S): at 21:52

## 2023-12-10 RX ADMIN — Medication 50 MILLIGRAM(S): at 06:11

## 2023-12-10 RX ADMIN — SENNA PLUS 2 TABLET(S): 8.6 TABLET ORAL at 21:52

## 2023-12-10 RX ADMIN — AMIODARONE HYDROCHLORIDE 200 MILLIGRAM(S): 400 TABLET ORAL at 06:10

## 2023-12-10 RX ADMIN — Medication 50 MILLIGRAM(S): at 06:10

## 2023-12-10 RX ADMIN — APIXABAN 5 MILLIGRAM(S): 2.5 TABLET, FILM COATED ORAL at 18:14

## 2023-12-10 RX ADMIN — Medication 50 MILLIGRAM(S): at 21:53

## 2023-12-10 RX ADMIN — LOSARTAN POTASSIUM 25 MILLIGRAM(S): 100 TABLET, FILM COATED ORAL at 06:11

## 2023-12-10 RX ADMIN — ATORVASTATIN CALCIUM 80 MILLIGRAM(S): 80 TABLET, FILM COATED ORAL at 21:52

## 2023-12-10 RX ADMIN — APIXABAN 5 MILLIGRAM(S): 2.5 TABLET, FILM COATED ORAL at 06:10

## 2023-12-10 RX ADMIN — PANTOPRAZOLE SODIUM 40 MILLIGRAM(S): 20 TABLET, DELAYED RELEASE ORAL at 11:48

## 2023-12-10 RX ADMIN — Medication 50 MILLIGRAM(S): at 14:41

## 2023-12-10 NOTE — PROGRESS NOTE ADULT - ASSESSMENT
Assessment/Plan:  Mrs. Екатерина Poole is a 58 year old female patient with past medical history of HTN and rheumatoid arthritis who is admitted for Acute Inpatient Rehabilitation with a multidisciplinary rehab program at [facility] with functional impairments in ADLs and mobility secondary to left hemiparesis resulting from bilateral subacute to remote embolic strokes, a right posterior corona radiata acute infarct, multiple petechial hemorrhages in bilateral thalamus and basal ganglia with etiology highly indicative from underlying vasculitis and a subsequent left parietal cortical acute infarction of a likely cardioembolic source.    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 Maintain dose per rheumatology eval/recommendation.  - Left hemiparesis, impaired ADLs and mobility, need for assistance with personal care   - comprehensive rehab program of PT/OT/SLP - 3 hours a day, 5 days a week. P&O as needed   - Fall /Aspiration precautions  - Will need repeat cerebral angiogram in 1 month (~12/20/23)  - Rheumatology and Neurology following  - Continue prednisone 50 mg QD  - Bactrim DS 1 tab MWF for PCP ppx while on high dose steroids  - EEG (12/4) no epileptic activity    # leukocytosis  - WBC 17.18->16.54->16.98  - likely steroid induced, was started on steroids 11/24, WBC started uptrending after  - afebrile, asymptomatic  - monitor CBC, hospitalist follow up    # RRT/Code Stroke with syncope after BM 12/1  - Head CT stable as above  - rheumatology and neuro consulted  - EEG (12/4) no epileptic activity    Afib  - amiodarone 200 mg QD  - metoprolol 50 mg TID  - Eliquis 5 mg BID    HTN  - continue Losartan 25 mg QD  - Norvasc 10 mg discontinued per Hospitalist  - Monitor BP    HLD  - cont atorvastatin 80 mg QHS    Hyperglycemia in setting of steroid use  - SSI  - Monitor fingersticks    Mood / Cognition  - Neuropsychology consult PRN    Sleep  - Maintain quiet hours and a low stim environment.     GI / Bowel  - Senna qHS  - Dulcolax 5 mg QHS  - GI ppx: pantoprazole 40 mg QD     / Bladder  - Bladder scans Q8 hours with straight cath for >400cc.  - Toileting schedule every 4 hours    Skin:  - Skin assessment on admission performed : Intact  - Pressure Injury/Skin: OOB to chair, PT/OT  - nursing to monitor skin q Shift    Diet/Dysphagia:  - Diet Consistency: regular  - Aspiration Precautions  - SLP consult for swallow function evaluation and treatment  - Nutrition consult    DVT prophylaxis:   - on Eliquis 5 mg BID      Outpatient Follow-up:  Gavin Calvo  Neurology  [address], [address]  [place] [address]  Phone: [phone]  Fax: [phone]  Follow Up Time: 2 weeks    Khadijah Hartley  Radiology  [address]  [place] [address]  Phone: [phone]  Fax: [phone]  Follow Up Time: 2 weeks    Brando Lazar  Cardiology  [address], [address]  [place] [address]  Phone: [phone]  Fax: [phone]  Follow Up Time: 2 weeks    Kenya Naik  Rheumatology  [address], [address]  [place] [address]  Phone: [phone]  Fax: [phone]  Follow Up Time: 2 weeks      Code Status/Emergency Contact:    --------------- Assessment/Plan:  Mrs. Екатерина Poole is a 58 year old female patient with past medical history of HTN and rheumatoid arthritis who is admitted for Acute Inpatient Rehabilitation with a multidisciplinary rehab program at [facility] with functional impairments in ADLs and mobility secondary to left hemiparesis resulting from bilateral subacute to remote embolic strokes, a right posterior corona radiata acute infarct, multiple petechial hemorrhages in bilateral thalamus and basal ganglia with etiology highly indicative from underlying vasculitis and a subsequent left parietal cortical acute infarction of a likely cardioembolic source.    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 Maintain dose per rheumatology eval/recommendation.  - Left hemiparesis, impaired ADLs and mobility, need for assistance with personal care   - comprehensive rehab program of PT/OT/SLP - 3 hours a day, 5 days a week. P&O as needed   - Fall /Aspiration precautions  - Will need repeat cerebral angiogram in 1 month (~12/20/23)  - Rheumatology and Neurology following  - Continue prednisone 50 mg QD  - Bactrim DS 1 tab MWF for PCP ppx while on high dose steroids  - EEG (12/4) no epileptic activity    # leukocytosis  - WBC 17.18->16.54->16.98  - likely steroid induced, was started on steroids 11/24, WBC started uptrending after  - afebrile, asymptomatic  - monitor CBC, hospitalist follow up    # RRT/Code Stroke with syncope after BM 12/1  - Head CT stable as above  - rheumatology and neuro consulted  - EEG (12/4) no epileptic activity    Afib  - amiodarone 200 mg QD  - metoprolol 50 mg TID  - Eliquis 5 mg BID    HTN  - continue Losartan 25 mg QD  - Norvasc 10 mg discontinued per Hospitalist  - Monitor BP    HLD  - cont atorvastatin 80 mg QHS    Hyperglycemia in setting of steroid use  - SSI  - Monitor fingersticks    Mood / Cognition  - Neuropsychology consult PRN    Sleep  - Maintain quiet hours and a low stim environment.     GI / Bowel  - Senna qHS  - Dulcolax 5 mg QHS  - GI ppx: pantoprazole 40 mg QD     / Bladder  - Bladder scans Q8 hours with straight cath for >400cc.  - Toileting schedule every 4 hours    Skin:  - Skin assessment on admission performed : Intact  - Pressure Injury/Skin: OOB to chair, PT/OT  - nursing to monitor skin q Shift    Diet/Dysphagia:  - Diet Consistency: regular  - Aspiration Precautions  - SLP consult for swallow function evaluation and treatment  - Nutrition consult    DVT prophylaxis:   - on Eliquis 5 mg BID      Outpatient Follow-up:  Gavin Calvo  Neurology  [address] [facility], [address]  [place] [address]  Phone: [phone]  Fax: [phone]  Follow Up Time: 2 weeks    Khadijah Hartley  Radiology  [address] [facility], [address]  [place] [address]  Phone: [phone]  Fax: [phone]  Follow Up Time: 2 weeks    Brando Lazar  Cardiology  [address], [address]  [place] [address]  Phone: [phone]  Fax: [phone]  Follow Up Time: 2 weeks    Kenya Naik  Rheumatology  [address] [facility] [address] [facility], [address]  [place] [address]  Phone: [phone]  Fax: [phone]  Follow Up Time: 2 weeks      Code Status/Emergency Contact:    ---------------

## 2023-12-10 NOTE — PROGRESS NOTE ADULT - ASSESSMENT
58 year old female patient with past medical history of HTN and rheumatoid arthritis who is admitted for Acute Inpatient Rehabilitation with a multidisciplinary rehab program at [facility] with functional impairments in ADLs and mobility secondary to left hemiparesis resulting from bilateral subacute to remote embolic strokes, a right posterior corona radiata acute infarct, multiple petechial hemorrhages in bilateral thalamus and basal ganglia with etiology highly indicative from underlying vasculitis and a subsequent left parietal cortical acute infarction of a likely cardioembolic source.      CVA  - suspect due to vasculitis  - [facility] Neurology Impression: Left hemiparesis due to right corona radiata infarct, multiple micro hemorrhages, deep and cortical. Cerebral angiogram concerning for PACNS. Pt also with Afib, less likely cardioembolic event  - s/p IV solumedrol. now on prednisone 50mg QD   - per rheumatology: cont po prednisone 50mg daily until repeat cerebral angiogram in 1 month (~12/20/23)  - cont PPI while on steroid    - Rapid response on 12/1 for syncopal episode after a BM- likely vasovagal syncope  - CT scan: no acute changes  - Continue prednisone 50 mg QD  - Neuro consult appreciated     Leukocytosis - possibly reactive and steroid induced   - afebrile. no focal symptoms  - hematology consult appreciated.   - infectious work up negative   - monitor off abx    Afib  - s/p amiodarone gtt  - continue amiodarone 200 mg QD, metoprolol 50 mg TID, and Eliquis 5 mg BID    HTN  - continue Losartan 25 mg QD  - Hold Amlodipine 10mg  - Monitor BP    HLD  - cont atorvastatin 80 mg QHS    Hyperglycemia in setting of steroid use  - AIC 4.9  - off fingerstick monitoriong    DVT prophylaxis:   - on Eliquis 5 mg BID    GI prophylaxis  - Protonix 58 year old female patient with past medical history of HTN and rheumatoid arthritis who is admitted for Acute Inpatient Rehabilitation with a multidisciplinary rehab program at [facility] with functional impairments in ADLs and mobility secondary to left hemiparesis resulting from bilateral subacute to remote embolic strokes, a right posterior corona radiata acute infarct, multiple petechial hemorrhages in bilateral thalamus and basal ganglia with etiology highly indicative from underlying vasculitis and a subsequent left parietal cortical acute infarction of a likely cardioembolic source.      CVA  - suspect due to vasculitis  - [facility] Neurology Impression: Left hemiparesis due to right corona radiata infarct, multiple micro hemorrhages, deep and cortical. Cerebral angiogram concerning for PACNS. Pt also with Afib, less likely cardioembolic event  - s/p IV solumedrol. now on prednisone 50mg QD   - per rheumatology: cont po prednisone 50mg daily until repeat cerebral angiogram in 1 month (~12/20/23)  - cont PPI while on steroid    - Rapid response on 12/1 for syncopal episode after a BM- likely vasovagal syncope  - CT scan: no acute changes  - Continue prednisone 50 mg QD  - Neuro consult appreciated     Leukocytosis - possibly reactive and steroid induced   - afebrile. no focal symptoms  - hematology consult appreciated.   - infectious work up negative   - monitor off abx    Afib  - s/p amiodarone gtt  - continue amiodarone 200 mg QD, metoprolol 50 mg TID, and Eliquis 5 mg BID    HTN  - continue Losartan 25 mg QD  - Hold Amlodipine 10mg  - Monitor BP    HLD  - cont atorvastatin 80 mg QHS    Hyperglycemia in setting of steroid use  - AIC 4.9  - off fingerstick monitoriong    DVT prophylaxis:   - on Eliquis 5 mg BID    GI prophylaxis  - Protonix

## 2023-12-10 NOTE — PROGRESS NOTE ADULT - SUBJECTIVE AND OBJECTIVE BOX
HPI:  Mrs. Екатерина Poole is a 58 year old female patient with past medical history of HTN and rheumatoid arthritis who presented to [place] on 11/15 for AMS when a neighbor overhead patient was looking for her mom and she was found confused/wandering around apartment complex. Imaging studies initially performed reported bilateral subacute to remote embolic strokes, a right posterior corona radiata acute infarct, and multiple petechial hemorrhages in bilateral thalamus and basal ganglia. She was additionally noted to have KRUNAL which has since resolved. Hypertension managed with amlodipine, aldactone, and losartan and she was started on B12 supplements. Patient seen by Rheumatology and was transferred to [facility] on 11/20 for cerebral angiogram.    A cerebral angiogram performed on 11/20 reported multiple areas of focal stenosis and dilatation concerning for vasculitis. An LP was completed on 11/21 with a normal profile. Rheumatology consulted and recommended possible brain biopsy to confirm diagnosis of vasculitis but the risks of performing an open biopsy outweighed the benefits as the results of the biopsy were deemed unlikely to . She was seen by Neurosurgery with patient's neuroradiologic findings including beading on cerebral angiogram (consistent with vasculitis) and clinical presentation, with no other etiology of vasculitis, point to PACNS. Patient was started on steroids given her micro hemorrhage and strokes likely attributed to vasculitis. Steroids started on 11/24 with initial dose Solumedrol 1g for 3 days. She was to continue Prednisone 60mg with a decrease to 50mg daily for discharge to acute in-patient rehabilitation. No taper indicated per rheumatology evaluation and recommendations. An EEG was additionally performed and reported moderate diffuse/multi-focal cerebral dysfunction, not specific as to etiology. There were no epileptiform abnormalities recorded. Will need a repeat cerebral angiogram in 1 month which should take place around 12/20/23. Patient started on Eliquis 5 mg BID, amiodarone, and metoprolol for Afib. TTE reported as normal with global left ventricular systolic function, mild mitral valve regurgitation, mild aortic regurgitation, and a sclerotic aortic valve with normal opening. Hospitalization additionally significant for a rapid response on 11/20 due to acute mental status changes after being found to be slumped over while on toilet by staff prompting MRI imaging reporting:  Left parietal small cortical acute infarction  Innumerable scattered chronic microhemorrhages in the brainstem, bilateral cerebellar hemispheres, deep gray nuclei and peripherally within the cerebral hemispheres which may be secondary to chronic hypertensive encephalopathy.  Severe extensive chronic microvascular ischemic changes and multifocal chronic lacunar infarcts as detailed above.  Patient evaluated by PT/OT and was recommended for acute inpatient rehab. Patient is medically stable for discharge to [facility] on 11/30. (30 Nov 2023 14:36)    TDD: 12/14/23 JUAN  ___________________________________________________________________________    SUBJECTIVE/ROS  Patient was seen and evaluated at bedside today.  no reported issues overnight.   BPs OK today  last Bm yesterday 12/9  Patient is motivated to continue participation on the recommended rehabilitation program.   Denies any CP, SOB, LAURA, palpitations, fever, chills, body aches, cough, congestion, or any other symptoms at this time.   ___________________________________________________________________________    EEG (12/04/23)  Classification / Summary:  Abnormal routine EEG in the drowsy and asleep states with intermittent arousals. Unclear if full wakefulness is captured.  Occasional left > right temporal focal slowing.  Mild diffuse slowing vs. excess drowsiness.  No epileptiform abnormalities are captured.     Clinical Impression:  Left > right temporal focal cerebral dysfunction can be structural or functional in etiology.  Mild diffuse cerebral dysfunction is nonspecific in etiology. Alternatively, excess drowsiness may be contributing.    ___________________________________________________________________________    CT Head No Cont (12.01.23 @ 10:40)   Moderate to severe chronic microvascular changes without evidence of an acute transcortical infarction or hemorrhage.    CT Angio Brain Stroke Protocol  w/ IV Cont (12.01.23 @ 13:03)   Negative CT perfusion. CTA demonstrates persistent narrowing of the anterior and middle cerebral arteries consistent with vasospasm or vasculitis.  ___________________________________________________________________________    Vital Signs Last 24 Hrs  T(C): 37.2 (10 Dec 2023 08:34), Max: 37.2 (10 Dec 2023 08:34)  T(F): 99 (10 Dec 2023 08:34), Max: 99 (10 Dec 2023 08:34)  HR: 59 (10 Dec 2023 08:34) (59 - 64)  BP: 125/75 (10 Dec 2023 08:34) (125/75 - 145/69)  BP(mean): --  RR: 16 (10 Dec 2023 08:34) (16 - 16)  SpO2: 97% (10 Dec 2023 08:34) (95% - 97%)    Parameters below as of 10 Dec 2023 08:34  Patient On (Oxygen Delivery Method): room air            ___________________________________________________________________________    LAB                        10.3   16.98 )-----------( 340      ( 07 Dec 2023 05:48 )             29.4                          9.6    16.54 )-----------( 383      ( 04 Dec 2023 05:51 )             27.0       12-07    139  |  99  |  27<H>  ----------------------------<  83  3.6   |  33<H>  |  1.17    Ca    9.0      07 Dec 2023 05:48    TPro  6.5  /  Alb  2.7<L>  /  TBili  0.4  /  DBili  x   /  AST  8<L>  /  ALT  23  /  AlkPhos  67  12-07    LIVER FUNCTIONS - ( 07 Dec 2023 05:48 )  Alb: 2.7 g/dL / Pro: 6.5 g/dL / ALK PHOS: 67 U/L / ALT: 23 U/L / AST: 8 U/L / GGT: x             12-04    138  |  100  |  26<H>  ----------------------------<  89  3.5   |  30  |  1.11    Ca    8.4      04 Dec 2023 05:51    TPro  6.3  /  Alb  2.5<L>  /  TBili  0.3  /  DBili  x   /  AST  11  /  ALT  23  /  AlkPhos  71  12-04      ___________________________________________________________________________    MEDICATIONS  (STANDING):  aMIOdarone    Tablet 200 milliGRAM(s) Oral daily  apixaban 5 milliGRAM(s) Oral two times a day  atorvastatin 80 milliGRAM(s) Oral at bedtime  bisacodyl 5 milliGRAM(s) Oral at bedtime  dextrose 5%. 1000 milliLiter(s) (50 mL/Hr) IV Continuous <Continuous>  dextrose 5%. 1000 milliLiter(s) (100 mL/Hr) IV Continuous <Continuous>  dextrose 50% Injectable 25 Gram(s) IV Push once  dextrose 50% Injectable 12.5 Gram(s) IV Push once  dextrose 50% Injectable 25 Gram(s) IV Push once  glucagon  Injectable 1 milliGRAM(s) IntraMuscular once  influenza   Vaccine 0.5 milliLiter(s) IntraMuscular once  insulin lispro (ADMELOG) corrective regimen sliding scale   SubCutaneous at bedtime  insulin lispro (ADMELOG) corrective regimen sliding scale   SubCutaneous three times a day before meals  losartan 25 milliGRAM(s) Oral daily  metoprolol tartrate 50 milliGRAM(s) Oral three times a day  pantoprazole    Tablet 40 milliGRAM(s) Oral daily  predniSONE   Tablet 50 milliGRAM(s) Oral daily  senna 2 Tablet(s) Oral at bedtime  trimethoprim  160 mG/sulfamethoxazole 800 mG 1 Tablet(s) Oral <User Schedule>    MEDICATIONS  (PRN):  dextrose Oral Gel 15 Gram(s) Oral once PRN Blood Glucose LESS THAN 70 milliGRAM(s)/deciliter    ___________________________________________________________________________    PHYSICAL EXAM:  Gen - NAD, Comfortable  HEENT - NCAT, EOMI, MMM  Pulm - breathing comfortably on room air  Cardiovascular - warm and well perfused  Abdomen - Soft, NT/ND  Extremities - No C/C/E, No calf tenderness  Neuro-     Cognitive - alert cooperative oriented x2     Cranial Nerves - no facial asymmetry, EOMI     Motor -                     LEFT    UE - ShAB 5/5, EF 5/5, EE 5/5, WE 5/5,  5/5                    RIGHT UE - ShAB 5/5, EF 5/5, EE 5/5, WE 5/5,  5/5                    LEFT    LE - HF 4/5, KE 5/5, DF 5/5, PF 5/5                    RIGHT LE - HF 4/5, KE 5/5, DF 5/5, PF 5/5        Sensory - Intact to LT     Reflexes - DTR Intact, No primitive reflexive     Coordination - FTN intact     Tone - normal  Psychiatric - Mood stable, Affect WNL  Skin:  all skin intact    ___________________________________________________________________________   HPI:  Mrs. Екатерина Poole is a 58 year old female patient with past medical history of HTN and rheumatoid arthritis who presented to [place] on 11/15 for AMS when a neighbor overhead patient was looking for her mom and she was found confused/wandering around apartment complex. Imaging studies initially performed reported bilateral subacute to remote embolic strokes, a right posterior corona radiata acute infarct, and multiple petechial hemorrhages in bilateral thalamus and basal ganglia. She was additionally noted to have KRUNAL which has since resolved. Hypertension managed with amlodipine, aldactone, and losartan and she was started on B12 supplements. Patient seen by Rheumatology and was transferred to [facility] on 11/20 for cerebral angiogram.    A cerebral angiogram performed on 11/20 reported multiple areas of focal stenosis and dilatation concerning for vasculitis. An LP was completed on 11/21 with a normal profile. Rheumatology consulted and recommended possible brain biopsy to confirm diagnosis of vasculitis but the risks of performing an open biopsy outweighed the benefits as the results of the biopsy were deemed unlikely to . She was seen by Neurosurgery with patient's neuroradiologic findings including beading on cerebral angiogram (consistent with vasculitis) and clinical presentation, with no other etiology of vasculitis, point to PACNS. Patient was started on steroids given her micro hemorrhage and strokes likely attributed to vasculitis. Steroids started on 11/24 with initial dose Solumedrol 1g for 3 days. She was to continue Prednisone 60mg with a decrease to 50mg daily for discharge to acute in-patient rehabilitation. No taper indicated per rheumatology evaluation and recommendations. An EEG was additionally performed and reported moderate diffuse/multi-focal cerebral dysfunction, not specific as to etiology. There were no epileptiform abnormalities recorded. Will need a repeat cerebral angiogram in 1 month which should take place around 12/20/23. Patient started on Eliquis 5 mg BID, amiodarone, and metoprolol for Afib. TTE reported as normal with global left ventricular systolic function, mild mitral valve regurgitation, mild aortic regurgitation, and a sclerotic aortic valve with normal opening. Hospitalization additionally significant for a rapid response on 11/20 due to acute mental status changes after being found to be slumped over while on toilet by staff prompting MRI imaging reporting:  Left parietal small cortical acute infarction  Innumerable scattered chronic microhemorrhages in the brainstem, bilateral cerebellar hemispheres, deep gray nuclei and peripherally within the cerebral hemispheres which may be secondary to chronic hypertensive encephalopathy.  Severe extensive chronic microvascular ischemic changes and multifocal chronic lacunar infarcts as detailed above.  Patient evaluated by PT/OT and was recommended for acute inpatient rehab. Patient is medically stable for discharge to [facility] on 11/30. (30 Nov 2023 14:36)    TDD: 12/14/23 JUAN  ___________________________________________________________________________    SUBJECTIVE/ROS  Patient was seen and evaluated at bedside today.  no reported issues overnight.   BPs OK today  last Bm yesterday 12/9  Patient is motivated to continue participation on the recommended rehabilitation program.   Denies any CP, SOB, LAURA, palpitations, fever, chills, body aches, cough, congestion, or any other symptoms at this time.   ___________________________________________________________________________    EEG (12/04/23)  Classification / Summary:  Abnormal routine EEG in the drowsy and asleep states with intermittent arousals. Unclear if full wakefulness is captured.  Occasional left > right temporal focal slowing.  Mild diffuse slowing vs. excess drowsiness.  No epileptiform abnormalities are captured.     Clinical Impression:  Left > right temporal focal cerebral dysfunction can be structural or functional in etiology.  Mild diffuse cerebral dysfunction is nonspecific in etiology. Alternatively, excess drowsiness may be contributing.    ___________________________________________________________________________    CT Head No Cont (12.01.23 @ 10:40)   Moderate to severe chronic microvascular changes without evidence of an acute transcortical infarction or hemorrhage.    CT Angio Brain Stroke Protocol  w/ IV Cont (12.01.23 @ 13:03)   Negative CT perfusion. CTA demonstrates persistent narrowing of the anterior and middle cerebral arteries consistent with vasospasm or vasculitis.  ___________________________________________________________________________    Vital Signs Last 24 Hrs  T(C): 37.2 (10 Dec 2023 08:34), Max: 37.2 (10 Dec 2023 08:34)  T(F): 99 (10 Dec 2023 08:34), Max: 99 (10 Dec 2023 08:34)  HR: 59 (10 Dec 2023 08:34) (59 - 64)  BP: 125/75 (10 Dec 2023 08:34) (125/75 - 145/69)  BP(mean): --  RR: 16 (10 Dec 2023 08:34) (16 - 16)  SpO2: 97% (10 Dec 2023 08:34) (95% - 97%)    Parameters below as of 10 Dec 2023 08:34  Patient On (Oxygen Delivery Method): room air            ___________________________________________________________________________    LAB                        10.3   16.98 )-----------( 340      ( 07 Dec 2023 05:48 )             29.4                          9.6    16.54 )-----------( 383      ( 04 Dec 2023 05:51 )             27.0       12-07    139  |  99  |  27<H>  ----------------------------<  83  3.6   |  33<H>  |  1.17    Ca    9.0      07 Dec 2023 05:48    TPro  6.5  /  Alb  2.7<L>  /  TBili  0.4  /  DBili  x   /  AST  8<L>  /  ALT  23  /  AlkPhos  67  12-07    LIVER FUNCTIONS - ( 07 Dec 2023 05:48 )  Alb: 2.7 g/dL / Pro: 6.5 g/dL / ALK PHOS: 67 U/L / ALT: 23 U/L / AST: 8 U/L / GGT: x             12-04    138  |  100  |  26<H>  ----------------------------<  89  3.5   |  30  |  1.11    Ca    8.4      04 Dec 2023 05:51    TPro  6.3  /  Alb  2.5<L>  /  TBili  0.3  /  DBili  x   /  AST  11  /  ALT  23  /  AlkPhos  71  12-04      ___________________________________________________________________________    MEDICATIONS  (STANDING):  aMIOdarone    Tablet 200 milliGRAM(s) Oral daily  apixaban 5 milliGRAM(s) Oral two times a day  atorvastatin 80 milliGRAM(s) Oral at bedtime  bisacodyl 5 milliGRAM(s) Oral at bedtime  dextrose 5%. 1000 milliLiter(s) (50 mL/Hr) IV Continuous <Continuous>  dextrose 5%. 1000 milliLiter(s) (100 mL/Hr) IV Continuous <Continuous>  dextrose 50% Injectable 25 Gram(s) IV Push once  dextrose 50% Injectable 12.5 Gram(s) IV Push once  dextrose 50% Injectable 25 Gram(s) IV Push once  glucagon  Injectable 1 milliGRAM(s) IntraMuscular once  influenza   Vaccine 0.5 milliLiter(s) IntraMuscular once  insulin lispro (ADMELOG) corrective regimen sliding scale   SubCutaneous at bedtime  insulin lispro (ADMELOG) corrective regimen sliding scale   SubCutaneous three times a day before meals  losartan 25 milliGRAM(s) Oral daily  metoprolol tartrate 50 milliGRAM(s) Oral three times a day  pantoprazole    Tablet 40 milliGRAM(s) Oral daily  predniSONE   Tablet 50 milliGRAM(s) Oral daily  senna 2 Tablet(s) Oral at bedtime  trimethoprim  160 mG/sulfamethoxazole 800 mG 1 Tablet(s) Oral <User Schedule>    MEDICATIONS  (PRN):  dextrose Oral Gel 15 Gram(s) Oral once PRN Blood Glucose LESS THAN 70 milliGRAM(s)/deciliter    ___________________________________________________________________________    PHYSICAL EXAM:  Gen - NAD, Comfortable  HEENT - NCAT, EOMI, MMM  Pulm - breathing comfortably on room air  Cardiovascular - warm and well perfused  Abdomen - Soft, NT/ND  Extremities - No C/C/E, No calf tenderness  Neuro-     Cognitive - alert cooperative oriented x2     Cranial Nerves - no facial asymmetry, EOMI     Motor -                     LEFT    UE - ShAB 5/5, EF 5/5, EE 5/5, WE 5/5,  5/5                    RIGHT UE - ShAB 5/5, EF 5/5, EE 5/5, WE 5/5,  5/5                    LEFT    LE - HF 4/5, KE 5/5, DF 5/5, PF 5/5                    RIGHT LE - HF 4/5, KE 5/5, DF 5/5, PF 5/5        Sensory - Intact to LT     Reflexes - DTR Intact, No primitive reflexive     Coordination - FTN intact     Tone - normal  Psychiatric - Mood stable, Affect WNL  Skin:  all skin intact    ___________________________________________________________________________

## 2023-12-10 NOTE — PROGRESS NOTE ADULT - SUBJECTIVE AND OBJECTIVE BOX
Patient is a 58y old  Female who presents with a chief complaint of CVA (09 Dec 2023 16:45)      Subjective and overnight events:  Patient seen and examined at bedside. no complaints. no fever. chills, cough, sob, cp, abd pain, n/v.    ALLERGIES:  No Known Drug Allergies  Shrimp (Unknown)    MEDICATIONS  (STANDING):  aMIOdarone    Tablet 200 milliGRAM(s) Oral daily  apixaban 5 milliGRAM(s) Oral two times a day  atorvastatin 80 milliGRAM(s) Oral at bedtime  bisacodyl 5 milliGRAM(s) Oral at bedtime  glucagon  Injectable 1 milliGRAM(s) IntraMuscular once  influenza   Vaccine 0.5 milliLiter(s) IntraMuscular once  losartan 25 milliGRAM(s) Oral daily  metoprolol tartrate 50 milliGRAM(s) Oral three times a day  pantoprazole    Tablet 40 milliGRAM(s) Oral daily  predniSONE   Tablet 50 milliGRAM(s) Oral daily  senna 2 Tablet(s) Oral at bedtime  trimethoprim  160 mG/sulfamethoxazole 800 mG 1 Tablet(s) Oral <User Schedule>    MEDICATIONS  (PRN):    Vital Signs Last 24 Hrs  T(F): 99 (10 Dec 2023 08:34), Max: 99 (10 Dec 2023 08:34)  HR: 59 (10 Dec 2023 08:34) (59 - 65)  BP: 125/75 (10 Dec 2023 08:34) (111/70 - 145/69)  RR: 16 (10 Dec 2023 08:34) (16 - 17)  SpO2: 97% (10 Dec 2023 08:34) (94% - 97%)  I&O's Summary    PHYSICAL EXAM:  General: NAD, Awake alert and answering questions   ENT: MMM  Neck: Supple, No JVD  Lungs: Clear to auscultation bilaterally  Cardio: RRR, S1/S2, No murmurs  Abdomen: Soft, Nontender, Nondistended; Bowel sounds present  Extremities: No calf tenderness, No pitting edema    LABS:        11-22 Chol 186 mg/dL LDL -- HDL 43 mg/dL Trig 80 mg/dL            RADIOLOGY & ADDITIONAL TESTS:    Care Discussed with Consultants/Other Providers:

## 2023-12-11 LAB
ALBUMIN SERPL ELPH-MCNC: 2.6 G/DL — LOW (ref 3.3–5)
ALBUMIN SERPL ELPH-MCNC: 2.6 G/DL — LOW (ref 3.3–5)
ALP SERPL-CCNC: 68 U/L — SIGNIFICANT CHANGE UP (ref 40–120)
ALP SERPL-CCNC: 68 U/L — SIGNIFICANT CHANGE UP (ref 40–120)
ALT FLD-CCNC: 23 U/L — SIGNIFICANT CHANGE UP (ref 10–45)
ALT FLD-CCNC: 23 U/L — SIGNIFICANT CHANGE UP (ref 10–45)
ANION GAP SERPL CALC-SCNC: 6 MMOL/L — SIGNIFICANT CHANGE UP (ref 5–17)
ANION GAP SERPL CALC-SCNC: 6 MMOL/L — SIGNIFICANT CHANGE UP (ref 5–17)
AST SERPL-CCNC: 9 U/L — LOW (ref 10–40)
AST SERPL-CCNC: 9 U/L — LOW (ref 10–40)
BILIRUB SERPL-MCNC: 0.4 MG/DL — SIGNIFICANT CHANGE UP (ref 0.2–1.2)
BILIRUB SERPL-MCNC: 0.4 MG/DL — SIGNIFICANT CHANGE UP (ref 0.2–1.2)
BUN SERPL-MCNC: 36 MG/DL — HIGH (ref 7–23)
BUN SERPL-MCNC: 36 MG/DL — HIGH (ref 7–23)
CALCIUM SERPL-MCNC: 9 MG/DL — SIGNIFICANT CHANGE UP (ref 8.4–10.5)
CALCIUM SERPL-MCNC: 9 MG/DL — SIGNIFICANT CHANGE UP (ref 8.4–10.5)
CHLORIDE SERPL-SCNC: 101 MMOL/L — SIGNIFICANT CHANGE UP (ref 96–108)
CHLORIDE SERPL-SCNC: 101 MMOL/L — SIGNIFICANT CHANGE UP (ref 96–108)
CO2 SERPL-SCNC: 32 MMOL/L — HIGH (ref 22–31)
CO2 SERPL-SCNC: 32 MMOL/L — HIGH (ref 22–31)
CREAT SERPL-MCNC: 1.24 MG/DL — SIGNIFICANT CHANGE UP (ref 0.5–1.3)
CREAT SERPL-MCNC: 1.24 MG/DL — SIGNIFICANT CHANGE UP (ref 0.5–1.3)
EGFR: 50 ML/MIN/1.73M2 — LOW
EGFR: 50 ML/MIN/1.73M2 — LOW
GLUCOSE SERPL-MCNC: 82 MG/DL — SIGNIFICANT CHANGE UP (ref 70–99)
GLUCOSE SERPL-MCNC: 82 MG/DL — SIGNIFICANT CHANGE UP (ref 70–99)
HCT VFR BLD CALC: 27.2 % — LOW (ref 34.5–45)
HCT VFR BLD CALC: 27.2 % — LOW (ref 34.5–45)
HGB BLD-MCNC: 9.5 G/DL — LOW (ref 11.5–15.5)
HGB BLD-MCNC: 9.5 G/DL — LOW (ref 11.5–15.5)
MCHC RBC-ENTMCNC: 29.1 PG — SIGNIFICANT CHANGE UP (ref 27–34)
MCHC RBC-ENTMCNC: 29.1 PG — SIGNIFICANT CHANGE UP (ref 27–34)
MCHC RBC-ENTMCNC: 34.9 GM/DL — SIGNIFICANT CHANGE UP (ref 32–36)
MCHC RBC-ENTMCNC: 34.9 GM/DL — SIGNIFICANT CHANGE UP (ref 32–36)
MCV RBC AUTO: 83.4 FL — SIGNIFICANT CHANGE UP (ref 80–100)
MCV RBC AUTO: 83.4 FL — SIGNIFICANT CHANGE UP (ref 80–100)
NRBC # BLD: 0 /100 WBCS — SIGNIFICANT CHANGE UP (ref 0–0)
NRBC # BLD: 0 /100 WBCS — SIGNIFICANT CHANGE UP (ref 0–0)
PLATELET # BLD AUTO: 301 K/UL — SIGNIFICANT CHANGE UP (ref 150–400)
PLATELET # BLD AUTO: 301 K/UL — SIGNIFICANT CHANGE UP (ref 150–400)
POTASSIUM SERPL-MCNC: 3.7 MMOL/L — SIGNIFICANT CHANGE UP (ref 3.5–5.3)
POTASSIUM SERPL-MCNC: 3.7 MMOL/L — SIGNIFICANT CHANGE UP (ref 3.5–5.3)
POTASSIUM SERPL-SCNC: 3.7 MMOL/L — SIGNIFICANT CHANGE UP (ref 3.5–5.3)
POTASSIUM SERPL-SCNC: 3.7 MMOL/L — SIGNIFICANT CHANGE UP (ref 3.5–5.3)
PROT SERPL-MCNC: 6.2 G/DL — SIGNIFICANT CHANGE UP (ref 6–8.3)
PROT SERPL-MCNC: 6.2 G/DL — SIGNIFICANT CHANGE UP (ref 6–8.3)
RBC # BLD: 3.26 M/UL — LOW (ref 3.8–5.2)
RBC # BLD: 3.26 M/UL — LOW (ref 3.8–5.2)
RBC # FLD: 13.9 % — SIGNIFICANT CHANGE UP (ref 10.3–14.5)
RBC # FLD: 13.9 % — SIGNIFICANT CHANGE UP (ref 10.3–14.5)
SODIUM SERPL-SCNC: 139 MMOL/L — SIGNIFICANT CHANGE UP (ref 135–145)
SODIUM SERPL-SCNC: 139 MMOL/L — SIGNIFICANT CHANGE UP (ref 135–145)
WBC # BLD: 13.38 K/UL — HIGH (ref 3.8–10.5)
WBC # BLD: 13.38 K/UL — HIGH (ref 3.8–10.5)
WBC # FLD AUTO: 13.38 K/UL — HIGH (ref 3.8–10.5)
WBC # FLD AUTO: 13.38 K/UL — HIGH (ref 3.8–10.5)

## 2023-12-11 PROCEDURE — 99232 SBSQ HOSP IP/OBS MODERATE 35: CPT

## 2023-12-11 RX ORDER — SENNA PLUS 8.6 MG/1
2 TABLET ORAL
Qty: 60 | Refills: 0
Start: 2023-12-11 | End: 2024-01-09

## 2023-12-11 RX ORDER — ATORVASTATIN CALCIUM 80 MG/1
1 TABLET, FILM COATED ORAL
Qty: 30 | Refills: 0
Start: 2023-12-11 | End: 2024-01-09

## 2023-12-11 RX ORDER — APIXABAN 2.5 MG/1
1 TABLET, FILM COATED ORAL
Qty: 60 | Refills: 0
Start: 2023-12-11 | End: 2024-01-09

## 2023-12-11 RX ORDER — METOPROLOL TARTRATE 50 MG
1 TABLET ORAL
Qty: 90 | Refills: 0
Start: 2023-12-11 | End: 2024-01-09

## 2023-12-11 RX ORDER — LOSARTAN POTASSIUM 100 MG/1
1 TABLET, FILM COATED ORAL
Qty: 30 | Refills: 0
Start: 2023-12-11 | End: 2024-01-09

## 2023-12-11 RX ORDER — PANTOPRAZOLE SODIUM 20 MG/1
1 TABLET, DELAYED RELEASE ORAL
Qty: 30 | Refills: 0
Start: 2023-12-11 | End: 2024-01-09

## 2023-12-11 RX ORDER — AMIODARONE HYDROCHLORIDE 400 MG/1
1 TABLET ORAL
Qty: 30 | Refills: 0
Start: 2023-12-11 | End: 2024-01-09

## 2023-12-11 RX ADMIN — Medication 1 TABLET(S): at 06:10

## 2023-12-11 RX ADMIN — PANTOPRAZOLE SODIUM 40 MILLIGRAM(S): 20 TABLET, DELAYED RELEASE ORAL at 11:15

## 2023-12-11 RX ADMIN — Medication 50 MILLIGRAM(S): at 14:18

## 2023-12-11 RX ADMIN — Medication 50 MILLIGRAM(S): at 21:47

## 2023-12-11 RX ADMIN — Medication 5 MILLIGRAM(S): at 21:47

## 2023-12-11 RX ADMIN — Medication 50 MILLIGRAM(S): at 06:10

## 2023-12-11 RX ADMIN — ATORVASTATIN CALCIUM 80 MILLIGRAM(S): 80 TABLET, FILM COATED ORAL at 21:46

## 2023-12-11 RX ADMIN — APIXABAN 5 MILLIGRAM(S): 2.5 TABLET, FILM COATED ORAL at 17:25

## 2023-12-11 RX ADMIN — AMIODARONE HYDROCHLORIDE 200 MILLIGRAM(S): 400 TABLET ORAL at 06:10

## 2023-12-11 RX ADMIN — SENNA PLUS 2 TABLET(S): 8.6 TABLET ORAL at 21:46

## 2023-12-11 RX ADMIN — APIXABAN 5 MILLIGRAM(S): 2.5 TABLET, FILM COATED ORAL at 06:10

## 2023-12-11 RX ADMIN — LOSARTAN POTASSIUM 25 MILLIGRAM(S): 100 TABLET, FILM COATED ORAL at 06:10

## 2023-12-11 NOTE — PROGRESS NOTE ADULT - ASSESSMENT
58 year old female patient with past medical history of HTN and rheumatoid arthritis who is admitted for Acute Inpatient Rehabilitation with a multidisciplinary rehab program at [facility] with functional impairments in ADLs and mobility secondary to left hemiparesis resulting from bilateral subacute to remote embolic strokes, a right posterior corona radiata acute infarct, multiple petechial hemorrhages in bilateral thalamus and basal ganglia with etiology highly indicative from underlying vasculitis and a subsequent left parietal cortical acute infarction of a likely cardioembolic source.      CVA  - suspect due to vasculitis  - [facility] Neurology Impression: Left hemiparesis due to right corona radiata infarct, multiple micro hemorrhages, deep and cortical. Cerebral angiogram concerning for PACNS. Pt also with Afib, less likely cardioembolic event  - s/p IV solumedrol. now on prednisone 50mg QD   - per rheumatology: cont po prednisone 50mg daily until repeat cerebral angiogram in 1 month (~12/20/23)  - cont PPI while on steroid    - Rapid response on 12/1 for syncopal episode after a BM- likely vasovagal syncope  - CT scan: no acute changes  - Continue prednisone 50 mg QD  - Neuro consult appreciated     Leukocytosis - possibly reactive and steroid induced   - afebrile. no focal symptoms  - hematology consult appreciated.   - infectious work up negative   - monitor off abx    Afib  - s/p amiodarone gtt  - continue amiodarone 200 mg QD, metoprolol 50 mg TID, and Eliquis 5 mg BID    HTN  - continue Losartan 25 mg QD  - Hold Amlodipine 10mg  - Monitor BP    HLD  - cont atorvastatin 80 mg QHS    Hyperglycemia in setting of steroid use  - AIC 4.9  - off fingerstick monitoriong    DVT prophylaxis: Eliquis 5 mg BID     58 year old female patient with past medical history of HTN and rheumatoid arthritis who is admitted for Acute Inpatient Rehabilitation with a multidisciplinary rehab program at [facility] with functional impairments in ADLs and mobility secondary to left hemiparesis resulting from bilateral subacute to remote embolic strokes, a right posterior corona radiata acute infarct, multiple petechial hemorrhages in bilateral thalamus and basal ganglia with etiology highly indicative from underlying vasculitis and a subsequent left parietal cortical acute infarction of a likely cardioembolic source.      CVA  - suspect due to vasculitis  - [facility] Neurology Impression: Left hemiparesis due to right corona radiata infarct, multiple micro hemorrhages, deep and cortical. Cerebral angiogram concerning for PACNS. Pt also with Afib, less likely cardioembolic event  - s/p IV solumedrol. now on prednisone 50mg QD   - per rheumatology: cont po prednisone 50mg daily until repeat cerebral angiogram in 1 month (~12/20/23)  - cont PPI while on steroid    - Rapid response on 12/1 for syncopal episode after a BM- likely vasovagal syncope  - CT scan: no acute changes  - Continue prednisone 50 mg QD  - Neuro consult appreciated     Leukocytosis - possibly reactive and steroid induced   - afebrile. no focal symptoms  - hematology consult appreciated.   - infectious work up negative   - monitor off abx    Afib  - s/p amiodarone gtt  - continue amiodarone 200 mg QD, metoprolol 50 mg TID, and Eliquis 5 mg BID    HTN  - continue Losartan 25 mg QD  - Hold Amlodipine 10mg  - Monitor BP    HLD  - cont atorvastatin 80 mg QHS    Hyperglycemia in setting of steroid use  - AIC 4.9  - off fingerstick monitoriong    DVT prophylaxis: Eliquis 5 mg BID

## 2023-12-11 NOTE — PROGRESS NOTE ADULT - SUBJECTIVE AND OBJECTIVE BOX
HPI:  Mrs. Екатерина Poole is a 58 year old female patient with past medical history of HTN and rheumatoid arthritis who presented to [place] on 11/15 for AMS when a neighbor overhead patient was looking for her mom and she was found confused/wandering around apartment complex. Imaging studies initially performed reported bilateral subacute to remote embolic strokes, a right posterior corona radiata acute infarct, and multiple petechial hemorrhages in bilateral thalamus and basal ganglia. She was additionally noted to have KRUNAL which has since resolved. Hypertension managed with amlodipine, aldactone, and losartan and she was started on B12 supplements. Patient seen by Rheumatology and was transferred to [facility] on 11/20 for cerebral angiogram.    A cerebral angiogram performed on 11/20 reported multiple areas of focal stenosis and dilatation concerning for vasculitis. An LP was completed on 11/21 with a normal profile. Rheumatology consulted and recommended possible brain biopsy to confirm diagnosis of vasculitis but the risks of performing an open biopsy outweighed the benefits as the results of the biopsy were deemed unlikely to . She was seen by Neurosurgery with patient's neuroradiologic findings including beading on cerebral angiogram (consistent with vasculitis) and clinical presentation, with no other etiology of vasculitis, point to PACNS. Patient was started on steroids given her micro hemorrhage and strokes likely attributed to vasculitis. Steroids started on 11/24 with initial dose Solumedrol 1g for 3 days. She was to continue Prednisone 60mg with a decrease to 50mg daily for discharge to acute in-patient rehabilitation. No taper indicated per rheumatology evaluation and recommendations. An EEG was additionally performed and reported moderate diffuse/multi-focal cerebral dysfunction, not specific as to etiology. There were no epileptiform abnormalities recorded. Will need a repeat cerebral angiogram in 1 month which should take place around 12/20/23. Patient started on Eliquis 5 mg BID, amiodarone, and metoprolol for Afib. TTE reported as normal with global left ventricular systolic function, mild mitral valve regurgitation, mild aortic regurgitation, and a sclerotic aortic valve with normal opening. Hospitalization additionally significant for a rapid response on 11/20 due to acute mental status changes after being found to be slumped over while on toilet by staff prompting MRI imaging reporting:  Left parietal small cortical acute infarction  Innumerable scattered chronic microhemorrhages in the brainstem, bilateral cerebellar hemispheres, deep gray nuclei and peripherally within the cerebral hemispheres which may be secondary to chronic hypertensive encephalopathy.  Severe extensive chronic microvascular ischemic changes and multifocal chronic lacunar infarcts as detailed above.  Patient evaluated by PT/OT and was recommended for acute inpatient rehab. Patient is medically stable for discharge to [facility] on 11/30. (30 Nov 2023 14:36)    TDD: 12/13/23 Home  ___________________________________________________________________________    SUBJECTIVE/ROS  Patient was seen and evaluated at bedside today.  Reported no overnight events and is in no acute distress.   No weekend events reported.  Discharge disposition changed to home on 12/13 per discussion with family.  Patient is motivated to continue participation on the recommended rehabilitation program.   Denies any CP, SOB, LAURA, palpitations, fever, chills, body aches, cough, congestion, or any other symptoms at this time.   ___________________________________________________________________________    EEG (12/04/23)  Classification / Summary:  Abnormal routine EEG in the drowsy and asleep states with intermittent arousals. Unclear if full wakefulness is captured.  Occasional left > right temporal focal slowing.  Mild diffuse slowing vs. excess drowsiness.  No epileptiform abnormalities are captured.     Clinical Impression:  Left > right temporal focal cerebral dysfunction can be structural or functional in etiology.  Mild diffuse cerebral dysfunction is nonspecific in etiology. Alternatively, excess drowsiness may be contributing.    ___________________________________________________________________________    CT Head No Cont (12.01.23 @ 10:40)   Moderate to severe chronic microvascular changes without evidence of an acute transcortical infarction or hemorrhage.    CT Angio Brain Stroke Protocol  w/ IV Cont (12.01.23 @ 13:03)   Negative CT perfusion. CTA demonstrates persistent narrowing of the anterior and middle cerebral arteries consistent with vasospasm or vasculitis.  ___________________________________________________________________________    Vital Signs Last 24 Hrs  T(C): 36.9 (11 Dec 2023 08:24), Max: 37.2 (10 Dec 2023 14:30)  T(F): 98.4 (11 Dec 2023 08:24), Max: 99 (10 Dec 2023 14:30)  HR: 58 (11 Dec 2023 08:24) (58 - 70)  BP: 169/84 (11 Dec 2023 08:24) (126/70 - 169/84)  RR: 16 (11 Dec 2023 08:24) (16 - 18)  SpO2: 98% (11 Dec 2023 08:24) (97% - 98%)    ___________________________________________________________________________    LAB                        9.5    13.38 )-----------( 301      ( 11 Dec 2023 05:26 )             27.2                        10.3   16.98 )-----------( 340      ( 07 Dec 2023 05:48 )             29.4     12-11    139  |  101  |  36<H>  ----------------------------<  82  3.7   |  32<H>  |  1.24    Ca    9.0      11 Dec 2023 05:26    TPro  6.2  /  Alb  2.6<L>  /  TBili  0.4  /  DBili  x   /  AST  9<L>  /  ALT  23  /  AlkPhos  68  12-11    LIVER FUNCTIONS - ( 11 Dec 2023 05:26 )  Alb: 2.6 g/dL / Pro: 6.2 g/dL / ALK PHOS: 68 U/L / ALT: 23 U/L / AST: 9 U/L / GGT: x           ___________________________________________________________________________    MEDICATIONS  (STANDING):  aMIOdarone    Tablet 200 milliGRAM(s) Oral daily  apixaban 5 milliGRAM(s) Oral two times a day  atorvastatin 80 milliGRAM(s) Oral at bedtime  bisacodyl 5 milliGRAM(s) Oral at bedtime  glucagon  Injectable 1 milliGRAM(s) IntraMuscular once  influenza   Vaccine 0.5 milliLiter(s) IntraMuscular once  losartan 25 milliGRAM(s) Oral daily  metoprolol tartrate 50 milliGRAM(s) Oral three times a day  pantoprazole    Tablet 40 milliGRAM(s) Oral daily  predniSONE   Tablet 50 milliGRAM(s) Oral daily  senna 2 Tablet(s) Oral at bedtime  trimethoprim  160 mG/sulfamethoxazole 800 mG 1 Tablet(s) Oral <User Schedule>    ___________________________________________________________________________    PHYSICAL EXAM:  Gen - NAD, Comfortable  HEENT - NCAT, EOMI, MMM  Pulm - breathing comfortably on room air  Cardiovascular - warm and well perfused  Abdomen - Soft, NT/ND  Extremities - No C/C/E, No calf tenderness  Neuro-     Cognitive - oriented to name, states rehab in [place] for place, states February 2023 for time     Communication - Fluent, No dysarthria     Attention: able to state days of the week backwards     Cranial Nerves - no facial asymmetry, EOMI     Motor -                     LEFT    UE - ShAB 5/5, EF 5/5, EE 5/5, WE 5/5,  5/5                    RIGHT UE - ShAB 5/5, EF 5/5, EE 5/5, WE 5/5,  5/5                    LEFT    LE - HF 4/5, KE 5/5, DF 5/5, PF 5/5                    RIGHT LE - HF 4/5, KE 5/5, DF 5/5, PF 5/5        Sensory - Intact to LT     Reflexes - DTR Intact, No primitive reflexive     Coordination - FTN intact     Tone - normal  Psychiatric - Mood stable, Affect WNL  Skin:  all skin intact    ___________________________________________________________________________   HPI:  Mrs. Екатерина Poole is a 58 year old female patient with past medical history of HTN and rheumatoid arthritis who presented to [place] on 11/15 for AMS when a neighbor overhead patient was looking for her mom and she was found confused/wandering around apartment complex. Imaging studies initially performed reported bilateral subacute to remote embolic strokes, a right posterior corona radiata acute infarct, and multiple petechial hemorrhages in bilateral thalamus and basal ganglia. She was additionally noted to have KRUNAL which has since resolved. Hypertension managed with amlodipine, aldactone, and losartan and she was started on B12 supplements. Patient seen by Rheumatology and was transferred to [facility] on 11/20 for cerebral angiogram.    A cerebral angiogram performed on 11/20 reported multiple areas of focal stenosis and dilatation concerning for vasculitis. An LP was completed on 11/21 with a normal profile. Rheumatology consulted and recommended possible brain biopsy to confirm diagnosis of vasculitis but the risks of performing an open biopsy outweighed the benefits as the results of the biopsy were deemed unlikely to . She was seen by Neurosurgery with patient's neuroradiologic findings including beading on cerebral angiogram (consistent with vasculitis) and clinical presentation, with no other etiology of vasculitis, point to PACNS. Patient was started on steroids given her micro hemorrhage and strokes likely attributed to vasculitis. Steroids started on 11/24 with initial dose Solumedrol 1g for 3 days. She was to continue Prednisone 60mg with a decrease to 50mg daily for discharge to acute in-patient rehabilitation. No taper indicated per rheumatology evaluation and recommendations. An EEG was additionally performed and reported moderate diffuse/multi-focal cerebral dysfunction, not specific as to etiology. There were no epileptiform abnormalities recorded. Will need a repeat cerebral angiogram in 1 month which should take place around 12/20/23. Patient started on Eliquis 5 mg BID, amiodarone, and metoprolol for Afib. TTE reported as normal with global left ventricular systolic function, mild mitral valve regurgitation, mild aortic regurgitation, and a sclerotic aortic valve with normal opening. Hospitalization additionally significant for a rapid response on 11/20 due to acute mental status changes after being found to be slumped over while on toilet by staff prompting MRI imaging reporting:  Left parietal small cortical acute infarction  Innumerable scattered chronic microhemorrhages in the brainstem, bilateral cerebellar hemispheres, deep gray nuclei and peripherally within the cerebral hemispheres which may be secondary to chronic hypertensive encephalopathy.  Severe extensive chronic microvascular ischemic changes and multifocal chronic lacunar infarcts as detailed above.  Patient evaluated by PT/OT and was recommended for acute inpatient rehab. Patient is medically stable for discharge to [facility] on 11/30. (30 Nov 2023 14:36)    TDD: 12/13/23 Home  ___________________________________________________________________________    SUBJECTIVE/ROS  Patient was seen and evaluated at bedside today.  Reported no overnight events and is in no acute distress.   No weekend events reported.  Discharge disposition changed to home on 12/13 per discussion with family.  Patient is motivated to continue participation on the recommended rehabilitation program.   Denies any CP, SOB, LAURA, palpitations, fever, chills, body aches, cough, congestion, or any other symptoms at this time.   ___________________________________________________________________________    EEG (12/04/23)  Classification / Summary:  Abnormal routine EEG in the drowsy and asleep states with intermittent arousals. Unclear if full wakefulness is captured.  Occasional left > right temporal focal slowing.  Mild diffuse slowing vs. excess drowsiness.  No epileptiform abnormalities are captured.     Clinical Impression:  Left > right temporal focal cerebral dysfunction can be structural or functional in etiology.  Mild diffuse cerebral dysfunction is nonspecific in etiology. Alternatively, excess drowsiness may be contributing.    ___________________________________________________________________________    CT Head No Cont (12.01.23 @ 10:40)   Moderate to severe chronic microvascular changes without evidence of an acute transcortical infarction or hemorrhage.    CT Angio Brain Stroke Protocol  w/ IV Cont (12.01.23 @ 13:03)   Negative CT perfusion. CTA demonstrates persistent narrowing of the anterior and middle cerebral arteries consistent with vasospasm or vasculitis.  ___________________________________________________________________________    Vital Signs Last 24 Hrs  T(C): 36.9 (11 Dec 2023 08:24), Max: 37.2 (10 Dec 2023 14:30)  T(F): 98.4 (11 Dec 2023 08:24), Max: 99 (10 Dec 2023 14:30)  HR: 58 (11 Dec 2023 08:24) (58 - 70)  BP: 169/84 (11 Dec 2023 08:24) (126/70 - 169/84)  RR: 16 (11 Dec 2023 08:24) (16 - 18)  SpO2: 98% (11 Dec 2023 08:24) (97% - 98%)    ___________________________________________________________________________    LAB                        9.5    13.38 )-----------( 301      ( 11 Dec 2023 05:26 )             27.2                        10.3   16.98 )-----------( 340      ( 07 Dec 2023 05:48 )             29.4     12-11    139  |  101  |  36<H>  ----------------------------<  82  3.7   |  32<H>  |  1.24    Ca    9.0      11 Dec 2023 05:26    TPro  6.2  /  Alb  2.6<L>  /  TBili  0.4  /  DBili  x   /  AST  9<L>  /  ALT  23  /  AlkPhos  68  12-11    LIVER FUNCTIONS - ( 11 Dec 2023 05:26 )  Alb: 2.6 g/dL / Pro: 6.2 g/dL / ALK PHOS: 68 U/L / ALT: 23 U/L / AST: 9 U/L / GGT: x           ___________________________________________________________________________    MEDICATIONS  (STANDING):  aMIOdarone    Tablet 200 milliGRAM(s) Oral daily  apixaban 5 milliGRAM(s) Oral two times a day  atorvastatin 80 milliGRAM(s) Oral at bedtime  bisacodyl 5 milliGRAM(s) Oral at bedtime  glucagon  Injectable 1 milliGRAM(s) IntraMuscular once  influenza   Vaccine 0.5 milliLiter(s) IntraMuscular once  losartan 25 milliGRAM(s) Oral daily  metoprolol tartrate 50 milliGRAM(s) Oral three times a day  pantoprazole    Tablet 40 milliGRAM(s) Oral daily  predniSONE   Tablet 50 milliGRAM(s) Oral daily  senna 2 Tablet(s) Oral at bedtime  trimethoprim  160 mG/sulfamethoxazole 800 mG 1 Tablet(s) Oral <User Schedule>    ___________________________________________________________________________    PHYSICAL EXAM:  Gen - NAD, Comfortable  HEENT - NCAT, EOMI, MMM  Pulm - breathing comfortably on room air  Cardiovascular - warm and well perfused  Abdomen - Soft, NT/ND  Extremities - No C/C/E, No calf tenderness  Neuro-     Cognitive - oriented to name, states rehab in [place] for place, states February 2023 for time     Communication - Fluent, No dysarthria     Attention: able to state days of the week backwards     Cranial Nerves - no facial asymmetry, EOMI     Motor -                     LEFT    UE - ShAB 5/5, EF 5/5, EE 5/5, WE 5/5,  5/5                    RIGHT UE - ShAB 5/5, EF 5/5, EE 5/5, WE 5/5,  5/5                    LEFT    LE - HF 4/5, KE 5/5, DF 5/5, PF 5/5                    RIGHT LE - HF 4/5, KE 5/5, DF 5/5, PF 5/5        Sensory - Intact to LT     Reflexes - DTR Intact, No primitive reflexive     Coordination - FTN intact     Tone - normal  Psychiatric - Mood stable, Affect WNL  Skin:  all skin intact    ___________________________________________________________________________

## 2023-12-11 NOTE — PROGRESS NOTE ADULT - SUBJECTIVE AND OBJECTIVE BOX
Patient is a 58y old  Female who presents with a chief complaint of CVA (11 Dec 2023 10:56)      Patient seen and examined at bedside.    ALLERGIES:  No Known Drug Allergies  Shrimp (Unknown)    MEDICATIONS  (STANDING):  aMIOdarone    Tablet 200 milliGRAM(s) Oral daily  apixaban 5 milliGRAM(s) Oral two times a day  atorvastatin 80 milliGRAM(s) Oral at bedtime  bisacodyl 5 milliGRAM(s) Oral at bedtime  glucagon  Injectable 1 milliGRAM(s) IntraMuscular once  influenza   Vaccine 0.5 milliLiter(s) IntraMuscular once  losartan 25 milliGRAM(s) Oral daily  metoprolol tartrate 50 milliGRAM(s) Oral three times a day  pantoprazole    Tablet 40 milliGRAM(s) Oral daily  predniSONE   Tablet 50 milliGRAM(s) Oral daily  senna 2 Tablet(s) Oral at bedtime  trimethoprim  160 mG/sulfamethoxazole 800 mG 1 Tablet(s) Oral <User Schedule>    MEDICATIONS  (PRN):    Vital Signs Last 24 Hrs  T(F): 98.4 (11 Dec 2023 08:24), Max: 99 (10 Dec 2023 14:30)  HR: 58 (11 Dec 2023 08:24) (58 - 70)  BP: 169/84 (11 Dec 2023 08:24) (126/70 - 169/84)  RR: 16 (11 Dec 2023 08:24) (16 - 18)  SpO2: 98% (11 Dec 2023 08:24) (97% - 98%)  I&O's Summary      PHYSICAL EXAM:  General: NAD, Awake alert and answering questions   ENT: MMM  Neck: Supple, No JVD  Lungs: Clear to auscultation bilaterally  Cardio: RRR, S1/S2, No murmurs  Abdomen: Soft, Nontender, Nondistended; Bowel sounds present  Extremities: No calf tenderness, No pitting edema    LABS:                        9.5    13.38 )-----------( 301      ( 11 Dec 2023 05:26 )             27.2       12-11    139  |  101  |  36  ----------------------------<  82  3.7   |  32  |  1.24    Ca    9.0      11 Dec 2023 05:26    TPro  6.2  /  Alb  2.6  /  TBili  0.4  /  DBili  x   /  AST  9   /  ALT  23  /  AlkPhos  68  12-11     11-22 Chol 186 mg/dL LDL -- HDL 43 mg/dL Trig 80 mg/dL    Urinalysis Basic - ( 11 Dec 2023 05:26 )    Color: x / Appearance: x / SG: x / pH: x  Gluc: 82 mg/dL / Ketone: x  / Bili: x / Urobili: x   Blood: x / Protein: x / Nitrite: x   Leuk Esterase: x / RBC: x / WBC x   Sq Epi: x / Non Sq Epi: x / Bacteria: x

## 2023-12-11 NOTE — PROGRESS NOTE ADULT - ASSESSMENT
Assessment/Plan:  Mrs. Екатерина Poole is a 58 year old female patient with past medical history of HTN and rheumatoid arthritis who is admitted for Acute Inpatient Rehabilitation with a multidisciplinary rehab program at [facility] with functional impairments in ADLs and mobility secondary to left hemiparesis resulting from bilateral subacute to remote embolic strokes, a right posterior corona radiata acute infarct, multiple petechial hemorrhages in bilateral thalamus and basal ganglia with etiology highly indicative from underlying vasculitis and a subsequent left parietal cortical acute infarction of a likely cardioembolic source.    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 Maintain dose per rheumatology eval/recommendation.  - Left hemiparesis, impaired ADLs and mobility, need for assistance with personal care   - Start comprehensive rehab program of PT/OT/SLP - 3 hours a day, 5 days a week. P&O as needed   - Fall /Aspiration precautions  - Will need repeat cerebral angiogram in 1 month (~12/20/23)  - Rheumatology and Neurology following  - Continue prednisone 50 mg QD  - Bactrim DS 1 tab MWF for PCP ppx while on high dose steroids  - EEG (12/4) no epileptic activity    # leukocytosis  - WBC 17.18->16.54->16.98  - likely steroid induced, was started on steroids 11/24, WBC started uptrending after  - afebrile, asymptomatic  - monitor CBC, hospitalist follow up    # RRT/Code Stroke with syncope after BM 12/1  - Head CT stable as above  - rheumatology and neuro consulted  - EEG (12/4) no epileptic activity    Afib  - amiodarone 200 mg QD  - metoprolol 50 mg TID  - Eliquis 5 mg BID    HTN  - continue Losartan 25 mg QD  - Norvasc 10 mg discontinued per Hospitalist  - Monitor BP    HLD  - cont atorvastatin 80 mg QHS    Hyperglycemia in setting of steroid use  - SSI  - Monitor fingersticks    Mood / Cognition  - Neuropsychology consult PRN    Sleep  - Maintain quiet hours and a low stim environment.     GI / Bowel  - Senna qHS  - Dulcolax 5 mg QHS  - GI ppx: pantoprazole 40 mg QD     / Bladder  - Bladder scans Q8 hours with straight cath for >400cc.  - Toileting schedule every 4 hours    Skin:  - Skin assessment on admission performed : Intact  - Pressure Injury/Skin: OOB to chair, PT/OT  - nursing to monitor skin q Shift    Diet/Dysphagia:  - Diet Consistency: regular  - Aspiration Precautions  - SLP consult for swallow function evaluation and treatment  - Nutrition consult    DVT prophylaxis:   - on Eliquis 5 mg BID      Outpatient Follow-up:  Gavin Calvo  Neurology  [address], [address]  [place] [address]  Phone: [phone]  Fax: [phone]  Follow Up Time: 2 weeks    Khadijah Hartley  Radiology  [address], [address]  [place] [address]  Phone: [phone]  Fax: [phone]  Follow Up Time: 2 weeks    Brando Lazar  Cardiology  [address], [address]  [place] [address]  Phone: [phone]  Fax: [phone]  Follow Up Time: 2 weeks    Kenya Naik  Rheumatology  [address], [address]  [place] [address]  Phone: [phone]  Fax: [phone]  Follow Up Time: 2 weeks      Code Status/Emergency Contact:    --------------- Assessment/Plan:  Mrs. Екатерина Poole is a 58 year old female patient with past medical history of HTN and rheumatoid arthritis who is admitted for Acute Inpatient Rehabilitation with a multidisciplinary rehab program at [facility] with functional impairments in ADLs and mobility secondary to left hemiparesis resulting from bilateral subacute to remote embolic strokes, a right posterior corona radiata acute infarct, multiple petechial hemorrhages in bilateral thalamus and basal ganglia with etiology highly indicative from underlying vasculitis and a subsequent left parietal cortical acute infarction of a likely cardioembolic source.    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 Maintain dose per rheumatology eval/recommendation.  - Left hemiparesis, impaired ADLs and mobility, need for assistance with personal care   - Start comprehensive rehab program of PT/OT/SLP - 3 hours a day, 5 days a week. P&O as needed   - Fall /Aspiration precautions  - Will need repeat cerebral angiogram in 1 month (~12/20/23)  - Rheumatology and Neurology following  - Continue prednisone 50 mg QD  - Bactrim DS 1 tab MWF for PCP ppx while on high dose steroids  - EEG (12/4) no epileptic activity    # leukocytosis  - WBC 17.18->16.54->16.98  - likely steroid induced, was started on steroids 11/24, WBC started uptrending after  - afebrile, asymptomatic  - monitor CBC, hospitalist follow up    # RRT/Code Stroke with syncope after BM 12/1  - Head CT stable as above  - rheumatology and neuro consulted  - EEG (12/4) no epileptic activity    Afib  - amiodarone 200 mg QD  - metoprolol 50 mg TID  - Eliquis 5 mg BID    HTN  - continue Losartan 25 mg QD  - Norvasc 10 mg discontinued per Hospitalist  - Monitor BP    HLD  - cont atorvastatin 80 mg QHS    Hyperglycemia in setting of steroid use  - SSI  - Monitor fingersticks    Mood / Cognition  - Neuropsychology consult PRN    Sleep  - Maintain quiet hours and a low stim environment.     GI / Bowel  - Senna qHS  - Dulcolax 5 mg QHS  - GI ppx: pantoprazole 40 mg QD     / Bladder  - Bladder scans Q8 hours with straight cath for >400cc.  - Toileting schedule every 4 hours    Skin:  - Skin assessment on admission performed : Intact  - Pressure Injury/Skin: OOB to chair, PT/OT  - nursing to monitor skin q Shift    Diet/Dysphagia:  - Diet Consistency: regular  - Aspiration Precautions  - SLP consult for swallow function evaluation and treatment  - Nutrition consult    DVT prophylaxis:   - on Eliquis 5 mg BID      Outpatient Follow-up:  Gavin Calvo  Neurology  [address], [address]  [place] [address]  Phone: [phone]  Fax: [phone]  Follow Up Time: 2 weeks    Khadijah Hartley  Radiology  [address], [address]  [place] [address]  Phone: [phone]  Fax: [phone]  Follow Up Time: 2 weeks    Brando Lazar  Cardiology  [address]  [place] [address]  Phone: [phone]  Fax: [phone]  Follow Up Time: 2 weeks    Kenya Naik  Rheumatology  [address], [address]  [place] [address]  Phone: [phone]  Fax: [phone]  Follow Up Time: 2 weeks      Code Status/Emergency Contact:    ---------------

## 2023-12-12 ENCOUNTER — TRANSCRIPTION ENCOUNTER (OUTPATIENT)
Age: 58
End: 2023-12-12

## 2023-12-12 PROCEDURE — 99232 SBSQ HOSP IP/OBS MODERATE 35: CPT

## 2023-12-12 RX ADMIN — AMIODARONE HYDROCHLORIDE 200 MILLIGRAM(S): 400 TABLET ORAL at 06:21

## 2023-12-12 RX ADMIN — Medication 5 MILLIGRAM(S): at 22:12

## 2023-12-12 RX ADMIN — SENNA PLUS 2 TABLET(S): 8.6 TABLET ORAL at 22:11

## 2023-12-12 RX ADMIN — LOSARTAN POTASSIUM 25 MILLIGRAM(S): 100 TABLET, FILM COATED ORAL at 06:21

## 2023-12-12 RX ADMIN — ATORVASTATIN CALCIUM 80 MILLIGRAM(S): 80 TABLET, FILM COATED ORAL at 22:11

## 2023-12-12 RX ADMIN — Medication 50 MILLIGRAM(S): at 06:21

## 2023-12-12 RX ADMIN — Medication 50 MILLIGRAM(S): at 22:11

## 2023-12-12 RX ADMIN — Medication 50 MILLIGRAM(S): at 14:39

## 2023-12-12 RX ADMIN — PANTOPRAZOLE SODIUM 40 MILLIGRAM(S): 20 TABLET, DELAYED RELEASE ORAL at 12:17

## 2023-12-12 RX ADMIN — APIXABAN 5 MILLIGRAM(S): 2.5 TABLET, FILM COATED ORAL at 06:21

## 2023-12-12 RX ADMIN — APIXABAN 5 MILLIGRAM(S): 2.5 TABLET, FILM COATED ORAL at 17:18

## 2023-12-12 NOTE — DISCHARGE NOTE NURSING/CASE MANAGEMENT/SOCIAL WORK - PATIENT PORTAL LINK FT
You can access the FollowMyHealth Patient Portal offered by [facility] by registering at the following website: [email or web address]. By joining [employer]’s FollowMyHealth portal, you will also be able to view your health information using other applications (apps) compatible with our system. You can access the FollowMyHealth Patient Portal offered by [facility] by registering at the following website: [email or web address]. By joining [employer]’s FollowMyHealth portal, you will also be able to view your health information using other applications (apps) compatible with our system.

## 2023-12-12 NOTE — PROGRESS NOTE ADULT - SUBJECTIVE AND OBJECTIVE BOX
HPI:  Mrs. Екатерина Poole is a 58 year old female patient with past medical history of HTN and rheumatoid arthritis who presented to [place] on 11/15 for AMS when a neighbor overhead patient was looking for her mom and she was found confused/wandering around apartment complex. Imaging studies initially performed reported bilateral subacute to remote embolic strokes, a right posterior corona radiata acute infarct, and multiple petechial hemorrhages in bilateral thalamus and basal ganglia. She was additionally noted to have KRUNAL which has since resolved. Hypertension managed with amlodipine, aldactone, and losartan and she was started on B12 supplements. Patient seen by Rheumatology and was transferred to [facility] on 11/20 for cerebral angiogram.    A cerebral angiogram performed on 11/20 reported multiple areas of focal stenosis and dilatation concerning for vasculitis. An LP was completed on 11/21 with a normal profile. Rheumatology consulted and recommended possible brain biopsy to confirm diagnosis of vasculitis but the risks of performing an open biopsy outweighed the benefits as the results of the biopsy were deemed unlikely to . She was seen by Neurosurgery with patient's neuroradiologic findings including beading on cerebral angiogram (consistent with vasculitis) and clinical presentation, with no other etiology of vasculitis, point to PACNS. Patient was started on steroids given her micro hemorrhage and strokes likely attributed to vasculitis. Steroids started on 11/24 with initial dose Solumedrol 1g for 3 days. She was to continue Prednisone 60mg with a decrease to 50mg daily for discharge to acute in-patient rehabilitation. No taper indicated per rheumatology evaluation and recommendations. An EEG was additionally performed and reported moderate diffuse/multi-focal cerebral dysfunction, not specific as to etiology. There were no epileptiform abnormalities recorded. Will need a repeat cerebral angiogram in 1 month which should take place around 12/20/23. Patient started on Eliquis 5 mg BID, amiodarone, and metoprolol for Afib. TTE reported as normal with global left ventricular systolic function, mild mitral valve regurgitation, mild aortic regurgitation, and a sclerotic aortic valve with normal opening. Hospitalization additionally significant for a rapid response on 11/20 due to acute mental status changes after being found to be slumped over while on toilet by staff prompting MRI imaging reporting:  Left parietal small cortical acute infarction  Innumerable scattered chronic microhemorrhages in the brainstem, bilateral cerebellar hemispheres, deep gray nuclei and peripherally within the cerebral hemispheres which may be secondary to chronic hypertensive encephalopathy.  Severe extensive chronic microvascular ischemic changes and multifocal chronic lacunar infarcts as detailed above.  Patient evaluated by PT/OT and was recommended for acute inpatient rehab. Patient is medically stable for discharge to [facility] on 11/30. (30 Nov 2023 14:36)    TDD: 12/13/23 Home  ___________________________________________________________________________    SUBJECTIVE/ROS  Patient was seen and evaluated at bedside today.  Reported no overnight events and is in no acute distress.   Scheduled for discharge home tomorrow.  Patient is motivated to continue participation on the recommended rehabilitation program as outpatient.   Denies any CP, SOB, LAURA, palpitations, fever, chills, body aches, cough, congestion, or any other symptoms at this time.   ___________________________________________________________________________    EEG (12/04/23)  Classification / Summary:  Abnormal routine EEG in the drowsy and asleep states with intermittent arousals. Unclear if full wakefulness is captured.  Occasional left > right temporal focal slowing.  Mild diffuse slowing vs. excess drowsiness.  No epileptiform abnormalities are captured.     Clinical Impression:  Left > right temporal focal cerebral dysfunction can be structural or functional in etiology.  Mild diffuse cerebral dysfunction is nonspecific in etiology. Alternatively, excess drowsiness may be contributing.    ___________________________________________________________________________    CT Head No Cont (12.01.23 @ 10:40)   Moderate to severe chronic microvascular changes without evidence of an acute transcortical infarction or hemorrhage.    CT Angio Brain Stroke Protocol  w/ IV Cont (12.01.23 @ 13:03)   Negative CT perfusion. CTA demonstrates persistent narrowing of the anterior and middle cerebral arteries consistent with vasospasm or vasculitis.  ___________________________________________________________________________    Vital Signs Last 24 Hrs  T(C): 36.8 (12 Dec 2023 09:04), Max: 36.8 (12 Dec 2023 09:04)  T(F): 98.2 (12 Dec 2023 09:04), Max: 98.2 (12 Dec 2023 09:04)  HR: 59 (12 Dec 2023 09:04) (59 - 66)  BP: 169/82 (12 Dec 2023 09:04) (134/78 - 169/82)  RR: 15 (12 Dec 2023 09:04) (15 - 18)  SpO2: 100% (12 Dec 2023 09:04) (98% - 100%)    ___________________________________________________________________________    LAB                        9.5    13.38 )-----------( 301      ( 11 Dec 2023 05:26 )             27.2                        10.3   16.98 )-----------( 340      ( 07 Dec 2023 05:48 )             29.4     12-11    139  |  101  |  36<H>  ----------------------------<  82  3.7   |  32<H>  |  1.24    Ca    9.0      11 Dec 2023 05:26    TPro  6.2  /  Alb  2.6<L>  /  TBili  0.4  /  DBili  x   /  AST  9<L>  /  ALT  23  /  AlkPhos  68  12-11    LIVER FUNCTIONS - ( 11 Dec 2023 05:26 )  Alb: 2.6 g/dL / Pro: 6.2 g/dL / ALK PHOS: 68 U/L / ALT: 23 U/L / AST: 9 U/L / GGT: x           ___________________________________________________________________________    MEDICATIONS  (STANDING):  aMIOdarone    Tablet 200 milliGRAM(s) Oral daily  apixaban 5 milliGRAM(s) Oral two times a day  atorvastatin 80 milliGRAM(s) Oral at bedtime  bisacodyl 5 milliGRAM(s) Oral at bedtime  glucagon  Injectable 1 milliGRAM(s) IntraMuscular once  influenza   Vaccine 0.5 milliLiter(s) IntraMuscular once  losartan 25 milliGRAM(s) Oral daily  metoprolol tartrate 50 milliGRAM(s) Oral three times a day  pantoprazole    Tablet 40 milliGRAM(s) Oral daily  predniSONE   Tablet 50 milliGRAM(s) Oral daily  senna 2 Tablet(s) Oral at bedtime  trimethoprim  160 mG/sulfamethoxazole 800 mG 1 Tablet(s) Oral <User Schedule>    ___________________________________________________________________________    PHYSICAL EXAM:  Gen - NAD, Comfortable  HEENT - NCAT, EOMI, MMM  Pulm - breathing comfortably on room air  Cardiovascular - warm and well perfused  Abdomen - Soft, NT/ND  Extremities - No C/C/E, No calf tenderness  Neuro-     Cognitive - oriented to name, states rehab in [place] for place, states February 2023 for time     Communication - Fluent, No dysarthria     Attention: able to state days of the week backwards     Cranial Nerves - no facial asymmetry, EOMI     Motor -                     LEFT    UE - ShAB 5/5, EF 5/5, EE 5/5, WE 5/5,  5/5                    RIGHT UE - ShAB 5/5, EF 5/5, EE 5/5, WE 5/5,  5/5                    LEFT    LE - HF 4/5, KE 5/5, DF 5/5, PF 5/5                    RIGHT LE - HF 4/5, KE 5/5, DF 5/5, PF 5/5        Sensory - Intact to LT     Reflexes - DTR Intact, No primitive reflexive     Coordination - FTN intact     Tone - normal  Psychiatric - Mood stable, Affect WNL  Skin:  all skin intact    ___________________________________________________________________________   HPI:  Mrs. Екатерина Poole is a 58 year old female patient with past medical history of HTN and rheumatoid arthritis who presented to [place] on 11/15 for AMS when a neighbor overhead patient was looking for her mom and she was found confused/wandering around apartment complex. Imaging studies initially performed reported bilateral subacute to remote embolic strokes, a right posterior corona radiata acute infarct, and multiple petechial hemorrhages in bilateral thalamus and basal ganglia. She was additionally noted to have KRUNAL which has since resolved. Hypertension managed with amlodipine, aldactone, and losartan and she was started on B12 supplements. Patient seen by Rheumatology and was transferred to [facility] on 11/20 for cerebral angiogram.    A cerebral angiogram performed on 11/20 reported multiple areas of focal stenosis and dilatation concerning for vasculitis. An LP was completed on 11/21 with a normal profile. Rheumatology consulted and recommended possible brain biopsy to confirm diagnosis of vasculitis but the risks of performing an open biopsy outweighed the benefits as the results of the biopsy were deemed unlikely to . She was seen by Neurosurgery with patient's neuroradiologic findings including beading on cerebral angiogram (consistent with vasculitis) and clinical presentation, with no other etiology of vasculitis, point to PACNS. Patient was started on steroids given her micro hemorrhage and strokes likely attributed to vasculitis. Steroids started on 11/24 with initial dose Solumedrol 1g for 3 days. She was to continue Prednisone 60mg with a decrease to 50mg daily for discharge to acute in-patient rehabilitation. No taper indicated per rheumatology evaluation and recommendations. An EEG was additionally performed and reported moderate diffuse/multi-focal cerebral dysfunction, not specific as to etiology. There were no epileptiform abnormalities recorded. Will need a repeat cerebral angiogram in 1 month which should take place around 12/20/23. Patient started on Eliquis 5 mg BID, amiodarone, and metoprolol for Afib. TTE reported as normal with global left ventricular systolic function, mild mitral valve regurgitation, mild aortic regurgitation, and a sclerotic aortic valve with normal opening. Hospitalization additionally significant for a rapid response on 11/20 due to acute mental status changes after being found to be slumped over while on toilet by staff prompting MRI imaging reporting:  Left parietal small cortical acute infarction  Innumerable scattered chronic microhemorrhages in the brainstem, bilateral cerebellar hemispheres, deep gray nuclei and peripherally within the cerebral hemispheres which may be secondary to chronic hypertensive encephalopathy.  Severe extensive chronic microvascular ischemic changes and multifocal chronic lacunar infarcts as detailed above.  Patient evaluated by PT/OT and was recommended for acute inpatient rehab. Patient is medically stable for discharge to [facility] on 11/30. (30 Nov 2023 14:36)    TDD: 12/13/23 Home  ___________________________________________________________________________    SUBJECTIVE/ROS  Patient was seen and evaluated at bedside today.  Reported no overnight events and is in no acute distress.   Scheduled for discharge home tomorrow.  Patient is motivated to continue participation on the recommended rehabilitation program as outpatient.   Denies any CP, SOB, LAURA, palpitations, fever, chills, body aches, cough, congestion, or any other symptoms at this time.   ___________________________________________________________________________    EEG (12/04/23)  Classification / Summary:  Abnormal routine EEG in the drowsy and asleep states with intermittent arousals. Unclear if full wakefulness is captured.  Occasional left > right temporal focal slowing.  Mild diffuse slowing vs. excess drowsiness.  No epileptiform abnormalities are captured.     Clinical Impression:  Left > right temporal focal cerebral dysfunction can be structural or functional in etiology.  Mild diffuse cerebral dysfunction is nonspecific in etiology. Alternatively, excess drowsiness may be contributing.    ___________________________________________________________________________    CT Head No Cont (12.01.23 @ 10:40)   Moderate to severe chronic microvascular changes without evidence of an acute transcortical infarction or hemorrhage.    CT Angio Brain Stroke Protocol  w/ IV Cont (12.01.23 @ 13:03)   Negative CT perfusion. CTA demonstrates persistent narrowing of the anterior and middle cerebral arteries consistent with vasospasm or vasculitis.  ___________________________________________________________________________    Vital Signs Last 24 Hrs  T(C): 36.8 (12 Dec 2023 09:04), Max: 36.8 (12 Dec 2023 09:04)  T(F): 98.2 (12 Dec 2023 09:04), Max: 98.2 (12 Dec 2023 09:04)  HR: 59 (12 Dec 2023 09:04) (59 - 66)  BP: 169/82 (12 Dec 2023 09:04) (134/78 - 169/82)  RR: 15 (12 Dec 2023 09:04) (15 - 18)  SpO2: 100% (12 Dec 2023 09:04) (98% - 100%)    ___________________________________________________________________________    LAB                        9.5    13.38 )-----------( 301      ( 11 Dec 2023 05:26 )             27.2                        10.3   16.98 )-----------( 340      ( 07 Dec 2023 05:48 )             29.4     12-11    139  |  101  |  36<H>  ----------------------------<  82  3.7   |  32<H>  |  1.24    Ca    9.0      11 Dec 2023 05:26    TPro  6.2  /  Alb  2.6<L>  /  TBili  0.4  /  DBili  x   /  AST  9<L>  /  ALT  23  /  AlkPhos  68  12-11    LIVER FUNCTIONS - ( 11 Dec 2023 05:26 )  Alb: 2.6 g/dL / Pro: 6.2 g/dL / ALK PHOS: 68 U/L / ALT: 23 U/L / AST: 9 U/L / GGT: x           ___________________________________________________________________________    MEDICATIONS  (STANDING):  aMIOdarone    Tablet 200 milliGRAM(s) Oral daily  apixaban 5 milliGRAM(s) Oral two times a day  atorvastatin 80 milliGRAM(s) Oral at bedtime  bisacodyl 5 milliGRAM(s) Oral at bedtime  glucagon  Injectable 1 milliGRAM(s) IntraMuscular once  influenza   Vaccine 0.5 milliLiter(s) IntraMuscular once  losartan 25 milliGRAM(s) Oral daily  metoprolol tartrate 50 milliGRAM(s) Oral three times a day  pantoprazole    Tablet 40 milliGRAM(s) Oral daily  predniSONE   Tablet 50 milliGRAM(s) Oral daily  senna 2 Tablet(s) Oral at bedtime  trimethoprim  160 mG/sulfamethoxazole 800 mG 1 Tablet(s) Oral <User Schedule>    ___________________________________________________________________________    PHYSICAL EXAM:  Gen - NAD, Comfortable  HEENT - NCAT, EOMI, MMM  Pulm - breathing comfortably on room air  Cardiovascular - warm and well perfused  Abdomen - Soft, NT/ND  Extremities - No C/C/E, No calf tenderness  Neuro-     Cognitive - oriented to name, states rehab in [place] for place, states February 2023 for time     Communication - Fluent, No dysarthria     Attention: able to state days of the week backwards     Cranial Nerves - no facial asymmetry, EOMI     Motor -                     LEFT    UE - ShAB 5/5, EF 5/5, EE 5/5, WE 5/5,  5/5                    RIGHT UE - ShAB 5/5, EF 5/5, EE 5/5, WE 5/5,  5/5                    LEFT    LE - HF 4/5, KE 5/5, DF 5/5, PF 5/5                    RIGHT LE - HF 4/5, KE 5/5, DF 5/5, PF 5/5        Sensory - Intact to LT     Reflexes - DTR Intact, No primitive reflexive     Coordination - FTN intact     Tone - normal  Psychiatric - Mood stable, Affect WNL  Skin:  all skin intact    ___________________________________________________________________________

## 2023-12-12 NOTE — DISCHARGE NOTE NURSING/CASE MANAGEMENT/SOCIAL WORK - NSDCPEFALRISK_GEN_ALL_CORE
For information on Fall & Injury Prevention, visit: [email or web address] OR  [email or web address] OR  https://www.cdc.gov/steadi/patient.html For information on Fall & Injury Prevention, visit: [email or web address] OR  [email or web address] OR  https://www.cdc.gov/steadi/patient.html

## 2023-12-12 NOTE — PROGRESS NOTE ADULT - ASSESSMENT
58 year old female patient with past medical history of HTN and rheumatoid arthritis who is admitted for Acute Inpatient Rehabilitation with a multidisciplinary rehab program at [facility] with functional impairments in ADLs and mobility secondary to left hemiparesis resulting from bilateral subacute to remote embolic strokes, a right posterior corona radiata acute infarct, multiple petechial hemorrhages in bilateral thalamus and basal ganglia with etiology highly indicative from underlying vasculitis and a subsequent left parietal cortical acute infarction of a likely cardioembolic source.      CVA  - suspect due to vasculitis  - [facility] Neurology Impression: Left hemiparesis due to right corona radiata infarct, multiple micro hemorrhages, deep and cortical. Cerebral angiogram concerning for PACNS. Pt also with Afib, less likely cardioembolic event  - s/p IV solumedrol. now on prednisone 50mg QD   - per rheumatology: cont po prednisone 50mg daily until repeat cerebral angiogram in 1 month (~12/20/23)  - cont PPI while on steroid    Rapid response on 12/1 for syncopal episode after a BM- likely vasovagal syncope  - CT scan: no acute changes  - Continue prednisone 50 mg QD  - Neuro consult appreciated     Leukocytosis - possibly reactive and steroid induced   - afebrile. no focal symptoms  - hematology consult appreciated.   - infectious work up negative   - monitor off abx    Afib  - s/p amiodarone gtt  - continue amiodarone 200 mg QD, metoprolol 50 mg TID, and Eliquis 5 mg BID    HTN  - continue Losartan 25 mg QD  - Hold Amlodipine 10mg  - Monitor BP    HLD  - cont atorvastatin 80 mg QHS    Hyperglycemia in setting of steroid use  - AIC 4.9  - off fingerstick monitoriong    DVT prophylaxis: Eliquis 5 mg BID       58 year old female patient with past medical history of HTN and rheumatoid arthritis who is admitted for Acute Inpatient Rehabilitation with a multidisciplinary rehab program at [facility] with functional impairments in ADLs and mobility secondary to left hemiparesis resulting from bilateral subacute to remote embolic strokes, a right posterior corona radiata acute infarct, multiple petechial hemorrhages in bilateral thalamus and basal ganglia with etiology highly indicative from underlying vasculitis and a subsequent left parietal cortical acute infarction of a likely cardioembolic source.      CVA  - suspect due to vasculitis  - [facility] Neurology Impression: Left hemiparesis due to right corona radiata infarct, multiple micro hemorrhages, deep and cortical. Cerebral angiogram concerning for PACNS. Pt also with Afib, less likely cardioembolic event  - s/p IV solumedrol. now on prednisone 50mg QD   - per rheumatology: cont po prednisone 50mg daily until repeat cerebral angiogram in 1 month (~12/20/23)  - cont PPI while on steroid    Rapid response on 12/1 for syncopal episode after a BM- likely vasovagal syncope  - CT scan: no acute changes  - Continue prednisone 50 mg QD  - Neuro consult appreciated     Leukocytosis - possibly reactive and steroid induced   - afebrile. no focal symptoms  - hematology consult appreciated.   - infectious work up negative   - monitor off abx    Afib  - s/p amiodarone gtt  - continue amiodarone 200 mg QD, metoprolol 50 mg TID, and Eliquis 5 mg BID    HTN  - continue Losartan 25 mg QD  - Hold Amlodipine 10mg  - Monitor BP    HLD  - cont atorvastatin 80 mg QHS    Hyperglycemia in setting of steroid use  - AIC 4.9  - off fingerstick monitoriong    DVT prophylaxis: Eliquis 5 mg BID

## 2023-12-12 NOTE — PROGRESS NOTE ADULT - SUBJECTIVE AND OBJECTIVE BOX
Patient is a 58y old  Female who presents with a chief complaint of CVA (11 Dec 2023 13:04)      Patient seen and examined at bedside.    ALLERGIES:  No Known Drug Allergies  Shrimp (Unknown)    MEDICATIONS  (STANDING):  aMIOdarone    Tablet 200 milliGRAM(s) Oral daily  apixaban 5 milliGRAM(s) Oral two times a day  atorvastatin 80 milliGRAM(s) Oral at bedtime  bisacodyl 5 milliGRAM(s) Oral at bedtime  glucagon  Injectable 1 milliGRAM(s) IntraMuscular once  influenza   Vaccine 0.5 milliLiter(s) IntraMuscular once  losartan 25 milliGRAM(s) Oral daily  metoprolol tartrate 50 milliGRAM(s) Oral three times a day  pantoprazole    Tablet 40 milliGRAM(s) Oral daily  predniSONE   Tablet 50 milliGRAM(s) Oral daily  senna 2 Tablet(s) Oral at bedtime  trimethoprim  160 mG/sulfamethoxazole 800 mG 1 Tablet(s) Oral <User Schedule>    MEDICATIONS  (PRN):    Vital Signs Last 24 Hrs  T(F): 98.2 (12 Dec 2023 09:04), Max: 98.2 (12 Dec 2023 09:04)  HR: 59 (12 Dec 2023 09:04) (59 - 66)  BP: 169/82 (12 Dec 2023 09:04) (134/78 - 169/82)  RR: 15 (12 Dec 2023 09:04) (15 - 18)  SpO2: 100% (12 Dec 2023 09:04) (98% - 100%)  I&O's Summary      PHYSICAL EXAM:  General: NAD, A/O x 3  ENT: MMM, no scleral icterus  Neck: Supple, No JVD, no thyroidomegaly  Lungs: Clear to auscultation bilaterally, no wheezes, no rales, no rhonchi, good inspiratory effort  Cardio: RRR, S1/S2, No murmurs  Abdomen: Soft, Nontender, Nondistended; Bowel sounds present  Extremities: No calf tenderness, No pitting edema, no skin changes    LABS:                        9.5    13.38 )-----------( 301      ( 11 Dec 2023 05:26 )             27.2       12-11    139  |  101  |  36  ----------------------------<  82  3.7   |  32  |  1.24    Ca    9.0      11 Dec 2023 05:26    TPro  6.2  /  Alb  2.6  /  TBili  0.4  /  DBili  x   /  AST  9   /  ALT  23  /  AlkPhos  68  12-11 11-22 Chol 186 mg/dL LDL -- HDL 43 mg/dL Trig 80 mg/dL    Urinalysis Basic - ( 11 Dec 2023 05:26 )    Color: x / Appearance: x / SG: x / pH: x  Gluc: 82 mg/dL / Ketone: x  / Bili: x / Urobili: x   Blood: x / Protein: x / Nitrite: x   Leuk Esterase: x / RBC: x / WBC x   Sq Epi: x / Non Sq Epi: x / Bacteria: x

## 2023-12-12 NOTE — PROGRESS NOTE ADULT - ASSESSMENT
Assessment/Plan:  Mrs. Екатерина Poole is a 58 year old female patient with past medical history of HTN and rheumatoid arthritis who is admitted for Acute Inpatient Rehabilitation with a multidisciplinary rehab program at [facility] with functional impairments in ADLs and mobility secondary to left hemiparesis resulting from bilateral subacute to remote embolic strokes, a right posterior corona radiata acute infarct, multiple petechial hemorrhages in bilateral thalamus and basal ganglia with etiology highly indicative from underlying vasculitis and a subsequent left parietal cortical acute infarction of a likely cardioembolic source.    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 Maintain dose per rheumatology eval/recommendation.  - Left hemiparesis, impaired ADLs and mobility, need for assistance with personal care   - Start comprehensive rehab program of PT/OT/SLP - 3 hours a day, 5 days a week. P&O as needed   - Fall /Aspiration precautions  - Will need repeat cerebral angiogram in 1 month (~12/20/23)  - Rheumatology and Neurology following  - Continue prednisone 50 mg QD  - Bactrim DS 1 tab MWF for PCP ppx while on high dose steroids  - EEG (12/4) no epileptic activity    # leukocytosis  - WBC 17.18->16.54->16.98  - likely steroid induced, was started on steroids 11/24, WBC started uptrending after  - afebrile, asymptomatic  - monitor CBC, hospitalist follow up    # RRT/Code Stroke with syncope after BM 12/1  - Head CT stable as above  - rheumatology and neuro consulted  - EEG (12/4) no epileptic activity    Afib  - amiodarone 200 mg QD  - metoprolol 50 mg TID  - Eliquis 5 mg BID    HTN  - continue Losartan 25 mg QD  - Norvasc 10 mg discontinued per Hospitalist  - Monitor BP    HLD  - cont atorvastatin 80 mg QHS    Hyperglycemia in setting of steroid use  - SSI  - Monitor fingersticks    Mood / Cognition  - Neuropsychology consult PRN    Sleep  - Maintain quiet hours and a low stim environment.     GI / Bowel  - Senna qHS  - Dulcolax 5 mg QHS  - GI ppx: pantoprazole 40 mg QD     / Bladder  - Bladder scans Q8 hours with straight cath for >400cc.  - Toileting schedule every 4 hours    Skin:  - Skin assessment on admission performed : Intact  - Pressure Injury/Skin: OOB to chair, PT/OT  - nursing to monitor skin q Shift    Diet/Dysphagia:  - Diet Consistency: regular  - Aspiration Precautions  - SLP consult for swallow function evaluation and treatment  - Nutrition consult    DVT prophylaxis:   - on Eliquis 5 mg BID      Outpatient Follow-up:  Gavin Calvo  Neurology  [address], [address]  [place] [address]  Phone: [phone]  Fax: [phone]  Follow Up Time: 2 weeks    Khadijah Hartley  Radiology  [address], [address]  [place] [address]  Phone: [phone]  Fax: [phone]  Follow Up Time: 2 weeks    Brando Lazar  Cardiology  [address], [address]  [place] [address]  Phone: [phone]  Fax: [phone]  Follow Up Time: 2 weeks    Kenya Naik  Rheumatology  [address], [address]  [place] [address]  Phone: [phone]  Fax: [phone]  Follow Up Time: 2 weeks      Code Status/Emergency Contact:    --------------- Assessment/Plan:  Mrs. Екатерина Poole is a 58 year old female patient with past medical history of HTN and rheumatoid arthritis who is admitted for Acute Inpatient Rehabilitation with a multidisciplinary rehab program at [facility] with functional impairments in ADLs and mobility secondary to left hemiparesis resulting from bilateral subacute to remote embolic strokes, a right posterior corona radiata acute infarct, multiple petechial hemorrhages in bilateral thalamus and basal ganglia with etiology highly indicative from underlying vasculitis and a subsequent left parietal cortical acute infarction of a likely cardioembolic source.    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 Maintain dose per rheumatology eval/recommendation.  - Left hemiparesis, impaired ADLs and mobility, need for assistance with personal care   - Start comprehensive rehab program of PT/OT/SLP - 3 hours a day, 5 days a week. P&O as needed   - Fall /Aspiration precautions  - Will need repeat cerebral angiogram in 1 month (~12/20/23)  - Rheumatology and Neurology following  - Continue prednisone 50 mg QD  - Bactrim DS 1 tab MWF for PCP ppx while on high dose steroids  - EEG (12/4) no epileptic activity    # leukocytosis  - WBC 17.18->16.54->16.98  - likely steroid induced, was started on steroids 11/24, WBC started uptrending after  - afebrile, asymptomatic  - monitor CBC, hospitalist follow up    # RRT/Code Stroke with syncope after BM 12/1  - Head CT stable as above  - rheumatology and neuro consulted  - EEG (12/4) no epileptic activity    Afib  - amiodarone 200 mg QD  - metoprolol 50 mg TID  - Eliquis 5 mg BID    HTN  - continue Losartan 25 mg QD  - Norvasc 10 mg discontinued per Hospitalist  - Monitor BP    HLD  - cont atorvastatin 80 mg QHS    Hyperglycemia in setting of steroid use  - SSI  - Monitor fingersticks    Mood / Cognition  - Neuropsychology consult PRN    Sleep  - Maintain quiet hours and a low stim environment.     GI / Bowel  - Senna qHS  - Dulcolax 5 mg QHS  - GI ppx: pantoprazole 40 mg QD     / Bladder  - Bladder scans Q8 hours with straight cath for >400cc.  - Toileting schedule every 4 hours    Skin:  - Skin assessment on admission performed : Intact  - Pressure Injury/Skin: OOB to chair, PT/OT  - nursing to monitor skin q Shift    Diet/Dysphagia:  - Diet Consistency: regular  - Aspiration Precautions  - SLP consult for swallow function evaluation and treatment  - Nutrition consult    DVT prophylaxis:   - on Eliquis 5 mg BID      Outpatient Follow-up:  Gavin Calvo  Neurology  [address], [address]  [place] [address]  Phone: [phone]  Fax: [phone]  Follow Up Time: 2 weeks    Khadijah Hartley  Radiology  [address]  [place] [address]  Phone: [phone]  Fax: [phone]  Follow Up Time: 2 weeks    Brando Lazar  Cardiology  [address], [address]  [place] [address]  Phone: [phone]  Fax: [phone]  Follow Up Time: 2 weeks    Kenya Naik  Rheumatology  [address]  [place] [address]  Phone: [phone]  Fax: [phone]  Follow Up Time: 2 weeks      Code Status/Emergency Contact:    ---------------

## 2023-12-13 VITALS — SYSTOLIC BLOOD PRESSURE: 145 MMHG | DIASTOLIC BLOOD PRESSURE: 71 MMHG | HEART RATE: 65 BPM

## 2023-12-13 PROCEDURE — 92610 EVALUATE SWALLOWING FUNCTION: CPT

## 2023-12-13 PROCEDURE — 81001 URINALYSIS AUTO W/SCOPE: CPT

## 2023-12-13 PROCEDURE — 70496 CT ANGIOGRAPHY HEAD: CPT

## 2023-12-13 PROCEDURE — 95812 EEG 41-60 MINUTES: CPT

## 2023-12-13 PROCEDURE — 80053 COMPREHEN METABOLIC PANEL: CPT

## 2023-12-13 PROCEDURE — 97110 THERAPEUTIC EXERCISES: CPT

## 2023-12-13 PROCEDURE — 83605 ASSAY OF LACTIC ACID: CPT

## 2023-12-13 PROCEDURE — 85610 PROTHROMBIN TIME: CPT

## 2023-12-13 PROCEDURE — 70498 CT ANGIOGRAPHY NECK: CPT

## 2023-12-13 PROCEDURE — 97116 GAIT TRAINING THERAPY: CPT

## 2023-12-13 PROCEDURE — 97163 PT EVAL HIGH COMPLEX 45 MIN: CPT

## 2023-12-13 PROCEDURE — 97112 NEUROMUSCULAR REEDUCATION: CPT

## 2023-12-13 PROCEDURE — 97530 THERAPEUTIC ACTIVITIES: CPT

## 2023-12-13 PROCEDURE — 86140 C-REACTIVE PROTEIN: CPT

## 2023-12-13 PROCEDURE — 99232 SBSQ HOSP IP/OBS MODERATE 35: CPT

## 2023-12-13 PROCEDURE — 84484 ASSAY OF TROPONIN QUANT: CPT

## 2023-12-13 PROCEDURE — 99239 HOSP IP/OBS DSCHRG MGMT >30: CPT

## 2023-12-13 PROCEDURE — 71045 X-RAY EXAM CHEST 1 VIEW: CPT

## 2023-12-13 PROCEDURE — 82553 CREATINE MB FRACTION: CPT

## 2023-12-13 PROCEDURE — 70450 CT HEAD/BRAIN W/O DYE: CPT

## 2023-12-13 PROCEDURE — 92507 TX SP LANG VOICE COMM INDIV: CPT

## 2023-12-13 PROCEDURE — 87040 BLOOD CULTURE FOR BACTERIA: CPT

## 2023-12-13 PROCEDURE — 85730 THROMBOPLASTIN TIME PARTIAL: CPT

## 2023-12-13 PROCEDURE — 85027 COMPLETE CBC AUTOMATED: CPT

## 2023-12-13 PROCEDURE — 83735 ASSAY OF MAGNESIUM: CPT

## 2023-12-13 PROCEDURE — 85652 RBC SED RATE AUTOMATED: CPT

## 2023-12-13 PROCEDURE — 84100 ASSAY OF PHOSPHORUS: CPT

## 2023-12-13 PROCEDURE — 82962 GLUCOSE BLOOD TEST: CPT

## 2023-12-13 PROCEDURE — 82550 ASSAY OF CK (CPK): CPT

## 2023-12-13 PROCEDURE — 0042T: CPT

## 2023-12-13 PROCEDURE — 93005 ELECTROCARDIOGRAM TRACING: CPT

## 2023-12-13 PROCEDURE — 92523 SPEECH SOUND LANG COMPREHEN: CPT

## 2023-12-13 PROCEDURE — 36415 COLL VENOUS BLD VENIPUNCTURE: CPT

## 2023-12-13 PROCEDURE — 97167 OT EVAL HIGH COMPLEX 60 MIN: CPT

## 2023-12-13 PROCEDURE — 97535 SELF CARE MNGMENT TRAINING: CPT

## 2023-12-13 PROCEDURE — 85025 COMPLETE CBC W/AUTO DIFF WBC: CPT

## 2023-12-13 RX ADMIN — Medication 50 MILLIGRAM(S): at 13:17

## 2023-12-13 RX ADMIN — Medication 50 MILLIGRAM(S): at 05:30

## 2023-12-13 RX ADMIN — APIXABAN 5 MILLIGRAM(S): 2.5 TABLET, FILM COATED ORAL at 05:30

## 2023-12-13 RX ADMIN — APIXABAN 5 MILLIGRAM(S): 2.5 TABLET, FILM COATED ORAL at 17:05

## 2023-12-13 RX ADMIN — Medication 1 TABLET(S): at 05:30

## 2023-12-13 RX ADMIN — PANTOPRAZOLE SODIUM 40 MILLIGRAM(S): 20 TABLET, DELAYED RELEASE ORAL at 12:02

## 2023-12-13 RX ADMIN — LOSARTAN POTASSIUM 25 MILLIGRAM(S): 100 TABLET, FILM COATED ORAL at 05:30

## 2023-12-13 RX ADMIN — AMIODARONE HYDROCHLORIDE 200 MILLIGRAM(S): 400 TABLET ORAL at 05:31

## 2023-12-13 NOTE — PROGRESS NOTE ADULT - ASSESSMENT
Assessment/Plan:  Mrs. Екатерина Poole is a 58 year old female patient with past medical history of HTN and rheumatoid arthritis who is admitted for Acute Inpatient Rehabilitation with a multidisciplinary rehab program at [facility] with functional impairments in ADLs and mobility secondary to left hemiparesis resulting from bilateral subacute to remote embolic strokes, a right posterior corona radiata acute infarct, multiple petechial hemorrhages in bilateral thalamus and basal ganglia with etiology highly indicative from underlying vasculitis and a subsequent left parietal cortical acute infarction of a likely cardioembolic source.    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 Maintain dose per rheumatology eval/recommendation.  - Left hemiparesis, impaired ADLs and mobility, need for assistance with personal care   - Completed comprehensive rehab program  - Fall /Aspiration precautions  - Will need repeat cerebral angiogram in 1 month (~12/20/23)  - Rheumatology and Neurology following  - Continue prednisone 50 mg QD  - Bactrim DS 1 tab MWF for PCP ppx while on high dose steroids  - EEG (12/4) no epileptic activity  - Discharge home today    # leukocytosis  - Downtrending  - WBC 17.18->16.54->16.98>13.38 (12/11)  - likely steroid induced, was started on steroids 11/24, WBC started uptrending after  - afebrile, asymptomatic    # RRT/Code Stroke with syncope after BM 12/1  - Head CT stable as above  - rheumatology and neuro consulted  - EEG (12/4) no epileptic activity    Afib  - amiodarone 200 mg QD  - metoprolol 50 mg TID  - Eliquis 5 mg BID    HTN  - continue Losartan 25 mg QD  - Norvasc 10 mg discontinued per Hospitalist  - Monitor BP    HLD  - cont atorvastatin 80 mg QHS    Hyperglycemia in setting of steroid use  - SSI  - Monitor fingersticks    Mood / Cognition  - Neuropsychology consult PRN    Sleep  - Maintain quiet hours and a low stim environment.     GI / Bowel  - Senna qHS  - Dulcolax 5 mg QHS  - GI ppx: pantoprazole 40 mg QD     / Bladder  - Bladder scans Q8 hours with straight cath for >400cc.  - Toileting schedule every 4 hours    Skin:  - Skin assessment on admission performed : Intact  - Pressure Injury/Skin: OOB to chair, PT/OT  - nursing to monitor skin q Shift    Diet/Dysphagia:  - Diet Consistency: regular  - Aspiration Precautions  - SLP consult for swallow function evaluation and treatment  - Nutrition consult    DVT prophylaxis:   - on Eliquis 5 mg BID      Outpatient Follow-up:  Gavin Calvo  Neurology  [address], [address]  [place] [address]  Phone: [phone]  Fax: [phone]  Follow Up Time: 2 weeks    Khadijah Hartley  Radiology  [address]  [place] [address]  Phone: [phone]  Fax: [phone]  Follow Up Time: 2 weeks    Brando Lazar  Cardiology  [address]  [place] [address]  Phone: [phone]  Fax: [phone]  Follow Up Time: 2 weeks    Kenya Naik  Rheumatology  [address]  [place] [address]  Phone: [phone]  Fax: [phone]  Follow Up Time: 2 weeks      Code Status/Emergency Contact:    --------------- Assessment/Plan:  Mrs. Екатерина Poole is a 58 year old female patient with past medical history of HTN and rheumatoid arthritis who is admitted for Acute Inpatient Rehabilitation with a multidisciplinary rehab program at [facility] with functional impairments in ADLs and mobility secondary to left hemiparesis resulting from bilateral subacute to remote embolic strokes, a right posterior corona radiata acute infarct, multiple petechial hemorrhages in bilateral thalamus and basal ganglia with etiology highly indicative from underlying vasculitis and a subsequent left parietal cortical acute infarction of a likely cardioembolic source.    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 Maintain dose per rheumatology eval/recommendation.  - Left hemiparesis, impaired ADLs and mobility, need for assistance with personal care   - Completed comprehensive rehab program  - Fall /Aspiration precautions  - Will need repeat cerebral angiogram in 1 month (~12/20/23)  - Rheumatology and Neurology following  - Continue prednisone 50 mg QD  - Bactrim DS 1 tab MWF for PCP ppx while on high dose steroids  - EEG (12/4) no epileptic activity  - Discharge home today    # leukocytosis  - Downtrending  - WBC 17.18->16.54->16.98>13.38 (12/11)  - likely steroid induced, was started on steroids 11/24, WBC started uptrending after  - afebrile, asymptomatic    # RRT/Code Stroke with syncope after BM 12/1  - Head CT stable as above  - rheumatology and neuro consulted  - EEG (12/4) no epileptic activity    Afib  - amiodarone 200 mg QD  - metoprolol 50 mg TID  - Eliquis 5 mg BID    HTN  - continue Losartan 25 mg QD  - Norvasc 10 mg discontinued per Hospitalist  - Monitor BP    HLD  - cont atorvastatin 80 mg QHS    Hyperglycemia in setting of steroid use  - SSI  - Monitor fingersticks    Mood / Cognition  - Neuropsychology consult PRN    Sleep  - Maintain quiet hours and a low stim environment.     GI / Bowel  - Senna qHS  - Dulcolax 5 mg QHS  - GI ppx: pantoprazole 40 mg QD     / Bladder  - Bladder scans Q8 hours with straight cath for >400cc.  - Toileting schedule every 4 hours    Skin:  - Skin assessment on admission performed : Intact  - Pressure Injury/Skin: OOB to chair, PT/OT  - nursing to monitor skin q Shift    Diet/Dysphagia:  - Diet Consistency: regular  - Aspiration Precautions  - SLP consult for swallow function evaluation and treatment  - Nutrition consult    DVT prophylaxis:   - on Eliquis 5 mg BID      Outpatient Follow-up:  Gavin Calvo  Neurology  [address]  [place] [address]  Phone: [phone]  Fax: [phone]  Follow Up Time: 2 weeks    Khadijah Hartley  Radiology  [address]  [place] [address]  Phone: [phone]  Fax: [phone]  Follow Up Time: 2 weeks    Brando Lazar  Cardiology  [address], [address]  [place] [address]  Phone: [phone]  Fax: [phone]  Follow Up Time: 2 weeks    Kenya Naik  Rheumatology  [address]  [place] [address]  Phone: [phone]  Fax: [phone]  Follow Up Time: 2 weeks      Code Status/Emergency Contact:    ---------------

## 2023-12-13 NOTE — PROGRESS NOTE ADULT - PROVIDER SPECIALTY LIST ADULT
Hospitalist
Physiatry
Hospitalist
Physiatry
Hospitalist
Physiatry
Physiatry
Hospitalist
Hospitalist
Heme/Onc
Hospitalist
Hospitalist
Physiatry

## 2023-12-13 NOTE — PROGRESS NOTE ADULT - NUTRITIONAL ASSESSMENT
This patient has been assessed with a concern for Malnutrition and has been determined to have a diagnosis/diagnoses of Morbid obesity (BMI > 40).    This patient is being managed with:   Diet Consistent Carbohydrate w/Evening Snack-  DASH/TLC {Sodium & Cholesterol Restricted}  Entered: Dec  1 2023  3:18PM  

## 2023-12-13 NOTE — CONSULT NOTE ADULT - ASSESSMENT
Impression:  57 yo F with functional deficits secondary to diagnosis of multiple subacute and acute infarcts    Plan:  Inpatient Rehab team with recommendations for Acute Inpatient  Rehab- patient requires active and ongoing therapeutic interventions of multiple disciplines and can tolerate 3 hours of therapies. Can actively participate and benefit from  an intensive rehabilitation program. Requires supervision by a rehabilitation physician and a coordinated interdisciplinary approach to providing rehabilitation.     PT- ROM, Bed Mob, Transfers, Amb w AD and bracing as needed  OT- ADLs, bracing  SLP- Dysphagia eval and treat  Prec- Falls, Aspiration  DVT Prophylaxis - held given intracranial hemorrhage  Skin- Turn q2 h  Dispo- acute inpatient rehabilitation when inpatient workup for potential embolic stroke and once patient is medically stabilized and deemed appropriate for discharge General Sunscreen Counseling: I recommended a broad spectrum sunscreen with a SPF of 30 or higher.  I explained that SPF 30 sunscreens block approximately 97 percent of the sun's harmful rays.  Sunscreens should be applied at least 15 minutes prior to expected sun exposure and then every 2 hours after that as long as sun exposure continues. If swimming or exercising sunscreen should be reapplied every 45 minutes to an hour after getting wet or sweating.  One ounce, or the equivalent of a shot glass full of sunscreen, is adequate to protect the skin not covered by a bathing suit. I also recommended a lip balm with a sunscreen as well. Sun protective clothing can be used in lieu of sunscreen but must be worn the entire time you are exposed to the sun's rays. Detail Level: Detailed

## 2023-12-13 NOTE — PROGRESS NOTE ADULT - SUBJECTIVE AND OBJECTIVE BOX
Patient is a 58y old  Female who presents with a chief complaint of CVA (13 Dec 2023 08:45)      Subjective and overnight events:  Patient seen and examined at bedside. no complaints. no fever, chills, sob, cp, abd pain.     ALLERGIES:  No Known Drug Allergies  Shrimp (Unknown)    MEDICATIONS  (STANDING):  aMIOdarone    Tablet 200 milliGRAM(s) Oral daily  apixaban 5 milliGRAM(s) Oral two times a day  atorvastatin 80 milliGRAM(s) Oral at bedtime  bisacodyl 5 milliGRAM(s) Oral at bedtime  glucagon  Injectable 1 milliGRAM(s) IntraMuscular once  influenza   Vaccine 0.5 milliLiter(s) IntraMuscular once  losartan 25 milliGRAM(s) Oral daily  metoprolol tartrate 50 milliGRAM(s) Oral three times a day  pantoprazole    Tablet 40 milliGRAM(s) Oral daily  predniSONE   Tablet 50 milliGRAM(s) Oral daily  senna 2 Tablet(s) Oral at bedtime  trimethoprim  160 mG/sulfamethoxazole 800 mG 1 Tablet(s) Oral <User Schedule>    MEDICATIONS  (PRN):    Vital Signs Last 24 Hrs  T(F): 98.4 (13 Dec 2023 07:37), Max: 98.4 (13 Dec 2023 07:37)  HR: 65 (13 Dec 2023 13:21) (59 - 72)  BP: 145/71 (13 Dec 2023 13:21) (144/80 - 168/75)  RR: 16 (13 Dec 2023 07:37) (16 - 18)  SpO2: 98% (13 Dec 2023 07:37) (98% - 99%)  I&O's Summary    PHYSICAL EXAM:  General: NAD, awake alert answering questions appropriately   ENT: MMM  Neck: Supple, No JVD  Lungs: Clear to auscultation bilaterally  Cardio: RRR, S1/S2, No murmurs  Abdomen: Soft, Nontender, Nondistended; Bowel sounds present  Extremities: No calf tenderness, No pitting edema    LABS:                        9.5    13.38 )-----------( 301      ( 11 Dec 2023 05:26 )             27.2     12-11    139  |  101  |  36  ----------------------------<  82  3.7   |  32  |  1.24    Ca    9.0      11 Dec 2023 05:26    TPro  6.2  /  Alb  2.6  /  TBili  0.4  /  DBili  x   /  AST  9   /  ALT  23  /  AlkPhos  68  12-11 11-22 Chol 186 mg/dL LDL -- HDL 43 mg/dL Trig 80 mg/dL      Urinalysis Basic - ( 11 Dec 2023 05:26 )    Color: x / Appearance: x / SG: x / pH: x  Gluc: 82 mg/dL / Ketone: x  / Bili: x / Urobili: x   Blood: x / Protein: x / Nitrite: x   Leuk Esterase: x / RBC: x / WBC x   Sq Epi: x / Non Sq Epi: x / Bacteria: x          RADIOLOGY & ADDITIONAL TESTS:    Care Discussed with Consultants/Other Providers:

## 2023-12-13 NOTE — PROGRESS NOTE ADULT - ASSESSMENT
58 year old female patient with past medical history of HTN and rheumatoid arthritis who is admitted for Acute Inpatient Rehabilitation with a multidisciplinary rehab program at [facility] with functional impairments in ADLs and mobility secondary to left hemiparesis resulting from bilateral subacute to remote embolic strokes, a right posterior corona radiata acute infarct, multiple petechial hemorrhages in bilateral thalamus and basal ganglia with etiology highly indicative from underlying vasculitis and a subsequent left parietal cortical acute infarction of a likely cardioembolic source.      CVA  - suspect due to vasculitis  - [facility] Neurology Impression: Left hemiparesis due to right corona radiata infarct, multiple micro hemorrhages, deep and cortical. Cerebral angiogram concerning for PACNS. Pt also with Afib, less likely cardioembolic event  - s/p IV solumedrol. now on prednisone 50mg QD   - per rheumatology: cont po prednisone 50mg daily until repeat cerebral angiogram in 1 month (~12/20/23)  - cont PPI while on steroid    Rapid response on 12/1 for syncopal episode after a BM- likely vasovagal syncope  - CT scan: no acute changes  - Continue prednisone 50 mg QD  - Neuro consult appreciated     Leukocytosis - possibly reactive and steroid induced   - afebrile. no focal symptoms  - hematology consult appreciated.   - infectious work up negative   - monitor off abx    Afib  - s/p amiodarone gtt  - continue amiodarone 200 mg QD, metoprolol 50 mg TID, and Eliquis 5 mg BID    HTN  - continue Losartan 25 mg QD  - Monitor BP    HLD  - cont atorvastatin 80 mg QHS    Hyperglycemia in setting of steroid use  - AIC 4.9  - off fingerstick monitoriong    DVT prophylaxis: Eliquis 5 mg BID       58 year old female patient with past medical history of HTN and rheumatoid arthritis who is admitted for Acute Inpatient Rehabilitation with a multidisciplinary rehab program at [facility] with functional impairments in ADLs and mobility secondary to left hemiparesis resulting from bilateral subacute to remote embolic strokes, a right posterior corona radiata acute infarct, multiple petechial hemorrhages in bilateral thalamus and basal ganglia with etiology highly indicative from underlying vasculitis and a subsequent left parietal cortical acute infarction of a likely cardioembolic source.      CVA  - suspect due to vasculitis  - [facility] Neurology Impression: Left hemiparesis due to right corona radiata infarct, multiple micro hemorrhages, deep and cortical. Cerebral angiogram concerning for PACNS. Pt also with Afib, less likely cardioembolic event  - s/p IV solumedrol. now on prednisone 50mg QD   - per rheumatology: cont po prednisone 50mg daily until repeat cerebral angiogram in 1 month (~12/20/23)  - cont PPI while on steroid    Rapid response on 12/1 for syncopal episode after a BM- likely vasovagal syncope  - CT scan: no acute changes  - Continue prednisone 50 mg QD  - Neuro consult appreciated     Leukocytosis - possibly reactive and steroid induced   - afebrile. no focal symptoms  - hematology consult appreciated.   - infectious work up negative   - monitor off abx    Afib  - s/p amiodarone gtt  - continue amiodarone 200 mg QD, metoprolol 50 mg TID, and Eliquis 5 mg BID    HTN  - continue Losartan 25 mg QD  - Monitor BP    HLD  - cont atorvastatin 80 mg QHS    Hyperglycemia in setting of steroid use  - AIC 4.9  - off fingerstick monitoriong    DVT prophylaxis: Eliquis 5 mg BID

## 2023-12-13 NOTE — PROGRESS NOTE ADULT - SUBJECTIVE AND OBJECTIVE BOX
HPI:  Mrs. Екатерина Poole is a 58 year old female patient with past medical history of HTN and rheumatoid arthritis who presented to [facility] on 11/15 for AMS when a neighbor overhead patient was looking for her mom and she was found confused/wandering around apartment complex. Imaging studies initially performed reported bilateral subacute to remote embolic strokes, a right posterior corona radiata acute infarct, and multiple petechial hemorrhages in bilateral thalamus and basal ganglia. She was additionally noted to have KRUNAL which has since resolved. Hypertension managed with amlodipine, aldactone, and losartan and she was started on B12 supplements. Patient seen by Rheumatology and was transferred to [facility] on 11/20 for cerebral angiogram.    A cerebral angiogram performed on 11/20 reported multiple areas of focal stenosis and dilatation concerning for vasculitis. An LP was completed on 11/21 with a normal profile. Rheumatology consulted and recommended possible brain biopsy to confirm diagnosis of vasculitis but the risks of performing an open biopsy outweighed the benefits as the results of the biopsy were deemed unlikely to . She was seen by Neurosurgery with patient's neuroradiologic findings including beading on cerebral angiogram (consistent with vasculitis) and clinical presentation, with no other etiology of vasculitis, point to PACNS. Patient was started on steroids given her micro hemorrhage and strokes likely attributed to vasculitis. Steroids started on 11/24 with initial dose Solumedrol 1g for 3 days. She was to continue Prednisone 60mg with a decrease to 50mg daily for discharge to acute in-patient rehabilitation. No taper indicated per rheumatology evaluation and recommendations. An EEG was additionally performed and reported moderate diffuse/multi-focal cerebral dysfunction, not specific as to etiology. There were no epileptiform abnormalities recorded. Will need a repeat cerebral angiogram in 1 month which should take place around 12/20/23. Patient started on Eliquis 5 mg BID, amiodarone, and metoprolol for Afib. TTE reported as normal with global left ventricular systolic function, mild mitral valve regurgitation, mild aortic regurgitation, and a sclerotic aortic valve with normal opening. Hospitalization additionally significant for a rapid response on 11/20 due to acute mental status changes after being found to be slumped over while on toilet by staff prompting MRI imaging reporting:  Left parietal small cortical acute infarction  Innumerable scattered chronic microhemorrhages in the brainstem, bilateral cerebellar hemispheres, deep gray nuclei and peripherally within the cerebral hemispheres which may be secondary to chronic hypertensive encephalopathy.  Severe extensive chronic microvascular ischemic changes and multifocal chronic lacunar infarcts as detailed above.  Patient evaluated by PT/OT and was recommended for acute inpatient rehab. Patient is medically stable for discharge to [facility] on 11/30. (30 Nov 2023 14:36)    TDD: 12/13/23 Home  ___________________________________________________________________________    SUBJECTIVE/ROS  Patient was seen and evaluated at bedside today.  Reported no overnight events and is in no acute distress.   Scheduled for discharge home today.  Patient is motivated to continue participation on the recommended rehabilitation program as outpatient.   Denies any CP, SOB, LAURA, palpitations, fever, chills, body aches, cough, congestion, or any other symptoms at this time.   ___________________________________________________________________________    EEG (12/04/23)  Classification / Summary:  Abnormal routine EEG in the drowsy and asleep states with intermittent arousals. Unclear if full wakefulness is captured.  Occasional left > right temporal focal slowing.  Mild diffuse slowing vs. excess drowsiness.  No epileptiform abnormalities are captured.     Clinical Impression:  Left > right temporal focal cerebral dysfunction can be structural or functional in etiology.  Mild diffuse cerebral dysfunction is nonspecific in etiology. Alternatively, excess drowsiness may be contributing.    ___________________________________________________________________________    CT Head No Cont (12.01.23 @ 10:40)   Moderate to severe chronic microvascular changes without evidence of an acute transcortical infarction or hemorrhage.    CT Angio Brain Stroke Protocol  w/ IV Cont (12.01.23 @ 13:03)   Negative CT perfusion. CTA demonstrates persistent narrowing of the anterior and middle cerebral arteries consistent with vasospasm or vasculitis.  ___________________________________________________________________________    MEDICATIONS  (STANDING):  aMIOdarone    Tablet 200 milliGRAM(s) Oral daily  apixaban 5 milliGRAM(s) Oral two times a day  atorvastatin 80 milliGRAM(s) Oral at bedtime  bisacodyl 5 milliGRAM(s) Oral at bedtime  glucagon  Injectable 1 milliGRAM(s) IntraMuscular once  influenza   Vaccine 0.5 milliLiter(s) IntraMuscular once  losartan 25 milliGRAM(s) Oral daily  metoprolol tartrate 50 milliGRAM(s) Oral three times a day  pantoprazole    Tablet 40 milliGRAM(s) Oral daily  predniSONE   Tablet 50 milliGRAM(s) Oral daily  senna 2 Tablet(s) Oral at bedtime  trimethoprim  160 mG/sulfamethoxazole 800 mG 1 Tablet(s) Oral <User Schedule>    MEDICATIONS  (PRN):    ___________________________________________________________________________    LAB                        9.5    13.38 )-----------( 301      ( 11 Dec 2023 05:26 )             27.2                        10.3   16.98 )-----------( 340      ( 07 Dec 2023 05:48 )             29.4     12-11    139  |  101  |  36<H>  ----------------------------<  82  3.7   |  32<H>  |  1.24    Ca    9.0      11 Dec 2023 05:26    TPro  6.2  /  Alb  2.6<L>  /  TBili  0.4  /  DBili  x   /  AST  9<L>  /  ALT  23  /  AlkPhos  68  12-11    LIVER FUNCTIONS - ( 11 Dec 2023 05:26 )  Alb: 2.6 g/dL / Pro: 6.2 g/dL / ALK PHOS: 68 U/L / ALT: 23 U/L / AST: 9 U/L / GGT: x           ___________________________________________________________________________    MEDICATIONS  (STANDING):  aMIOdarone    Tablet 200 milliGRAM(s) Oral daily  apixaban 5 milliGRAM(s) Oral two times a day  atorvastatin 80 milliGRAM(s) Oral at bedtime  bisacodyl 5 milliGRAM(s) Oral at bedtime  glucagon  Injectable 1 milliGRAM(s) IntraMuscular once  influenza   Vaccine 0.5 milliLiter(s) IntraMuscular once  losartan 25 milliGRAM(s) Oral daily  metoprolol tartrate 50 milliGRAM(s) Oral three times a day  pantoprazole    Tablet 40 milliGRAM(s) Oral daily  predniSONE   Tablet 50 milliGRAM(s) Oral daily  senna 2 Tablet(s) Oral at bedtime  trimethoprim  160 mG/sulfamethoxazole 800 mG 1 Tablet(s) Oral <User Schedule>    ___________________________________________________________________________    PHYSICAL EXAM:  Gen - NAD, Comfortable  HEENT - NCAT, EOMI, MMM  Pulm - breathing comfortably on room air  Cardiovascular - warm and well perfused  Abdomen - Soft, NT/ND  Extremities - No C/C/E, No calf tenderness  Neuro-     Cognitive - oriented to name, states rehab in [place] for place, states February 2023 for time     Communication - Fluent, No dysarthria     Attention: able to state days of the week backwards     Cranial Nerves - no facial asymmetry, EOMI     Motor -                     LEFT    UE - ShAB 5/5, EF 5/5, EE 5/5, WE 5/5,  5/5                    RIGHT UE - ShAB 5/5, EF 5/5, EE 5/5, WE 5/5,  5/5                    LEFT    LE - HF 4/5, KE 5/5, DF 5/5, PF 5/5                    RIGHT LE - HF 4/5, KE 5/5, DF 5/5, PF 5/5        Sensory - Intact to LT     Reflexes - DTR Intact, No primitive reflexive     Coordination - FTN intact     Tone - normal  Psychiatric - Mood stable, Affect WNL  Skin:  all skin intact    ___________________________________________________________________________   HPI:  Mrs. Екатерина Poole is a 58 year old female patient with past medical history of HTN and rheumatoid arthritis who presented to [place] on 11/15 for AMS when a neighbor overhead patient was looking for her mom and she was found confused/wandering around apartment complex. Imaging studies initially performed reported bilateral subacute to remote embolic strokes, a right posterior corona radiata acute infarct, and multiple petechial hemorrhages in bilateral thalamus and basal ganglia. She was additionally noted to have KRUNAL which has since resolved. Hypertension managed with amlodipine, aldactone, and losartan and she was started on B12 supplements. Patient seen by Rheumatology and was transferred to [facility] on 11/20 for cerebral angiogram.    A cerebral angiogram performed on 11/20 reported multiple areas of focal stenosis and dilatation concerning for vasculitis. An LP was completed on 11/21 with a normal profile. Rheumatology consulted and recommended possible brain biopsy to confirm diagnosis of vasculitis but the risks of performing an open biopsy outweighed the benefits as the results of the biopsy were deemed unlikely to . She was seen by Neurosurgery with patient's neuroradiologic findings including beading on cerebral angiogram (consistent with vasculitis) and clinical presentation, with no other etiology of vasculitis, point to PACNS. Patient was started on steroids given her micro hemorrhage and strokes likely attributed to vasculitis. Steroids started on 11/24 with initial dose Solumedrol 1g for 3 days. She was to continue Prednisone 60mg with a decrease to 50mg daily for discharge to acute in-patient rehabilitation. No taper indicated per rheumatology evaluation and recommendations. An EEG was additionally performed and reported moderate diffuse/multi-focal cerebral dysfunction, not specific as to etiology. There were no epileptiform abnormalities recorded. Will need a repeat cerebral angiogram in 1 month which should take place around 12/20/23. Patient started on Eliquis 5 mg BID, amiodarone, and metoprolol for Afib. TTE reported as normal with global left ventricular systolic function, mild mitral valve regurgitation, mild aortic regurgitation, and a sclerotic aortic valve with normal opening. Hospitalization additionally significant for a rapid response on 11/20 due to acute mental status changes after being found to be slumped over while on toilet by staff prompting MRI imaging reporting:  Left parietal small cortical acute infarction  Innumerable scattered chronic microhemorrhages in the brainstem, bilateral cerebellar hemispheres, deep gray nuclei and peripherally within the cerebral hemispheres which may be secondary to chronic hypertensive encephalopathy.  Severe extensive chronic microvascular ischemic changes and multifocal chronic lacunar infarcts as detailed above.  Patient evaluated by PT/OT and was recommended for acute inpatient rehab. Patient is medically stable for discharge to [facility] on 11/30. (30 Nov 2023 14:36)    TDD: 12/13/23 Home  ___________________________________________________________________________    SUBJECTIVE/ROS  Patient was seen and evaluated at bedside today.  Reported no overnight events and is in no acute distress.   Scheduled for discharge home today.  Patient is motivated to continue participation on the recommended rehabilitation program as outpatient.   Denies any CP, SOB, LAURA, palpitations, fever, chills, body aches, cough, congestion, or any other symptoms at this time.   ___________________________________________________________________________    EEG (12/04/23)  Classification / Summary:  Abnormal routine EEG in the drowsy and asleep states with intermittent arousals. Unclear if full wakefulness is captured.  Occasional left > right temporal focal slowing.  Mild diffuse slowing vs. excess drowsiness.  No epileptiform abnormalities are captured.     Clinical Impression:  Left > right temporal focal cerebral dysfunction can be structural or functional in etiology.  Mild diffuse cerebral dysfunction is nonspecific in etiology. Alternatively, excess drowsiness may be contributing.    ___________________________________________________________________________    CT Head No Cont (12.01.23 @ 10:40)   Moderate to severe chronic microvascular changes without evidence of an acute transcortical infarction or hemorrhage.    CT Angio Brain Stroke Protocol  w/ IV Cont (12.01.23 @ 13:03)   Negative CT perfusion. CTA demonstrates persistent narrowing of the anterior and middle cerebral arteries consistent with vasospasm or vasculitis.  ___________________________________________________________________________    MEDICATIONS  (STANDING):  aMIOdarone    Tablet 200 milliGRAM(s) Oral daily  apixaban 5 milliGRAM(s) Oral two times a day  atorvastatin 80 milliGRAM(s) Oral at bedtime  bisacodyl 5 milliGRAM(s) Oral at bedtime  glucagon  Injectable 1 milliGRAM(s) IntraMuscular once  influenza   Vaccine 0.5 milliLiter(s) IntraMuscular once  losartan 25 milliGRAM(s) Oral daily  metoprolol tartrate 50 milliGRAM(s) Oral three times a day  pantoprazole    Tablet 40 milliGRAM(s) Oral daily  predniSONE   Tablet 50 milliGRAM(s) Oral daily  senna 2 Tablet(s) Oral at bedtime  trimethoprim  160 mG/sulfamethoxazole 800 mG 1 Tablet(s) Oral <User Schedule>    MEDICATIONS  (PRN):    ___________________________________________________________________________    LAB                        9.5    13.38 )-----------( 301      ( 11 Dec 2023 05:26 )             27.2                        10.3   16.98 )-----------( 340      ( 07 Dec 2023 05:48 )             29.4     12-11    139  |  101  |  36<H>  ----------------------------<  82  3.7   |  32<H>  |  1.24    Ca    9.0      11 Dec 2023 05:26    TPro  6.2  /  Alb  2.6<L>  /  TBili  0.4  /  DBili  x   /  AST  9<L>  /  ALT  23  /  AlkPhos  68  12-11    LIVER FUNCTIONS - ( 11 Dec 2023 05:26 )  Alb: 2.6 g/dL / Pro: 6.2 g/dL / ALK PHOS: 68 U/L / ALT: 23 U/L / AST: 9 U/L / GGT: x           ___________________________________________________________________________    MEDICATIONS  (STANDING):  aMIOdarone    Tablet 200 milliGRAM(s) Oral daily  apixaban 5 milliGRAM(s) Oral two times a day  atorvastatin 80 milliGRAM(s) Oral at bedtime  bisacodyl 5 milliGRAM(s) Oral at bedtime  glucagon  Injectable 1 milliGRAM(s) IntraMuscular once  influenza   Vaccine 0.5 milliLiter(s) IntraMuscular once  losartan 25 milliGRAM(s) Oral daily  metoprolol tartrate 50 milliGRAM(s) Oral three times a day  pantoprazole    Tablet 40 milliGRAM(s) Oral daily  predniSONE   Tablet 50 milliGRAM(s) Oral daily  senna 2 Tablet(s) Oral at bedtime  trimethoprim  160 mG/sulfamethoxazole 800 mG 1 Tablet(s) Oral <User Schedule>    ___________________________________________________________________________    PHYSICAL EXAM:  Gen - NAD, Comfortable  HEENT - NCAT, EOMI, MMM  Pulm - breathing comfortably on room air  Cardiovascular - warm and well perfused  Abdomen - Soft, NT/ND  Extremities - No C/C/E, No calf tenderness  Neuro-     Cognitive - oriented to name, states rehab in [place] for place, states February 2023 for time     Communication - Fluent, No dysarthria     Attention: able to state days of the week backwards     Cranial Nerves - no facial asymmetry, EOMI     Motor -                     LEFT    UE - ShAB 5/5, EF 5/5, EE 5/5, WE 5/5,  5/5                    RIGHT UE - ShAB 5/5, EF 5/5, EE 5/5, WE 5/5,  5/5                    LEFT    LE - HF 4/5, KE 5/5, DF 5/5, PF 5/5                    RIGHT LE - HF 4/5, KE 5/5, DF 5/5, PF 5/5        Sensory - Intact to LT     Reflexes - DTR Intact, No primitive reflexive     Coordination - FTN intact     Tone - normal  Psychiatric - Mood stable, Affect WNL  Skin:  all skin intact    ___________________________________________________________________________

## 2023-12-20 LAB
CULTURE RESULTS: SIGNIFICANT CHANGE UP
CULTURE RESULTS: SIGNIFICANT CHANGE UP
SPECIMEN SOURCE: SIGNIFICANT CHANGE UP
SPECIMEN SOURCE: SIGNIFICANT CHANGE UP

## 2024-01-04 ENCOUNTER — OUTPATIENT (OUTPATIENT)
Dept: OUTPATIENT SERVICES | Facility: HOSPITAL | Age: 59
LOS: 1 days | End: 2024-01-04
Payer: MEDICAID

## 2024-01-04 VITALS
SYSTOLIC BLOOD PRESSURE: 170 MMHG | TEMPERATURE: 99 F | DIASTOLIC BLOOD PRESSURE: 89 MMHG | OXYGEN SATURATION: 97 % | HEART RATE: 60 BPM | HEIGHT: 62 IN | RESPIRATION RATE: 16 BRPM | WEIGHT: 205.91 LBS

## 2024-01-04 DIAGNOSIS — Q28.2 ARTERIOVENOUS MALFORMATION OF CEREBRAL VESSELS: ICD-10-CM

## 2024-01-04 DIAGNOSIS — Z01.818 ENCOUNTER FOR OTHER PREPROCEDURAL EXAMINATION: ICD-10-CM

## 2024-01-04 DIAGNOSIS — Z98.890 OTHER SPECIFIED POSTPROCEDURAL STATES: Chronic | ICD-10-CM

## 2024-01-04 DIAGNOSIS — Z86.79 PERSONAL HISTORY OF OTHER DISEASES OF THE CIRCULATORY SYSTEM: ICD-10-CM

## 2024-01-04 DIAGNOSIS — Z90.3 ACQUIRED ABSENCE OF STOMACH [PART OF]: Chronic | ICD-10-CM

## 2024-01-04 LAB
ANION GAP SERPL CALC-SCNC: 15 MMOL/L — SIGNIFICANT CHANGE UP (ref 5–17)
ANION GAP SERPL CALC-SCNC: 15 MMOL/L — SIGNIFICANT CHANGE UP (ref 5–17)
APTT BLD: 28.3 SEC — SIGNIFICANT CHANGE UP (ref 24.5–35.6)
APTT BLD: 28.3 SEC — SIGNIFICANT CHANGE UP (ref 24.5–35.6)
BLD GP AB SCN SERPL QL: NEGATIVE — SIGNIFICANT CHANGE UP
BLD GP AB SCN SERPL QL: NEGATIVE — SIGNIFICANT CHANGE UP
BUN SERPL-MCNC: 31 MG/DL — HIGH (ref 7–23)
BUN SERPL-MCNC: 31 MG/DL — HIGH (ref 7–23)
CALCIUM SERPL-MCNC: 9.3 MG/DL — SIGNIFICANT CHANGE UP (ref 8.4–10.5)
CALCIUM SERPL-MCNC: 9.3 MG/DL — SIGNIFICANT CHANGE UP (ref 8.4–10.5)
CHLORIDE SERPL-SCNC: 101 MMOL/L — SIGNIFICANT CHANGE UP (ref 96–108)
CHLORIDE SERPL-SCNC: 101 MMOL/L — SIGNIFICANT CHANGE UP (ref 96–108)
CO2 SERPL-SCNC: 30 MMOL/L — SIGNIFICANT CHANGE UP (ref 22–31)
CO2 SERPL-SCNC: 30 MMOL/L — SIGNIFICANT CHANGE UP (ref 22–31)
CREAT SERPL-MCNC: 1.1 MG/DL — SIGNIFICANT CHANGE UP (ref 0.5–1.3)
CREAT SERPL-MCNC: 1.1 MG/DL — SIGNIFICANT CHANGE UP (ref 0.5–1.3)
EGFR: 58 ML/MIN/1.73M2 — LOW
EGFR: 58 ML/MIN/1.73M2 — LOW
GLUCOSE SERPL-MCNC: 159 MG/DL — HIGH (ref 70–99)
GLUCOSE SERPL-MCNC: 159 MG/DL — HIGH (ref 70–99)
HCT VFR BLD CALC: 33.8 % — LOW (ref 34.5–45)
HCT VFR BLD CALC: 33.8 % — LOW (ref 34.5–45)
HGB BLD-MCNC: 12 G/DL — SIGNIFICANT CHANGE UP (ref 11.5–15.5)
HGB BLD-MCNC: 12 G/DL — SIGNIFICANT CHANGE UP (ref 11.5–15.5)
INR BLD: 1.21 RATIO — HIGH (ref 0.85–1.18)
INR BLD: 1.21 RATIO — HIGH (ref 0.85–1.18)
MCHC RBC-ENTMCNC: 30.5 PG — SIGNIFICANT CHANGE UP (ref 27–34)
MCHC RBC-ENTMCNC: 30.5 PG — SIGNIFICANT CHANGE UP (ref 27–34)
MCHC RBC-ENTMCNC: 35.5 GM/DL — SIGNIFICANT CHANGE UP (ref 32–36)
MCHC RBC-ENTMCNC: 35.5 GM/DL — SIGNIFICANT CHANGE UP (ref 32–36)
MCV RBC AUTO: 85.8 FL — SIGNIFICANT CHANGE UP (ref 80–100)
MCV RBC AUTO: 85.8 FL — SIGNIFICANT CHANGE UP (ref 80–100)
NRBC # BLD: 0 /100 WBCS — SIGNIFICANT CHANGE UP (ref 0–0)
NRBC # BLD: 0 /100 WBCS — SIGNIFICANT CHANGE UP (ref 0–0)
PLATELET # BLD AUTO: 300 K/UL — SIGNIFICANT CHANGE UP (ref 150–400)
PLATELET # BLD AUTO: 300 K/UL — SIGNIFICANT CHANGE UP (ref 150–400)
POTASSIUM SERPL-MCNC: 3.1 MMOL/L — LOW (ref 3.5–5.3)
POTASSIUM SERPL-MCNC: 3.1 MMOL/L — LOW (ref 3.5–5.3)
POTASSIUM SERPL-SCNC: 3.1 MMOL/L — LOW (ref 3.5–5.3)
POTASSIUM SERPL-SCNC: 3.1 MMOL/L — LOW (ref 3.5–5.3)
PROTHROM AB SERPL-ACNC: 12.6 SEC — SIGNIFICANT CHANGE UP (ref 9.5–13)
PROTHROM AB SERPL-ACNC: 12.6 SEC — SIGNIFICANT CHANGE UP (ref 9.5–13)
RBC # BLD: 3.94 M/UL — SIGNIFICANT CHANGE UP (ref 3.8–5.2)
RBC # BLD: 3.94 M/UL — SIGNIFICANT CHANGE UP (ref 3.8–5.2)
RBC # FLD: 14.5 % — SIGNIFICANT CHANGE UP (ref 10.3–14.5)
RBC # FLD: 14.5 % — SIGNIFICANT CHANGE UP (ref 10.3–14.5)
RH IG SCN BLD-IMP: POSITIVE — SIGNIFICANT CHANGE UP
RH IG SCN BLD-IMP: POSITIVE — SIGNIFICANT CHANGE UP
SODIUM SERPL-SCNC: 146 MMOL/L — HIGH (ref 135–145)
SODIUM SERPL-SCNC: 146 MMOL/L — HIGH (ref 135–145)
WBC # BLD: 11.46 K/UL — HIGH (ref 3.8–10.5)
WBC # BLD: 11.46 K/UL — HIGH (ref 3.8–10.5)
WBC # FLD AUTO: 11.46 K/UL — HIGH (ref 3.8–10.5)
WBC # FLD AUTO: 11.46 K/UL — HIGH (ref 3.8–10.5)

## 2024-01-04 PROCEDURE — 86901 BLOOD TYPING SEROLOGIC RH(D): CPT

## 2024-01-04 PROCEDURE — 85027 COMPLETE CBC AUTOMATED: CPT

## 2024-01-04 PROCEDURE — 86850 RBC ANTIBODY SCREEN: CPT

## 2024-01-04 PROCEDURE — G0463: CPT

## 2024-01-04 PROCEDURE — 85730 THROMBOPLASTIN TIME PARTIAL: CPT

## 2024-01-04 PROCEDURE — 86900 BLOOD TYPING SEROLOGIC ABO: CPT

## 2024-01-04 PROCEDURE — 80048 BASIC METABOLIC PNL TOTAL CA: CPT

## 2024-01-04 PROCEDURE — 85610 PROTHROMBIN TIME: CPT

## 2024-01-04 NOTE — H&P PST ADULT - ASSESSMENT
DASI score: 5.07  DASI activity: Able to walk 2-3 blocks, ADLs, Grocery Shopping, 1 Flight of Stairs, lives in a 2nd floor apartment with an elevator  Loose teeth or denture: denies DASI score: 5.07  DASI activity: Able to walk 2-3 blocks, ADLs, Grocery Shopping, 1 Flight of Stairs, lives in a 2nd floor apartment with an elevator, currently residing with family/friends during the day and alone at night  Loose teeth or denture: denies

## 2024-01-04 NOTE — H&P PST ADULT - COMMENTS
pt is due for her evening antihypertensive medication - she denies any headaches, blurred vision, chest pain or palpitations at this time

## 2024-01-04 NOTE — H&P PST ADULT - HISTORY OF PRESENT ILLNESS
58 year old female patient with PMH HTN, oesity (BMI 37.7) s/p sleeve procedure (20+ years ago with 50 lb weight loss) and rheumatoid arthritis who presented to [place] on 11/15 for AMS s/p imaging revealed B/L subacute to remote embolic strokes, a right posterior corona radiata acute infarct, and multiple petechial hemorrhages in bilateral thalamus and basal ganglia. Pt was transferred to [facility] sp cerebral angiogram performed on 11/20/23 reported multiple areas of focal stenosis and dilatation concerning for vasculitis. An LP was completed on 11/21/23 with a normal profile. She was seen by Neurosurgery with patient's neuroradiologic findings including beading on cerebral angiogram (consistent with vasculitis) and clinical presentation, with no other etiology of vasculitis, point to PACNS. Patient was started on steroids given her micro hemorrhage and strokes likely attributed to vasculitis. An EEG was additionally performed and reported moderate diffuse/multi-focal cerebral dysfunction, not specific as to etiology. Patient was started on Eliquis 5 mg BID, amiodarone, and metoprolol for Afib. Recent TTE reported as normal with global left ventricular systolic function, mild mitral valve regurgitation, mild aortic regurgitation, and a sclerotic aortic valve with normal opening. Hospitalization additionally significant for a rapid response on 11/20 due to acute mental status changes after being found to be slumped over while on toilet by staff prompting MRI imaging reporting which revealed a left parietal small cortical acute infarction and innumerable scattered chronic microhemorrhages in the brainstem, bilateral cerebellar hemispheres, deep gray nuclei and peripherally within the cerebral hemispheres which may be secondary to chronic hypertensive encephalopathy. Pt was medically stable for discharge to [facility] on 11/30/23 s/p RRT/code stroke on the day of arrival due to syncope after BM. CT head obtained and stable. Mental status imroved. Pt was discharged from rehab to home 2 weeks ago. Pt now presents to [facility] for scheduled cerebral angiogram with Dr. Hartley on 1/5/24 at the interventional radiology suite.   58 year old female patient with PMH HTN, oesity (BMI 37.7) s/p sleeve procedure (20+ years ago with 50 lb weight loss) and rheumatoid arthritis who presented to [place] on 11/15 for AMS s/p imaging revealed B/L subacute to remote embolic strokes, a right posterior corona radiata acute infarct, and multiple petechial hemorrhages in bilateral thalamus and basal ganglia. Pt was transferred to [facility] sp cerebral angiogram performed on 11/20/23 reported multiple areas of focal stenosis and dilatation concerning for vasculitis. An LP was completed on 11/21/23 with a normal profile. She was seen by Neurosurgery with patient's neuroradiologic findings including beading on cerebral angiogram (consistent with vasculitis) and clinical presentation, with no other etiology of vasculitis, point to PACNS. Patient was started on steroids given her micro hemorrhage and strokes likely attributed to vasculitis. An EEG was additionally performed and reported moderate diffuse/multi-focal cerebral dysfunction, not specific as to etiology. Patient was started on Eliquis 5 mg BID, amiodarone, and metoprolol for Afib. Recent TTE reported as normal with global left ventricular systolic function, mild mitral valve regurgitation, mild aortic regurgitation, and a sclerotic aortic valve with normal opening. Hospitalization additionally significant for a rapid response on 11/20 due to acute mental status changes after being found to be slumped over while on toilet by staff prompting MRI imaging reporting which revealed a left parietal small cortical acute infarction and innumerable scattered chronic microhemorrhages in the brainstem, bilateral cerebellar hemispheres, deep gray nuclei and peripherally within the cerebral hemispheres which may be secondary to chronic hypertensive encephalopathy. Pt was medically stable for discharge to [facility] on 11/30/23 s/p RRT/code stroke on the day of arrival due to syncope after BM. CT head obtained and stable. Mental status imroved. Pt was discharged from rehab to home 2 weeks ago. Pt now presents to [email or web address] for scheduled cerebral angiogram with Dr. Hartley on 1/5/24 at the interventional radiology suite.   58 year old female patient with PMH HTN, obesity (BMI 37.7) s/p sleeve procedure (20+ years ago with 50 lb weight loss) and rheumatoid arthritis (no meds currently) who presented to [place] on 11/15 for AMS s/p imaging revealed B/L subacute to remote embolic strokes, a right posterior corona radiata acute infarct, and multiple petechial hemorrhages in bilateral thalamus and basal ganglia. Pt was transferred to [facility] s/p cerebral angiogram performed on 11/20/23 reported multiple areas of focal stenosis and dilatation concerning for vasculitis. An LP was completed on 11/21/23 with a normal profile. She was seen by Neurosurgery with patient's neuroradiologic findings including beading on cerebral angiogram (consistent with vasculitis) and clinical presentation, with no other etiology of vasculitis. Patient was started on steroids given her micro hemorrhage and strokes likely attributed to vasculitis. An EEG was additionally performed and reported moderate diffuse/multi-focal cerebral dysfunction, not specific as to etiology. Patient was started on Eliquis 5 mg BID, amiodarone, and metoprolol for Afib. Recent TTE reported as normal with global left ventricular systolic function, mild mitral valve regurgitation, mild aortic regurgitation, and a sclerotic aortic valve with normal opening. Hospitalization additionally significant for a rapid response on 11/20 due to acute mental status changes after being found to be slumped over while on toilet by staff prompting MRI imaging reporting which revealed a left parietal small cortical acute infarction and innumerable scattered chronic microhemorrhages in the brainstem, bilateral cerebellar hemispheres, deep gray nuclei and peripherally within the cerebral hemispheres which may be secondary to chronic hypertensive encephalopathy. Pt was medically stable for discharge to [facility] on 11/30/23 s/p RRT/code stroke on the day of arrival due to syncope after BM. CT head obtained and stable. Mental status improved.Pt was discharged from rehab to home 2 weeks ago. Pt now presents to [facility] for scheduled cerebral angiogram with Dr. Hartley on 1/5/24 at the interventional radiology suite.   58 year old female patient with PMH HTN, obesity (BMI 37.7) s/p sleeve procedure (20+ years ago with 50 lb weight loss) and rheumatoid arthritis (no meds currently) who presented to [place] on 11/15 for AMS s/p imaging revealed B/L subacute to remote embolic strokes, a right posterior corona radiata acute infarct, and multiple petechial hemorrhages in bilateral thalamus and basal ganglia. Pt was transferred to [facility] s/p cerebral angiogram performed on 11/20/23 reported multiple areas of focal stenosis and dilatation concerning for vasculitis. An LP was completed on 11/21/23 with a normal profile. She was seen by Neurosurgery with patient's neuroradiologic findings including beading on cerebral angiogram (consistent with vasculitis) and clinical presentation, with no other etiology of vasculitis. Patient was started on steroids given her micro hemorrhage and strokes likely attributed to vasculitis. An EEG was additionally performed and reported moderate diffuse/multi-focal cerebral dysfunction, not specific as to etiology. Patient was started on Eliquis 5 mg BID, amiodarone, and metoprolol for Afib. Recent TTE reported as normal with global left ventricular systolic function, mild mitral valve regurgitation, mild aortic regurgitation, and a sclerotic aortic valve with normal opening. Hospitalization additionally significant for a rapid response on 11/20 due to acute mental status changes after being found to be slumped over while on toilet by staff prompting MRI imaging reporting which revealed a left parietal small cortical acute infarction and innumerable scattered chronic microhemorrhages in the brainstem, bilateral cerebellar hemispheres, deep gray nuclei and peripherally within the cerebral hemispheres which may be secondary to chronic hypertensive encephalopathy. Pt was medically stable for discharge to [facility] on 11/30/23 s/p RRT/code stroke on the day of arrival due to syncope after BM. CT head obtained and stable. Mental status improved.Pt was discharged from rehab to home 2 weeks ago. Pt now presents to [facility] for scheduled cerebral angiogram with Dr. Hartley on 1/5/24 at the interventional radiology suite.

## 2024-01-04 NOTE — H&P PST ADULT - PROBLEM SELECTOR PLAN 1
Pt is scheduled for a cerebral angiogram with Dr. Hartley on 1/5/24 at the IR suite  CBC, BMP, T/S and Coags ordered and obtained at [facility]  ABO on admit Pt is scheduled for a cerebral angiogram with Dr. Hartley on 1/5/24 at the IR suite  CBC, BMP, T/S and Coags ordered and obtained at [facility]  ABO on admit

## 2024-01-04 NOTE — H&P PST ADULT - NSICDXPASTMEDICALHX_GEN_ALL_CORE_FT
PAST MEDICAL HISTORY:  Arteriovenous malformation of cerebral vessels     Asthma     Class 2 obesity with body mass index (BMI) of 37.0 to 37.9 in adult     CVA (cerebrovascular accident)     History of atrial fibrillation     History of rheumatoid arthritis     BRENDA (obstructive sleep apnea)

## 2024-01-04 NOTE — H&P PST ADULT - NEGATIVE NEUROLOGICAL SYMPTOMS
no transient paralysis/no weakness/no paresthesias/no generalized seizures/no focal seizures/no syncope/no tremors/no vertigo/no loss of sensation/no difficulty walking/no headache/no loss of consciousness/no hemiparesis/no confusion/no facial palsy no transient paralysis/no weakness/no paresthesias/no generalized seizures/no focal seizures/no syncope/no tremors/no vertigo/no loss of sensation/no difficulty walking/no headache/no loss of consciousness/no hemiparesis/no facial palsy

## 2024-01-04 NOTE — H&P PST ADULT - PRIMARY CARE PROVIDER
Dr. Aranza Szymanski [phone] - last visit 12/30/23 Dr. Aranza Szymanski [phone] - last visit 12/30/23

## 2024-01-05 ENCOUNTER — OUTPATIENT (OUTPATIENT)
Dept: OUTPATIENT SERVICES | Facility: HOSPITAL | Age: 59
LOS: 1 days | End: 2024-01-05
Payer: MEDICAID

## 2024-01-05 ENCOUNTER — TRANSCRIPTION ENCOUNTER (OUTPATIENT)
Age: 59
End: 2024-01-05

## 2024-01-05 ENCOUNTER — RESULT REVIEW (OUTPATIENT)
Age: 59
End: 2024-01-05

## 2024-01-05 ENCOUNTER — APPOINTMENT (OUTPATIENT)
Dept: NEUROSURGERY | Facility: HOSPITAL | Age: 59
End: 2024-01-05

## 2024-01-05 VITALS
OXYGEN SATURATION: 99 % | WEIGHT: 233.91 LBS | DIASTOLIC BLOOD PRESSURE: 92 MMHG | HEART RATE: 62 BPM | RESPIRATION RATE: 17 BRPM | TEMPERATURE: 98 F | SYSTOLIC BLOOD PRESSURE: 189 MMHG | HEIGHT: 62 IN

## 2024-01-05 VITALS
SYSTOLIC BLOOD PRESSURE: 148 MMHG | RESPIRATION RATE: 22 BRPM | HEART RATE: 87 BPM | DIASTOLIC BLOOD PRESSURE: 60 MMHG | OXYGEN SATURATION: 98 %

## 2024-01-05 DIAGNOSIS — Q28.2 ARTERIOVENOUS MALFORMATION OF CEREBRAL VESSELS: ICD-10-CM

## 2024-01-05 DIAGNOSIS — I63.9 CEREBRAL INFARCTION, UNSPECIFIED: ICD-10-CM

## 2024-01-05 DIAGNOSIS — Z90.3 ACQUIRED ABSENCE OF STOMACH [PART OF]: Chronic | ICD-10-CM

## 2024-01-05 DIAGNOSIS — Z98.890 OTHER SPECIFIED POSTPROCEDURAL STATES: Chronic | ICD-10-CM

## 2024-01-05 DIAGNOSIS — I67.9 CEREBROVASCULAR DISEASE, UNSPECIFIED: ICD-10-CM

## 2024-01-05 PROCEDURE — 36226 PLACE CATH VERTEBRAL ART: CPT | Mod: LT

## 2024-01-05 PROCEDURE — C1887: CPT

## 2024-01-05 PROCEDURE — C1769: CPT

## 2024-01-05 PROCEDURE — C1760: CPT

## 2024-01-05 PROCEDURE — 36224 PLACE CATH CAROTD ART: CPT | Mod: 50

## 2024-01-05 PROCEDURE — 36224 PLACE CATH CAROTD ART: CPT

## 2024-01-05 PROCEDURE — C1894: CPT

## 2024-01-05 PROCEDURE — 36227 PLACE CATH XTRNL CAROTID: CPT | Mod: 50

## 2024-01-05 PROCEDURE — 36227 PLACE CATH XTRNL CAROTID: CPT

## 2024-01-05 PROCEDURE — 36226 PLACE CATH VERTEBRAL ART: CPT

## 2024-01-05 RX ORDER — ACETAMINOPHEN 500 MG
1000 TABLET ORAL ONCE
Refills: 0 | Status: COMPLETED | OUTPATIENT
Start: 2024-01-05 | End: 2024-01-05

## 2024-01-05 RX ORDER — SODIUM CHLORIDE 9 MG/ML
1000 INJECTION INTRAMUSCULAR; INTRAVENOUS; SUBCUTANEOUS
Refills: 0 | Status: DISCONTINUED | OUTPATIENT
Start: 2024-01-05 | End: 2024-01-19

## 2024-01-05 RX ORDER — ALBUTEROL 90 UG/1
2 AEROSOL, METERED ORAL
Refills: 0 | DISCHARGE

## 2024-01-05 RX ORDER — ONDANSETRON 8 MG/1
4 TABLET, FILM COATED ORAL ONCE
Refills: 0 | Status: DISCONTINUED | OUTPATIENT
Start: 2024-01-05 | End: 2024-01-19

## 2024-01-05 RX ADMIN — Medication 400 MILLIGRAM(S): at 12:17

## 2024-01-05 NOTE — ASU DISCHARGE PLAN (ADULT/PEDIATRIC) - CARE PROVIDER_API CALL
Khadijah Hartley  Radiology  [address], [address]  [place] [address]  Phone: [phone]  Fax: [phone]  Follow Up Time:    Khadijah Hartley  Radiology  [address], [address]  [place] [address]  Phone: [phone]  Fax: [phone]  Follow Up Time:

## 2024-01-05 NOTE — ASU DISCHARGE PLAN (ADULT/PEDIATRIC) - NS MD DC FALL RISK RISK
For information on Fall & Injury Prevention, visit: [email or web address] OR  [email or web address] OR  https://www.cdc.gov/steadi/patient.html For information on Fall & Injury Prevention, visit: [email or web address] OR  [email or web address] OR  https://www.cdc.gov/steadi/patient.html

## 2024-01-05 NOTE — ASU PATIENT PROFILE, ADULT - FALL HARM RISK - HARM RISK INTERVENTIONS
Assistance with ambulation/Assistance OOB with selected safe patient handling equipment/Communicate Risk of Fall with Harm to all staff/Reinforce activity limits and safety measures with patient and family/Tailored Fall Risk Interventions/Visual Cue: Yellow wristband and red socks/Bed in lowest position, wheels locked, appropriate side rails in place/Call bell, personal items and telephone in reach/Instruct patient to call for assistance before getting out of bed or chair/Non-slip footwear when patient is out of bed/[place] to call system/Physically safe environment - no spills, clutter or unnecessary equipment/Purposeful Proactive Rounding/Room/bathroom lighting operational, light cord in reach Assistance with ambulation/Assistance OOB with selected safe patient handling equipment/Communicate Risk of Fall with Harm to all staff/Reinforce activity limits and safety measures with patient and family/Tailored Fall Risk Interventions/Visual Cue: Yellow wristband and red socks/Bed in lowest position, wheels locked, appropriate side rails in place/Call bell, personal items and telephone in reach/Instruct patient to call for assistance before getting out of bed or chair/Non-slip footwear when patient is out of bed/[place] to call system/Physically safe environment - no spills, clutter or unnecessary equipment/Purposeful Proactive Rounding/Room/bathroom lighting operational, light cord in reach

## 2024-01-05 NOTE — PRE-ANESTHESIA EVALUATION ADULT - NSANTHAIRWAYFT_ENT_ALL_CORE
Mouth opening: >2cm  Thyromental distance: <3 FBs  Upper lip bite: adequate  Cervical ROM: grossly intact  short and thick neck

## 2024-01-05 NOTE — CHART NOTE - NSCHARTNOTEFT_GEN_A_CORE
Interventional Neuro- Radiology   Procedure Note      Procedure: Selective Cerebral Angiography   Pre- Procedure Diagnosis: Primary CNS Vasculitis   Post- Procedure Diagnosis: Primary CNS Vasculitis     : Dr. Khadijah Hartley  Fellow: Dr. Kristine Barr  Physician Assistant: Herminio Montana PA-C    RN: Vladislav Kohler  Tech: Katrina Fitzpatrick    Anesthesia: (MAC) Dr. MICHELLE Pizarro    I/Os:  Fluids: 200 mL  Hamm: DTV  Contrast: 67 cc  Estimated Blood Loss: <10cc    Preliminary Report:  Under MAC, using a 5 Fr short sheath to the right femoral artery examination of left vertebral artery, left internal carotid artery, left external carotid artery, right vertebral artery, right internal carotid artery, right external carotid artery via selective cerebral angiography demonstrates findings consistent with PCNS vasculitis. Diffuse anterior and posterior circulation vasculitis was found. Official dictated note to follow.    Patient tolerated procedure well, vital signs stable, hemodynamically stable, no change in neurological status compared to baseline. Results discussed with patient. Groin sheath d/c'ed, manual compression held to hemostasis, no active bleeding, no hematoma, Vascade applied, quick clot and safeguard balloon dressing applied at 10:00 hours.  Patient transferred to IR recovery for further care/ monitoring.

## 2024-01-05 NOTE — CHART NOTE - NSCHARTNOTEFT_GEN_A_CORE
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     Neuro Exam: Awake and alert, oriented x3, fluent, normal naming and repetition, follows 3 step commands. Extraocular movements intact, no nystagmus, visual fields full, face symmetric, tongue midline. No drift, 5/5 power x 4 extremities. Normal sensation to LT. Normal finger-to-nose and rapid alternating movements.    PAST MEDICAL & SURGICAL HISTORY:  History of atrial fibrillation  CVA (cerebrovascular accident)  Arteriovenous malformation of cerebral vessels  History of rheumatoid arthritis  Asthma  BRENDA (obstructive sleep apnea)  Class 2 obesity with body mass index (BMI) of 37.0 to 37.9 in adult  History of cerebral angiography  H/O gastric sleeve    Social History:   Denies tobacco use    FAMILY HISTORY:    No pertinent family history    Allergies: No Known Drug Allergies  Shrimp (Unknown)    Current Medications:     Labs:                         12.0   11.46 )-----------( 300      ( 04 Jan 2024 19:18 )             33.8       01-04    146<H>  |  101  |  31<H>  ----------------------------<  159<H>  3.1<L>   |  30  |  1.10    Ca    9.3      04 Jan 2024 19:18    Prothrombin Time and INR, Plasma (01.04.24 @ 19:18)    Prothrombin Time, Plasma: 12.6 sec    INR: 1.21:   Activated Partial Thromboplastin Time (01.04.24 @ 19:18)    Activated Partial Thromboplastin Time: 28.3:    Blood Bank: 01-04-24  B  --  Positive        Assessment/Plan:   58y RIght hand dominant Female  presents with ______. Patient presents to neuro-IR for selective cerebral angiography. Procedure/ risks/ benefits/ goals/ alternatives were explained. Risks include but are not limited to stroke/ vessel injury/ hemorrhage/ groin hematoma. All questions answered. Informed consent obtained from patient____. Consent placed in chart. Interventional Neuro Radiology  Pre-Procedure Note    HPI:  57 y/o F with imaging findings including beading on cerebral angiogram (consistent with vasculitis) and clinical presentation, with no other etiology  of vasculitis. She is believed to have Primary central nervous system vasculitis and presents for diagnostic angiogram.      PAST MEDICAL & SURGICAL HISTORY:  History of atrial fibrillation  CVA (cerebrovascular accident)  Arteriovenous malformation of cerebral vessels  History of rheumatoid arthritis  Asthma  BRENDA (obstructive sleep apnea)  Class 2 obesity with body mass index (BMI) of 37.0 to 37.9 in adult  History of cerebral angiography  H/O gastric sleeve    Allergies: No Known Drug Allergies  Shrimp (Unknown)      Neuro Exam: Awake and alert, oriented x3, fluent, follows 3 step commands. Extraocular movements intact, no nystagmus, visual fields full, face symmetric, tongue midline. No drift, 5/5 power x 4 extremities. Normal finger-to-nose and rapid alternating movements.    Labs:                         12.0   11.46 )-----------( 300      ( 04 Jan 2024 19:18 )             33.8       01-04    146<H>  |  101  |  31<H>  ----------------------------<  159<H>  3.1<L>   |  30  |  1.10    Ca    9.3      04 Jan 2024 19:18    Prothrombin Time and INR, Plasma (01.04.24 @ 19:18)    Prothrombin Time, Plasma: 12.6 sec    INR: 1.21:   Activated Partial Thromboplastin Time (01.04.24 @ 19:18)    Activated Partial Thromboplastin Time: 28.3:    Blood Bank: 01-04-24  B  --  Positive    Assessment/Plan:   58y right hand dominant Female presents with primary CNS vasculitis. Patient presents to neuro-IR for selective cerebral angiography. Procedure/ risks/ benefits/ goals/ alternatives were explained. Risks include but are not limited to stroke/ vessel injury/ hemorrhage/ groin hematoma. All questions answered. Informed consent obtained from patient. Consent placed in chart. Cosentyx Counseling:  I discussed with the patient the risks of Cosentyx including but not limited to worsening of Crohn's disease, immunosuppression, allergic reactions and infections.  The patient understands that monitoring is required including a PPD at baseline and must alert us or the primary physician if symptoms of infection or other concerning signs are noted.

## 2024-02-26 ENCOUNTER — LABORATORY RESULT (OUTPATIENT)
Age: 59
End: 2024-02-26

## 2024-02-26 ENCOUNTER — APPOINTMENT (OUTPATIENT)
Dept: RHEUMATOLOGY | Facility: CLINIC | Age: 59
End: 2024-02-26
Payer: MEDICAID

## 2024-02-26 VITALS
DIASTOLIC BLOOD PRESSURE: 91 MMHG | OXYGEN SATURATION: 95 % | SYSTOLIC BLOOD PRESSURE: 156 MMHG | HEIGHT: 61 IN | BODY MASS INDEX: 44.18 KG/M2 | RESPIRATION RATE: 15 BRPM | HEART RATE: 64 BPM | WEIGHT: 234 LBS

## 2024-02-26 PROCEDURE — 99215 OFFICE O/P EST HI 40 MIN: CPT

## 2024-02-26 NOTE — ASSESSMENT
[FreeTextEntry1] : ##eval for PACNS Progressive mental status change/ cognitive impairment over the past year Brain imaging with findings of multiple infarcts, petechial hemorrhages Cerebral angio showed diffuse beading Negative MAX/cpANCA/dsDNA/Sm/Ribo P/BONI/SSA/SSB/SPEP/UA/C3: 152/C4: 36. CRP: 8. ESR: 113 RF: 30 apls negative Negative Hepatitis/Treponema -s/p 1g Methylprednisolone (11/24 - 11/27) then prednisone 60mg  -difficult to assess for clinical response -also difficult to ascertain how much of these changes are now permanent vs how much can immunosuppression reverse. or if the role of immunosuppression is to stop recurrent strokes/progression -risk/benefit of addition of Cytoxan at this point is unclear -discussed her case during case conference, ?addition of Rituximab.  -new stroke noted on MR on 11/29. per neurology likely from new a-fib and not from possible vasculitis activity -d/w neuroradiology. remainder of findings on MR on 11/29 stable -Repeat cerebral angiogram 1/5/24: Multiple cerebrovascular luminal irregularities of bilateral anterior and posterior circulation consistent with a vasculopathy such as vasculitis. Stable findings compare to the angiography of 11/20/2023. -original plan was to keep patient on prednisone 50 mg daily and repeat the angio to determine if response, unclear what happened in/ post rehab but brother reporting that the prednisone was stopped.  -discussed today the options for treating PACNS including CYC alternatively RTX risks/benefits/alternatives/side effects reviewed with patient and brother at length both very hesitant in the meantime, will resume prednisone -labs ordered today  -they asked for a referral for Dr Calvo- provided -to follow up with neuro and cardio  will call with results I sent a message today to neuro to touch base

## 2024-02-26 NOTE — HISTORY OF PRESENT ILLNESS
[FreeTextEntry1] : Hospitalization at [facility]/ November 2023 hospital summary per fellow, below  58-year old woman with history of hypertension, recurrent strokes, brought into ED at  initially for concern for altered mental status with findings concerning for PACNS with new stroke noted on MR on 11/29  ##eval for PACNS Progressive mental status change/ cognitive impairment over the past year Brain imaging with findings of multiple infarcts, petechial hemorrhages Cerebral angio showed diffuse beading Negative MAX/cpANCA/dsDNA/Sm/Ribo P/BONI/SSA/SSB/SPEP/UA/C3: 152/C4: 36. CRP: 8. ESR: 113 RF: 30 apls negative Negative Hepatitis/Treponema -s/p 1g Methylprednisolone (11/24 - 11/27) then prednisone 60mg (11/28 - ) -difficult to assess for clinical response giving waxing/waning nature of disease -also difficult to ascertain how much of these changes are now permanent vs how much can immunosuppression reverse. or if the role of immunosuppression is to stop recurrent strokes/progression -risk/benefit of addition of Cytoxan at this point is unclear -discussed her case during case conference, ?addition of Rituximab. however role of immunosuppression is unclear as stated above -new stroke noted on MR on 11/29. per neurology likely from new a-fib and not from possible vasculitis activity -d/w neuroradiology. remainder of findings on MR on 11/29 stable  -discussed with stroke team and neuroradiology. apart from new stroke 11/29 (likely from a-fib) remainder of radiology is stable -prednisone 50mg qD for discharge and maintain dose -recommend against a taper since no steroid sparing agent is being offered until radiographic progression can be assessed for -after discussion with stroke service/neuro-radiology/neurosurgery planned repeat angiogram in 2-6 weeks after initial intervention. patient going to acute rehab and re-admission for repeat angio/imaging being arranged by their service -[place] IgG on CSF noted, per neurology not clinically concerning due to neg IgM. Myelin basic protein elevated, however not specific   #new infarct -from MR 11/29 -per neurology likely from new A-fib and thus embolic and not representative of vasculitis activity  # Anemia, no signs of hemolysis  #A.Fib, on anticoagulation -----------------------------------------------------------------------------------------------'   since d/c patient was in acute Rehab and was d/c home now living at home, per brother he has someone to help her  she can dress herself and eat, unable to manage her finances or go to work  denies any episodes of confusion as was happening at the hospital  patient denies any HA, no joint pain or swelling, no rashes, no weakness,,,, unclear what happened but the prednisone was d/c shortly after rehab

## 2024-02-26 NOTE — PHYSICAL EXAM
[General Appearance - Alert] : alert [General Appearance - In No Acute Distress] : in no acute distress [Auscultation Breath Sounds / Voice Sounds] : lungs were clear to auscultation bilaterally [Heart Sounds] : normal S1 and S2 [] : no rash [Musculoskeletal - Swelling] : no joint swelling seen

## 2024-03-04 LAB
ALBUMIN MFR SERPL ELPH: 51.7 %
ALBUMIN SERPL ELPH-MCNC: 4.3 G/DL
ALBUMIN SERPL-MCNC: 3.7 G/DL
ALBUMIN/GLOB SERPL: 1.1 RATIO
ALP BLD-CCNC: 81 U/L
ALPHA1 GLOB MFR SERPL ELPH: 4.9 %
ALPHA1 GLOB SERPL ELPH-MCNC: 0.3 G/DL
ALPHA2 GLOB MFR SERPL ELPH: 14.9 %
ALPHA2 GLOB SERPL ELPH-MCNC: 1.1 G/DL
ALT SERPL-CCNC: 14 U/L
ANA SER IF-ACNC: NEGATIVE
ANION GAP SERPL CALC-SCNC: 16 MMOL/L
APPEARANCE: ABNORMAL
AST SERPL-CCNC: 14 U/L
B-GLOBULIN MFR SERPL ELPH: 12.6 %
B-GLOBULIN SERPL ELPH-MCNC: 0.9 G/DL
BACTERIA: ABNORMAL /HPF
BASOPHILS # BLD AUTO: 0.05 K/UL
BASOPHILS NFR BLD AUTO: 0.7 %
BILIRUB SERPL-MCNC: 0.3 MG/DL
BILIRUBIN URINE: ABNORMAL
BLOOD URINE: NEGATIVE
BUN SERPL-MCNC: 24 MG/DL
CALCIUM SERPL-MCNC: 9.9 MG/DL
CAST: 9 /LPF
CCP AB SER IA-ACNC: <8 UNITS
CHLORIDE SERPL-SCNC: 100 MMOL/L
CO2 SERPL-SCNC: 29 MMOL/L
COARSE GRANULAR CASTS: PRESENT
COLOR: NORMAL
CREAT SERPL-MCNC: 1.34 MG/DL
CREAT SPEC-SCNC: 493 MG/DL
CREAT/PROT UR: 0.4 RATIO
CRP SERPL-MCNC: 3 MG/L
DEPRECATED KAPPA LC FREE/LAMBDA SER: 2 RATIO
EGFR: 45 ML/MIN/1.73M2
ENA SS-A AB SER IA-ACNC: <0.2 AL
ENA SS-B AB SER IA-ACNC: <0.2 AL
EOSINOPHIL # BLD AUTO: 0.36 K/UL
EOSINOPHIL NFR BLD AUTO: 4.8 %
EPITHELIAL CELLS: 12 /HPF
ERYTHROCYTE [SEDIMENTATION RATE] IN BLOOD BY WESTERGREN METHOD: 95 MM/HR
GAMMA GLOB FLD ELPH-MCNC: 1.1 G/DL
GAMMA GLOB MFR SERPL ELPH: 15.9 %
GLUCOSE QUALITATIVE U: NEGATIVE MG/DL
HBV CORE IGG+IGM SER QL: NONREACTIVE
HBV SURFACE AB SER QL: NONREACTIVE
HBV SURFACE AG SER QL: NONREACTIVE
HCT VFR BLD CALC: 35.4 %
HCV AB SER QL: NONREACTIVE
HCV S/CO RATIO: 0.06 S/CO
HGB BLD-MCNC: 12.2 G/DL
HYALINE CASTS: PRESENT
IGA 24H UR QL IFE: NORMAL
IGA SER QL IEP: 294 MG/DL
IGG SER QL IEP: 1145 MG/DL
IGM SER QL IEP: 62 MG/DL
IMM GRANULOCYTES NFR BLD AUTO: 0.3 %
INTERPRETATION SERPL IEP-IMP: NORMAL
KAPPA LC CSF-MCNC: 2.44 MG/DL
KAPPA LC SERPL-MCNC: 4.89 MG/DL
KETONES URINE: ABNORMAL MG/DL
LEUKOCYTE ESTERASE URINE: NEGATIVE
LYMPHOCYTES # BLD AUTO: 2.3 K/UL
LYMPHOCYTES NFR BLD AUTO: 30.7 %
M PROTEIN SPEC IFE-MCNC: NORMAL
M TB IFN-G BLD-IMP: NEGATIVE
MAN DIFF?: NORMAL
MCHC RBC-ENTMCNC: 29.2 PG
MCHC RBC-ENTMCNC: 34.5 GM/DL
MCV RBC AUTO: 84.7 FL
MICROSCOPIC-UA: NORMAL
MONOCYTES # BLD AUTO: 0.83 K/UL
MONOCYTES NFR BLD AUTO: 11.1 %
NEUTROPHILS # BLD AUTO: 3.93 K/UL
NEUTROPHILS NFR BLD AUTO: 52.4 %
NITRITE URINE: NEGATIVE
PH URINE: 5.5
PLATELET # BLD AUTO: 281 K/UL
POTASSIUM SERPL-SCNC: 3.3 MMOL/L
PROT SERPL-MCNC: 7.1 G/DL
PROT UR-MCNC: 191 MG/DL
PROTEIN URINE: 100 MG/DL
QUANTIFERON TB PLUS MITOGEN MINUS NIL: 4.44 IU/ML
QUANTIFERON TB PLUS NIL: 0.03 IU/ML
QUANTIFERON TB PLUS TB1 MINUS NIL: 0 IU/ML
QUANTIFERON TB PLUS TB2 MINUS NIL: 0 IU/ML
RBC # BLD: 4.18 M/UL
RBC # FLD: 13.7 %
RED BLOOD CELLS URINE: 1 /HPF
REVIEW: NORMAL
RF+CCP IGG SER-IMP: NEGATIVE
RHEUMATOID FACT SER QL: 69 IU/ML
SODIUM SERPL-SCNC: 145 MMOL/L
SPECIFIC GRAVITY URINE: 1.03
UROBILINOGEN URINE: 1 MG/DL
WBC # FLD AUTO: 7.49 K/UL
WHITE BLOOD CELLS URINE: 2 /HPF

## 2024-03-08 PROBLEM — E66.9 OBESITY, UNSPECIFIED: Chronic | Status: ACTIVE | Noted: 2024-01-04

## 2024-03-08 PROBLEM — G47.33 OBSTRUCTIVE SLEEP APNEA (ADULT) (PEDIATRIC): Chronic | Status: ACTIVE | Noted: 2024-01-04

## 2024-03-08 PROBLEM — Z86.79 PERSONAL HISTORY OF OTHER DISEASES OF THE CIRCULATORY SYSTEM: Chronic | Status: ACTIVE | Noted: 2024-01-04

## 2024-03-08 PROBLEM — Q28.2 ARTERIOVENOUS MALFORMATION OF CEREBRAL VESSELS: Chronic | Status: ACTIVE | Noted: 2024-01-04

## 2024-03-08 PROBLEM — J45.909 UNSPECIFIED ASTHMA, UNCOMPLICATED: Chronic | Status: ACTIVE | Noted: 2024-01-04

## 2024-03-08 PROBLEM — I63.9 CEREBRAL INFARCTION, UNSPECIFIED: Chronic | Status: ACTIVE | Noted: 2024-01-04

## 2024-03-08 PROBLEM — Z87.39 PERSONAL HISTORY OF OTHER DISEASES OF THE MUSCULOSKELETAL SYSTEM AND CONNECTIVE TISSUE: Chronic | Status: ACTIVE | Noted: 2024-01-04

## 2024-03-11 RX ORDER — APIXABAN 5 MG/1
5 TABLET, FILM COATED ORAL
Refills: 0 | Status: ACTIVE | COMMUNITY

## 2024-03-11 RX ORDER — FOLIC ACID 1 MG/1
1 TABLET ORAL
Refills: 0 | Status: ACTIVE | COMMUNITY

## 2024-03-11 RX ORDER — POTASSIUM CHLORIDE 1500 MG/1
20 TABLET, FILM COATED, EXTENDED RELEASE ORAL
Refills: 0 | Status: ACTIVE | COMMUNITY

## 2024-03-11 RX ORDER — LOSARTAN POTASSIUM 50 MG/1
50 TABLET, FILM COATED ORAL
Refills: 0 | Status: ACTIVE | COMMUNITY

## 2024-03-11 RX ORDER — AMIODARONE HYDROCHLORIDE 200 MG/1
200 TABLET ORAL
Refills: 0 | Status: ACTIVE | COMMUNITY

## 2024-03-11 RX ORDER — ATORVASTATIN CALCIUM 80 MG/1
80 TABLET, FILM COATED ORAL
Refills: 0 | Status: ACTIVE | COMMUNITY

## 2024-03-11 RX ORDER — PREDNISONE 20 MG/1
20 TABLET ORAL DAILY
Qty: 90 | Refills: 0 | Status: DISCONTINUED | COMMUNITY
Start: 2024-03-04 | End: 2024-03-11

## 2024-03-11 RX ORDER — SULFAMETHOXAZOLE AND TRIMETHOPRIM 800; 160 MG/1; MG/1
800-160 TABLET ORAL
Refills: 0 | Status: ACTIVE | COMMUNITY

## 2024-03-11 RX ORDER — ISOPROPYL ALCOHOL 70 ML/100ML
70 SWAB TOPICAL
Refills: 0 | Status: ACTIVE | COMMUNITY

## 2024-03-11 RX ORDER — METOPROLOL TARTRATE 50 MG/1
50 TABLET, FILM COATED ORAL
Refills: 0 | Status: ACTIVE | COMMUNITY

## 2024-03-11 RX ORDER — PANTOPRAZOLE SODIUM 40 MG/1
40 GRANULE, DELAYED RELEASE ORAL
Refills: 0 | Status: ACTIVE | COMMUNITY

## 2024-03-11 RX ORDER — OMEPRAZOLE 20 MG/1
20 CAPSULE, DELAYED RELEASE ORAL DAILY
Qty: 30 | Refills: 3 | Status: DISCONTINUED | COMMUNITY
Start: 2024-03-04 | End: 2024-03-11

## 2024-04-02 ENCOUNTER — APPOINTMENT (OUTPATIENT)
Dept: RHEUMATOLOGY | Facility: CLINIC | Age: 59
End: 2024-04-02
Payer: MEDICAID

## 2024-04-02 VITALS
BODY MASS INDEX: 44.18 KG/M2 | DIASTOLIC BLOOD PRESSURE: 90 MMHG | OXYGEN SATURATION: 98 % | HEART RATE: 65 BPM | SYSTOLIC BLOOD PRESSURE: 167 MMHG | TEMPERATURE: 98.1 F | HEIGHT: 61 IN | WEIGHT: 234 LBS | RESPIRATION RATE: 16 BRPM

## 2024-04-02 DIAGNOSIS — R70.0 ELEVATED ERYTHROCYTE SEDIMENTATION RATE: ICD-10-CM

## 2024-04-02 PROCEDURE — 99214 OFFICE O/P EST MOD 30 MIN: CPT

## 2024-04-02 NOTE — ASSESSMENT
[FreeTextEntry1] : # R/O PACNS   Progressive mental status change/ cognitive impairment over the past year Brain imaging with findings of multiple infarcts, petechial hemorrhages Cerebral angio showed diffuse beading Negative MAX/cpANCA/dsDNA/Sm/Ribo P/BONI/SSA/SSB/SPEP/UA/C3: 152/C4: 36. CRP: 8. ESR: 113 RF: 30 apls negative Negative Hepatitis/Treponema -s/p 1g Methylprednisolone (11/24 - 11/27) then prednisone 60mg -difficult to assess for clinical response -also difficult to ascertain how much of these changes are now permanent vs how much can immunosuppression reverse. or if the role of immunosuppression is to stop recurrent strokes/progression -new stroke noted on MR on 11/29. per neurology likely from new a-fib and not from possible vasculitis activity -d/w neuroradiology. remainder of findings on MR on 11/29 stable -Repeat cerebral angiogram 1/5/24: Multiple cerebrovascular luminal irregularities of bilateral anterior and posterior circulation consistent with a vasculopathy such as vasculitis. Stable findings compare to the angiography of 11/20/2023. -original plan was to keep patient on prednisone 50 mg daily and repeat the angio to determine if response, unclear what happened in/ post rehab but brother reporting that the prednisone was stopped. -post d/c visit Feb 2024 ESR 95, CRP 3 MAX negative, RF 69, CCP negative -results reviewed with sister in law and brother advised about treatment for possible PACNS, with [facility]- they declined risks/benefits/alternatives/side effects reviewed with them agreed to steroids alone #elevated Kappa/ Lambda 2 no monoclonal bands # Monitoring labs hep panel, QTB negative March 2024  [] prednisone 50 mg daily was sent after last discussion, sister in law unable to confirm if patient started it  will place a call to the pharmacy to confirm she needs  to be on prednisone, PPI and bactrim for pcp ppx [] labs today  [] follow up with neuro and cardiology   as per sister in law, patient will be moved to a long term assisted facility in May in [place] and will need to confirm her ability to come for follow up visits or see another rheumatologist closer  she will touch base with me once logistics of the transfer are sorted out

## 2024-04-02 NOTE — PHYSICAL EXAM
[General Appearance - Alert] : alert [General Appearance - In No Acute Distress] : in no acute distress [Respiration, Rhythm And Depth] : normal respiratory rhythm and effort [Impaired Insight] : insight and judgment were intact [No Focal Deficits] : no focal deficits

## 2024-04-02 NOTE — HISTORY OF PRESENT ILLNESS
[de-identified] : at today's visit  [FreeTextEntry1] : she reports feeling well overall, she denies any HA, visual changes, joint pain or swelling, rashes... as per sister in law, has not had acute confusional states she has a health aide with her until 8 pm, patient is able to eat, dress and shower but unable to take directions, pay bills

## 2024-04-03 ENCOUNTER — NON-APPOINTMENT (OUTPATIENT)
Age: 59
End: 2024-04-03

## 2024-04-03 DIAGNOSIS — E87.6 HYPOKALEMIA: ICD-10-CM

## 2024-04-03 LAB
ALBUMIN SERPL ELPH-MCNC: 3.9 G/DL
ALP BLD-CCNC: 71 U/L
ALT SERPL-CCNC: 11 U/L
ANION GAP SERPL CALC-SCNC: 13 MMOL/L
AST SERPL-CCNC: 15 U/L
BILIRUB SERPL-MCNC: 0.3 MG/DL
BUN SERPL-MCNC: 27 MG/DL
CALCIUM SERPL-MCNC: 9.2 MG/DL
CHLORIDE SERPL-SCNC: 102 MMOL/L
CO2 SERPL-SCNC: 32 MMOL/L
CREAT SERPL-MCNC: 1.44 MG/DL
CRP SERPL-MCNC: 4 MG/L
EGFR: 41 ML/MIN/1.73M2
ERYTHROCYTE [SEDIMENTATION RATE] IN BLOOD BY WESTERGREN METHOD: 46 MM/HR
POTASSIUM SERPL-SCNC: 2.9 MMOL/L
PROT SERPL-MCNC: 6.7 G/DL
SODIUM SERPL-SCNC: 147 MMOL/L

## 2024-05-23 ENCOUNTER — APPOINTMENT (OUTPATIENT)
Dept: RHEUMATOLOGY | Facility: CLINIC | Age: 59
End: 2024-05-23

## 2024-05-24 ENCOUNTER — APPOINTMENT (OUTPATIENT)
Dept: RHEUMATOLOGY | Facility: CLINIC | Age: 59
End: 2024-05-24
Payer: MEDICAID

## 2024-05-24 VITALS
HEIGHT: 61 IN | DIASTOLIC BLOOD PRESSURE: 84 MMHG | OXYGEN SATURATION: 98 % | HEART RATE: 80 BPM | TEMPERATURE: 98.1 F | WEIGHT: 206 LBS | SYSTOLIC BLOOD PRESSURE: 149 MMHG | RESPIRATION RATE: 16 BRPM | BODY MASS INDEX: 38.89 KG/M2

## 2024-05-24 DIAGNOSIS — Z79.899 OTHER LONG TERM (CURRENT) DRUG THERAPY: ICD-10-CM

## 2024-05-24 DIAGNOSIS — Z29.89 ENCOUNTER. FOR OTHER SPECIFIED PROPHYLACTIC MEASURES: ICD-10-CM

## 2024-05-24 DIAGNOSIS — R01.1 CARDIAC MURMUR, UNSPECIFIED: ICD-10-CM

## 2024-05-24 DIAGNOSIS — I77.6 ARTERITIS, UNSPECIFIED: ICD-10-CM

## 2024-05-24 DIAGNOSIS — R09.89 OTHER SPECIFIED SYMPTOMS AND SIGNS INVOLVING THE CIRCULATORY AND RESPIRATORY SYSTEMS: ICD-10-CM

## 2024-05-24 LAB
ALBUMIN SERPL ELPH-MCNC: 4 G/DL
ALP BLD-CCNC: 72 U/L
ALT SERPL-CCNC: 24 U/L
ANION GAP SERPL CALC-SCNC: 14 MMOL/L
AST SERPL-CCNC: 13 U/L
BASOPHILS # BLD AUTO: 0.01 K/UL
BASOPHILS NFR BLD AUTO: 0.1 %
BILIRUB SERPL-MCNC: 0.3 MG/DL
BUN SERPL-MCNC: 29 MG/DL
CALCIUM SERPL-MCNC: 9.5 MG/DL
CHLORIDE SERPL-SCNC: 101 MMOL/L
CK SERPL-CCNC: 28 U/L
CO2 SERPL-SCNC: 27 MMOL/L
CREAT SERPL-MCNC: 1.19 MG/DL
CRP SERPL-MCNC: <3 MG/L
EGFR: 52 ML/MIN/1.73M2
EOSINOPHIL # BLD AUTO: 0.1 K/UL
EOSINOPHIL NFR BLD AUTO: 0.9 %
ERYTHROCYTE [SEDIMENTATION RATE] IN BLOOD BY WESTERGREN METHOD: 22 MM/HR
GLUCOSE SERPL-MCNC: 100 MG/DL
HCT VFR BLD CALC: 34 %
HGB BLD-MCNC: 12 G/DL
IMM GRANULOCYTES NFR BLD AUTO: 0.5 %
LYMPHOCYTES # BLD AUTO: 1.2 K/UL
LYMPHOCYTES NFR BLD AUTO: 10.6 %
MAN DIFF?: NORMAL
MCHC RBC-ENTMCNC: 29.5 PG
MCHC RBC-ENTMCNC: 35.3 GM/DL
MCV RBC AUTO: 83.5 FL
MONOCYTES # BLD AUTO: 0.83 K/UL
MONOCYTES NFR BLD AUTO: 7.3 %
NEUTROPHILS # BLD AUTO: 9.17 K/UL
NEUTROPHILS NFR BLD AUTO: 80.6 %
PLATELET # BLD AUTO: 302 K/UL
POTASSIUM SERPL-SCNC: 3.6 MMOL/L
PROT SERPL-MCNC: 6.5 G/DL
RBC # BLD: 4.07 M/UL
RBC # FLD: 14.1 %
SODIUM SERPL-SCNC: 141 MMOL/L
WBC # FLD AUTO: 11.37 K/UL

## 2024-05-24 PROCEDURE — 99215 OFFICE O/P EST HI 40 MIN: CPT

## 2024-05-24 PROCEDURE — G2211 COMPLEX E/M VISIT ADD ON: CPT | Mod: NC,1L

## 2024-05-24 RX ORDER — SULFAMETHOXAZOLE AND TRIMETHOPRIM 800; 160 MG/1; MG/1
800-160 TABLET ORAL
Qty: 12 | Refills: 3 | Status: ACTIVE | OUTPATIENT
Start: 2024-05-24

## 2024-05-24 RX ORDER — PREDNISONE 50 MG/1
50 TABLET ORAL
Refills: 0 | Status: ACTIVE | COMMUNITY

## 2024-05-24 RX ORDER — PREDNISONE 20 MG/1
20 TABLET ORAL
Qty: 60 | Refills: 0 | Status: ACTIVE | COMMUNITY
Start: 2024-05-24 | End: 1900-01-01

## 2024-05-24 NOTE — ASSESSMENT
[FreeTextEntry1] : #  PACNS, that presented with progressive mental status change/ cognitive impairment  = MRI with findings of multiple infarcts, petechial hemorrhages = Cerebral angiogram 1/5/24: Multiple cerebrovascular luminal irregularities of bilateral anterior and posterior circulation consistent with a vasculopathy such as vasculitis. Stable findings compare to the angiography of 11/20/2023. = Negative MAX/cpANCA/dsDNA/Sm/Ribo P/BONI/SSA/SSB/SPEP/UA/C3: 152/C4: 36. CRP: 8. ESR: 113 RF: 30 apls negative # High risk meds -s/p 1g Methylprednisolone (11/24 - 11/27) then prednisone 60mg  = now on prednisone 50 mg a day since Feb 2024 - confirmed current dose with nursing home facility = have not seen neurologist since last visit  # Carotid bruits and heat M // a-fib  = have not seen cardiologist as per last visit recom [facility] :  = hep panel, QTB negative March 2024 = had A fib in 2/2024 and CVA  - labs - prednisone taper - 40 mg x 2 weeks, 30 mg a day x 2 weeks - add Bactrim for PCP prophylaxis - ECHO and US carotids - MR angiogram head and neck to evaluate extend of vasculitis and response to tx - neurology fu -Dr. Calvo #[phone] - cardiology fu  - as per sister-in-law, patient will be moved to a long term assisted facility in May in [place] and will need help with transportation or see another rheumatologist closer

## 2024-05-24 NOTE — HISTORY OF PRESENT ILLNESS
[de-identified] : Last was seen by DR Francis  in April 2024 [FreeTextEntry1] : Interval History : --------------------------- remained on prednisone 50 mg a day- dose confirmed with nursing facility No headache, no visual changes, feels well overall,  has not had acute confusional states recently moved to [facility] facility

## 2024-05-24 NOTE — PHYSICAL EXAM
[General Appearance - Alert] : alert [General Appearance - In No Acute Distress] : in no acute distress [Respiration, Rhythm And Depth] : normal respiratory rhythm and effort [Heart Rate And Rhythm] : heart rate was normal and rhythm regular [Heart Sounds] : normal S1 and S2 [Edema] : there was no peripheral edema [] : no rash [No Focal Deficits] : no focal deficits [Impaired Insight] : insight and judgment were intact [FreeTextEntry1] : a little withdrawn, but responds to all questions appropriately

## 2024-05-24 NOTE — DATA REVIEWED
[FreeTextEntry1] :  ACC: [id number] EXAM: CT ANGIO NECK STROKE PROTCL IC ORDERED BY: BRAIN GONZALEZ  ACC: [id number] EXAM: CT BRAIN PERFUSION MAPS STROKE ORDERED BY: BRAIN GONZALEZ  ACC: [id number] EXAM: CT ANGIO BRAIN STROKE PROTC IC ORDERED BY: BRAIN GONZALEZ  PROCEDURE DATE: 12/01/2023    INTERPRETATION: Clinical indication: Altered mental status, vasovagal, prior CVA, vasculitis  CT PERFUSION:  After the intravenous administration of 50 cc of Omnipaque 350 serial thin sections were obtained through the brain for the purposes of evaluating CT perfusion. Raw data was sent to the rapid ischemia view software for postprocessing.  No core infarct or delayed mean transit time is identified.  CBF<30% volume; 0 ml Tmax> 6.0s volume: 0 ml Mismatch volume; 0 ml Mismatch ratio:none    CTA head and neck:    After the intravenous power injection of 75 cc of Omnipaque 350 using a bolus janeth timing run serial thin sections were obtained through the neck from the thoracic inlet through the intracranial circulation centered at the [id number] on a multislice CT scanner reformatted with coronal and sagittal 2 D-MIP projections, including 3 D reconstructions using a separate 3D [employer] software workstation.A total of 125 cc of Omnipaque were intravenously injected. 0 cc were discarded.  Comparison is made with the prior CTA of 11/24/2023.  The origins of the carotid and vertebral arteries are normal. Left vertebral artery is dominant. The carotid bifurcations are normal bilaterally.  The distal vertebral arteries are well identified as are the posterior-inferior cerebellar arteries bilaterally. The region of the vertebral basilar junction is normal. The basilar artery is normal. The posterior cerebral and superior cerebellar arteries are normal.  Evaluation of the carotid arteries demonstrate normal appearance to distal cervical, petrous cavernous and supraclinoid internal carotid arteries. There is irregular narrowing of the anterior cerebral artery A2 segments bilaterally and the bilateral distal middle cerebral arteries in the sylvian fissure. There is no significant change since the prior exam.  Intracranial venous system is unremarkable.  IMPRESSION: Negative CT perfusion. CTA demonstrates persistent narrowing of the anterior and middle cerebral arteries consistent with vasospasm or vasculitis.  --- End of Report ---      BRI COLLINS MD; Attending Radiologist This document has been electronically signed. Dec 1 2023 3:51PM

## 2024-05-24 NOTE — CONSULT LETTER
[Dear  ___] : Dear  [unfilled], [Courtesy Letter:] : I had the pleasure of seeing your patient, [unfilled], in my office today. [Please see my note below.] : Please see my note below. [Consult Closing:] : Thank you very much for allowing me to participate in the care of this patient.  If you have any questions, please do not hesitate to contact me. [Sincerely,] : Sincerely, [FreeTextEntry2] : To [facility] Att to Sarina  Fax # [phone] [FreeTextEntry3] : Belgica Rose MD Director, [facility]cul[facility]os[facility],  Rheumatology Division, Department of Medicine ,  [employer]  at [address], [address] [place] [address] Tel: [phone]

## 2024-05-29 LAB
ESTIMATED AVERAGE GLUCOSE: 103 MG/DL
HBA1C MFR BLD HPLC: 5.2 %

## 2024-12-06 NOTE — PACU DISCHARGE NOTE - THE ANESTHESIA ORDERS USED IN THE PACU ORDER SET WILL BE DISCONTINUED UPON TRANSFER OF THIS PATIENT
Patient has had recent hospitalizations where rates were challenging to control  She is now recovered from acute illness and heart rate is more controlled  Patient would like to discontinue her midday atenolol dose  Patient sent for a digoxin level and a CMP.  Digoxin level was normal at time of dictation  Regimen as follows:  Continue apixaban 5 mg twice daily  Continue digoxin 125 mcg/day  Decrease atenolol to 50 mg twice daily   Statement Selected